# Patient Record
Sex: FEMALE | Race: WHITE | NOT HISPANIC OR LATINO | URBAN - METROPOLITAN AREA
[De-identification: names, ages, dates, MRNs, and addresses within clinical notes are randomized per-mention and may not be internally consistent; named-entity substitution may affect disease eponyms.]

---

## 2017-04-29 ENCOUNTER — EMERGENCY (EMERGENCY)
Facility: HOSPITAL | Age: 72
LOS: 1 days | Discharge: HOME | End: 2017-04-29
Attending: EMERGENCY MEDICINE | Admitting: EMERGENCY MEDICINE
Payer: MEDICARE

## 2017-04-29 DIAGNOSIS — K94.23 GASTROSTOMY MALFUNCTION: ICD-10-CM

## 2017-04-29 DIAGNOSIS — Z88.8 ALLERGY STATUS TO OTHER DRUGS, MEDICAMENTS AND BIOLOGICAL SUBSTANCES STATUS: ICD-10-CM

## 2017-04-29 DIAGNOSIS — Z91.018 ALLERGY TO OTHER FOODS: ICD-10-CM

## 2017-04-29 DIAGNOSIS — I10 ESSENTIAL (PRIMARY) HYPERTENSION: ICD-10-CM

## 2017-04-29 DIAGNOSIS — K94.23 GASTROSTOMY MALFUNCTION: Chronic | ICD-10-CM

## 2017-04-29 PROCEDURE — 74000: CPT | Mod: 26

## 2017-04-29 PROCEDURE — 99283 EMERGENCY DEPT VISIT LOW MDM: CPT | Mod: 25

## 2017-04-29 PROCEDURE — 43760 CHANGE GASTROSTOMY TUBE PERCUTANEOUS W/O GUIDE: CPT

## 2017-04-29 RX ORDER — IOHEXOL 300 MG/ML
50 INJECTION, SOLUTION INTRAVENOUS ONCE
Qty: 0 | Refills: 0 | Status: COMPLETED | OUTPATIENT
Start: 2017-04-29 | End: 2017-04-29

## 2017-04-29 RX ORDER — DIATRIZOATE MEGLUMINE 180 MG/ML
30 INJECTION, SOLUTION INTRAVESICAL ONCE
Qty: 0 | Refills: 0 | Status: DISCONTINUED | OUTPATIENT
Start: 2017-04-29 | End: 2017-04-29

## 2017-04-29 RX ADMIN — IOHEXOL 50 MILLILITER(S): 300 INJECTION, SOLUTION INTRAVENOUS at 23:00

## 2017-04-29 NOTE — ED PROVIDER NOTE - GASTROINTESTINAL, MLM
NABS, soft, ND, +PEG tube, unable to flush at bedside, no surrounding erythema, ecchymosis, warmth, induration or d/c noted

## 2017-04-29 NOTE — ED PROCEDURE NOTE - CPROC ED INFORMED CONSENT1
Benefits, risks, and possible complications of procedure explained to patient/caregiver who verbalized understanding and gave verbal consent. Benefits, risks, and possible complications of procedure explained to patient/caregiver who verbalized understanding and gave verbal consent./x

## 2017-04-29 NOTE — ED PROVIDER NOTE - MEDICAL DECISION MAKING DETAILS
pt with clogged PEG tube from group home, replaced at bedside with 16fr PEG tube, confirmed on AXR with no extravasation of contrast noted, medically stable for d/c

## 2017-04-29 NOTE — ED PROVIDER NOTE - OBJECTIVE STATEMENT
46 yo F with PMHx of MRCP, GERD, HTN, dementia, atypical psychosis, spastic dysplegia and dysphagia with PEG placement, last changed few months ago, diverticulosis, asp PNA, PE and DVT, not on any AC, sent from group home for clogged PEG tube since this afternoon.  Per aid, pt was noted to have clogged PEG tube and unable to flush the tube by RN staff and sent in for further evaluation.  No fever, chills, bleeding, d/c, abdominal pain, AMS, cough, and SOB noted

## 2017-04-29 NOTE — ED PROVIDER NOTE - PMH
CP (cerebral palsy)    DVT (deep venous thrombosis)    Dysphagia    GERD (gastroesophageal reflux disease)    HTN (hypertension)    Mental retardation    PE (pulmonary thromboembolism)    Spastic quadriplegia

## 2017-04-29 NOTE — ED PROCEDURE NOTE - CPROC ED POST RADIOGRAPHY1
post-procedure radiography performed/no extravasation of contrast/feeding tube in correct gastric position

## 2017-04-29 NOTE — ED PROCEDURE NOTE - CPROC ED FEED TUBE REPLAC DET1
Location was identified, and feeding tube inserted into the existing tract of the abdominal wall with easy passage. Tube was secured in place, and balloon inflated.

## 2017-05-07 ENCOUNTER — EMERGENCY (EMERGENCY)
Facility: HOSPITAL | Age: 72
LOS: 1 days | Discharge: HOME | End: 2017-05-07
Attending: EMERGENCY MEDICINE | Admitting: EMERGENCY MEDICINE
Payer: MEDICARE

## 2017-05-07 VITALS
DIASTOLIC BLOOD PRESSURE: 70 MMHG | TEMPERATURE: 98 F | OXYGEN SATURATION: 94 % | RESPIRATION RATE: 18 BRPM | SYSTOLIC BLOOD PRESSURE: 127 MMHG | HEART RATE: 75 BPM

## 2017-05-07 VITALS
OXYGEN SATURATION: 95 % | HEART RATE: 84 BPM | TEMPERATURE: 98 F | DIASTOLIC BLOOD PRESSURE: 76 MMHG | SYSTOLIC BLOOD PRESSURE: 125 MMHG | RESPIRATION RATE: 17 BRPM

## 2017-05-07 DIAGNOSIS — K94.23 GASTROSTOMY MALFUNCTION: Chronic | ICD-10-CM

## 2017-05-07 DIAGNOSIS — K94.23 GASTROSTOMY MALFUNCTION: ICD-10-CM

## 2017-05-07 DIAGNOSIS — Z88.8 ALLERGY STATUS TO OTHER DRUGS, MEDICAMENTS AND BIOLOGICAL SUBSTANCES STATUS: ICD-10-CM

## 2017-05-07 DIAGNOSIS — I10 ESSENTIAL (PRIMARY) HYPERTENSION: ICD-10-CM

## 2017-05-07 DIAGNOSIS — Z91.018 ALLERGY TO OTHER FOODS: ICD-10-CM

## 2017-05-07 PROCEDURE — 74010: CPT | Mod: 26

## 2017-05-07 PROCEDURE — 74020: CPT | Mod: 26

## 2017-05-07 PROCEDURE — 99283 EMERGENCY DEPT VISIT LOW MDM: CPT | Mod: 25

## 2017-05-07 NOTE — ED PROVIDER NOTE - OBJECTIVE STATEMENT
70 yo F with Hx of CP, DVT, GERD, HTN, Mental retardation, PE and spastic quadriplegia presents c/o g-tube dislodged 30 minutes ago. Pt's aid denies any other complaints at this time. Denies fever or chills.

## 2017-05-07 NOTE — ED PROVIDER NOTE - CONSTITUTIONAL, MLM
normal... Well appearing, well nourished, awake, alert and in no apparent distress. Pt is minimally verbal.

## 2017-05-07 NOTE — ED PROVIDER NOTE - NS ED MD SCRIBE ATTENDING SCRIBE SECTIONS
VITAL SIGNS( Pullset)/PHYSICAL EXAM/HISTORY OF PRESENT ILLNESS/INTAKE ASSESSMENT/SCREENINGS/PAST MEDICAL/SURGICAL/SOCIAL HISTORY/CONSULTATIONS/SHIFT CHANGE/DISPOSITION/PROGRESS NOTE/RESULTS/REVIEW OF SYSTEMS/HIV

## 2017-05-07 NOTE — ED PROVIDER NOTE - MEDICAL DECISION MAKING DETAILS
70 yo F with Hx of mental retardation and thus minimally very upon exam presents c/o G-tube dislodgement and abdominal stroma. 70 yo F with PMH of cerebral palsy, chronic PEG and thus minimally very upon exam presents c/o G-tube dislodgement and abdominal stroma. 72 yo F with PMH of cerebral palsy, chronic PEG and thus minimally very upon exam presents c/o G-tube dislodgement and abdominal stoma.  PEG tube replaced and confirmed on xray with no contrast extravagation.

## 2017-05-07 NOTE — ED PROVIDER NOTE - DIAGNOSTIC INTERPRETATION
Interpreted by ED physician  abdomen x-ray, 1 views  tube in place, no Foreign Body noted, soft tissue normal

## 2017-05-07 NOTE — ED ADULT NURSE NOTE - OBJECTIVE STATEMENT
pt brought it by aid because her gtube dislogded. Pt not in any pain/distress. pt nonverbal at baseline. pt w/ hx of CP, reflux, dementia, psychosis, spastic dysplegia brought it by aid because her gtube dislogded. Pt not in any pain/distress. pt nonverbal at baseline.

## 2019-10-10 ENCOUNTER — EMERGENCY (EMERGENCY)
Facility: HOSPITAL | Age: 74
LOS: 1 days | Discharge: HOME | End: 2019-10-10
Attending: EMERGENCY MEDICINE | Admitting: EMERGENCY MEDICINE
Payer: MEDICARE

## 2019-10-10 VITALS
OXYGEN SATURATION: 98 % | HEART RATE: 98 BPM | TEMPERATURE: 98 F | RESPIRATION RATE: 18 BRPM | SYSTOLIC BLOOD PRESSURE: 102 MMHG | DIASTOLIC BLOOD PRESSURE: 62 MMHG

## 2019-10-10 VITALS
DIASTOLIC BLOOD PRESSURE: 87 MMHG | OXYGEN SATURATION: 94 % | SYSTOLIC BLOOD PRESSURE: 127 MMHG | TEMPERATURE: 98 F | HEART RATE: 85 BPM | RESPIRATION RATE: 18 BRPM

## 2019-10-10 DIAGNOSIS — K94.23 GASTROSTOMY MALFUNCTION: Chronic | ICD-10-CM

## 2019-10-10 PROCEDURE — 99282 EMERGENCY DEPT VISIT SF MDM: CPT

## 2019-10-10 PROCEDURE — 99283 EMERGENCY DEPT VISIT LOW MDM: CPT

## 2019-10-10 PROCEDURE — 99285 EMERGENCY DEPT VISIT HI MDM: CPT

## 2019-10-10 NOTE — ED ADULT NURSE NOTE - CHIEF COMPLAINT QUOTE
Patient from [facility], comes in for clogged Gastrostomy Tube, which health aid noted 12pm. Patient states possible obstructed tube causing pain. no bleeding. No fever

## 2019-10-10 NOTE — ED PROVIDER NOTE - CLINICAL SUMMARY MEDICAL DECISION MAKING FREE TEXT BOX
74f w feeding tube malfunction. Unable to flush or replace tube. Care d/w GI and patient to be transferred to [facility] for ED GI eval.

## 2019-10-10 NOTE — ED PROVIDER NOTE - PHYSICAL EXAMINATION
Physical Exam  General: Awake, alert, NAD, elderly/frail, non-toxic appearing, NCAT  Eyes: PERRL, EOMI, no icterus, lids and conjunctivae are normal  ENT: External inspection normal, pink/moist membranes, pharynx normal  CV: S1S2, regular rate and rhythm, no murmur/gallops/rubs, no JVD, 2+ pulses b/l, no edema/cords/homans, warm/well-perfused  Respiratory: Normal respiratory rate/effort, no respiratory distress, normal voice, speaking full sentences, lungs clear to auscultation b/l, no wheezing/rales/rhonchi, no retractions, no stridor  Abdomen: Soft abdomen, Feeding tube in LUQ, no tender/distended/guarding/rebound, no CVA tender  Musculoskeletal: Contracted extremities at baseline, N/V intact  Neck: FROM neck, supple, no meningismus, trachea midline, no JVD  Integumentary: Color normal for race, warm and dry, no rash  Neuro: Alert/interactive, CN 2-12 grossly intact, baseline motor/sensory

## 2019-10-10 NOTE — CHART NOTE - NSCHARTNOTEFT_GEN_A_CORE
Called by ED physician at [facility] Dr. Fontaine for PEG dysfunction. Patient was noted to have dysfunctioning PEG x 2 days for which she initially presented to the ED but was discharged after return of function. She represented with her aide today after her PEG was unable to be flushed. Per Dr. Fontaine, he attempted flushing with multiple modalities including flushing of ginger ale and replacement, however, unsuccessful. The patient was transferred to [facility] ED for evaluation. Pt was seen at bedside and noted to have a dysfunctioning PEG tube. The PEG tube was then cleaned with a cleaning brush and subsequently flushed with 60 cc of sterile water without difficulty. Pt and aid were instructed to follow-up with primary GI for further PEG management and possible replacement.

## 2019-10-10 NOTE — ED PROVIDER NOTE - PROGRESS NOTE DETAILS
Attempted to flush feeding tube and to flush tube w ginger ale but unable to flush tube. attempted to deflate balloon to replace and unable to deflate balloon or remove dysfunctional feeding tube. will d/w GI. Care d/w Dr Karolina ESCALANTE and recommending transfer to [facility] ED for ED eval by GI. Care d/w Dr Solomon accepting transfer

## 2019-10-10 NOTE — ED ADULT TRIAGE NOTE - CHIEF COMPLAINT QUOTE
Patient from [facility], comes in for clogged Gastrostomy Tube, which health aid noted 12pm. Patient states possible obstructed tube causing pain. no bleeding. No fever

## 2019-10-10 NOTE — ED PROVIDER NOTE - GASTROINTESTINAL, MLM
PEG in place; surrounding area appears clean and dry.  No grimacing on palpation throughout entire abdomen.

## 2019-10-10 NOTE — ED PROVIDER NOTE - NSFOLLOWUPINSTRUCTIONS_ED_ALL_ED_FT
Please follow up with your primary physician in 1-2 days for re evaluation.  Please return to ER immediately should your symptoms worsen or if you have any concern prior to this recommended follow up.    How to Use and Care for Your PEG Tube    WHAT YOU NEED TO KNOW:    Healthcare providers will teach you how to put liquid food and certain medicines through the tube. You will also be taught how to care for the PEG tube and the skin where the tube enters your body.    DISCHARGE INSTRUCTIONS:    Return to the emergency department if:     You start coughing or vomiting during or after a feeding.       You have severe abdominal pain.       Blood or tube feeding fluid leaks from the PEG tube site.       Your PEG tube is shorter than it was when it was put in.      Your PEG tube comes out.       Your mouth feels dry, your heart feels like it is beating too fast, or you feel weak.    Call your doctor or gastroenterologist if:     You have nausea, diarrhea, or abdominal bloating or discomfort.      You have stomach pain after each feeding or when you move around.      You have discomfort or pain around your PEG tube site.      The skin around your PEG tube is red, swollen, or draining pus.      You weigh less than your healthcare provider says you should.      Your PEG tube is longer than it was when it was put in.      You have questions or concerns about your condition or care.    How to use a PEG tube for feedings: Your healthcare provider will tell you when and how often to use your PEG tube for feedings. A bolus feeding means nutrition is given over a short period of time. An intermittent feeding is scheduled for certain times throughout the day. Continuous feedings run all the time. The following are types of PEG tube systems:     A feeding syringe helps liquid food to flow steadily into the PEG tube. The syringe is connected to the end of the PEG tube. You will pour the liquid into the syringe and hold it up high. The syringe plunger may be used to gently push the last of the liquid through the PEG tube.      A gravity drip bag allows liquid food to drip more slowly into the PEG tube. The tubing from the gravity drip bag is connected to the end of the PEG tube. You will pour the liquid into the bag. The bag hangs on a medical pole or similar device. You can adjust the flow rate on the tubing according to your healthcare provider's instructions.      An electric feeding pump controls the flow of the liquid food into your PEG tube. Your healthcare provider will teach you how to set up and use the pump.    How to care for your PEG tube:     Always wash your hands before and after each use. This helps prevent infections. Use soap and water to wash your hands.Handwashing           Always flush your PEG tube before and after each use. This helps prevent blockage from formula or medicine. Use at least 30 milliliters (mL) of water to flush the tube. Follow directions for flushing your PEG tube.      If your PEG tube becomes clogged, try to unclog it as soon as you can. Flush your PEG tube with a 60 mL syringe filled with warm water. Never use a wire to unclog the tube. A wire can poke a hole in the tube. Your healthcare provider may have you use a medicine or a plastic brush to help unclog your tube.      Check the PEG tube daily:   Check the length of the tube from the end to where it goes into your body. If it gets longer, it may be at risk for coming out. If it gets shorter, let your healthcare provider know right away.      Check the bumper. The bumper is a piece that goes around the tube, next to your skin. It should be snug against your skin. Tell your healthcare provider if the bumper seems too tight or too loose.      Use an alcohol pad to clean the end of your PEG tube. Clean before you connect tubing or a syringe to your PEG tube and after you remove it. Do not let the end of the PEG tube touch anything.    How do I care for the skin around my PEG tube?     Do not remove the stitches or medical tape. Stitches or medical tape hold your PEG tube in place when you first get it. Your healthcare provider will take them off once the skin around your tube heals. Leave clean bandages over the tube area for the first 24 hours after the tube is put in. You may not need to use bandages after 24 hours if the skin around the tube looks dry. Ask when you can shower or bathe.      Routine skin care:   Clean the skin around your tube 1 to 2 times each day. Ask your healthcare provider what you should use to clean your skin. Check for redness, swelling, or pus in the area where the tube goes into your body. Check for fluid draining from your stoma (the hole where the tube was put in).      Gently turn your tube daily after your stitches come out. This may decrease pressure on your skin under the bumper. It may also help prevent an infection.      Keep the skin around your PEG tube dry. This will help prevent skin irritation and infection.      Use topical medicines as directed. You may need to put antibiotic cream on the skin around your tube after you are done cleaning it.    Other tips to know about a PEG tube:     Keep a record of liquids you have each day. You may also need to keep a record of how much you urinate and how many times you have a bowel movement each day. Bring this record to your follow-up visits.      Check your weight directed. Keep a record of your weights and bring it to your follow-up visits. Your healthcare provider may need to change your feedings if your weight changes too quickly.Weight Checks SHANTEL           Take your medicines as directed. Learn which of your medicines can be crushed, mixed with water, and given through the PEG tube. Certain medicines should not be crushed or may clog the PEG tube.      Go to all follow-up appointments. You may need to have blood tests and other tests when you see your healthcare provider.    Follow up with your doctor or gastroenterologist as directed: Write down your questions so you remember to ask them during your visits.       © Copyright [employer] 2019 All illustrations and images included in CareNotes are the copyrighted property of [employer].A.M., Inc[employer] or Assignment Editor.      back to top                      © Copyright [employer] 2019

## 2019-10-10 NOTE — ED PROVIDER NOTE - NS ED ROS FT
Review of Systems  Constitutional:  No fever or chills.   Eyes:  Negative.   ENMT:  No nasal congestion, discharge, or throat pain.   Cardiac:  No chest pain, syncope, or edema.  Respiratory:  No dyspnea, wheezing, or cough. No hemoptysis.  GI:  See HPI  :  No dysuria or hematuria.   Musculoskeletal:  Chronic spastic paraplegia unchanged  Skin:  No skin rash, jaundice, or lesions.  Neuro:  No change in mental status.

## 2019-10-10 NOTE — ED PROVIDER NOTE - PATIENT PORTAL LINK FT
You can access the FollowMyHealth Patient Portal offered by [facility] by registering at the following website: [email or web address]. By joining [facility]’s FollowMyHealth portal, you will also be able to view your health information using other applications (apps) compatible with our system.

## 2019-10-10 NOTE — ED ADULT NURSE NOTE - TEMPLATE
ED Nursing criteria listed below was addressed during verbal handoff:     Abnormal vitals: No  Abnormal results: Yes  Med Reconciliation completed: Yes  Meds given in ED: Yes  Any Overdue Meds: Yes  Core Measures: N/A  Isolation: No  Special needs: No  Skin assessment: Yes, right hand is swollen.    Observation Patient  Education provided: Yes  
Abdominal Pain, N/V/D

## 2019-10-10 NOTE — ED PROVIDER NOTE - NSBENEFITOFTRANSFER_ED_A_ED
Continuity of Care at Other Facility/Worsening of Condition, Death, or Disability if Patient Does Not Transfer/Obtain Level of Care/Service Not Available at this Facility

## 2019-10-10 NOTE — ED PROVIDER NOTE - CLINICAL SUMMARY MEDICAL DECISION MAKING FREE TEXT BOX
Patient in ED w concern for PEG tube malfunction, sent to [facility] ED from [facility] ED for GI eval of PEG tube.  GI in ED to eval on arrival and PEG tube is cleaned and flushing appropriately.  No additional acute complaints or concerns per care taker at bedside.  Will plan for discharge home with instruction for prompt GI follow up in 1-2 days (patient has GI physician out of another facility).  Caretaker is instructed to return patient to ED immediately should symptoms worsen or if there is any concern prior to this recommended follow up.  Caretaker is aware of plan and verbalizes her understanding.  Will discharge home at this time.

## 2019-10-10 NOTE — ED ADULT NURSE NOTE - OBJECTIVE STATEMENT
per  Liborio ([facility]), while at day program feeding tube became clogged today while attempting to pass meds, pt c/o luq pain, states lbm was today, receives tube feeds from [id number] daily, no s/s of distress, awaiting provider eval (speech garbled-baseline per staff) hha at bedside

## 2019-10-10 NOTE — ED PROVIDER NOTE - OBJECTIVE STATEMENT
74f w a hx of HTN, HLD, MR/Psych, spastic quadriplegia, and dysphagia dependent on PEG tube. Pt presents w aide for PEG tube malfunction since 12p today. Aide reports unable to pass feed through tube. Pt reports no symptoms at this time. No abdominal pain. No vomit/diarrhea, no black/bloody stools. Patient in usual state of health prior. No other injury, no other complaints. History assisted by aide at bedside.

## 2019-10-10 NOTE — ED PROVIDER NOTE - OBJECTIVE STATEMENT
74 year old female with history of CP, DVT, Dysphagia, GERD, chronic PEG, HTN, mental retardation, PE, spastic quadreplgia presents to ED from [facility] after being evaluated for PEG tube malfunction.  As per report, PEG tube was clogged and patient was sent to [facility] for GI eval of PEG tube.  GI team in ED to evaluate on patient's arrival.  Patient is accompanied by health aid and no additional acute complaints or concerns are noted.  Additional history is limited as patient is non verbal.

## 2019-10-10 NOTE — ED PROVIDER NOTE - CARE PLAN
Principal Discharge DX:	PEG tube malfunction Principal Discharge DX:	PEG tube malfunction  Assessment and plan of treatment:	74f w feeding tube malfunction. --Will attempt to flush or replace tube, observe/re-assess.

## 2019-10-10 NOTE — ED ADULT NURSE NOTE - OBJECTIVE STATEMENT
pt hx of dysphagia, spastic quad and CP with g tube for feedings and meds presents with clogged tube all day per HHA.

## 2019-10-11 PROBLEM — I82.409 ACUTE EMBOLISM AND THROMBOSIS OF UNSPECIFIED DEEP VEINS OF UNSPECIFIED LOWER EXTREMITY: Chronic | Status: ACTIVE | Noted: 2017-04-29

## 2019-10-11 PROBLEM — G82.50 QUADRIPLEGIA, UNSPECIFIED: Chronic | Status: ACTIVE | Noted: 2017-04-29

## 2019-10-11 PROBLEM — I26.99 OTHER PULMONARY EMBOLISM WITHOUT ACUTE COR PULMONALE: Chronic | Status: ACTIVE | Noted: 2017-04-29

## 2019-10-11 PROBLEM — F79 UNSPECIFIED INTELLECTUAL DISABILITIES: Chronic | Status: ACTIVE | Noted: 2017-04-29

## 2019-10-11 PROBLEM — R13.10 DYSPHAGIA, UNSPECIFIED: Chronic | Status: ACTIVE | Noted: 2017-04-29

## 2019-10-11 PROBLEM — I10 ESSENTIAL (PRIMARY) HYPERTENSION: Chronic | Status: ACTIVE | Noted: 2017-04-29

## 2019-10-11 PROBLEM — G80.9 CEREBRAL PALSY, UNSPECIFIED: Chronic | Status: ACTIVE | Noted: 2017-04-29

## 2019-10-11 PROBLEM — K21.9 GASTRO-ESOPHAGEAL REFLUX DISEASE WITHOUT ESOPHAGITIS: Chronic | Status: ACTIVE | Noted: 2017-04-29

## 2019-10-14 DIAGNOSIS — K94.23 GASTROSTOMY MALFUNCTION: ICD-10-CM

## 2019-10-17 DIAGNOSIS — K94.23 GASTROSTOMY MALFUNCTION: ICD-10-CM

## 2020-04-03 ENCOUNTER — INPATIENT (INPATIENT)
Facility: HOSPITAL | Age: 75
LOS: 34 days | Discharge: HOME HEALTH | DRG: 207 | End: 2020-05-08
Attending: INTERNAL MEDICINE | Admitting: INTERNAL MEDICINE
Payer: MEDICARE

## 2020-04-03 VITALS
HEART RATE: 110 BPM | RESPIRATION RATE: 18 BRPM | OXYGEN SATURATION: 90 % | SYSTOLIC BLOOD PRESSURE: 159 MMHG | DIASTOLIC BLOOD PRESSURE: 95 MMHG

## 2020-04-03 DIAGNOSIS — K94.23 GASTROSTOMY MALFUNCTION: Chronic | ICD-10-CM

## 2020-04-03 LAB
ALBUMIN SERPL ELPH-MCNC: 3.1 G/DL — LOW (ref 3.4–5)
ALP SERPL-CCNC: 84 U/L — SIGNIFICANT CHANGE UP (ref 40–120)
ALT FLD-CCNC: 30 U/L — SIGNIFICANT CHANGE UP (ref 12–42)
ANION GAP SERPL CALC-SCNC: 7 MMOL/L — LOW (ref 9–16)
APTT BLD: 27.7 SEC — SIGNIFICANT CHANGE UP (ref 27.5–36.3)
APTT BLD: 30.4 SEC — SIGNIFICANT CHANGE UP (ref 27.5–36.3)
AST SERPL-CCNC: 55 U/L — HIGH (ref 15–37)
BASE EXCESS BLDA CALC-SCNC: -1.3 MMOL/L — SIGNIFICANT CHANGE UP (ref -2–3)
BASOPHILS # BLD AUTO: 0.03 K/UL — SIGNIFICANT CHANGE UP (ref 0–0.2)
BASOPHILS NFR BLD AUTO: 0.5 % — SIGNIFICANT CHANGE UP (ref 0–2)
BILIRUB SERPL-MCNC: 0.5 MG/DL — SIGNIFICANT CHANGE UP (ref 0.2–1.2)
BUN SERPL-MCNC: 18 MG/DL — SIGNIFICANT CHANGE UP (ref 7–23)
CALCIUM SERPL-MCNC: 8.8 MG/DL — SIGNIFICANT CHANGE UP (ref 8.5–10.5)
CHLORIDE SERPL-SCNC: 95 MMOL/L — LOW (ref 96–108)
CO2 SERPL-SCNC: 33 MMOL/L — HIGH (ref 22–31)
CREAT SERPL-MCNC: 0.76 MG/DL — SIGNIFICANT CHANGE UP (ref 0.5–1.3)
CRP SERPL-MCNC: 1.28 MG/DL — HIGH (ref 0–0.4)
D DIMER BLD IA.RAPID-MCNC: 478 NG/ML DDU — HIGH
EOSINOPHIL # BLD AUTO: 0.05 K/UL — SIGNIFICANT CHANGE UP (ref 0–0.5)
EOSINOPHIL NFR BLD AUTO: 0.8 % — SIGNIFICANT CHANGE UP (ref 0–6)
FERRITIN SERPL-MCNC: 162 NG/ML — HIGH (ref 15–150)
GLUCOSE SERPL-MCNC: 122 MG/DL — HIGH (ref 70–99)
HCO3 BLDA-SCNC: 24 MMOL/L — SIGNIFICANT CHANGE UP (ref 21–28)
HCT VFR BLD CALC: 44.8 % — SIGNIFICANT CHANGE UP (ref 34.5–45)
HGB BLD-MCNC: 13.8 G/DL — SIGNIFICANT CHANGE UP (ref 11.5–15.5)
IMM GRANULOCYTES NFR BLD AUTO: 0.2 % — SIGNIFICANT CHANGE UP (ref 0–1.5)
INR BLD: 0.95 — SIGNIFICANT CHANGE UP (ref 0.88–1.16)
INR BLD: 0.97 — SIGNIFICANT CHANGE UP (ref 0.88–1.16)
LACTATE SERPL-SCNC: 2 MMOL/L — SIGNIFICANT CHANGE UP (ref 0.5–2)
LACTATE SERPL-SCNC: 4.2 MMOL/L — CRITICAL HIGH (ref 0.4–2)
LYMPHOCYTES # BLD AUTO: 3 K/UL — SIGNIFICANT CHANGE UP (ref 1–3.3)
LYMPHOCYTES # BLD AUTO: 48 % — HIGH (ref 13–44)
MCHC RBC-ENTMCNC: 30.8 GM/DL — LOW (ref 32–36)
MCHC RBC-ENTMCNC: 31.1 PG — SIGNIFICANT CHANGE UP (ref 27–34)
MCV RBC AUTO: 100.9 FL — HIGH (ref 80–100)
MONOCYTES # BLD AUTO: 0.83 K/UL — SIGNIFICANT CHANGE UP (ref 0–0.9)
MONOCYTES NFR BLD AUTO: 13.3 % — SIGNIFICANT CHANGE UP (ref 2–14)
MRSA PCR RESULT.: NEGATIVE — SIGNIFICANT CHANGE UP
NEUTROPHILS # BLD AUTO: 2.33 K/UL — SIGNIFICANT CHANGE UP (ref 1.8–7.4)
NEUTROPHILS NFR BLD AUTO: 37.2 % — LOW (ref 43–77)
NRBC # BLD: 0 /100 WBCS — SIGNIFICANT CHANGE UP (ref 0–0)
PCO2 BLDA: 41 MMHG — SIGNIFICANT CHANGE UP (ref 32–45)
PCO2 BLDV: 73 MMHG — HIGH (ref 41–51)
PCO2 BLDV: 97 MMHG — HIGH (ref 41–51)
PH BLDA: 7.38 — SIGNIFICANT CHANGE UP (ref 7.35–7.45)
PH BLDV: 7.08 — CRITICAL LOW (ref 7.32–7.43)
PH BLDV: 7.3 — LOW (ref 7.32–7.43)
PLATELET # BLD AUTO: 157 K/UL — SIGNIFICANT CHANGE UP (ref 150–400)
PO2 BLDA: 101 MMHG — SIGNIFICANT CHANGE UP (ref 83–108)
PO2 BLDV: 23 MMHG — LOW (ref 35–40)
PO2 BLDV: 84 MMHG — HIGH (ref 35–40)
POTASSIUM SERPL-MCNC: 3.6 MMOL/L — SIGNIFICANT CHANGE UP (ref 3.5–5.3)
POTASSIUM SERPL-SCNC: 3.6 MMOL/L — SIGNIFICANT CHANGE UP (ref 3.5–5.3)
PROCALCITONIN SERPL-MCNC: 0.46 NG/ML — HIGH (ref 0.02–0.1)
PROT SERPL-MCNC: 7.4 G/DL — SIGNIFICANT CHANGE UP (ref 6.4–8.2)
PROTHROM AB SERPL-ACNC: 10.4 SEC — SIGNIFICANT CHANGE UP (ref 10–12.9)
PROTHROM AB SERPL-ACNC: 11.1 SEC — SIGNIFICANT CHANGE UP (ref 10–12.9)
RBC # BLD: 4.44 M/UL — SIGNIFICANT CHANGE UP (ref 3.8–5.2)
RBC # FLD: 13 % — SIGNIFICANT CHANGE UP (ref 10.3–14.5)
S AUREUS DNA NOSE QL NAA+PROBE: POSITIVE
SAO2 % BLDA: 98 % — SIGNIFICANT CHANGE UP (ref 95–100)
SAO2 % BLDV: 34 % — SIGNIFICANT CHANGE UP
SAO2 % BLDV: 91 % — SIGNIFICANT CHANGE UP
SARS-COV-2 RNA SPEC QL NAA+PROBE: DETECTED
SODIUM SERPL-SCNC: 135 MMOL/L — SIGNIFICANT CHANGE UP (ref 132–145)
WBC # BLD: 6.25 K/UL — SIGNIFICANT CHANGE UP (ref 3.8–10.5)
WBC # FLD AUTO: 6.25 K/UL — SIGNIFICANT CHANGE UP (ref 3.8–10.5)

## 2020-04-03 PROCEDURE — 71045 X-RAY EXAM CHEST 1 VIEW: CPT | Mod: 26,77

## 2020-04-03 PROCEDURE — 93010 ELECTROCARDIOGRAM REPORT: CPT | Mod: 59

## 2020-04-03 PROCEDURE — 71045 X-RAY EXAM CHEST 1 VIEW: CPT | Mod: 26

## 2020-04-03 PROCEDURE — 99291 CRITICAL CARE FIRST HOUR: CPT | Mod: 25

## 2020-04-03 PROCEDURE — 31500 INSERT EMERGENCY AIRWAY: CPT

## 2020-04-03 PROCEDURE — 99291 CRITICAL CARE FIRST HOUR: CPT

## 2020-04-03 RX ORDER — CHLORHEXIDINE GLUCONATE 213 G/1000ML
15 SOLUTION TOPICAL EVERY 12 HOURS
Refills: 0 | Status: DISCONTINUED | OUTPATIENT
Start: 2020-04-03 | End: 2020-04-03

## 2020-04-03 RX ORDER — CHLORHEXIDINE GLUCONATE 213 G/1000ML
1 SOLUTION TOPICAL
Refills: 0 | Status: DISCONTINUED | OUTPATIENT
Start: 2020-04-03 | End: 2020-05-08

## 2020-04-03 RX ORDER — PROPOFOL 10 MG/ML
40 INJECTION, EMULSION INTRAVENOUS ONCE
Refills: 0 | Status: COMPLETED | OUTPATIENT
Start: 2020-04-03 | End: 2020-04-03

## 2020-04-03 RX ORDER — HYDROXYCHLOROQUINE SULFATE 200 MG
400 TABLET ORAL EVERY 12 HOURS
Refills: 0 | Status: COMPLETED | OUTPATIENT
Start: 2020-04-03 | End: 2020-04-04

## 2020-04-03 RX ORDER — ACETAMINOPHEN 500 MG
650 TABLET ORAL EVERY 6 HOURS
Refills: 0 | Status: DISCONTINUED | OUTPATIENT
Start: 2020-04-03 | End: 2020-05-08

## 2020-04-03 RX ORDER — SENNA PLUS 8.6 MG/1
2 TABLET ORAL AT BEDTIME
Refills: 0 | Status: DISCONTINUED | OUTPATIENT
Start: 2020-04-03 | End: 2020-04-14

## 2020-04-03 RX ORDER — PROPOFOL 10 MG/ML
10 INJECTION, EMULSION INTRAVENOUS
Qty: 1000 | Refills: 0 | Status: DISCONTINUED | OUTPATIENT
Start: 2020-04-03 | End: 2020-04-07

## 2020-04-03 RX ORDER — SODIUM CHLORIDE 9 MG/ML
10 INJECTION INTRAMUSCULAR; INTRAVENOUS; SUBCUTANEOUS
Refills: 0 | Status: DISCONTINUED | OUTPATIENT
Start: 2020-04-03 | End: 2020-05-08

## 2020-04-03 RX ORDER — HYDROXYCHLOROQUINE SULFATE 200 MG
TABLET ORAL
Refills: 0 | Status: COMPLETED | OUTPATIENT
Start: 2020-04-03 | End: 2020-04-08

## 2020-04-03 RX ORDER — PANTOPRAZOLE SODIUM 20 MG/1
40 TABLET, DELAYED RELEASE ORAL DAILY
Refills: 0 | Status: DISCONTINUED | OUTPATIENT
Start: 2020-04-03 | End: 2020-05-08

## 2020-04-03 RX ORDER — PROPOFOL 10 MG/ML
10 INJECTION, EMULSION INTRAVENOUS
Qty: 500 | Refills: 0 | Status: DISCONTINUED | OUTPATIENT
Start: 2020-04-03 | End: 2020-04-03

## 2020-04-03 RX ORDER — KETAMINE HYDROCHLORIDE 100 MG/ML
50 INJECTION INTRAMUSCULAR; INTRAVENOUS ONCE
Refills: 0 | Status: DISCONTINUED | OUTPATIENT
Start: 2020-04-03 | End: 2020-04-03

## 2020-04-03 RX ORDER — ROCURONIUM BROMIDE 10 MG/ML
100 VIAL (ML) INTRAVENOUS ONCE
Refills: 0 | Status: COMPLETED | OUTPATIENT
Start: 2020-04-03 | End: 2020-04-03

## 2020-04-03 RX ORDER — HYDROXYCHLOROQUINE SULFATE 200 MG
200 TABLET ORAL EVERY 12 HOURS
Refills: 0 | Status: COMPLETED | OUTPATIENT
Start: 2020-04-04 | End: 2020-04-08

## 2020-04-03 RX ORDER — CHLORHEXIDINE GLUCONATE 213 G/1000ML
15 SOLUTION TOPICAL EVERY 12 HOURS
Refills: 0 | Status: DISCONTINUED | OUTPATIENT
Start: 2020-04-03 | End: 2020-04-07

## 2020-04-03 RX ORDER — VANCOMYCIN HCL 1 G
800 VIAL (EA) INTRAVENOUS EVERY 12 HOURS
Refills: 0 | Status: DISCONTINUED | OUTPATIENT
Start: 2020-04-03 | End: 2020-04-04

## 2020-04-03 RX ORDER — SODIUM CHLORIDE 9 MG/ML
500 INJECTION INTRAMUSCULAR; INTRAVENOUS; SUBCUTANEOUS ONCE
Refills: 0 | Status: COMPLETED | OUTPATIENT
Start: 2020-04-03 | End: 2020-04-03

## 2020-04-03 RX ORDER — AZITHROMYCIN 500 MG/1
250 TABLET, FILM COATED ORAL EVERY 24 HOURS
Refills: 0 | Status: DISCONTINUED | OUTPATIENT
Start: 2020-04-03 | End: 2020-04-09

## 2020-04-03 RX ORDER — ENOXAPARIN SODIUM 100 MG/ML
40 INJECTION SUBCUTANEOUS EVERY 24 HOURS
Refills: 0 | Status: DISCONTINUED | OUTPATIENT
Start: 2020-04-03 | End: 2020-04-06

## 2020-04-03 RX ORDER — VANCOMYCIN HCL 1 G
1000 VIAL (EA) INTRAVENOUS ONCE
Refills: 0 | Status: COMPLETED | OUTPATIENT
Start: 2020-04-03 | End: 2020-04-03

## 2020-04-03 RX ORDER — INSULIN LISPRO 100/ML
VIAL (ML) SUBCUTANEOUS EVERY 6 HOURS
Refills: 0 | Status: DISCONTINUED | OUTPATIENT
Start: 2020-04-03 | End: 2020-05-08

## 2020-04-03 RX ORDER — PROPOFOL 10 MG/ML
50 INJECTION, EMULSION INTRAVENOUS ONCE
Refills: 0 | Status: COMPLETED | OUTPATIENT
Start: 2020-04-03 | End: 2020-04-03

## 2020-04-03 RX ORDER — PROPOFOL 10 MG/ML
20 INJECTION, EMULSION INTRAVENOUS
Qty: 1000 | Refills: 0 | Status: DISCONTINUED | OUTPATIENT
Start: 2020-04-03 | End: 2020-04-03

## 2020-04-03 RX ORDER — CHLORHEXIDINE GLUCONATE 213 G/1000ML
1 SOLUTION TOPICAL
Refills: 0 | Status: DISCONTINUED | OUTPATIENT
Start: 2020-04-03 | End: 2020-04-03

## 2020-04-03 RX ORDER — ETOMIDATE 2 MG/ML
20 INJECTION INTRAVENOUS ONCE
Refills: 0 | Status: COMPLETED | OUTPATIENT
Start: 2020-04-03 | End: 2020-04-03

## 2020-04-03 RX ORDER — PIPERACILLIN AND TAZOBACTAM 4; .5 G/20ML; G/20ML
3.38 INJECTION, POWDER, LYOPHILIZED, FOR SOLUTION INTRAVENOUS EVERY 6 HOURS
Refills: 0 | Status: DISCONTINUED | OUTPATIENT
Start: 2020-04-03 | End: 2020-04-03

## 2020-04-03 RX ORDER — NOREPINEPHRINE BITARTRATE/D5W 8 MG/250ML
0.05 PLASTIC BAG, INJECTION (ML) INTRAVENOUS
Qty: 8 | Refills: 0 | Status: DISCONTINUED | OUTPATIENT
Start: 2020-04-03 | End: 2020-04-07

## 2020-04-03 RX ORDER — FENTANYL CITRATE 50 UG/ML
50 INJECTION INTRAVENOUS ONCE
Refills: 0 | Status: DISCONTINUED | OUTPATIENT
Start: 2020-04-03 | End: 2020-04-03

## 2020-04-03 RX ORDER — ACETAMINOPHEN 500 MG
650 TABLET ORAL ONCE
Refills: 0 | Status: COMPLETED | OUTPATIENT
Start: 2020-04-03 | End: 2020-04-03

## 2020-04-03 RX ORDER — PIPERACILLIN AND TAZOBACTAM 4; .5 G/20ML; G/20ML
3.38 INJECTION, POWDER, LYOPHILIZED, FOR SOLUTION INTRAVENOUS ONCE
Refills: 0 | Status: COMPLETED | OUTPATIENT
Start: 2020-04-03 | End: 2020-04-03

## 2020-04-03 RX ADMIN — PIPERACILLIN AND TAZOBACTAM 100 GRAM(S): 4; .5 INJECTION, POWDER, LYOPHILIZED, FOR SOLUTION INTRAVENOUS at 14:54

## 2020-04-03 RX ADMIN — Medication 650 MILLIGRAM(S): at 19:36

## 2020-04-03 RX ADMIN — ETOMIDATE 20 MILLIGRAM(S): 2 INJECTION INTRAVENOUS at 04:58

## 2020-04-03 RX ADMIN — SODIUM CHLORIDE 500 MILLILITER(S): 9 INJECTION INTRAMUSCULAR; INTRAVENOUS; SUBCUTANEOUS at 07:00

## 2020-04-03 RX ADMIN — Medication 1000 MILLIGRAM(S): at 06:45

## 2020-04-03 RX ADMIN — PROPOFOL 50 MILLIGRAM(S): 10 INJECTION, EMULSION INTRAVENOUS at 06:20

## 2020-04-03 RX ADMIN — Medication 250 MILLIGRAM(S): at 05:45

## 2020-04-03 RX ADMIN — Medication 400 MILLIGRAM(S): at 19:41

## 2020-04-03 RX ADMIN — ENOXAPARIN SODIUM 40 MILLIGRAM(S): 100 INJECTION SUBCUTANEOUS at 12:20

## 2020-04-03 RX ADMIN — Medication 650 MILLIGRAM(S): at 12:20

## 2020-04-03 RX ADMIN — SODIUM CHLORIDE 500 MILLILITER(S): 9 INJECTION INTRAMUSCULAR; INTRAVENOUS; SUBCUTANEOUS at 05:35

## 2020-04-03 RX ADMIN — AZITHROMYCIN 250 MILLIGRAM(S): 500 TABLET, FILM COATED ORAL at 19:41

## 2020-04-03 RX ADMIN — Medication 100 MILLIGRAM(S): at 04:59

## 2020-04-03 RX ADMIN — KETAMINE HYDROCHLORIDE 50 MILLIGRAM(S): 100 INJECTION INTRAMUSCULAR; INTRAVENOUS at 08:00

## 2020-04-03 RX ADMIN — Medication 40 MILLIGRAM(S): at 19:30

## 2020-04-03 RX ADMIN — Medication 5.22 MICROGRAM(S)/KG/MIN: at 12:56

## 2020-04-03 RX ADMIN — PANTOPRAZOLE SODIUM 40 MILLIGRAM(S): 20 TABLET, DELAYED RELEASE ORAL at 12:20

## 2020-04-03 RX ADMIN — PROPOFOL 40 MILLIGRAM(S): 10 INJECTION, EMULSION INTRAVENOUS at 07:05

## 2020-04-03 RX ADMIN — PIPERACILLIN AND TAZOBACTAM 200 GRAM(S): 4; .5 INJECTION, POWDER, LYOPHILIZED, FOR SOLUTION INTRAVENOUS at 05:35

## 2020-04-03 RX ADMIN — SODIUM CHLORIDE 500 MILLILITER(S): 9 INJECTION INTRAMUSCULAR; INTRAVENOUS; SUBCUTANEOUS at 06:35

## 2020-04-03 RX ADMIN — SODIUM CHLORIDE 500 MILLILITER(S): 9 INJECTION INTRAMUSCULAR; INTRAVENOUS; SUBCUTANEOUS at 08:00

## 2020-04-03 RX ADMIN — PROPOFOL 6.6 MICROGRAM(S)/KG/MIN: 10 INJECTION, EMULSION INTRAVENOUS at 06:44

## 2020-04-03 RX ADMIN — PROPOFOL 3.3 MICROGRAM(S)/KG/MIN: 10 INJECTION, EMULSION INTRAVENOUS at 16:54

## 2020-04-03 RX ADMIN — PIPERACILLIN AND TAZOBACTAM 3.38 GRAM(S): 4; .5 INJECTION, POWDER, LYOPHILIZED, FOR SOLUTION INTRAVENOUS at 05:50

## 2020-04-03 RX ADMIN — CHLORHEXIDINE GLUCONATE 15 MILLILITER(S): 213 SOLUTION TOPICAL at 15:25

## 2020-04-03 RX ADMIN — FENTANYL CITRATE 50 MICROGRAM(S): 50 INJECTION INTRAVENOUS at 07:10

## 2020-04-03 NOTE — ED PROVIDER NOTE - CLINICAL SUMMARY MEDICAL DECISION MAKING FREE TEXT BOX
Elderly F w/multiple comorbidities in respiratory failure, intubated and sedated with propofol, ETT in place as visualized on CXR. + L infiltrate suggesting PNA, likely aspiration, covered with abx, COVID testing sent, stable on Vent maintaining pressure, saturating high 90s on 100% FiO2. Anticipate admission to MICU.

## 2020-04-03 NOTE — ED ADULT NURSE NOTE - OBJECTIVE STATEMENT
73 y/o F BIBA from nursing home. Pt found on floor, unresponsive. EMS BVMing pt. Pt has known PMH of Cerebral Palsy and currently has PEG tube in place. Pt currently unresponsive. SpO2 up to 96% with BVM. Pt tachycardic to low 100s.

## 2020-04-03 NOTE — ED PROVIDER NOTE - PHYSICAL EXAMINATION
VITAL SIGNS: I have reviewed nursing notes and confirm.  CONSTITUTIONAL: Well-developed F w/MR in extremis  SKIN: Skin exam is warm and dry, no acute rash.  HEAD: Normocephalic; atraumatic.  EYES: PERRL, conjunctiva and sclera clear.  ENT: No nasal discharge; airway clear.  NECK: Supple; non tender.  CARD: S1, S2 normal; + tachycardic regular rhythm  RESP: agonal breathing, bagging via BVM in progress  ABD: soft; non-distended, + PEG tube well maintained  EXT: peripherally cyanotic, distal pulses intact  NEURO: obtunded

## 2020-04-03 NOTE — ED ADULT NURSE REASSESSMENT NOTE - NS ED NURSE REASSESS COMMENT FT1
Called [facility] at 0650 to try and give report, was asked to call back. Called again at 0735 and was told RN was dealing with another patient and to hold. At 0748, JERI Villarreal told me they were unsure if they had the staffing to take care of an ICU pt. Assistant Director of Nursing, Nathan, called at 0750 and said [facility] is ready to take patient and to call them back for report. Spoke to JERI Clay at 0755 and gave report. Pt leaving unit now, 0805.
Pt ventilated and on propofol drip. RR 18, SpO2 100% with EtCO2 30. HR 79, NSR. /56. Pt has bed assigned at [facility], awaiting transfer. Will continue to monitor. Side rails up, safety is maintained.

## 2020-04-03 NOTE — ED ADULT NURSE NOTE - NSFALLRSKOUTCOME_ED_ALL_ED
Reviewed and accepted note   Alert and cooperative  -burning, -itching, -tearing, -flashes/floater, +headache, -diplopia      No results found for: HGBA1C     Dilated fundus exam performed. 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 Explained risk for glaucoma, cataract, retinopathy and benefits of diet, exercise and goal of A1C <7.0.  Annual observation advised. Negative macular edema.     Compound hyperopic astigmat, ou cc presbyopia  Explained accommodation and bifocal     Nuclear Cataract.  Discussed natural course and future benefits of extraction.    2018 mild frontal headache that comes on in the afternoon.        Fall with Harm Risk

## 2020-04-03 NOTE — ED ADULT TRIAGE NOTE - CHIEF COMPLAINT QUOTE
BIBA from SNF with respiratory failure, hx of cerebral palsy. Pt arrives being oxygenated via BVM by EMS for a SPO2 of 60%

## 2020-04-03 NOTE — ED PROVIDER NOTE - OBJECTIVE STATEMENT
73 y/o F w/hx CP, DVT, Dysphagia, GERD, chronic PEG, HTN, mental retardation, PE, spastic quadreplgia BIBEMS in acute respiratory failure, found shortly before arrival to ED cyanotic w/agonal breathing, initial O2 saturation in 50s, maintaining stable BP, bagged up to 90s w/vomitus in the oropharynx. No report of recent illness per NH or documented fever.

## 2020-04-03 NOTE — PROGRESS NOTE ADULT - SUBJECTIVE AND OBJECTIVE BOX
MICU Transfer Accept Note    Transfer from: [facility]   Transfer to:  MICU  Accepting physican: Dr. Topete       [facility] COURSE: 73 y/o F w/hx CP, DVT, Dysphagia, GERD, chronic PEG, HTN, mental retardation, PE, spastic quadreplgia BIBEMS in acute respiratory failure, found shortly before arrival to ED cyanotic w/agonal breathing, initial O2 saturation in 50s, maintaining stable BP, bagged up to 90s w/vomitus in the oropharynx. No report of recent illness per NH or documented fever.    4/3: transferred to [facility] from [facility], intubated on propofol         OBJECTIVE:    Vital Signs Last 24 Hrs  T(C): 37.9 (03 Apr 2020 12:00), Max: 37.9 (03 Apr 2020 12:00)  T(F): 100.3 (03 Apr 2020 12:00), Max: 100.3 (03 Apr 2020 12:00)  HR: 95 (03 Apr 2020 14:00) (77 - 110)  BP: 97/53 (03 Apr 2020 14:00) (78/49 - 159/95)  BP(mean): 72 (03 Apr 2020 14:00) (59 - 108)  RR: 18 (03 Apr 2020 14:00) (18 - 29)  SpO2: 92% (03 Apr 2020 14:00) (89% - 99%)  I&O's Summary    03 Apr 2020 07:01  -  03 Apr 2020 15:09  --------------------------------------------------------  IN: 54 mL / OUT: 375 mL / NET: -321 mL        PHYSICAL EXAM:  CONSTITUTIONAL: Intubated, sedated on propofol   In no acute respiratory distress  	SKIN: Skin exam is warm and dry, no acute rash.  	HEAD: Normocephalic; atraumatic.  	EYES: PERRL, conjunctiva and sclera clear.  	ENT: No nasal discharge; airway clear.  	NECK: Supple; non tender.  	CARD: S1, S2 normal; + tachycardic regular rhythm  	RESP: agonal breathing, bagging via BVM in progress  	ABD: soft; non-distended, + PEG tube well maintained  	EXT: peripherally cyanotic, distal pulses intact  NEURO: obtunded    MEDICATIONS  (STANDING):  chlorhexidine 0.12% Liquid 15 milliLiter(s) Oral Mucosa every 12 hours  chlorhexidine 2% Cloths 1 Application(s) Topical <User Schedule>  chlorhexidine 4% Liquid 1 Application(s) Topical <User Schedule>  enoxaparin Injectable 40 milliGRAM(s) SubCutaneous every 24 hours  norepinephrine Infusion 0.05 MICROgram(s)/kG/Min (5.22 mL/Hr) IV Continuous <Continuous>  pantoprazole   Suspension 40 milliGRAM(s) Oral daily  piperacillin/tazobactam IVPB.. 3.375 Gram(s) IV Intermittent every 6 hours  propofol Infusion 10 MICROgram(s)/kG/Min (3.3 mL/Hr) IV Continuous <Continuous>  senna 2 Tablet(s) Oral at bedtime    MEDICATIONS  (PRN):  acetaminophen    Suspension .. 650 milliGRAM(s) Oral every 6 hours PRN Temp greater or equal to 38C (100.4F), Mild Pain (1 - 3)  bisacodyl 5 milliGRAM(s) Oral every 12 hours PRN Constipation  sodium chloride 0.9% lock flush 10 milliLiter(s) IV Push every 1 hour PRN Pre/post blood products, medications, blood draw, and to maintain line patency        LABS                                            13.8                  Neurophils% (auto):   37.2   (04-03 @ 04:27):    6.25 )-----------(157          Lymphocytes% (auto):  48.0                                          44.8                   Eosinphils% (auto):   0.8      Manual%: Neutrophils x    ; Lymphocytes x    ; Eosinophils x    ; Bands%: x    ; Blasts x                                    135    |  95     |  18                  Calcium: 8.8   / iCa: x      (04-03 @ 05:37)    ----------------------------<  122       Magnesium: x                                3.6     |  33     |  0.76             Phosphorous: x        TPro  7.4    /  Alb  3.1    /  TBili  0.5    /  DBili  x      /  AST  55     /  ALT  30     /  AlkPhos  84     03 Apr 2020 05:37    ( 04-03 @ 11:46 )   PT: 11.1 sec;   INR: 0.97   aPTT: 30.4 sec

## 2020-04-03 NOTE — PROGRESS NOTE ADULT - ASSESSMENT
ASSESSMENT & PLAN: 74 F with pmhx of CP, MR, hx of DVT/PE, GERD, chronic PEG, HTN\, 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, presented to [facility] for AHRF, intubated, sedated on propofol tx to [facility] for ICU care. COVID results pending , now saturating well.     Plan:     acute hypoxic respiratory distress.  Imaging c/w ARDS       #COVID19+  All the following measure, pending covid results and Goal of care planning upon family discussion     - hydroxychloroquine 400mg q12h x2 doses then 200mg q12h  - lopinavir/ritonavir 80mg-20mg 5mL sol'n q12h  - tociluzumab 560mg IV q8h x4 doses  - daily CBC/CMP/Mg/Phos/CRP  - q3d labs: ESR, Tcell subset, d-dimer, LDH, ferritin, CK, trop, coags  - COVID isolation protocol  - obtain consent for HIV testing when able    #Hypoxic respiratory failure 2/2 ARDS 2/2 COVID+  - CXR on arrival with BL multilobar interstitial opacities c/w ARDS 2/2 COVID+  - O2sat 90 improving on full vent support     Gastrointestinal:  #Prophylaxis  - protonix 40mg IV daily         F: Fluids  E: Replete K<4, Mg<2  N: NPO   G: protonix 40mg IV daily    Code: FULL CODE ASSESSMENT & PLAN: 74 F with pmhx of CP, MR, hx of DVT/PE, GERD, chronic PEG, HTN\, 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, presented to [facility] for AHRF, intubated, sedated on propofol tx to [facility] for ICU care. COVID results pending but likely coronavirus pna with acute hypoxemic respiratory failure , now saturating well.     Plan:     acute hypoxic respiratory distress.  Imaging c/w ARDS       #COVID19+  All the following measure, pending covid results and Goal of care planning upon family discussion     - hydroxychloroquine 400mg q12h x2 doses then 200mg q12h  - lopinavir/ritonavir 80mg-20mg 5mL sol'n q12h  - tociluzumab 560mg IV q8h x4 doses  - daily CBC/CMP/Mg/Phos/CRP  - q3d labs: ESR, Tcell subset, d-dimer, LDH, ferritin, CK, trop, coags  - COVID isolation protocol  - obtain consent for HIV testing when able    #Hypoxic respiratory failure 2/2 ARDS 2/2 COVID+  - CXR on arrival with BL multilobar interstitial opacities c/w ARDS 2/2 COVID+  - O2sat 90 improving on full vent support     Gastrointestinal:  #Prophylaxis  - protonix 40mg IV daily         F: Fluids  E: Replete K<4, Mg<2  N: NPO   G: protonix 40mg IV daily    Code: FULL CODE

## 2020-04-03 NOTE — ED ADULT NURSE NOTE - BREATH SOUNDS, LEFT
Procedure(s): SIGMOIDOSCOPY FLEXIBLE with Botox injection BOTOX INJECTION 
COLON BIOPSY. <BSHSIANPOST> Visit Vitals BP 94/52 Pulse 70 Temp 36.6 °C (97.9 °F) Resp 0 Ht (!) 142.2 cm Wt 34.7 kg SpO2 100% BMI 17.15 kg/m² clear

## 2020-04-04 LAB
ANION GAP SERPL CALC-SCNC: 12 MMOL/L — SIGNIFICANT CHANGE UP (ref 5–17)
BUN SERPL-MCNC: 12 MG/DL — SIGNIFICANT CHANGE UP (ref 7–23)
C DIFF GDH STL QL: NEGATIVE — SIGNIFICANT CHANGE UP
C DIFF GDH STL QL: SIGNIFICANT CHANGE UP
CALCIUM SERPL-MCNC: 8.1 MG/DL — LOW (ref 8.4–10.5)
CHLORIDE SERPL-SCNC: 100 MMOL/L — SIGNIFICANT CHANGE UP (ref 96–108)
CO2 SERPL-SCNC: 25 MMOL/L — SIGNIFICANT CHANGE UP (ref 22–31)
CREAT SERPL-MCNC: 0.58 MG/DL — SIGNIFICANT CHANGE UP (ref 0.5–1.3)
CRP SERPL-MCNC: 17.31 MG/DL — HIGH (ref 0–0.4)
D DIMER BLD IA.RAPID-MCNC: 358 NG/ML DDU — HIGH
FERRITIN SERPL-MCNC: 249 NG/ML — HIGH (ref 15–150)
GLUCOSE SERPL-MCNC: 141 MG/DL — HIGH (ref 70–99)
HCT VFR BLD CALC: 38.3 % — SIGNIFICANT CHANGE UP (ref 34.5–45)
HGB BLD-MCNC: 12.2 G/DL — SIGNIFICANT CHANGE UP (ref 11.5–15.5)
MAGNESIUM SERPL-MCNC: 1.5 MG/DL — LOW (ref 1.6–2.6)
MCHC RBC-ENTMCNC: 30.5 PG — SIGNIFICANT CHANGE UP (ref 27–34)
MCHC RBC-ENTMCNC: 31.9 GM/DL — LOW (ref 32–36)
MCV RBC AUTO: 95.8 FL — SIGNIFICANT CHANGE UP (ref 80–100)
NRBC # BLD: 0 /100 WBCS — SIGNIFICANT CHANGE UP (ref 0–0)
PHOSPHATE SERPL-MCNC: 2.8 MG/DL — SIGNIFICANT CHANGE UP (ref 2.5–4.5)
PLATELET # BLD AUTO: 156 K/UL — SIGNIFICANT CHANGE UP (ref 150–400)
POTASSIUM SERPL-MCNC: 3 MMOL/L — LOW (ref 3.5–5.3)
POTASSIUM SERPL-SCNC: 3 MMOL/L — LOW (ref 3.5–5.3)
PROCALCITONIN SERPL-MCNC: 7.77 NG/ML — HIGH (ref 0.02–0.1)
RBC # BLD: 4 M/UL — SIGNIFICANT CHANGE UP (ref 3.8–5.2)
RBC # FLD: 13.1 % — SIGNIFICANT CHANGE UP (ref 10.3–14.5)
SODIUM SERPL-SCNC: 137 MMOL/L — SIGNIFICANT CHANGE UP (ref 135–145)
WBC # BLD: 22.05 K/UL — HIGH (ref 3.8–10.5)
WBC # FLD AUTO: 22.05 K/UL — HIGH (ref 3.8–10.5)

## 2020-04-04 PROCEDURE — 71045 X-RAY EXAM CHEST 1 VIEW: CPT | Mod: 26

## 2020-04-04 PROCEDURE — 99291 CRITICAL CARE FIRST HOUR: CPT

## 2020-04-04 RX ORDER — CEFTRIAXONE 500 MG/1
2000 INJECTION, POWDER, FOR SOLUTION INTRAMUSCULAR; INTRAVENOUS EVERY 24 HOURS
Refills: 0 | Status: DISCONTINUED | OUTPATIENT
Start: 2020-04-04 | End: 2020-04-05

## 2020-04-04 RX ORDER — MAGNESIUM SULFATE 500 MG/ML
2 VIAL (ML) INJECTION ONCE
Refills: 0 | Status: COMPLETED | OUTPATIENT
Start: 2020-04-04 | End: 2020-04-04

## 2020-04-04 RX ORDER — POTASSIUM CHLORIDE 20 MEQ
40 PACKET (EA) ORAL ONCE
Refills: 0 | Status: COMPLETED | OUTPATIENT
Start: 2020-04-04 | End: 2020-04-04

## 2020-04-04 RX ORDER — FUROSEMIDE 40 MG
20 TABLET ORAL ONCE
Refills: 0 | Status: COMPLETED | OUTPATIENT
Start: 2020-04-04 | End: 2020-04-04

## 2020-04-04 RX ORDER — VANCOMYCIN HCL 1 G
750 VIAL (EA) INTRAVENOUS EVERY 12 HOURS
Refills: 0 | Status: DISCONTINUED | OUTPATIENT
Start: 2020-04-04 | End: 2020-04-04

## 2020-04-04 RX ORDER — POTASSIUM CHLORIDE 20 MEQ
20 PACKET (EA) ORAL
Refills: 0 | Status: COMPLETED | OUTPATIENT
Start: 2020-04-04 | End: 2020-04-04

## 2020-04-04 RX ADMIN — ENOXAPARIN SODIUM 40 MILLIGRAM(S): 100 INJECTION SUBCUTANEOUS at 12:17

## 2020-04-04 RX ADMIN — PANTOPRAZOLE SODIUM 40 MILLIGRAM(S): 20 TABLET, DELAYED RELEASE ORAL at 11:45

## 2020-04-04 RX ADMIN — CHLORHEXIDINE GLUCONATE 15 MILLILITER(S): 213 SOLUTION TOPICAL at 17:22

## 2020-04-04 RX ADMIN — Medication 5.22 MICROGRAM(S)/KG/MIN: at 09:00

## 2020-04-04 RX ADMIN — Medication 50 MILLIEQUIVALENT(S): at 06:31

## 2020-04-04 RX ADMIN — Medication 50 GRAM(S): at 12:31

## 2020-04-04 RX ADMIN — CHLORHEXIDINE GLUCONATE 1 APPLICATION(S): 213 SOLUTION TOPICAL at 06:32

## 2020-04-04 RX ADMIN — SENNA PLUS 2 TABLET(S): 8.6 TABLET ORAL at 22:58

## 2020-04-04 RX ADMIN — CEFTRIAXONE 100 MILLIGRAM(S): 500 INJECTION, POWDER, FOR SOLUTION INTRAMUSCULAR; INTRAVENOUS at 11:44

## 2020-04-04 RX ADMIN — CHLORHEXIDINE GLUCONATE 15 MILLILITER(S): 213 SOLUTION TOPICAL at 06:31

## 2020-04-04 RX ADMIN — Medication 20 MILLIGRAM(S): at 11:45

## 2020-04-04 RX ADMIN — Medication 50 MILLIEQUIVALENT(S): at 09:01

## 2020-04-04 RX ADMIN — PROPOFOL 3.3 MICROGRAM(S)/KG/MIN: 10 INJECTION, EMULSION INTRAVENOUS at 17:08

## 2020-04-04 RX ADMIN — Medication 400 MILLIGRAM(S): at 06:31

## 2020-04-04 RX ADMIN — Medication 200 MILLIGRAM(S): at 17:22

## 2020-04-04 RX ADMIN — AZITHROMYCIN 250 MILLIGRAM(S): 500 TABLET, FILM COATED ORAL at 18:52

## 2020-04-04 RX ADMIN — Medication 40 MILLIEQUIVALENT(S): at 11:45

## 2020-04-04 RX ADMIN — Medication 40 MILLIEQUIVALENT(S): at 06:31

## 2020-04-04 RX ADMIN — Medication 40 MILLIGRAM(S): at 17:22

## 2020-04-04 RX ADMIN — Medication 50 MILLIEQUIVALENT(S): at 07:44

## 2020-04-04 RX ADMIN — PROPOFOL 3.3 MICROGRAM(S)/KG/MIN: 10 INJECTION, EMULSION INTRAVENOUS at 09:00

## 2020-04-04 RX ADMIN — Medication 40 MILLIGRAM(S): at 06:31

## 2020-04-04 NOTE — PROGRESS NOTE ADULT - ASSESSMENT
ASSESSMENT & PLAN: 74 F with pmhx of CP, MR, hx of DVT/PE, GERD, chronic PEG, HTN 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, presented to [facility] for AHRF, intubated, sedated on propofol tx to [facility] for ICU care. COVID results pending but likely coronavirus pna with acute hypoxemic respiratory failure , now saturating well.     acute hypoxic respiratory distress.  Imaging c/w ARDS       #COVID19+  All the following measure, pending covid results and Goal of care planning upon family discussion     - hydroxychloroquine 400mg q12h x2 doses then 200mg q12h  - lopinavir/ritonavir 80mg-20mg 5mL sol'n q12h  - tociluzumab 560mg IV q8h x4 doses  - daily CBC/CMP/Mg/Phos/CRP  - q3d labs: ESR, Tcell subset, d-dimer, LDH, ferritin, CK, trop, coags  - COVID isolation protocol    #Hypoxic respiratory failure 2/2 ARDS 2/2 COVID+  - CXR on arrival with BL multilobar interstitial opacities c/w ARDS 2/2 COVID+  - intubated on vent:     Gastrointestinal:  - protonix 40mg IV daily  -        F: Fluids  E: Replete K<4, Mg<2  N: NPO   G: protonix 40mg IV daily    Code: FULL CODE ASSESSMENT & PLAN: 74 F with pmhx of CP, MR, hx of DVT/PE, GERD, chronic PEG, HTN 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, presented to [facility] for AHRF, intubated, sedated on propofol tx to [facility] for ICU care. COVID results pending but likely coronavirus pna with acute hypoxemic respiratory failure/ARDS    NEURO: sedation holiday  CV: hypotensive on low dose levophed  PULM: intubated on AC, CPAP trial today. lasix 20 today.   GI/FEN: NPO, dobhoff for meds. protonix ppx, hypoK - Kcl 40 q8h today.   : tabitha  ENDO: ISS  ID: plaquenil (), zithromax (), empiric PNA coverage with ceftriaxone.   PPX: SCD, SQL,   LINES: PIV, Laure (), TLC ()  WOUNDS/DRAINS: none  PT: not appropriate  DISPO: MICU        , now saturating well.           #COVID19+  All the following measure, pending covid results and Goal of care planning upon family discussion     - hydroxychloroquine 400mg q12h x2 doses then 200mg q12h  - lopinavir/ritonavir 80mg-20mg 5mL sol'n q12h  - tociluzumab 560mg IV q8h x4 doses  - daily CBC/CMP/Mg/Phos/CRP  - q3d labs: ESR, Tcell subset, d-dimer, LDH, ferritin, CK, trop, coags  - COVID isolation protocol    #Hypoxic respiratory failure 2/2 ARDS 2/2 COVID+  - CXR on arrival with BL multilobar interstitial opacities c/w ARDS 2/2 COVID+  - intubated on vent: CPAP trial today.   - give lasix 20 x 1.     Gastrointestinal:  - protonix 40mg IV daily  - NGT for meds.     Code: FULL CODE ASSESSMENT & PLAN: 74 F with pmhx of CP, MR, hx of DVT/PE, GERD, chronic PEG, HTN 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, presented to [facility] for AHRF, intubated, sedated on propofol tx to [facility] for ICU care. COVID results pending but likely coronavirus pna with acute hypoxemic respiratory failure/ARDS    NEURO: sedation holiday  CV: hypotensive on low dose levophed  PULM: intubated on AC, CPAP trial today. lasix 20 today.   GI/FEN: NPO, dobhoff for meds. protonix ppx, hypoK - Kcl 40 q8h today.   : tabitha  ENDO: ISS  ID: plaquenil (), zithromax (), solumedrol. empiric PNA coverage with ceftriaxone.   PPX: SCD, SQL,   LINES: PIV, [place] (), TLC ()  WOUNDS/DRAINS: none  PT: not appropriate  DISPO: MICU        , now saturating well.           #COVID19+  All the following measure, pending covid results and Goal of care planning upon family discussion     - hydroxychloroquine 400mg q12h x2 doses then 200mg q12h  - lopinavir/ritonavir 80mg-20mg 5mL sol'n q12h  - tociluzumab 560mg IV q8h x4 doses  - daily CBC/CMP/Mg/Phos/CRP  - q3d labs: ESR, Tcell subset, d-dimer, LDH, ferritin, CK, trop, coags  - COVID isolation protocol    #Hypoxic respiratory failure 2/2 ARDS 2/2 COVID+  - CXR on arrival with BL multilobar interstitial opacities c/w ARDS 2/2 COVID+  - intubated on vent: CPAP trial today.   - give lasix 20 x 1.     Gastrointestinal:  - protonix 40mg IV daily  - NGT for meds.     Code: FULL CODE ASSESSMENT & PLAN: 74 F with pmhx of CP, MR, hx of DVT/PE, GERD, chronic PEG, HTN 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, presented to [facility] for AHRF, intubated, sedated on propofol tx to [facility] for ICU care. COVID positive, coronavirus pna with acute hypoxemic respiratory failure/ARDS    NEURO: sedation holiday  CV: hypotensive on low dose levophed  PULM: intubated on AC, CPAP trial today. lasix 20 today.   GI/FEN: NPO, dobhoff for meds. protonix ppx, hypoK - Kcl 40 q8h today.   : tabitha  ENDO: ISS  ID: plaquenil (), zithromax (), solumedrol. empiric PNA coverage with ceftriaxone.   PPX: SCD, SQL,   LINES: PIV, Laure (), TLC ()  WOUNDS/DRAINS: none  PT: not appropriate  DISPO: MICU

## 2020-04-04 NOTE — PROGRESS NOTE ADULT - SUBJECTIVE AND OBJECTIVE BOX
S: No new issues/events overnight, no new med c/o    O: ICU Vital Signs Last 24 Hrs  T(F): 100.1 (04-04-20 @ 10:00), Max: 102 (04-03-20 @ 19:00)  HR: 78 (04-04-20 @ 14:00) (72 - 98)  BP: 94/54 (04-03-20 @ 20:00) (94/54 - 112/57)  BP(mean): 71 (04-03-20 @ 20:00) (71 - 80)  ABP: 139/69 (04-04-20 @ 14:00)  RR: 32 (04-04-20 @ 14:00) (0 - 35)  SpO2: 95% (04-04-20 @ 14:00) (87% - 98%)      PHYSICAL EXAM:   CONSTITUTIONAL: Intubated, sedated on propofol   In no acute respiratory distress  	SKIN: Skin exam is warm and dry, no acute rash.  	HEAD: Normocephalic; atraumatic.  	EYES: PERRL, conjunctiva and sclera clear.  	ENT: No nasal discharge; airway clear.  	NECK: Supple; non tender.  	CARD: S1, S2 normal; + tachycardic regular rhythm  	RESP: agonal breathing, bagging via BVM in progress  	ABD: soft; non-distended, + PEG tube well maintained  	EXT: peripherally cyanotic, distal pulses intact  NEURO: obtunded    LABS:    04-04    137  |  100  |  12  ----------------------------<  141<H>  3.0<L>   |  25  |  0.58    Ca    8.1<L>      04 Apr 2020 04:06  Phos  2.8     04-04  Mg     1.5     04-04    TPro  7.4  /  Alb  3.1<L>  /  TBili  0.5  /  DBili  x   /  AST  55<H>  /  ALT  30  /  AlkPhos  84  04-03  LIVER FUNCTIONS - ( 03 Apr 2020 05:37 )  Alb: 3.1 g/dL / Pro: 7.4 g/dL / ALK PHOS: 84 U/L / ALT: 30 U/L / AST: 55 U/L / GGT: x                               12.2   22.05 )-----------( 156      ( 04 Apr 2020 04:06 )             38.3   PT/INR - ( 03 Apr 2020 11:46 )   PT: 11.1 sec;   INR: 0.97          PTT - ( 03 Apr 2020 11:46 )  PTT:30.4 sec  ABG - ( 03 Apr 2020 17:52 )  pH, Arterial: 7.38  pH, Blood: x     /  pCO2: 41    /  pO2: 101   / HCO3: 24    / Base Excess: -1.3  /  SaO2: 98              CAPILLARY BLOOD GLUCOSE      POCT Blood Glucose.: 118 mg/dL (04 Apr 2020 12:08)  POCT Blood Glucose.: 117 mg/dL (04 Apr 2020 06:04)  POCT Blood Glucose.: 116 mg/dL (03 Apr 2020 23:51)  POCT Blood Glucose.: 88 mg/dL (03 Apr 2020 19:07)      MEDICATIONS  (STANDING):  azithromycin   Tablet 250 milliGRAM(s) Oral every 24 hours  cefTRIAXone   IVPB 2000 milliGRAM(s) IV Intermittent every 24 hours  chlorhexidine 0.12% Liquid 15 milliLiter(s) Oral Mucosa every 12 hours  chlorhexidine 2% Cloths 1 Application(s) Topical <User Schedule>  enoxaparin Injectable 40 milliGRAM(s) SubCutaneous every 24 hours  hydroxychloroquine   Oral   hydroxychloroquine 200 milliGRAM(s) Oral every 12 hours  insulin lispro (HumaLOG) corrective regimen sliding scale   SubCutaneous every 6 hours  methylPREDNISolone sodium succinate Injectable 40 milliGRAM(s) IV Push every 12 hours  norepinephrine Infusion 0.05 MICROgram(s)/kG/Min (5.22 mL/Hr) IV Continuous <Continuous>  pantoprazole   Suspension 40 milliGRAM(s) Oral daily  propofol Infusion 10 MICROgram(s)/kG/Min (3.3 mL/Hr) IV Continuous <Continuous>  senna 2 Tablet(s) Oral at bedtime    MEDICATIONS  (PRN):  acetaminophen    Suspension .. 650 milliGRAM(s) Oral every 6 hours PRN Temp greater or equal to 38C (100.4F), Mild Pain (1 - 3)  bisacodyl 5 milliGRAM(s) Oral every 12 hours PRN Constipation  sodium chloride 0.9% lock flush 10 milliLiter(s) IV Push every 1 hour PRN Pre/post blood products, medications, blood draw, and to maintain line patency      Quintero:	  [ ] None	[x ] Daily Quintero Order Placed	   Indication:	  [x ] Strict I and O's    [ ] Obstruction     [ ] Incontinence + Stage 3 or 4 Decubitus  Central Line:  [ ] None	   [ ]  Medication / TPN Administration     [ ] No Peripheral IV S: No new issues/events overnight, no new med c/o, ROS not obtainable    O: ICU Vital Signs Last 24 Hrs  T(F): 100.1 (04-04-20 @ 10:00), Max: 102 (04-03-20 @ 19:00)  HR: 78 (04-04-20 @ 14:00) (72 - 98)  BP: 94/54 (04-03-20 @ 20:00) (94/54 - 112/57)  BP(mean): 71 (04-03-20 @ 20:00) (71 - 80)  ABP: 139/69 (04-04-20 @ 14:00)  RR: 32 (04-04-20 @ 14:00) (0 - 35)  SpO2: 95% (04-04-20 @ 14:00) (87% - 98%)      PHYSICAL EXAM:   CONSTITUTIONAL: Intubated, sedated on propofol   In no acute respiratory distress  	SKIN: Skin exam is warm and dry, no acute rash.  	HEAD: Normocephalic; atraumatic.  	EYES: PERRL, conjunctiva and sclera clear.  	ENT: No nasal discharge; airway clear.  	NECK: Supple; non tender.  	CARD: S1, S2 normal; + tachycardic regular rhythm  	RESP: agonal breathing, bagging via BVM in progress  	ABD: soft; non-distended, + PEG tube well maintained  	EXT: peripherally cyanotic, distal pulses intact  NEURO: sedated    LABS:    04-04    137  |  100  |  12  ----------------------------<  141<H>  3.0<L>   |  25  |  0.58    Ca    8.1<L>      04 Apr 2020 04:06  Phos  2.8     04-04  Mg     1.5     04-04    TPro  7.4  /  Alb  3.1<L>  /  TBili  0.5  /  DBili  x   /  AST  55<H>  /  ALT  30  /  AlkPhos  84  04-03  LIVER FUNCTIONS - ( 03 Apr 2020 05:37 )  Alb: 3.1 g/dL / Pro: 7.4 g/dL / ALK PHOS: 84 U/L / ALT: 30 U/L / AST: 55 U/L / GGT: x                               12.2   22.05 )-----------( 156      ( 04 Apr 2020 04:06 )             38.3   PT/INR - ( 03 Apr 2020 11:46 )   PT: 11.1 sec;   INR: 0.97          PTT - ( 03 Apr 2020 11:46 )  PTT:30.4 sec  ABG - ( 03 Apr 2020 17:52 )  pH, Arterial: 7.38  pH, Blood: x     /  pCO2: 41    /  pO2: 101   / HCO3: 24    / Base Excess: -1.3  /  SaO2: 98              CAPILLARY BLOOD GLUCOSE      POCT Blood Glucose.: 118 mg/dL (04 Apr 2020 12:08)  POCT Blood Glucose.: 117 mg/dL (04 Apr 2020 06:04)  POCT Blood Glucose.: 116 mg/dL (03 Apr 2020 23:51)  POCT Blood Glucose.: 88 mg/dL (03 Apr 2020 19:07)      MEDICATIONS  (STANDING):  azithromycin   Tablet 250 milliGRAM(s) Oral every 24 hours  cefTRIAXone   IVPB 2000 milliGRAM(s) IV Intermittent every 24 hours  chlorhexidine 0.12% Liquid 15 milliLiter(s) Oral Mucosa every 12 hours  chlorhexidine 2% Cloths 1 Application(s) Topical <User Schedule>  enoxaparin Injectable 40 milliGRAM(s) SubCutaneous every 24 hours  hydroxychloroquine   Oral   hydroxychloroquine 200 milliGRAM(s) Oral every 12 hours  insulin lispro (HumaLOG) corrective regimen sliding scale   SubCutaneous every 6 hours  methylPREDNISolone sodium succinate Injectable 40 milliGRAM(s) IV Push every 12 hours  norepinephrine Infusion 0.05 MICROgram(s)/kG/Min (5.22 mL/Hr) IV Continuous <Continuous>  pantoprazole   Suspension 40 milliGRAM(s) Oral daily  propofol Infusion 10 MICROgram(s)/kG/Min (3.3 mL/Hr) IV Continuous <Continuous>  senna 2 Tablet(s) Oral at bedtime    MEDICATIONS  (PRN):  acetaminophen    Suspension .. 650 milliGRAM(s) Oral every 6 hours PRN Temp greater or equal to 38C (100.4F), Mild Pain (1 - 3)  bisacodyl 5 milliGRAM(s) Oral every 12 hours PRN Constipation  sodium chloride 0.9% lock flush 10 milliLiter(s) IV Push every 1 hour PRN Pre/post blood products, medications, blood draw, and to maintain line patency      Quintero:	  [ ] None	[x ] Daily Quintero Order Placed	   Indication:	  [x ] Strict I and O's    [ ] Obstruction     [ ] Incontinence + Stage 3 or 4 Decubitus  Central Line:  [ ] None	   [ ]  Medication / TPN Administration     [ ] No Peripheral IV

## 2020-04-05 LAB
ALBUMIN SERPL ELPH-MCNC: 2.8 G/DL — LOW (ref 3.3–5)
ALP SERPL-CCNC: 62 U/L — SIGNIFICANT CHANGE UP (ref 40–120)
ALT FLD-CCNC: 23 U/L — SIGNIFICANT CHANGE UP (ref 10–45)
ANION GAP SERPL CALC-SCNC: 10 MMOL/L — SIGNIFICANT CHANGE UP (ref 5–17)
ANION GAP SERPL CALC-SCNC: 13 MMOL/L — SIGNIFICANT CHANGE UP (ref 5–17)
AST SERPL-CCNC: 56 U/L — HIGH (ref 10–40)
BASOPHILS # BLD AUTO: 0.02 K/UL — SIGNIFICANT CHANGE UP (ref 0–0.2)
BASOPHILS NFR BLD AUTO: 0.1 % — SIGNIFICANT CHANGE UP (ref 0–2)
BILIRUB SERPL-MCNC: 0.3 MG/DL — SIGNIFICANT CHANGE UP (ref 0.2–1.2)
BUN SERPL-MCNC: 20 MG/DL — SIGNIFICANT CHANGE UP (ref 7–23)
BUN SERPL-MCNC: 21 MG/DL — SIGNIFICANT CHANGE UP (ref 7–23)
CALCIUM SERPL-MCNC: 7.6 MG/DL — LOW (ref 8.4–10.5)
CALCIUM SERPL-MCNC: 8.1 MG/DL — LOW (ref 8.4–10.5)
CHLORIDE SERPL-SCNC: 104 MMOL/L — SIGNIFICANT CHANGE UP (ref 96–108)
CHLORIDE SERPL-SCNC: 105 MMOL/L — SIGNIFICANT CHANGE UP (ref 96–108)
CO2 SERPL-SCNC: 23 MMOL/L — SIGNIFICANT CHANGE UP (ref 22–31)
CO2 SERPL-SCNC: 24 MMOL/L — SIGNIFICANT CHANGE UP (ref 22–31)
CREAT SERPL-MCNC: 0.52 MG/DL — SIGNIFICANT CHANGE UP (ref 0.5–1.3)
CREAT SERPL-MCNC: 0.68 MG/DL — SIGNIFICANT CHANGE UP (ref 0.5–1.3)
CRP SERPL-MCNC: 24.41 MG/DL — HIGH (ref 0–0.4)
D DIMER BLD IA.RAPID-MCNC: 326 NG/ML DDU — HIGH
EOSINOPHIL # BLD AUTO: 0 K/UL — SIGNIFICANT CHANGE UP (ref 0–0.5)
EOSINOPHIL NFR BLD AUTO: 0 % — SIGNIFICANT CHANGE UP (ref 0–6)
FERRITIN SERPL-MCNC: 289 NG/ML — HIGH (ref 15–150)
GAS PNL BLDA: SIGNIFICANT CHANGE UP
GLUCOSE BLDC GLUCOMTR-MCNC: 188 MG/DL — HIGH (ref 70–99)
GLUCOSE BLDC GLUCOMTR-MCNC: 206 MG/DL — HIGH (ref 70–99)
GLUCOSE SERPL-MCNC: 124 MG/DL — HIGH (ref 70–99)
GLUCOSE SERPL-MCNC: 207 MG/DL — HIGH (ref 70–99)
GRAM STN FLD: SIGNIFICANT CHANGE UP
HCT VFR BLD CALC: 35 % — SIGNIFICANT CHANGE UP (ref 34.5–45)
HCT VFR BLD CALC: 37.1 % — SIGNIFICANT CHANGE UP (ref 34.5–45)
HGB BLD-MCNC: 10.9 G/DL — LOW (ref 11.5–15.5)
HGB BLD-MCNC: 11.7 G/DL — SIGNIFICANT CHANGE UP (ref 11.5–15.5)
IMM GRANULOCYTES NFR BLD AUTO: 3.7 % — HIGH (ref 0–1.5)
LYMPHOCYTES # BLD AUTO: 0.46 K/UL — LOW (ref 1–3.3)
LYMPHOCYTES # BLD AUTO: 2 % — LOW (ref 13–44)
MAGNESIUM SERPL-MCNC: 2 MG/DL — SIGNIFICANT CHANGE UP (ref 1.6–2.6)
MAGNESIUM SERPL-MCNC: 2.4 MG/DL — SIGNIFICANT CHANGE UP (ref 1.6–2.6)
MCHC RBC-ENTMCNC: 30.3 PG — SIGNIFICANT CHANGE UP (ref 27–34)
MCHC RBC-ENTMCNC: 30.7 PG — SIGNIFICANT CHANGE UP (ref 27–34)
MCHC RBC-ENTMCNC: 31.1 GM/DL — LOW (ref 32–36)
MCHC RBC-ENTMCNC: 31.5 GM/DL — LOW (ref 32–36)
MCV RBC AUTO: 97.2 FL — SIGNIFICANT CHANGE UP (ref 80–100)
MCV RBC AUTO: 97.4 FL — SIGNIFICANT CHANGE UP (ref 80–100)
MONOCYTES # BLD AUTO: 0.97 K/UL — HIGH (ref 0–0.9)
MONOCYTES NFR BLD AUTO: 4.1 % — SIGNIFICANT CHANGE UP (ref 2–14)
NEUTROPHILS # BLD AUTO: 21.15 K/UL — HIGH (ref 1.8–7.4)
NEUTROPHILS NFR BLD AUTO: 90.1 % — HIGH (ref 43–77)
NRBC # BLD: 0 /100 WBCS — SIGNIFICANT CHANGE UP (ref 0–0)
NRBC # BLD: 0 /100 WBCS — SIGNIFICANT CHANGE UP (ref 0–0)
PHOSPHATE SERPL-MCNC: 1.7 MG/DL — LOW (ref 2.5–4.5)
PHOSPHATE SERPL-MCNC: 3.7 MG/DL — SIGNIFICANT CHANGE UP (ref 2.5–4.5)
PLATELET # BLD AUTO: 177 K/UL — SIGNIFICANT CHANGE UP (ref 150–400)
PLATELET # BLD AUTO: 185 K/UL — SIGNIFICANT CHANGE UP (ref 150–400)
POTASSIUM SERPL-MCNC: 3.4 MMOL/L — LOW (ref 3.5–5.3)
POTASSIUM SERPL-MCNC: 4.6 MMOL/L — SIGNIFICANT CHANGE UP (ref 3.5–5.3)
POTASSIUM SERPL-SCNC: 3.4 MMOL/L — LOW (ref 3.5–5.3)
POTASSIUM SERPL-SCNC: 4.6 MMOL/L — SIGNIFICANT CHANGE UP (ref 3.5–5.3)
PROT SERPL-MCNC: 6.5 G/DL — SIGNIFICANT CHANGE UP (ref 6–8.3)
RBC # BLD: 3.6 M/UL — LOW (ref 3.8–5.2)
RBC # BLD: 3.81 M/UL — SIGNIFICANT CHANGE UP (ref 3.8–5.2)
RBC # FLD: 13.3 % — SIGNIFICANT CHANGE UP (ref 10.3–14.5)
RBC # FLD: 13.6 % — SIGNIFICANT CHANGE UP (ref 10.3–14.5)
SODIUM SERPL-SCNC: 138 MMOL/L — SIGNIFICANT CHANGE UP (ref 135–145)
SODIUM SERPL-SCNC: 141 MMOL/L — SIGNIFICANT CHANGE UP (ref 135–145)
SPECIMEN SOURCE: SIGNIFICANT CHANGE UP
TRIGL SERPL-MCNC: 88 MG/DL — SIGNIFICANT CHANGE UP (ref 10–149)
WBC # BLD: 18.67 K/UL — HIGH (ref 3.8–10.5)
WBC # BLD: 23.46 K/UL — HIGH (ref 3.8–10.5)
WBC # FLD AUTO: 18.67 K/UL — HIGH (ref 3.8–10.5)
WBC # FLD AUTO: 23.46 K/UL — HIGH (ref 3.8–10.5)

## 2020-04-05 PROCEDURE — 99291 CRITICAL CARE FIRST HOUR: CPT

## 2020-04-05 RX ORDER — POTASSIUM CHLORIDE 20 MEQ
40 PACKET (EA) ORAL ONCE
Refills: 0 | Status: COMPLETED | OUTPATIENT
Start: 2020-04-05 | End: 2020-04-05

## 2020-04-05 RX ORDER — PIPERACILLIN AND TAZOBACTAM 4; .5 G/20ML; G/20ML
4.5 INJECTION, POWDER, LYOPHILIZED, FOR SOLUTION INTRAVENOUS EVERY 6 HOURS
Refills: 0 | Status: COMPLETED | OUTPATIENT
Start: 2020-04-05 | End: 2020-04-12

## 2020-04-05 RX ORDER — POTASSIUM CHLORIDE 20 MEQ
20 PACKET (EA) ORAL
Refills: 0 | Status: COMPLETED | OUTPATIENT
Start: 2020-04-05 | End: 2020-04-06

## 2020-04-05 RX ORDER — ROBINUL 0.2 MG/ML
0.2 INJECTION INTRAMUSCULAR; INTRAVENOUS EVERY 6 HOURS
Refills: 0 | Status: DISCONTINUED | OUTPATIENT
Start: 2020-04-06 | End: 2020-05-05

## 2020-04-05 RX ORDER — SODIUM CHLORIDE 9 MG/ML
500 INJECTION INTRAMUSCULAR; INTRAVENOUS; SUBCUTANEOUS ONCE
Refills: 0 | Status: COMPLETED | OUTPATIENT
Start: 2020-04-05 | End: 2020-04-05

## 2020-04-05 RX ORDER — POTASSIUM CHLORIDE 20 MEQ
80 PACKET (EA) ORAL ONCE
Refills: 0 | Status: DISCONTINUED | OUTPATIENT
Start: 2020-04-05 | End: 2020-04-05

## 2020-04-05 RX ADMIN — Medication 40 MILLIGRAM(S): at 18:12

## 2020-04-05 RX ADMIN — PANTOPRAZOLE SODIUM 40 MILLIGRAM(S): 20 TABLET, DELAYED RELEASE ORAL at 11:57

## 2020-04-05 RX ADMIN — Medication 5.22 MICROGRAM(S)/KG/MIN: at 14:15

## 2020-04-05 RX ADMIN — SODIUM CHLORIDE 500 MILLILITER(S): 9 INJECTION INTRAMUSCULAR; INTRAVENOUS; SUBCUTANEOUS at 14:42

## 2020-04-05 RX ADMIN — PIPERACILLIN AND TAZOBACTAM 25 GRAM(S): 4; .5 INJECTION, POWDER, LYOPHILIZED, FOR SOLUTION INTRAVENOUS at 23:17

## 2020-04-05 RX ADMIN — AZITHROMYCIN 250 MILLIGRAM(S): 500 TABLET, FILM COATED ORAL at 18:43

## 2020-04-05 RX ADMIN — SENNA PLUS 2 TABLET(S): 8.6 TABLET ORAL at 23:17

## 2020-04-05 RX ADMIN — Medication 200 MILLIGRAM(S): at 18:11

## 2020-04-05 RX ADMIN — PROPOFOL 3.3 MICROGRAM(S)/KG/MIN: 10 INJECTION, EMULSION INTRAVENOUS at 14:15

## 2020-04-05 RX ADMIN — Medication 2: at 18:42

## 2020-04-05 RX ADMIN — Medication 4: at 23:40

## 2020-04-05 RX ADMIN — ENOXAPARIN SODIUM 40 MILLIGRAM(S): 100 INJECTION SUBCUTANEOUS at 11:57

## 2020-04-05 RX ADMIN — Medication 85 MILLIMOLE(S): at 14:57

## 2020-04-05 RX ADMIN — Medication 40 MILLIEQUIVALENT(S): at 23:17

## 2020-04-05 RX ADMIN — CHLORHEXIDINE GLUCONATE 1 APPLICATION(S): 213 SOLUTION TOPICAL at 06:04

## 2020-04-05 RX ADMIN — PIPERACILLIN AND TAZOBACTAM 25 GRAM(S): 4; .5 INJECTION, POWDER, LYOPHILIZED, FOR SOLUTION INTRAVENOUS at 18:10

## 2020-04-05 RX ADMIN — Medication 40 MILLIGRAM(S): at 06:03

## 2020-04-05 RX ADMIN — CHLORHEXIDINE GLUCONATE 15 MILLILITER(S): 213 SOLUTION TOPICAL at 18:11

## 2020-04-05 RX ADMIN — Medication 50 MILLIEQUIVALENT(S): at 23:17

## 2020-04-05 RX ADMIN — CHLORHEXIDINE GLUCONATE 15 MILLILITER(S): 213 SOLUTION TOPICAL at 06:03

## 2020-04-05 RX ADMIN — PIPERACILLIN AND TAZOBACTAM 25 GRAM(S): 4; .5 INJECTION, POWDER, LYOPHILIZED, FOR SOLUTION INTRAVENOUS at 11:57

## 2020-04-05 RX ADMIN — Medication 200 MILLIGRAM(S): at 06:03

## 2020-04-05 NOTE — PROGRESS NOTE ADULT - SUBJECTIVE AND OBJECTIVE BOX
S: No new issues/events overnight, no new med c/o, ROS not obtainable        Vital Signs Last 24 Hrs  T(C): 37.1 (05 Apr 2020 09:00), Max: 37.4 (04 Apr 2020 20:00)  T(F): 98.7 (05 Apr 2020 09:00), Max: 99.4 (04 Apr 2020 20:00)  HR: 81 (05 Apr 2020 10:00) (74 - 106)  BP: 147/65 (05 Apr 2020 10:00) (90/54 - 147/65)  BP(mean): 94 (05 Apr 2020 10:00) (67 - 94)  RR: 29 (05 Apr 2020 10:00) (18 - 44)  SpO2: 92% (05 Apr 2020 10:00) (91% - 98%)    04-04 @ 07:01  -  04-05 @ 07:00  --------------------------------------------------------  IN: 1111.9 mL / OUT: 1371 mL / NET: -259.1 mL    04-05 @ 07:01  -  04-05 @ 12:47  --------------------------------------------------------  IN: 33.9 mL / OUT: 75 mL / NET: -41.1 mL      04-04 @ 07:01  -  04-05 @ 07:00  --------------------------------------------------------  IN: 1111.9 mL / OUT: 1371 mL / NET: -259.1 mL    04-05 @ 07:01  -  04-05 @ 12:47  --------------------------------------------------------  IN: 33.9 mL / OUT: 75 mL / NET: -41.1 mL      PHYSICAL EXAM:   CONSTITUTIONAL: Intubated, sedated on propofol   In no acute respiratory distress  SKIN: Skin exam is warm and dry, no acute rash.  HEAD: Normocephalic; atraumatic.  EYES: PERRL, conjunctiva and sclera clear.  ENT: No nasal discharge; airway clear.  NECK: Supple; non tender.  CARD: S1, S2 normal; + tachycardic regular rhythm  RESP: agonal breathing, bagging via BVM in progress  ABD: soft; non-distended, + PEG tube well maintained  EXT: peripherally cyanotic, distal pulses intact  NEURO: sedated          LABS:  ABG - ( 03 Apr 2020 17:52 )  pH, Arterial: 7.38  pH, Blood: x     /  pCO2: 41    /  pO2: 101   / HCO3: 24    / Base Excess: -1.3  /  SaO2: 98                  CBC Full  -  ( 05 Apr 2020 05:02 )  WBC Count : 23.46 K/uL  RBC Count : 3.81 M/uL  Hemoglobin : 11.7 g/dL  Hematocrit : 37.1 %  Platelet Count - Automated : 177 K/uL  Mean Cell Volume : 97.4 fl  Mean Cell Hemoglobin : 30.7 pg  Mean Cell Hemoglobin Concentration : 31.5 gm/dL  Auto Neutrophil # : 21.15 K/uL  Auto Lymphocyte # : 0.46 K/uL  Auto Monocyte # : 0.97 K/uL  Auto Eosinophil # : 0.00 K/uL  Auto Basophil # : 0.02 K/uL  Auto Neutrophil % : 90.1 %  Auto Lymphocyte % : 2.0 %  Auto Monocyte % : 4.1 %  Auto Eosinophil % : 0.0 %  Auto Basophil % : 0.1 %    04-05    138  |  105  |  21  ----------------------------<  124<H>  4.6   |  23  |  0.68    Ca    8.1<L>      05 Apr 2020 05:02  Phos  1.7     04-05  Mg     2.4     04-05    TPro  6.5  /  Alb  2.8<L>  /  TBili  0.3  /  DBili  x   /  AST  56<H>  /  ALT  23  /  AlkPhos  62  04-05                    RADIOLOGY & ADDITIONAL STUDIES:        ASSESSMENT & PLAN: 74 F with pmhx of CP, MR, hx of DVT/PE, GERD, chronic PEG, HTN 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 COVID positive, coronavirus pna with acute hypoxemic respiratory failure/ARDS    NEURO: sedation holiday  CV: hypotensive on low dose levophed  PULM: intubated on AC- monitor secretions  GI/FEN: Jevity 1.5 started, dobhoff for meds. protonix ppx  ENDO: ISS  ID: Azithromycin, Plaquenil and Solumedrol. Ceftriaxone switched to zosyn. F/u sputum Cx and UA  PPX: SCD, SQL,   LINES: PIV, [place], TLC   WOUNDS/DRAINS: none  PT: not appropriate    FULL CODE  DISPO: MICU  d/w Dr. Lino S: No new issues/events overnight, no new med c/o, ROS not obtainable        Vital Signs Last 24 Hrs  T(C): 37.1 (05 Apr 2020 09:00), Max: 37.4 (04 Apr 2020 20:00)  T(F): 98.7 (05 Apr 2020 09:00), Max: 99.4 (04 Apr 2020 20:00)  HR: 81 (05 Apr 2020 10:00) (74 - 106)  BP: 147/65 (05 Apr 2020 10:00) (90/54 - 147/65)  BP(mean): 94 (05 Apr 2020 10:00) (67 - 94)  RR: 29 (05 Apr 2020 10:00) (18 - 44)  SpO2: 92% (05 Apr 2020 10:00) (91% - 98%)    04-04 @ 07:01  -  04-05 @ 07:00  --------------------------------------------------------  IN: 1111.9 mL / OUT: 1371 mL / NET: -259.1 mL    04-05 @ 07:01  -  04-05 @ 12:47  --------------------------------------------------------  IN: 33.9 mL / OUT: 75 mL / NET: -41.1 mL      04-04 @ 07:01  -  04-05 @ 07:00  --------------------------------------------------------  IN: 1111.9 mL / OUT: 1371 mL / NET: -259.1 mL    04-05 @ 07:01  -  04-05 @ 12:47  --------------------------------------------------------  IN: 33.9 mL / OUT: 75 mL / NET: -41.1 mL      PHYSICAL EXAM:   CONSTITUTIONAL: Intubated, sedated on propofol   In no acute respiratory distress  SKIN: Skin exam is warm and dry, no acute rash.  HEAD: Normocephalic; atraumatic.  EYES: PERRL, conjunctiva and sclera clear.  ENT: No nasal discharge; airway clear.  NECK: Supple; non tender.  CARD: S1, S2 normal; + tachycardic regular rhythm  RESP: agonal breathing, bagging via BVM in progress  ABD: soft; non-distended, + PEG tube well maintained  EXT: peripherally cyanotic, distal pulses intact  NEURO: sedated          LABS:  ABG - ( 03 Apr 2020 17:52 )  pH, Arterial: 7.38  pH, Blood: x     /  pCO2: 41    /  pO2: 101   / HCO3: 24    / Base Excess: -1.3  /  SaO2: 98                  CBC Full  -  ( 05 Apr 2020 05:02 )  WBC Count : 23.46 K/uL  RBC Count : 3.81 M/uL  Hemoglobin : 11.7 g/dL  Hematocrit : 37.1 %  Platelet Count - Automated : 177 K/uL  Mean Cell Volume : 97.4 fl  Mean Cell Hemoglobin : 30.7 pg  Mean Cell Hemoglobin Concentration : 31.5 gm/dL  Auto Neutrophil # : 21.15 K/uL  Auto Lymphocyte # : 0.46 K/uL  Auto Monocyte # : 0.97 K/uL  Auto Eosinophil # : 0.00 K/uL  Auto Basophil # : 0.02 K/uL  Auto Neutrophil % : 90.1 %  Auto Lymphocyte % : 2.0 %  Auto Monocyte % : 4.1 %  Auto Eosinophil % : 0.0 %  Auto Basophil % : 0.1 %    04-05    138  |  105  |  21  ----------------------------<  124<H>  4.6   |  23  |  0.68    Ca    8.1<L>      05 Apr 2020 05:02  Phos  1.7     04-05  Mg     2.4     04-05    TPro  6.5  /  Alb  2.8<L>  /  TBili  0.3  /  DBili  x   /  AST  56<H>  /  ALT  23  /  AlkPhos  62  04-05                    RADIOLOGY & ADDITIONAL STUDIES:        ASSESSMENT & PLAN: 74 F with pmhx of CP, MR, hx of DVT/PE, GERD, chronic PEG, HTN 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, presented to [facility] for AHRF, intubated, sedated on propofol tx to [facility] for ICU care. COVID positive, coronavirus pna with acute hypoxemic respiratory failure/ARDS    NEURO: sedation holiday  CV: hypotensive on low dose levophed  PULM: intubated on AC- monitor secretions  GI/FEN: Jevity 1.5 started, dobhoff for meds. protonix ppx  ENDO: ISS  ID: Azithromycin, Plaquenil and Solumedrol. Ceftriaxone switched to zosyn as WBC still high and was in group home. F/u sputum Cx and UA  PPX: SCD, SQL,   LINES: PIV, [place], TLC   WOUNDS/DRAINS: none  PT: not appropriate    FULL CODE  DISPO: MICU  d/w Dr. Lino

## 2020-04-05 NOTE — DIETITIAN INITIAL EVALUATION ADULT. - ENERGY NEEDS
Height: 55" Weight: 122.7lbs (4/3) IBW 90lbs+/-10%, %%, BMI 28.5 kg/m2  IBW used for calculations as pt >120% of IBW. Needs adjusted for spastic quadriplegia, vent, acute infection/ catabolic state 2/2 COVID-19 and advanced age. Fluids per team.

## 2020-04-05 NOTE — DIETITIAN INITIAL EVALUATION ADULT. - PERTINENT MEDS FT
Lovenox  Rocephin  Zithromax  Peridex  Plaquenil   Insulin  Bowel regime  Solumedrol  Levo  Protonix  Propofol

## 2020-04-05 NOTE — DIETITIAN INITIAL EVALUATION ADULT. - OTHER INFO
74F with hx of CP, MR, hx of DVT/PE, GERD, chronic PEG, HTN 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, presented to [facility] for AHRF, intubated, sedated on propofol (5 ml/hr providing 132kcal) tx to [facility] for ICU care. COVID positive, coronavirus pna with acute hypoxemic respiratory failure/ARDS. Vent on AC mode. MAP 88 this am- stable on levo. Questionable plan for CPAP trials today. Unable to complete assessment 2/2 AMS. NPO this am. Pt with PEG with plan to restart EN feeding per EMR/ team- please see recommendations below, disc with team. GI: WDL, last BM 4/4. Skin: WDL, jeovanny score 12, trace to +1 generalized pitting edema. Pain: denies at this time. Allergic to caffeine, high acid, and chocolate- noted in EMR. Education deferred. Please see nutrition recommendations below. RD to follow up per protocol. 74F with hx of CP, MR, hx of DVT/PE, GERD, chronic PEG, HTN 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, presented to [facility] for AHRF, intubated, sedated on propofol (5 ml/hr providing 132kcal) tx to [facility] for ICU care. COVID positive, coronavirus pna with acute hypoxemic respiratory failure/ARDS. Vent on AC mode. MAP 88 this am- stable on levo. Questionable plan for CPAP trials today. Unable to complete assessment 2/2 AMS. NPO this am. Pt with PEG with plan to restart EN feeding per EMR/ team- please see recommendations below, disc with team. GI: WDL, last BM 4/4. Skin: WDL, jeovanny score 12, trace to +1 generalized pitting edema. Pain: denies at this time. Allergic to caffeine, high acid, and chocolate, tomatoes- noted in EMR. Education deferred. Please see nutrition recommendations below. RD to follow up per protocol.

## 2020-04-06 LAB
ALBUMIN SERPL ELPH-MCNC: 2.6 G/DL — LOW (ref 3.3–5)
ALP SERPL-CCNC: 55 U/L — SIGNIFICANT CHANGE UP (ref 40–120)
ALT FLD-CCNC: 21 U/L — SIGNIFICANT CHANGE UP (ref 10–45)
ANION GAP SERPL CALC-SCNC: 11 MMOL/L — SIGNIFICANT CHANGE UP (ref 5–17)
ANION GAP SERPL CALC-SCNC: 8 MMOL/L — SIGNIFICANT CHANGE UP (ref 5–17)
AST SERPL-CCNC: 40 U/L — SIGNIFICANT CHANGE UP (ref 10–40)
BASOPHILS # BLD AUTO: 0.02 K/UL — SIGNIFICANT CHANGE UP (ref 0–0.2)
BASOPHILS NFR BLD AUTO: 0.1 % — SIGNIFICANT CHANGE UP (ref 0–2)
BILIRUB SERPL-MCNC: 0.2 MG/DL — SIGNIFICANT CHANGE UP (ref 0.2–1.2)
BUN SERPL-MCNC: 14 MG/DL — SIGNIFICANT CHANGE UP (ref 7–23)
BUN SERPL-MCNC: 19 MG/DL — SIGNIFICANT CHANGE UP (ref 7–23)
CALCIUM SERPL-MCNC: 7.8 MG/DL — LOW (ref 8.4–10.5)
CALCIUM SERPL-MCNC: 7.8 MG/DL — LOW (ref 8.4–10.5)
CHLORIDE SERPL-SCNC: 101 MMOL/L — SIGNIFICANT CHANGE UP (ref 96–108)
CHLORIDE SERPL-SCNC: 106 MMOL/L — SIGNIFICANT CHANGE UP (ref 96–108)
CO2 SERPL-SCNC: 22 MMOL/L — SIGNIFICANT CHANGE UP (ref 22–31)
CO2 SERPL-SCNC: 24 MMOL/L — SIGNIFICANT CHANGE UP (ref 22–31)
CREAT SERPL-MCNC: 0.51 MG/DL — SIGNIFICANT CHANGE UP (ref 0.5–1.3)
CREAT SERPL-MCNC: 0.53 MG/DL — SIGNIFICANT CHANGE UP (ref 0.5–1.3)
CRP SERPL-MCNC: 10.18 MG/DL — HIGH (ref 0–0.4)
D DIMER BLD IA.RAPID-MCNC: 235 NG/ML DDU — HIGH
EOSINOPHIL # BLD AUTO: 0 K/UL — SIGNIFICANT CHANGE UP (ref 0–0.5)
EOSINOPHIL NFR BLD AUTO: 0 % — SIGNIFICANT CHANGE UP (ref 0–6)
FERRITIN SERPL-MCNC: 257 NG/ML — HIGH (ref 15–150)
GLUCOSE BLDC GLUCOMTR-MCNC: 117 MG/DL — HIGH (ref 70–99)
GLUCOSE BLDC GLUCOMTR-MCNC: 118 MG/DL — HIGH (ref 70–99)
GLUCOSE BLDC GLUCOMTR-MCNC: 157 MG/DL — HIGH (ref 70–99)
GLUCOSE BLDC GLUCOMTR-MCNC: 95 MG/DL — SIGNIFICANT CHANGE UP (ref 70–99)
GLUCOSE SERPL-MCNC: 143 MG/DL — HIGH (ref 70–99)
GLUCOSE SERPL-MCNC: 194 MG/DL — HIGH (ref 70–99)
HCT VFR BLD CALC: 36.3 % — SIGNIFICANT CHANGE UP (ref 34.5–45)
HGB BLD-MCNC: 11.1 G/DL — LOW (ref 11.5–15.5)
IMM GRANULOCYTES NFR BLD AUTO: 0.9 % — SIGNIFICANT CHANGE UP (ref 0–1.5)
LYMPHOCYTES # BLD AUTO: 0.25 K/UL — LOW (ref 1–3.3)
LYMPHOCYTES # BLD AUTO: 1.5 % — LOW (ref 13–44)
MAGNESIUM SERPL-MCNC: 1.8 MG/DL — SIGNIFICANT CHANGE UP (ref 1.6–2.6)
MAGNESIUM SERPL-MCNC: 2.1 MG/DL — SIGNIFICANT CHANGE UP (ref 1.6–2.6)
MCHC RBC-ENTMCNC: 29.8 PG — SIGNIFICANT CHANGE UP (ref 27–34)
MCHC RBC-ENTMCNC: 30.6 GM/DL — LOW (ref 32–36)
MCV RBC AUTO: 97.3 FL — SIGNIFICANT CHANGE UP (ref 80–100)
MONOCYTES # BLD AUTO: 0.84 K/UL — SIGNIFICANT CHANGE UP (ref 0–0.9)
MONOCYTES NFR BLD AUTO: 5 % — SIGNIFICANT CHANGE UP (ref 2–14)
NEUTROPHILS # BLD AUTO: 15.6 K/UL — HIGH (ref 1.8–7.4)
NEUTROPHILS NFR BLD AUTO: 92.5 % — HIGH (ref 43–77)
NRBC # BLD: 0 /100 WBCS — SIGNIFICANT CHANGE UP (ref 0–0)
PHOSPHATE SERPL-MCNC: 1.4 MG/DL — LOW (ref 2.5–4.5)
PHOSPHATE SERPL-MCNC: 3 MG/DL — SIGNIFICANT CHANGE UP (ref 2.5–4.5)
PLATELET # BLD AUTO: 203 K/UL — SIGNIFICANT CHANGE UP (ref 150–400)
POTASSIUM SERPL-MCNC: 4.1 MMOL/L — SIGNIFICANT CHANGE UP (ref 3.5–5.3)
POTASSIUM SERPL-MCNC: 6 MMOL/L — HIGH (ref 3.5–5.3)
POTASSIUM SERPL-SCNC: 4.1 MMOL/L — SIGNIFICANT CHANGE UP (ref 3.5–5.3)
POTASSIUM SERPL-SCNC: 6 MMOL/L — HIGH (ref 3.5–5.3)
PROCALCITONIN SERPL-MCNC: 3.13 NG/ML — HIGH (ref 0.02–0.1)
PROT SERPL-MCNC: 5.9 G/DL — LOW (ref 6–8.3)
RBC # BLD: 3.73 M/UL — LOW (ref 3.8–5.2)
RBC # FLD: 13.4 % — SIGNIFICANT CHANGE UP (ref 10.3–14.5)
SODIUM SERPL-SCNC: 136 MMOL/L — SIGNIFICANT CHANGE UP (ref 135–145)
SODIUM SERPL-SCNC: 136 MMOL/L — SIGNIFICANT CHANGE UP (ref 135–145)
TRIGL SERPL-MCNC: 96 MG/DL — SIGNIFICANT CHANGE UP (ref 10–149)
WBC # BLD: 16.86 K/UL — HIGH (ref 3.8–10.5)
WBC # FLD AUTO: 16.86 K/UL — HIGH (ref 3.8–10.5)

## 2020-04-06 PROCEDURE — 99291 CRITICAL CARE FIRST HOUR: CPT

## 2020-04-06 RX ORDER — APIXABAN 2.5 MG/1
5 TABLET, FILM COATED ORAL EVERY 12 HOURS
Refills: 0 | Status: DISCONTINUED | OUTPATIENT
Start: 2020-04-06 | End: 2020-05-08

## 2020-04-06 RX ORDER — ROBINUL 0.2 MG/ML
0.2 INJECTION INTRAMUSCULAR; INTRAVENOUS ONCE
Refills: 0 | Status: COMPLETED | OUTPATIENT
Start: 2020-04-06 | End: 2020-04-06

## 2020-04-06 RX ORDER — INSULIN HUMAN 100 [IU]/ML
10 INJECTION, SOLUTION SUBCUTANEOUS ONCE
Refills: 0 | Status: COMPLETED | OUTPATIENT
Start: 2020-04-06 | End: 2020-04-06

## 2020-04-06 RX ORDER — DEXTROSE 50 % IN WATER 50 %
50 SYRINGE (ML) INTRAVENOUS ONCE
Refills: 0 | Status: COMPLETED | OUTPATIENT
Start: 2020-04-06 | End: 2020-04-06

## 2020-04-06 RX ADMIN — PIPERACILLIN AND TAZOBACTAM 25 GRAM(S): 4; .5 INJECTION, POWDER, LYOPHILIZED, FOR SOLUTION INTRAVENOUS at 22:32

## 2020-04-06 RX ADMIN — INSULIN HUMAN 10 UNIT(S): 100 INJECTION, SOLUTION SUBCUTANEOUS at 06:46

## 2020-04-06 RX ADMIN — Medication 50 MILLIEQUIVALENT(S): at 02:28

## 2020-04-06 RX ADMIN — PIPERACILLIN AND TAZOBACTAM 25 GRAM(S): 4; .5 INJECTION, POWDER, LYOPHILIZED, FOR SOLUTION INTRAVENOUS at 11:30

## 2020-04-06 RX ADMIN — ROBINUL 0.2 MILLIGRAM(S): 0.2 INJECTION INTRAMUSCULAR; INTRAVENOUS at 17:30

## 2020-04-06 RX ADMIN — ROBINUL 0.2 MILLIGRAM(S): 0.2 INJECTION INTRAMUSCULAR; INTRAVENOUS at 22:32

## 2020-04-06 RX ADMIN — ROBINUL 0.2 MILLIGRAM(S): 0.2 INJECTION INTRAMUSCULAR; INTRAVENOUS at 05:34

## 2020-04-06 RX ADMIN — AZITHROMYCIN 250 MILLIGRAM(S): 500 TABLET, FILM COATED ORAL at 18:39

## 2020-04-06 RX ADMIN — Medication 50 MILLIEQUIVALENT(S): at 01:12

## 2020-04-06 RX ADMIN — CHLORHEXIDINE GLUCONATE 15 MILLILITER(S): 213 SOLUTION TOPICAL at 17:28

## 2020-04-06 RX ADMIN — Medication 40 MILLIGRAM(S): at 17:30

## 2020-04-06 RX ADMIN — Medication 200 MILLIGRAM(S): at 05:34

## 2020-04-06 RX ADMIN — Medication 85 MILLIMOLE(S): at 08:00

## 2020-04-06 RX ADMIN — APIXABAN 5 MILLIGRAM(S): 2.5 TABLET, FILM COATED ORAL at 22:32

## 2020-04-06 RX ADMIN — PIPERACILLIN AND TAZOBACTAM 25 GRAM(S): 4; .5 INJECTION, POWDER, LYOPHILIZED, FOR SOLUTION INTRAVENOUS at 05:34

## 2020-04-06 RX ADMIN — ROBINUL 0.2 MILLIGRAM(S): 0.2 INJECTION INTRAMUSCULAR; INTRAVENOUS at 11:30

## 2020-04-06 RX ADMIN — PANTOPRAZOLE SODIUM 40 MILLIGRAM(S): 20 TABLET, DELAYED RELEASE ORAL at 11:30

## 2020-04-06 RX ADMIN — ROBINUL 0.2 MILLIGRAM(S): 0.2 INJECTION INTRAMUSCULAR; INTRAVENOUS at 09:15

## 2020-04-06 RX ADMIN — Medication 2: at 06:38

## 2020-04-06 RX ADMIN — APIXABAN 5 MILLIGRAM(S): 2.5 TABLET, FILM COATED ORAL at 11:32

## 2020-04-06 RX ADMIN — Medication 40 MILLIGRAM(S): at 05:34

## 2020-04-06 RX ADMIN — Medication 200 MILLIGRAM(S): at 17:28

## 2020-04-06 RX ADMIN — Medication 50 MILLILITER(S): at 06:46

## 2020-04-06 RX ADMIN — CHLORHEXIDINE GLUCONATE 1 APPLICATION(S): 213 SOLUTION TOPICAL at 05:34

## 2020-04-06 RX ADMIN — CHLORHEXIDINE GLUCONATE 15 MILLILITER(S): 213 SOLUTION TOPICAL at 05:34

## 2020-04-06 RX ADMIN — PIPERACILLIN AND TAZOBACTAM 25 GRAM(S): 4; .5 INJECTION, POWDER, LYOPHILIZED, FOR SOLUTION INTRAVENOUS at 17:30

## 2020-04-06 NOTE — PROGRESS NOTE ADULT - ASSESSMENT
ASSESSMENT & PLAN: 74 F with pmhx of CP, MR, hx of DVT/PE, GERD, chronic PEG, HTN 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, presented to [facility] for AHRF, intubated, sedated on propofol tx to [facility] for ICU care. COVID positive, coronavirus pna with acute hypoxemic respiratory failure/ARDS    NEURO: stopped sedation   CV: on/off levo  PULM: extubated   GI/FEN: Jevity 1.5 started. G-tube. protonix ppx  ENDO: ISS  ID: Azithromycin, Plaquenil and Solumedrol. Ceftriaxone switched to zosyn as WBC still high and was in group home.   PPX: SCD, SQL,   LINES: PIV, Laure, TLC   WOUNDS/DRAINS: none    FULL CODE  DISPO: MICU  d/w Dr. Lino

## 2020-04-06 NOTE — PROGRESS NOTE ADULT - SUBJECTIVE AND OBJECTIVE BOX
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    OVERNIGHT EVENTS: doing well on the vent. weaning sedation for possible extubation    SUBJECTIVE HPI: Patient seen and examined at bedside.    VITAL SIGNS:  ICU Vital Signs Last 24 Hrs  T(C): 37.3 (06 Apr 2020 15:00), Max: 37.7 (06 Apr 2020 12:00)  T(F): 99.1 (06 Apr 2020 15:00), Max: 99.8 (06 Apr 2020 12:00)  HR: 84 (06 Apr 2020 15:00) (58 - 85)  BP: 133/72 (06 Apr 2020 15:00) (90/50 - 137/100)  BP(mean): 96 (06 Apr 2020 15:00) (64 - 112)  ABP: 129/116 (06 Apr 2020 15:00) (96/47 - 146/77)  ABP(mean): 122 (06 Apr 2020 15:00) (66 - 140)  RR: 27 (06 Apr 2020 15:00) (10 - 37)  SpO2: 94% (06 Apr 2020 15:00) (92% - 100%)    I&O's Summary    05 Apr 2020 07:01  -  06 Apr 2020 07:00  --------------------------------------------------------  IN: 2442.2 mL / OUT: 925 mL / NET: 1517.2 mL    06 Apr 2020 07:01  -  06 Apr 2020 15:20  --------------------------------------------------------  IN: 480.1 mL / OUT: 366 mL / NET: 114.1 mL    PHYSICAL EXAM:  General: Comfortable  Neurological: awake and alert.  HEENT: NC/AT; EOMI, PERRL, clear conjunctiva, no nasal or oropharyngeal discharge or exudates, MMM  Neck: supple, no cervical or post-auricular lymphadenopathy  Cardiovascular: +S1/S2, no murmurs/rubs/gallops, RRR  Respiratory: CTA B/L, no diminished breath sounds, no wheezes/rales/rhonchi, no increased work of breathing or accessory muscle use  Gastrointestinal: soft, NT/ND; active BSx4 quadrants  Genitourinary: no suprapubic tenderness, no CVA tenderness  Extremities: WWP; no edema, clubbing or cyanosis  Vascular: 2+ radial, DP/PT pulses B/L  Skin: no rashes    MEDICATIONS:  MEDICATIONS  (STANDING):  apixaban 5 milliGRAM(s) Oral every 12 hours  azithromycin   Tablet 250 milliGRAM(s) Oral every 24 hours  chlorhexidine 0.12% Liquid 15 milliLiter(s) Oral Mucosa every 12 hours  chlorhexidine 2% Cloths 1 Application(s) Topical <User Schedule>  glycopyrrolate Injectable 0.2 milliGRAM(s) IV Push every 6 hours  hydroxychloroquine   Oral   hydroxychloroquine 200 milliGRAM(s) Oral every 12 hours  insulin lispro (HumaLOG) corrective regimen sliding scale   SubCutaneous every 6 hours  methylPREDNISolone sodium succinate Injectable 40 milliGRAM(s) IV Push every 12 hours  norepinephrine Infusion 0.05 MICROgram(s)/kG/Min (5.22 mL/Hr) IV Continuous <Continuous>  pantoprazole   Suspension 40 milliGRAM(s) Oral daily  piperacillin/tazobactam IVPB.. 4.5 Gram(s) IV Intermittent every 6 hours  propofol Infusion 10 MICROgram(s)/kG/Min (3.3 mL/Hr) IV Continuous <Continuous>  senna 2 Tablet(s) Oral at bedtime    MEDICATIONS  (PRN):  acetaminophen    Suspension .. 650 milliGRAM(s) Oral every 6 hours PRN Temp greater or equal to 38C (100.4F), Mild Pain (1 - 3)  bisacodyl 5 milliGRAM(s) Oral every 12 hours PRN Constipation  sodium chloride 0.9% lock flush 10 milliLiter(s) IV Push every 1 hour PRN Pre/post blood products, medications, blood draw, and to maintain line patency    ALLERGIES/INTOLERANCES:  Allergies    ace inhibitors (Anaphylaxis)  caffeine (Rash)  chocolate (Rash)  high acid (Rash)  Tomatoes (Hives)    Intolerances    LABS:                        11.1   16.86 )-----------( 203      ( 06 Apr 2020 05:21 )             36.3     Auto Neutrophil %: 92.5 % (04-06-20 @ 05:21)  Auto Lymphocyte #: 0.25 K/uL (04-06-20 @ 05:21)    04-06    136  |  106  |  19  ----------------------------<  194<H>  6.0<H>   |  22  |  0.53    Ca    7.8<L>      06 Apr 2020 05:21  Phos  1.4     04-06  Mg     2.1     04-06    TPro  5.9<L>  /  Alb  2.6<L>  /  TBili  0.2  /  DBili  x   /  AST  40  /  ALT  21  /  AlkPhos  55  04-06    Urinalysis Basic - ( 05 Apr 2020 12:44 )    Color: Yellow / Appearance: Clear / SG: >=1.030 / pH: x  Gluc: x / Ketone: >=80 mg/dL  / Bili: Negative / Urobili: 0.2 E.U./dL   Blood: x / Protein: 100 mg/dL / Nitrite: NEGATIVE   Leuk Esterase: NEGATIVE / RBC: 5-10 /HPF / WBC 5-10 /HPF   Sq Epi: x / Non Sq Epi: 0-5 /HPF / Bacteria: Present /HPF    ABG - ( 05 Apr 2020 21:57 )  pH, Arterial: 7.37  pH, Blood: x     /  pCO2: 46    /  pO2: 89    / HCO3: 26    / Base Excess: 0.7   /  SaO2: 97        CoVID-specific labs:  D-Dimer Assay, Quantitative: 235 ng/mL DDU <H> (04-06-20 @ 05:21)  Procalcitonin, Serum: 3.13 ng/mL <H> (04-06-20 @ 05:21)  C-Reactive Protein, Serum: 10.18 mg/dL <H> (04-06-20 @ 05:21)    Microbiology:   Culture - Sputum (collected 05 Apr 2020 17:36)  Source: .Sputum Sputum  Gram Stain (05 Apr 2020 21:01):    No epithelial cells seen    Rare WBC's    Few-moderate Gram Negative Rods  Preliminary Report (06 Apr 2020 10:22):    Numerous Pseudomonas aeruginosa    Susceptibility to follow.    RADIOLOGY, EKG AND ADDITIONAL TESTS: Reviewed.

## 2020-04-07 LAB
-  AMIKACIN: SIGNIFICANT CHANGE UP
-  AZTREONAM: SIGNIFICANT CHANGE UP
-  CEFEPIME: SIGNIFICANT CHANGE UP
-  CEFTAZIDIME: SIGNIFICANT CHANGE UP
-  CIPROFLOXACIN: SIGNIFICANT CHANGE UP
-  GENTAMICIN: SIGNIFICANT CHANGE UP
-  LEVOFLOXACIN: SIGNIFICANT CHANGE UP
-  MEROPENEM: SIGNIFICANT CHANGE UP
-  PIPERACILLIN/TAZOBACTAM: SIGNIFICANT CHANGE UP
-  TOBRAMYCIN: SIGNIFICANT CHANGE UP
ALBUMIN SERPL ELPH-MCNC: 2.6 G/DL — LOW (ref 3.3–5)
ALP SERPL-CCNC: 42 U/L — SIGNIFICANT CHANGE UP (ref 40–120)
ALT FLD-CCNC: 22 U/L — SIGNIFICANT CHANGE UP (ref 10–45)
ANION GAP SERPL CALC-SCNC: 9 MMOL/L — SIGNIFICANT CHANGE UP (ref 5–17)
AST SERPL-CCNC: 35 U/L — SIGNIFICANT CHANGE UP (ref 10–40)
BASOPHILS # BLD AUTO: 0.01 K/UL — SIGNIFICANT CHANGE UP (ref 0–0.2)
BASOPHILS NFR BLD AUTO: 0.1 % — SIGNIFICANT CHANGE UP (ref 0–2)
BILIRUB SERPL-MCNC: 0.3 MG/DL — SIGNIFICANT CHANGE UP (ref 0.2–1.2)
BUN SERPL-MCNC: 13 MG/DL — SIGNIFICANT CHANGE UP (ref 7–23)
CALCIUM SERPL-MCNC: 8 MG/DL — LOW (ref 8.4–10.5)
CHLORIDE SERPL-SCNC: 104 MMOL/L — SIGNIFICANT CHANGE UP (ref 96–108)
CO2 SERPL-SCNC: 25 MMOL/L — SIGNIFICANT CHANGE UP (ref 22–31)
CREAT SERPL-MCNC: 0.51 MG/DL — SIGNIFICANT CHANGE UP (ref 0.5–1.3)
CRP SERPL-MCNC: 4.35 MG/DL — HIGH (ref 0–0.4)
CULTURE RESULTS: SIGNIFICANT CHANGE UP
D DIMER BLD IA.RAPID-MCNC: 296 NG/ML DDU — HIGH
EOSINOPHIL # BLD AUTO: 0 K/UL — SIGNIFICANT CHANGE UP (ref 0–0.5)
EOSINOPHIL NFR BLD AUTO: 0 % — SIGNIFICANT CHANGE UP (ref 0–6)
FERRITIN SERPL-MCNC: 216 NG/ML — HIGH (ref 15–150)
GLUCOSE BLDC GLUCOMTR-MCNC: 125 MG/DL — HIGH (ref 70–99)
GLUCOSE BLDC GLUCOMTR-MCNC: 130 MG/DL — HIGH (ref 70–99)
GLUCOSE BLDC GLUCOMTR-MCNC: 166 MG/DL — HIGH (ref 70–99)
GLUCOSE BLDC GLUCOMTR-MCNC: 77 MG/DL — SIGNIFICANT CHANGE UP (ref 70–99)
GLUCOSE SERPL-MCNC: 126 MG/DL — HIGH (ref 70–99)
HCT VFR BLD CALC: 34.9 % — SIGNIFICANT CHANGE UP (ref 34.5–45)
HGB BLD-MCNC: 11 G/DL — LOW (ref 11.5–15.5)
IMM GRANULOCYTES NFR BLD AUTO: 0.4 % — SIGNIFICANT CHANGE UP (ref 0–1.5)
LYMPHOCYTES # BLD AUTO: 0.31 K/UL — LOW (ref 1–3.3)
LYMPHOCYTES # BLD AUTO: 2.3 % — LOW (ref 13–44)
MAGNESIUM SERPL-MCNC: 1.9 MG/DL — SIGNIFICANT CHANGE UP (ref 1.6–2.6)
MCHC RBC-ENTMCNC: 30.3 PG — SIGNIFICANT CHANGE UP (ref 27–34)
MCHC RBC-ENTMCNC: 31.5 GM/DL — LOW (ref 32–36)
MCV RBC AUTO: 96.1 FL — SIGNIFICANT CHANGE UP (ref 80–100)
METHOD TYPE: SIGNIFICANT CHANGE UP
MONOCYTES # BLD AUTO: 0.71 K/UL — SIGNIFICANT CHANGE UP (ref 0–0.9)
MONOCYTES NFR BLD AUTO: 5.3 % — SIGNIFICANT CHANGE UP (ref 2–14)
NEUTROPHILS # BLD AUTO: 12.26 K/UL — HIGH (ref 1.8–7.4)
NEUTROPHILS NFR BLD AUTO: 91.9 % — HIGH (ref 43–77)
NRBC # BLD: 0 /100 WBCS — SIGNIFICANT CHANGE UP (ref 0–0)
ORGANISM # SPEC MICROSCOPIC CNT: SIGNIFICANT CHANGE UP
ORGANISM # SPEC MICROSCOPIC CNT: SIGNIFICANT CHANGE UP
PHOSPHATE SERPL-MCNC: 2.9 MG/DL — SIGNIFICANT CHANGE UP (ref 2.5–4.5)
PLATELET # BLD AUTO: 200 K/UL — SIGNIFICANT CHANGE UP (ref 150–400)
POTASSIUM SERPL-MCNC: 4.2 MMOL/L — SIGNIFICANT CHANGE UP (ref 3.5–5.3)
POTASSIUM SERPL-SCNC: 4.2 MMOL/L — SIGNIFICANT CHANGE UP (ref 3.5–5.3)
PROCALCITONIN SERPL-MCNC: 1.44 NG/ML — HIGH (ref 0.02–0.1)
PROT SERPL-MCNC: 6 G/DL — SIGNIFICANT CHANGE UP (ref 6–8.3)
RBC # BLD: 3.63 M/UL — LOW (ref 3.8–5.2)
RBC # FLD: 13.3 % — SIGNIFICANT CHANGE UP (ref 10.3–14.5)
SODIUM SERPL-SCNC: 138 MMOL/L — SIGNIFICANT CHANGE UP (ref 135–145)
SPECIMEN SOURCE: SIGNIFICANT CHANGE UP
WBC # BLD: 13.35 K/UL — HIGH (ref 3.8–10.5)
WBC # FLD AUTO: 13.35 K/UL — HIGH (ref 3.8–10.5)

## 2020-04-07 PROCEDURE — 99291 CRITICAL CARE FIRST HOUR: CPT

## 2020-04-07 RX ORDER — FUROSEMIDE 40 MG
20 TABLET ORAL DAILY
Refills: 0 | Status: DISCONTINUED | OUTPATIENT
Start: 2020-04-07 | End: 2020-04-08

## 2020-04-07 RX ADMIN — PIPERACILLIN AND TAZOBACTAM 25 GRAM(S): 4; .5 INJECTION, POWDER, LYOPHILIZED, FOR SOLUTION INTRAVENOUS at 05:36

## 2020-04-07 RX ADMIN — PIPERACILLIN AND TAZOBACTAM 25 GRAM(S): 4; .5 INJECTION, POWDER, LYOPHILIZED, FOR SOLUTION INTRAVENOUS at 12:15

## 2020-04-07 RX ADMIN — Medication 20 MILLIGRAM(S): at 11:15

## 2020-04-07 RX ADMIN — Medication 200 MILLIGRAM(S): at 05:36

## 2020-04-07 RX ADMIN — Medication 200 MILLIGRAM(S): at 16:44

## 2020-04-07 RX ADMIN — ROBINUL 0.2 MILLIGRAM(S): 0.2 INJECTION INTRAMUSCULAR; INTRAVENOUS at 12:15

## 2020-04-07 RX ADMIN — CHLORHEXIDINE GLUCONATE 1 APPLICATION(S): 213 SOLUTION TOPICAL at 05:21

## 2020-04-07 RX ADMIN — ROBINUL 0.2 MILLIGRAM(S): 0.2 INJECTION INTRAMUSCULAR; INTRAVENOUS at 23:50

## 2020-04-07 RX ADMIN — PIPERACILLIN AND TAZOBACTAM 25 GRAM(S): 4; .5 INJECTION, POWDER, LYOPHILIZED, FOR SOLUTION INTRAVENOUS at 18:00

## 2020-04-07 RX ADMIN — ROBINUL 0.2 MILLIGRAM(S): 0.2 INJECTION INTRAMUSCULAR; INTRAVENOUS at 05:36

## 2020-04-07 RX ADMIN — AZITHROMYCIN 250 MILLIGRAM(S): 500 TABLET, FILM COATED ORAL at 21:22

## 2020-04-07 RX ADMIN — Medication 40 MILLIGRAM(S): at 18:00

## 2020-04-07 RX ADMIN — PIPERACILLIN AND TAZOBACTAM 25 GRAM(S): 4; .5 INJECTION, POWDER, LYOPHILIZED, FOR SOLUTION INTRAVENOUS at 23:49

## 2020-04-07 RX ADMIN — PANTOPRAZOLE SODIUM 40 MILLIGRAM(S): 20 TABLET, DELAYED RELEASE ORAL at 12:11

## 2020-04-07 RX ADMIN — APIXABAN 5 MILLIGRAM(S): 2.5 TABLET, FILM COATED ORAL at 12:12

## 2020-04-07 RX ADMIN — Medication 40 MILLIGRAM(S): at 05:36

## 2020-04-07 RX ADMIN — APIXABAN 5 MILLIGRAM(S): 2.5 TABLET, FILM COATED ORAL at 23:49

## 2020-04-07 RX ADMIN — ROBINUL 0.2 MILLIGRAM(S): 0.2 INJECTION INTRAMUSCULAR; INTRAVENOUS at 17:59

## 2020-04-07 NOTE — PROGRESS NOTE ADULT - ASSESSMENT
ASSESSMENT & PLAN: 74 F with pmhx of CP, MR, hx of DVT/PE, GERD, chronic PEG, HTN 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, presented to [facility] for AHRF, intubated, sedated on propofol tx to [facility] for ICU care. COVID positive, coronavirus pna with acute hypoxemic respiratory failure/ARDS    NEURO: stopped sedation   CV: on/off levo  PULM: extubated   GI/FEN: Jevity 1.5 started. G-tube. protonix ppx  ENDO: ISS  ID: Azithromycin, Plaquenil and Solumedrol. Ceftriaxone switched to zosyn as WBC still high and was in group home.   PPX: SCD, SQL,   LINES: PIV, [place]ure, TLC   WOUNDS/DRAINS: none    FULL CODE  DISPO: MICU  d/w Dr. Lino ASSESSMENT & PLAN: 74 F with pmhx of CP, MR, hx of DVT/PE, GERD, chronic PEG, HTN 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, presented to [facility] for AHRF, intubated, sedated on propofol tx to [facility] for ICU care. COVID positive, coronavirus pna with acute hypoxemic respiratory failure/ARDS    NEURO: awake off all sedation  CV: off levo  PULM: extubated doing well on nasal cannula   GI/FEN: Jevity 1.5 started. G-tube. protonix ppx  ENDO: ISS  ID: Azithromycin, Plaquenil and Solumedrol. Ceftriaxone switched to zosyn as WBC still high and was in group home.   PPX: SCD, SQL,   LINES: PIV, Laure, TLC   WOUNDS/DRAINS: none    FULL CODE  DISPO: stepdown

## 2020-04-07 NOTE — PROGRESS NOTE ADULT - SUBJECTIVE AND OBJECTIVE BOX
HOSPITAL COURSE: 74 F with pmhx of CP, MR, hx of DVT/PE, GERD, chronic PEG, HTN\, 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, presented to [facility] for AHRF, intubated, sedated on propofol tx to [facility] for ICU care.     OVERNIGHT EVENTS: no acute events overnight    SUBJECTIVE HPI: Patient seen and examined at bedside.    VITAL SIGNS:  ICU Vital Signs Last 24 Hrs  T(C): 37.2 (07 Apr 2020 09:00), Max: 37.4 (06 Apr 2020 18:00)  T(F): 99 (07 Apr 2020 09:00), Max: 99.4 (06 Apr 2020 18:00)  HR: 61 (07 Apr 2020 12:00) (61 - 117)  BP: 110/55 (07 Apr 2020 12:00) (103/59 - 140/75)  BP(mean): 79 (07 Apr 2020 12:00) (76 - 100)  ABP: 117/92 (07 Apr 2020 10:00) (110/60 - 153/72)  ABP(mean): 105 (07 Apr 2020 10:00) (83 - 140)  RR: 25 (07 Apr 2020 12:00) (10 - 43)  SpO2: 88% (07 Apr 2020 12:00) (86% - 97%)    I&O's Summary    06 Apr 2020 07:01  -  07 Apr 2020 07:00  --------------------------------------------------------  IN: 580.1 mL / OUT: 1008 mL / NET: -427.9 mL    07 Apr 2020 07:01  -  07 Apr 2020 12:42  --------------------------------------------------------  IN: 100 mL / OUT: 931 mL / NET: -831 mL    PHYSICAL EXAM:  General: Comfortable in bed on nasal cannula  Neurological: awake and alert  HEENT: NC/AT; EOMI, PERRL, clear conjunctiva, no nasal or oropharyngeal discharge or exudates, MMM  Neck: supple, no cervical or post-auricular lymphadenopathy  Cardiovascular: +S1/S2, no murmurs/rubs/gallops, RRR  Respiratory: CTA B/L, no diminished breath sounds, no wheezes/rales/rhonchi, no increased work of breathing or accessory muscle use  Gastrointestinal: soft, NT/ND; active BSx4 quadrants  Genitourinary: no suprapubic tenderness, no CVA tenderness  Extremities: WWP; no edema, clubbing or cyanosis  Vascular: 2+ radial, DP/PT pulses B/L  Skin: no rashes    MEDICATIONS:  MEDICATIONS  (STANDING):  apixaban 5 milliGRAM(s) Oral every 12 hours  azithromycin   Tablet 250 milliGRAM(s) Oral every 24 hours  chlorhexidine 2% Cloths 1 Application(s) Topical <User Schedule>  furosemide   Injectable 20 milliGRAM(s) IV Push daily  glycopyrrolate Injectable 0.2 milliGRAM(s) IV Push every 6 hours  hydroxychloroquine   Oral   hydroxychloroquine 200 milliGRAM(s) Oral every 12 hours  insulin lispro (HumaLOG) corrective regimen sliding scale   SubCutaneous every 6 hours  methylPREDNISolone sodium succinate Injectable 40 milliGRAM(s) IV Push every 12 hours  pantoprazole   Suspension 40 milliGRAM(s) Oral daily  piperacillin/tazobactam IVPB.. 4.5 Gram(s) IV Intermittent every 6 hours  senna 2 Tablet(s) Oral at bedtime    MEDICATIONS  (PRN):  acetaminophen    Suspension .. 650 milliGRAM(s) Oral every 6 hours PRN Temp greater or equal to 38C (100.4F), Mild Pain (1 - 3)  bisacodyl 5 milliGRAM(s) Oral every 12 hours PRN Constipation  sodium chloride 0.9% lock flush 10 milliLiter(s) IV Push every 1 hour PRN Pre/post blood products, medications, blood draw, and to maintain line patency    ALLERGIES/INTOLERANCES:  Allergies    ace inhibitors (Anaphylaxis)  caffeine (Rash)  chocolate (Rash)  high acid (Rash)  Tomatoes (Hives)    Intolerances    LABS:                        11.0   13.35 )-----------( 200      ( 07 Apr 2020 04:04 )             34.9     Auto Neutrophil %: 91.9 % (04-07-20 @ 04:04)  Auto Lymphocyte #: 0.31 K/uL (04-07-20 @ 04:04)    04-07    138  |  104  |  13  ----------------------------<  126<H>  4.2   |  25  |  0.51    Ca    8.0<L>      07 Apr 2020 04:04  Phos  2.9     04-07  Mg     1.9     04-07    TPro  6.0  /  Alb  2.6<L>  /  TBili  0.3  /  DBili  x   /  AST  35  /  ALT  22  /  AlkPhos  42  04-07    Urinalysis Basic - ( 05 Apr 2020 12:44 )    Color: Yellow / Appearance: Clear / SG: >=1.030 / pH: x  Gluc: x / Ketone: >=80 mg/dL  / Bili: Negative / Urobili: 0.2 E.U./dL   Blood: x / Protein: 100 mg/dL / Nitrite: NEGATIVE   Leuk Esterase: NEGATIVE / RBC: 5-10 /HPF / WBC 5-10 /HPF   Sq Epi: x / Non Sq Epi: 0-5 /HPF / Bacteria: Present /HPF    ABG - ( 05 Apr 2020 21:57 )  pH, Arterial: 7.37  pH, Blood: x     /  pCO2: 46    /  pO2: 89    / HCO3: 26    / Base Excess: 0.7   /  SaO2: 97        CoVID-specific labs:  Procalcitonin, Serum: 1.44 ng/mL <H> (04-07-20 @ 04:04)  Ferritin, Serum: 216 ng/mL <H> (04-07-20 @ 04:04)  C-Reactive Protein, Serum: 4.35 mg/dL <H> (04-07-20 @ 04:04)      Microbiology:   Culture - Sputum (collected 05 Apr 2020 17:36)  Source: .Sputum Sputum  Gram Stain (05 Apr 2020 21:01):    No epithelial cells seen    Rare WBC's    Few-moderate Gram Negative Rods  Final Report (07 Apr 2020 08:28):    Numerous Pseudomonas aeruginosa    Normal Respiratory Ifeoma absent  Organism: Pseudomonas aeruginosa (07 Apr 2020 08:28)  Organism: Pseudomonas aeruginosa (07 Apr 2020 08:28)        RADIOLOGY, EKG AND ADDITIONAL TESTS: Reviewed.  CoVID Basline labs:  T Cell Subsets:

## 2020-04-07 NOTE — H&P ADULT - HISTORY OF PRESENT ILLNESS
74 F with pmhx of CP, MR, hx of DVT/PE, GERD, chronic PEG, HTN\, 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, presented to [facility] for AHRF, intubated, sedated on propofol tx to [facility] for ICU care on 4/3.

## 2020-04-07 NOTE — H&P ADULT - ASSESSMENT
ASSESSMENT & PLAN: 74 F with pmhx of CP, MR, hx of DVT/PE, GERD, chronic PEG, HTN 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, presented to [facility] for AHRF, intubated, sedated on propofol tx to [facility] for ICU care. COVID positive, coronavirus pna with acute hypoxemic respiratory failure/ARDS    NEURO: sedated   CV: on/off levo  PULM: intubated in ED for respiratory distress  GI/FEN: Jevity 1.5 started. G-tube. protonix ppx  ENDO: ISS  ID: Azithromycin, Plaquenil and Solumedrol. Ceftriaxone switched to zosyn as WBC still high and was in group home.   PPX: SCD, SQL,   LINES: PIV, [place], TLC   WOUNDS/DRAINS: none    FULL CODE  DISPO: MICU

## 2020-04-08 LAB
ALBUMIN SERPL ELPH-MCNC: 2.8 G/DL — LOW (ref 3.3–5)
ALP SERPL-CCNC: 56 U/L — SIGNIFICANT CHANGE UP (ref 40–120)
ALT FLD-CCNC: 24 U/L — SIGNIFICANT CHANGE UP (ref 10–45)
ANION GAP SERPL CALC-SCNC: 10 MMOL/L — SIGNIFICANT CHANGE UP (ref 5–17)
APTT BLD: 31.9 SEC — SIGNIFICANT CHANGE UP (ref 27.5–36.3)
AST SERPL-CCNC: 32 U/L — SIGNIFICANT CHANGE UP (ref 10–40)
BASOPHILS # BLD AUTO: 0.01 K/UL — SIGNIFICANT CHANGE UP (ref 0–0.2)
BASOPHILS NFR BLD AUTO: 0.1 % — SIGNIFICANT CHANGE UP (ref 0–2)
BILIRUB SERPL-MCNC: 0.3 MG/DL — SIGNIFICANT CHANGE UP (ref 0.2–1.2)
BUN SERPL-MCNC: 23 MG/DL — SIGNIFICANT CHANGE UP (ref 7–23)
CALCIUM SERPL-MCNC: 8.4 MG/DL — SIGNIFICANT CHANGE UP (ref 8.4–10.5)
CHLORIDE SERPL-SCNC: 101 MMOL/L — SIGNIFICANT CHANGE UP (ref 96–108)
CO2 SERPL-SCNC: 30 MMOL/L — SIGNIFICANT CHANGE UP (ref 22–31)
CREAT SERPL-MCNC: 0.67 MG/DL — SIGNIFICANT CHANGE UP (ref 0.5–1.3)
CRP SERPL-MCNC: 2.51 MG/DL — HIGH (ref 0–0.4)
D DIMER BLD IA.RAPID-MCNC: 221 NG/ML DDU — SIGNIFICANT CHANGE UP
EOSINOPHIL # BLD AUTO: 0 K/UL — SIGNIFICANT CHANGE UP (ref 0–0.5)
EOSINOPHIL NFR BLD AUTO: 0 % — SIGNIFICANT CHANGE UP (ref 0–6)
FERRITIN SERPL-MCNC: 192 NG/ML — HIGH (ref 15–150)
GLUCOSE BLDC GLUCOMTR-MCNC: 109 MG/DL — HIGH (ref 70–99)
GLUCOSE BLDC GLUCOMTR-MCNC: 120 MG/DL — HIGH (ref 70–99)
GLUCOSE BLDC GLUCOMTR-MCNC: 183 MG/DL — HIGH (ref 70–99)
GLUCOSE SERPL-MCNC: 154 MG/DL — HIGH (ref 70–99)
HCT VFR BLD CALC: 37 % — SIGNIFICANT CHANGE UP (ref 34.5–45)
HCV AB S/CO SERPL IA: 0.09 S/CO — SIGNIFICANT CHANGE UP
HCV AB SERPL-IMP: SIGNIFICANT CHANGE UP
HGB BLD-MCNC: 11.4 G/DL — LOW (ref 11.5–15.5)
IMM GRANULOCYTES NFR BLD AUTO: 0.8 % — SIGNIFICANT CHANGE UP (ref 0–1.5)
LYMPHOCYTES # BLD AUTO: 0.39 K/UL — LOW (ref 1–3.3)
LYMPHOCYTES # BLD AUTO: 3.8 % — LOW (ref 13–44)
MAGNESIUM SERPL-MCNC: 1.9 MG/DL — SIGNIFICANT CHANGE UP (ref 1.6–2.6)
MCHC RBC-ENTMCNC: 30.1 PG — SIGNIFICANT CHANGE UP (ref 27–34)
MCHC RBC-ENTMCNC: 30.8 GM/DL — LOW (ref 32–36)
MCV RBC AUTO: 97.6 FL — SIGNIFICANT CHANGE UP (ref 80–100)
MONOCYTES # BLD AUTO: 0.85 K/UL — SIGNIFICANT CHANGE UP (ref 0–0.9)
MONOCYTES NFR BLD AUTO: 8.3 % — SIGNIFICANT CHANGE UP (ref 2–14)
NEUTROPHILS # BLD AUTO: 8.85 K/UL — HIGH (ref 1.8–7.4)
NEUTROPHILS NFR BLD AUTO: 87 % — HIGH (ref 43–77)
NRBC # BLD: 0 /100 WBCS — SIGNIFICANT CHANGE UP (ref 0–0)
PHOSPHATE SERPL-MCNC: 3.2 MG/DL — SIGNIFICANT CHANGE UP (ref 2.5–4.5)
PLATELET # BLD AUTO: 236 K/UL — SIGNIFICANT CHANGE UP (ref 150–400)
POTASSIUM SERPL-MCNC: 3.8 MMOL/L — SIGNIFICANT CHANGE UP (ref 3.5–5.3)
POTASSIUM SERPL-SCNC: 3.8 MMOL/L — SIGNIFICANT CHANGE UP (ref 3.5–5.3)
PROCALCITONIN SERPL-MCNC: 0.76 NG/ML — HIGH (ref 0.02–0.1)
PROT SERPL-MCNC: 6.1 G/DL — SIGNIFICANT CHANGE UP (ref 6–8.3)
RBC # BLD: 3.79 M/UL — LOW (ref 3.8–5.2)
RBC # FLD: 13.2 % — SIGNIFICANT CHANGE UP (ref 10.3–14.5)
SODIUM SERPL-SCNC: 141 MMOL/L — SIGNIFICANT CHANGE UP (ref 135–145)
WBC # BLD: 10.18 K/UL — SIGNIFICANT CHANGE UP (ref 3.8–10.5)
WBC # FLD AUTO: 10.18 K/UL — SIGNIFICANT CHANGE UP (ref 3.8–10.5)

## 2020-04-08 PROCEDURE — 99291 CRITICAL CARE FIRST HOUR: CPT

## 2020-04-08 PROCEDURE — 71045 X-RAY EXAM CHEST 1 VIEW: CPT | Mod: 26

## 2020-04-08 RX ORDER — EPINEPHRINE 11.25MG/ML
5 SOLUTION, NON-ORAL INHALATION ONCE
Refills: 0 | Status: COMPLETED | OUTPATIENT
Start: 2020-04-08 | End: 2020-04-08

## 2020-04-08 RX ORDER — MORPHINE SULFATE 50 MG/1
2 CAPSULE, EXTENDED RELEASE ORAL ONCE
Refills: 0 | Status: DISCONTINUED | OUTPATIENT
Start: 2020-04-08 | End: 2020-04-08

## 2020-04-08 RX ORDER — SODIUM CHLORIDE 9 MG/ML
4 INJECTION INTRAMUSCULAR; INTRAVENOUS; SUBCUTANEOUS ONCE
Refills: 0 | Status: DISCONTINUED | OUTPATIENT
Start: 2020-04-08 | End: 2020-04-21

## 2020-04-08 RX ORDER — CHLORHEXIDINE GLUCONATE 213 G/1000ML
15 SOLUTION TOPICAL EVERY 12 HOURS
Refills: 0 | Status: DISCONTINUED | OUTPATIENT
Start: 2020-04-08 | End: 2020-04-13

## 2020-04-08 RX ORDER — NOREPINEPHRINE BITARTRATE/D5W 8 MG/250ML
0.05 PLASTIC BAG, INJECTION (ML) INTRAVENOUS
Qty: 8 | Refills: 0 | Status: DISCONTINUED | OUTPATIENT
Start: 2020-04-08 | End: 2020-04-11

## 2020-04-08 RX ORDER — PROPOFOL 10 MG/ML
5 INJECTION, EMULSION INTRAVENOUS
Qty: 1000 | Refills: 0 | Status: DISCONTINUED | OUTPATIENT
Start: 2020-04-08 | End: 2020-04-12

## 2020-04-08 RX ADMIN — ROBINUL 0.2 MILLIGRAM(S): 0.2 INJECTION INTRAMUSCULAR; INTRAVENOUS at 06:20

## 2020-04-08 RX ADMIN — Medication 5 MILLILITER(S): at 16:00

## 2020-04-08 RX ADMIN — PIPERACILLIN AND TAZOBACTAM 25 GRAM(S): 4; .5 INJECTION, POWDER, LYOPHILIZED, FOR SOLUTION INTRAVENOUS at 17:11

## 2020-04-08 RX ADMIN — Medication 5.22 MICROGRAM(S)/KG/MIN: at 20:00

## 2020-04-08 RX ADMIN — Medication 2: at 17:31

## 2020-04-08 RX ADMIN — Medication 200 MILLIGRAM(S): at 06:18

## 2020-04-08 RX ADMIN — ROBINUL 0.2 MILLIGRAM(S): 0.2 INJECTION INTRAMUSCULAR; INTRAVENOUS at 12:24

## 2020-04-08 RX ADMIN — PIPERACILLIN AND TAZOBACTAM 25 GRAM(S): 4; .5 INJECTION, POWDER, LYOPHILIZED, FOR SOLUTION INTRAVENOUS at 12:25

## 2020-04-08 RX ADMIN — APIXABAN 5 MILLIGRAM(S): 2.5 TABLET, FILM COATED ORAL at 12:24

## 2020-04-08 RX ADMIN — Medication 40 MILLIGRAM(S): at 08:57

## 2020-04-08 RX ADMIN — Medication 20 MILLIGRAM(S): at 06:18

## 2020-04-08 RX ADMIN — MORPHINE SULFATE 2 MILLIGRAM(S): 50 CAPSULE, EXTENDED RELEASE ORAL at 16:00

## 2020-04-08 RX ADMIN — MORPHINE SULFATE 2 MILLIGRAM(S): 50 CAPSULE, EXTENDED RELEASE ORAL at 15:30

## 2020-04-08 RX ADMIN — PIPERACILLIN AND TAZOBACTAM 25 GRAM(S): 4; .5 INJECTION, POWDER, LYOPHILIZED, FOR SOLUTION INTRAVENOUS at 06:18

## 2020-04-08 RX ADMIN — PANTOPRAZOLE SODIUM 40 MILLIGRAM(S): 20 TABLET, DELAYED RELEASE ORAL at 12:25

## 2020-04-08 RX ADMIN — ROBINUL 0.2 MILLIGRAM(S): 0.2 INJECTION INTRAMUSCULAR; INTRAVENOUS at 18:34

## 2020-04-08 RX ADMIN — CHLORHEXIDINE GLUCONATE 1 APPLICATION(S): 213 SOLUTION TOPICAL at 06:20

## 2020-04-08 RX ADMIN — Medication 5 MILLILITER(S): at 15:00

## 2020-04-08 RX ADMIN — Medication 2: at 00:08

## 2020-04-08 RX ADMIN — Medication 120 MILLIGRAM(S): at 16:00

## 2020-04-08 NOTE — AIRWAY PLACEMENT NOTE ADULT - COMPLICATIONS:
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 Inline suction attached./vomiting/ aspiration
none
none

## 2020-04-08 NOTE — PHYSICAL THERAPY INITIAL EVALUATION ADULT - ADDITIONAL COMMENTS
pt lives in a rehab facility. States that she was able to complete bed<>wheelchair transfers w/ assist prior to this admission. States that she gets PT at rehab.

## 2020-04-08 NOTE — PHYSICAL THERAPY INITIAL EVALUATION ADULT - PLANNED THERAPY INTERVENTIONS, PT EVAL
balance training/neuromuscular re-education/postural re-education/bed mobility training/transfer training/gait training/strengthening

## 2020-04-08 NOTE — AIRWAY PLACEMENT NOTE ADULT - MEDICATIONS GIVEN TO FACILATATE INTUBATION
20 Etomidate, 100 Vecuronium
50mg Propofol, 50mg Rocuronium, 110mg Succinylcholine
Propofol 50mg, Succinylcholine 100mg, Rocuronium 50mg

## 2020-04-08 NOTE — PROGRESS NOTE ADULT - SUBJECTIVE AND OBJECTIVE BOX
HOSPITAL COURSE:    OVERNIGHT EVENTS:     SUBJECTIVE HPI: Patient seen and examined at bedside.    VITAL SIGNS:  ICU Vital Signs Last 24 Hrs  T(C): 36.6 (08 Apr 2020 10:00), Max: 37.4 (07 Apr 2020 13:17)  T(F): 97.8 (08 Apr 2020 10:00), Max: 99.3 (07 Apr 2020 13:17)  HR: 82 (08 Apr 2020 10:00) (58 - 93)  BP: 126/71 (08 Apr 2020 10:00) (85/48 - 126/71)  BP(mean): 94 (08 Apr 2020 10:00) (62 - 94)  ABP: --  ABP(mean): --  RR: 27 (08 Apr 2020 10:00) (21 - 32)  SpO2: 89% (08 Apr 2020 10:00) (82% - 93%)    I&O's Summary    07 Apr 2020 07:01  -  08 Apr 2020 07:00  --------------------------------------------------------  IN: 590 mL / OUT: 1467 mL / NET: -877 mL    08 Apr 2020 07:01  -  08 Apr 2020 12:00  --------------------------------------------------------  IN: 60 mL / OUT: 0 mL / NET: 60 mL    PHYSICAL EXAM:  General: Comfortable, pleasant in bed  Neurological: AAOx3  HEENT: NC/AT; EOMI, PERRL, clear conjunctiva, no nasal or oropharyngeal discharge or exudates, MMM  Neck: supple, no cervical or post-auricular lymphadenopathy  Cardiovascular: +S1/S2, no murmurs/rubs/gallops, RRR  Respiratory: CTA B/L, no diminished breath sounds, no wheezes/rales/rhonchi, no increased work of breathing or accessory muscle use  Gastrointestinal: soft, NT/ND; active BSx4 quadrants  Genitourinary: no suprapubic tenderness, no CVA tenderness  Extremities: WWP; no edema, clubbing or cyanosis  Vascular: 2+ radial, DP/PT pulses B/L  Skin: no rashes    MEDICATIONS:  MEDICATIONS  (STANDING):  apixaban 5 milliGRAM(s) Oral every 12 hours  azithromycin   Tablet 250 milliGRAM(s) Oral every 24 hours  chlorhexidine 2% Cloths 1 Application(s) Topical <User Schedule>  glycopyrrolate Injectable 0.2 milliGRAM(s) IV Push every 6 hours  insulin lispro (HumaLOG) corrective regimen sliding scale   SubCutaneous every 6 hours  methylPREDNISolone sodium succinate Injectable 40 milliGRAM(s) IV Push every 12 hours  pantoprazole   Suspension 40 milliGRAM(s) Oral daily  piperacillin/tazobactam IVPB.. 4.5 Gram(s) IV Intermittent every 6 hours  senna 2 Tablet(s) Oral at bedtime    MEDICATIONS  (PRN):  acetaminophen    Suspension .. 650 milliGRAM(s) Oral every 6 hours PRN Temp greater or equal to 38C (100.4F), Mild Pain (1 - 3)  bisacodyl 5 milliGRAM(s) Oral every 12 hours PRN Constipation  sodium chloride 0.9% lock flush 10 milliLiter(s) IV Push every 1 hour PRN Pre/post blood products, medications, blood draw, and to maintain line patency    ALLERGIES/INTOLERANCES:  Allergies    ace inhibitors (Anaphylaxis)  caffeine (Rash)  chocolate (Rash)  high acid (Rash)  Tomatoes (Hives)    Intolerances    LABS:                        11.4   10.18 )-----------( 236      ( 08 Apr 2020 05:27 )             37.0     Auto Neutrophil %: 87.0 % (04-08-20 @ 05:27)  Auto Lymphocyte #: 0.39 K/uL (04-08-20 @ 05:27)    04-08    141  |  101  |  23  ----------------------------<  154<H>  3.8   |  30  |  0.67    Ca    8.4      08 Apr 2020 05:27  Phos  3.2     04-08  Mg     1.9     04-08    TPro  6.1  /  Alb  2.8<L>  /  TBili  0.3  /  DBili  x   /  AST  32  /  ALT  24  /  AlkPhos  56  04-08    PTT - ( 08 Apr 2020 05:27 )  PTT:31.9 sec    CoVID-specific labs:  D-Dimer Assay, Quantitative: 221 ng/mL DDU (04-08-20 @ 05:27)  Procalcitonin, Serum: 0.76 ng/mL <H> (04-08-20 @ 05:27)  C-Reactive Protein, Serum: 2.51 mg/dL <H> (04-08-20 @ 05:27)    Microbiology:   Culture - Sputum (collected 05 Apr 2020 17:36)  Source: .Sputum Sputum  Gram Stain (05 Apr 2020 21:01):    No epithelial cells seen    Rare WBC's    Few-moderate Gram Negative Rods  Final Report (07 Apr 2020 08:28):    Numerous Pseudomonas aeruginosa    Normal Respiratory Ifeoma absent  Organism: Pseudomonas aeruginosa (07 Apr 2020 08:28)  Organism: Pseudomonas aeruginosa (07 Apr 2020 08:28)    RADIOLOGY, EKG AND ADDITIONAL TESTS: Reviewed.

## 2020-04-08 NOTE — PHYSICAL THERAPY INITIAL EVALUATION ADULT - ORIENTATION, REHAB EVAL
unable to assess 2/2 pt not verbalizing, seems to be mentating well and responds appropriately to all questions/commands

## 2020-04-08 NOTE — PROGRESS NOTE ADULT - ASSESSMENT
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 COVID positive, coronavirus pna with acute hypoxemic respiratory failure/ARDS    NEURO: awake off all sedation  CV: off levo  PULM: extubated doing well on nasal cannula   GI/FEN: Jevity 1.5 started. G-tube. protonix ppx  ENDO: ISS  ID: Azithromycin, Plaquenil and Solumedrol. Ceftriaxone switched to zosyn as WBC still high and was in group home.   PPX: SCD, SQL,   LINES: PIV, Laure, TLC   WOUNDS/DRAINS: none    FULL CODE  DISPO: stepdown

## 2020-04-08 NOTE — AIRWAY PLACEMENT NOTE ADULT - POST AIRWAY PLACEMENT ASSESSMENT:
breath sounds bilateral/positive end tidal CO2 noted/CXR completed with verified placement
CXR pending/chest excursion noted/breath sounds bilateral/breath sounds equal/skin color improved/positive end tidal CO2 noted
positive end tidal CO2 noted/skin color improved/breath sounds equal/chest excursion noted/breath sounds bilateral/CXR pending

## 2020-04-08 NOTE — PHYSICAL THERAPY INITIAL EVALUATION ADULT - IMPAIRMENTS CONTRIBUTING TO GAIT DEVIATIONS, PT EVAL
decreased strength/impaired balance/impaired motor control/abnormal muscle tone/impaired postural control

## 2020-04-08 NOTE — PHYSICAL THERAPY INITIAL EVALUATION ADULT - GAIT DEVIATIONS NOTED, PT EVAL
increased time in double stance/decreased step length/decreased lory/decreased weight-shifting ability

## 2020-04-08 NOTE — AIRWAY PLACEMENT NOTE ADULT - AIRWAY COMMENTS:
Pre-intubation pt oxygenated up to SPO2 98% using BVM, post intubation Spo2 92-95% on mechanical ventilation
Procedure was performed as planned without complication. Pt in stable condition, left in care of ICU team.

## 2020-04-08 NOTE — PHYSICAL THERAPY INITIAL EVALUATION ADULT - CRITERIA FOR SKILLED THERAPEUTIC INTERVENTIONS
therapy frequency/anticipated discharge recommendation/functional limitations in following categories/impairments found/risk reduction/prevention/rehab potential

## 2020-04-08 NOTE — PHYSICAL THERAPY INITIAL EVALUATION ADULT - PERTINENT HX OF CURRENT PROBLEM, REHAB EVAL
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 COVID positive, coronavirus pna with acute hypoxemic respiratory failure/ARDS Hydroxychloroquine Counseling:  I discussed with the patient that a baseline ophthalmologic exam is needed at the start of therapy and every year thereafter while on therapy. A CBC may also be warranted for monitoring.  The side effects of this medication were discussed with the patient, including but not limited to agranulocytosis, aplastic anemia, seizures, rashes, retinopathy, and liver toxicity. Patient instructed to call the office should any adverse effect occur.  The patient verbalized understanding of the proper use and possible adverse effects of Plaquenil.  All the patient's questions and concerns were addressed.

## 2020-04-08 NOTE — PHYSICAL THERAPY INITIAL EVALUATION ADULT - IMPAIRMENTS FOUND, PT EVAL
aerobic capacity/endurance/fine motor/gait, locomotion, and balance/integumentary integrity/muscle strength/posture/joint integrity and mobility/neuromotor development and sensory integration/gross motor

## 2020-04-08 NOTE — AIRWAY PLACEMENT NOTE ADULT - DISPOSITION AFTER INTUBATION:
100% Fio2/patient placed on mechanical ventilation
patient placed on mechanical ventilation
patient placed on mechanical ventilation

## 2020-04-09 LAB
ALBUMIN SERPL ELPH-MCNC: 3.1 G/DL — LOW (ref 3.3–5)
ALP SERPL-CCNC: 46 U/L — SIGNIFICANT CHANGE UP (ref 40–120)
ALT FLD-CCNC: 29 U/L — SIGNIFICANT CHANGE UP (ref 10–45)
ANION GAP SERPL CALC-SCNC: 11 MMOL/L — SIGNIFICANT CHANGE UP (ref 5–17)
APTT BLD: 30.4 SEC — SIGNIFICANT CHANGE UP (ref 27.5–36.3)
AST SERPL-CCNC: 39 U/L — SIGNIFICANT CHANGE UP (ref 10–40)
BASE EXCESS BLDA CALC-SCNC: 8.3 MMOL/L — HIGH (ref -2–3)
BASOPHILS # BLD AUTO: 0.03 K/UL — SIGNIFICANT CHANGE UP (ref 0–0.2)
BASOPHILS NFR BLD AUTO: 0.2 % — SIGNIFICANT CHANGE UP (ref 0–2)
BILIRUB SERPL-MCNC: 0.4 MG/DL — SIGNIFICANT CHANGE UP (ref 0.2–1.2)
BUN SERPL-MCNC: 25 MG/DL — HIGH (ref 7–23)
C3 SERPL-MCNC: 115 MG/DL — SIGNIFICANT CHANGE UP (ref 81–157)
C4 SERPL-MCNC: 33 MG/DL — SIGNIFICANT CHANGE UP (ref 13–39)
CALCIUM SERPL-MCNC: 8.7 MG/DL — SIGNIFICANT CHANGE UP (ref 8.4–10.5)
CHLORIDE SERPL-SCNC: 98 MMOL/L — SIGNIFICANT CHANGE UP (ref 96–108)
CO2 SERPL-SCNC: 35 MMOL/L — HIGH (ref 22–31)
CREAT SERPL-MCNC: 0.72 MG/DL — SIGNIFICANT CHANGE UP (ref 0.5–1.3)
CRP SERPL-MCNC: 3.21 MG/DL — HIGH (ref 0–0.4)
D DIMER BLD IA.RAPID-MCNC: 332 NG/ML DDU — HIGH
EOSINOPHIL # BLD AUTO: 0 K/UL — SIGNIFICANT CHANGE UP (ref 0–0.5)
EOSINOPHIL NFR BLD AUTO: 0 % — SIGNIFICANT CHANGE UP (ref 0–6)
FERRITIN SERPL-MCNC: 233 NG/ML — HIGH (ref 15–150)
FIBRINOGEN PPP-MCNC: 419 MG/DL — SIGNIFICANT CHANGE UP (ref 258–438)
GLUCOSE BLDC GLUCOMTR-MCNC: 125 MG/DL — HIGH (ref 70–99)
GLUCOSE BLDC GLUCOMTR-MCNC: 137 MG/DL — HIGH (ref 70–99)
GLUCOSE BLDC GLUCOMTR-MCNC: 223 MG/DL — HIGH (ref 70–99)
GLUCOSE BLDC GLUCOMTR-MCNC: 72 MG/DL — SIGNIFICANT CHANGE UP (ref 70–99)
GLUCOSE SERPL-MCNC: 216 MG/DL — HIGH (ref 70–99)
HCO3 BLDA-SCNC: 35 MMOL/L — HIGH (ref 21–28)
HCT VFR BLD CALC: 37.2 % — SIGNIFICANT CHANGE UP (ref 34.5–45)
HGB BLD-MCNC: 11.6 G/DL — SIGNIFICANT CHANGE UP (ref 11.5–15.5)
IMM GRANULOCYTES NFR BLD AUTO: 1.5 % — SIGNIFICANT CHANGE UP (ref 0–1.5)
LYMPHOCYTES # BLD AUTO: 0.28 K/UL — LOW (ref 1–3.3)
LYMPHOCYTES # BLD AUTO: 1.4 % — LOW (ref 13–44)
MAGNESIUM SERPL-MCNC: 1.8 MG/DL — SIGNIFICANT CHANGE UP (ref 1.6–2.6)
MCHC RBC-ENTMCNC: 30.4 PG — SIGNIFICANT CHANGE UP (ref 27–34)
MCHC RBC-ENTMCNC: 31.2 GM/DL — LOW (ref 32–36)
MCV RBC AUTO: 97.6 FL — SIGNIFICANT CHANGE UP (ref 80–100)
MONOCYTES # BLD AUTO: 1.12 K/UL — HIGH (ref 0–0.9)
MONOCYTES NFR BLD AUTO: 5.8 % — SIGNIFICANT CHANGE UP (ref 2–14)
NEUTROPHILS # BLD AUTO: 17.73 K/UL — HIGH (ref 1.8–7.4)
NEUTROPHILS NFR BLD AUTO: 91.1 % — HIGH (ref 43–77)
NRBC # BLD: 0 /100 WBCS — SIGNIFICANT CHANGE UP (ref 0–0)
PCO2 BLDA: 54 MMHG — HIGH (ref 32–45)
PH BLDA: 7.42 — SIGNIFICANT CHANGE UP (ref 7.35–7.45)
PHOSPHATE SERPL-MCNC: 3.7 MG/DL — SIGNIFICANT CHANGE UP (ref 2.5–4.5)
PLATELET # BLD AUTO: 274 K/UL — SIGNIFICANT CHANGE UP (ref 150–400)
PO2 BLDA: 58 MMHG — CRITICAL LOW (ref 83–108)
POTASSIUM SERPL-MCNC: 3.2 MMOL/L — LOW (ref 3.5–5.3)
POTASSIUM SERPL-SCNC: 3.2 MMOL/L — LOW (ref 3.5–5.3)
PROCALCITONIN SERPL-MCNC: 0.51 NG/ML — HIGH (ref 0.02–0.1)
PROT SERPL-MCNC: 6.4 G/DL — SIGNIFICANT CHANGE UP (ref 6–8.3)
RBC # BLD: 3.81 M/UL — SIGNIFICANT CHANGE UP (ref 3.8–5.2)
RBC # FLD: 13 % — SIGNIFICANT CHANGE UP (ref 10.3–14.5)
SAO2 % BLDA: 90 % — LOW (ref 95–100)
SODIUM SERPL-SCNC: 144 MMOL/L — SIGNIFICANT CHANGE UP (ref 135–145)
WBC # BLD: 19.45 K/UL — HIGH (ref 3.8–10.5)
WBC # FLD AUTO: 19.45 K/UL — HIGH (ref 3.8–10.5)

## 2020-04-09 PROCEDURE — 70490 CT SOFT TISSUE NECK W/O DYE: CPT | Mod: 26

## 2020-04-09 PROCEDURE — 99291 CRITICAL CARE FIRST HOUR: CPT

## 2020-04-09 PROCEDURE — 71045 X-RAY EXAM CHEST 1 VIEW: CPT | Mod: 26

## 2020-04-09 RX ORDER — DEXTROSE 50 % IN WATER 50 %
12.5 SYRINGE (ML) INTRAVENOUS ONCE
Refills: 0 | Status: DISCONTINUED | OUTPATIENT
Start: 2020-04-09 | End: 2020-05-08

## 2020-04-09 RX ORDER — GLUCAGON INJECTION, SOLUTION 0.5 MG/.1ML
1 INJECTION, SOLUTION SUBCUTANEOUS ONCE
Refills: 0 | Status: DISCONTINUED | OUTPATIENT
Start: 2020-04-09 | End: 2020-05-08

## 2020-04-09 RX ORDER — HUMAN INSULIN 100 [IU]/ML
10 INJECTION, SUSPENSION SUBCUTANEOUS ONCE
Refills: 0 | Status: COMPLETED | OUTPATIENT
Start: 2020-04-09 | End: 2020-04-09

## 2020-04-09 RX ORDER — SODIUM CHLORIDE 9 MG/ML
1000 INJECTION, SOLUTION INTRAVENOUS
Refills: 0 | Status: DISCONTINUED | OUTPATIENT
Start: 2020-04-09 | End: 2020-05-08

## 2020-04-09 RX ORDER — DEXTROSE 50 % IN WATER 50 %
25 SYRINGE (ML) INTRAVENOUS ONCE
Refills: 0 | Status: DISCONTINUED | OUTPATIENT
Start: 2020-04-09 | End: 2020-05-08

## 2020-04-09 RX ORDER — DEXTROSE 50 % IN WATER 50 %
15 SYRINGE (ML) INTRAVENOUS ONCE
Refills: 0 | Status: DISCONTINUED | OUTPATIENT
Start: 2020-04-09 | End: 2020-05-08

## 2020-04-09 RX ORDER — POTASSIUM CHLORIDE 20 MEQ
40 PACKET (EA) ORAL ONCE
Refills: 0 | Status: COMPLETED | OUTPATIENT
Start: 2020-04-09 | End: 2020-04-09

## 2020-04-09 RX ORDER — VANCOMYCIN HCL 1 G
1000 VIAL (EA) INTRAVENOUS EVERY 12 HOURS
Refills: 0 | Status: DISCONTINUED | OUTPATIENT
Start: 2020-04-09 | End: 2020-04-11

## 2020-04-09 RX ORDER — POTASSIUM PHOSPHATE, MONOBASIC POTASSIUM PHOSPHATE, DIBASIC 236; 224 MG/ML; MG/ML
15 INJECTION, SOLUTION INTRAVENOUS ONCE
Refills: 0 | Status: COMPLETED | OUTPATIENT
Start: 2020-04-09 | End: 2020-04-09

## 2020-04-09 RX ORDER — MAGNESIUM SULFATE 500 MG/ML
1 VIAL (ML) INJECTION ONCE
Refills: 0 | Status: COMPLETED | OUTPATIENT
Start: 2020-04-09 | End: 2020-04-09

## 2020-04-09 RX ADMIN — HUMAN INSULIN 10 UNIT(S): 100 INJECTION, SUSPENSION SUBCUTANEOUS at 06:19

## 2020-04-09 RX ADMIN — Medication 40 MILLIGRAM(S): at 00:26

## 2020-04-09 RX ADMIN — ROBINUL 0.2 MILLIGRAM(S): 0.2 INJECTION INTRAMUSCULAR; INTRAVENOUS at 00:27

## 2020-04-09 RX ADMIN — ROBINUL 0.2 MILLIGRAM(S): 0.2 INJECTION INTRAMUSCULAR; INTRAVENOUS at 11:07

## 2020-04-09 RX ADMIN — AZITHROMYCIN 250 MILLIGRAM(S): 500 TABLET, FILM COATED ORAL at 00:00

## 2020-04-09 RX ADMIN — CHLORHEXIDINE GLUCONATE 15 MILLILITER(S): 213 SOLUTION TOPICAL at 17:14

## 2020-04-09 RX ADMIN — PIPERACILLIN AND TAZOBACTAM 25 GRAM(S): 4; .5 INJECTION, POWDER, LYOPHILIZED, FOR SOLUTION INTRAVENOUS at 11:08

## 2020-04-09 RX ADMIN — SENNA PLUS 2 TABLET(S): 8.6 TABLET ORAL at 21:47

## 2020-04-09 RX ADMIN — PIPERACILLIN AND TAZOBACTAM 25 GRAM(S): 4; .5 INJECTION, POWDER, LYOPHILIZED, FOR SOLUTION INTRAVENOUS at 17:14

## 2020-04-09 RX ADMIN — APIXABAN 5 MILLIGRAM(S): 2.5 TABLET, FILM COATED ORAL at 11:08

## 2020-04-09 RX ADMIN — PIPERACILLIN AND TAZOBACTAM 25 GRAM(S): 4; .5 INJECTION, POWDER, LYOPHILIZED, FOR SOLUTION INTRAVENOUS at 06:02

## 2020-04-09 RX ADMIN — Medication 40 MILLIGRAM(S): at 09:13

## 2020-04-09 RX ADMIN — PROPOFOL 1.67 MICROGRAM(S)/KG/MIN: 10 INJECTION, EMULSION INTRAVENOUS at 16:25

## 2020-04-09 RX ADMIN — PIPERACILLIN AND TAZOBACTAM 25 GRAM(S): 4; .5 INJECTION, POWDER, LYOPHILIZED, FOR SOLUTION INTRAVENOUS at 00:26

## 2020-04-09 RX ADMIN — PROPOFOL 1.67 MICROGRAM(S)/KG/MIN: 10 INJECTION, EMULSION INTRAVENOUS at 19:35

## 2020-04-09 RX ADMIN — PANTOPRAZOLE SODIUM 40 MILLIGRAM(S): 20 TABLET, DELAYED RELEASE ORAL at 11:08

## 2020-04-09 RX ADMIN — Medication 4: at 06:18

## 2020-04-09 RX ADMIN — Medication 40 MILLIGRAM(S): at 21:47

## 2020-04-09 RX ADMIN — CHLORHEXIDINE GLUCONATE 15 MILLILITER(S): 213 SOLUTION TOPICAL at 06:02

## 2020-04-09 RX ADMIN — SENNA PLUS 2 TABLET(S): 8.6 TABLET ORAL at 00:27

## 2020-04-09 RX ADMIN — Medication 100 GRAM(S): at 11:08

## 2020-04-09 RX ADMIN — POTASSIUM PHOSPHATE, MONOBASIC POTASSIUM PHOSPHATE, DIBASIC 63.75 MILLIMOLE(S): 236; 224 INJECTION, SOLUTION INTRAVENOUS at 11:08

## 2020-04-09 RX ADMIN — APIXABAN 5 MILLIGRAM(S): 2.5 TABLET, FILM COATED ORAL at 00:27

## 2020-04-09 RX ADMIN — ROBINUL 0.2 MILLIGRAM(S): 0.2 INJECTION INTRAMUSCULAR; INTRAVENOUS at 06:02

## 2020-04-09 RX ADMIN — Medication 40 MILLIEQUIVALENT(S): at 11:09

## 2020-04-09 RX ADMIN — Medication 250 MILLIGRAM(S): at 11:09

## 2020-04-09 RX ADMIN — Medication 250 MILLIGRAM(S): at 23:41

## 2020-04-09 RX ADMIN — ROBINUL 0.2 MILLIGRAM(S): 0.2 INJECTION INTRAMUSCULAR; INTRAVENOUS at 17:14

## 2020-04-09 RX ADMIN — CHLORHEXIDINE GLUCONATE 1 APPLICATION(S): 213 SOLUTION TOPICAL at 06:03

## 2020-04-09 NOTE — PROGRESS NOTE ADULT - SUBJECTIVE AND OBJECTIVE BOX
Patient Discussed on Morning Rounds with Dr. Castro.    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     OVERNIGHT EVENTS: emergent reintubation at 7pm    SUBJECTIVE HPI: Patient seen and examined at bedside.    OBJECTIVE:  Vitals: ICU Vital Signs Last 24 Hrs  T(C): 36.5 (09 Apr 2020 12:00), Max: 37.2 (09 Apr 2020 05:00)  T(F): 97.7 (09 Apr 2020 12:00), Max: 98.9 (09 Apr 2020 05:00)  HR: 68 (09 Apr 2020 16:00) (53 - 137)  BP: 109/65 (09 Apr 2020 14:00) (77/49 - 154/83)  BP(mean): 81 (09 Apr 2020 14:00) (57 - 109)  ABP: 104/46 (09 Apr 2020 16:00) (104/46 - 144/65)  ABP(mean): 64 (09 Apr 2020 16:00) (64 - 93)  RR: 25 (09 Apr 2020 16:00) (18 - 33)  SpO2: 91% (09 Apr 2020 16:00) (68% - 100%)    I&O:  I&O's Detail    08 Apr 2020 07:01  -  09 Apr 2020 07:00  --------------------------------------------------------  IN:    Enteral Tube Flush: 60 mL    IV PiggyBack: 300 mL    norepinephrine Infusion: 54 mL    ns in tub fed  [id number]: 420 mL    propofol Infusion: 50 mL  Total IN: 884 mL    OUT:    Voided: 1200 mL  Total OUT: 1200 mL    Total NET: -316 mL      09 Apr 2020 07:01  -  09 Apr 2020 17:31  --------------------------------------------------------  IN:    Enteral Tube Flush: 100 mL    IV PiggyBack: 350 mL    norepinephrine Infusion: 14 mL    ns in tub fed  [id number]: 270 mL    propofol Infusion: 90 mL    Solution: 100 mL    Solution: 187.5 mL  Total IN: 1111.5 mL    OUT:    Indwelling Catheter - Urethral: 525 mL    Voided: 250 mL  Total OUT: 775 mL    Total NET: 336.5 mL        VENTILATOR SETTINGS:  Mode: AC/ CMV (Assist Control/ Continuous Mandatory Ventilation)  RR (machine): 16  TV (machine): 250  FiO2: 45  PEEP: 5  ITime: 1  MAP: 8  PIP: 19    ABG - ( 09 Apr 2020 12:44 )  pH, Arterial: 7.42  pH, Blood: x     /  pCO2: 54    /  pO2: 58    / HCO3: 35    / Base Excess: 8.3   /  SaO2: 90                  PHYSICAL EXAM: Physical Exam performed by Intensivist to minimize risk of exposure.  Please refer to Attending Attestation for comprehensive exam.   General: No acute distress  Neuro: [Sedated]  CV: RRR  Pulm: [INTUBATED]  : [Quintero]  Psych: affect appropriate    LABS:                         11.6   19.45 )-----------( 274      ( 09 Apr 2020 05:32 )             37.2   04-09    144  |  98  |  25<H>  ----------------------------<  216<H>  3.2<L>   |  35<H>  |  0.72    Ca    8.7      09 Apr 2020 05:32  Phos  3.7     04-09  Mg     1.8     04-09    TPro  6.4  /  Alb  3.1<L>  /  TBili  0.4  /  DBili  x   /  AST  39  /  ALT  29  /  AlkPhos  46  04-09  PTT - ( 09 Apr 2020 05:32 )  PTT:30.4 sec  D-Dimer Assay, Quantitative: 332 ng/mL DDU (04-09-20 @ 05:32)  Ferritin, Serum: 233 ng/mL (04-09-20 @ 05:32)  ABG - ( 09 Apr 2020 12:44 )  pH, Arterial: 7.42  pH, Blood: x     /  pCO2: 54    /  pO2: 58    / HCO3: 35    / Base Excess: 8.3   /  SaO2: 90                CAPILLARY BLOOD GLUCOSE      POCT Blood Glucose.: 72 mg/dL (09 Apr 2020 11:25)  POCT Blood Glucose.: 223 mg/dL (09 Apr 2020 04:34)  POCT Blood Glucose.: 137 mg/dL (09 Apr 2020 00:38)     MEDICATIONS  (STANDING):  apixaban 5 milliGRAM(s) Oral every 12 hours  chlorhexidine 0.12% Liquid 15 milliLiter(s) Oral Mucosa every 12 hours  chlorhexidine 2% Cloths 1 Application(s) Topical <User Schedule>  dextrose 5%. 1000 milliLiter(s) (50 mL/Hr) IV Continuous <Continuous>  dextrose 50% Injectable 12.5 Gram(s) IV Push once  dextrose 50% Injectable 25 Gram(s) IV Push once  dextrose 50% Injectable 25 Gram(s) IV Push once  glycopyrrolate Injectable 0.2 milliGRAM(s) IV Push every 6 hours  insulin lispro (HumaLOG) corrective regimen sliding scale   SubCutaneous every 6 hours  methylPREDNISolone sodium succinate Injectable 40 milliGRAM(s) IV Push every 12 hours  norepinephrine Infusion 0.05 MICROgram(s)/kG/Min (5.22 mL/Hr) IV Continuous <Continuous>  pantoprazole   Suspension 40 milliGRAM(s) Oral daily  piperacillin/tazobactam IVPB.. 4.5 Gram(s) IV Intermittent every 6 hours  propofol Infusion 5 MICROgram(s)/kG/Min (1.67 mL/Hr) IV Continuous <Continuous>  senna 2 Tablet(s) Oral at bedtime  sodium chloride 3%  Inhalation 4 milliLiter(s) Inhalation once  vancomycin  IVPB 1000 milliGRAM(s) IV Intermittent every 12 hours    MEDICATIONS  (PRN):  acetaminophen    Suspension .. 650 milliGRAM(s) Oral every 6 hours PRN Temp greater or equal to 38C (100.4F), Mild Pain (1 - 3)  bisacodyl 5 milliGRAM(s) Oral every 12 hours PRN Constipation  dextrose 40% Gel 15 Gram(s) Oral once PRN Blood Glucose LESS THAN 70 milliGRAM(s)/deciliter  glucagon  Injectable 1 milliGRAM(s) IntraMuscular once PRN Glucose LESS THAN 70 milligrams/deciliter  sodium chloride 0.9% lock flush 10 milliLiter(s) IV Push every 1 hour PRN Pre/post blood products, medications, blood draw, and to maintain line patency      RADIOLOGY/OTHER STUDIES: CXR 4/9 IMPRESSION:  1.  Interval repositioning of the ET tube which now projects with its tip 3.3 cm above the paula.  2.  Moderate left pleural effusion. Small right pleural effusion. Retrocardiac opacity.

## 2020-04-09 NOTE — CHART NOTE - NSCHARTNOTEFT_GEN_A_CORE
Admitting Diagnosis:   Patient is a 74y old  Female who presents with a chief complaint of covid (08 Apr 2020 12:00)      PAST MEDICAL & SURGICAL HISTORY:  HTN (hypertension)  Spastic quadriplegia  DVT (deep venous thrombosis)  PE (pulmonary thromboembolism)  GERD (gastroesophageal reflux disease)  Dysphagia  Mental retardation  CP (cerebral palsy)  PEG tube malfunction      Current Nutrition Order: NPO TF, Please see Below   PO Intake: Good (%) [   ]  Fair (50-75%) [   ] Poor (<25%) [   ]- NPO TF   GI Issues: CDIFF negative, +BM x2 4/7 & x3 4/6 (meds noted, PRN/bedtime Bowel Regime)   Pain: none per flow sheets   Skin Integrity: 1+ gen edema, no pressure ulcers     Labs:   04-09    144  |  98  |  25<H>  ----------------------------<  216<H>  3.2<L>   |  35<H>  |  0.72    Ca    8.7      09 Apr 2020 05:32  Phos  3.7     04-09  Mg     1.8     04-09    TPro  6.4  /  Alb  3.1<L>  /  TBili  0.4  /  DBili  x   /  AST  39  /  ALT  29  /  AlkPhos  46  04-09    CAPILLARY BLOOD GLUCOSE      POCT Blood Glucose.: 223 mg/dL (09 Apr 2020 04:34)  POCT Blood Glucose.: 137 mg/dL (09 Apr 2020 00:38)  POCT Blood Glucose.: 183 mg/dL (08 Apr 2020 17:14)  POCT Blood Glucose.: 109 mg/dL (08 Apr 2020 11:59)      Medications:  MEDICATIONS  (STANDING):  apixaban 5 milliGRAM(s) Oral every 12 hours  azithromycin   Tablet 250 milliGRAM(s) Oral every 24 hours  chlorhexidine 0.12% Liquid 15 milliLiter(s) Oral Mucosa every 12 hours  chlorhexidine 2% Cloths 1 Application(s) Topical <User Schedule>  dextrose 5%. 1000 milliLiter(s) (50 mL/Hr) IV Continuous <Continuous>  dextrose 50% Injectable 12.5 Gram(s) IV Push once  dextrose 50% Injectable 25 Gram(s) IV Push once  dextrose 50% Injectable 25 Gram(s) IV Push once  glycopyrrolate Injectable 0.2 milliGRAM(s) IV Push every 6 hours  insulin lispro (HumaLOG) corrective regimen sliding scale   SubCutaneous every 6 hours  methylPREDNISolone sodium succinate Injectable 40 milliGRAM(s) IV Push every 12 hours  norepinephrine Infusion 0.05 MICROgram(s)/kG/Min (5.22 mL/Hr) IV Continuous <Continuous>  pantoprazole   Suspension 40 milliGRAM(s) Oral daily  piperacillin/tazobactam IVPB.. 4.5 Gram(s) IV Intermittent every 6 hours  propofol Infusion 5 MICROgram(s)/kG/Min (1.67 mL/Hr) IV Continuous <Continuous>  senna 2 Tablet(s) Oral at bedtime  sodium chloride 3%  Inhalation 4 milliLiter(s) Inhalation once    MEDICATIONS  (PRN):  acetaminophen    Suspension .. 650 milliGRAM(s) Oral every 6 hours PRN Temp greater or equal to 38C (100.4F), Mild Pain (1 - 3)  bisacodyl 5 milliGRAM(s) Oral every 12 hours PRN Constipation  dextrose 40% Gel 15 Gram(s) Oral once PRN Blood Glucose LESS THAN 70 milliGRAM(s)/deciliter  glucagon  Injectable 1 milliGRAM(s) IntraMuscular once PRN Glucose LESS THAN 70 milligrams/deciliter  sodium chloride 0.9% lock flush 10 milliLiter(s) IV Push every 1 hour PRN Pre/post blood products, medications, blood draw, and to maintain line patency      4'7''  pounds +-10%  (4/3) 122 pounds   (4/6) 117 pounds  (4/8) 116.6 pounds   %ROP=396% BMI=27.19 -- Based on most recent EMR wt    Nutrition Focused Physical Exam: Completed [   ]  Not Pertinent [   ]  Muscle Wasting- Temporal [   ]  Clavicle/Pectoral [   ]  Shoulder/Deltoid [   ]  Scapula [   ]  Interosseous [   ]  Quadriceps [   ]  Gastrocnemius [   ]  Fat Wasting- Orbital [   ]  Buccal [   ]  Triceps [   ]  Rib [   ]  Suspect [PCM] 2/2 to physical assessment, [poor intake], and [wt loss]; please see malnutrition chart note.    Estimated energy needs:   IBW used to calculate energy needs due to pt's current body weight exceeding 120% of IBW   Needs adjusted for age based on hypermetabolic state 2/2 viral infection  kcal/day (25-30kcal/kg)  g protein/day (1-1.2g pro/kg)  Fluids per team     Subjective:   74F with hx of CP, MR, hx of DVT/PE, GERD, chronic PEG, HTN 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, presented to [facility] for AHRF, intubated, sedated on propofol (5 ml/hr providing 132kcal) tx to [facility] for ICU care 4/3. Pt COVID positive with coronavirus pna, acute hypoxemic respiratory failure/ARDS. Pt extubated to NRB 4/4, however later re-intubated. Pt extuabted 4/6 to NRB. Pt reintuabted 4/8; AC CMV, norepinephrine Infusion (trending down), Propofol rate of 5 ([id number]), MAP *&.   Current TF order for Jevity 1.5 goal rate 30ml/hr x24hrs +1PS/day, provides 720ml, 1180kcal, 61gm prot, 547ml water (+100ml free water daily order).  Allergic to caffeine, high acid, and chocolate, tomatoes- noted in EMR.     Previous Nutrition Diagnosis: Inadequate Energy Intake Etiology RT inability to meet est needs 2/2 acute critical status Signs/Symptoms AEB NPO diet meeting 0% EER.   RESOLVED - order for EN 4/5   Goal/Expected Outcome 1. Consistently meet >75% through EN.    Recommendations:    Education: NA   Risk Level: High [ X  ] Moderate [   ] Low [   ] Admitting Diagnosis:   Patient is a 74y old  Female who presents with a chief complaint of covid (08 Apr 2020 12:00)      PAST MEDICAL & SURGICAL HISTORY:  HTN (hypertension)  Spastic quadriplegia  DVT (deep venous thrombosis)  PE (pulmonary thromboembolism)  GERD (gastroesophageal reflux disease)  Dysphagia  Mental retardation  CP (cerebral palsy)  PEG tube malfunction      Current Nutrition Order: NPO TF, Please see Below   PO Intake: Good (%) [   ]  Fair (50-75%) [   ] Poor (<25%) [   ]- NPO TF   GI Issues: CDIFF negative, +BM x2 4/7 & x3 4/6 (meds noted, PRN/bedtime Bowel Regime)   Pain: none per flow sheets   Skin Integrity: 1+ gen edema, no pressure ulcers     Labs:   04-09    144  |  98  |  25<H>  ----------------------------<  216<H>  3.2<L>   |  35<H>  |  0.72    Ca    8.7      09 Apr 2020 05:32  Phos  3.7     04-09  Mg     1.8     04-09    TPro  6.4  /  Alb  3.1<L>  /  TBili  0.4  /  DBili  x   /  AST  39  /  ALT  29  /  AlkPhos  46  04-09    CAPILLARY BLOOD GLUCOSE      POCT Blood Glucose.: 223 mg/dL (09 Apr 2020 04:34)  POCT Blood Glucose.: 137 mg/dL (09 Apr 2020 00:38)  POCT Blood Glucose.: 183 mg/dL (08 Apr 2020 17:14)  POCT Blood Glucose.: 109 mg/dL (08 Apr 2020 11:59)      Medications:  MEDICATIONS  (STANDING):  apixaban 5 milliGRAM(s) Oral every 12 hours  azithromycin   Tablet 250 milliGRAM(s) Oral every 24 hours  chlorhexidine 0.12% Liquid 15 milliLiter(s) Oral Mucosa every 12 hours  chlorhexidine 2% Cloths 1 Application(s) Topical <User Schedule>  dextrose 5%. 1000 milliLiter(s) (50 mL/Hr) IV Continuous <Continuous>  dextrose 50% Injectable 12.5 Gram(s) IV Push once  dextrose 50% Injectable 25 Gram(s) IV Push once  dextrose 50% Injectable 25 Gram(s) IV Push once  glycopyrrolate Injectable 0.2 milliGRAM(s) IV Push every 6 hours  insulin lispro (HumaLOG) corrective regimen sliding scale   SubCutaneous every 6 hours  methylPREDNISolone sodium succinate Injectable 40 milliGRAM(s) IV Push every 12 hours  norepinephrine Infusion 0.05 MICROgram(s)/kG/Min (5.22 mL/Hr) IV Continuous <Continuous>  pantoprazole   Suspension 40 milliGRAM(s) Oral daily  piperacillin/tazobactam IVPB.. 4.5 Gram(s) IV Intermittent every 6 hours  propofol Infusion 5 MICROgram(s)/kG/Min (1.67 mL/Hr) IV Continuous <Continuous>  senna 2 Tablet(s) Oral at bedtime  sodium chloride 3%  Inhalation 4 milliLiter(s) Inhalation once    MEDICATIONS  (PRN):  acetaminophen    Suspension .. 650 milliGRAM(s) Oral every 6 hours PRN Temp greater or equal to 38C (100.4F), Mild Pain (1 - 3)  bisacodyl 5 milliGRAM(s) Oral every 12 hours PRN Constipation  dextrose 40% Gel 15 Gram(s) Oral once PRN Blood Glucose LESS THAN 70 milliGRAM(s)/deciliter  glucagon  Injectable 1 milliGRAM(s) IntraMuscular once PRN Glucose LESS THAN 70 milligrams/deciliter  sodium chloride 0.9% lock flush 10 milliLiter(s) IV Push every 1 hour PRN Pre/post blood products, medications, blood draw, and to maintain line patency      4'7'' IBW 90 pounds +-10%  (4/3) 122 pounds   (4/6) 117 pounds  (4/8) 116.6 pounds   %WAS=980% BMI=27.19 -- Based on most recent EMR wt    Nutrition Focused Physical Exam: Completed [   ]  Not Pertinent [   ]- NA d/t COVID     Estimated energy needs:   IBW used to calculate energy needs due to pt's current body weight exceeding 120% of IBW   Needs adjusted for age based on hypermetabolic state 2/2 viral infection  1025-1230kcal/day (25-30kcal/kg)  57-66g protein/day (1.4-1.6g pro/kg)  Fluids per team     Subjective:   74F with hx of CP, MR, hx of DVT/PE, GERD, chronic PEG, HTN 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, presented to [facility] for AHRF, intubated, sedated on propofol (5 ml/hr providing 132kcal) tx to [facility] for ICU care 4/3. Pt COVID positive with coronavirus pna, acute hypoxemic respiratory failure/ARDS. Pt extubated to NRB 4/4, however later re-intubated. Pt extuabted 4/6 to NRB. Pt reintuabted 4/8; AC CMV, norepinephrine Infusion (trending down), Propofol rate of 5 ([id number]), MAP 84.   Current TF order for Jevity 1.5 goal rate 30ml/hr x24hrs +1PS/day, provides 720ml, 1180kcal, 61gm prot, 547ml water (+100ml free water daily order). Spoke with RN, reports pt tolerating feeds thus far, running at 30ml, denies BM thus far today.   Allergic to caffeine, high acid, and chocolate, tomatoes- noted in EMR.      Previous Nutrition Diagnosis: Inadequate Energy Intake Etiology RT inability to meet est needs 2/2 acute critical status Signs/Symptoms AEB NPO diet meeting 0% EER.   RESOLVED - order for EN 4/5   New PES: Increased nutrient needs RT increased demand for energy and protein AEB hypermetabolic state 2/2 viral infection (COVID19+)   Goal/Expected Outcome: Pt will meet at least 75% of nutrient needs     Recommendations:  1. Pt with order for Steroids, Last 3 POCT 223 137 183, last Glucose 216.  >> Should CHO reduced formula be indicated, consider use of Glucerna 1.2 x24hrs goal rate 30ml/hr +1PS/day, will provide ~720ml, 964kcal/1096kcal with propofol at current rate, 58gm prot, 580ml water.  >> However should Reduced CHO formula not be needed, Pending BG/POCT with steroid use, consider use of Jevity 1.2 x24hrs goal rate 25ml/hr +2PS/day, will provide ~600ml, 920kcal/1052kcal with propofol at current rate, 63gm prot, 484ml water.   2. Start feeds at low rate, increase slowly as tolerated. Monitor tolerance, GI distress - if diarrhea, fiber agent vs ? need for bowel regime adjustment.  3. Monitor Skin, Labs, Wts, GOC.  4. Micro-nutrients/Vitamins/Minerals per team.  5. RD to remain available for additional nutrition interventions as needed.     Education: NA   Risk Level: High [ X  ] Moderate [   ] Low [   ] Admitting Diagnosis:   Patient is a 74y old  Female who presents with a chief complaint of covid (08 Apr 2020 12:00)      PAST MEDICAL & SURGICAL HISTORY:  HTN (hypertension)  Spastic quadriplegia  DVT (deep venous thrombosis)  PE (pulmonary thromboembolism)  GERD (gastroesophageal reflux disease)  Dysphagia  Mental retardation  CP (cerebral palsy)  PEG tube malfunction      Current Nutrition Order: NPO TF, Please see Below   PO Intake: Good (%) [   ]  Fair (50-75%) [   ] Poor (<25%) [   ]- NPO TF   GI Issues: CDIFF negative, +BM x2 4/7 & x3 4/6 (meds noted, PRN/bedtime Bowel Regime)   Pain: none per flow sheets   Skin Integrity: 1+ gen edema, no pressure ulcers     Labs:   04-09    144  |  98  |  25<H>  ----------------------------<  216<H>  3.2<L>   |  35<H>  |  0.72    Ca    8.7      09 Apr 2020 05:32  Phos  3.7     04-09  Mg     1.8     04-09    TPro  6.4  /  Alb  3.1<L>  /  TBili  0.4  /  DBili  x   /  AST  39  /  ALT  29  /  AlkPhos  46  04-09    CAPILLARY BLOOD GLUCOSE      POCT Blood Glucose.: 223 mg/dL (09 Apr 2020 04:34)  POCT Blood Glucose.: 137 mg/dL (09 Apr 2020 00:38)  POCT Blood Glucose.: 183 mg/dL (08 Apr 2020 17:14)  POCT Blood Glucose.: 109 mg/dL (08 Apr 2020 11:59)      Medications:  MEDICATIONS  (STANDING):  apixaban 5 milliGRAM(s) Oral every 12 hours  azithromycin   Tablet 250 milliGRAM(s) Oral every 24 hours  chlorhexidine 0.12% Liquid 15 milliLiter(s) Oral Mucosa every 12 hours  chlorhexidine 2% Cloths 1 Application(s) Topical <User Schedule>  dextrose 5%. 1000 milliLiter(s) (50 mL/Hr) IV Continuous <Continuous>  dextrose 50% Injectable 12.5 Gram(s) IV Push once  dextrose 50% Injectable 25 Gram(s) IV Push once  dextrose 50% Injectable 25 Gram(s) IV Push once  glycopyrrolate Injectable 0.2 milliGRAM(s) IV Push every 6 hours  insulin lispro (HumaLOG) corrective regimen sliding scale   SubCutaneous every 6 hours  methylPREDNISolone sodium succinate Injectable 40 milliGRAM(s) IV Push every 12 hours  norepinephrine Infusion 0.05 MICROgram(s)/kG/Min (5.22 mL/Hr) IV Continuous <Continuous>  pantoprazole   Suspension 40 milliGRAM(s) Oral daily  piperacillin/tazobactam IVPB.. 4.5 Gram(s) IV Intermittent every 6 hours  propofol Infusion 5 MICROgram(s)/kG/Min (1.67 mL/Hr) IV Continuous <Continuous>  senna 2 Tablet(s) Oral at bedtime  sodium chloride 3%  Inhalation 4 milliLiter(s) Inhalation once    MEDICATIONS  (PRN):  acetaminophen    Suspension .. 650 milliGRAM(s) Oral every 6 hours PRN Temp greater or equal to 38C (100.4F), Mild Pain (1 - 3)  bisacodyl 5 milliGRAM(s) Oral every 12 hours PRN Constipation  dextrose 40% Gel 15 Gram(s) Oral once PRN Blood Glucose LESS THAN 70 milliGRAM(s)/deciliter  glucagon  Injectable 1 milliGRAM(s) IntraMuscular once PRN Glucose LESS THAN 70 milligrams/deciliter  sodium chloride 0.9% lock flush 10 milliLiter(s) IV Push every 1 hour PRN Pre/post blood products, medications, blood draw, and to maintain line patency      4'7'' IBW 90 pounds +-10%  (4/3) 122 pounds   (4/6) 117 pounds  (4/8) 116.6 pounds   %SKU=355% BMI=27.19 -- Based on most recent EMR wt    Nutrition Focused Physical Exam: Completed [   ]  Not Pertinent [   ]- NA d/t COVID     Estimated energy needs:   IBW used to calculate energy needs due to pt's current body weight exceeding 120% of IBW   Needs adjusted for age based on hypermetabolic state 2/2 viral infection  1025-1230kcal/day (25-30kcal/kg)  57-66g protein/day (1.4-1.6g pro/kg)  Fluids per team     Subjective:   74F with hx of CP, MR, hx of DVT/PE, GERD, chronic PEG, HTN 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, presented to [facility] for AHRF, intubated, sedated on propofol (5 ml/hr providing 132kcal) tx to [facility] for ICU care 4/3. Pt COVID positive with coronavirus pna, acute hypoxemic respiratory failure/ARDS. Pt extubated to NRB 4/4, however later re-intubated. Pt extuabted 4/6 to NRB. Pt reintuabted 4/8; AC CMV, norepinephrine Infusion (trending down), Propofol rate of 5 ([id number]), MAP 84.   Current TF order for Jevity 1.5 goal rate 30ml/hr x24hrs +1PS/day, provides 720ml, 1180kcal, 61gm prot, 547ml water (+100ml free water daily order). Spoke with RN, reports pt tolerating feeds thus far, running at 30ml, denies BM thus far today.   Allergic to caffeine, high acid, and chocolate, tomatoes- noted in EMR.  Please see below for nutritions recommendations. Recs made with team.     Previous Nutrition Diagnosis: Inadequate Energy Intake Etiology RT inability to meet est needs 2/2 acute critical status Signs/Symptoms AEB NPO diet meeting 0% EER.   RESOLVED - order for EN 4/5   New PES: Increased nutrient needs RT increased demand for energy and protein AEB hypermetabolic state 2/2 viral infection (COVID19+)   Goal/Expected Outcome: Pt will meet at least 75% of nutrient needs     Recommendations:  1. Pt with order for Steroids, Last 3 POCT 223 137 183, last Glucose 216.  >> Should CHO reduced formula be indicated, consider use of Glucerna 1.2 x24hrs goal rate 30ml/hr +1PS/day, will provide ~720ml, 964kcal/1096kcal with propofol at current rate, 58gm prot, 580ml water.  >> However should Reduced CHO formula not be needed, Pending BG/POCT with steroid use, consider use of Jevity 1.2 x24hrs goal rate 25ml/hr +2PS/day, will provide ~600ml, 920kcal/1052kcal with propofol at current rate, 63gm prot, 484ml water.   2. Start feeds at low rate, increase slowly as tolerated. Monitor tolerance, GI distress - if diarrhea, fiber agent vs ? need for bowel regime adjustment.  3. Monitor Skin, Labs, Wts, GOC.  4. Micro-nutrients/Vitamins/Minerals per team.  5. RD to remain available for additional nutrition interventions as needed.     Education: NA   Risk Level: High [ X  ] Moderate [   ] Low [   ]

## 2020-04-09 NOTE — PROGRESS NOTE ADULT - ASSESSMENT
Assessment:   74 F with pmhx of CP, MR, hx of DVT/PE, GERD, chronic PEG, HTN 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, presented to [facility] for AHRF, intubated, sedated on propofol tx to [facility] for ICU care. COVID positive, coronavirus pna with acute hypoxemic respiratory failure/ARDS    Plan:    1. Neurovascular:   -Intubation requiring sedation  -Drips: levophed, propofol  -Monitor neuro status    2. Respiratory: Acute hypoxemic respiratory failure 2/2 COVID.    -Date Intubated: reintubated 4/8  -solumedrol (4/3- )  -Continue vent weaning as tolerated    3. Cardiovascular: Pressor requirement 2/2 sedation/pain regimen.   -Daily EKG's to assess for QT prolongation    -QTc: 400  -Drips:  -Monitor HR/BP/Tele    4. GI: NPO  -Nutrition: Jevity 1.5. G-tube. protonix ppx  -Prophylaxis: Protonix 40mg  -C/w bowel regimen: senna, bisacodyl      5. /Renal: +Quintero  -BUN/Cr: 25/0.72  -Trend Cr on AM labs  -Monitor UOP  -Replete electrolytes as needed for goal K+ 4.0, Phos 3.0, Mg 2.0    6. ID: Sepsis 2/2 COVID   -vanco (4/9- ), zosyn (4/5- ) for pseudomonas,   -Plaquenil and Azithromycin completed  -WBC: 19.45  -Continue with regular surveillance labs including CBC with diff, CMP, Mg, Phos, CRP, ABG, Ferritin, CK,  D-Dimer, EKG  -COVID isolation protocol     7. Endo:  -ISS    8. Heme:   -H/H: 11.6/37.2  -Continue to monitor on daily and q3d labs as above.  -DVT ppx: apixaban 5mg q12 and SCDs  -4/9 C4 complement: 33, C5 complement: 115  -f/u C1Q binding results  -abg daily or every 2 days    Disposition: Full Code. Remain in ICU    Critical Care Time Spent:

## 2020-04-10 LAB
ALBUMIN SERPL ELPH-MCNC: 2.5 G/DL — LOW (ref 3.3–5)
ALP SERPL-CCNC: 44 U/L — SIGNIFICANT CHANGE UP (ref 40–120)
ALT FLD-CCNC: 21 U/L — SIGNIFICANT CHANGE UP (ref 10–45)
ANION GAP SERPL CALC-SCNC: 10 MMOL/L — SIGNIFICANT CHANGE UP (ref 5–17)
ANION GAP SERPL CALC-SCNC: 8 MMOL/L — SIGNIFICANT CHANGE UP (ref 5–17)
APTT BLD: 30.2 SEC — SIGNIFICANT CHANGE UP (ref 27.5–36.3)
AST SERPL-CCNC: 24 U/L — SIGNIFICANT CHANGE UP (ref 10–40)
BASE EXCESS BLDA CALC-SCNC: 11.5 MMOL/L — HIGH (ref -2–3)
BASE EXCESS BLDA CALC-SCNC: 12.2 MMOL/L — HIGH (ref -2–3)
BASOPHILS # BLD AUTO: 0 K/UL — SIGNIFICANT CHANGE UP (ref 0–0.2)
BASOPHILS NFR BLD AUTO: 0 % — SIGNIFICANT CHANGE UP (ref 0–2)
BILIRUB SERPL-MCNC: 0.2 MG/DL — SIGNIFICANT CHANGE UP (ref 0.2–1.2)
BUN SERPL-MCNC: 18 MG/DL — SIGNIFICANT CHANGE UP (ref 7–23)
BUN SERPL-MCNC: 18 MG/DL — SIGNIFICANT CHANGE UP (ref 7–23)
CALCIUM SERPL-MCNC: 8.2 MG/DL — LOW (ref 8.4–10.5)
CALCIUM SERPL-MCNC: 8.6 MG/DL — SIGNIFICANT CHANGE UP (ref 8.4–10.5)
CHLORIDE SERPL-SCNC: 95 MMOL/L — LOW (ref 96–108)
CHLORIDE SERPL-SCNC: 99 MMOL/L — SIGNIFICANT CHANGE UP (ref 96–108)
CO2 SERPL-SCNC: 34 MMOL/L — HIGH (ref 22–31)
CO2 SERPL-SCNC: 38 MMOL/L — HIGH (ref 22–31)
CREAT SERPL-MCNC: 0.54 MG/DL — SIGNIFICANT CHANGE UP (ref 0.5–1.3)
CREAT SERPL-MCNC: 0.58 MG/DL — SIGNIFICANT CHANGE UP (ref 0.5–1.3)
CRP SERPL-MCNC: 4.26 MG/DL — HIGH (ref 0–0.4)
D DIMER BLD IA.RAPID-MCNC: 172 NG/ML DDU — SIGNIFICANT CHANGE UP
EOSINOPHIL # BLD AUTO: 0 K/UL — SIGNIFICANT CHANGE UP (ref 0–0.5)
EOSINOPHIL NFR BLD AUTO: 0 % — SIGNIFICANT CHANGE UP (ref 0–6)
FERRITIN SERPL-MCNC: 177 NG/ML — HIGH (ref 15–150)
FIBRINOGEN PPP-MCNC: 412 MG/DL — SIGNIFICANT CHANGE UP (ref 258–438)
GAS PNL BLDA: SIGNIFICANT CHANGE UP
GLUCOSE BLDC GLUCOMTR-MCNC: 115 MG/DL — HIGH (ref 70–99)
GLUCOSE BLDC GLUCOMTR-MCNC: 136 MG/DL — HIGH (ref 70–99)
GLUCOSE BLDC GLUCOMTR-MCNC: 159 MG/DL — HIGH (ref 70–99)
GLUCOSE BLDC GLUCOMTR-MCNC: 168 MG/DL — HIGH (ref 70–99)
GLUCOSE BLDC GLUCOMTR-MCNC: 175 MG/DL — HIGH (ref 70–99)
GLUCOSE SERPL-MCNC: 164 MG/DL — HIGH (ref 70–99)
GLUCOSE SERPL-MCNC: 181 MG/DL — HIGH (ref 70–99)
HBA1C BLD-MCNC: 5.7 % — HIGH (ref 4–5.6)
HCO3 BLDA-SCNC: 36 MMOL/L — HIGH (ref 21–28)
HCO3 BLDA-SCNC: 37 MMOL/L — HIGH (ref 21–28)
HCT VFR BLD CALC: 33.2 % — LOW (ref 34.5–45)
HGB BLD-MCNC: 10.2 G/DL — LOW (ref 11.5–15.5)
IMM GRANULOCYTES NFR BLD AUTO: 1.3 % — SIGNIFICANT CHANGE UP (ref 0–1.5)
INR BLD: 1.3 — HIGH (ref 0.88–1.16)
LDH SERPL L TO P-CCNC: 343 U/L — HIGH (ref 50–242)
LYMPHOCYTES # BLD AUTO: 0.28 K/UL — LOW (ref 1–3.3)
LYMPHOCYTES # BLD AUTO: 3.3 % — LOW (ref 13–44)
MAGNESIUM SERPL-MCNC: 1.9 MG/DL — SIGNIFICANT CHANGE UP (ref 1.6–2.6)
MCHC RBC-ENTMCNC: 29.9 PG — SIGNIFICANT CHANGE UP (ref 27–34)
MCHC RBC-ENTMCNC: 30.7 GM/DL — LOW (ref 32–36)
MCV RBC AUTO: 97.4 FL — SIGNIFICANT CHANGE UP (ref 80–100)
MONOCYTES # BLD AUTO: 0.41 K/UL — SIGNIFICANT CHANGE UP (ref 0–0.9)
MONOCYTES NFR BLD AUTO: 4.8 % — SIGNIFICANT CHANGE UP (ref 2–14)
NEUTROPHILS # BLD AUTO: 7.76 K/UL — HIGH (ref 1.8–7.4)
NEUTROPHILS NFR BLD AUTO: 90.6 % — HIGH (ref 43–77)
NRBC # BLD: 0 /100 WBCS — SIGNIFICANT CHANGE UP (ref 0–0)
PCO2 BLDA: 44 MMHG — SIGNIFICANT CHANGE UP (ref 32–45)
PCO2 BLDA: 47 MMHG — HIGH (ref 32–45)
PH BLDA: 7.51 — HIGH (ref 7.35–7.45)
PH BLDA: 7.52 — HIGH (ref 7.35–7.45)
PHOSPHATE SERPL-MCNC: 2.6 MG/DL — SIGNIFICANT CHANGE UP (ref 2.5–4.5)
PLATELET # BLD AUTO: 256 K/UL — SIGNIFICANT CHANGE UP (ref 150–400)
PO2 BLDA: 57 MMHG — CRITICAL LOW (ref 83–108)
PO2 BLDA: 57 MMHG — CRITICAL LOW (ref 83–108)
POTASSIUM SERPL-MCNC: 3.4 MMOL/L — LOW (ref 3.5–5.3)
POTASSIUM SERPL-MCNC: 3.4 MMOL/L — LOW (ref 3.5–5.3)
POTASSIUM SERPL-SCNC: 3.4 MMOL/L — LOW (ref 3.5–5.3)
POTASSIUM SERPL-SCNC: 3.4 MMOL/L — LOW (ref 3.5–5.3)
PROCALCITONIN SERPL-MCNC: 0.74 NG/ML — HIGH (ref 0.02–0.1)
PROT SERPL-MCNC: 5.4 G/DL — LOW (ref 6–8.3)
PROTHROM AB SERPL-ACNC: 14.9 SEC — HIGH (ref 10–12.9)
RBC # BLD: 3.41 M/UL — LOW (ref 3.8–5.2)
RBC # FLD: 12.9 % — SIGNIFICANT CHANGE UP (ref 10.3–14.5)
SAO2 % BLDA: 90 % — LOW (ref 95–100)
SAO2 % BLDA: 91 % — LOW (ref 95–100)
SODIUM SERPL-SCNC: 141 MMOL/L — SIGNIFICANT CHANGE UP (ref 135–145)
SODIUM SERPL-SCNC: 143 MMOL/L — SIGNIFICANT CHANGE UP (ref 135–145)
WBC # BLD: 8.56 K/UL — SIGNIFICANT CHANGE UP (ref 3.8–10.5)
WBC # FLD AUTO: 8.56 K/UL — SIGNIFICANT CHANGE UP (ref 3.8–10.5)

## 2020-04-10 PROCEDURE — 99291 CRITICAL CARE FIRST HOUR: CPT

## 2020-04-10 RX ORDER — POTASSIUM CHLORIDE 20 MEQ
40 PACKET (EA) ORAL ONCE
Refills: 0 | Status: COMPLETED | OUTPATIENT
Start: 2020-04-10 | End: 2020-04-10

## 2020-04-10 RX ORDER — FUROSEMIDE 40 MG
40 TABLET ORAL ONCE
Refills: 0 | Status: COMPLETED | OUTPATIENT
Start: 2020-04-10 | End: 2020-04-10

## 2020-04-10 RX ORDER — POTASSIUM CHLORIDE 20 MEQ
60 PACKET (EA) ORAL ONCE
Refills: 0 | Status: COMPLETED | OUTPATIENT
Start: 2020-04-10 | End: 2020-04-10

## 2020-04-10 RX ADMIN — Medication 40 MILLIGRAM(S): at 09:18

## 2020-04-10 RX ADMIN — ROBINUL 0.2 MILLIGRAM(S): 0.2 INJECTION INTRAMUSCULAR; INTRAVENOUS at 06:06

## 2020-04-10 RX ADMIN — APIXABAN 5 MILLIGRAM(S): 2.5 TABLET, FILM COATED ORAL at 11:00

## 2020-04-10 RX ADMIN — PIPERACILLIN AND TAZOBACTAM 25 GRAM(S): 4; .5 INJECTION, POWDER, LYOPHILIZED, FOR SOLUTION INTRAVENOUS at 23:01

## 2020-04-10 RX ADMIN — ROBINUL 0.2 MILLIGRAM(S): 0.2 INJECTION INTRAMUSCULAR; INTRAVENOUS at 17:07

## 2020-04-10 RX ADMIN — Medication 250 MILLIGRAM(S): at 11:01

## 2020-04-10 RX ADMIN — PIPERACILLIN AND TAZOBACTAM 25 GRAM(S): 4; .5 INJECTION, POWDER, LYOPHILIZED, FOR SOLUTION INTRAVENOUS at 11:01

## 2020-04-10 RX ADMIN — ROBINUL 0.2 MILLIGRAM(S): 0.2 INJECTION INTRAMUSCULAR; INTRAVENOUS at 11:00

## 2020-04-10 RX ADMIN — PROPOFOL 1.67 MICROGRAM(S)/KG/MIN: 10 INJECTION, EMULSION INTRAVENOUS at 11:01

## 2020-04-10 RX ADMIN — PIPERACILLIN AND TAZOBACTAM 25 GRAM(S): 4; .5 INJECTION, POWDER, LYOPHILIZED, FOR SOLUTION INTRAVENOUS at 06:06

## 2020-04-10 RX ADMIN — Medication 250 MILLIGRAM(S): at 22:10

## 2020-04-10 RX ADMIN — Medication 40 MILLIEQUIVALENT(S): at 11:00

## 2020-04-10 RX ADMIN — PIPERACILLIN AND TAZOBACTAM 25 GRAM(S): 4; .5 INJECTION, POWDER, LYOPHILIZED, FOR SOLUTION INTRAVENOUS at 00:18

## 2020-04-10 RX ADMIN — ROBINUL 0.2 MILLIGRAM(S): 0.2 INJECTION INTRAMUSCULAR; INTRAVENOUS at 00:18

## 2020-04-10 RX ADMIN — PROPOFOL 1.67 MICROGRAM(S)/KG/MIN: 10 INJECTION, EMULSION INTRAVENOUS at 06:41

## 2020-04-10 RX ADMIN — Medication 60 MILLIEQUIVALENT(S): at 17:39

## 2020-04-10 RX ADMIN — CHLORHEXIDINE GLUCONATE 15 MILLILITER(S): 213 SOLUTION TOPICAL at 17:07

## 2020-04-10 RX ADMIN — Medication 2: at 06:35

## 2020-04-10 RX ADMIN — CHLORHEXIDINE GLUCONATE 15 MILLILITER(S): 213 SOLUTION TOPICAL at 06:06

## 2020-04-10 RX ADMIN — CHLORHEXIDINE GLUCONATE 1 APPLICATION(S): 213 SOLUTION TOPICAL at 06:06

## 2020-04-10 RX ADMIN — Medication 2: at 17:39

## 2020-04-10 RX ADMIN — PIPERACILLIN AND TAZOBACTAM 25 GRAM(S): 4; .5 INJECTION, POWDER, LYOPHILIZED, FOR SOLUTION INTRAVENOUS at 17:07

## 2020-04-10 RX ADMIN — APIXABAN 5 MILLIGRAM(S): 2.5 TABLET, FILM COATED ORAL at 23:01

## 2020-04-10 RX ADMIN — PROPOFOL 1.67 MICROGRAM(S)/KG/MIN: 10 INJECTION, EMULSION INTRAVENOUS at 17:41

## 2020-04-10 RX ADMIN — APIXABAN 5 MILLIGRAM(S): 2.5 TABLET, FILM COATED ORAL at 00:18

## 2020-04-10 RX ADMIN — PANTOPRAZOLE SODIUM 40 MILLIGRAM(S): 20 TABLET, DELAYED RELEASE ORAL at 11:00

## 2020-04-10 RX ADMIN — Medication 40 MILLIGRAM(S): at 21:48

## 2020-04-10 RX ADMIN — SENNA PLUS 2 TABLET(S): 8.6 TABLET ORAL at 21:48

## 2020-04-10 RX ADMIN — ROBINUL 0.2 MILLIGRAM(S): 0.2 INJECTION INTRAMUSCULAR; INTRAVENOUS at 23:40

## 2020-04-10 NOTE — PROGRESS NOTE ADULT - SUBJECTIVE AND OBJECTIVE BOX
Patient Discussed on Morning Rounds with Dr. Castro.    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     OVERNIGHT EVENTS: No overnight event.    SUBJECTIVE HPI: Patient seen and examined at bedside.    ICU Vital Signs Last 24 Hrs  T(C): 37.5 (10 Apr 2020 09:00), Max: 37.5 (10 Apr 2020 09:00)  T(F): 99.5 (10 Apr 2020 09:00), Max: 99.5 (10 Apr 2020 09:00)  HR: 69 (10 Apr 2020 11:00) (56 - 78)  BP: 120/59 (10 Apr 2020 09:00) (109/62 - 154/83)  BP(mean): 84 (10 Apr 2020 09:00) (80 - 109)  ABP: 129/54 (10 Apr 2020 11:00) (81/38 - 152/61)  ABP(mean): 77 (10 Apr 2020 11:00) (54 - 98)  RR: 25 (10 Apr 2020 11:00) (0 - 27)  SpO2: 90% (10 Apr 2020 11:00) (89% - 97%)    I&O's Detail    09 Apr 2020 07:01  -  10 Apr 2020 07:00  --------------------------------------------------------  IN:    Enteral Tube Flush: 210 mL    IV PiggyBack: 750 mL    norepinephrine Infusion: 24 mL    ns in tub fed  [id number]: 720 mL    propofol Infusion: 254 mL    Solution: 100 mL    Solution: 250 mL  Total IN: 2308 mL    OUT:    Indwelling Catheter - Urethral: 1255 mL    Voided: 250 mL  Total OUT: 1505 mL    Total NET: 803 mL      10 Apr 2020 07:01  -  10 Apr 2020 11:58  --------------------------------------------------------  IN:    Enteral Tube Flush: 200 mL    IV PiggyBack: 100 mL    ns in tub fed  [id number]: 150 mL    propofol Infusion: 56 mL    Solution: 250 mL  Total IN: 756 mL    OUT:    Indwelling Catheter - Urethral: 475 mL  Total OUT: 475 mL    Total NET: 281 mL    Mode: AC/ CMV (Assist Control/ Continuous Mandatory Ventilation)  RR (machine): 16  TV (machine): 250  FiO2: 45  PEEP: 5  ITime: 1  MAP: 17  PIP: 22    PHYSICAL EXAM: Physical Exam performed by Intensivist to minimize risk of exposure.  Please refer to Attending Attestation for comprehensive exam.   General: No acute distress  Neuro: [Sedated]  CV: RRR  Pulm: [INTUBATED]  : [Quintero]  Psych: affect appropriate    LABS:                       10.2   8.56  )-----------( 256      ( 10 Apr 2020 05:44 )             33.2   D-Dimer 172 today    04-10    141  |  99  |  18  ----------------------------<  164<H>  3.4<L>   |  34<H>  |  0.54    Ca    8.2<L>      10 Apr 2020 05:44  Phos  2.6     04-10  Mg     1.9     04-10    TPro  5.4<L>  /  Alb  2.5<L>  /  TBili  0.2  /  DBili  x   /  AST  24  /  ALT  21  /  AlkPhos  44  04-10      PT/INR - ( 10 Apr 2020 05:44 )   PT: 14.9 sec;   INR: 1.30          PTT - ( 10 Apr 2020 05:44 )  PTT:30.2 sec             CAPILLARY BLOOD GLUCOSE  POCT Blood Glucose.: 115 mg/dL (10 Apr 2020 11:04)  POCT Blood Glucose.: 168 mg/dL (10 Apr 2020 06:31)  POCT Blood Glucose.: 136 mg/dL (10 Apr 2020 00:29)  POCT Blood Glucose.: 125 mg/dL (09 Apr 2020 17:44)    MEDICATIONS  (STANDING):  apixaban 5 milliGRAM(s) Oral every 12 hours  chlorhexidine 0.12% Liquid 15 milliLiter(s) Oral Mucosa every 12 hours  chlorhexidine 2% Cloths 1 Application(s) Topical <User Schedule>  dextrose 5%. 1000 milliLiter(s) (50 mL/Hr) IV Continuous <Continuous>  dextrose 50% Injectable 12.5 Gram(s) IV Push once  dextrose 50% Injectable 25 Gram(s) IV Push once  dextrose 50% Injectable 25 Gram(s) IV Push once  glycopyrrolate Injectable 0.2 milliGRAM(s) IV Push every 6 hours  insulin lispro (HumaLOG) corrective regimen sliding scale   SubCutaneous every 6 hours  methylPREDNISolone sodium succinate Injectable 40 milliGRAM(s) IV Push every 12 hours  norepinephrine Infusion 0.05 MICROgram(s)/kG/Min (5.22 mL/Hr) IV Continuous <Continuous>  pantoprazole   Suspension 40 milliGRAM(s) Oral daily  piperacillin/tazobactam IVPB.. 4.5 Gram(s) IV Intermittent every 6 hours  propofol Infusion 5 MICROgram(s)/kG/Min (1.67 mL/Hr) IV Continuous <Continuous>  senna 2 Tablet(s) Oral at bedtime  sodium chloride 3%  Inhalation 4 milliLiter(s) Inhalation once  vancomycin  IVPB 1000 milliGRAM(s) IV Intermittent every 12 hours    MEDICATIONS  (PRN):  acetaminophen    Suspension .. 650 milliGRAM(s) Oral every 6 hours PRN Temp greater or equal to 38C (100.4F), Mild Pain (1 - 3)  bisacodyl 5 milliGRAM(s) Oral every 12 hours PRN Constipation  dextrose 40% Gel 15 Gram(s) Oral once PRN Blood Glucose LESS THAN 70 milliGRAM(s)/deciliter  glucagon  Injectable 1 milliGRAM(s) IntraMuscular once PRN Glucose LESS THAN 70 milligrams/deciliter  sodium chloride 0.9% lock flush 10 milliLiter(s) IV Push every 1 hour PRN Pre/post blood products, medications, blood draw, and to maintain line patency    RADIOLOGY/OTHER STUDIES: CXR 4/9 IMPRESSION:  1.  Interval repositioning of the ET tube which now projects with its tip 3.3 cm above the paula.  2.  Moderate left pleural effusion. Small right pleural effusion. Retrocardiac opacity.     < from: CT Neck Soft Tissue No Cont (04.09.20 @ 23:29) >  The CT examination is limited secondary to patient's body habitus and positioning as well as lack of intravenous contrast.  There is fluid surrounding the endotracheal tracheal tube up to the oropharynx.    There is marked levoscoliosis with superimposed kyphotic deformity of the cervicothoracic spine. There are marked degenerative changes involving the left lateral mass of C1 and C2 with with large left anterolateral osteophyte indenting on the on the left side of the oropharynx.    < end of copied text >    Physical Exam:   Reference Recent Physical Exam:  · In accordance with current standards limiting patient contact please refer to the recent:	Inpatient Physical Exam	    Assessment and Plan:   · Assessment		  Assessment:     Plan:    1. Neurovascular:   -Intubation requiring sedation  -Drips: levophed, propofol  -Monitor neuro status    2. Respiratory: Acute hypoxemic respiratory failure 2/2 COVID.    -Date Intubated: reintubated 4/8  -solumedrol (4/3- )  -Continue vent weaning as tolerated  - robinul 0.2 mg iv q6h    3. Cardiovascular: Pressor requirement 2/2 sedation/pain regimen.   -Daily EKG's to assess for QT prolongation  -Monitor HR/BP/Tele    4. GI: NPO  -Nutrition: Jevity 1.5. G-tube. protonix ppx  -Prophylaxis: Protonix 40mg  -C/w bowel regimen: senna, bisacodyl      5. /Renal: +Quintero  -BUN/Cr: 18/0.54  -Trend Cr on AM labs  -Monitor UOP  -Replete electrolytes as needed for goal K+ 4.0, Phos 3.0, Mg 2.0  - Lasix 40 x 1 dose today    6. ID: Sepsis 2/2 COVID   -vanco (4/9- ), zosyn (4/5-  ) for pseudomonas,   -Plaquenil and Azithromycin completed  -WBC: 8.56  -Continue with regular surveillance labs including CBC with diff, CMP, Mg, Phos, CRP, ABG, Ferritin, CK,  D-Dimer, EKG  -COVID isolation protocol     7. Endo:  -ISS    8. Heme:   -H/H: 10.2/32  -Continue to monitor on daily and q3d labs as above.  -DVT ppx: apixaban 5mg q12 and SCDs  -4/9 C4 complement: 33, C5 complement: 115  -f/u C1Q binding results  -abg daily or every 2 days    Disposition: Full Code. Remain in ICU    All above d/w Dr. Castro.

## 2020-04-11 LAB
ALBUMIN SERPL ELPH-MCNC: 2.7 G/DL — LOW (ref 3.3–5)
ALP SERPL-CCNC: 38 U/L — LOW (ref 40–120)
ALT FLD-CCNC: 20 U/L — SIGNIFICANT CHANGE UP (ref 10–45)
ANION GAP SERPL CALC-SCNC: 9 MMOL/L — SIGNIFICANT CHANGE UP (ref 5–17)
APTT BLD: 28.5 SEC — SIGNIFICANT CHANGE UP (ref 27.5–36.3)
AST SERPL-CCNC: 19 U/L — SIGNIFICANT CHANGE UP (ref 10–40)
BASE EXCESS BLDA CALC-SCNC: 9.8 MMOL/L — HIGH (ref -2–3)
BASOPHILS # BLD AUTO: 0 K/UL — SIGNIFICANT CHANGE UP (ref 0–0.2)
BASOPHILS NFR BLD AUTO: 0 % — SIGNIFICANT CHANGE UP (ref 0–2)
BILIRUB SERPL-MCNC: 0.3 MG/DL — SIGNIFICANT CHANGE UP (ref 0.2–1.2)
BUN SERPL-MCNC: 19 MG/DL — SIGNIFICANT CHANGE UP (ref 7–23)
CALCIUM SERPL-MCNC: 8.4 MG/DL — SIGNIFICANT CHANGE UP (ref 8.4–10.5)
CHLORIDE SERPL-SCNC: 97 MMOL/L — SIGNIFICANT CHANGE UP (ref 96–108)
CO2 SERPL-SCNC: 34 MMOL/L — HIGH (ref 22–31)
CREAT SERPL-MCNC: 0.61 MG/DL — SIGNIFICANT CHANGE UP (ref 0.5–1.3)
CRP SERPL-MCNC: 2.41 MG/DL — HIGH (ref 0–0.4)
D DIMER BLD IA.RAPID-MCNC: 220 NG/ML DDU — SIGNIFICANT CHANGE UP
EOSINOPHIL # BLD AUTO: 0 K/UL — SIGNIFICANT CHANGE UP (ref 0–0.5)
EOSINOPHIL NFR BLD AUTO: 0 % — SIGNIFICANT CHANGE UP (ref 0–6)
GAS PNL BLDA: SIGNIFICANT CHANGE UP
GLUCOSE BLDC GLUCOMTR-MCNC: 100 MG/DL — HIGH (ref 70–99)
GLUCOSE BLDC GLUCOMTR-MCNC: 136 MG/DL — HIGH (ref 70–99)
GLUCOSE BLDC GLUCOMTR-MCNC: 142 MG/DL — HIGH (ref 70–99)
GLUCOSE BLDC GLUCOMTR-MCNC: 147 MG/DL — HIGH (ref 70–99)
GLUCOSE SERPL-MCNC: 158 MG/DL — HIGH (ref 70–99)
HCO3 BLDA-SCNC: 34 MMOL/L — HIGH (ref 21–28)
HCT VFR BLD CALC: 33.9 % — LOW (ref 34.5–45)
HGB BLD-MCNC: 10.7 G/DL — LOW (ref 11.5–15.5)
INR BLD: 1.22 — HIGH (ref 0.88–1.16)
LACTATE SERPL-SCNC: 2 MMOL/L — SIGNIFICANT CHANGE UP (ref 0.5–2)
LYMPHOCYTES # BLD AUTO: 0.22 K/UL — LOW (ref 1–3.3)
LYMPHOCYTES # BLD AUTO: 2.8 % — LOW (ref 13–44)
MAGNESIUM SERPL-MCNC: 1.6 MG/DL — SIGNIFICANT CHANGE UP (ref 1.6–2.6)
MCHC RBC-ENTMCNC: 30.1 PG — SIGNIFICANT CHANGE UP (ref 27–34)
MCHC RBC-ENTMCNC: 31.6 GM/DL — LOW (ref 32–36)
MCV RBC AUTO: 95.2 FL — SIGNIFICANT CHANGE UP (ref 80–100)
MONOCYTES # BLD AUTO: 0.22 K/UL — SIGNIFICANT CHANGE UP (ref 0–0.9)
MONOCYTES NFR BLD AUTO: 2.8 % — SIGNIFICANT CHANGE UP (ref 2–14)
NEUTROPHILS # BLD AUTO: 7.31 K/UL — SIGNIFICANT CHANGE UP (ref 1.8–7.4)
NEUTROPHILS NFR BLD AUTO: 91.6 % — HIGH (ref 43–77)
PCO2 BLDA: 46 MMHG — HIGH (ref 32–45)
PH BLDA: 7.49 — HIGH (ref 7.35–7.45)
PHOSPHATE SERPL-MCNC: 2.6 MG/DL — SIGNIFICANT CHANGE UP (ref 2.5–4.5)
PLATELET # BLD AUTO: 306 K/UL — SIGNIFICANT CHANGE UP (ref 150–400)
PO2 BLDA: 55 MMHG — CRITICAL LOW (ref 83–108)
POTASSIUM SERPL-MCNC: 3.9 MMOL/L — SIGNIFICANT CHANGE UP (ref 3.5–5.3)
POTASSIUM SERPL-SCNC: 3.9 MMOL/L — SIGNIFICANT CHANGE UP (ref 3.5–5.3)
PROT SERPL-MCNC: 5.6 G/DL — LOW (ref 6–8.3)
PROTHROM AB SERPL-ACNC: 14 SEC — HIGH (ref 10–12.9)
RBC # BLD: 3.56 M/UL — LOW (ref 3.8–5.2)
RBC # FLD: 12.6 % — SIGNIFICANT CHANGE UP (ref 10.3–14.5)
SAO2 % BLDA: 87 % — LOW (ref 95–100)
SODIUM SERPL-SCNC: 140 MMOL/L — SIGNIFICANT CHANGE UP (ref 135–145)
TRIGL SERPL-MCNC: 159 MG/DL — HIGH (ref 10–149)
VANCOMYCIN FLD-MCNC: 21.2 UG/ML — SIGNIFICANT CHANGE UP
WBC # BLD: 7.98 K/UL — SIGNIFICANT CHANGE UP (ref 3.8–10.5)
WBC # FLD AUTO: 7.98 K/UL — SIGNIFICANT CHANGE UP (ref 3.8–10.5)

## 2020-04-11 PROCEDURE — 99291 CRITICAL CARE FIRST HOUR: CPT

## 2020-04-11 RX ORDER — NOREPINEPHRINE BITARTRATE/D5W 8 MG/250ML
0.05 PLASTIC BAG, INJECTION (ML) INTRAVENOUS
Qty: 8 | Refills: 0 | Status: DISCONTINUED | OUTPATIENT
Start: 2020-04-11 | End: 2020-04-13

## 2020-04-11 RX ORDER — ACETAZOLAMIDE 250 MG/1
500 TABLET ORAL ONCE
Refills: 0 | Status: COMPLETED | OUTPATIENT
Start: 2020-04-11 | End: 2020-04-11

## 2020-04-11 RX ORDER — LACTULOSE 10 G/15ML
10 SOLUTION ORAL DAILY
Refills: 0 | Status: DISCONTINUED | OUTPATIENT
Start: 2020-04-11 | End: 2020-04-11

## 2020-04-11 RX ORDER — SCOPALAMINE 1 MG/3D
1 PATCH, EXTENDED RELEASE TRANSDERMAL
Refills: 0 | Status: DISCONTINUED | OUTPATIENT
Start: 2020-04-11 | End: 2020-05-08

## 2020-04-11 RX ORDER — LACTULOSE 10 G/15ML
20 SOLUTION ORAL
Refills: 0 | Status: DISCONTINUED | OUTPATIENT
Start: 2020-04-11 | End: 2020-04-14

## 2020-04-11 RX ADMIN — ROBINUL 0.2 MILLIGRAM(S): 0.2 INJECTION INTRAMUSCULAR; INTRAVENOUS at 12:18

## 2020-04-11 RX ADMIN — PROPOFOL 1.67 MICROGRAM(S)/KG/MIN: 10 INJECTION, EMULSION INTRAVENOUS at 12:51

## 2020-04-11 RX ADMIN — PANTOPRAZOLE SODIUM 40 MILLIGRAM(S): 20 TABLET, DELAYED RELEASE ORAL at 12:19

## 2020-04-11 RX ADMIN — ACETAZOLAMIDE 110 MILLIGRAM(S): 250 TABLET ORAL at 12:19

## 2020-04-11 RX ADMIN — CHLORHEXIDINE GLUCONATE 15 MILLILITER(S): 213 SOLUTION TOPICAL at 05:48

## 2020-04-11 RX ADMIN — PIPERACILLIN AND TAZOBACTAM 25 GRAM(S): 4; .5 INJECTION, POWDER, LYOPHILIZED, FOR SOLUTION INTRAVENOUS at 12:18

## 2020-04-11 RX ADMIN — Medication 40 MILLIGRAM(S): at 23:08

## 2020-04-11 RX ADMIN — PROPOFOL 1.67 MICROGRAM(S)/KG/MIN: 10 INJECTION, EMULSION INTRAVENOUS at 08:31

## 2020-04-11 RX ADMIN — PIPERACILLIN AND TAZOBACTAM 25 GRAM(S): 4; .5 INJECTION, POWDER, LYOPHILIZED, FOR SOLUTION INTRAVENOUS at 05:00

## 2020-04-11 RX ADMIN — Medication 2: at 00:18

## 2020-04-11 RX ADMIN — PIPERACILLIN AND TAZOBACTAM 25 GRAM(S): 4; .5 INJECTION, POWDER, LYOPHILIZED, FOR SOLUTION INTRAVENOUS at 23:09

## 2020-04-11 RX ADMIN — ROBINUL 0.2 MILLIGRAM(S): 0.2 INJECTION INTRAMUSCULAR; INTRAVENOUS at 23:08

## 2020-04-11 RX ADMIN — PIPERACILLIN AND TAZOBACTAM 25 GRAM(S): 4; .5 INJECTION, POWDER, LYOPHILIZED, FOR SOLUTION INTRAVENOUS at 18:51

## 2020-04-11 RX ADMIN — CHLORHEXIDINE GLUCONATE 1 APPLICATION(S): 213 SOLUTION TOPICAL at 05:48

## 2020-04-11 RX ADMIN — PROPOFOL 1.67 MICROGRAM(S)/KG/MIN: 10 INJECTION, EMULSION INTRAVENOUS at 14:48

## 2020-04-11 RX ADMIN — APIXABAN 5 MILLIGRAM(S): 2.5 TABLET, FILM COATED ORAL at 12:19

## 2020-04-11 RX ADMIN — PROPOFOL 1.67 MICROGRAM(S)/KG/MIN: 10 INJECTION, EMULSION INTRAVENOUS at 02:23

## 2020-04-11 RX ADMIN — ROBINUL 0.2 MILLIGRAM(S): 0.2 INJECTION INTRAMUSCULAR; INTRAVENOUS at 05:01

## 2020-04-11 RX ADMIN — APIXABAN 5 MILLIGRAM(S): 2.5 TABLET, FILM COATED ORAL at 23:09

## 2020-04-11 RX ADMIN — Medication 40 MILLIGRAM(S): at 10:07

## 2020-04-11 RX ADMIN — CHLORHEXIDINE GLUCONATE 15 MILLILITER(S): 213 SOLUTION TOPICAL at 18:52

## 2020-04-11 RX ADMIN — ROBINUL 0.2 MILLIGRAM(S): 0.2 INJECTION INTRAMUSCULAR; INTRAVENOUS at 18:52

## 2020-04-11 RX ADMIN — SCOPALAMINE 1 PATCH: 1 PATCH, EXTENDED RELEASE TRANSDERMAL at 19:03

## 2020-04-11 RX ADMIN — SCOPALAMINE 1 PATCH: 1 PATCH, EXTENDED RELEASE TRANSDERMAL at 12:50

## 2020-04-11 NOTE — PROGRESS NOTE ADULT - SUBJECTIVE AND OBJECTIVE BOX
Medicine Progress Note    Patient is a 74y old  Female who presents with a chief complaint of covid (10 Apr 2020 11:55)  Patient intubated and sedated.     No acute events overnight.       MEDICATIONS  (STANDING):  acetaZOLAMIDE  IVPB 500 milliGRAM(s) IV Intermittent once  apixaban 5 milliGRAM(s) Oral every 12 hours  chlorhexidine 0.12% Liquid 15 milliLiter(s) Oral Mucosa every 12 hours  chlorhexidine 2% Cloths 1 Application(s) Topical <User Schedule>  dextrose 5%. 1000 milliLiter(s) (50 mL/Hr) IV Continuous <Continuous>  dextrose 50% Injectable 12.5 Gram(s) IV Push once  dextrose 50% Injectable 25 Gram(s) IV Push once  dextrose 50% Injectable 25 Gram(s) IV Push once  glycopyrrolate Injectable 0.2 milliGRAM(s) IV Push every 6 hours  insulin lispro (HumaLOG) corrective regimen sliding scale   SubCutaneous every 6 hours  methylPREDNISolone sodium succinate Injectable 40 milliGRAM(s) IV Push every 12 hours  norepinephrine Infusion 0.05 MICROgram(s)/kG/Min (5.22 mL/Hr) IV Continuous <Continuous>  pantoprazole   Suspension 40 milliGRAM(s) Oral daily  piperacillin/tazobactam IVPB.. 4.5 Gram(s) IV Intermittent every 6 hours  propofol Infusion 5 MICROgram(s)/kG/Min (1.67 mL/Hr) IV Continuous <Continuous>  senna 2 Tablet(s) Oral at bedtime  sodium chloride 3%  Inhalation 4 milliLiter(s) Inhalation once    MEDICATIONS  (PRN):  acetaminophen    Suspension .. 650 milliGRAM(s) Oral every 6 hours PRN Temp greater or equal to 38C (100.4F), Mild Pain (1 - 3)  bisacodyl 5 milliGRAM(s) Oral every 12 hours PRN Constipation  dextrose 40% Gel 15 Gram(s) Oral once PRN Blood Glucose LESS THAN 70 milliGRAM(s)/deciliter  glucagon  Injectable 1 milliGRAM(s) IntraMuscular once PRN Glucose LESS THAN 70 milligrams/deciliter  sodium chloride 0.9% lock flush 10 milliLiter(s) IV Push every 1 hour PRN Pre/post blood products, medications, blood draw, and to maintain line patency    CAPILLARY BLOOD GLUCOSE      POCT Blood Glucose.: 136 mg/dL (11 Apr 2020 05:51)  POCT Blood Glucose.: 175 mg/dL (10 Apr 2020 23:25)  POCT Blood Glucose.: 159 mg/dL (10 Apr 2020 17:13)  POCT Blood Glucose.: 115 mg/dL (10 Apr 2020 11:04)    I&O's Summary    10 Apr 2020 07:01  -  11 Apr 2020 07:00  --------------------------------------------------------  IN: 2426 mL / OUT: 2110 mL / NET: 316 mL    11 Apr 2020 07:01  -  11 Apr 2020 10:50  --------------------------------------------------------  IN: 140 mL / OUT: 125 mL / NET: 15 mL        PHYSICAL EXAM:  Vital Signs Last 24 Hrs  T(C): 36 (11 Apr 2020 08:45), Max: 37.1 (10 Apr 2020 16:46)  T(F): 96.8 (11 Apr 2020 08:45), Max: 98.8 (10 Apr 2020 18:00)  HR: 74 (11 Apr 2020 09:00) (59 - 86)  BP: 139/73 (11 Apr 2020 09:00) (107/60 - 168/85)  BP(mean): 100 (11 Apr 2020 09:00) (78 - 119)  RR: 26 (11 Apr 2020 09:00) (16 - 28)  SpO2: 94% (11 Apr 2020 09:00) (89% - 96%)  PE deferred - patient examined by attending to minimize COVID exposure of the team.   LABS:                        10.7   7.98  )-----------( 306      ( 11 Apr 2020 05:13 )             33.9     04-11    140  |  97  |  19  ----------------------------<  158<H>  3.9   |  34<H>  |  0.61    Ca    8.4      11 Apr 2020 05:13  Phos  2.6     04-11  Mg     1.6     04-11    TPro  5.6<L>  /  Alb  2.7<L>  /  TBili  0.3  /  DBili  x   /  AST  19  /  ALT  20  /  AlkPhos  38<L>  04-11    PT/INR - ( 11 Apr 2020 05:13 )   PT: 14.0 sec;   INR: 1.22          PTT - ( 11 Apr 2020 05:13 )  PTT:28.5 sec    Assessment:   74y female with hx of CP admitted for acute respiratory failure secondary to COVID infection.   Patient continued to be intubated and sedated, currently stable condition.   Patient continues to be on Zosyn with normalized WBC. Coags wnl, patient currently being AC with eliquis. FSBG controlled with ISS and patient is currently receiving Jevity 1.5 at goal via PEG tube (in place upon admission). Quintero in place to monitor fluid status - will remove once patients condition is more stable and she is no longer needing intubation. Maintaining ~neg 1L fluid balance giving Lasix prn, Cr remains normal. Inflammatory markers continue to downtrend, patient is recovering appropriately with no escalation of care needed, continue steroids. We will maintain supportive care. No plans for extubation today.   Plan discussed with attending.

## 2020-04-12 LAB
ALBUMIN SERPL ELPH-MCNC: 2.7 G/DL — LOW (ref 3.3–5)
ALP SERPL-CCNC: 45 U/L — SIGNIFICANT CHANGE UP (ref 40–120)
ALT FLD-CCNC: 18 U/L — SIGNIFICANT CHANGE UP (ref 10–45)
ANION GAP SERPL CALC-SCNC: 11 MMOL/L — SIGNIFICANT CHANGE UP (ref 5–17)
APTT BLD: 28.8 SEC — SIGNIFICANT CHANGE UP (ref 27.5–36.3)
AST SERPL-CCNC: 17 U/L — SIGNIFICANT CHANGE UP (ref 10–40)
BASE EXCESS BLDA CALC-SCNC: 2.9 MMOL/L — SIGNIFICANT CHANGE UP (ref -2–3)
BASE EXCESS BLDA CALC-SCNC: 3.1 MMOL/L — HIGH (ref -2–3)
BASOPHILS # BLD AUTO: 0.03 K/UL — SIGNIFICANT CHANGE UP (ref 0–0.2)
BASOPHILS NFR BLD AUTO: 0.2 % — SIGNIFICANT CHANGE UP (ref 0–2)
BILIRUB SERPL-MCNC: 0.2 MG/DL — SIGNIFICANT CHANGE UP (ref 0.2–1.2)
BUN SERPL-MCNC: 18 MG/DL — SIGNIFICANT CHANGE UP (ref 7–23)
CALCIUM SERPL-MCNC: 9.1 MG/DL — SIGNIFICANT CHANGE UP (ref 8.4–10.5)
CHLORIDE SERPL-SCNC: 101 MMOL/L — SIGNIFICANT CHANGE UP (ref 96–108)
CO2 SERPL-SCNC: 29 MMOL/L — SIGNIFICANT CHANGE UP (ref 22–31)
CREAT SERPL-MCNC: 0.61 MG/DL — SIGNIFICANT CHANGE UP (ref 0.5–1.3)
CRP SERPL-MCNC: 1.55 MG/DL — HIGH (ref 0–0.4)
D DIMER BLD IA.RAPID-MCNC: 183 NG/ML DDU — SIGNIFICANT CHANGE UP
D DIMER BLD IA.RAPID-MCNC: 223 NG/ML DDU — SIGNIFICANT CHANGE UP
EOSINOPHIL # BLD AUTO: 0.01 K/UL — SIGNIFICANT CHANGE UP (ref 0–0.5)
EOSINOPHIL NFR BLD AUTO: 0.1 % — SIGNIFICANT CHANGE UP (ref 0–6)
FERRITIN SERPL-MCNC: 164 NG/ML — HIGH (ref 15–150)
GAS PNL BLDA: SIGNIFICANT CHANGE UP
GLUCOSE BLDC GLUCOMTR-MCNC: 130 MG/DL — HIGH (ref 70–99)
GLUCOSE BLDC GLUCOMTR-MCNC: 171 MG/DL — HIGH (ref 70–99)
GLUCOSE BLDC GLUCOMTR-MCNC: 206 MG/DL — HIGH (ref 70–99)
GLUCOSE SERPL-MCNC: 196 MG/DL — HIGH (ref 70–99)
HCO3 BLDA-SCNC: 29 MMOL/L — HIGH (ref 21–28)
HCO3 BLDA-SCNC: 29 MMOL/L — HIGH (ref 21–28)
HCT VFR BLD CALC: 38.3 % — SIGNIFICANT CHANGE UP (ref 34.5–45)
HGB BLD-MCNC: 11.5 G/DL — SIGNIFICANT CHANGE UP (ref 11.5–15.5)
IMM GRANULOCYTES NFR BLD AUTO: 2 % — HIGH (ref 0–1.5)
INR BLD: 1.17 — HIGH (ref 0.88–1.16)
LYMPHOCYTES # BLD AUTO: 0.5 K/UL — LOW (ref 1–3.3)
LYMPHOCYTES # BLD AUTO: 3.5 % — LOW (ref 13–44)
MAGNESIUM SERPL-MCNC: 1.6 MG/DL — SIGNIFICANT CHANGE UP (ref 1.6–2.6)
MCHC RBC-ENTMCNC: 29.5 PG — SIGNIFICANT CHANGE UP (ref 27–34)
MCHC RBC-ENTMCNC: 30 GM/DL — LOW (ref 32–36)
MCV RBC AUTO: 98.2 FL — SIGNIFICANT CHANGE UP (ref 80–100)
MONOCYTES # BLD AUTO: 0.31 K/UL — SIGNIFICANT CHANGE UP (ref 0–0.9)
MONOCYTES NFR BLD AUTO: 2.2 % — SIGNIFICANT CHANGE UP (ref 2–14)
NEUTROPHILS # BLD AUTO: 13.01 K/UL — HIGH (ref 1.8–7.4)
NEUTROPHILS NFR BLD AUTO: 92 % — HIGH (ref 43–77)
NRBC # BLD: 0 /100 WBCS — SIGNIFICANT CHANGE UP (ref 0–0)
PCO2 BLDA: 48 MMHG — HIGH (ref 32–45)
PCO2 BLDA: 52 MMHG — HIGH (ref 32–45)
PH BLDA: 7.37 — SIGNIFICANT CHANGE UP (ref 7.35–7.45)
PH BLDA: 7.39 — SIGNIFICANT CHANGE UP (ref 7.35–7.45)
PHOSPHATE SERPL-MCNC: 4.5 MG/DL — SIGNIFICANT CHANGE UP (ref 2.5–4.5)
PLATELET # BLD AUTO: 375 K/UL — SIGNIFICANT CHANGE UP (ref 150–400)
PO2 BLDA: 108 MMHG — SIGNIFICANT CHANGE UP (ref 83–108)
PO2 BLDA: 72 MMHG — LOW (ref 83–108)
POTASSIUM SERPL-MCNC: 3.2 MMOL/L — LOW (ref 3.5–5.3)
POTASSIUM SERPL-SCNC: 3.2 MMOL/L — LOW (ref 3.5–5.3)
PROT SERPL-MCNC: 6.1 G/DL — SIGNIFICANT CHANGE UP (ref 6–8.3)
PROTHROM AB SERPL-ACNC: 13.4 SEC — HIGH (ref 10–12.9)
RBC # BLD: 3.9 M/UL — SIGNIFICANT CHANGE UP (ref 3.8–5.2)
RBC # FLD: 13 % — SIGNIFICANT CHANGE UP (ref 10.3–14.5)
SAO2 % BLDA: 94 % — LOW (ref 95–100)
SAO2 % BLDA: 98 % — SIGNIFICANT CHANGE UP (ref 95–100)
SARS-COV-2 RNA SPEC QL NAA+PROBE: DETECTED
SODIUM SERPL-SCNC: 141 MMOL/L — SIGNIFICANT CHANGE UP (ref 135–145)
WBC # BLD: 14.14 K/UL — HIGH (ref 3.8–10.5)
WBC # FLD AUTO: 14.14 K/UL — HIGH (ref 3.8–10.5)

## 2020-04-12 PROCEDURE — 99291 CRITICAL CARE FIRST HOUR: CPT | Mod: CS

## 2020-04-12 RX ORDER — PIPERACILLIN AND TAZOBACTAM 4; .5 G/20ML; G/20ML
3.38 INJECTION, POWDER, LYOPHILIZED, FOR SOLUTION INTRAVENOUS EVERY 6 HOURS
Refills: 0 | Status: DISCONTINUED | OUTPATIENT
Start: 2020-04-12 | End: 2020-04-14

## 2020-04-12 RX ORDER — VANCOMYCIN HCL 1 G
750 VIAL (EA) INTRAVENOUS ONCE
Refills: 0 | Status: COMPLETED | OUTPATIENT
Start: 2020-04-12 | End: 2020-04-12

## 2020-04-12 RX ORDER — PIPERACILLIN AND TAZOBACTAM 4; .5 G/20ML; G/20ML
3.38 INJECTION, POWDER, LYOPHILIZED, FOR SOLUTION INTRAVENOUS EVERY 8 HOURS
Refills: 0 | Status: DISCONTINUED | OUTPATIENT
Start: 2020-04-12 | End: 2020-04-12

## 2020-04-12 RX ORDER — PIPERACILLIN AND TAZOBACTAM 4; .5 G/20ML; G/20ML
3.38 INJECTION, POWDER, LYOPHILIZED, FOR SOLUTION INTRAVENOUS ONCE
Refills: 0 | Status: DISCONTINUED | OUTPATIENT
Start: 2020-04-12 | End: 2020-04-12

## 2020-04-12 RX ORDER — VANCOMYCIN HCL 1 G
VIAL (EA) INTRAVENOUS
Refills: 0 | Status: DISCONTINUED | OUTPATIENT
Start: 2020-04-12 | End: 2020-04-14

## 2020-04-12 RX ORDER — DEXMEDETOMIDINE HYDROCHLORIDE IN 0.9% SODIUM CHLORIDE 4 UG/ML
0.2 INJECTION INTRAVENOUS
Qty: 200 | Refills: 0 | Status: DISCONTINUED | OUTPATIENT
Start: 2020-04-12 | End: 2020-04-13

## 2020-04-12 RX ORDER — POTASSIUM CHLORIDE 20 MEQ
40 PACKET (EA) ORAL ONCE
Refills: 0 | Status: COMPLETED | OUTPATIENT
Start: 2020-04-12 | End: 2020-04-12

## 2020-04-12 RX ORDER — VANCOMYCIN HCL 1 G
750 VIAL (EA) INTRAVENOUS EVERY 12 HOURS
Refills: 0 | Status: DISCONTINUED | OUTPATIENT
Start: 2020-04-12 | End: 2020-04-14

## 2020-04-12 RX ADMIN — ROBINUL 0.2 MILLIGRAM(S): 0.2 INJECTION INTRAMUSCULAR; INTRAVENOUS at 11:23

## 2020-04-12 RX ADMIN — Medication 2: at 06:18

## 2020-04-12 RX ADMIN — PROPOFOL 1.67 MICROGRAM(S)/KG/MIN: 10 INJECTION, EMULSION INTRAVENOUS at 02:16

## 2020-04-12 RX ADMIN — PANTOPRAZOLE SODIUM 40 MILLIGRAM(S): 20 TABLET, DELAYED RELEASE ORAL at 11:23

## 2020-04-12 RX ADMIN — Medication 4: at 17:00

## 2020-04-12 RX ADMIN — DEXMEDETOMIDINE HYDROCHLORIDE IN 0.9% SODIUM CHLORIDE 2.79 MICROGRAM(S)/KG/HR: 4 INJECTION INTRAVENOUS at 10:08

## 2020-04-12 RX ADMIN — SENNA PLUS 2 TABLET(S): 8.6 TABLET ORAL at 22:25

## 2020-04-12 RX ADMIN — PIPERACILLIN AND TAZOBACTAM 25 GRAM(S): 4; .5 INJECTION, POWDER, LYOPHILIZED, FOR SOLUTION INTRAVENOUS at 05:50

## 2020-04-12 RX ADMIN — Medication 40 MILLIGRAM(S): at 09:38

## 2020-04-12 RX ADMIN — Medication 250 MILLIGRAM(S): at 09:37

## 2020-04-12 RX ADMIN — PIPERACILLIN AND TAZOBACTAM 200 GRAM(S): 4; .5 INJECTION, POWDER, LYOPHILIZED, FOR SOLUTION INTRAVENOUS at 22:24

## 2020-04-12 RX ADMIN — PIPERACILLIN AND TAZOBACTAM 200 GRAM(S): 4; .5 INJECTION, POWDER, LYOPHILIZED, FOR SOLUTION INTRAVENOUS at 16:42

## 2020-04-12 RX ADMIN — SCOPALAMINE 1 PATCH: 1 PATCH, EXTENDED RELEASE TRANSDERMAL at 05:50

## 2020-04-12 RX ADMIN — CHLORHEXIDINE GLUCONATE 15 MILLILITER(S): 213 SOLUTION TOPICAL at 05:48

## 2020-04-12 RX ADMIN — Medication 40 MILLIGRAM(S): at 22:24

## 2020-04-12 RX ADMIN — Medication 40 MILLIEQUIVALENT(S): at 07:22

## 2020-04-12 RX ADMIN — Medication 250 MILLIGRAM(S): at 22:24

## 2020-04-12 RX ADMIN — SCOPALAMINE 1 PATCH: 1 PATCH, EXTENDED RELEASE TRANSDERMAL at 17:56

## 2020-04-12 RX ADMIN — APIXABAN 5 MILLIGRAM(S): 2.5 TABLET, FILM COATED ORAL at 11:23

## 2020-04-12 RX ADMIN — ROBINUL 0.2 MILLIGRAM(S): 0.2 INJECTION INTRAMUSCULAR; INTRAVENOUS at 05:48

## 2020-04-12 RX ADMIN — ROBINUL 0.2 MILLIGRAM(S): 0.2 INJECTION INTRAMUSCULAR; INTRAVENOUS at 16:43

## 2020-04-12 RX ADMIN — CHLORHEXIDINE GLUCONATE 1 APPLICATION(S): 213 SOLUTION TOPICAL at 05:48

## 2020-04-12 RX ADMIN — CHLORHEXIDINE GLUCONATE 15 MILLILITER(S): 213 SOLUTION TOPICAL at 16:42

## 2020-04-12 NOTE — PROGRESS NOTE ADULT - SUBJECTIVE AND OBJECTIVE BOX
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 Patient was extubated and then reintubated 4/8 for respiratory distress. Remains intubated now.    OVERNIGHT EVENTS: no acute events    SUBJECTIVE HPI: Patient seen and examined at bedside.      VITAL SIGNS:  ICU Vital Signs Last 24 Hrs  T(C): 36.9 (12 Apr 2020 10:00), Max: 37.3 (11 Apr 2020 17:00)  T(F): 98.4 (12 Apr 2020 10:00), Max: 99.2 (11 Apr 2020 17:00)  HR: 78 (12 Apr 2020 11:00) (56 - 90)  BP: 139/74 (12 Apr 2020 09:00) (94/53 - 149/74)  BP(mean): 102 (12 Apr 2020 09:00) (67 - 104)  ABP: 118/63 (12 Apr 2020 11:00) (91/52 - 163/74)  ABP(mean): 83 (12 Apr 2020 11:00) (66 - 109)  RR: 28 (12 Apr 2020 11:00) (0 - 40)  SpO2: 92% (12 Apr 2020 11:00) (89% - 98%)      I&O's Summary    11 Apr 2020 07:01  -  12 Apr 2020 07:00  --------------------------------------------------------  IN: 1707 mL / OUT: 2635 mL / NET: -928 mL    12 Apr 2020 07:01  -  12 Apr 2020 11:50  --------------------------------------------------------  IN: 405 mL / OUT: 760 mL / NET: -355 mL        Ventilator settings:      PHYSICAL EXAM:  General: Comfortable   Neurological: sedated  HEENT: NC/AT; EOMI, PERRL, clear conjunctiva, no nasal or oropharyngeal discharge or exudates, MMM  Neck: supple, no cervical or post-auricular lymphadenopathy  Cardiovascular: +S1/S2, no murmurs/rubs/gallops, RRR  Respiratory: CTA B/L, no diminished breath sounds, no wheezes/rales/rhonchi, no increased work of breathing or accessory muscle use  Gastrointestinal: soft, NT/ND; active BSx4 quadrants  Genitourinary: no suprapubic tenderness, no CVA tenderness  Extremities: WWP; no edema, clubbing or cyanosis  Vascular: 2+ radial, DP/PT pulses B/L  Skin: no rashes    MEDICATIONS:  MEDICATIONS  (STANDING):  apixaban 5 milliGRAM(s) Oral every 12 hours  chlorhexidine 0.12% Liquid 15 milliLiter(s) Oral Mucosa every 12 hours  chlorhexidine 2% Cloths 1 Application(s) Topical <User Schedule>  dexMEDEtomidine Infusion 0.2 MICROgram(s)/kG/Hr (2.79 mL/Hr) IV Continuous <Continuous>  dextrose 5%. 1000 milliLiter(s) (50 mL/Hr) IV Continuous <Continuous>  dextrose 50% Injectable 12.5 Gram(s) IV Push once  dextrose 50% Injectable 25 Gram(s) IV Push once  dextrose 50% Injectable 25 Gram(s) IV Push once  glycopyrrolate Injectable 0.2 milliGRAM(s) IV Push every 6 hours  insulin lispro (HumaLOG) corrective regimen sliding scale   SubCutaneous every 6 hours  methylPREDNISolone sodium succinate Injectable 40 milliGRAM(s) IV Push every 12 hours  norepinephrine Infusion 0.05 MICROgram(s)/kG/Min (5.22 mL/Hr) IV Continuous <Continuous>  pantoprazole   Suspension 40 milliGRAM(s) Oral daily  piperacillin/tazobactam IVPB.. 3.375 Gram(s) IV Intermittent every 12 hours  scopolamine   Patch 1 Patch Transdermal every 72 hours  senna 2 Tablet(s) Oral at bedtime  sodium chloride 3%  Inhalation 4 milliLiter(s) Inhalation once  vancomycin  IVPB 750 milliGRAM(s) IV Intermittent every 12 hours  vancomycin  IVPB        MEDICATIONS  (PRN):  acetaminophen    Suspension .. 650 milliGRAM(s) Oral every 6 hours PRN Temp greater or equal to 38C (100.4F), Mild Pain (1 - 3)  bisacodyl 5 milliGRAM(s) Oral every 12 hours PRN Constipation  dextrose 40% Gel 15 Gram(s) Oral once PRN Blood Glucose LESS THAN 70 milliGRAM(s)/deciliter  glucagon  Injectable 1 milliGRAM(s) IntraMuscular once PRN Glucose LESS THAN 70 milligrams/deciliter  lactulose Syrup 20 Gram(s) Oral four times a day PRN lack of bowel movement  sodium chloride 0.9% lock flush 10 milliLiter(s) IV Push every 1 hour PRN Pre/post blood products, medications, blood draw, and to maintain line patency    ALLERGIES/INTOLERANCES:  Allergies    ace inhibitors (Anaphylaxis)  caffeine (Rash)  chocolate (Rash)  high acid (Rash)  Tomatoes (Hives)    Intolerances    LABS:                        11.5   14.14 )-----------( 375      ( 12 Apr 2020 05:50 )             38.3     Auto Neutrophil %: 92.0 % (04-12-20 @ 05:50)  Auto Lymphocyte #: 0.50 K/uL (04-12-20 @ 05:50)    04-12    141  |  101  |  18  ----------------------------<  196<H>  3.2<L>   |  29  |  0.61    Ca    9.1      12 Apr 2020 05:50  Phos  4.5     04-12  Mg     1.6     04-12    TPro  6.1  /  Alb  2.7<L>  /  TBili  0.2  /  DBili  x   /  AST  17  /  ALT  18  /  AlkPhos  45  04-12    PT/INR - ( 12 Apr 2020 05:50 )   PT: 13.4 sec;   INR: 1.17        PTT - ( 12 Apr 2020 05:50 )  PTT:28.8 sec    ABG - ( 12 Apr 2020 05:38 )  pH, Arterial: 7.37  pH, Blood: x     /  pCO2: 52    /  pO2: 72    / HCO3: 29    / Base Excess: 3.1   /  SaO2: 94                  CoVID-specific labs:  Ferritin, Serum: 164 ng/mL <H> (04-12-20 @ 05:50)  D-Dimer Assay, Quantitative: 183 ng/mL DDU (04-12-20 @ 05:50)  C-Reactive Protein, Serum: 1.55 mg/dL <H> (04-12-20 @ 05:50)      Microbiology:     RADIOLOGY, EKG AND ADDITIONAL TESTS: Reviewed.  CoVID Basline labs:  T Cell Subsets:

## 2020-04-12 NOTE — PROGRESS NOTE ADULT - ASSESSMENT
62 y/o M DM 2 acute hypoxemic respiratory failure 2/2+ COVID; Sepsis, respiratory acidosis, coronavirus pna/ARDS with septic shock.     Neuro:  - awake once sedation was shut off  - started precedex     CV:   - off levo    Pulm:  - intubated on arrival to [facility]  - extubated and re-intubated  - remain on vent now but possible extubation today     ID:  - finished plaquenil & azithromycin  - continue with solumedrol   - repeat COVID swab & sputum sent today  - increased WBC to 14 : continued zosyn and started vanco      Nephro:  - f/u U/O    GI:  - G-tube  - Jevity 30ml/hr    Endo:   - follow-up FS    Prophy:  - eliquis    FULL CODE  Dispo: MICU 74 F with pmhx of CP, MR, hx of DVT/PE, GERD, chronic PEG, HTN\, 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 for respiratory failure secondary to COVID     Neuro:  - awake once sedation was shut off  - started precedex     CV:   - off levo    Pulm:  - intubated on arrival to [facility]  - extubated and re-intubated  - remain on vent now but possible extubation today     ID:  - finished plaquenil & azithromycin  - continue with solumedrol   - repeat COVID swab & sputum sent today  - increased WBC to 14 : continued zosyn and started vanco      Nephro:  - f/u U/O    GI:  - G-tube  - Jevity 30ml/hr    Endo:   - follow-up FS    Prophy:  - eliquis    FULL CODE  Dispo: MICU

## 2020-04-13 LAB
ALBUMIN SERPL ELPH-MCNC: 2.8 G/DL — LOW (ref 3.3–5)
ALP SERPL-CCNC: 43 U/L — SIGNIFICANT CHANGE UP (ref 40–120)
ALT FLD-CCNC: 17 U/L — SIGNIFICANT CHANGE UP (ref 10–45)
ANION GAP SERPL CALC-SCNC: 9 MMOL/L — SIGNIFICANT CHANGE UP (ref 5–17)
APPEARANCE UR: CLEAR — SIGNIFICANT CHANGE UP
APTT BLD: 28.3 SEC — SIGNIFICANT CHANGE UP (ref 27.5–36.3)
AST SERPL-CCNC: 18 U/L — SIGNIFICANT CHANGE UP (ref 10–40)
BACTERIA # UR AUTO: PRESENT /HPF
BASOPHILS # BLD AUTO: 0.02 K/UL — SIGNIFICANT CHANGE UP (ref 0–0.2)
BASOPHILS NFR BLD AUTO: 0.2 % — SIGNIFICANT CHANGE UP (ref 0–2)
BILIRUB SERPL-MCNC: 0.3 MG/DL — SIGNIFICANT CHANGE UP (ref 0.2–1.2)
BILIRUB UR-MCNC: NEGATIVE — SIGNIFICANT CHANGE UP
BUN SERPL-MCNC: 18 MG/DL — SIGNIFICANT CHANGE UP (ref 7–23)
CALCIUM SERPL-MCNC: 9 MG/DL — SIGNIFICANT CHANGE UP (ref 8.4–10.5)
CHLORIDE SERPL-SCNC: 103 MMOL/L — SIGNIFICANT CHANGE UP (ref 96–108)
CO2 SERPL-SCNC: 29 MMOL/L — SIGNIFICANT CHANGE UP (ref 22–31)
COLOR SPEC: YELLOW — SIGNIFICANT CHANGE UP
CREAT SERPL-MCNC: 0.57 MG/DL — SIGNIFICANT CHANGE UP (ref 0.5–1.3)
CRP SERPL-MCNC: 1.07 MG/DL — HIGH (ref 0–0.4)
D DIMER BLD IA.RAPID-MCNC: 261 NG/ML DDU — HIGH
DIFF PNL FLD: ABNORMAL
EOSINOPHIL # BLD AUTO: 0 K/UL — SIGNIFICANT CHANGE UP (ref 0–0.5)
EOSINOPHIL NFR BLD AUTO: 0 % — SIGNIFICANT CHANGE UP (ref 0–6)
EPI CELLS # UR: SIGNIFICANT CHANGE UP /HPF (ref 0–5)
GLUCOSE BLDC GLUCOMTR-MCNC: 127 MG/DL — HIGH (ref 70–99)
GLUCOSE BLDC GLUCOMTR-MCNC: 133 MG/DL — HIGH (ref 70–99)
GLUCOSE BLDC GLUCOMTR-MCNC: 142 MG/DL — HIGH (ref 70–99)
GLUCOSE BLDC GLUCOMTR-MCNC: 145 MG/DL — HIGH (ref 70–99)
GLUCOSE BLDC GLUCOMTR-MCNC: 181 MG/DL — HIGH (ref 70–99)
GLUCOSE SERPL-MCNC: 160 MG/DL — HIGH (ref 70–99)
GLUCOSE UR QL: NEGATIVE — SIGNIFICANT CHANGE UP
HCT VFR BLD CALC: 36.2 % — SIGNIFICANT CHANGE UP (ref 34.5–45)
HGB BLD-MCNC: 11.3 G/DL — LOW (ref 11.5–15.5)
IMM GRANULOCYTES NFR BLD AUTO: 2.2 % — HIGH (ref 0–1.5)
INR BLD: 1.16 — SIGNIFICANT CHANGE UP (ref 0.88–1.16)
KETONES UR-MCNC: NEGATIVE — SIGNIFICANT CHANGE UP
LEUKOCYTE ESTERASE UR-ACNC: NEGATIVE — SIGNIFICANT CHANGE UP
LYMPHOCYTES # BLD AUTO: 0.65 K/UL — LOW (ref 1–3.3)
LYMPHOCYTES # BLD AUTO: 5.2 % — LOW (ref 13–44)
MAGNESIUM SERPL-MCNC: 1.6 MG/DL — SIGNIFICANT CHANGE UP (ref 1.6–2.6)
MCHC RBC-ENTMCNC: 30.5 PG — SIGNIFICANT CHANGE UP (ref 27–34)
MCHC RBC-ENTMCNC: 31.2 GM/DL — LOW (ref 32–36)
MCV RBC AUTO: 97.8 FL — SIGNIFICANT CHANGE UP (ref 80–100)
MONOCYTES # BLD AUTO: 0.36 K/UL — SIGNIFICANT CHANGE UP (ref 0–0.9)
MONOCYTES NFR BLD AUTO: 2.9 % — SIGNIFICANT CHANGE UP (ref 2–14)
NEUTROPHILS # BLD AUTO: 11.2 K/UL — HIGH (ref 1.8–7.4)
NEUTROPHILS NFR BLD AUTO: 89.5 % — HIGH (ref 43–77)
NITRITE UR-MCNC: NEGATIVE — SIGNIFICANT CHANGE UP
NRBC # BLD: 0 /100 WBCS — SIGNIFICANT CHANGE UP (ref 0–0)
PH UR: 7 — SIGNIFICANT CHANGE UP (ref 5–8)
PHOSPHATE SERPL-MCNC: 2.8 MG/DL — SIGNIFICANT CHANGE UP (ref 2.5–4.5)
PLATELET # BLD AUTO: 355 K/UL — SIGNIFICANT CHANGE UP (ref 150–400)
POTASSIUM SERPL-MCNC: 3.6 MMOL/L — SIGNIFICANT CHANGE UP (ref 3.5–5.3)
POTASSIUM SERPL-SCNC: 3.6 MMOL/L — SIGNIFICANT CHANGE UP (ref 3.5–5.3)
PROT SERPL-MCNC: 5.9 G/DL — LOW (ref 6–8.3)
PROT UR-MCNC: 30 MG/DL
PROTHROM AB SERPL-ACNC: 13.3 SEC — HIGH (ref 10–12.9)
RBC # BLD: 3.7 M/UL — LOW (ref 3.8–5.2)
RBC # FLD: 13 % — SIGNIFICANT CHANGE UP (ref 10.3–14.5)
RBC CASTS # UR COMP ASSIST: ABNORMAL /HPF
SARS-COV-2 RNA SPEC QL NAA+PROBE: DETECTED
SODIUM SERPL-SCNC: 141 MMOL/L — SIGNIFICANT CHANGE UP (ref 135–145)
SP GR SPEC: 1.01 — SIGNIFICANT CHANGE UP (ref 1–1.03)
UROBILINOGEN FLD QL: 0.2 E.U./DL — SIGNIFICANT CHANGE UP
WBC # BLD: 12.51 K/UL — HIGH (ref 3.8–10.5)
WBC # FLD AUTO: 12.51 K/UL — HIGH (ref 3.8–10.5)
WBC UR QL: < 5 /HPF — SIGNIFICANT CHANGE UP

## 2020-04-13 PROCEDURE — 99291 CRITICAL CARE FIRST HOUR: CPT | Mod: CS

## 2020-04-13 RX ORDER — SODIUM CHLORIDE 9 MG/ML
1000 INJECTION, SOLUTION INTRAVENOUS
Refills: 0 | Status: DISCONTINUED | OUTPATIENT
Start: 2020-04-13 | End: 2020-04-15

## 2020-04-13 RX ORDER — SODIUM CHLORIDE 9 MG/ML
500 INJECTION INTRAMUSCULAR; INTRAVENOUS; SUBCUTANEOUS ONCE
Refills: 0 | Status: DISCONTINUED | OUTPATIENT
Start: 2020-04-13 | End: 2020-04-13

## 2020-04-13 RX ADMIN — PANTOPRAZOLE SODIUM 40 MILLIGRAM(S): 20 TABLET, DELAYED RELEASE ORAL at 14:05

## 2020-04-13 RX ADMIN — CHLORHEXIDINE GLUCONATE 15 MILLILITER(S): 213 SOLUTION TOPICAL at 19:08

## 2020-04-13 RX ADMIN — APIXABAN 5 MILLIGRAM(S): 2.5 TABLET, FILM COATED ORAL at 14:04

## 2020-04-13 RX ADMIN — SODIUM CHLORIDE 50 MILLILITER(S): 9 INJECTION, SOLUTION INTRAVENOUS at 10:09

## 2020-04-13 RX ADMIN — ROBINUL 0.2 MILLIGRAM(S): 0.2 INJECTION INTRAMUSCULAR; INTRAVENOUS at 05:36

## 2020-04-13 RX ADMIN — PIPERACILLIN AND TAZOBACTAM 200 GRAM(S): 4; .5 INJECTION, POWDER, LYOPHILIZED, FOR SOLUTION INTRAVENOUS at 04:01

## 2020-04-13 RX ADMIN — ROBINUL 0.2 MILLIGRAM(S): 0.2 INJECTION INTRAMUSCULAR; INTRAVENOUS at 14:05

## 2020-04-13 RX ADMIN — Medication 40 MILLIGRAM(S): at 22:27

## 2020-04-13 RX ADMIN — PIPERACILLIN AND TAZOBACTAM 200 GRAM(S): 4; .5 INJECTION, POWDER, LYOPHILIZED, FOR SOLUTION INTRAVENOUS at 10:09

## 2020-04-13 RX ADMIN — Medication 40 MILLIGRAM(S): at 10:08

## 2020-04-13 RX ADMIN — APIXABAN 5 MILLIGRAM(S): 2.5 TABLET, FILM COATED ORAL at 00:01

## 2020-04-13 RX ADMIN — Medication 250 MILLIGRAM(S): at 22:27

## 2020-04-13 RX ADMIN — PIPERACILLIN AND TAZOBACTAM 200 GRAM(S): 4; .5 INJECTION, POWDER, LYOPHILIZED, FOR SOLUTION INTRAVENOUS at 19:28

## 2020-04-13 RX ADMIN — Medication 2: at 05:36

## 2020-04-13 RX ADMIN — CHLORHEXIDINE GLUCONATE 15 MILLILITER(S): 213 SOLUTION TOPICAL at 05:36

## 2020-04-13 RX ADMIN — ROBINUL 0.2 MILLIGRAM(S): 0.2 INJECTION INTRAMUSCULAR; INTRAVENOUS at 19:05

## 2020-04-13 RX ADMIN — Medication 250 MILLIGRAM(S): at 14:05

## 2020-04-13 RX ADMIN — SCOPALAMINE 1 PATCH: 1 PATCH, EXTENDED RELEASE TRANSDERMAL at 05:36

## 2020-04-13 RX ADMIN — ROBINUL 0.2 MILLIGRAM(S): 0.2 INJECTION INTRAMUSCULAR; INTRAVENOUS at 00:01

## 2020-04-13 RX ADMIN — CHLORHEXIDINE GLUCONATE 1 APPLICATION(S): 213 SOLUTION TOPICAL at 04:59

## 2020-04-13 RX ADMIN — SCOPALAMINE 1 PATCH: 1 PATCH, EXTENDED RELEASE TRANSDERMAL at 19:29

## 2020-04-13 NOTE — PROGRESS NOTE ADULT - ASSESSMENT
64 y/o M DM 2 acute hypoxemic respiratory failure 2/2+ COVID; Sepsis, respiratory acidosis, coronavirus pna/ARDS with septic shock.     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, presenting to [facility] for respiratory failure secondary to COVID     Neuro:  - awake off sedation     CV:   - off levo    Pulm:  - intubated on arrival to [facility]  - extubated and re-intubated  - extubated 4/12  - non-breather with downgrade to nasal cannula     ID:  - finished plaquenil & azithromycin  - continue with solumedrol   - repeat COVID swab & sputum sent today  - increased WBC to 14 : continued zosyn and started vanco      Nephro:  - f/u U/O    GI:  - G-tube  - Jevity 30ml/hr  - LR@50    Endo:   - follow-up FS    Prophy:  - eliquis    FULL CODE  Dispo: [facility]

## 2020-04-13 NOTE — PROGRESS NOTE ADULT - SUBJECTIVE AND OBJECTIVE BOX
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 for ICU care. Extubated x2 and doing well on non-rebreather.     OVERNIGHT EVENTS: did well on non-rebreather all night    SUBJECTIVE HPI: Patient seen and examined at bedside.    VITAL SIGNS:  ICU Vital Signs Last 24 Hrs  T(C): 37.4 (2020 09:00), Max: 37.4 (2020 09:00)  T(F): 99.3 (2020 09:00), Max: 99.3 (2020 09:00)  HR: 92 (:00) (53 - 103)  BP: 125/62 (2020 09:00) (111/60 - 152/74)  BP(mean): 86 (2020 09:00) (78 - 107)  ABP: 143/58 (2020 09:00) (113/51 - 159/72)  ABP(mean): 87 (2020 09:00) (70 - 106)  RR: 27 (2020 09:00) (12 - 40)  SpO2: 95% (2020 09:00) (90% - 99%)    I&O's Summary    2020 07:01  -  2020 07:00  --------------------------------------------------------  IN: 1165 mL / OUT: 1545 mL / NET: -380 mL    2020 07:01  -  2020 10:03  --------------------------------------------------------  IN: 0 mL / OUT: 1 mL / NET: -1 mL    PHYSICAL EXAM:  General: Comfortable, pleasant laying in bed  Neurological: Awake and alert, responsive to conversation, no focal deficits  HEENT: NC/AT; EOMI, PERRL, clear conjunctiva, no nasal or oropharyngeal discharge or exudates, MMM  Neck: supple, no cervical or post-auricular lymphadenopathy  Cardiovascular: +S1/S2, no murmurs/rubs/gallops, RRR  Respiratory: CTA B/L, no diminished breath sounds, no wheezes/rales/rhonchi, no increased work of breathing or accessory muscle use  Gastrointestinal: soft, NT/ND; active BSx4 quadrants  Genitourinary: no suprapubic tenderness, no CVA tenderness  Extremities: WWP; no edema, clubbing or cyanosis  Vascular: 2+ radial, DP/PT pulses B/L  Skin: no rashes    MEDICATIONS:  MEDICATIONS  (STANDING):  apixaban 5 milliGRAM(s) Oral every 12 hours  chlorhexidine 0.12% Liquid 15 milliLiter(s) Oral Mucosa every 12 hours  chlorhexidine 2% Cloths 1 Application(s) Topical <User Schedule>  dextrose 5%. 1000 milliLiter(s) (50 mL/Hr) IV Continuous <Continuous>  dextrose 50% Injectable 12.5 Gram(s) IV Push once  dextrose 50% Injectable 25 Gram(s) IV Push once  dextrose 50% Injectable 25 Gram(s) IV Push once  glycopyrrolate Injectable 0.2 milliGRAM(s) IV Push every 6 hours  insulin lispro (HumaLOG) corrective regimen sliding scale   SubCutaneous every 6 hours  lactated ringers. 1000 milliLiter(s) (50 mL/Hr) IV Continuous <Continuous>  methylPREDNISolone sodium succinate Injectable 40 milliGRAM(s) IV Push every 12 hours  pantoprazole   Suspension 40 milliGRAM(s) Oral daily  piperacillin/tazobactam IVPB.. 3.375 Gram(s) IV Intermittent every 6 hours  scopolamine   Patch 1 Patch Transdermal every 72 hours  senna 2 Tablet(s) Oral at bedtime  sodium chloride 3%  Inhalation 4 milliLiter(s) Inhalation once  vancomycin  IVPB 750 milliGRAM(s) IV Intermittent every 12 hours  vancomycin  IVPB        MEDICATIONS  (PRN):  acetaminophen    Suspension .. 650 milliGRAM(s) Oral every 6 hours PRN Temp greater or equal to 38C (100.4F), Mild Pain (1 - 3)  bisacodyl 5 milliGRAM(s) Oral every 12 hours PRN Constipation  dextrose 40% Gel 15 Gram(s) Oral once PRN Blood Glucose LESS THAN 70 milliGRAM(s)/deciliter  glucagon  Injectable 1 milliGRAM(s) IntraMuscular once PRN Glucose LESS THAN 70 milligrams/deciliter  lactulose Syrup 20 Gram(s) Oral four times a day PRN lack of bowel movement  sodium chloride 0.9% lock flush 10 milliLiter(s) IV Push every 1 hour PRN Pre/post blood products, medications, blood draw, and to maintain line patency    ALLERGIES/INTOLERANCES:  Allergies    ace inhibitors (Anaphylaxis)  caffeine (Rash)  chocolate (Rash)  high acid (Rash)  Tomatoes (Hives)    Intolerances    LABS:                        11.3   12.51 )-----------( 355      ( 2020 05:21 )             36.2     Auto Neutrophil %: 89.5 % (20 @ 05:21)  Auto Lymphocyte #: 0.65 K/uL (20 @ 05:21)    04-    141  |  103  |  18  ----------------------------<  160<H>  3.6   |  29  |  0.57    Ca    9.0      2020 05:21  Phos  2.8     04-13  Mg     1.6     04-13    TPro  5.9<L>  /  Alb  2.8<L>  /  TBili  0.3  /  DBili  x   /  AST  18  /  ALT  17  /  AlkPhos  43  04-13    PT/INR - ( 2020 05:21 )   PT: 13.3 sec;   INR: 1.16     PTT - ( 2020 05:21 )  PTT:28.3 sec  Urinalysis Basic - ( 2020 00:53 )    Color: Yellow / Appearance: Clear / S.010 / pH: x  Gluc: x / Ketone: NEGATIVE  / Bili: Negative / Urobili: 0.2 E.U./dL   Blood: x / Protein: 30 mg/dL / Nitrite: NEGATIVE   Leuk Esterase: NEGATIVE / RBC: Many /HPF / WBC < 5 /HPF   Sq Epi: x / Non Sq Epi: 0-5 /HPF / Bacteria: Present /HPF        ABG - ( 2020 17:11 )  pH, Arterial: 7.39  pH, Blood: x     /  pCO2: 48    /  pO2: 108   / HCO3: 29    / Base Excess: 2.9   /  SaO2: 98                  CoVID-specific labs:  C-Reactive Protein, Serum: 1.07 mg/dL <H> (20 @ 05:21)  D-Dimer Assay, Quantitative: 261 ng/mL DDU <H> (20 @ 05:21)  Ferritin, Serum: 164 ng/mL <H> (20 @ 05:50)      Microbiology:     RADIOLOGY, EKG AND ADDITIONAL TESTS: Reviewed.  CoVID Basline labs:  T Cell Subsets:

## 2020-04-14 LAB
ALBUMIN SERPL ELPH-MCNC: 2.6 G/DL — LOW (ref 3.3–5)
ALP SERPL-CCNC: 40 U/L — SIGNIFICANT CHANGE UP (ref 40–120)
ALT FLD-CCNC: 19 U/L — SIGNIFICANT CHANGE UP (ref 10–45)
ANION GAP SERPL CALC-SCNC: 9 MMOL/L — SIGNIFICANT CHANGE UP (ref 5–17)
AST SERPL-CCNC: 23 U/L — SIGNIFICANT CHANGE UP (ref 10–40)
BASOPHILS # BLD AUTO: 0.01 K/UL — SIGNIFICANT CHANGE UP (ref 0–0.2)
BASOPHILS NFR BLD AUTO: 0.1 % — SIGNIFICANT CHANGE UP (ref 0–2)
BILIRUB SERPL-MCNC: 0.2 MG/DL — SIGNIFICANT CHANGE UP (ref 0.2–1.2)
BUN SERPL-MCNC: 20 MG/DL — SIGNIFICANT CHANGE UP (ref 7–23)
C1Q FLD-MCNC: 1.3 MCG/DL — SIGNIFICANT CHANGE UP
CALCIUM SERPL-MCNC: 8.6 MG/DL — SIGNIFICANT CHANGE UP (ref 8.4–10.5)
CHLORIDE SERPL-SCNC: 102 MMOL/L — SIGNIFICANT CHANGE UP (ref 96–108)
CO2 SERPL-SCNC: 30 MMOL/L — SIGNIFICANT CHANGE UP (ref 22–31)
CREAT SERPL-MCNC: 0.48 MG/DL — LOW (ref 0.5–1.3)
CRP SERPL-MCNC: 0.69 MG/DL — HIGH (ref 0–0.4)
D DIMER BLD IA.RAPID-MCNC: 195 NG/ML DDU — SIGNIFICANT CHANGE UP
EOSINOPHIL # BLD AUTO: 0.01 K/UL — SIGNIFICANT CHANGE UP (ref 0–0.5)
EOSINOPHIL NFR BLD AUTO: 0.1 % — SIGNIFICANT CHANGE UP (ref 0–6)
FERRITIN SERPL-MCNC: 214 NG/ML — HIGH (ref 15–150)
GLUCOSE BLDC GLUCOMTR-MCNC: 111 MG/DL — HIGH (ref 70–99)
GLUCOSE BLDC GLUCOMTR-MCNC: 115 MG/DL — HIGH (ref 70–99)
GLUCOSE BLDC GLUCOMTR-MCNC: 153 MG/DL — HIGH (ref 70–99)
GLUCOSE BLDC GLUCOMTR-MCNC: 84 MG/DL — SIGNIFICANT CHANGE UP (ref 70–99)
GLUCOSE SERPL-MCNC: 166 MG/DL — HIGH (ref 70–99)
HCT VFR BLD CALC: 33.6 % — LOW (ref 34.5–45)
HGB BLD-MCNC: 10.3 G/DL — LOW (ref 11.5–15.5)
IMM GRANULOCYTES NFR BLD AUTO: 1.6 % — HIGH (ref 0–1.5)
LYMPHOCYTES # BLD AUTO: 0.69 K/UL — LOW (ref 1–3.3)
LYMPHOCYTES # BLD AUTO: 7 % — LOW (ref 13–44)
MAGNESIUM SERPL-MCNC: 1.5 MG/DL — LOW (ref 1.6–2.6)
MCHC RBC-ENTMCNC: 30 PG — SIGNIFICANT CHANGE UP (ref 27–34)
MCHC RBC-ENTMCNC: 30.7 GM/DL — LOW (ref 32–36)
MCV RBC AUTO: 98 FL — SIGNIFICANT CHANGE UP (ref 80–100)
MONOCYTES # BLD AUTO: 0.27 K/UL — SIGNIFICANT CHANGE UP (ref 0–0.9)
MONOCYTES NFR BLD AUTO: 2.7 % — SIGNIFICANT CHANGE UP (ref 2–14)
NEUTROPHILS # BLD AUTO: 8.75 K/UL — HIGH (ref 1.8–7.4)
NEUTROPHILS NFR BLD AUTO: 88.5 % — HIGH (ref 43–77)
NRBC # BLD: 0 /100 WBCS — SIGNIFICANT CHANGE UP (ref 0–0)
PHOSPHATE SERPL-MCNC: 2.6 MG/DL — SIGNIFICANT CHANGE UP (ref 2.5–4.5)
PLATELET # BLD AUTO: 337 K/UL — SIGNIFICANT CHANGE UP (ref 150–400)
POTASSIUM SERPL-MCNC: 3.8 MMOL/L — SIGNIFICANT CHANGE UP (ref 3.5–5.3)
POTASSIUM SERPL-SCNC: 3.8 MMOL/L — SIGNIFICANT CHANGE UP (ref 3.5–5.3)
PROCALCITONIN SERPL-MCNC: 0.13 NG/ML — HIGH (ref 0.02–0.1)
PROT SERPL-MCNC: 5.4 G/DL — LOW (ref 6–8.3)
RBC # BLD: 3.43 M/UL — LOW (ref 3.8–5.2)
RBC # FLD: 12.8 % — SIGNIFICANT CHANGE UP (ref 10.3–14.5)
SODIUM SERPL-SCNC: 141 MMOL/L — SIGNIFICANT CHANGE UP (ref 135–145)
VANCOMYCIN TROUGH SERPL-MCNC: 16.2 UG/ML — SIGNIFICANT CHANGE UP (ref 10–20)
WBC # BLD: 9.89 K/UL — SIGNIFICANT CHANGE UP (ref 3.8–10.5)
WBC # FLD AUTO: 9.89 K/UL — SIGNIFICANT CHANGE UP (ref 3.8–10.5)

## 2020-04-14 PROCEDURE — 99291 CRITICAL CARE FIRST HOUR: CPT | Mod: CS

## 2020-04-14 RX ORDER — VANCOMYCIN HCL 1 G
750 VIAL (EA) INTRAVENOUS EVERY 12 HOURS
Refills: 0 | Status: DISCONTINUED | OUTPATIENT
Start: 2020-04-14 | End: 2020-04-15

## 2020-04-14 RX ADMIN — PIPERACILLIN AND TAZOBACTAM 200 GRAM(S): 4; .5 INJECTION, POWDER, LYOPHILIZED, FOR SOLUTION INTRAVENOUS at 06:10

## 2020-04-14 RX ADMIN — Medication 250 MILLIGRAM(S): at 14:54

## 2020-04-14 RX ADMIN — PANTOPRAZOLE SODIUM 40 MILLIGRAM(S): 20 TABLET, DELAYED RELEASE ORAL at 11:41

## 2020-04-14 RX ADMIN — PIPERACILLIN AND TAZOBACTAM 200 GRAM(S): 4; .5 INJECTION, POWDER, LYOPHILIZED, FOR SOLUTION INTRAVENOUS at 11:41

## 2020-04-14 RX ADMIN — SCOPALAMINE 1 PATCH: 1 PATCH, EXTENDED RELEASE TRANSDERMAL at 12:08

## 2020-04-14 RX ADMIN — Medication 2: at 05:30

## 2020-04-14 RX ADMIN — CHLORHEXIDINE GLUCONATE 1 APPLICATION(S): 213 SOLUTION TOPICAL at 05:30

## 2020-04-14 RX ADMIN — PIPERACILLIN AND TAZOBACTAM 200 GRAM(S): 4; .5 INJECTION, POWDER, LYOPHILIZED, FOR SOLUTION INTRAVENOUS at 00:01

## 2020-04-14 RX ADMIN — Medication 40 MILLIGRAM(S): at 10:30

## 2020-04-14 RX ADMIN — ROBINUL 0.2 MILLIGRAM(S): 0.2 INJECTION INTRAMUSCULAR; INTRAVENOUS at 23:10

## 2020-04-14 RX ADMIN — Medication 40 MILLIGRAM(S): at 22:03

## 2020-04-14 RX ADMIN — SCOPALAMINE 1 PATCH: 1 PATCH, EXTENDED RELEASE TRANSDERMAL at 12:07

## 2020-04-14 RX ADMIN — APIXABAN 5 MILLIGRAM(S): 2.5 TABLET, FILM COATED ORAL at 00:01

## 2020-04-14 RX ADMIN — ROBINUL 0.2 MILLIGRAM(S): 0.2 INJECTION INTRAMUSCULAR; INTRAVENOUS at 11:40

## 2020-04-14 RX ADMIN — APIXABAN 5 MILLIGRAM(S): 2.5 TABLET, FILM COATED ORAL at 23:10

## 2020-04-14 RX ADMIN — SCOPALAMINE 1 PATCH: 1 PATCH, EXTENDED RELEASE TRANSDERMAL at 06:14

## 2020-04-14 RX ADMIN — ROBINUL 0.2 MILLIGRAM(S): 0.2 INJECTION INTRAMUSCULAR; INTRAVENOUS at 00:01

## 2020-04-14 RX ADMIN — SCOPALAMINE 1 PATCH: 1 PATCH, EXTENDED RELEASE TRANSDERMAL at 19:11

## 2020-04-14 RX ADMIN — ROBINUL 0.2 MILLIGRAM(S): 0.2 INJECTION INTRAMUSCULAR; INTRAVENOUS at 18:00

## 2020-04-14 RX ADMIN — ROBINUL 0.2 MILLIGRAM(S): 0.2 INJECTION INTRAMUSCULAR; INTRAVENOUS at 06:10

## 2020-04-14 RX ADMIN — APIXABAN 5 MILLIGRAM(S): 2.5 TABLET, FILM COATED ORAL at 11:40

## 2020-04-14 NOTE — PROGRESS NOTE ADULT - SUBJECTIVE AND OBJECTIVE BOX
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 for ICU care. Extubated x2 and doing well on non-rebreather. Switched to nasal cannula , tolerating.     OVERNIGHT EVENTS: tolerated nasal cannula all night    SUBJECTIVE HPI: Patient seen and examined at bedside.    VITAL SIGNS:  ICU Vital Signs Last 24 Hrs  T(C): 37.3 (2020 09:00), Max: 37.5 (2020 02:00)  T(F): 99.2 (2020 09:00), Max: 99.5 (2020 02:00)  HR: 92 (2020 09:00) (60 - 95)  BP: 170/87 (:00) (120/71 - 170/87)  BP(mean): 121 (:00) (91 - 121)  ABP: 158/66 (2020 09:00) (129/49 - 165/70)  ABP(mean): 100 (2020 09:00) (72 - 105)  RR: 41 (:00) (15 - 41)  SpO2: 93% (2020 09:00) (92% - 100%)    I&O's Summary    2020 07:  -  2020 07:00  --------------------------------------------------------  IN: 1920 mL / OUT: 1151 mL / NET: 769 mL    2020 07:01  -  2020 11:19  --------------------------------------------------------  IN: 80 mL / OUT: 250 mL / NET: -170 mL    PHYSICAL EXAM:  General: Comfortable, pleasant  Neurological: AAOx3, no focal deficits  HEENT: NC/AT; EOMI, PERRL, clear conjunctiva, no nasal or oropharyngeal discharge or exudates, MMM  Neck: supple, no cervical or post-auricular lymphadenopathy  Cardiovascular: +S1/S2, no murmurs/rubs/gallops, RRR  Respiratory: CTA B/L, no diminished breath sounds, no wheezes/rales/rhonchi, no increased work of breathing or accessory muscle use  Gastrointestinal: soft, NT/ND; active BSx4 quadrants  Genitourinary: no suprapubic tenderness, no CVA tenderness  Extremities: WWP; no edema, clubbing or cyanosis  Vascular: 2+ radial, DP/PT pulses B/L  Skin: no rashes     MEDICATIONS:  MEDICATIONS  (STANDING):  apixaban 5 milliGRAM(s) Oral every 12 hours  chlorhexidine 2% Cloths 1 Application(s) Topical <User Schedule>  dextrose 5%. 1000 milliLiter(s) (50 mL/Hr) IV Continuous <Continuous>  dextrose 50% Injectable 12.5 Gram(s) IV Push once  dextrose 50% Injectable 25 Gram(s) IV Push once  dextrose 50% Injectable 25 Gram(s) IV Push once  glycopyrrolate Injectable 0.2 milliGRAM(s) IV Push every 6 hours  insulin lispro (HumaLOG) corrective regimen sliding scale   SubCutaneous every 6 hours  lactated ringers. 1000 milliLiter(s) (50 mL/Hr) IV Continuous <Continuous>  methylPREDNISolone sodium succinate Injectable 40 milliGRAM(s) IV Push every 12 hours  pantoprazole   Suspension 40 milliGRAM(s) Oral daily  piperacillin/tazobactam IVPB.. 3.375 Gram(s) IV Intermittent every 6 hours  scopolamine   Patch 1 Patch Transdermal every 72 hours  senna 2 Tablet(s) Oral at bedtime  sodium chloride 3%  Inhalation 4 milliLiter(s) Inhalation once    MEDICATIONS  (PRN):  acetaminophen    Suspension .. 650 milliGRAM(s) Oral every 6 hours PRN Temp greater or equal to 38C (100.4F), Mild Pain (1 - 3)  bisacodyl 5 milliGRAM(s) Oral every 12 hours PRN Constipation  dextrose 40% Gel 15 Gram(s) Oral once PRN Blood Glucose LESS THAN 70 milliGRAM(s)/deciliter  glucagon  Injectable 1 milliGRAM(s) IntraMuscular once PRN Glucose LESS THAN 70 milligrams/deciliter  lactulose Syrup 20 Gram(s) Oral four times a day PRN lack of bowel movement  sodium chloride 0.9% lock flush 10 milliLiter(s) IV Push every 1 hour PRN Pre/post blood products, medications, blood draw, and to maintain line patency    ALLERGIES/INTOLERANCES:  Allergies    ace inhibitors (Anaphylaxis)  caffeine (Rash)  chocolate (Rash)  high acid (Rash)  Tomatoes (Hives)    Intolerances    LABS:                        10.3   9.89  )-----------( 337      ( 2020 05:28 )             33.6     Auto Neutrophil %: 88.5 % (20 @ 05:28)  Auto Lymphocyte #: 0.69 K/uL (20 @ 05:28)        141  |  102  |  20  ----------------------------<  166<H>  3.8   |  30  |  0.48<L>    Ca    8.6      2020 05:28  Phos  2.6     -  Mg     1.5         TPro  5.4<L>  /  Alb  2.6<L>  /  TBili  0.2  /  DBili  x   /  AST  23  /  ALT  19  /  AlkPhos  40  04-14    PT/INR - ( 2020 05:21 )   PT: 13.3 sec;   INR: 1.16       PTT - ( 2020 05:21 )  PTT:28.3 sec    Urinalysis Basic - ( 2020 00:53 )    Color: Yellow / Appearance: Clear / S.010 / pH: x  Gluc: x / Ketone: NEGATIVE  / Bili: Negative / Urobili: 0.2 E.U./dL   Blood: x / Protein: 30 mg/dL / Nitrite: NEGATIVE   Leuk Esterase: NEGATIVE / RBC: Many /HPF / WBC < 5 /HPF   Sq Epi: x / Non Sq Epi: 0-5 /HPF / Bacteria: Present /HPF    ABG - ( 2020 17:11 )  pH, Arterial: 7.39  pH, Blood: x     /  pCO2: 48    /  pO2: 108   / HCO3: 29    / Base Excess: 2.9   /  SaO2: 98      CoVID-specific labs:  D-Dimer Assay, Quantitative: 195 ng/mL DDU (20 @ 05:28)  Procalcitonin, Serum: 0.13 ng/mL <H> (20 @ 05:28)  C-Reactive Protein, Serum: 0.69 mg/dL <H> (20 @ 05:28)    RADIOLOGY, EKG AND ADDITIONAL TESTS: Reviewed.

## 2020-04-14 NOTE — CHART NOTE - NSCHARTNOTEFT_GEN_A_CORE
Admitting Diagnosis:   Patient is a 74y old  Female who presents with a chief complaint of covid (14 Apr 2020 11:19)      PAST MEDICAL & SURGICAL HISTORY:  HTN (hypertension)  Spastic quadriplegia  DVT (deep venous thrombosis)  PE (pulmonary thromboembolism)  GERD (gastroesophageal reflux disease)  Dysphagia  Mental retardation  CP (cerebral palsy)  PEG tube malfunction      Current Nutrition Order: Jevity 1.5 goal rate 30ml/hr 1PS/day x24hrs via PEG   PO Intake: Good (%) [   ]  Fair (50-75%) [   ] Poor (<25%) [   ]- Please See Below   GI Issues:  +BM 4/13 & 4/14 x2, 500ml 4/12; Noted CDIFF negative results; Antibiotic use noted + Bowel regieme noted as well   Pain: none per flow sheets   Skin: 1+ edema gen / no pressure ulcer       Labs:   04-14    141  |  102  |  20  ----------------------------<  166<H>  3.8   |  30  |  0.48<L>    Ca    8.6      14 Apr 2020 05:28  Phos  2.6     04-14  Mg     1.5     04-14    TPro  5.4<L>  /  Alb  2.6<L>  /  TBili  0.2  /  DBili  x   /  AST  23  /  ALT  19  /  AlkPhos  40  04-14    CAPILLARY BLOOD GLUCOSE      POCT Blood Glucose.: 84 mg/dL (14 Apr 2020 10:30)  POCT Blood Glucose.: 153 mg/dL (14 Apr 2020 04:59)  POCT Blood Glucose.: 145 mg/dL (13 Apr 2020 23:15)  POCT Blood Glucose.: 133 mg/dL (13 Apr 2020 19:11)  POCT Blood Glucose.: 127 mg/dL (13 Apr 2020 14:29)      Medications:  MEDICATIONS  (STANDING):  apixaban 5 milliGRAM(s) Oral every 12 hours  chlorhexidine 2% Cloths 1 Application(s) Topical <User Schedule>  dextrose 5%. 1000 milliLiter(s) (50 mL/Hr) IV Continuous <Continuous>  dextrose 50% Injectable 12.5 Gram(s) IV Push once  dextrose 50% Injectable 25 Gram(s) IV Push once  dextrose 50% Injectable 25 Gram(s) IV Push once  glycopyrrolate Injectable 0.2 milliGRAM(s) IV Push every 6 hours  insulin lispro (HumaLOG) corrective regimen sliding scale   SubCutaneous every 6 hours  lactated ringers. 1000 milliLiter(s) (50 mL/Hr) IV Continuous <Continuous>  methylPREDNISolone sodium succinate Injectable 40 milliGRAM(s) IV Push every 12 hours  pantoprazole   Suspension 40 milliGRAM(s) Oral daily  piperacillin/tazobactam IVPB.. 3.375 Gram(s) IV Intermittent every 6 hours  scopolamine   Patch 1 Patch Transdermal every 72 hours  senna 2 Tablet(s) Oral at bedtime  sodium chloride 3%  Inhalation 4 milliLiter(s) Inhalation once    MEDICATIONS  (PRN):  acetaminophen    Suspension .. 650 milliGRAM(s) Oral every 6 hours PRN Temp greater or equal to 38C (100.4F), Mild Pain (1 - 3)  bisacodyl 5 milliGRAM(s) Oral every 12 hours PRN Constipation  dextrose 40% Gel 15 Gram(s) Oral once PRN Blood Glucose LESS THAN 70 milliGRAM(s)/deciliter  glucagon  Injectable 1 milliGRAM(s) IntraMuscular once PRN Glucose LESS THAN 70 milligrams/deciliter  lactulose Syrup 20 Gram(s) Oral four times a day PRN lack of bowel movement  sodium chloride 0.9% lock flush 10 milliLiter(s) IV Push every 1 hour PRN Pre/post blood products, medications, blood draw, and to maintain line patency      4'7'' IBW 90 pounds +-10%  (4/3) 122 pounds   (4/6) 117 pounds  (4/8) 116.6 pounds   (4/12) 116.6 pounds   %MDV=116% BMI=27.19 -- Based on most recent EMR wt    Nutrition Focused Physical Exam: Completed [   ]  Not Pertinent [   ]- NA d/t COVID     Estimated energy needs:   IBW used to calculate energy needs due to pt's current body weight exceeding 120% of IBW   Needs adjusted for age based on hypermetabolic state 2/2 viral infection  1025-1230kcal/day (25-30kcal/kg)  49-57g protein/day (1.2-1.4g pro/kg)  Fluids per team     Subjective:   74F with hx of CP, MR, hx of DVT/PE, GERD, chronic PEG, HTN 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 for ICU care 4/3. Pt COVID positive with coronavirus pna, acute hypoxemic respiratory failure/ARDS. Pt extubated to NRB 4/4, however later re-intubated. Pt extuabted 4/6 to NRB. Pt reintuabted 4/8. Extubated for third time 4/12, doing well on non-rebreather and Switched to nasal cannula 4/13, noted to be tolerating.   Current TF order for Jevity 1.5 goal rate 30ml/hr x24hrs +1PS/day, provides 720ml, 1180kcal, 61gm prot, 547ml water; Running at 30ml/hr per EMR.   Allergic to caffeine, high acid, and chocolate, tomatoes- noted in EMR.  Please see below for nutritions recommendations. Recs made with team.     Previous Nutrition Diagnosis: Inadequate Energy Intake Etiology RT inability to meet est needs 2/2 acute critical status Signs/Symptoms AEB NPO diet meeting 0% EER.   RESOLVED - order for EN 4/5   Previous Nutrition Diagnosis: Increased nutrient needs RT increased demand for energy and protein AEB hypermetabolic state 2/2 viral infection (COVID19+)   ON GOING AT THIS TIME   Goal/Expected Outcome: Pt will meet at least 75% of nutrient needs     Recommendations:  1. Pt with order for Steroids, Last 3 POCT 84 153 145, last Glucose 166 - less comapred to time of prior RD visit; A1c 5.7% noted.  >> Should CHO reduced formula be indicated, consider use of Glucerna 1.2 x24hrs goal rate 38ml/hr to provide ~912ml, 1094kcal, 55gm prot, 735ml water.   >> However should Reduced CHO formula not be needed, Pending BG/POCT with steroid use, current TF order meeting EER - Jevity 1.5 goal rate 30ml/hr x24hrs +1PS/day, provides 720ml, 1180kcal, 61gm prot, 547ml water  2. Monitor tolerance, GI distress - if diarrhea, fiber agent vs ? need for bowel regime adjustment.  3. Monitor Skin, Labs, Wts, GOC.  4. Micro-nutrients/Vitamins/Minerals per team.  5. RD to remain available for additional nutrition interventions as needed.     Education: NA   Risk Level: High [ X  ] Moderate [   ] Low [   ].

## 2020-04-14 NOTE — PROGRESS NOTE ADULT - ASSESSMENT
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

## 2020-04-15 LAB
ALBUMIN SERPL ELPH-MCNC: 2.5 G/DL — LOW (ref 3.3–5)
ALP SERPL-CCNC: 40 U/L — SIGNIFICANT CHANGE UP (ref 40–120)
ALT FLD-CCNC: 19 U/L — SIGNIFICANT CHANGE UP (ref 10–45)
ANION GAP SERPL CALC-SCNC: 8 MMOL/L — SIGNIFICANT CHANGE UP (ref 5–17)
AST SERPL-CCNC: 25 U/L — SIGNIFICANT CHANGE UP (ref 10–40)
BASOPHILS # BLD AUTO: 0.01 K/UL — SIGNIFICANT CHANGE UP (ref 0–0.2)
BASOPHILS NFR BLD AUTO: 0.1 % — SIGNIFICANT CHANGE UP (ref 0–2)
BILIRUB SERPL-MCNC: 0.3 MG/DL — SIGNIFICANT CHANGE UP (ref 0.2–1.2)
BUN SERPL-MCNC: 20 MG/DL — SIGNIFICANT CHANGE UP (ref 7–23)
CALCIUM SERPL-MCNC: 8.5 MG/DL — SIGNIFICANT CHANGE UP (ref 8.4–10.5)
CHLORIDE SERPL-SCNC: 102 MMOL/L — SIGNIFICANT CHANGE UP (ref 96–108)
CO2 SERPL-SCNC: 29 MMOL/L — SIGNIFICANT CHANGE UP (ref 22–31)
CREAT SERPL-MCNC: 0.39 MG/DL — LOW (ref 0.5–1.3)
CRP SERPL-MCNC: 0.52 MG/DL — HIGH (ref 0–0.4)
D DIMER BLD IA.RAPID-MCNC: 199 NG/ML DDU — SIGNIFICANT CHANGE UP
EOSINOPHIL # BLD AUTO: 0 K/UL — SIGNIFICANT CHANGE UP (ref 0–0.5)
EOSINOPHIL NFR BLD AUTO: 0 % — SIGNIFICANT CHANGE UP (ref 0–6)
FERRITIN SERPL-MCNC: 208 NG/ML — HIGH (ref 15–150)
GLUCOSE BLDC GLUCOMTR-MCNC: 133 MG/DL — HIGH (ref 70–99)
GLUCOSE BLDC GLUCOMTR-MCNC: 165 MG/DL — HIGH (ref 70–99)
GLUCOSE BLDC GLUCOMTR-MCNC: 99 MG/DL — SIGNIFICANT CHANGE UP (ref 70–99)
GLUCOSE SERPL-MCNC: 195 MG/DL — HIGH (ref 70–99)
HCT VFR BLD CALC: 32.8 % — LOW (ref 34.5–45)
HGB BLD-MCNC: 10.2 G/DL — LOW (ref 11.5–15.5)
IMM GRANULOCYTES NFR BLD AUTO: 1 % — SIGNIFICANT CHANGE UP (ref 0–1.5)
LYMPHOCYTES # BLD AUTO: 0.75 K/UL — LOW (ref 1–3.3)
LYMPHOCYTES # BLD AUTO: 7 % — LOW (ref 13–44)
MAGNESIUM SERPL-MCNC: 1.4 MG/DL — LOW (ref 1.6–2.6)
MCHC RBC-ENTMCNC: 30.2 PG — SIGNIFICANT CHANGE UP (ref 27–34)
MCHC RBC-ENTMCNC: 31.1 GM/DL — LOW (ref 32–36)
MCV RBC AUTO: 97 FL — SIGNIFICANT CHANGE UP (ref 80–100)
MONOCYTES # BLD AUTO: 0.33 K/UL — SIGNIFICANT CHANGE UP (ref 0–0.9)
MONOCYTES NFR BLD AUTO: 3.1 % — SIGNIFICANT CHANGE UP (ref 2–14)
NEUTROPHILS # BLD AUTO: 9.52 K/UL — HIGH (ref 1.8–7.4)
NEUTROPHILS NFR BLD AUTO: 88.8 % — HIGH (ref 43–77)
NRBC # BLD: 0 /100 WBCS — SIGNIFICANT CHANGE UP (ref 0–0)
PHOSPHATE SERPL-MCNC: 2.3 MG/DL — LOW (ref 2.5–4.5)
PLATELET # BLD AUTO: 327 K/UL — SIGNIFICANT CHANGE UP (ref 150–400)
POTASSIUM SERPL-MCNC: 3.9 MMOL/L — SIGNIFICANT CHANGE UP (ref 3.5–5.3)
POTASSIUM SERPL-SCNC: 3.9 MMOL/L — SIGNIFICANT CHANGE UP (ref 3.5–5.3)
PROT SERPL-MCNC: 5.6 G/DL — LOW (ref 6–8.3)
RBC # BLD: 3.38 M/UL — LOW (ref 3.8–5.2)
RBC # FLD: 12.7 % — SIGNIFICANT CHANGE UP (ref 10.3–14.5)
SARS-COV-2 RNA SPEC QL NAA+PROBE: DETECTED
SODIUM SERPL-SCNC: 139 MMOL/L — SIGNIFICANT CHANGE UP (ref 135–145)
WBC # BLD: 10.72 K/UL — HIGH (ref 3.8–10.5)
WBC # FLD AUTO: 10.72 K/UL — HIGH (ref 3.8–10.5)

## 2020-04-15 PROCEDURE — 99291 CRITICAL CARE FIRST HOUR: CPT | Mod: CS

## 2020-04-15 RX ADMIN — Medication 2: at 05:38

## 2020-04-15 RX ADMIN — SCOPALAMINE 1 PATCH: 1 PATCH, EXTENDED RELEASE TRANSDERMAL at 05:37

## 2020-04-15 RX ADMIN — SCOPALAMINE 1 PATCH: 1 PATCH, EXTENDED RELEASE TRANSDERMAL at 17:31

## 2020-04-15 RX ADMIN — CHLORHEXIDINE GLUCONATE 1 APPLICATION(S): 213 SOLUTION TOPICAL at 04:43

## 2020-04-15 RX ADMIN — ROBINUL 0.2 MILLIGRAM(S): 0.2 INJECTION INTRAMUSCULAR; INTRAVENOUS at 04:43

## 2020-04-15 RX ADMIN — ROBINUL 0.2 MILLIGRAM(S): 0.2 INJECTION INTRAMUSCULAR; INTRAVENOUS at 10:10

## 2020-04-15 RX ADMIN — Medication 250 MILLIGRAM(S): at 02:30

## 2020-04-15 RX ADMIN — APIXABAN 5 MILLIGRAM(S): 2.5 TABLET, FILM COATED ORAL at 10:10

## 2020-04-15 RX ADMIN — ROBINUL 0.2 MILLIGRAM(S): 0.2 INJECTION INTRAMUSCULAR; INTRAVENOUS at 18:13

## 2020-04-15 RX ADMIN — Medication 40 MILLIGRAM(S): at 10:10

## 2020-04-15 RX ADMIN — PANTOPRAZOLE SODIUM 40 MILLIGRAM(S): 20 TABLET, DELAYED RELEASE ORAL at 10:10

## 2020-04-15 NOTE — PROGRESS NOTE ADULT - ASSESSMENT
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

## 2020-04-15 NOTE — PROGRESS NOTE ADULT - SUBJECTIVE AND OBJECTIVE BOX
*** INCOMPLETE ***  HOSPITAL COURSE:    OVERNIGHT EVENTS:     INTERVAL EVENTS:    SUBJECTIVE HPI: Patient seen and examined at bedside.      VITAL SIGNS:  ICU Vital Signs Last 24 Hrs  T(C): 36.8 (15 Apr 2020 10:00), Max: 37.2 (14 Apr 2020 17:25)  T(F): 98.3 (15 Apr 2020 10:00), Max: 98.9 (14 Apr 2020 17:25)  HR: 96 (15 Apr 2020 11:00) (56 - 97)  BP: 124/64 (14 Apr 2020 19:00) (124/64 - 153/86)  BP(mean): 100 (14 Apr 2020 16:00) (98 - 100)  ABP: 157/67 (15 Apr 2020 11:00) (114/50 - 176/84)  ABP(mean): 97 (15 Apr 2020 11:00) (71 - 121)  RR: 24 (15 Apr 2020 11:00) (18 - 33)  SpO2: 97% (15 Apr 2020 11:00) (91% - 98%)      I&O's Summary    14 Apr 2020 07:01  -  15 Apr 2020 07:00  --------------------------------------------------------  IN: 2010 mL / OUT: 1455 mL / NET: 555 mL    15 Apr 2020 07:01  -  15 Apr 2020 11:52  --------------------------------------------------------  IN: 80 mL / OUT: 60 mL / NET: 20 mL        Ventilator settings:      PHYSICAL EXAM:  General: Comfortable, pleasant/anxious/agitated, Ill-appearing, well-nourished/frail/cachectic, comfortable / in distress  Neurological: AAOx3, no focal deficits  HEENT: NC/AT; EOMI, PERRL, clear conjunctiva, no nasal or oropharyngeal discharge or exudates, MMM  Neck: supple, no cervical or post-auricular lymphadenopathy  Cardiovascular: +S1/S2, no murmurs/rubs/gallops, RRR  Respiratory: CTA B/L, no diminished breath sounds, no wheezes/rales/rhonchi, no increased work of breathing or accessory muscle use  Gastrointestinal: soft, NT/ND; active BSx4 quadrants  Genitourinary: no suprapubic tenderness, no CVA tenderness  Extremities: WWP; no edema, clubbing or cyanosis  Vascular: 2+ radial, DP/PT pulses B/L  Skin: no rashes  Lines/Drains:       MEDICATIONS:  MEDICATIONS  (STANDING):  apixaban 5 milliGRAM(s) Oral every 12 hours  chlorhexidine 2% Cloths 1 Application(s) Topical <User Schedule>  dextrose 5%. 1000 milliLiter(s) (50 mL/Hr) IV Continuous <Continuous>  dextrose 50% Injectable 12.5 Gram(s) IV Push once  dextrose 50% Injectable 25 Gram(s) IV Push once  dextrose 50% Injectable 25 Gram(s) IV Push once  glycopyrrolate Injectable 0.2 milliGRAM(s) IV Push every 6 hours  insulin lispro (HumaLOG) corrective regimen sliding scale   SubCutaneous every 6 hours  methylPREDNISolone sodium succinate Injectable 40 milliGRAM(s) IV Push every 12 hours  pantoprazole   Suspension 40 milliGRAM(s) Oral daily  scopolamine   Patch 1 Patch Transdermal every 72 hours  sodium chloride 3%  Inhalation 4 milliLiter(s) Inhalation once    MEDICATIONS  (PRN):  acetaminophen    Suspension .. 650 milliGRAM(s) Oral every 6 hours PRN Temp greater or equal to 38C (100.4F), Mild Pain (1 - 3)  dextrose 40% Gel 15 Gram(s) Oral once PRN Blood Glucose LESS THAN 70 milliGRAM(s)/deciliter  glucagon  Injectable 1 milliGRAM(s) IntraMuscular once PRN Glucose LESS THAN 70 milligrams/deciliter  sodium chloride 0.9% lock flush 10 milliLiter(s) IV Push every 1 hour PRN Pre/post blood products, medications, blood draw, and to maintain line patency      ALLERGIES/INTOLERANCES:  Allergies    ace inhibitors (Anaphylaxis)  caffeine (Rash)  chocolate (Rash)  high acid (Rash)  Tomatoes (Hives)    Intolerances          LABS:                        10.2   10.72 )-----------( 327      ( 15 Apr 2020 05:38 )             32.8     Auto Neutrophil %: 88.8 % (04-15-20 @ 05:38)  Auto Lymphocyte #: 0.75 K/uL (04-15-20 @ 05:38)      04-15    139  |  102  |  20  ----------------------------<  195<H>  3.9   |  29  |  0.39<L>    Ca    8.5      15 Apr 2020 05:38  Phos  2.3     04-15  Mg     1.4     04-15    TPro  5.6<L>  /  Alb  2.5<L>  /  TBili  0.3  /  DBili  x   /  AST  25  /  ALT  19  /  AlkPhos  40  04-15                            CoVID-specific labs:  D-Dimer Assay, Quantitative: 199 ng/mL DDU (04-15-20 @ 05:38)  C-Reactive Protein, Serum: 0.52 mg/dL <H> (04-15-20 @ 05:38)  Ferritin, Serum: 208 ng/mL <H> (04-15-20 @ 05:38)      Microbiology:     RADIOLOGY, EKG AND ADDITIONAL TESTS: Reviewed.  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 Extubated x2 and doing well on non-rebreather. Switched to nasal cannula 4/13, tolerating.     OVERNIGHT EVENTS: no acute events      SUBJECTIVE HPI: Patient seen and examined at bedside.    VITAL SIGNS:  ICU Vital Signs Last 24 Hrs  T(C): 36.8 (15 Apr 2020 10:00), Max: 37.2 (14 Apr 2020 17:25)  T(F): 98.3 (15 Apr 2020 10:00), Max: 98.9 (14 Apr 2020 17:25)  HR: 96 (15 Apr 2020 11:00) (56 - 97)  BP: 124/64 (14 Apr 2020 19:00) (124/64 - 153/86)  BP(mean): 100 (14 Apr 2020 16:00) (98 - 100)  ABP: 157/67 (15 Apr 2020 11:00) (114/50 - 176/84)  ABP(mean): 97 (15 Apr 2020 11:00) (71 - 121)  RR: 24 (15 Apr 2020 11:00) (18 - 33)  SpO2: 97% (15 Apr 2020 11:00) (91% - 98%)  I&O's Summary    14 Apr 2020 07:01  -  15 Apr 2020 07:00  --------------------------------------------------------  IN: 2010 mL / OUT: 1455 mL / NET: 555 mL    15 Apr 2020 07:01  -  15 Apr 2020 11:52  --------------------------------------------------------  IN: 80 mL / OUT: 60 mL / NET: 20 mL    PHYSICAL EXAM:  General: Comfortable, pleasant  Neurological: AAOx3, no focal deficits  HEENT: NC/AT; EOMI, PERRL, clear conjunctiva, no nasal or oropharyngeal discharge or exudates, MMM  Neck: supple, no cervical or post-auricular lymphadenopathy  Cardiovascular: +S1/S2, no murmurs/rubs/gallops, RRR  Respiratory: CTA B/L, no diminished breath sounds, no wheezes/rales/rhonchi, no increased work of breathing or accessory muscle use  Gastrointestinal: soft, NT/ND; active BSx4 quadrants  Genitourinary: no suprapubic tenderness, no CVA tenderness  Extremities: WWP; no edema, clubbing or cyanosis  Vascular: 2+ radial, DP/PT pulses B/L  Skin: no rashes    MEDICATIONS:  MEDICATIONS  (STANDING):  apixaban 5 milliGRAM(s) Oral every 12 hours  chlorhexidine 2% Cloths 1 Application(s) Topical <User Schedule>  dextrose 5%. 1000 milliLiter(s) (50 mL/Hr) IV Continuous <Continuous>  dextrose 50% Injectable 12.5 Gram(s) IV Push once  dextrose 50% Injectable 25 Gram(s) IV Push once  dextrose 50% Injectable 25 Gram(s) IV Push once  glycopyrrolate Injectable 0.2 milliGRAM(s) IV Push every 6 hours  insulin lispro (HumaLOG) corrective regimen sliding scale   SubCutaneous every 6 hours  methylPREDNISolone sodium succinate Injectable 40 milliGRAM(s) IV Push every 12 hours  pantoprazole   Suspension 40 milliGRAM(s) Oral daily  scopolamine   Patch 1 Patch Transdermal every 72 hours  sodium chloride 3%  Inhalation 4 milliLiter(s) Inhalation once    MEDICATIONS  (PRN):  acetaminophen    Suspension .. 650 milliGRAM(s) Oral every 6 hours PRN Temp greater or equal to 38C (100.4F), Mild Pain (1 - 3)  dextrose 40% Gel 15 Gram(s) Oral once PRN Blood Glucose LESS THAN 70 milliGRAM(s)/deciliter  glucagon  Injectable 1 milliGRAM(s) IntraMuscular once PRN Glucose LESS THAN 70 milligrams/deciliter  sodium chloride 0.9% lock flush 10 milliLiter(s) IV Push every 1 hour PRN Pre/post blood products, medications, blood draw, and to maintain line patency    ALLERGIES/INTOLERANCES:  Allergies    ace inhibitors (Anaphylaxis)  caffeine (Rash)  chocolate (Rash)  high acid (Rash)  Tomatoes (Hives)    Intolerances    LABS:                        10.2   10.72 )-----------( 327      ( 15 Apr 2020 05:38 )             32.8     Auto Neutrophil %: 88.8 % (04-15-20 @ 05:38)  Auto Lymphocyte #: 0.75 K/uL (04-15-20 @ 05:38)    04-15    139  |  102  |  20  ----------------------------<  195<H>  3.9   |  29  |  0.39<L>    Ca    8.5      15 Apr 2020 05:38  Phos  2.3     04-15  Mg     1.4     04-15    TPro  5.6<L>  /  Alb  2.5<L>  /  TBili  0.3  /  DBili  x   /  AST  25  /  ALT  19  /  AlkPhos  40  04-15  CoVID-specific labs:  D-Dimer Assay, Quantitative: 199 ng/mL DDU (04-15-20 @ 05:38)  C-Reactive Protein, Serum: 0.52 mg/dL <H> (04-15-20 @ 05:38)  Ferritin, Serum: 208 ng/mL <H> (04-15-20 @ 05:38)    RADIOLOGY, EKG AND ADDITIONAL TESTS: Reviewed.

## 2020-04-16 LAB
ALBUMIN SERPL ELPH-MCNC: 2.8 G/DL — LOW (ref 3.3–5)
ALP SERPL-CCNC: 44 U/L — SIGNIFICANT CHANGE UP (ref 40–120)
ALT FLD-CCNC: 17 U/L — SIGNIFICANT CHANGE UP (ref 10–45)
ANION GAP SERPL CALC-SCNC: 6 MMOL/L — SIGNIFICANT CHANGE UP (ref 5–17)
APTT BLD: 28.7 SEC — SIGNIFICANT CHANGE UP (ref 27.5–36.3)
AST SERPL-CCNC: 20 U/L — SIGNIFICANT CHANGE UP (ref 10–40)
BASOPHILS # BLD AUTO: 0.03 K/UL — SIGNIFICANT CHANGE UP (ref 0–0.2)
BASOPHILS NFR BLD AUTO: 0.2 % — SIGNIFICANT CHANGE UP (ref 0–2)
BILIRUB SERPL-MCNC: 0.3 MG/DL — SIGNIFICANT CHANGE UP (ref 0.2–1.2)
BUN SERPL-MCNC: 20 MG/DL — SIGNIFICANT CHANGE UP (ref 7–23)
CALCIUM SERPL-MCNC: 8.8 MG/DL — SIGNIFICANT CHANGE UP (ref 8.4–10.5)
CHLORIDE SERPL-SCNC: 104 MMOL/L — SIGNIFICANT CHANGE UP (ref 96–108)
CO2 SERPL-SCNC: 32 MMOL/L — HIGH (ref 22–31)
CREAT SERPL-MCNC: 0.4 MG/DL — LOW (ref 0.5–1.3)
CRP SERPL-MCNC: 0.56 MG/DL — HIGH (ref 0–0.4)
D DIMER BLD IA.RAPID-MCNC: 231 NG/ML DDU — HIGH
EOSINOPHIL # BLD AUTO: 0.15 K/UL — SIGNIFICANT CHANGE UP (ref 0–0.5)
EOSINOPHIL NFR BLD AUTO: 0.8 % — SIGNIFICANT CHANGE UP (ref 0–6)
FERRITIN SERPL-MCNC: 214 NG/ML — HIGH (ref 15–150)
FIBRINOGEN PPP-MCNC: 321 MG/DL — SIGNIFICANT CHANGE UP (ref 258–438)
GLUCOSE BLDC GLUCOMTR-MCNC: 101 MG/DL — HIGH (ref 70–99)
GLUCOSE BLDC GLUCOMTR-MCNC: 109 MG/DL — HIGH (ref 70–99)
GLUCOSE BLDC GLUCOMTR-MCNC: 89 MG/DL — SIGNIFICANT CHANGE UP (ref 70–99)
GLUCOSE BLDC GLUCOMTR-MCNC: 93 MG/DL — SIGNIFICANT CHANGE UP (ref 70–99)
GLUCOSE BLDC GLUCOMTR-MCNC: 93 MG/DL — SIGNIFICANT CHANGE UP (ref 70–99)
GLUCOSE SERPL-MCNC: 112 MG/DL — HIGH (ref 70–99)
HCT VFR BLD CALC: 33.4 % — LOW (ref 34.5–45)
HGB BLD-MCNC: 10.5 G/DL — LOW (ref 11.5–15.5)
IMM GRANULOCYTES NFR BLD AUTO: 0.7 % — SIGNIFICANT CHANGE UP (ref 0–1.5)
LYMPHOCYTES # BLD AUTO: 1.61 K/UL — SIGNIFICANT CHANGE UP (ref 1–3.3)
LYMPHOCYTES # BLD AUTO: 9.1 % — LOW (ref 13–44)
MAGNESIUM SERPL-MCNC: 1.5 MG/DL — LOW (ref 1.6–2.6)
MCHC RBC-ENTMCNC: 30.4 PG — SIGNIFICANT CHANGE UP (ref 27–34)
MCHC RBC-ENTMCNC: 31.4 GM/DL — LOW (ref 32–36)
MCV RBC AUTO: 96.8 FL — SIGNIFICANT CHANGE UP (ref 80–100)
MONOCYTES # BLD AUTO: 1.52 K/UL — HIGH (ref 0–0.9)
MONOCYTES NFR BLD AUTO: 8.6 % — SIGNIFICANT CHANGE UP (ref 2–14)
NEUTROPHILS # BLD AUTO: 14.34 K/UL — HIGH (ref 1.8–7.4)
NEUTROPHILS NFR BLD AUTO: 80.6 % — HIGH (ref 43–77)
NRBC # BLD: 0 /100 WBCS — SIGNIFICANT CHANGE UP (ref 0–0)
PHOSPHATE SERPL-MCNC: 2 MG/DL — LOW (ref 2.5–4.5)
PLATELET # BLD AUTO: 332 K/UL — SIGNIFICANT CHANGE UP (ref 150–400)
POTASSIUM SERPL-MCNC: 3.3 MMOL/L — LOW (ref 3.5–5.3)
POTASSIUM SERPL-SCNC: 3.3 MMOL/L — LOW (ref 3.5–5.3)
PROCALCITONIN SERPL-MCNC: 0.08 NG/ML — SIGNIFICANT CHANGE UP (ref 0.02–0.1)
PROT SERPL-MCNC: 5.6 G/DL — LOW (ref 6–8.3)
RBC # BLD: 3.45 M/UL — LOW (ref 3.8–5.2)
RBC # FLD: 12.9 % — SIGNIFICANT CHANGE UP (ref 10.3–14.5)
SODIUM SERPL-SCNC: 142 MMOL/L — SIGNIFICANT CHANGE UP (ref 135–145)
WBC # BLD: 17.77 K/UL — HIGH (ref 3.8–10.5)
WBC # FLD AUTO: 17.77 K/UL — HIGH (ref 3.8–10.5)

## 2020-04-16 PROCEDURE — 99291 CRITICAL CARE FIRST HOUR: CPT | Mod: CS

## 2020-04-16 RX ORDER — MAGNESIUM SULFATE 500 MG/ML
2 VIAL (ML) INJECTION ONCE
Refills: 0 | Status: COMPLETED | OUTPATIENT
Start: 2020-04-16 | End: 2020-04-16

## 2020-04-16 RX ORDER — VANCOMYCIN HCL 1 G
750 VIAL (EA) INTRAVENOUS ONCE
Refills: 0 | Status: COMPLETED | OUTPATIENT
Start: 2020-04-16 | End: 2020-04-16

## 2020-04-16 RX ORDER — PIPERACILLIN AND TAZOBACTAM 4; .5 G/20ML; G/20ML
3.38 INJECTION, POWDER, LYOPHILIZED, FOR SOLUTION INTRAVENOUS EVERY 6 HOURS
Refills: 0 | Status: DISCONTINUED | OUTPATIENT
Start: 2020-04-16 | End: 2020-04-20

## 2020-04-16 RX ORDER — POTASSIUM PHOSPHATE, MONOBASIC POTASSIUM PHOSPHATE, DIBASIC 236; 224 MG/ML; MG/ML
15 INJECTION, SOLUTION INTRAVENOUS ONCE
Refills: 0 | Status: COMPLETED | OUTPATIENT
Start: 2020-04-16 | End: 2020-04-16

## 2020-04-16 RX ORDER — PIPERACILLIN AND TAZOBACTAM 4; .5 G/20ML; G/20ML
3.38 INJECTION, POWDER, LYOPHILIZED, FOR SOLUTION INTRAVENOUS ONCE
Refills: 0 | Status: COMPLETED | OUTPATIENT
Start: 2020-04-16 | End: 2020-04-16

## 2020-04-16 RX ORDER — POTASSIUM CHLORIDE 20 MEQ
40 PACKET (EA) ORAL ONCE
Refills: 0 | Status: COMPLETED | OUTPATIENT
Start: 2020-04-16 | End: 2020-04-16

## 2020-04-16 RX ORDER — VANCOMYCIN HCL 1 G
750 VIAL (EA) INTRAVENOUS EVERY 12 HOURS
Refills: 0 | Status: DISCONTINUED | OUTPATIENT
Start: 2020-04-16 | End: 2020-04-18

## 2020-04-16 RX ORDER — VANCOMYCIN HCL 1 G
VIAL (EA) INTRAVENOUS
Refills: 0 | Status: DISCONTINUED | OUTPATIENT
Start: 2020-04-16 | End: 2020-04-18

## 2020-04-16 RX ADMIN — ROBINUL 0.2 MILLIGRAM(S): 0.2 INJECTION INTRAMUSCULAR; INTRAVENOUS at 01:35

## 2020-04-16 RX ADMIN — Medication 250 MILLIGRAM(S): at 23:09

## 2020-04-16 RX ADMIN — ROBINUL 0.2 MILLIGRAM(S): 0.2 INJECTION INTRAMUSCULAR; INTRAVENOUS at 23:09

## 2020-04-16 RX ADMIN — Medication 250 MILLIGRAM(S): at 18:25

## 2020-04-16 RX ADMIN — APIXABAN 5 MILLIGRAM(S): 2.5 TABLET, FILM COATED ORAL at 01:35

## 2020-04-16 RX ADMIN — ROBINUL 0.2 MILLIGRAM(S): 0.2 INJECTION INTRAMUSCULAR; INTRAVENOUS at 06:43

## 2020-04-16 RX ADMIN — PIPERACILLIN AND TAZOBACTAM 200 GRAM(S): 4; .5 INJECTION, POWDER, LYOPHILIZED, FOR SOLUTION INTRAVENOUS at 23:05

## 2020-04-16 RX ADMIN — Medication 50 GRAM(S): at 10:42

## 2020-04-16 RX ADMIN — CHLORHEXIDINE GLUCONATE 1 APPLICATION(S): 213 SOLUTION TOPICAL at 06:43

## 2020-04-16 RX ADMIN — Medication 40 MILLIEQUIVALENT(S): at 10:43

## 2020-04-16 RX ADMIN — PANTOPRAZOLE SODIUM 40 MILLIGRAM(S): 20 TABLET, DELAYED RELEASE ORAL at 10:43

## 2020-04-16 RX ADMIN — POTASSIUM PHOSPHATE, MONOBASIC POTASSIUM PHOSPHATE, DIBASIC 63.75 MILLIMOLE(S): 236; 224 INJECTION, SOLUTION INTRAVENOUS at 10:43

## 2020-04-16 RX ADMIN — SCOPALAMINE 1 PATCH: 1 PATCH, EXTENDED RELEASE TRANSDERMAL at 19:04

## 2020-04-16 RX ADMIN — ROBINUL 0.2 MILLIGRAM(S): 0.2 INJECTION INTRAMUSCULAR; INTRAVENOUS at 10:43

## 2020-04-16 RX ADMIN — APIXABAN 5 MILLIGRAM(S): 2.5 TABLET, FILM COATED ORAL at 10:43

## 2020-04-16 RX ADMIN — SCOPALAMINE 1 PATCH: 1 PATCH, EXTENDED RELEASE TRANSDERMAL at 06:43

## 2020-04-16 RX ADMIN — ROBINUL 0.2 MILLIGRAM(S): 0.2 INJECTION INTRAMUSCULAR; INTRAVENOUS at 18:25

## 2020-04-16 RX ADMIN — APIXABAN 5 MILLIGRAM(S): 2.5 TABLET, FILM COATED ORAL at 23:09

## 2020-04-16 RX ADMIN — PIPERACILLIN AND TAZOBACTAM 200 GRAM(S): 4; .5 INJECTION, POWDER, LYOPHILIZED, FOR SOLUTION INTRAVENOUS at 18:25

## 2020-04-16 NOTE — PROGRESS NOTE ADULT - SUBJECTIVE AND OBJECTIVE BOX
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 Extubated x2 and doing well on non-rebreather. Switched to nasal cannula 4/13, tolerating.     INTERVAL HPI/OVERNIGHT EVENTS:  no acute events overnight  rpt covid test positive    MEDICATIONS  (STANDING):  apixaban 5 milliGRAM(s) Oral every 12 hours  chlorhexidine 2% Cloths 1 Application(s) Topical <User Schedule>  dextrose 5%. 1000 milliLiter(s) (50 mL/Hr) IV Continuous <Continuous>  dextrose 50% Injectable 12.5 Gram(s) IV Push once  dextrose 50% Injectable 25 Gram(s) IV Push once  dextrose 50% Injectable 25 Gram(s) IV Push once  glycopyrrolate Injectable 0.2 milliGRAM(s) IV Push every 6 hours  insulin lispro (HumaLOG) corrective regimen sliding scale   SubCutaneous every 6 hours  pantoprazole   Suspension 40 milliGRAM(s) Oral daily  scopolamine   Patch 1 Patch Transdermal every 72 hours  sodium chloride 3%  Inhalation 4 milliLiter(s) Inhalation once    MEDICATIONS  (PRN):  acetaminophen    Suspension .. 650 milliGRAM(s) Oral every 6 hours PRN Temp greater or equal to 38C (100.4F), Mild Pain (1 - 3)  dextrose 40% Gel 15 Gram(s) Oral once PRN Blood Glucose LESS THAN 70 milliGRAM(s)/deciliter  glucagon  Injectable 1 milliGRAM(s) IntraMuscular once PRN Glucose LESS THAN 70 milligrams/deciliter  sodium chloride 0.9% lock flush 10 milliLiter(s) IV Push every 1 hour PRN Pre/post blood products, medications, blood draw, and to maintain line patency      Vitals:  Vital Signs Last 24 Hrs  T(C): 36.9 (16 Apr 2020 05:01), Max: 37.7 (15 Apr 2020 15:53)  T(F): 98.4 (16 Apr 2020 05:01), Max: 99.9 (15 Apr 2020 15:53)  HR: 81 (16 Apr 2020 06:00) (63 - 103)  BP: --  BP(mean): --  RR: 25 (16 Apr 2020 06:00) (14 - 33)  SpO2: 100% (16 Apr 2020 06:00) (91% - 100%)    Vitals (Invasive):  ABP: 156/60 (04-16-20 @ 06:00) (125/47 - 169/75)  CVP(mm Hg): 5 (04-16-20 @ 06:00) (1 - 8)  CVP(cm H2O): --  CO: --  CI: --  PA: --  PA(mean): --  PCWP: --  LA: --  RA: --  SVR: --  SVRI: --  PVR: --  PVRI: --    I&O's Summary    15 Apr 2020 07:01  -  16 Apr 2020 07:00  --------------------------------------------------------  IN: 80 mL / OUT: 210 mL / NET: -130 mL        Physical Exam:  HEENT: NC/AT; EOMI, PERRL, clear conjunctiva, no nasal or oropharyngeal discharge or exudates, MMM  Neck: supple, no cervical or post-auricular lymphadenopathy  Cardiovascular: +S1/S2, no murmurs/rubs/gallops, RRR  Respiratory: CTA B/L, no diminished breath sounds, no wheezes/rales/rhonchi, no increased work of breathing or accessory muscle use  Gastrointestinal: soft, NT/ND; active BSx4 quadrants  Genitourinary: no suprapubic tenderness, no CVA tenderness  Extremities: WWP; no edema, clubbing or cyanosis  Vascular: 2+ radial, DP/PT pulses B/L  Skin: no rashes     Labs:  COVID:  COVID-19 PCR: Detected (04-15-20 @ 11:23)                          10.2   10.72 )-----------( 327      ( 15 Apr 2020 05:38 )             32.8     04-16    142  |  104  |  20  ----------------------------<  112<H>  3.3<L>   |  32<H>  |  0.40<L>    Ca    8.8      16 Apr 2020 07:06  Phos  2.0     04-16  Mg     1.5     04-16    TPro  5.6<L>  /  Alb  2.8<L>  /  TBili  0.3  /  DBili  x   /  AST  20  /  ALT  17  /  AlkPhos  44  04-16    PTT - ( 16 Apr 2020 07:06 )  PTT:28.7 sec      COVID related labs:  16 Apr 2020 07:06  D-dimer:  231<H>  Calcitonin:  x  CRP:  x  LDH:  x  Lactate,Blood:  x  CK:  x  Troponin I:  x  Troponin T:  x  Troponin HS:  x  Ferritin, Serum: x  BNP:  x      Blood Gases:  (ARTERIAL):  04-12-20 @ 17:11  pH 7.39 / pCO2 48 / pO2 108 / HCO3 29  Total CO2 --  FiO2 --  Oxygen Saturation 98  Total Hemoglobin, Calculated --  Hematocrit, Calculated --  Oxygen Content --  Blood Gas Arterial - Calcium, Ionized --  Blood Gas Arterial - Chloride --  Blood Gas Arterial - Glucose --  Blood Gas Arterial - Potassium --  Blood Gas Arterial - Sodium --  Blood Gas Arterial - Creatinine --  Base Excess, Arterial 2.9    (VENOUS):  04-03-20 @ 05:37  pH 7.30 / pCO2 73 / pO2 23 / HCO3 --  Total CO2, Venous --  FiO2, Venous --  Oxygen Saturation 34  Blood Gas Venous - Creatinine --  Blood Gas Venous - Glucose --  Blood Gas Venous - Hematocrit --  Blood Gas Venous Hemoglobin --  Blood Gas Venous - Lactate --  Blood Gas Venous - Potassium --  Blood Gas Venous - Sodium --  Base Excess, Venous --      Radiology:  CT chest results consistent with multifocal bilateral ground glass opacities  ID consulted, follow up recommendations    Plan:  74 F with pmhx of CP, MR, hx of DVT/PE, GERD, chronic PEG, HTN\, 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, presenting to [facility] for respiratory failure secondary to COVID     Neuro:  - awake   - comfortable     CV:   - off levo    Pulm:  - intubated on arrival to [facility]  - extubated and re-intubated  - extubated 4/12  - nasal cannula     ID:  - finished plaquenil & azithromycin  - continue with solumedrol   - repeat COVID swab & sputum: positive  -continue zosyn    Nephro:  - f/u U/O    GI:  - G-tube  - Jevity 30ml/hr  - LR@50    Endo:   - follow-up FS    Prophy:  - eliquis    PT: out of bed to chair today     FULL CODE  Dispo: MICU    Code: FULL CODE    Disposition: Patient requires continued monitoring in MICU Admit Date: 04-03-20  Length of Stay: 13d    74yFemale  74 F with pmhx of CP, MR, hx of DVT/PE, GERD, chronic PEG, HTN\, 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 Extubated x2 and doing well on non-rebreather. Switched to nasal cannula 4/13, tolerating.     INTERVAL HPI/OVERNIGHT EVENTS:  no acute events overnight  rpt covid test positive    MEDICATIONS  (STANDING):  apixaban 5 milliGRAM(s) Oral every 12 hours  chlorhexidine 2% Cloths 1 Application(s) Topical <User Schedule>  dextrose 5%. 1000 milliLiter(s) (50 mL/Hr) IV Continuous <Continuous>  dextrose 50% Injectable 12.5 Gram(s) IV Push once  dextrose 50% Injectable 25 Gram(s) IV Push once  dextrose 50% Injectable 25 Gram(s) IV Push once  glycopyrrolate Injectable 0.2 milliGRAM(s) IV Push every 6 hours  insulin lispro (HumaLOG) corrective regimen sliding scale   SubCutaneous every 6 hours  pantoprazole   Suspension 40 milliGRAM(s) Oral daily  scopolamine   Patch 1 Patch Transdermal every 72 hours  sodium chloride 3%  Inhalation 4 milliLiter(s) Inhalation once    MEDICATIONS  (PRN):  acetaminophen    Suspension .. 650 milliGRAM(s) Oral every 6 hours PRN Temp greater or equal to 38C (100.4F), Mild Pain (1 - 3)  dextrose 40% Gel 15 Gram(s) Oral once PRN Blood Glucose LESS THAN 70 milliGRAM(s)/deciliter  glucagon  Injectable 1 milliGRAM(s) IntraMuscular once PRN Glucose LESS THAN 70 milligrams/deciliter  sodium chloride 0.9% lock flush 10 milliLiter(s) IV Push every 1 hour PRN Pre/post blood products, medications, blood draw, and to maintain line patency      Vitals:  Vital Signs Last 24 Hrs  T(C): 36.9 (16 Apr 2020 05:01), Max: 37.7 (15 Apr 2020 15:53)  T(F): 98.4 (16 Apr 2020 05:01), Max: 99.9 (15 Apr 2020 15:53)  HR: 81 (16 Apr 2020 06:00) (63 - 103)  BP: --  BP(mean): --  RR: 25 (16 Apr 2020 06:00) (14 - 33)  SpO2: 100% (16 Apr 2020 06:00) (91% - 100%)    Vitals (Invasive):  ABP: 156/60 (04-16-20 @ 06:00) (125/47 - 169/75)  CVP(mm Hg): 5 (04-16-20 @ 06:00) (1 - 8)  CVP(cm H2O): --  CO: --  CI: --  PA: --  PA(mean): --  PCWP: --  LA: --  RA: --  SVR: --  SVRI: --  PVR: --  PVRI: --    I&O's Summary    15 Apr 2020 07:01  -  16 Apr 2020 07:00  --------------------------------------------------------  IN: 80 mL / OUT: 210 mL / NET: -130 mL        Physical Exam:  HEENT: NC/AT; EOMI, PERRL, clear conjunctiva, no nasal or oropharyngeal discharge or exudates, MMM  Neck: supple, no cervical or post-auricular lymphadenopathy  Cardiovascular: +S1/S2, no murmurs/rubs/gallops, RRR  Respiratory: CTA B/L, no diminished breath sounds, no wheezes/rales/rhonchi, no increased work of breathing or accessory muscle use  Gastrointestinal: soft, NT/ND; active BSx4 quadrants  Genitourinary: no suprapubic tenderness, no CVA tenderness  Extremities: WWP; no edema, clubbing or cyanosis  Vascular: 2+ radial, DP/PT pulses B/L  Skin: no rashes     Labs:  COVID:  COVID-19 PCR: Detected (04-15-20 @ 11:23)                          10.2   10.72 )-----------( 327      ( 15 Apr 2020 05:38 )             32.8     04-16    142  |  104  |  20  ----------------------------<  112<H>  3.3<L>   |  32<H>  |  0.40<L>    Ca    8.8      16 Apr 2020 07:06  Phos  2.0     04-16  Mg     1.5     04-16    TPro  5.6<L>  /  Alb  2.8<L>  /  TBili  0.3  /  DBili  x   /  AST  20  /  ALT  17  /  AlkPhos  44  04-16    PTT - ( 16 Apr 2020 07:06 )  PTT:28.7 sec      COVID related labs:  16 Apr 2020 07:06  D-dimer:  231<H>  Calcitonin:  x  CRP:  x  LDH:  x  Lactate,Blood:  x  CK:  x  Troponin I:  x  Troponin T:  x  Troponin HS:  x  Ferritin, Serum: x  BNP:  x      Blood Gases:  (ARTERIAL):  04-12-20 @ 17:11  pH 7.39 / pCO2 48 / pO2 108 / HCO3 29  Total CO2 --  FiO2 --  Oxygen Saturation 98  Total Hemoglobin, Calculated --  Hematocrit, Calculated --  Oxygen Content --  Blood Gas Arterial - Calcium, Ionized --  Blood Gas Arterial - Chloride --  Blood Gas Arterial - Glucose --  Blood Gas Arterial - Potassium --  Blood Gas Arterial - Sodium --  Blood Gas Arterial - Creatinine --  Base Excess, Arterial 2.9    (VENOUS):  04-03-20 @ 05:37  pH 7.30 / pCO2 73 / pO2 23 / HCO3 --  Total CO2, Venous --  FiO2, Venous --  Oxygen Saturation 34  Blood Gas Venous - Creatinine --  Blood Gas Venous - Glucose --  Blood Gas Venous - Hematocrit --  Blood Gas Venous Hemoglobin --  Blood Gas Venous - Lactate --  Blood Gas Venous - Potassium --  Blood Gas Venous - Sodium --  Base Excess, Venous --      Radiology:  CT chest results consistent with multifocal bilateral ground glass opacities  ID consulted, follow up recommendations    Plan:  74 F with pmhx of CP, MR, hx of DVT/PE, GERD, chronic PEG, HTN\, 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, presenting to [facility] for respiratory failure secondary to COVID     Neuro:  - awake   - comfortable     CV:   - off levo    Pulm:  - intubated on arrival to [facility]  - extubated and re-intubated  - extubated 4/12  - nasal cannula     ID:  - finished plaquenil & azithromycin  - continue with solumedrol   - repeat COVID swab & sputum: positive  - continue zosyn    Nephro:  - f/u U/O    GI:  - G-tube  - Jevity 30ml/hr  - LR@50    Endo:   - follow-up FS    Prophy:  - eliquis    PT: out of bed to chair today     FULL CODE  Dispo: MICU    Code: FULL CODE    Disposition: Patient requires continued monitoring in MICU Admit Date: 04-03-20  Length of Stay: 13d    74yFemale  74 F with pmhx of CP, MR, hx of DVT/PE, GERD, chronic PEG, HTN\, 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 Extubated x2 and doing well on non-rebreather. Switched to nasal cannula 4/13, tolerating.     INTERVAL HPI/OVERNIGHT EVENTS:  no acute events overnight  rpt covid test positive    MEDICATIONS  (STANDING):  apixaban 5 milliGRAM(s) Oral every 12 hours  chlorhexidine 2% Cloths 1 Application(s) Topical <User Schedule>  dextrose 5%. 1000 milliLiter(s) (50 mL/Hr) IV Continuous <Continuous>  dextrose 50% Injectable 12.5 Gram(s) IV Push once  dextrose 50% Injectable 25 Gram(s) IV Push once  dextrose 50% Injectable 25 Gram(s) IV Push once  glycopyrrolate Injectable 0.2 milliGRAM(s) IV Push every 6 hours  insulin lispro (HumaLOG) corrective regimen sliding scale   SubCutaneous every 6 hours  pantoprazole   Suspension 40 milliGRAM(s) Oral daily  scopolamine   Patch 1 Patch Transdermal every 72 hours  sodium chloride 3%  Inhalation 4 milliLiter(s) Inhalation once    MEDICATIONS  (PRN):  acetaminophen    Suspension .. 650 milliGRAM(s) Oral every 6 hours PRN Temp greater or equal to 38C (100.4F), Mild Pain (1 - 3)  dextrose 40% Gel 15 Gram(s) Oral once PRN Blood Glucose LESS THAN 70 milliGRAM(s)/deciliter  glucagon  Injectable 1 milliGRAM(s) IntraMuscular once PRN Glucose LESS THAN 70 milligrams/deciliter  sodium chloride 0.9% lock flush 10 milliLiter(s) IV Push every 1 hour PRN Pre/post blood products, medications, blood draw, and to maintain line patency      Vitals:  Vital Signs Last 24 Hrs  T(C): 36.9 (16 Apr 2020 05:01), Max: 37.7 (15 Apr 2020 15:53)  T(F): 98.4 (16 Apr 2020 05:01), Max: 99.9 (15 Apr 2020 15:53)  HR: 81 (16 Apr 2020 06:00) (63 - 103)  BP: --  BP(mean): --  RR: 25 (16 Apr 2020 06:00) (14 - 33)  SpO2: 100% (16 Apr 2020 06:00) (91% - 100%)    Vitals (Invasive):  ABP: 156/60 (04-16-20 @ 06:00) (125/47 - 169/75)  CVP(mm Hg): 5 (04-16-20 @ 06:00) (1 - 8)  CVP(cm H2O): --  CO: --  CI: --  PA: --  PA(mean): --  PCWP: --  LA: --  RA: --  SVR: --  SVRI: --  PVR: --  PVRI: --    I&O's Summary    15 Apr 2020 07:01  -  16 Apr 2020 07:00  --------------------------------------------------------  IN: 80 mL / OUT: 210 mL / NET: -130 mL        Physical Exam:  HEENT: NC/AT; EOMI, PERRL, clear conjunctiva, no nasal or oropharyngeal discharge or exudates, MMM  Neck: supple, no cervical or post-auricular lymphadenopathy  Cardiovascular: +S1/S2, no murmurs/rubs/gallops, RRR  Respiratory: CTA B/L, no diminished breath sounds, no wheezes/rales/rhonchi, no increased work of breathing or accessory muscle use  Gastrointestinal: soft, NT/ND; active BSx4 quadrants  Genitourinary: no suprapubic tenderness, no CVA tenderness  Extremities: WWP; no edema, clubbing or cyanosis  Vascular: 2+ radial, DP/PT pulses B/L  Skin: no rashes     Labs:  COVID:  COVID-19 PCR: Detected (04-15-20 @ 11:23)                          10.2   10.72 )-----------( 327      ( 15 Apr 2020 05:38 )             32.8     04-16    142  |  104  |  20  ----------------------------<  112<H>  3.3<L>   |  32<H>  |  0.40<L>    Ca    8.8      16 Apr 2020 07:06  Phos  2.0     04-16  Mg     1.5     04-16    TPro  5.6<L>  /  Alb  2.8<L>  /  TBili  0.3  /  DBili  x   /  AST  20  /  ALT  17  /  AlkPhos  44  04-16    PTT - ( 16 Apr 2020 07:06 )  PTT:28.7 sec      COVID related labs:  16 Apr 2020 07:06  D-dimer:  231<H>  Calcitonin:  x  CRP:  x  LDH:  x  Lactate,Blood:  x  CK:  x  Troponin I:  x  Troponin T:  x  Troponin HS:  x  Ferritin, Serum: x  BNP:  x      Blood Gases:  (ARTERIAL):  04-12-20 @ 17:11  pH 7.39 / pCO2 48 / pO2 108 / HCO3 29  Total CO2 --  FiO2 --  Oxygen Saturation 98  Total Hemoglobin, Calculated --  Hematocrit, Calculated --  Oxygen Content --  Blood Gas Arterial - Calcium, Ionized --  Blood Gas Arterial - Chloride --  Blood Gas Arterial - Glucose --  Blood Gas Arterial - Potassium --  Blood Gas Arterial - Sodium --  Blood Gas Arterial - Creatinine --  Base Excess, Arterial 2.9    (VENOUS):  04-03-20 @ 05:37  pH 7.30 / pCO2 73 / pO2 23 / HCO3 --  Total CO2, Venous --  FiO2, Venous --  Oxygen Saturation 34  Blood Gas Venous - Creatinine --  Blood Gas Venous - Glucose --  Blood Gas Venous - Hematocrit --  Blood Gas Venous Hemoglobin --  Blood Gas Venous - Lactate --  Blood Gas Venous - Potassium --  Blood Gas Venous - Sodium --  Base Excess, Venous --      Radiology:  CT chest results consistent with multifocal bilateral ground glass opacities  ID consulted, follow up recommendations    Plan:  74 F with pmhx of CP, MR, hx of DVT/PE, GERD, chronic PEG, HTN\, 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, presenting to [facility] for respiratory failure secondary to COVID     Neuro:  - awake   - comfortable     CV:   - off levo    Pulm:  - intubated on arrival to [facility]  - extubated and re-intubated  - extubated 4/12  - nasal cannula   - cont gylcopyrolate    ID:  - finished plaquenil & azithromycin  - finished with solumedrol   - repeat COVID swab & sputum: positive  - resume vanc/zosyn empiric for inc WBC w/ bands  - f/u vanc trough before tomorrow nights dose     Nephro:  - HLIV  - f/u U/O  - lu to gravity     GI:  - G-tube  - Jevity 30ml/hr  - PPI daily    Endo:   - follow-up FS    Prophy:  - eliquis    PT: out of bed to chair today     FULL CODE  Dispo: MICU    Code: FULL CODE  Disposition: Patient requires continued monitoring in MICU  as above d/w Dr. Castro

## 2020-04-17 LAB
ALBUMIN SERPL ELPH-MCNC: 2.8 G/DL — LOW (ref 3.3–5)
ALP SERPL-CCNC: 43 U/L — SIGNIFICANT CHANGE UP (ref 40–120)
ALT FLD-CCNC: 16 U/L — SIGNIFICANT CHANGE UP (ref 10–45)
ANION GAP SERPL CALC-SCNC: 6 MMOL/L — SIGNIFICANT CHANGE UP (ref 5–17)
AST SERPL-CCNC: 19 U/L — SIGNIFICANT CHANGE UP (ref 10–40)
BASOPHILS # BLD AUTO: 0.01 K/UL — SIGNIFICANT CHANGE UP (ref 0–0.2)
BASOPHILS NFR BLD AUTO: 0.1 % — SIGNIFICANT CHANGE UP (ref 0–2)
BILIRUB SERPL-MCNC: 0.4 MG/DL — SIGNIFICANT CHANGE UP (ref 0.2–1.2)
BUN SERPL-MCNC: 14 MG/DL — SIGNIFICANT CHANGE UP (ref 7–23)
CALCIUM SERPL-MCNC: 8.3 MG/DL — LOW (ref 8.4–10.5)
CHLORIDE SERPL-SCNC: 104 MMOL/L — SIGNIFICANT CHANGE UP (ref 96–108)
CO2 SERPL-SCNC: 33 MMOL/L — HIGH (ref 22–31)
CREAT SERPL-MCNC: 0.44 MG/DL — LOW (ref 0.5–1.3)
CRP SERPL-MCNC: 2.58 MG/DL — HIGH (ref 0–0.4)
CULTURE RESULTS: SIGNIFICANT CHANGE UP
D DIMER BLD IA.RAPID-MCNC: 228 NG/ML DDU — SIGNIFICANT CHANGE UP
EOSINOPHIL # BLD AUTO: 0.3 K/UL — SIGNIFICANT CHANGE UP (ref 0–0.5)
EOSINOPHIL NFR BLD AUTO: 2.7 % — SIGNIFICANT CHANGE UP (ref 0–6)
FERRITIN SERPL-MCNC: 213 NG/ML — HIGH (ref 15–150)
GLUCOSE BLDC GLUCOMTR-MCNC: 112 MG/DL — HIGH (ref 70–99)
GLUCOSE BLDC GLUCOMTR-MCNC: 122 MG/DL — HIGH (ref 70–99)
GLUCOSE BLDC GLUCOMTR-MCNC: 130 MG/DL — HIGH (ref 70–99)
GLUCOSE BLDC GLUCOMTR-MCNC: 97 MG/DL — SIGNIFICANT CHANGE UP (ref 70–99)
GLUCOSE SERPL-MCNC: 111 MG/DL — HIGH (ref 70–99)
GRAM STN FLD: SIGNIFICANT CHANGE UP
HCT VFR BLD CALC: 31.4 % — LOW (ref 34.5–45)
HGB BLD-MCNC: 9.7 G/DL — LOW (ref 11.5–15.5)
IMM GRANULOCYTES NFR BLD AUTO: 0.4 % — SIGNIFICANT CHANGE UP (ref 0–1.5)
LYMPHOCYTES # BLD AUTO: 1.3 K/UL — SIGNIFICANT CHANGE UP (ref 1–3.3)
LYMPHOCYTES # BLD AUTO: 11.6 % — LOW (ref 13–44)
MAGNESIUM SERPL-MCNC: 1.9 MG/DL — SIGNIFICANT CHANGE UP (ref 1.6–2.6)
MCHC RBC-ENTMCNC: 30.2 PG — SIGNIFICANT CHANGE UP (ref 27–34)
MCHC RBC-ENTMCNC: 30.9 GM/DL — LOW (ref 32–36)
MCV RBC AUTO: 97.8 FL — SIGNIFICANT CHANGE UP (ref 80–100)
MONOCYTES # BLD AUTO: 1.05 K/UL — HIGH (ref 0–0.9)
MONOCYTES NFR BLD AUTO: 9.4 % — SIGNIFICANT CHANGE UP (ref 2–14)
NEUTROPHILS # BLD AUTO: 8.5 K/UL — HIGH (ref 1.8–7.4)
NEUTROPHILS NFR BLD AUTO: 75.8 % — SIGNIFICANT CHANGE UP (ref 43–77)
NRBC # BLD: 0 /100 WBCS — SIGNIFICANT CHANGE UP (ref 0–0)
PHOSPHATE SERPL-MCNC: 2.8 MG/DL — SIGNIFICANT CHANGE UP (ref 2.5–4.5)
PLATELET # BLD AUTO: 299 K/UL — SIGNIFICANT CHANGE UP (ref 150–400)
POTASSIUM SERPL-MCNC: 4.1 MMOL/L — SIGNIFICANT CHANGE UP (ref 3.5–5.3)
POTASSIUM SERPL-SCNC: 4.1 MMOL/L — SIGNIFICANT CHANGE UP (ref 3.5–5.3)
PROCALCITONIN SERPL-MCNC: 0.08 NG/ML — SIGNIFICANT CHANGE UP (ref 0.02–0.1)
PROT SERPL-MCNC: 5.3 G/DL — LOW (ref 6–8.3)
RBC # BLD: 3.21 M/UL — LOW (ref 3.8–5.2)
RBC # FLD: 12.9 % — SIGNIFICANT CHANGE UP (ref 10.3–14.5)
SARS-COV-2 RNA SPEC QL NAA+PROBE: DETECTED
SODIUM SERPL-SCNC: 143 MMOL/L — SIGNIFICANT CHANGE UP (ref 135–145)
SPECIMEN SOURCE: SIGNIFICANT CHANGE UP
VANCOMYCIN TROUGH SERPL-MCNC: 12 UG/ML — SIGNIFICANT CHANGE UP (ref 10–20)
WBC # BLD: 11.21 K/UL — HIGH (ref 3.8–10.5)
WBC # FLD AUTO: 11.21 K/UL — HIGH (ref 3.8–10.5)

## 2020-04-17 PROCEDURE — 99291 CRITICAL CARE FIRST HOUR: CPT | Mod: CS

## 2020-04-17 RX ADMIN — PIPERACILLIN AND TAZOBACTAM 200 GRAM(S): 4; .5 INJECTION, POWDER, LYOPHILIZED, FOR SOLUTION INTRAVENOUS at 12:11

## 2020-04-17 RX ADMIN — PIPERACILLIN AND TAZOBACTAM 200 GRAM(S): 4; .5 INJECTION, POWDER, LYOPHILIZED, FOR SOLUTION INTRAVENOUS at 05:06

## 2020-04-17 RX ADMIN — SCOPALAMINE 1 PATCH: 1 PATCH, EXTENDED RELEASE TRANSDERMAL at 12:17

## 2020-04-17 RX ADMIN — Medication 250 MILLIGRAM(S): at 12:18

## 2020-04-17 RX ADMIN — APIXABAN 5 MILLIGRAM(S): 2.5 TABLET, FILM COATED ORAL at 12:11

## 2020-04-17 RX ADMIN — ROBINUL 0.2 MILLIGRAM(S): 0.2 INJECTION INTRAMUSCULAR; INTRAVENOUS at 18:00

## 2020-04-17 RX ADMIN — APIXABAN 5 MILLIGRAM(S): 2.5 TABLET, FILM COATED ORAL at 23:05

## 2020-04-17 RX ADMIN — SCOPALAMINE 1 PATCH: 1 PATCH, EXTENDED RELEASE TRANSDERMAL at 07:17

## 2020-04-17 RX ADMIN — ROBINUL 0.2 MILLIGRAM(S): 0.2 INJECTION INTRAMUSCULAR; INTRAVENOUS at 05:06

## 2020-04-17 RX ADMIN — CHLORHEXIDINE GLUCONATE 1 APPLICATION(S): 213 SOLUTION TOPICAL at 05:06

## 2020-04-17 RX ADMIN — PIPERACILLIN AND TAZOBACTAM 200 GRAM(S): 4; .5 INJECTION, POWDER, LYOPHILIZED, FOR SOLUTION INTRAVENOUS at 23:05

## 2020-04-17 RX ADMIN — SCOPALAMINE 1 PATCH: 1 PATCH, EXTENDED RELEASE TRANSDERMAL at 19:00

## 2020-04-17 RX ADMIN — SCOPALAMINE 1 PATCH: 1 PATCH, EXTENDED RELEASE TRANSDERMAL at 12:00

## 2020-04-17 RX ADMIN — PANTOPRAZOLE SODIUM 40 MILLIGRAM(S): 20 TABLET, DELAYED RELEASE ORAL at 14:41

## 2020-04-17 RX ADMIN — ROBINUL 0.2 MILLIGRAM(S): 0.2 INJECTION INTRAMUSCULAR; INTRAVENOUS at 23:05

## 2020-04-17 RX ADMIN — ROBINUL 0.2 MILLIGRAM(S): 0.2 INJECTION INTRAMUSCULAR; INTRAVENOUS at 12:12

## 2020-04-17 RX ADMIN — PIPERACILLIN AND TAZOBACTAM 200 GRAM(S): 4; .5 INJECTION, POWDER, LYOPHILIZED, FOR SOLUTION INTRAVENOUS at 18:00

## 2020-04-17 NOTE — PROGRESS NOTE ADULT - SUBJECTIVE AND OBJECTIVE BOX
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 for AHRF, intubated, sedated on propofol tx to [facility] for ICU care. Extubated x2 and doing well on non-rebreather. Switched to nasal cannula 4/13, tolerating.     INTERVAL HPI/OVERNIGHT EVENTS:  no acute events overnight    MEDICATIONS  (STANDING):  apixaban 5 milliGRAM(s) Oral every 12 hours  chlorhexidine 2% Cloths 1 Application(s) Topical <User Schedule>  dextrose 5%. 1000 milliLiter(s) (50 mL/Hr) IV Continuous <Continuous>  dextrose 50% Injectable 12.5 Gram(s) IV Push once  dextrose 50% Injectable 25 Gram(s) IV Push once  dextrose 50% Injectable 25 Gram(s) IV Push once  glycopyrrolate Injectable 0.2 milliGRAM(s) IV Push every 6 hours  insulin lispro (HumaLOG) corrective regimen sliding scale   SubCutaneous every 6 hours  pantoprazole   Suspension 40 milliGRAM(s) Oral daily  piperacillin/tazobactam IVPB.. 3.375 Gram(s) IV Intermittent every 6 hours  scopolamine   Patch 1 Patch Transdermal every 72 hours  sodium chloride 3%  Inhalation 4 milliLiter(s) Inhalation once  vancomycin  IVPB      vancomycin  IVPB 750 milliGRAM(s) IV Intermittent every 12 hours    MEDICATIONS  (PRN):  acetaminophen    Suspension .. 650 milliGRAM(s) Oral every 6 hours PRN Temp greater or equal to 38C (100.4F), Mild Pain (1 - 3)  dextrose 40% Gel 15 Gram(s) Oral once PRN Blood Glucose LESS THAN 70 milliGRAM(s)/deciliter  glucagon  Injectable 1 milliGRAM(s) IntraMuscular once PRN Glucose LESS THAN 70 milligrams/deciliter  sodium chloride 0.9% lock flush 10 milliLiter(s) IV Push every 1 hour PRN Pre/post blood products, medications, blood draw, and to maintain line patency    ICU Vital Signs Last 24 Hrs  T(C): 36.7 (17 Apr 2020 09:00), Max: 37.5 (17 Apr 2020 05:29)  T(F): 98.1 (17 Apr 2020 09:00), Max: 99.5 (17 Apr 2020 05:29)  HR: 84 (17 Apr 2020 12:00) (71 - 101)  BP: --  BP(mean): --  ABP: 142/61 (17 Apr 2020 12:00) (129/55 - 172/72)  ABP(mean): 86 (17 Apr 2020 12:00) (74 - 111)  RR: 22 (17 Apr 2020 12:00) (18 - 42)  SpO2: 98% (17 Apr 2020 12:00) (65% - 100%)    I&O's Summary    16 Apr 2020 07:01  -  17 Apr 2020 07:00  --------------------------------------------------------  IN: 830 mL / OUT: 750 mL / NET: 80 mL    17 Apr 2020 07:01  -  17 Apr 2020 12:17  --------------------------------------------------------  IN: 60 mL / OUT: 250 mL / NET: -190 mL    Physical Exam:  HEENT: NC/AT; EOMI, PERRL, clear conjunctiva, no nasal or oropharyngeal discharge or exudates, MMM  Neck: supple, no cervical or post-auricular lymphadenopathy  Cardiovascular: +S1/S2, no murmurs/rubs/gallops, RRR  Respiratory: CTA B/L, no diminished breath sounds, no wheezes/rales/rhonchi, no increased work of breathing or accessory muscle use  Gastrointestinal: soft, NT/ND; active BSx4 quadrants  Genitourinary: no suprapubic tenderness, no CVA tenderness  Extremities: WWP; no edema, clubbing or cyanosis  Vascular: 2+ radial, DP/PT pulses B/L  Skin: no rashes     Labs:  COVID:  COVID-19 PCR: Detected (04-15-20 @ 11:23)               *** INCOMPLETE ***  HOSPITAL COURSE:    OVERNIGHT EVENTS:     INTERVAL EVENTS:    SUBJECTIVE HPI: Patient seen and examined at bedside.      VITAL SIGNS:  ICU Vital Signs Last 24 Hrs  T(C): 36.7 (17 Apr 2020 09:00), Max: 37.5 (17 Apr 2020 05:29)  T(F): 98.1 (17 Apr 2020 09:00), Max: 99.5 (17 Apr 2020 05:29)  HR: 84 (17 Apr 2020 12:00) (71 - 101)  BP: --  BP(mean): --  ABP: 142/61 (17 Apr 2020 12:00) (129/55 - 172/72)  ABP(mean): 86 (17 Apr 2020 12:00) (74 - 111)  RR: 22 (17 Apr 2020 12:00) (18 - 42)  SpO2: 98% (17 Apr 2020 12:00) (65% - 100%)      I&O's Summary    16 Apr 2020 07:01  -  17 Apr 2020 07:00  --------------------------------------------------------  IN: 830 mL / OUT: 750 mL / NET: 80 mL    17 Apr 2020 07:01  -  17 Apr 2020 12:18  --------------------------------------------------------  IN: 60 mL / OUT: 250 mL / NET: -190 mL        Ventilator settings:      PHYSICAL EXAM:  General: Comfortable, pleasant/anxious/agitated, Ill-appearing, well-nourished/frail/cachectic, comfortable / in distress  Neurological: AAOx3, no focal deficits  HEENT: NC/AT; EOMI, PERRL, clear conjunctiva, no nasal or oropharyngeal discharge or exudates, MMM  Neck: supple, no cervical or post-auricular lymphadenopathy  Cardiovascular: +S1/S2, no murmurs/rubs/gallops, RRR  Respiratory: CTA B/L, no diminished breath sounds, no wheezes/rales/rhonchi, no increased work of breathing or accessory muscle use  Gastrointestinal: soft, NT/ND; active BSx4 quadrants  Genitourinary: no suprapubic tenderness, no CVA tenderness  Extremities: WWP; no edema, clubbing or cyanosis  Vascular: 2+ radial, DP/PT pulses B/L  Skin: no rashes  Lines/Drains:       MEDICATIONS:  MEDICATIONS  (STANDING):  apixaban 5 milliGRAM(s) Oral every 12 hours  chlorhexidine 2% Cloths 1 Application(s) Topical <User Schedule>  dextrose 5%. 1000 milliLiter(s) (50 mL/Hr) IV Continuous <Continuous>  dextrose 50% Injectable 12.5 Gram(s) IV Push once  dextrose 50% Injectable 25 Gram(s) IV Push once  dextrose 50% Injectable 25 Gram(s) IV Push once  glycopyrrolate Injectable 0.2 milliGRAM(s) IV Push every 6 hours  insulin lispro (HumaLOG) corrective regimen sliding scale   SubCutaneous every 6 hours  pantoprazole   Suspension 40 milliGRAM(s) Oral daily  piperacillin/tazobactam IVPB.. 3.375 Gram(s) IV Intermittent every 6 hours  scopolamine   Patch 1 Patch Transdermal every 72 hours  sodium chloride 3%  Inhalation 4 milliLiter(s) Inhalation once  vancomycin  IVPB      vancomycin  IVPB 750 milliGRAM(s) IV Intermittent every 12 hours    MEDICATIONS  (PRN):  acetaminophen    Suspension .. 650 milliGRAM(s) Oral every 6 hours PRN Temp greater or equal to 38C (100.4F), Mild Pain (1 - 3)  dextrose 40% Gel 15 Gram(s) Oral once PRN Blood Glucose LESS THAN 70 milliGRAM(s)/deciliter  glucagon  Injectable 1 milliGRAM(s) IntraMuscular once PRN Glucose LESS THAN 70 milligrams/deciliter  sodium chloride 0.9% lock flush 10 milliLiter(s) IV Push every 1 hour PRN Pre/post blood products, medications, blood draw, and to maintain line patency      ALLERGIES/INTOLERANCES:  Allergies    ace inhibitors (Anaphylaxis)  caffeine (Rash)  chocolate (Rash)  high acid (Rash)  Tomatoes (Hives)    Intolerances    LABS:                      9.7    11.21 )-----------( 299      ( 17 Apr 2020 03:48 )             31.4     Auto Neutrophil %: 75.8 % (04-17-20 @ 03:48)  Auto Lymphocyte #: 1.30 K/uL (04-17-20 @ 03:48)    04-17    143  |  104  |  14  ----------------------------<  111<H>  4.1   |  33<H>  |  0.44<L>    Ca    8.3<L>      17 Apr 2020 03:48  Phos  2.8     04-17  Mg     1.9     04-17    TPro  5.3<L>  /  Alb  2.8<L>  /  TBili  0.4  /  DBili  x   /  AST  19  /  ALT  16  /  AlkPhos  43  04-17      PTT - ( 16 Apr 2020 07:06 )  PTT:28.7 sec    CoVID-specific labs:  D-Dimer Assay, Quantitative: 228 ng/mL DDU (04-17-20 @ 03:48)  Ferritin, Serum: 213 ng/mL <H> (04-17-20 @ 03:48)  Procalcitonin, Serum: 0.08 ng/mL (04-17-20 @ 03:48)    RADIOLOGY, EKG AND ADDITIONAL TESTS: Reviewed.    Plan:  74 F with pmhx of CP, MR, hx of DVT/PE, GERD, chronic PEG, HTN spastic quadroplegia living in rehab, presenting to [facility] for respiratory failure secondary to COVID     Neuro:  - awake   - comfortable     CV:   - off levo    Pulm:  - intubated on arrival to [facility]  - extubated and re-intubated  - extubated 4/12  - nasal cannula   - cont glyccopyrolate    ID:  - finished plaquenil & azithromycin  - finished with solumedrol   - repeat COVID swab & sputum: positive, new sputum and nasopharyngeal COVID swabs ordered 4/17  - resume vanc/zosyn empiric for inc WBC w/ bands  - f/u vanc trough 23:30     Nephro:  - HLIV  - f/u U/O  - lu to gravity     GI:  - G-tube  - Jevity 30ml/hr  - PPI daily    Endo:   - follow-up FS    Prophy:  - eliquis    PT: out of bed to chair today     Code: FULL CODE  Disposition: Patient requires continued monitoring in MICU    All above d/w Dr. Castro

## 2020-04-17 NOTE — CHART NOTE - NSCHARTNOTEFT_GEN_A_CORE
Admitting Diagnosis:   Patient is a 74y old  Female who presents with a chief complaint of covid (16 Apr 2020 08:02)      PAST MEDICAL & SURGICAL HISTORY:  HTN (hypertension)  Spastic quadriplegia  DVT (deep venous thrombosis)  PE (pulmonary thromboembolism)  GERD (gastroesophageal reflux disease)  Dysphagia  Mental retardation  CP (cerebral palsy)  PEG tube malfunction      Current Nutrition Order: Jevity 1.5 goal rate 30ml/hr 1PS/day x24hrs via PEG 4/5   PO Intake: Good (%) [   ]  Fair (50-75%) [   ] Poor (<25%) [   ]- Please See Below   GI Issues:  +BM 4/16 large mucoid x2, 4/14 loose x3; Noted CDIFF negative results; Antibiotic use noted + Bowel regieme noted as well   Pain: none per flow sheets   Skin: 1+ edema BL leg / no pressure ulcer     Labs:   04-17    143  |  104  |  14  ----------------------------<  111<H>  4.1   |  33<H>  |  0.44<L>    Ca    8.3<L>      17 Apr 2020 03:48  Phos  2.8     04-17  Mg     1.9     04-17    TPro  5.3<L>  /  Alb  2.8<L>  /  TBili  0.4  /  DBili  x   /  AST  19  /  ALT  16  /  AlkPhos  43  04-17    CAPILLARY BLOOD GLUCOSE      POCT Blood Glucose.: 97 mg/dL (17 Apr 2020 05:42)  POCT Blood Glucose.: 93 mg/dL (16 Apr 2020 23:26)  POCT Blood Glucose.: 101 mg/dL (16 Apr 2020 18:26)  POCT Blood Glucose.: 93 mg/dL (16 Apr 2020 10:54)      Medications:  MEDICATIONS  (STANDING):  apixaban 5 milliGRAM(s) Oral every 12 hours  chlorhexidine 2% Cloths 1 Application(s) Topical <User Schedule>  dextrose 5%. 1000 milliLiter(s) (50 mL/Hr) IV Continuous <Continuous>  dextrose 50% Injectable 12.5 Gram(s) IV Push once  dextrose 50% Injectable 25 Gram(s) IV Push once  dextrose 50% Injectable 25 Gram(s) IV Push once  glycopyrrolate Injectable 0.2 milliGRAM(s) IV Push every 6 hours  insulin lispro (HumaLOG) corrective regimen sliding scale   SubCutaneous every 6 hours  pantoprazole   Suspension 40 milliGRAM(s) Oral daily  piperacillin/tazobactam IVPB.. 3.375 Gram(s) IV Intermittent every 6 hours  scopolamine   Patch 1 Patch Transdermal every 72 hours  sodium chloride 3%  Inhalation 4 milliLiter(s) Inhalation once  vancomycin  IVPB      vancomycin  IVPB 750 milliGRAM(s) IV Intermittent every 12 hours    MEDICATIONS  (PRN):  acetaminophen    Suspension .. 650 milliGRAM(s) Oral every 6 hours PRN Temp greater or equal to 38C (100.4F), Mild Pain (1 - 3)  dextrose 40% Gel 15 Gram(s) Oral once PRN Blood Glucose LESS THAN 70 milliGRAM(s)/deciliter  glucagon  Injectable 1 milliGRAM(s) IntraMuscular once PRN Glucose LESS THAN 70 milligrams/deciliter  sodium chloride 0.9% lock flush 10 milliLiter(s) IV Push every 1 hour PRN Pre/post blood products, medications, blood draw, and to maintain line patency    4'7'' IBW 90 pounds +-10%  (4/3) 122 pounds   (4/6) 117 pounds  (4/8) 116.6 pounds   (4/12) 116.6 pounds   %WAP=647% BMI=27.19  Based on most recent EMR wt    Nutrition Focused Physical Exam: Completed [   ]  Not Pertinent [   ]- NA d/t COVID     Estimated energy needs:   IBW used to calculate energy needs due to pt's current body weight exceeding 120% of IBW   Needs adjusted for age based on hypermetabolic state 2/2 viral infection  1025-1230kcal/day (25-30kcal/kg)  49-57g protein/day (1.2-1.4g pro/kg)  Fluids per team     Subjective:   74F with hx of CP, MR, hx of DVT/PE, GERD, chronic PEG, HTN 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, presented to [facility] for AHRF, intubated, sedated on propofol (5 ml/hr providing 132kcal) tx to [facility] for ICU care 4/3. Pt COVID positive with coronavirus pna, acute hypoxemic respiratory failure/ARDS. Pt extubated to NRB 4/4, however later re-intubated. Pt extuabted 4/6 to NRB. Pt reintuabted 4/8. Extubated for third time 4/12, doing well on non-rebreather and Switched to nasal cannula 4/13, noted to be tolerating.   Current TF order for Jevity 1.5 goal rate 30ml/hr x24hrs +1PS/day, provides 720ml, 1180kcal, 61gm prot, 547ml water.   Allergic to caffeine, high acid, and chocolate, tomatoes- noted in EMR.  Please see below for nutritions recommendations. Recs made with team.     Previous Nutrition Diagnosis: Inadequate Energy Intake Etiology RT inability to meet est needs 2/2 acute critical status Signs/Symptoms AEB NPO diet meeting 0% EER.   RESOLVED - order for EN 4/5   Previous Nutrition Diagnosis: Increased nutrient needs RT increased demand for energy and protein AEB hypermetabolic state 2/2 viral infection (COVID19+)   ON GOING AT THIS TIME   Goal/Expected Outcome: Pt will meet at least 75% of nutrient needs     Recommendations:  1. Current TF order for Jevity 1.5 goal rate 30ml/hr x24hrs +1PS/day, provides 720ml, 1180kcal, 61gm prot, 547ml water - meeting EER.  >> Given pump shortage d/t COVID 19, if bolus feeds feasible, consider use of 4 cans Jevity 1.2/day, will provide ~948ml, 1140kcal, 53gm prot.   2. Monitor tolerance, GI distress - if diarrhea, fiber agent vs protobitc use.   3. Monitor Skin, Labs, Wts, GOC.  4. Micro-nutrients/Vitamins/Minerals per team.  5. RD to remain available for additional nutrition interventions as needed.     Education: NA   Risk Level: High [ X  ] Moderate [   ] Low [   ]. Admitting Diagnosis:   Patient is a 74y old  Female who presents with a chief complaint of covid (16 Apr 2020 08:02)      PAST MEDICAL & SURGICAL HISTORY:  HTN (hypertension)  Spastic quadriplegia  DVT (deep venous thrombosis)  PE (pulmonary thromboembolism)  GERD (gastroesophageal reflux disease)  Dysphagia  Mental retardation  CP (cerebral palsy)  PEG tube malfunction      Current Nutrition Order: Jevity 1.5 goal rate 30ml/hr 1PS/day x24hrs via PEG 4/5   PO Intake: Good (%) [   ]  Fair (50-75%) [   ] Poor (<25%) [   ]- Please See Below   GI Issues:  +BM 4/16 large mucoid x2, 4/14 loose x3; Noted CDIFF negative results; Antibiotic use noted   Pain: none per flow sheets   Skin: 1+ edema BL leg / no pressure ulcer     Labs:   04-17    143  |  104  |  14  ----------------------------<  111<H>  4.1   |  33<H>  |  0.44<L>    Ca    8.3<L>      17 Apr 2020 03:48  Phos  2.8     04-17  Mg     1.9     04-17    TPro  5.3<L>  /  Alb  2.8<L>  /  TBili  0.4  /  DBili  x   /  AST  19  /  ALT  16  /  AlkPhos  43  04-17    CAPILLARY BLOOD GLUCOSE      POCT Blood Glucose.: 97 mg/dL (17 Apr 2020 05:42)  POCT Blood Glucose.: 93 mg/dL (16 Apr 2020 23:26)  POCT Blood Glucose.: 101 mg/dL (16 Apr 2020 18:26)  POCT Blood Glucose.: 93 mg/dL (16 Apr 2020 10:54)      Medications:  MEDICATIONS  (STANDING):  apixaban 5 milliGRAM(s) Oral every 12 hours  chlorhexidine 2% Cloths 1 Application(s) Topical <User Schedule>  dextrose 5%. 1000 milliLiter(s) (50 mL/Hr) IV Continuous <Continuous>  dextrose 50% Injectable 12.5 Gram(s) IV Push once  dextrose 50% Injectable 25 Gram(s) IV Push once  dextrose 50% Injectable 25 Gram(s) IV Push once  glycopyrrolate Injectable 0.2 milliGRAM(s) IV Push every 6 hours  insulin lispro (HumaLOG) corrective regimen sliding scale   SubCutaneous every 6 hours  pantoprazole   Suspension 40 milliGRAM(s) Oral daily  piperacillin/tazobactam IVPB.. 3.375 Gram(s) IV Intermittent every 6 hours  scopolamine   Patch 1 Patch Transdermal every 72 hours  sodium chloride 3%  Inhalation 4 milliLiter(s) Inhalation once  vancomycin  IVPB      vancomycin  IVPB 750 milliGRAM(s) IV Intermittent every 12 hours    MEDICATIONS  (PRN):  acetaminophen    Suspension .. 650 milliGRAM(s) Oral every 6 hours PRN Temp greater or equal to 38C (100.4F), Mild Pain (1 - 3)  dextrose 40% Gel 15 Gram(s) Oral once PRN Blood Glucose LESS THAN 70 milliGRAM(s)/deciliter  glucagon  Injectable 1 milliGRAM(s) IntraMuscular once PRN Glucose LESS THAN 70 milligrams/deciliter  sodium chloride 0.9% lock flush 10 milliLiter(s) IV Push every 1 hour PRN Pre/post blood products, medications, blood draw, and to maintain line patency    4'7'' IBW 90 pounds +-10%  (4/3) 122 pounds   (4/6) 117 pounds  (4/8) 116.6 pounds   (4/12) 116.6 pounds   %HKJ=480% BMI=27.19  Based on most recent EMR wt    Nutrition Focused Physical Exam: Completed [   ]  Not Pertinent [   ]- NA d/t COVID     Estimated energy needs:   IBW used to calculate energy needs due to pt's current body weight exceeding 120% of IBW   Needs adjusted for age based on hypermetabolic state 2/2 viral infection  1025-1230kcal/day (25-30kcal/kg)  49-57g protein/day (1.2-1.4g pro/kg)  Fluids per team     Subjective:   74F with hx of CP, MR, hx of DVT/PE, GERD, chronic PEG, HTN 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, presented to [facility] for AHRF, intubated, sedated on propofol (5 ml/hr providing 132kcal) tx to [facility] for ICU care 4/3. Pt COVID positive with coronavirus pna, acute hypoxemic respiratory failure/ARDS. Pt extubated to NRB 4/4, however later re-intubated. Pt extuabted 4/6 to NRB. Pt reintuabted 4/8. Extubated for third time 4/12, doing well on non-rebreather and Switched to nasal cannula 4/13, noted to be tolerating.   Current TF order for Jevity 1.5 goal rate 30ml/hr x24hrs +1PS/day, provides 720ml, 1180kcal, 61gm prot, 547ml water. Spoke with RN who reports not yet assessing TF tolerance yet, however reports no issues with TF from night shift RN.   Allergic to caffeine, high acid, and chocolate, tomatoes- noted in EMR.  Please see below for nutritions recommendations. Recs made with team.     Previous Nutrition Diagnosis: Inadequate Energy Intake Etiology RT inability to meet est needs 2/2 acute critical status Signs/Symptoms AEB NPO diet meeting 0% EER.   RESOLVED - order for EN 4/5   Previous Nutrition Diagnosis: Increased nutrient needs RT increased demand for energy and protein AEB hypermetabolic state 2/2 viral infection (COVID19+)   ON GOING AT THIS TIME   Goal/Expected Outcome: Pt will meet at least 75% of nutrient needs     Recommendations:  1. Current TF order for Jevity 1.5 goal rate 30ml/hr x24hrs +1PS/day, provides 720ml, 1180kcal, 61gm prot, 547ml water - meeting EER.  >> Given pump shortage d/t COVID 19, if bolus feeds feasible, consider use of 4 cans Jevity 1.2/day, will provide ~948ml, 1140kcal, 53gm prot.   2. Monitor tolerance, GI distress - if diarrhea, fiber agent vs protobitc use.   3. Monitor Skin, Labs, Wts, GOC.  4. Micro-nutrients/Vitamins/Minerals per team.  5. RD to remain available for additional nutrition interventions as needed.     Education: NA   Risk Level: High [ X  ] Moderate [   ] Low [   ].

## 2020-04-18 LAB
ALBUMIN SERPL ELPH-MCNC: 2.7 G/DL — LOW (ref 3.3–5)
ALP SERPL-CCNC: 50 U/L — SIGNIFICANT CHANGE UP (ref 40–120)
ALT FLD-CCNC: 14 U/L — SIGNIFICANT CHANGE UP (ref 10–45)
ANION GAP SERPL CALC-SCNC: 4 MMOL/L — LOW (ref 5–17)
AST SERPL-CCNC: 18 U/L — SIGNIFICANT CHANGE UP (ref 10–40)
BASOPHILS # BLD AUTO: 0.01 K/UL — SIGNIFICANT CHANGE UP (ref 0–0.2)
BASOPHILS NFR BLD AUTO: 0.1 % — SIGNIFICANT CHANGE UP (ref 0–2)
BILIRUB SERPL-MCNC: 0.4 MG/DL — SIGNIFICANT CHANGE UP (ref 0.2–1.2)
BUN SERPL-MCNC: 12 MG/DL — SIGNIFICANT CHANGE UP (ref 7–23)
CALCIUM SERPL-MCNC: 8.9 MG/DL — SIGNIFICANT CHANGE UP (ref 8.4–10.5)
CHLORIDE SERPL-SCNC: 101 MMOL/L — SIGNIFICANT CHANGE UP (ref 96–108)
CO2 SERPL-SCNC: 36 MMOL/L — HIGH (ref 22–31)
CREAT SERPL-MCNC: 0.55 MG/DL — SIGNIFICANT CHANGE UP (ref 0.5–1.3)
CRP SERPL-MCNC: 3.65 MG/DL — HIGH (ref 0–0.4)
D DIMER BLD IA.RAPID-MCNC: 308 NG/ML DDU — HIGH
EOSINOPHIL # BLD AUTO: 0.25 K/UL — SIGNIFICANT CHANGE UP (ref 0–0.5)
EOSINOPHIL NFR BLD AUTO: 2.2 % — SIGNIFICANT CHANGE UP (ref 0–6)
FERRITIN SERPL-MCNC: 217 NG/ML — HIGH (ref 15–150)
GLUCOSE BLDC GLUCOMTR-MCNC: 105 MG/DL — HIGH (ref 70–99)
GLUCOSE BLDC GLUCOMTR-MCNC: 129 MG/DL — HIGH (ref 70–99)
GLUCOSE BLDC GLUCOMTR-MCNC: 90 MG/DL — SIGNIFICANT CHANGE UP (ref 70–99)
GLUCOSE BLDC GLUCOMTR-MCNC: 99 MG/DL — SIGNIFICANT CHANGE UP (ref 70–99)
GLUCOSE SERPL-MCNC: 145 MG/DL — HIGH (ref 70–99)
HCT VFR BLD CALC: 29.6 % — LOW (ref 34.5–45)
HGB BLD-MCNC: 9.1 G/DL — LOW (ref 11.5–15.5)
IMM GRANULOCYTES NFR BLD AUTO: 0.4 % — SIGNIFICANT CHANGE UP (ref 0–1.5)
LYMPHOCYTES # BLD AUTO: 1.26 K/UL — SIGNIFICANT CHANGE UP (ref 1–3.3)
LYMPHOCYTES # BLD AUTO: 11.2 % — LOW (ref 13–44)
MAGNESIUM SERPL-MCNC: 1.7 MG/DL — SIGNIFICANT CHANGE UP (ref 1.6–2.6)
MCHC RBC-ENTMCNC: 30.6 PG — SIGNIFICANT CHANGE UP (ref 27–34)
MCHC RBC-ENTMCNC: 30.7 GM/DL — LOW (ref 32–36)
MCV RBC AUTO: 99.7 FL — SIGNIFICANT CHANGE UP (ref 80–100)
MONOCYTES # BLD AUTO: 0.91 K/UL — HIGH (ref 0–0.9)
MONOCYTES NFR BLD AUTO: 8.1 % — SIGNIFICANT CHANGE UP (ref 2–14)
NEUTROPHILS # BLD AUTO: 8.8 K/UL — HIGH (ref 1.8–7.4)
NEUTROPHILS NFR BLD AUTO: 78 % — HIGH (ref 43–77)
NRBC # BLD: 0 /100 WBCS — SIGNIFICANT CHANGE UP (ref 0–0)
PHOSPHATE SERPL-MCNC: 2.8 MG/DL — SIGNIFICANT CHANGE UP (ref 2.5–4.5)
PLATELET # BLD AUTO: 295 K/UL — SIGNIFICANT CHANGE UP (ref 150–400)
POTASSIUM SERPL-MCNC: 3.9 MMOL/L — SIGNIFICANT CHANGE UP (ref 3.5–5.3)
POTASSIUM SERPL-SCNC: 3.9 MMOL/L — SIGNIFICANT CHANGE UP (ref 3.5–5.3)
PROCALCITONIN SERPL-MCNC: 0.05 NG/ML — SIGNIFICANT CHANGE UP (ref 0.02–0.1)
PROT SERPL-MCNC: 5.5 G/DL — LOW (ref 6–8.3)
RBC # BLD: 2.97 M/UL — LOW (ref 3.8–5.2)
RBC # FLD: 13.1 % — SIGNIFICANT CHANGE UP (ref 10.3–14.5)
SODIUM SERPL-SCNC: 141 MMOL/L — SIGNIFICANT CHANGE UP (ref 135–145)
WBC # BLD: 11.28 K/UL — HIGH (ref 3.8–10.5)
WBC # FLD AUTO: 11.28 K/UL — HIGH (ref 3.8–10.5)

## 2020-04-18 PROCEDURE — 99291 CRITICAL CARE FIRST HOUR: CPT | Mod: CS

## 2020-04-18 RX ORDER — VANCOMYCIN HCL 1 G
750 VIAL (EA) INTRAVENOUS EVERY 12 HOURS
Refills: 0 | Status: COMPLETED | OUTPATIENT
Start: 2020-04-18 | End: 2020-04-19

## 2020-04-18 RX ORDER — FUROSEMIDE 40 MG
40 TABLET ORAL ONCE
Refills: 0 | Status: DISCONTINUED | OUTPATIENT
Start: 2020-04-18 | End: 2020-04-18

## 2020-04-18 RX ORDER — FUROSEMIDE 40 MG
20 TABLET ORAL ONCE
Refills: 0 | Status: COMPLETED | OUTPATIENT
Start: 2020-04-18 | End: 2020-04-18

## 2020-04-18 RX ORDER — NYSTATIN CREAM 100000 [USP'U]/G
1 CREAM TOPICAL
Refills: 0 | Status: DISCONTINUED | OUTPATIENT
Start: 2020-04-18 | End: 2020-04-21

## 2020-04-18 RX ORDER — MAGNESIUM SULFATE 500 MG/ML
2 VIAL (ML) INJECTION ONCE
Refills: 0 | Status: COMPLETED | OUTPATIENT
Start: 2020-04-18 | End: 2020-04-18

## 2020-04-18 RX ADMIN — PIPERACILLIN AND TAZOBACTAM 200 GRAM(S): 4; .5 INJECTION, POWDER, LYOPHILIZED, FOR SOLUTION INTRAVENOUS at 23:11

## 2020-04-18 RX ADMIN — PIPERACILLIN AND TAZOBACTAM 200 GRAM(S): 4; .5 INJECTION, POWDER, LYOPHILIZED, FOR SOLUTION INTRAVENOUS at 11:02

## 2020-04-18 RX ADMIN — APIXABAN 5 MILLIGRAM(S): 2.5 TABLET, FILM COATED ORAL at 11:03

## 2020-04-18 RX ADMIN — ROBINUL 0.2 MILLIGRAM(S): 0.2 INJECTION INTRAMUSCULAR; INTRAVENOUS at 05:14

## 2020-04-18 RX ADMIN — ROBINUL 0.2 MILLIGRAM(S): 0.2 INJECTION INTRAMUSCULAR; INTRAVENOUS at 23:10

## 2020-04-18 RX ADMIN — SCOPALAMINE 1 PATCH: 1 PATCH, EXTENDED RELEASE TRANSDERMAL at 19:15

## 2020-04-18 RX ADMIN — ROBINUL 0.2 MILLIGRAM(S): 0.2 INJECTION INTRAMUSCULAR; INTRAVENOUS at 11:03

## 2020-04-18 RX ADMIN — APIXABAN 5 MILLIGRAM(S): 2.5 TABLET, FILM COATED ORAL at 23:10

## 2020-04-18 RX ADMIN — Medication 50 GRAM(S): at 06:48

## 2020-04-18 RX ADMIN — Medication 20 MILLIGRAM(S): at 09:10

## 2020-04-18 RX ADMIN — Medication 250 MILLIGRAM(S): at 00:36

## 2020-04-18 RX ADMIN — CHLORHEXIDINE GLUCONATE 1 APPLICATION(S): 213 SOLUTION TOPICAL at 06:31

## 2020-04-18 RX ADMIN — ROBINUL 0.2 MILLIGRAM(S): 0.2 INJECTION INTRAMUSCULAR; INTRAVENOUS at 17:13

## 2020-04-18 RX ADMIN — Medication 650 MILLIGRAM(S): at 06:48

## 2020-04-18 RX ADMIN — PIPERACILLIN AND TAZOBACTAM 200 GRAM(S): 4; .5 INJECTION, POWDER, LYOPHILIZED, FOR SOLUTION INTRAVENOUS at 05:14

## 2020-04-18 RX ADMIN — PIPERACILLIN AND TAZOBACTAM 200 GRAM(S): 4; .5 INJECTION, POWDER, LYOPHILIZED, FOR SOLUTION INTRAVENOUS at 17:14

## 2020-04-18 RX ADMIN — SCOPALAMINE 1 PATCH: 1 PATCH, EXTENDED RELEASE TRANSDERMAL at 06:31

## 2020-04-18 RX ADMIN — NYSTATIN CREAM 1 APPLICATION(S): 100000 CREAM TOPICAL at 19:23

## 2020-04-18 RX ADMIN — Medication 250 MILLIGRAM(S): at 12:04

## 2020-04-18 RX ADMIN — PANTOPRAZOLE SODIUM 40 MILLIGRAM(S): 20 TABLET, DELAYED RELEASE ORAL at 11:03

## 2020-04-18 NOTE — PROGRESS NOTE ADULT - SUBJECTIVE AND OBJECTIVE BOX
Medicine Progress Note    Patient is a 74y old  Female who presents with a chief complaint of covid (17 Apr 2020 12:15)      SUBJECTIVE / OVERNIGHT EVENTS:  Vanco trough 12, redosed vanc. Sputum culture contaminated and rejected, ordered repeat. Mg repleted    MEDICATIONS  (STANDING):  apixaban 5 milliGRAM(s) Oral every 12 hours  chlorhexidine 2% Cloths 1 Application(s) Topical <User Schedule>  dextrose 5%. 1000 milliLiter(s) (50 mL/Hr) IV Continuous <Continuous>  dextrose 50% Injectable 12.5 Gram(s) IV Push once  dextrose 50% Injectable 25 Gram(s) IV Push once  dextrose 50% Injectable 25 Gram(s) IV Push once  glycopyrrolate Injectable 0.2 milliGRAM(s) IV Push every 6 hours  insulin lispro (HumaLOG) corrective regimen sliding scale   SubCutaneous every 6 hours  magnesium sulfate  IVPB 2 Gram(s) IV Intermittent once  pantoprazole   Suspension 40 milliGRAM(s) Oral daily  piperacillin/tazobactam IVPB.. 3.375 Gram(s) IV Intermittent every 6 hours  scopolamine   Patch 1 Patch Transdermal every 72 hours  sodium chloride 3%  Inhalation 4 milliLiter(s) Inhalation once  vancomycin  IVPB 750 milliGRAM(s) IV Intermittent every 12 hours    MEDICATIONS  (PRN):  acetaminophen    Suspension .. 650 milliGRAM(s) Oral every 6 hours PRN Temp greater or equal to 38C (100.4F), Mild Pain (1 - 3)  dextrose 40% Gel 15 Gram(s) Oral once PRN Blood Glucose LESS THAN 70 milliGRAM(s)/deciliter  glucagon  Injectable 1 milliGRAM(s) IntraMuscular once PRN Glucose LESS THAN 70 milligrams/deciliter  sodium chloride 0.9% lock flush 10 milliLiter(s) IV Push every 1 hour PRN Pre/post blood products, medications, blood draw, and to maintain line patency    CAPILLARY BLOOD GLUCOSE      POCT Blood Glucose.: 105 mg/dL (18 Apr 2020 05:30)  POCT Blood Glucose.: 130 mg/dL (17 Apr 2020 23:23)  POCT Blood Glucose.: 112 mg/dL (17 Apr 2020 18:18)  POCT Blood Glucose.: 122 mg/dL (17 Apr 2020 12:51)    I&O's Summary    16 Apr 2020 07:01  -  17 Apr 2020 07:00  --------------------------------------------------------  IN: 830 mL / OUT: 750 mL / NET: 80 mL    17 Apr 2020 07:01  -  18 Apr 2020 06:44  --------------------------------------------------------  IN: 1970 mL / OUT: 950 mL / NET: 1020 mL      Vital Signs Last 24 Hrs  T(C): 37.1 (18 Apr 2020 02:00), Max: 37.1 (18 Apr 2020 02:00)  T(F): 98.8 (18 Apr 2020 02:00), Max: 98.8 (18 Apr 2020 02:00)  HR: 93 (18 Apr 2020 06:00) (72 - 101)  BP: --  BP(mean): --  RR: 25 (18 Apr 2020 06:00) (9 - 41)  SpO2: 94% (18 Apr 2020 06:00) (90% - 100%)    Physical Exam:  HEENT: NC/AT; EOMI, PERRL, clear conjunctiva, no nasal or oropharyngeal discharge or exudates, MMM  Neck: supple, no cervical or post-auricular lymphadenopathy  Cardiovascular: +S1/S2, no murmurs/rubs/gallops, RRR  Respiratory: CTA B/L, no diminished breath sounds, no wheezes/rales/rhonchi, no increased work of breathing or accessory muscle use  Gastrointestinal: soft, NT/ND; active BSx4 quadrants  Genitourinary: no suprapubic tenderness, no CVA tenderness  Extremities: WWP; no edema, clubbing or cyanosis  Vascular: 2+ radial, DP/PT pulses B/L  Skin: no rashes       LABS:                        9.1    11.28 )-----------( 295      ( 18 Apr 2020 03:28 )             29.6     04-18    141  |  101  |  12  ----------------------------<  145<H>  3.9   |  36<H>  |  0.55    Ca    8.9      18 Apr 2020 03:28  Phos  2.8     04-18  Mg     1.7     04-18    TPro  5.5<L>  /  Alb  2.7<L>  /  TBili  0.4  /  DBili  x   /  AST  18  /  ALT  14  /  AlkPhos  50  04-18    PTT - ( 16 Apr 2020 07:06 )  PTT:28.7 sec          Culture - Sputum (collected 17 Apr 2020 14:44)  Source: .Sputum Sputum  Gram Stain (17 Apr 2020 20:49):    Moderate epithelial cells    No WBC's seen.    Moderate Gram Negative Rods  Final Report (17 Apr 2020 20:49):    Sputum specimen rejected.  Microscopic examination indicates    oropharyngeal contamination.  Please repeat.      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, presenting to [facility] for respiratory failure secondary to COVID     PLAN:  Neuro:  - awake   - comfortable     CV:   - off levo    Pulm:  - intubated on arrival to [facility]  - extubated and re-intubated  - extubated 4/12  - nasal cannula   - cont glyccopyrolate    ID:  - finished plaquenil & azithromycin  - finished with solumedrol   - repeat COVID swab & sputum: positive, new sputum and nasopharyngeal COVID swabs ordered 4/17  - resume vanc/zosyn empiric for inc WBC w/ bands  - f/u vanc trough 23:30     Nephro:  - HLIV  - f/u U/O  - lu to gravity     GI:  - G-tube  - Jevity 30ml/hr  - PPI daily    Endo:   - follow-up FS    Prophy:  - eliquis    PT: out of bed to chair today     Code: FULL CODE  Disposition: Patient requires continued monitoring in MICU    All above d/w Dr. Castro Medicine Progress Note    Patient is a 74y old  Female who presents with a chief complaint of covid (17 Apr 2020 12:15)      SUBJECTIVE / OVERNIGHT EVENTS:  Vanco trough 12, redosed vanc. Sputum culture contaminated and rejected, ordered repeat. Mg repleted    MEDICATIONS  (STANDING):  apixaban 5 milliGRAM(s) Oral every 12 hours  chlorhexidine 2% Cloths 1 Application(s) Topical <User Schedule>  dextrose 5%. 1000 milliLiter(s) (50 mL/Hr) IV Continuous <Continuous>  dextrose 50% Injectable 12.5 Gram(s) IV Push once  dextrose 50% Injectable 25 Gram(s) IV Push once  dextrose 50% Injectable 25 Gram(s) IV Push once  glycopyrrolate Injectable 0.2 milliGRAM(s) IV Push every 6 hours  insulin lispro (HumaLOG) corrective regimen sliding scale   SubCutaneous every 6 hours  magnesium sulfate  IVPB 2 Gram(s) IV Intermittent once  pantoprazole   Suspension 40 milliGRAM(s) Oral daily  piperacillin/tazobactam IVPB.. 3.375 Gram(s) IV Intermittent every 6 hours  scopolamine   Patch 1 Patch Transdermal every 72 hours  sodium chloride 3%  Inhalation 4 milliLiter(s) Inhalation once  vancomycin  IVPB 750 milliGRAM(s) IV Intermittent every 12 hours    MEDICATIONS  (PRN):  acetaminophen    Suspension .. 650 milliGRAM(s) Oral every 6 hours PRN Temp greater or equal to 38C (100.4F), Mild Pain (1 - 3)  dextrose 40% Gel 15 Gram(s) Oral once PRN Blood Glucose LESS THAN 70 milliGRAM(s)/deciliter  glucagon  Injectable 1 milliGRAM(s) IntraMuscular once PRN Glucose LESS THAN 70 milligrams/deciliter  sodium chloride 0.9% lock flush 10 milliLiter(s) IV Push every 1 hour PRN Pre/post blood products, medications, blood draw, and to maintain line patency    CAPILLARY BLOOD GLUCOSE      POCT Blood Glucose.: 105 mg/dL (18 Apr 2020 05:30)  POCT Blood Glucose.: 130 mg/dL (17 Apr 2020 23:23)  POCT Blood Glucose.: 112 mg/dL (17 Apr 2020 18:18)  POCT Blood Glucose.: 122 mg/dL (17 Apr 2020 12:51)    I&O's Summary    16 Apr 2020 07:01  -  17 Apr 2020 07:00  --------------------------------------------------------  IN: 830 mL / OUT: 750 mL / NET: 80 mL    17 Apr 2020 07:01  -  18 Apr 2020 06:44  --------------------------------------------------------  IN: 1970 mL / OUT: 950 mL / NET: 1020 mL      Vital Signs Last 24 Hrs  T(C): 37.1 (18 Apr 2020 02:00), Max: 37.1 (18 Apr 2020 02:00)  T(F): 98.8 (18 Apr 2020 02:00), Max: 98.8 (18 Apr 2020 02:00)  HR: 93 (18 Apr 2020 06:00) (72 - 101)  BP: --  BP(mean): --  RR: 25 (18 Apr 2020 06:00) (9 - 41)  SpO2: 94% (18 Apr 2020 06:00) (90% - 100%)    Physical Exam:  HEENT: NC/AT; EOMI, PERRL, clear conjunctiva, no nasal or oropharyngeal discharge or exudates, MMM  Neck: supple, no cervical or post-auricular lymphadenopathy  Cardiovascular: +S1/S2, no murmurs/rubs/gallops, RRR  Respiratory: CTA B/L, no diminished breath sounds, no wheezes/rales/rhonchi, no increased work of breathing or accessory muscle use  Gastrointestinal: soft, NT/ND; active BSx4 quadrants  Genitourinary: no suprapubic tenderness, no CVA tenderness  Extremities: WWP; no edema, clubbing or cyanosis  Vascular: 2+ radial, DP/PT pulses B/L  Skin: no rashes       LABS:                        9.1    11.28 )-----------( 295      ( 18 Apr 2020 03:28 )             29.6     04-18    141  |  101  |  12  ----------------------------<  145<H>  3.9   |  36<H>  |  0.55    Ca    8.9      18 Apr 2020 03:28  Phos  2.8     04-18  Mg     1.7     04-18    TPro  5.5<L>  /  Alb  2.7<L>  /  TBili  0.4  /  DBili  x   /  AST  18  /  ALT  14  /  AlkPhos  50  04-18    PTT - ( 16 Apr 2020 07:06 )  PTT:28.7 sec          Culture - Sputum (collected 17 Apr 2020 14:44)  Source: .Sputum Sputum  Gram Stain (17 Apr 2020 20:49):    Moderate epithelial cells    No WBC's seen.    Moderate Gram Negative Rods  Final Report (17 Apr 2020 20:49):    Sputum specimen rejected.  Microscopic examination indicates    oropharyngeal contamination.  Please repeat.      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 for respiratory failure secondary to COVID     PLAN:  Neuro:  - awake   - comfortable     CV:   - off levo    Pulm:  - intubated on arrival to [facility]  - extubated and re-intubated  - extubated 4/12  - nasal cannula   - cont glyccopyrolate  - given lasix 20mg IV on 4/18    ID:  - finished plaquenil & azithromycin  - finished with solumedrol   - repeat COVID swab & sputum: positive, new sputum and nasopharyngeal COVID swabs ordered 4/17  - resume vanc/zosyn empiric for inc WBC w/ bands  - f/u vanc trough 23:30 12, next vanc trough 4/19 at 11am    Nephro:  - HLIV  - f/u U/O after lasix  - lu to gravity     GI:  - G-tube  - Jevity 30ml/hr  - PPI daily    Endo:   - follow-up FS    Prophy:  - eliquis    PT: out of bed to chair today     Code: FULL CODE  Disposition: Patient requires continued monitoring in MICU    All above d/w Dr. Castro

## 2020-04-19 LAB
ALBUMIN SERPL ELPH-MCNC: 2.7 G/DL — LOW (ref 3.3–5)
ALP SERPL-CCNC: 45 U/L — SIGNIFICANT CHANGE UP (ref 40–120)
ALT FLD-CCNC: 13 U/L — SIGNIFICANT CHANGE UP (ref 10–45)
ANION GAP SERPL CALC-SCNC: 7 MMOL/L — SIGNIFICANT CHANGE UP (ref 5–17)
AST SERPL-CCNC: 17 U/L — SIGNIFICANT CHANGE UP (ref 10–40)
BASE EXCESS BLDA CALC-SCNC: 12.5 MMOL/L — HIGH (ref -2–3)
BASOPHILS # BLD AUTO: 0.02 K/UL — SIGNIFICANT CHANGE UP (ref 0–0.2)
BASOPHILS NFR BLD AUTO: 0.2 % — SIGNIFICANT CHANGE UP (ref 0–2)
BILIRUB SERPL-MCNC: 0.4 MG/DL — SIGNIFICANT CHANGE UP (ref 0.2–1.2)
BUN SERPL-MCNC: 10 MG/DL — SIGNIFICANT CHANGE UP (ref 7–23)
CALCIUM SERPL-MCNC: 8.7 MG/DL — SIGNIFICANT CHANGE UP (ref 8.4–10.5)
CHLORIDE SERPL-SCNC: 99 MMOL/L — SIGNIFICANT CHANGE UP (ref 96–108)
CO2 SERPL-SCNC: 36 MMOL/L — HIGH (ref 22–31)
CREAT SERPL-MCNC: 0.53 MG/DL — SIGNIFICANT CHANGE UP (ref 0.5–1.3)
CRP SERPL-MCNC: 4.19 MG/DL — HIGH (ref 0–0.4)
D DIMER BLD IA.RAPID-MCNC: 198 NG/ML DDU — SIGNIFICANT CHANGE UP
EOSINOPHIL # BLD AUTO: 0.22 K/UL — SIGNIFICANT CHANGE UP (ref 0–0.5)
EOSINOPHIL NFR BLD AUTO: 2.3 % — SIGNIFICANT CHANGE UP (ref 0–6)
FERRITIN SERPL-MCNC: 221 NG/ML — HIGH (ref 15–150)
GLUCOSE BLDC GLUCOMTR-MCNC: 107 MG/DL — HIGH (ref 70–99)
GLUCOSE BLDC GLUCOMTR-MCNC: 122 MG/DL — HIGH (ref 70–99)
GLUCOSE BLDC GLUCOMTR-MCNC: 83 MG/DL — SIGNIFICANT CHANGE UP (ref 70–99)
GLUCOSE BLDC GLUCOMTR-MCNC: 96 MG/DL — SIGNIFICANT CHANGE UP (ref 70–99)
GLUCOSE SERPL-MCNC: 82 MG/DL — SIGNIFICANT CHANGE UP (ref 70–99)
HCO3 BLDA-SCNC: 38 MMOL/L — HIGH (ref 21–28)
HCT VFR BLD CALC: 29.1 % — LOW (ref 34.5–45)
HGB BLD-MCNC: 9.1 G/DL — LOW (ref 11.5–15.5)
IMM GRANULOCYTES NFR BLD AUTO: 0.4 % — SIGNIFICANT CHANGE UP (ref 0–1.5)
LYMPHOCYTES # BLD AUTO: 1.22 K/UL — SIGNIFICANT CHANGE UP (ref 1–3.3)
LYMPHOCYTES # BLD AUTO: 12.9 % — LOW (ref 13–44)
MAGNESIUM SERPL-MCNC: 1.8 MG/DL — SIGNIFICANT CHANGE UP (ref 1.6–2.6)
MCHC RBC-ENTMCNC: 30.7 PG — SIGNIFICANT CHANGE UP (ref 27–34)
MCHC RBC-ENTMCNC: 31.3 GM/DL — LOW (ref 32–36)
MCV RBC AUTO: 98.3 FL — SIGNIFICANT CHANGE UP (ref 80–100)
MONOCYTES # BLD AUTO: 0.67 K/UL — SIGNIFICANT CHANGE UP (ref 0–0.9)
MONOCYTES NFR BLD AUTO: 7.1 % — SIGNIFICANT CHANGE UP (ref 2–14)
NEUTROPHILS # BLD AUTO: 7.27 K/UL — SIGNIFICANT CHANGE UP (ref 1.8–7.4)
NEUTROPHILS NFR BLD AUTO: 77.1 % — HIGH (ref 43–77)
NRBC # BLD: 0 /100 WBCS — SIGNIFICANT CHANGE UP (ref 0–0)
PCO2 BLDA: 58 MMHG — HIGH (ref 32–45)
PH BLDA: 7.44 — SIGNIFICANT CHANGE UP (ref 7.35–7.45)
PHOSPHATE SERPL-MCNC: 2.6 MG/DL — SIGNIFICANT CHANGE UP (ref 2.5–4.5)
PLATELET # BLD AUTO: 267 K/UL — SIGNIFICANT CHANGE UP (ref 150–400)
PO2 BLDA: 75 MMHG — LOW (ref 83–108)
POTASSIUM SERPL-MCNC: 3.7 MMOL/L — SIGNIFICANT CHANGE UP (ref 3.5–5.3)
POTASSIUM SERPL-SCNC: 3.7 MMOL/L — SIGNIFICANT CHANGE UP (ref 3.5–5.3)
PROCALCITONIN SERPL-MCNC: 0.06 NG/ML — SIGNIFICANT CHANGE UP (ref 0.02–0.1)
PROT SERPL-MCNC: 5.7 G/DL — LOW (ref 6–8.3)
RBC # BLD: 2.96 M/UL — LOW (ref 3.8–5.2)
RBC # FLD: 13.3 % — SIGNIFICANT CHANGE UP (ref 10.3–14.5)
SAO2 % BLDA: 95 % — SIGNIFICANT CHANGE UP (ref 95–100)
SARS-COV-2 RNA SPEC QL NAA+PROBE: DETECTED
SODIUM SERPL-SCNC: 142 MMOL/L — SIGNIFICANT CHANGE UP (ref 135–145)
WBC # BLD: 9.44 K/UL — SIGNIFICANT CHANGE UP (ref 3.8–10.5)
WBC # FLD AUTO: 9.44 K/UL — SIGNIFICANT CHANGE UP (ref 3.8–10.5)

## 2020-04-19 PROCEDURE — 99291 CRITICAL CARE FIRST HOUR: CPT | Mod: CS

## 2020-04-19 RX ORDER — ACETAZOLAMIDE 250 MG/1
500 TABLET ORAL ONCE
Refills: 0 | Status: COMPLETED | OUTPATIENT
Start: 2020-04-19 | End: 2020-04-19

## 2020-04-19 RX ORDER — POTASSIUM CHLORIDE 20 MEQ
40 PACKET (EA) ORAL ONCE
Refills: 0 | Status: COMPLETED | OUTPATIENT
Start: 2020-04-19 | End: 2020-04-19

## 2020-04-19 RX ADMIN — APIXABAN 5 MILLIGRAM(S): 2.5 TABLET, FILM COATED ORAL at 12:38

## 2020-04-19 RX ADMIN — CHLORHEXIDINE GLUCONATE 1 APPLICATION(S): 213 SOLUTION TOPICAL at 05:12

## 2020-04-19 RX ADMIN — PIPERACILLIN AND TAZOBACTAM 200 GRAM(S): 4; .5 INJECTION, POWDER, LYOPHILIZED, FOR SOLUTION INTRAVENOUS at 12:38

## 2020-04-19 RX ADMIN — NYSTATIN CREAM 1 APPLICATION(S): 100000 CREAM TOPICAL at 05:51

## 2020-04-19 RX ADMIN — SCOPALAMINE 1 PATCH: 1 PATCH, EXTENDED RELEASE TRANSDERMAL at 10:58

## 2020-04-19 RX ADMIN — SCOPALAMINE 1 PATCH: 1 PATCH, EXTENDED RELEASE TRANSDERMAL at 19:58

## 2020-04-19 RX ADMIN — ROBINUL 0.2 MILLIGRAM(S): 0.2 INJECTION INTRAMUSCULAR; INTRAVENOUS at 12:38

## 2020-04-19 RX ADMIN — APIXABAN 5 MILLIGRAM(S): 2.5 TABLET, FILM COATED ORAL at 23:09

## 2020-04-19 RX ADMIN — PANTOPRAZOLE SODIUM 40 MILLIGRAM(S): 20 TABLET, DELAYED RELEASE ORAL at 12:39

## 2020-04-19 RX ADMIN — Medication 40 MILLIEQUIVALENT(S): at 12:39

## 2020-04-19 RX ADMIN — ROBINUL 0.2 MILLIGRAM(S): 0.2 INJECTION INTRAMUSCULAR; INTRAVENOUS at 05:51

## 2020-04-19 RX ADMIN — PIPERACILLIN AND TAZOBACTAM 200 GRAM(S): 4; .5 INJECTION, POWDER, LYOPHILIZED, FOR SOLUTION INTRAVENOUS at 20:02

## 2020-04-19 RX ADMIN — NYSTATIN CREAM 1 APPLICATION(S): 100000 CREAM TOPICAL at 18:00

## 2020-04-19 RX ADMIN — ACETAZOLAMIDE 110 MILLIGRAM(S): 250 TABLET ORAL at 09:18

## 2020-04-19 RX ADMIN — PIPERACILLIN AND TAZOBACTAM 200 GRAM(S): 4; .5 INJECTION, POWDER, LYOPHILIZED, FOR SOLUTION INTRAVENOUS at 05:51

## 2020-04-19 RX ADMIN — Medication 250 MILLIGRAM(S): at 00:19

## 2020-04-19 RX ADMIN — ROBINUL 0.2 MILLIGRAM(S): 0.2 INJECTION INTRAMUSCULAR; INTRAVENOUS at 20:02

## 2020-04-19 NOTE — PROGRESS NOTE ADULT - ASSESSMENT
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

## 2020-04-19 NOTE — PROGRESS NOTE ADULT - SUBJECTIVE AND OBJECTIVE BOX
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 Extubated x2 and doing well on nasal cannula.    OVERNIGHT EVENTS: no acute events    SUBJECTIVE HPI: Patient seen and examined at bedside.    VITAL SIGNS:  ICU Vital Signs Last 24 Hrs  T(C): 37.4 (19 Apr 2020 10:00), Max: 37.4 (19 Apr 2020 05:16)  T(F): 99.3 (19 Apr 2020 10:00), Max: 99.3 (19 Apr 2020 05:16)  HR: 81 (19 Apr 2020 10:00) (70 - 105)  BP: 135/64 (19 Apr 2020 09:25) (100/52 - 135/64)  BP(mean): 92 (19 Apr 2020 09:25) (73 - 92)  ABP: 138/58 (19 Apr 2020 10:00) (105/45 - 164/64)  ABP(mean): 85 (19 Apr 2020 10:00) (65 - 103)  RR: 28 (19 Apr 2020 10:00) (20 - 36)  SpO2: 98% (19 Apr 2020 10:00) (92% - 100%)    I&O's Summary    18 Apr 2020 07:01  -  19 Apr 2020 07:00  --------------------------------------------------------  IN: 1050 mL / OUT: 1400 mL / NET: -350 mL    PHYSICAL EXAM:  General: Comfortable, pleasant  Neurological: Alert & awake, no focal deficits  HEENT: NC/AT; EOMI, PERRL, clear conjunctiva, no nasal or oropharyngeal discharge or exudates, MMM  Neck: supple, no cervical or post-auricular lymphadenopathy  Cardiovascular: +S1/S2, no murmurs/rubs/gallops, RRR  Respiratory: CTA B/L, no diminished breath sounds, no wheezes/rales/rhonchi, no increased work of breathing or accessory muscle use  Gastrointestinal: soft, NT/ND; active BSx4 quadrants  Genitourinary: no suprapubic tenderness, no CVA tenderness  Extremities: WWP; no edema, clubbing or cyanosis  Vascular: 2+ radial, DP/PT pulses B/L  Skin: no rashes    MEDICATIONS:  MEDICATIONS  (STANDING):  apixaban 5 milliGRAM(s) Oral every 12 hours  chlorhexidine 2% Cloths 1 Application(s) Topical <User Schedule>  dextrose 5%. 1000 milliLiter(s) (50 mL/Hr) IV Continuous <Continuous>  dextrose 50% Injectable 12.5 Gram(s) IV Push once  dextrose 50% Injectable 25 Gram(s) IV Push once  dextrose 50% Injectable 25 Gram(s) IV Push once  glycopyrrolate Injectable 0.2 milliGRAM(s) IV Push every 6 hours  insulin lispro (HumaLOG) corrective regimen sliding scale   SubCutaneous every 6 hours  nystatin Cream 1 Application(s) Topical two times a day  pantoprazole   Suspension 40 milliGRAM(s) Oral daily  piperacillin/tazobactam IVPB.. 3.375 Gram(s) IV Intermittent every 6 hours  scopolamine   Patch 1 Patch Transdermal every 72 hours  sodium chloride 3%  Inhalation 4 milliLiter(s) Inhalation once    MEDICATIONS  (PRN):  acetaminophen    Suspension .. 650 milliGRAM(s) Oral every 6 hours PRN Temp greater or equal to 38C (100.4F), Mild Pain (1 - 3)  dextrose 40% Gel 15 Gram(s) Oral once PRN Blood Glucose LESS THAN 70 milliGRAM(s)/deciliter  glucagon  Injectable 1 milliGRAM(s) IntraMuscular once PRN Glucose LESS THAN 70 milligrams/deciliter  sodium chloride 0.9% lock flush 10 milliLiter(s) IV Push every 1 hour PRN Pre/post blood products, medications, blood draw, and to maintain line patency    ALLERGIES/INTOLERANCES:  Allergies    ace inhibitors (Anaphylaxis)  caffeine (Rash)  chocolate (Rash)  high acid (Rash)  Tomatoes (Hives)    Intolerances    LABS:                        9.1    9.44  )-----------( 267      ( 19 Apr 2020 04:36 )             29.1     Auto Neutrophil %: 77.1 % (04-19-20 @ 04:36)  Auto Lymphocyte #: 1.22 K/uL (04-19-20 @ 04:36)    04-19    142  |  99  |  10  ----------------------------<  82  3.7   |  36<H>  |  0.53    Ca    8.7      19 Apr 2020 04:36  Phos  2.6     04-19  Mg     1.8     04-19    TPro  5.7<L>  /  Alb  2.7<L>  /  TBili  0.4  /  DBili  x   /  AST  17  /  ALT  13  /  AlkPhos  45  04-19    ABG - ( 19 Apr 2020 04:28 )  pH, Arterial: 7.44  pH, Blood: x     /  pCO2: 58    /  pO2: 75    / HCO3: 38    / Base Excess: 12.5  /  SaO2: 95        CoVID-specific labs:  C-Reactive Protein, Serum: 4.19 mg/dL <H> (04-19-20 @ 04:36)  Procalcitonin, Serum: 0.06 ng/mL (04-19-20 @ 04:36)  Ferritin, Serum: 221 ng/mL <H> (04-19-20 @ 04:36)    Microbiology:   Culture - Sputum (collected 17 Apr 2020 14:44)  Source: .Sputum Sputum  Gram Stain (17 Apr 2020 20:49):    Moderate epithelial cells    No WBC's seen.    Moderate Gram Negative Rods  Final Report (17 Apr 2020 20:49):    Sputum specimen rejected.  Microscopic examination indicates    oropharyngeal contamination.  Please repeat.        RADIOLOGY, EKG AND ADDITIONAL TESTS: Reviewed.  CoVID Basline labs:  T Cell Subsets:

## 2020-04-20 LAB
ALBUMIN SERPL ELPH-MCNC: 3.1 G/DL — LOW (ref 3.3–5)
ALP SERPL-CCNC: 51 U/L — SIGNIFICANT CHANGE UP (ref 40–120)
ALT FLD-CCNC: 13 U/L — SIGNIFICANT CHANGE UP (ref 10–45)
ANION GAP SERPL CALC-SCNC: 9 MMOL/L — SIGNIFICANT CHANGE UP (ref 5–17)
AST SERPL-CCNC: 19 U/L — SIGNIFICANT CHANGE UP (ref 10–40)
BASOPHILS # BLD AUTO: 0.03 K/UL — SIGNIFICANT CHANGE UP (ref 0–0.2)
BASOPHILS NFR BLD AUTO: 0.3 % — SIGNIFICANT CHANGE UP (ref 0–2)
BILIRUB SERPL-MCNC: 0.3 MG/DL — SIGNIFICANT CHANGE UP (ref 0.2–1.2)
BUN SERPL-MCNC: 9 MG/DL — SIGNIFICANT CHANGE UP (ref 7–23)
CALCIUM SERPL-MCNC: 9.2 MG/DL — SIGNIFICANT CHANGE UP (ref 8.4–10.5)
CHLORIDE SERPL-SCNC: 106 MMOL/L — SIGNIFICANT CHANGE UP (ref 96–108)
CO2 SERPL-SCNC: 26 MMOL/L — SIGNIFICANT CHANGE UP (ref 22–31)
CREAT SERPL-MCNC: 0.6 MG/DL — SIGNIFICANT CHANGE UP (ref 0.5–1.3)
CRP SERPL-MCNC: 3.62 MG/DL — HIGH (ref 0–0.4)
EOSINOPHIL # BLD AUTO: 0.12 K/UL — SIGNIFICANT CHANGE UP (ref 0–0.5)
EOSINOPHIL NFR BLD AUTO: 1.3 % — SIGNIFICANT CHANGE UP (ref 0–6)
GLUCOSE BLDC GLUCOMTR-MCNC: 112 MG/DL — HIGH (ref 70–99)
GLUCOSE BLDC GLUCOMTR-MCNC: 139 MG/DL — HIGH (ref 70–99)
GLUCOSE BLDC GLUCOMTR-MCNC: 86 MG/DL — SIGNIFICANT CHANGE UP (ref 70–99)
GLUCOSE BLDC GLUCOMTR-MCNC: 97 MG/DL — SIGNIFICANT CHANGE UP (ref 70–99)
GLUCOSE SERPL-MCNC: 131 MG/DL — HIGH (ref 70–99)
HCT VFR BLD CALC: 30.9 % — LOW (ref 34.5–45)
HGB BLD-MCNC: 9.6 G/DL — LOW (ref 11.5–15.5)
IMM GRANULOCYTES NFR BLD AUTO: 0.5 % — SIGNIFICANT CHANGE UP (ref 0–1.5)
LYMPHOCYTES # BLD AUTO: 0.59 K/UL — LOW (ref 1–3.3)
LYMPHOCYTES # BLD AUTO: 6.5 % — LOW (ref 13–44)
MAGNESIUM SERPL-MCNC: 1.6 MG/DL — SIGNIFICANT CHANGE UP (ref 1.6–2.6)
MCHC RBC-ENTMCNC: 30.4 PG — SIGNIFICANT CHANGE UP (ref 27–34)
MCHC RBC-ENTMCNC: 31.1 GM/DL — LOW (ref 32–36)
MCV RBC AUTO: 97.8 FL — SIGNIFICANT CHANGE UP (ref 80–100)
MONOCYTES # BLD AUTO: 0.59 K/UL — SIGNIFICANT CHANGE UP (ref 0–0.9)
MONOCYTES NFR BLD AUTO: 6.5 % — SIGNIFICANT CHANGE UP (ref 2–14)
NEUTROPHILS # BLD AUTO: 7.75 K/UL — HIGH (ref 1.8–7.4)
NEUTROPHILS NFR BLD AUTO: 84.9 % — HIGH (ref 43–77)
NRBC # BLD: 0 /100 WBCS — SIGNIFICANT CHANGE UP (ref 0–0)
PHOSPHATE SERPL-MCNC: 2 MG/DL — LOW (ref 2.5–4.5)
PLATELET # BLD AUTO: 265 K/UL — SIGNIFICANT CHANGE UP (ref 150–400)
POTASSIUM SERPL-MCNC: 3.5 MMOL/L — SIGNIFICANT CHANGE UP (ref 3.5–5.3)
POTASSIUM SERPL-SCNC: 3.5 MMOL/L — SIGNIFICANT CHANGE UP (ref 3.5–5.3)
PROT SERPL-MCNC: 6.1 G/DL — SIGNIFICANT CHANGE UP (ref 6–8.3)
RBC # BLD: 3.16 M/UL — LOW (ref 3.8–5.2)
RBC # FLD: 13.3 % — SIGNIFICANT CHANGE UP (ref 10.3–14.5)
SARS-COV-2 RNA SPEC QL NAA+PROBE: DETECTED
SODIUM SERPL-SCNC: 141 MMOL/L — SIGNIFICANT CHANGE UP (ref 135–145)
WBC # BLD: 9.13 K/UL — SIGNIFICANT CHANGE UP (ref 3.8–10.5)
WBC # FLD AUTO: 9.13 K/UL — SIGNIFICANT CHANGE UP (ref 3.8–10.5)

## 2020-04-20 PROCEDURE — 71045 X-RAY EXAM CHEST 1 VIEW: CPT | Mod: 26

## 2020-04-20 PROCEDURE — 99291 CRITICAL CARE FIRST HOUR: CPT | Mod: CS

## 2020-04-20 RX ORDER — MEROPENEM 1 G/30ML
1000 INJECTION INTRAVENOUS ONCE
Refills: 0 | Status: COMPLETED | OUTPATIENT
Start: 2020-04-20 | End: 2020-04-20

## 2020-04-20 RX ORDER — VANCOMYCIN HCL 1 G
750 VIAL (EA) INTRAVENOUS EVERY 12 HOURS
Refills: 0 | Status: DISCONTINUED | OUTPATIENT
Start: 2020-04-20 | End: 2020-04-23

## 2020-04-20 RX ORDER — VANCOMYCIN HCL 1 G
500 VIAL (EA) INTRAVENOUS EVERY 12 HOURS
Refills: 0 | Status: DISCONTINUED | OUTPATIENT
Start: 2020-04-20 | End: 2020-04-20

## 2020-04-20 RX ORDER — VANCOMYCIN HCL 1 G
VIAL (EA) INTRAVENOUS
Refills: 0 | Status: DISCONTINUED | OUTPATIENT
Start: 2020-04-20 | End: 2020-04-20

## 2020-04-20 RX ORDER — VANCOMYCIN HCL 1 G
500 VIAL (EA) INTRAVENOUS ONCE
Refills: 0 | Status: DISCONTINUED | OUTPATIENT
Start: 2020-04-20 | End: 2020-04-20

## 2020-04-20 RX ORDER — MEROPENEM 1 G/30ML
1000 INJECTION INTRAVENOUS EVERY 8 HOURS
Refills: 0 | Status: COMPLETED | OUTPATIENT
Start: 2020-04-20 | End: 2020-04-23

## 2020-04-20 RX ADMIN — SCOPALAMINE 1 PATCH: 1 PATCH, EXTENDED RELEASE TRANSDERMAL at 12:50

## 2020-04-20 RX ADMIN — CHLORHEXIDINE GLUCONATE 1 APPLICATION(S): 213 SOLUTION TOPICAL at 05:31

## 2020-04-20 RX ADMIN — ROBINUL 0.2 MILLIGRAM(S): 0.2 INJECTION INTRAMUSCULAR; INTRAVENOUS at 05:31

## 2020-04-20 RX ADMIN — MEROPENEM 100 MILLIGRAM(S): 1 INJECTION INTRAVENOUS at 21:25

## 2020-04-20 RX ADMIN — PIPERACILLIN AND TAZOBACTAM 200 GRAM(S): 4; .5 INJECTION, POWDER, LYOPHILIZED, FOR SOLUTION INTRAVENOUS at 00:52

## 2020-04-20 RX ADMIN — NYSTATIN CREAM 1 APPLICATION(S): 100000 CREAM TOPICAL at 05:31

## 2020-04-20 RX ADMIN — ROBINUL 0.2 MILLIGRAM(S): 0.2 INJECTION INTRAMUSCULAR; INTRAVENOUS at 17:26

## 2020-04-20 RX ADMIN — ROBINUL 0.2 MILLIGRAM(S): 0.2 INJECTION INTRAMUSCULAR; INTRAVENOUS at 23:43

## 2020-04-20 RX ADMIN — PIPERACILLIN AND TAZOBACTAM 200 GRAM(S): 4; .5 INJECTION, POWDER, LYOPHILIZED, FOR SOLUTION INTRAVENOUS at 05:31

## 2020-04-20 RX ADMIN — PIPERACILLIN AND TAZOBACTAM 200 GRAM(S): 4; .5 INJECTION, POWDER, LYOPHILIZED, FOR SOLUTION INTRAVENOUS at 11:34

## 2020-04-20 RX ADMIN — APIXABAN 5 MILLIGRAM(S): 2.5 TABLET, FILM COATED ORAL at 23:43

## 2020-04-20 RX ADMIN — NYSTATIN CREAM 1 APPLICATION(S): 100000 CREAM TOPICAL at 17:26

## 2020-04-20 RX ADMIN — ROBINUL 0.2 MILLIGRAM(S): 0.2 INJECTION INTRAMUSCULAR; INTRAVENOUS at 00:51

## 2020-04-20 RX ADMIN — PANTOPRAZOLE SODIUM 40 MILLIGRAM(S): 20 TABLET, DELAYED RELEASE ORAL at 11:36

## 2020-04-20 RX ADMIN — MEROPENEM 100 MILLIGRAM(S): 1 INJECTION INTRAVENOUS at 12:27

## 2020-04-20 RX ADMIN — APIXABAN 5 MILLIGRAM(S): 2.5 TABLET, FILM COATED ORAL at 11:36

## 2020-04-20 RX ADMIN — SCOPALAMINE 1 PATCH: 1 PATCH, EXTENDED RELEASE TRANSDERMAL at 11:36

## 2020-04-20 RX ADMIN — Medication 650 MILLIGRAM(S): at 04:24

## 2020-04-20 RX ADMIN — SCOPALAMINE 1 PATCH: 1 PATCH, EXTENDED RELEASE TRANSDERMAL at 05:08

## 2020-04-20 RX ADMIN — Medication 250 MILLIGRAM(S): at 13:27

## 2020-04-20 RX ADMIN — ROBINUL 0.2 MILLIGRAM(S): 0.2 INJECTION INTRAMUSCULAR; INTRAVENOUS at 11:36

## 2020-04-20 NOTE — PROGRESS NOTE ADULT - ASSESSMENT
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

## 2020-04-20 NOTE — CHART NOTE - NSCHARTNOTEFT_GEN_A_CORE
Infectious Diseases Anti-infective Approval Note    Medication:  Meropenem  Dose:  1 gram  Route:  IV  Frequency:  q8hrs  Duration:   3 days     Dose may be adjusted as needed for alterations in renal function.    *THIS IS NOT AN INFECTIOUS DISEASES CONSULTATION*

## 2020-04-20 NOTE — PROGRESS NOTE ADULT - SUBJECTIVE AND OBJECTIVE BOX
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 Extubated x2 and doing well on nasal cannula.    OVERNIGHT EVENTS: tmax 101. cultures sent & abx started    SUBJECTIVE HPI: Patient seen and examined at bedside.    VITAL SIGNS:  ICU Vital Signs Last 24 Hrs  T(C): 36.9 (20 Apr 2020 08:47), Max: 38.3 (20 Apr 2020 05:13)  T(F): 98.5 (20 Apr 2020 08:47), Max: 101 (20 Apr 2020 05:13)  HR: 92 (20 Apr 2020 09:46) (70 - 111)  BP: --  BP(mean): --  ABP: 150/65 (20 Apr 2020 09:46) (96/71 - 228/228)  ABP(mean): 96 (20 Apr 2020 09:46) (72 - 228)  RR: 38 (20 Apr 2020 09:46) (18 - 38)  SpO2: 91% (20 Apr 2020 09:46) (87% - 100%)    I&O's Summary    19 Apr 2020 07:01  -  20 Apr 2020 07:00  --------------------------------------------------------  IN: 1410 mL / OUT: 700 mL / NET: 710 mL    20 Apr 2020 07:01  -  20 Apr 2020 10:30  --------------------------------------------------------  IN: 30 mL / OUT: 0 mL / NET: 30 mL    PHYSICAL EXAM:  General: Comfortable, pleasant, responding to conversation  Neurological: Alert and awake  HEENT: NC/AT; EOMI, PERRL, clear conjunctiva, no nasal or oropharyngeal discharge or exudates, MMM  Neck: supple, no cervical or post-auricular lymphadenopathy  Cardiovascular: +S1/S2, no murmurs/rubs/gallops, RRR  Respiratory: CTA B/L, no diminished breath sounds, no wheezes/rales/rhonchi, no increased work of breathing or accessory muscle use  Gastrointestinal: soft, NT/ND; active BSx4 quadrants  Genitourinary: no suprapubic tenderness, no CVA tenderness  Extremities: WWP; no edema, clubbing or cyanosis  Vascular: 2+ radial, DP/PT pulses B/L  Skin: no rashes    MEDICATIONS:  MEDICATIONS  (STANDING):  apixaban 5 milliGRAM(s) Oral every 12 hours  chlorhexidine 2% Cloths 1 Application(s) Topical <User Schedule>  dextrose 5%. 1000 milliLiter(s) (50 mL/Hr) IV Continuous <Continuous>  dextrose 50% Injectable 12.5 Gram(s) IV Push once  dextrose 50% Injectable 25 Gram(s) IV Push once  dextrose 50% Injectable 25 Gram(s) IV Push once  glycopyrrolate Injectable 0.2 milliGRAM(s) IV Push every 6 hours  insulin lispro (HumaLOG) corrective regimen sliding scale   SubCutaneous every 6 hours  nystatin Cream 1 Application(s) Topical two times a day  pantoprazole   Suspension 40 milliGRAM(s) Oral daily  piperacillin/tazobactam IVPB.. 3.375 Gram(s) IV Intermittent every 6 hours  scopolamine   Patch 1 Patch Transdermal every 72 hours  sodium chloride 3%  Inhalation 4 milliLiter(s) Inhalation once    MEDICATIONS  (PRN):  acetaminophen    Suspension .. 650 milliGRAM(s) Oral every 6 hours PRN Temp greater or equal to 38C (100.4F), Mild Pain (1 - 3)  dextrose 40% Gel 15 Gram(s) Oral once PRN Blood Glucose LESS THAN 70 milliGRAM(s)/deciliter  glucagon  Injectable 1 milliGRAM(s) IntraMuscular once PRN Glucose LESS THAN 70 milligrams/deciliter  sodium chloride 0.9% lock flush 10 milliLiter(s) IV Push every 1 hour PRN Pre/post blood products, medications, blood draw, and to maintain line patency    ALLERGIES/INTOLERANCES:  Allergies    ace inhibitors (Anaphylaxis)  caffeine (Rash)  chocolate (Rash)  high acid (Rash)  Tomatoes (Hives)    Intolerances    LABS:                        9.6    9.13  )-----------( 265      ( 20 Apr 2020 03:45 )             30.9     Auto Neutrophil %: 84.9 % (04-20-20 @ 03:45)  Auto Lymphocyte #: 0.59 K/uL (04-20-20 @ 03:45)      04-20    141  |  106  |  9   ----------------------------<  131<H>  3.5   |  26  |  0.60    Ca    9.2      20 Apr 2020 03:45  Phos  2.0     04-20  Mg     1.6     04-20    TPro  6.1  /  Alb  3.1<L>  /  TBili  0.3  /  DBili  x   /  AST  19  /  ALT  13  /  AlkPhos  51  04-20    ABG - ( 19 Apr 2020 04:28 )  pH, Arterial: 7.44  pH, Blood: x     /  pCO2: 58    /  pO2: 75    / HCO3: 38    / Base Excess: 12.5  /  SaO2: 95        CoVID-specific labs:  C-Reactive Protein, Serum: 3.62 mg/dL <H> (04-20-20 @ 03:45)  C-Reactive Protein, Serum: 4.19 mg/dL <H> (04-19-20 @ 04:36)  Procalcitonin, Serum: 0.06 ng/mL (04-19-20 @ 04:36)      Microbiology:   Culture - Sputum (collected 17 Apr 2020 14:44)  Source: .Sputum Sputum  Gram Stain (17 Apr 2020 20:49):    Moderate epithelial cells    No WBC's seen.    Moderate Gram Negative Rods  Final Report (17 Apr 2020 20:49):    Sputum specimen rejected.  Microscopic examination indicates    oropharyngeal contamination.  Please repeat.        RADIOLOGY, EKG AND ADDITIONAL TESTS: Reviewed.  CoVID Basline labs:  T Cell Subsets:

## 2020-04-21 LAB
ALBUMIN SERPL ELPH-MCNC: 2.9 G/DL — LOW (ref 3.3–5)
ALP SERPL-CCNC: 49 U/L — SIGNIFICANT CHANGE UP (ref 40–120)
ALT FLD-CCNC: 14 U/L — SIGNIFICANT CHANGE UP (ref 10–45)
ANION GAP SERPL CALC-SCNC: 11 MMOL/L — SIGNIFICANT CHANGE UP (ref 5–17)
AST SERPL-CCNC: 22 U/L — SIGNIFICANT CHANGE UP (ref 10–40)
BASOPHILS # BLD AUTO: 0.03 K/UL — SIGNIFICANT CHANGE UP (ref 0–0.2)
BASOPHILS NFR BLD AUTO: 0.3 % — SIGNIFICANT CHANGE UP (ref 0–2)
BILIRUB SERPL-MCNC: 0.2 MG/DL — SIGNIFICANT CHANGE UP (ref 0.2–1.2)
BUN SERPL-MCNC: 10 MG/DL — SIGNIFICANT CHANGE UP (ref 7–23)
CALCIUM SERPL-MCNC: 8.5 MG/DL — SIGNIFICANT CHANGE UP (ref 8.4–10.5)
CHLORIDE SERPL-SCNC: 105 MMOL/L — SIGNIFICANT CHANGE UP (ref 96–108)
CO2 SERPL-SCNC: 24 MMOL/L — SIGNIFICANT CHANGE UP (ref 22–31)
CREAT SERPL-MCNC: 0.56 MG/DL — SIGNIFICANT CHANGE UP (ref 0.5–1.3)
CRP SERPL-MCNC: 3.86 MG/DL — HIGH (ref 0–0.4)
D DIMER BLD IA.RAPID-MCNC: 270 NG/ML DDU — HIGH
EOSINOPHIL # BLD AUTO: 0.09 K/UL — SIGNIFICANT CHANGE UP (ref 0–0.5)
EOSINOPHIL NFR BLD AUTO: 1 % — SIGNIFICANT CHANGE UP (ref 0–6)
FERRITIN SERPL-MCNC: 250 NG/ML — HIGH (ref 15–150)
GLUCOSE BLDC GLUCOMTR-MCNC: 116 MG/DL — HIGH (ref 70–99)
GLUCOSE BLDC GLUCOMTR-MCNC: 119 MG/DL — HIGH (ref 70–99)
GLUCOSE BLDC GLUCOMTR-MCNC: 124 MG/DL — HIGH (ref 70–99)
GLUCOSE BLDC GLUCOMTR-MCNC: 125 MG/DL — HIGH (ref 70–99)
GLUCOSE SERPL-MCNC: 131 MG/DL — HIGH (ref 70–99)
GRAM STN FLD: SIGNIFICANT CHANGE UP
HCT VFR BLD CALC: 29.9 % — LOW (ref 34.5–45)
HGB BLD-MCNC: 9.5 G/DL — LOW (ref 11.5–15.5)
IMM GRANULOCYTES NFR BLD AUTO: 0.3 % — SIGNIFICANT CHANGE UP (ref 0–1.5)
LYMPHOCYTES # BLD AUTO: 0.79 K/UL — LOW (ref 1–3.3)
LYMPHOCYTES # BLD AUTO: 9.2 % — LOW (ref 13–44)
MAGNESIUM SERPL-MCNC: 1.4 MG/DL — LOW (ref 1.6–2.6)
MCHC RBC-ENTMCNC: 30.4 PG — SIGNIFICANT CHANGE UP (ref 27–34)
MCHC RBC-ENTMCNC: 31.8 GM/DL — LOW (ref 32–36)
MCV RBC AUTO: 95.8 FL — SIGNIFICANT CHANGE UP (ref 80–100)
MONOCYTES # BLD AUTO: 0.61 K/UL — SIGNIFICANT CHANGE UP (ref 0–0.9)
MONOCYTES NFR BLD AUTO: 7.1 % — SIGNIFICANT CHANGE UP (ref 2–14)
NEUTROPHILS # BLD AUTO: 7.04 K/UL — SIGNIFICANT CHANGE UP (ref 1.8–7.4)
NEUTROPHILS NFR BLD AUTO: 82.1 % — HIGH (ref 43–77)
NRBC # BLD: 0 /100 WBCS — SIGNIFICANT CHANGE UP (ref 0–0)
PHOSPHATE SERPL-MCNC: 1.5 MG/DL — LOW (ref 2.5–4.5)
PLATELET # BLD AUTO: 225 K/UL — SIGNIFICANT CHANGE UP (ref 150–400)
POTASSIUM SERPL-MCNC: 3 MMOL/L — LOW (ref 3.5–5.3)
POTASSIUM SERPL-SCNC: 3 MMOL/L — LOW (ref 3.5–5.3)
PROCALCITONIN SERPL-MCNC: 0.21 NG/ML — HIGH (ref 0.02–0.1)
PROT SERPL-MCNC: 6.1 G/DL — SIGNIFICANT CHANGE UP (ref 6–8.3)
RBC # BLD: 3.12 M/UL — LOW (ref 3.8–5.2)
RBC # FLD: 13.7 % — SIGNIFICANT CHANGE UP (ref 10.3–14.5)
SODIUM SERPL-SCNC: 140 MMOL/L — SIGNIFICANT CHANGE UP (ref 135–145)
WBC # BLD: 8.59 K/UL — SIGNIFICANT CHANGE UP (ref 3.8–10.5)
WBC # FLD AUTO: 8.59 K/UL — SIGNIFICANT CHANGE UP (ref 3.8–10.5)

## 2020-04-21 PROCEDURE — 99291 CRITICAL CARE FIRST HOUR: CPT | Mod: CS

## 2020-04-21 RX ORDER — MAGNESIUM SULFATE 500 MG/ML
2 VIAL (ML) INJECTION ONCE
Refills: 0 | Status: COMPLETED | OUTPATIENT
Start: 2020-04-21 | End: 2020-04-21

## 2020-04-21 RX ORDER — POTASSIUM CHLORIDE 20 MEQ
40 PACKET (EA) ORAL EVERY 4 HOURS
Refills: 0 | Status: COMPLETED | OUTPATIENT
Start: 2020-04-21 | End: 2020-04-21

## 2020-04-21 RX ORDER — POTASSIUM CHLORIDE 20 MEQ
40 PACKET (EA) ORAL EVERY 4 HOURS
Refills: 0 | Status: DISCONTINUED | OUTPATIENT
Start: 2020-04-21 | End: 2020-04-21

## 2020-04-21 RX ADMIN — Medication 85 MILLIMOLE(S): at 14:48

## 2020-04-21 RX ADMIN — SCOPALAMINE 1 PATCH: 1 PATCH, EXTENDED RELEASE TRANSDERMAL at 18:04

## 2020-04-21 RX ADMIN — NYSTATIN CREAM 1 APPLICATION(S): 100000 CREAM TOPICAL at 06:20

## 2020-04-21 RX ADMIN — CHLORHEXIDINE GLUCONATE 1 APPLICATION(S): 213 SOLUTION TOPICAL at 06:22

## 2020-04-21 RX ADMIN — Medication 650 MILLIGRAM(S): at 18:10

## 2020-04-21 RX ADMIN — Medication 250 MILLIGRAM(S): at 01:01

## 2020-04-21 RX ADMIN — MEROPENEM 100 MILLIGRAM(S): 1 INJECTION INTRAVENOUS at 22:45

## 2020-04-21 RX ADMIN — ROBINUL 0.2 MILLIGRAM(S): 0.2 INJECTION INTRAMUSCULAR; INTRAVENOUS at 22:43

## 2020-04-21 RX ADMIN — Medication 250 MILLIGRAM(S): at 22:44

## 2020-04-21 RX ADMIN — Medication 50 GRAM(S): at 07:01

## 2020-04-21 RX ADMIN — APIXABAN 5 MILLIGRAM(S): 2.5 TABLET, FILM COATED ORAL at 11:51

## 2020-04-21 RX ADMIN — ROBINUL 0.2 MILLIGRAM(S): 0.2 INJECTION INTRAMUSCULAR; INTRAVENOUS at 06:22

## 2020-04-21 RX ADMIN — APIXABAN 5 MILLIGRAM(S): 2.5 TABLET, FILM COATED ORAL at 22:44

## 2020-04-21 RX ADMIN — PANTOPRAZOLE SODIUM 40 MILLIGRAM(S): 20 TABLET, DELAYED RELEASE ORAL at 11:51

## 2020-04-21 RX ADMIN — Medication 40 MILLIEQUIVALENT(S): at 11:51

## 2020-04-21 RX ADMIN — Medication 40 MILLIEQUIVALENT(S): at 08:31

## 2020-04-21 RX ADMIN — ROBINUL 0.2 MILLIGRAM(S): 0.2 INJECTION INTRAMUSCULAR; INTRAVENOUS at 16:50

## 2020-04-21 RX ADMIN — MEROPENEM 100 MILLIGRAM(S): 1 INJECTION INTRAVENOUS at 06:23

## 2020-04-21 RX ADMIN — Medication 250 MILLIGRAM(S): at 11:51

## 2020-04-21 RX ADMIN — Medication 650 MILLIGRAM(S): at 03:20

## 2020-04-21 RX ADMIN — SCOPALAMINE 1 PATCH: 1 PATCH, EXTENDED RELEASE TRANSDERMAL at 06:23

## 2020-04-21 RX ADMIN — MEROPENEM 100 MILLIGRAM(S): 1 INJECTION INTRAVENOUS at 14:49

## 2020-04-21 RX ADMIN — Medication 40 MILLIEQUIVALENT(S): at 16:50

## 2020-04-21 RX ADMIN — ROBINUL 0.2 MILLIGRAM(S): 0.2 INJECTION INTRAMUSCULAR; INTRAVENOUS at 11:51

## 2020-04-21 NOTE — PROGRESS NOTE ADULT - SUBJECTIVE AND OBJECTIVE BOX
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 Extubated x2 and doing well on nasal cannula.    INTERVAL HPI/OVERNIGHT EVENTS:  febrile overnight  o/w LIZABETH    MEDICATIONS  (STANDING):  apixaban 5 milliGRAM(s) Oral every 12 hours  chlorhexidine 2% Cloths 1 Application(s) Topical <User Schedule>  dextrose 5%. 1000 milliLiter(s) (50 mL/Hr) IV Continuous <Continuous>  dextrose 50% Injectable 12.5 Gram(s) IV Push once  dextrose 50% Injectable 25 Gram(s) IV Push once  dextrose 50% Injectable 25 Gram(s) IV Push once  glycopyrrolate Injectable 0.2 milliGRAM(s) IV Push every 6 hours  insulin lispro (HumaLOG) corrective regimen sliding scale   SubCutaneous every 6 hours  meropenem  IVPB 1000 milliGRAM(s) IV Intermittent every 8 hours  nystatin Cream 1 Application(s) Topical two times a day  pantoprazole   Suspension 40 milliGRAM(s) Oral daily  potassium chloride   Powder 40 milliEquivalent(s) Oral every 4 hours  scopolamine   Patch 1 Patch Transdermal every 72 hours  sodium chloride 3%  Inhalation 4 milliLiter(s) Inhalation once  sodium phosphate IVPB 30 milliMole(s) IV Intermittent once  vancomycin  IVPB 750 milliGRAM(s) IV Intermittent every 12 hours    MEDICATIONS  (PRN):  acetaminophen    Suspension .. 650 milliGRAM(s) Oral every 6 hours PRN Temp greater or equal to 38C (100.4F), Mild Pain (1 - 3)  dextrose 40% Gel 15 Gram(s) Oral once PRN Blood Glucose LESS THAN 70 milliGRAM(s)/deciliter  glucagon  Injectable 1 milliGRAM(s) IntraMuscular once PRN Glucose LESS THAN 70 milligrams/deciliter  sodium chloride 0.9% lock flush 10 milliLiter(s) IV Push every 1 hour PRN Pre/post blood products, medications, blood draw, and to maintain line patency      Vitals:  Vital Signs Last 24 Hrs  T(C): 37.2 (21 Apr 2020 06:37), Max: 38.4 (21 Apr 2020 02:02)  T(F): 99 (21 Apr 2020 06:37), Max: 101.1 (21 Apr 2020 02:02)  HR: 88 (21 Apr 2020 06:00) (83 - 128)  BP: 101/54 (21 Apr 2020 05:00) (101/54 - 138/65)  BP(mean): 74 (21 Apr 2020 05:00) (74 - 93)  RR: 33 (21 Apr 2020 06:00) (14 - 38)  SpO2: 92% (21 Apr 2020 06:00) (87% - 93%)    Vitals (Invasive):  ABP: 106/45 (04-21-20 @ 06:00) (95/46 - 183/71)  CVP(mm Hg): 42 (04-21-20 @ 00:00) (6 - 48)  CVP(cm H2O): --  CO: --  CI: --  PA: --  PA(mean): --  PCWP: --  LA: --  RA: --  SVR: --  SVRI: --  PVR: --  PVRI: --    I&O's Summary    20 Apr 2020 07:01  -  21 Apr 2020 07:00  --------------------------------------------------------  IN: 1850 mL / OUT: 1349 mL / NET: 501 mL        Physical Exam:  General: Comfortable, pleasant, responding to conversation  Neurological: Alert and awake  HEENT: NC/AT; EOMI, PERRL, clear conjunctiva, no nasal or oropharyngeal discharge or exudates, MMM  Neck: supple, no cervical or post-auricular lymphadenopathy  Cardiovascular: +S1/S2, no murmurs/rubs/gallops, RRR  Respiratory: CTA B/L, no diminished breath sounds, no wheezes/rales/rhonchi, no increased work of breathing or accessory muscle use  Gastrointestinal: soft, NT/ND; active BSx4 quadrants  Genitourinary: no suprapubic tenderness, no CVA tenderness  Extremities: WWP; no edema, clubbing or cyanosis  Vascular: 2+ radial, DP/PT pulses B/L  Skin: no rashes    Labs:  COVID:  COVID-19 PCR: Detected (04-20-20 @ 10:07)                          9.5    8.59  )-----------( 225      ( 21 Apr 2020 05:23 )             29.9     04-21    140  |  105  |  10  ----------------------------<  131<H>  3.0<L>   |  24  |  0.56    Ca    8.5      21 Apr 2020 05:23  Phos  1.5     04-21  Mg     1.4     04-21    TPro  6.1  /  Alb  2.9<L>  /  TBili  0.2  /  DBili  x   /  AST  22  /  ALT  14  /  AlkPhos  49  04-21          Culture - Blood (collected 04-20-20 @ 10:25)  Source: .Blood Blood  Preliminary Report (04-20-20 @ 23:00):    No growth at 12 hours      COVID related labs:  21 Apr 2020 05:23  D-dimer:  270<H>  Calcitonin:  x  CRP:  x  LDH:  x  Lactate,Blood:  x  CK:  x  Troponin I:  x  Troponin T:  x  Troponin HS:  x  Ferritin, Serum: 250<H>  BNP:  x      Blood Gases:  (ARTERIAL):  04-19-20 @ 04:28  pH 7.44 / pCO2 58 / pO2 75 / HCO3 38  Total CO2 --  FiO2 --  Oxygen Saturation 95  Total Hemoglobin, Calculated --  Hematocrit, Calculated --  Oxygen Content --  Blood Gas Arterial - Calcium, Ionized --  Blood Gas Arterial - Chloride --  Blood Gas Arterial - Glucose --  Blood Gas Arterial - Potassium --  Blood Gas Arterial - Sodium --  Blood Gas Arterial - Creatinine --  Base Excess, Arterial 12.5    (VENOUS):      Radiology:  CT chest results consistent with multifocal bilateral ground glass opacities  ID consulted, follow up recommendations    Plan:  74 F with pmhx of CP, MR, hx of DVT/PE, GERD, chronic PEG, HTN\, 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, presenting to [facility] for respiratory failure secondary to COVID     Neuro:  - awake off sedation   - daily sedation holidays    CV:   - stable    Pulm:  - intubated on arrival to [facility]  - extubated and re-intubated  - extubated 4/12  - nasal cannula     ID:  - finished plaquenil & azithromycin  - repeat COVID swab sent yest postivite 4/20  - tmax 101  - sent blood cultures & sputum  - started vanc/hermes    Nephro:  - lu is removed     GI:  - G-tube  - Jevity 30ml/hr    Endo:   - follow-up FS    Prophy:  - eliquis    FULL CODE  Dispo: MICU    Code: FULL CODE    Disposition: Patient requires continued monitoring in MICU Admit Date: 04-03-20  Length of Stay: 18d    74 F with pmhx of CP, MR, hx of DVT/PE, GERD, chronic PEG, HTN\, 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 for ICU care. Extubated x2 and doing well on nasal cannula.    INTERVAL HPI/OVERNIGHT EVENTS:  febrile overnight  o/w LIZABETH    MEDICATIONS  (STANDING):  apixaban 5 milliGRAM(s) Oral every 12 hours  chlorhexidine 2% Cloths 1 Application(s) Topical <User Schedule>  dextrose 5%. 1000 milliLiter(s) (50 mL/Hr) IV Continuous <Continuous>  dextrose 50% Injectable 12.5 Gram(s) IV Push once  dextrose 50% Injectable 25 Gram(s) IV Push once  dextrose 50% Injectable 25 Gram(s) IV Push once  glycopyrrolate Injectable 0.2 milliGRAM(s) IV Push every 6 hours  insulin lispro (HumaLOG) corrective regimen sliding scale   SubCutaneous every 6 hours  meropenem  IVPB 1000 milliGRAM(s) IV Intermittent every 8 hours  nystatin Cream 1 Application(s) Topical two times a day  pantoprazole   Suspension 40 milliGRAM(s) Oral daily  potassium chloride   Powder 40 milliEquivalent(s) Oral every 4 hours  scopolamine   Patch 1 Patch Transdermal every 72 hours  sodium chloride 3%  Inhalation 4 milliLiter(s) Inhalation once  sodium phosphate IVPB 30 milliMole(s) IV Intermittent once  vancomycin  IVPB 750 milliGRAM(s) IV Intermittent every 12 hours    MEDICATIONS  (PRN):  acetaminophen    Suspension .. 650 milliGRAM(s) Oral every 6 hours PRN Temp greater or equal to 38C (100.4F), Mild Pain (1 - 3)  dextrose 40% Gel 15 Gram(s) Oral once PRN Blood Glucose LESS THAN 70 milliGRAM(s)/deciliter  glucagon  Injectable 1 milliGRAM(s) IntraMuscular once PRN Glucose LESS THAN 70 milligrams/deciliter  sodium chloride 0.9% lock flush 10 milliLiter(s) IV Push every 1 hour PRN Pre/post blood products, medications, blood draw, and to maintain line patency      Vitals:  Vital Signs Last 24 Hrs  T(C): 37.2 (21 Apr 2020 06:37), Max: 38.4 (21 Apr 2020 02:02)  T(F): 99 (21 Apr 2020 06:37), Max: 101.1 (21 Apr 2020 02:02)  HR: 88 (21 Apr 2020 06:00) (83 - 128)  BP: 101/54 (21 Apr 2020 05:00) (101/54 - 138/65)  BP(mean): 74 (21 Apr 2020 05:00) (74 - 93)  RR: 33 (21 Apr 2020 06:00) (14 - 38)  SpO2: 92% (21 Apr 2020 06:00) (87% - 93%)    Vitals (Invasive):  ABP: 106/45 (04-21-20 @ 06:00) (95/46 - 183/71)  CVP(mm Hg): 42 (04-21-20 @ 00:00) (6 - 48)  CVP(cm H2O): --  CO: --  CI: --  PA: --  PA(mean): --  PCWP: --  LA: --  RA: --  SVR: --  SVRI: --  PVR: --  PVRI: --    I&O's Summary    20 Apr 2020 07:01  -  21 Apr 2020 07:00  --------------------------------------------------------  IN: 1850 mL / OUT: 1349 mL / NET: 501 mL        Physical Exam:  General: Comfortable, pleasant, responding to conversation  Neurological: Alert and awake  HEENT: NC/AT; EOMI, PERRL, clear conjunctiva, no nasal or oropharyngeal discharge or exudates, MMM  Neck: supple, no cervical or post-auricular lymphadenopathy  Cardiovascular: +S1/S2, no murmurs/rubs/gallops, RRR  Respiratory: CTA B/L, no diminished breath sounds, no wheezes/rales/rhonchi, no increased work of breathing or accessory muscle use  Gastrointestinal: soft, NT/ND; active BSx4 quadrants  Genitourinary: no suprapubic tenderness, no CVA tenderness  Extremities: WWP; no edema, clubbing or cyanosis  Vascular: 2+ radial, DP/PT pulses B/L  Skin: no rashes    Labs:  COVID:  COVID-19 PCR: Detected (04-20-20 @ 10:07)                          9.5    8.59  )-----------( 225      ( 21 Apr 2020 05:23 )             29.9     04-21    140  |  105  |  10  ----------------------------<  131<H>  3.0<L>   |  24  |  0.56    Ca    8.5      21 Apr 2020 05:23  Phos  1.5     04-21  Mg     1.4     04-21    TPro  6.1  /  Alb  2.9<L>  /  TBili  0.2  /  DBili  x   /  AST  22  /  ALT  14  /  AlkPhos  49  04-21          Culture - Blood (collected 04-20-20 @ 10:25)  Source: .Blood Blood  Preliminary Report (04-20-20 @ 23:00):    No growth at 12 hours      COVID related labs:  21 Apr 2020 05:23  D-dimer:  270<H>  Calcitonin:  x  CRP:  x  LDH:  x  Lactate,Blood:  x  CK:  x  Troponin I:  x  Troponin T:  x  Troponin HS:  x  Ferritin, Serum: 250<H>  BNP:  x      Blood Gases:  (ARTERIAL):  04-19-20 @ 04:28  pH 7.44 / pCO2 58 / pO2 75 / HCO3 38  Total CO2 --  FiO2 --  Oxygen Saturation 95  Total Hemoglobin, Calculated --  Hematocrit, Calculated --  Oxygen Content --  Blood Gas Arterial - Calcium, Ionized --  Blood Gas Arterial - Chloride --  Blood Gas Arterial - Glucose --  Blood Gas Arterial - Potassium --  Blood Gas Arterial - Sodium --  Blood Gas Arterial - Creatinine --  Base Excess, Arterial 12.5    (VENOUS):      Radiology:  CT chest results consistent with multifocal bilateral ground glass opacities  ID consulted, follow up recommendations    Plan:  74 F with pmhx of CP, MR, hx of DVT/PE, GERD, chronic PEG, HTN\, 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 for respiratory failure secondary to COVID     Neuro:  - awake off sedation   - daily sedation holidays    CV:   - stable    Pulm:  - intubated on arrival to [facility]  - extubated and re-intubated  - extubated 4/12  - nasal cannula   - encourage chest PT daily when awake    ID:  - finished plaquenil & azithromycin  - repeat COVID swab sent yest postivite 4/20  - tmax 101  - sent blood cultures & sputum  - started vanc/hermes, f/u vanc trough before the PM dose  - rpt covid test q3d    Nephro:  - lu is removed     GI:  - G-tube  - Jevity 30ml/hr    Endo:   - follow-up FS    Prophy:  - eliquis    FULL CODE  Dispo: MICU    Code: FULL CODE  above d/w Dr. Fritz  Disposition: Patient requires continued monitoring in MICU

## 2020-04-21 NOTE — ADVANCED PRACTICE NURSE CONSULT - RECOMMEDATIONS
Erythematous skin - apply zinc based moisture barrier cream twice daily.   WOCN discussed assessment and recommendation with JERI Lopez and ZAID Bush.

## 2020-04-21 NOTE — ADVANCED PRACTICE NURSE CONSULT - REASON FOR CONSULT
C nurse consult to assess incontinence associated dermatitis. The patient is a 74 F with pmhx of CP, MR, hx of DVT/PE, GERD, chronic PEG, HTN\, spastic quadriplegia living in rehab, presented to [facility] for AHRF, intubated, sedated on propofol tx to [facility] for ICU care on 4/3.

## 2020-04-21 NOTE — ADVANCED PRACTICE NURSE CONSULT - ASSESSMENT
Patient presented with bilateral buttocks and thigh generalized erythema secondary to bowel incontinence. Indwelling bowel management system in place. External urinary management system also in place. WOCN applied zinc based moisture barrier cream to erythematous skin.

## 2020-04-22 LAB
ALBUMIN SERPL ELPH-MCNC: 2.8 G/DL — LOW (ref 3.3–5)
ALP SERPL-CCNC: 51 U/L — SIGNIFICANT CHANGE UP (ref 40–120)
ALT FLD-CCNC: 17 U/L — SIGNIFICANT CHANGE UP (ref 10–45)
ANION GAP SERPL CALC-SCNC: 9 MMOL/L — SIGNIFICANT CHANGE UP (ref 5–17)
AST SERPL-CCNC: 25 U/L — SIGNIFICANT CHANGE UP (ref 10–40)
BASOPHILS # BLD AUTO: 0.08 K/UL — SIGNIFICANT CHANGE UP (ref 0–0.2)
BASOPHILS NFR BLD AUTO: 1.8 % — SIGNIFICANT CHANGE UP (ref 0–2)
BILIRUB SERPL-MCNC: 0.2 MG/DL — SIGNIFICANT CHANGE UP (ref 0.2–1.2)
BUN SERPL-MCNC: 6 MG/DL — LOW (ref 7–23)
CALCIUM SERPL-MCNC: 8.8 MG/DL — SIGNIFICANT CHANGE UP (ref 8.4–10.5)
CHLORIDE SERPL-SCNC: 109 MMOL/L — HIGH (ref 96–108)
CO2 SERPL-SCNC: 21 MMOL/L — LOW (ref 22–31)
CREAT SERPL-MCNC: 0.44 MG/DL — LOW (ref 0.5–1.3)
CRP SERPL-MCNC: 4.27 MG/DL — HIGH (ref 0–0.4)
D DIMER BLD IA.RAPID-MCNC: 310 NG/ML DDU — HIGH
EOSINOPHIL # BLD AUTO: 0.16 K/UL — SIGNIFICANT CHANGE UP (ref 0–0.5)
EOSINOPHIL NFR BLD AUTO: 3.5 % — SIGNIFICANT CHANGE UP (ref 0–6)
FERRITIN SERPL-MCNC: 247 NG/ML — HIGH (ref 15–150)
GLUCOSE BLDC GLUCOMTR-MCNC: 100 MG/DL — HIGH (ref 70–99)
GLUCOSE BLDC GLUCOMTR-MCNC: 118 MG/DL — HIGH (ref 70–99)
GLUCOSE BLDC GLUCOMTR-MCNC: 52 MG/DL — LOW (ref 70–99)
GLUCOSE BLDC GLUCOMTR-MCNC: 92 MG/DL — SIGNIFICANT CHANGE UP (ref 70–99)
GLUCOSE BLDC GLUCOMTR-MCNC: 99 MG/DL — SIGNIFICANT CHANGE UP (ref 70–99)
GLUCOSE SERPL-MCNC: 98 MG/DL — SIGNIFICANT CHANGE UP (ref 70–99)
HCT VFR BLD CALC: 32.2 % — LOW (ref 34.5–45)
HGB BLD-MCNC: 9.9 G/DL — LOW (ref 11.5–15.5)
LYMPHOCYTES # BLD AUTO: 1.29 K/UL — SIGNIFICANT CHANGE UP (ref 1–3.3)
LYMPHOCYTES # BLD AUTO: 28.3 % — SIGNIFICANT CHANGE UP (ref 13–44)
MAGNESIUM SERPL-MCNC: 1.8 MG/DL — SIGNIFICANT CHANGE UP (ref 1.6–2.6)
MCHC RBC-ENTMCNC: 29.9 PG — SIGNIFICANT CHANGE UP (ref 27–34)
MCHC RBC-ENTMCNC: 30.7 GM/DL — LOW (ref 32–36)
MCV RBC AUTO: 97.3 FL — SIGNIFICANT CHANGE UP (ref 80–100)
MONOCYTES # BLD AUTO: 0.04 K/UL — SIGNIFICANT CHANGE UP (ref 0–0.9)
MONOCYTES NFR BLD AUTO: 0.9 % — LOW (ref 2–14)
NEUTROPHILS # BLD AUTO: 2.98 K/UL — SIGNIFICANT CHANGE UP (ref 1.8–7.4)
NEUTROPHILS NFR BLD AUTO: 65.5 % — SIGNIFICANT CHANGE UP (ref 43–77)
PHOSPHATE SERPL-MCNC: 2.6 MG/DL — SIGNIFICANT CHANGE UP (ref 2.5–4.5)
PLATELET # BLD AUTO: 215 K/UL — SIGNIFICANT CHANGE UP (ref 150–400)
POTASSIUM SERPL-MCNC: 4.8 MMOL/L — SIGNIFICANT CHANGE UP (ref 3.5–5.3)
POTASSIUM SERPL-SCNC: 4.8 MMOL/L — SIGNIFICANT CHANGE UP (ref 3.5–5.3)
PROCALCITONIN SERPL-MCNC: 0.19 NG/ML — HIGH (ref 0.02–0.1)
PROT SERPL-MCNC: 6.3 G/DL — SIGNIFICANT CHANGE UP (ref 6–8.3)
RBC # BLD: 3.31 M/UL — LOW (ref 3.8–5.2)
RBC # FLD: 13.9 % — SIGNIFICANT CHANGE UP (ref 10.3–14.5)
SARS-COV-2 RNA SPEC QL NAA+PROBE: DETECTED
SODIUM SERPL-SCNC: 139 MMOL/L — SIGNIFICANT CHANGE UP (ref 135–145)
VANCOMYCIN TROUGH SERPL-MCNC: 17.9 UG/ML — SIGNIFICANT CHANGE UP (ref 10–20)
WBC # BLD: 4.55 K/UL — SIGNIFICANT CHANGE UP (ref 3.8–10.5)
WBC # FLD AUTO: 4.55 K/UL — SIGNIFICANT CHANGE UP (ref 3.8–10.5)

## 2020-04-22 PROCEDURE — 99291 CRITICAL CARE FIRST HOUR: CPT | Mod: CS

## 2020-04-22 RX ORDER — MAGNESIUM SULFATE 500 MG/ML
1 VIAL (ML) INJECTION ONCE
Refills: 0 | Status: COMPLETED | OUTPATIENT
Start: 2020-04-22 | End: 2020-04-22

## 2020-04-22 RX ADMIN — Medication 62.5 MILLIMOLE(S): at 11:59

## 2020-04-22 RX ADMIN — PANTOPRAZOLE SODIUM 40 MILLIGRAM(S): 20 TABLET, DELAYED RELEASE ORAL at 13:21

## 2020-04-22 RX ADMIN — MEROPENEM 100 MILLIGRAM(S): 1 INJECTION INTRAVENOUS at 13:24

## 2020-04-22 RX ADMIN — SCOPALAMINE 1 PATCH: 1 PATCH, EXTENDED RELEASE TRANSDERMAL at 05:15

## 2020-04-22 RX ADMIN — MEROPENEM 100 MILLIGRAM(S): 1 INJECTION INTRAVENOUS at 23:10

## 2020-04-22 RX ADMIN — APIXABAN 5 MILLIGRAM(S): 2.5 TABLET, FILM COATED ORAL at 11:59

## 2020-04-22 RX ADMIN — APIXABAN 5 MILLIGRAM(S): 2.5 TABLET, FILM COATED ORAL at 23:10

## 2020-04-22 RX ADMIN — MEROPENEM 100 MILLIGRAM(S): 1 INJECTION INTRAVENOUS at 05:14

## 2020-04-22 RX ADMIN — ROBINUL 0.2 MILLIGRAM(S): 0.2 INJECTION INTRAMUSCULAR; INTRAVENOUS at 05:15

## 2020-04-22 RX ADMIN — ROBINUL 0.2 MILLIGRAM(S): 0.2 INJECTION INTRAMUSCULAR; INTRAVENOUS at 23:10

## 2020-04-22 RX ADMIN — Medication 100 GRAM(S): at 11:58

## 2020-04-22 RX ADMIN — SCOPALAMINE 1 PATCH: 1 PATCH, EXTENDED RELEASE TRANSDERMAL at 19:11

## 2020-04-22 RX ADMIN — ROBINUL 0.2 MILLIGRAM(S): 0.2 INJECTION INTRAMUSCULAR; INTRAVENOUS at 11:59

## 2020-04-22 RX ADMIN — Medication 250 MILLIGRAM(S): at 23:44

## 2020-04-22 RX ADMIN — Medication 250 MILLIGRAM(S): at 13:21

## 2020-04-22 RX ADMIN — ROBINUL 0.2 MILLIGRAM(S): 0.2 INJECTION INTRAMUSCULAR; INTRAVENOUS at 17:41

## 2020-04-22 RX ADMIN — CHLORHEXIDINE GLUCONATE 1 APPLICATION(S): 213 SOLUTION TOPICAL at 04:52

## 2020-04-22 NOTE — PROGRESS NOTE ADULT - SUBJECTIVE AND OBJECTIVE BOX
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 for ICU care. Extubated x2 and doing well on nasal cannula.        Vital Signs Last 24 Hrs  T(C): 37.3 (22 Apr 2020 09:16), Max: 38.5 (21 Apr 2020 17:50)  T(F): 99.2 (22 Apr 2020 09:16), Max: 101.3 (21 Apr 2020 17:50)  HR: 108 (22 Apr 2020 09:50) (76 - 111)  BP: 155/74 (22 Apr 2020 07:00) (105/51 - 155/74)  BP(mean): 105 (22 Apr 2020 07:00) (74 - 105)  RR: 49 (22 Apr 2020 09:50) (21 - 49)  SpO2: 90% (22 Apr 2020 09:50) (89% - 96%)    04-21 @ 07:01  -  04-22 @ 07:00  --------------------------------------------------------  IN: 1969.8 mL / OUT: 1750 mL / NET: 219.8 mL    04-22 @ 07:01 - 04-22 @ 11:42  --------------------------------------------------------  IN: 30 mL / OUT: 300 mL / NET: -270 mL      04-21 @ 07:01  -  04-22 @ 07:00  --------------------------------------------------------  IN: 1969.8 mL / OUT: 1750 mL / NET: 219.8 mL    04-22 @ 07:01  -  04-22 @ 11:42  --------------------------------------------------------  IN: 30 mL / OUT: 300 mL / NET: -270 mL        Physical Exam:  General: Comfortable, pleasant, responding to conversation  Neurological: Alert and awake  HEENT: NC/AT; EOMI, PERRL, clear conjunctiva, no nasal or oropharyngeal discharge or exudates, MMM  Neck: supple, no cervical or post-auricular lymphadenopathy  Cardiovascular: +S1/S2, no murmurs/rubs/gallops, RRR  Respiratory: CTA B/L, no diminished breath sounds, no wheezes/rales/rhonchi, no increased work of breathing or accessory muscle use  Gastrointestinal: soft, NT/ND; active BSx4 quadrants  Genitourinary: no suprapubic tenderness, no CVA tenderness  Extremities: WWP; no edema, clubbing or cyanosis  Vascular: 2+ radial, DP/PT pulses B/L  Skin: no rashes          LABS:      CBC Full  -  ( 22 Apr 2020 04:07 )  WBC Count : 4.55 K/uL  RBC Count : 3.31 M/uL  Hemoglobin : 9.9 g/dL  Hematocrit : 32.2 %  Platelet Count - Automated : 215 K/uL  Mean Cell Volume : 97.3 fl  Mean Cell Hemoglobin : 29.9 pg  Mean Cell Hemoglobin Concentration : 30.7 gm/dL  Auto Neutrophil # : 2.98 K/uL  Auto Lymphocyte # : 1.29 K/uL  Auto Monocyte # : 0.04 K/uL  Auto Eosinophil # : 0.16 K/uL  Auto Basophil # : 0.08 K/uL  Auto Neutrophil % : 65.5 %  Auto Lymphocyte % : 28.3 %  Auto Monocyte % : 0.9 %  Auto Eosinophil % : 3.5 %  Auto Basophil % : 1.8 %    04-22    139  |  109<H>  |  6<L>  ----------------------------<  98  4.8   |  21<L>  |  0.44<L>    Ca    8.8      22 Apr 2020 04:07  Phos  2.6     04-22  Mg     1.8     04-22    TPro  6.3  /  Alb  2.8<L>  /  TBili  0.2  /  DBili  x   /  AST  25  /  ALT  17  /  AlkPhos  51  04-22                    RADIOLOGY & ADDITIONAL STUDIES (The following images were personally reviewed):          Plan:  74 F with pmhx of CP, MR, hx of DVT/PE, GERD, chronic PEG, HTN 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 for respiratory failure secondary to COVID.    Neuro:  - awake, off sedation     CV:   - SBP normotensive     Pulm:  - extubated 4/12  - nasal cannula (tolerating on 6L)  - encourage chest PT daily  - suctioning prn     ID:  - finished plaquenil & azithromycin  - repeat COVID swab sent (last positive on 4/20)  - rpt covid test q3d (next swab on 4/23)  - intermittently febrile- continued on meropenem and vanco  - F/u vanco trough 4/22 at 11 am    Nephro:  - voiding well     GI:  - G-tube  - Jevity 30ml/hr    Endo:   - ISS    Prophy:  - therapeutic Eliquis 5mg bid      Code: FULL CODE  Disposition: Patient requires continued monitoring in MICU  -d/w Dr. 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 Extubated x2 and doing well on nasal cannula.        Vital Signs Last 24 Hrs  T(C): 37.3 (22 Apr 2020 09:16), Max: 38.5 (21 Apr 2020 17:50)  T(F): 99.2 (22 Apr 2020 09:16), Max: 101.3 (21 Apr 2020 17:50)  HR: 108 (22 Apr 2020 09:50) (76 - 111)  BP: 155/74 (22 Apr 2020 07:00) (105/51 - 155/74)  BP(mean): 105 (22 Apr 2020 07:00) (74 - 105)  RR: 49 (22 Apr 2020 09:50) (21 - 49)  SpO2: 90% (22 Apr 2020 09:50) (89% - 96%)    04-21 @ 07:01  -  04-22 @ 07:00  --------------------------------------------------------  IN: 1969.8 mL / OUT: 1750 mL / NET: 219.8 mL    04-22 @ 07:01  -  04-22 @ 11:42  --------------------------------------------------------  IN: 30 mL / OUT: 300 mL / NET: -270 mL      04-21 @ 07:01  -  04-22 @ 07:00  --------------------------------------------------------  IN: 1969.8 mL / OUT: 1750 mL / NET: 219.8 mL    04-22 @ 07:01  -  04-22 @ 11:42  --------------------------------------------------------  IN: 30 mL / OUT: 300 mL / NET: -270 mL        Physical Exam:  General: Comfortable, pleasant, responding to conversation  Neurological: Alert and awake  HEENT: NC/AT; EOMI, PERRL, clear conjunctiva, no nasal or oropharyngeal discharge or exudates, MMM  Neck: supple, no cervical or post-auricular lymphadenopathy  Cardiovascular: +S1/S2, no murmurs/rubs/gallops, RRR  Respiratory: CTA B/L, no diminished breath sounds, no wheezes/rales/rhonchi, no increased work of breathing or accessory muscle use  Gastrointestinal: soft, NT/ND; active BSx4 quadrants  Genitourinary: no suprapubic tenderness, no CVA tenderness  Extremities: WWP; no edema, clubbing or cyanosis  Vascular: 2+ radial, DP/PT pulses B/L  Skin: no rashes          LABS:      CBC Full  -  ( 22 Apr 2020 04:07 )  WBC Count : 4.55 K/uL  RBC Count : 3.31 M/uL  Hemoglobin : 9.9 g/dL  Hematocrit : 32.2 %  Platelet Count - Automated : 215 K/uL  Mean Cell Volume : 97.3 fl  Mean Cell Hemoglobin : 29.9 pg  Mean Cell Hemoglobin Concentration : 30.7 gm/dL  Auto Neutrophil # : 2.98 K/uL  Auto Lymphocyte # : 1.29 K/uL  Auto Monocyte # : 0.04 K/uL  Auto Eosinophil # : 0.16 K/uL  Auto Basophil # : 0.08 K/uL  Auto Neutrophil % : 65.5 %  Auto Lymphocyte % : 28.3 %  Auto Monocyte % : 0.9 %  Auto Eosinophil % : 3.5 %  Auto Basophil % : 1.8 %    04-22    139  |  109<H>  |  6<L>  ----------------------------<  98  4.8   |  21<L>  |  0.44<L>    Ca    8.8      22 Apr 2020 04:07  Phos  2.6     04-22  Mg     1.8     04-22    TPro  6.3  /  Alb  2.8<L>  /  TBili  0.2  /  DBili  x   /  AST  25  /  ALT  17  /  AlkPhos  51  04-22                    RADIOLOGY & ADDITIONAL STUDIES (The following images were personally reviewed):          Plan:  74 F with pmhx of CP, MR, hx of DVT/PE, GERD, chronic PEG, HTN 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    Neuro:  - awake, off sedation     CV:   - SBP normotensive     Pulm:  - extubated 4/12  - nasal cannula (tolerating on 6L)  - encourage chest PT daily  - suctioning prn     ID:  - finished plaquenil & azithromycin  - repeat COVID swab sent (last positive on 4/20)  - rpt covid test q3d (next swab on 4/23)  - intermittently febrile- continued on meropenem and vanco  - F/u vanco trough 4/22 at 11 am    Nephro:  - voiding well     GI:  - G-tube  - Jevity 30ml/hr    Endo:   - ISS    Prophy:  - therapeutic Eliquis 5mg bid      Code: FULL CODE  Disposition: Patient requires continued monitoring in MICU  -d/w Dr. Fritz

## 2020-04-23 LAB
ALBUMIN SERPL ELPH-MCNC: 3.1 G/DL — LOW (ref 3.3–5)
ALP SERPL-CCNC: 53 U/L — SIGNIFICANT CHANGE UP (ref 40–120)
ALT FLD-CCNC: 18 U/L — SIGNIFICANT CHANGE UP (ref 10–45)
ANION GAP SERPL CALC-SCNC: 9 MMOL/L — SIGNIFICANT CHANGE UP (ref 5–17)
AST SERPL-CCNC: 27 U/L — SIGNIFICANT CHANGE UP (ref 10–40)
BASOPHILS # BLD AUTO: 0.03 K/UL — SIGNIFICANT CHANGE UP (ref 0–0.2)
BASOPHILS NFR BLD AUTO: 0.6 % — SIGNIFICANT CHANGE UP (ref 0–2)
BILIRUB SERPL-MCNC: 0.3 MG/DL — SIGNIFICANT CHANGE UP (ref 0.2–1.2)
BUN SERPL-MCNC: 9 MG/DL — SIGNIFICANT CHANGE UP (ref 7–23)
CALCIUM SERPL-MCNC: 8.6 MG/DL — SIGNIFICANT CHANGE UP (ref 8.4–10.5)
CHLORIDE SERPL-SCNC: 104 MMOL/L — SIGNIFICANT CHANGE UP (ref 96–108)
CO2 SERPL-SCNC: 25 MMOL/L — SIGNIFICANT CHANGE UP (ref 22–31)
CREAT SERPL-MCNC: 0.5 MG/DL — SIGNIFICANT CHANGE UP (ref 0.5–1.3)
CRP SERPL-MCNC: 3.29 MG/DL — HIGH (ref 0–0.4)
D DIMER BLD IA.RAPID-MCNC: 325 NG/ML DDU — HIGH
EOSINOPHIL # BLD AUTO: 0.14 K/UL — SIGNIFICANT CHANGE UP (ref 0–0.5)
EOSINOPHIL NFR BLD AUTO: 2.7 % — SIGNIFICANT CHANGE UP (ref 0–6)
FERRITIN SERPL-MCNC: 217 NG/ML — HIGH (ref 15–150)
GLUCOSE BLDC GLUCOMTR-MCNC: 126 MG/DL — HIGH (ref 70–99)
GLUCOSE BLDC GLUCOMTR-MCNC: 138 MG/DL — HIGH (ref 70–99)
GLUCOSE BLDC GLUCOMTR-MCNC: 159 MG/DL — HIGH (ref 70–99)
GLUCOSE BLDC GLUCOMTR-MCNC: 91 MG/DL — SIGNIFICANT CHANGE UP (ref 70–99)
GLUCOSE BLDC GLUCOMTR-MCNC: 91 MG/DL — SIGNIFICANT CHANGE UP (ref 70–99)
GLUCOSE SERPL-MCNC: 93 MG/DL — SIGNIFICANT CHANGE UP (ref 70–99)
HCT VFR BLD CALC: 31 % — LOW (ref 34.5–45)
HGB BLD-MCNC: 9.8 G/DL — LOW (ref 11.5–15.5)
IMM GRANULOCYTES NFR BLD AUTO: 0.2 % — SIGNIFICANT CHANGE UP (ref 0–1.5)
LYMPHOCYTES # BLD AUTO: 1.13 K/UL — SIGNIFICANT CHANGE UP (ref 1–3.3)
LYMPHOCYTES # BLD AUTO: 22.1 % — SIGNIFICANT CHANGE UP (ref 13–44)
MAGNESIUM SERPL-MCNC: 1.9 MG/DL — SIGNIFICANT CHANGE UP (ref 1.6–2.6)
MCHC RBC-ENTMCNC: 30.2 PG — SIGNIFICANT CHANGE UP (ref 27–34)
MCHC RBC-ENTMCNC: 31.6 GM/DL — LOW (ref 32–36)
MCV RBC AUTO: 95.4 FL — SIGNIFICANT CHANGE UP (ref 80–100)
MONOCYTES # BLD AUTO: 0.41 K/UL — SIGNIFICANT CHANGE UP (ref 0–0.9)
MONOCYTES NFR BLD AUTO: 8 % — SIGNIFICANT CHANGE UP (ref 2–14)
NEUTROPHILS # BLD AUTO: 3.4 K/UL — SIGNIFICANT CHANGE UP (ref 1.8–7.4)
NEUTROPHILS NFR BLD AUTO: 66.4 % — SIGNIFICANT CHANGE UP (ref 43–77)
NRBC # BLD: 0 /100 WBCS — SIGNIFICANT CHANGE UP (ref 0–0)
PHOSPHATE SERPL-MCNC: 2 MG/DL — LOW (ref 2.5–4.5)
PLATELET # BLD AUTO: 182 K/UL — SIGNIFICANT CHANGE UP (ref 150–400)
POTASSIUM SERPL-MCNC: 4.1 MMOL/L — SIGNIFICANT CHANGE UP (ref 3.5–5.3)
POTASSIUM SERPL-SCNC: 4.1 MMOL/L — SIGNIFICANT CHANGE UP (ref 3.5–5.3)
PROCALCITONIN SERPL-MCNC: 0.22 NG/ML — HIGH (ref 0.02–0.1)
PROT SERPL-MCNC: 6.3 G/DL — SIGNIFICANT CHANGE UP (ref 6–8.3)
RBC # BLD: 3.25 M/UL — LOW (ref 3.8–5.2)
RBC # FLD: 14.3 % — SIGNIFICANT CHANGE UP (ref 10.3–14.5)
SARS-COV-2 RNA SPEC QL NAA+PROBE: SIGNIFICANT CHANGE UP
SODIUM SERPL-SCNC: 138 MMOL/L — SIGNIFICANT CHANGE UP (ref 135–145)
WBC # BLD: 5.12 K/UL — SIGNIFICANT CHANGE UP (ref 3.8–10.5)
WBC # FLD AUTO: 5.12 K/UL — SIGNIFICANT CHANGE UP (ref 3.8–10.5)

## 2020-04-23 PROCEDURE — 99291 CRITICAL CARE FIRST HOUR: CPT | Mod: CS

## 2020-04-23 RX ORDER — LIDOCAINE HCL 20 MG/ML
50 VIAL (ML) INJECTION ONCE
Refills: 0 | Status: COMPLETED | OUTPATIENT
Start: 2020-04-23 | End: 2020-04-23

## 2020-04-23 RX ORDER — LACTOBACILLUS ACIDOPHILUS 100MM CELL
1 CAPSULE ORAL DAILY
Refills: 0 | Status: DISCONTINUED | OUTPATIENT
Start: 2020-04-23 | End: 2020-05-08

## 2020-04-23 RX ORDER — MAGNESIUM SULFATE 500 MG/ML
2 VIAL (ML) INJECTION ONCE
Refills: 0 | Status: COMPLETED | OUTPATIENT
Start: 2020-04-23 | End: 2020-04-23

## 2020-04-23 RX ORDER — LIDOCAINE HCL 20 MG/ML
5 VIAL (ML) INJECTION ONCE
Refills: 0 | Status: DISCONTINUED | OUTPATIENT
Start: 2020-04-23 | End: 2020-04-23

## 2020-04-23 RX ORDER — IOHEXOL 300 MG/ML
30 INJECTION, SOLUTION INTRAVENOUS ONCE
Refills: 0 | Status: COMPLETED | OUTPATIENT
Start: 2020-04-23 | End: 2020-04-23

## 2020-04-23 RX ORDER — VANCOMYCIN HCL 1 G
750 VIAL (EA) INTRAVENOUS EVERY 12 HOURS
Refills: 0 | Status: COMPLETED | OUTPATIENT
Start: 2020-04-23 | End: 2020-04-23

## 2020-04-23 RX ORDER — IOHEXOL 300 MG/ML
30 INJECTION, SOLUTION INTRAVENOUS ONCE
Refills: 0 | Status: DISCONTINUED | OUTPATIENT
Start: 2020-04-23 | End: 2020-04-23

## 2020-04-23 RX ADMIN — Medication 250 MILLIGRAM(S): at 17:44

## 2020-04-23 RX ADMIN — Medication 250 MILLIGRAM(S): at 10:01

## 2020-04-23 RX ADMIN — Medication 50 GRAM(S): at 06:34

## 2020-04-23 RX ADMIN — ROBINUL 0.2 MILLIGRAM(S): 0.2 INJECTION INTRAMUSCULAR; INTRAVENOUS at 10:03

## 2020-04-23 RX ADMIN — MEROPENEM 100 MILLIGRAM(S): 1 INJECTION INTRAVENOUS at 12:27

## 2020-04-23 RX ADMIN — PANTOPRAZOLE SODIUM 40 MILLIGRAM(S): 20 TABLET, DELAYED RELEASE ORAL at 10:05

## 2020-04-23 RX ADMIN — ROBINUL 0.2 MILLIGRAM(S): 0.2 INJECTION INTRAMUSCULAR; INTRAVENOUS at 05:29

## 2020-04-23 RX ADMIN — Medication 2: at 11:13

## 2020-04-23 RX ADMIN — ROBINUL 0.2 MILLIGRAM(S): 0.2 INJECTION INTRAMUSCULAR; INTRAVENOUS at 22:45

## 2020-04-23 RX ADMIN — CHLORHEXIDINE GLUCONATE 1 APPLICATION(S): 213 SOLUTION TOPICAL at 05:29

## 2020-04-23 RX ADMIN — Medication 650 MILLIGRAM(S): at 22:01

## 2020-04-23 RX ADMIN — SCOPALAMINE 1 PATCH: 1 PATCH, EXTENDED RELEASE TRANSDERMAL at 07:31

## 2020-04-23 RX ADMIN — SCOPALAMINE 1 PATCH: 1 PATCH, EXTENDED RELEASE TRANSDERMAL at 11:13

## 2020-04-23 RX ADMIN — APIXABAN 5 MILLIGRAM(S): 2.5 TABLET, FILM COATED ORAL at 10:03

## 2020-04-23 RX ADMIN — MEROPENEM 100 MILLIGRAM(S): 1 INJECTION INTRAVENOUS at 05:29

## 2020-04-23 RX ADMIN — SCOPALAMINE 1 PATCH: 1 PATCH, EXTENDED RELEASE TRANSDERMAL at 10:16

## 2020-04-23 RX ADMIN — ROBINUL 0.2 MILLIGRAM(S): 0.2 INJECTION INTRAMUSCULAR; INTRAVENOUS at 17:49

## 2020-04-23 RX ADMIN — Medication 85 MILLIMOLE(S): at 06:55

## 2020-04-23 RX ADMIN — SCOPALAMINE 1 PATCH: 1 PATCH, EXTENDED RELEASE TRANSDERMAL at 17:49

## 2020-04-23 NOTE — PROGRESS NOTE ADULT - SUBJECTIVE AND OBJECTIVE BOX
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 Extubated x2 and doing well on nasal cannula.    OVERNIGHT EVENTS: No acute events overnight.    SUBJECTIVE HPI: Patient seen and examined at bedside.    VITAL SIGNS:  ICU Vital Signs Last 24 Hrs  T(C): 37.1 (23 Apr 2020 09:28), Max: 37.8 (22 Apr 2020 16:54)  T(F): 98.7 (23 Apr 2020 09:28), Max: 100 (22 Apr 2020 16:54)  HR: 109 (23 Apr 2020 11:00) (78 - 119)  BP: 126/79 (23 Apr 2020 11:00) (112/59 - 154/75)  BP(mean): 96 (23 Apr 2020 11:00) (80 - 104)  ABP: 150/66 (23 Apr 2020 11:00) (118/51 - 161/73)  ABP(mean): 97 (23 Apr 2020 11:00) (76 - 105)  RR: 41 (23 Apr 2020 11:00) (19 - 47)  SpO2: 93% (23 Apr 2020 11:00) (91% - 100%)      I&O's Summary    22 Apr 2020 07:01  -  23 Apr 2020 07:00  --------------------------------------------------------  IN: 1940 mL / OUT: 2400 mL / NET: -460 mL    23 Apr 2020 07:01  -  23 Apr 2020 11:45  --------------------------------------------------------  IN: 30 mL / OUT: 0 mL / NET: 30 mL    PHYSICAL EXAM:  General: Comfortable, pleasant/anxious/agitated, Ill-appearing, well-nourished/frail/cachectic, comfortable / in distress  Neurological: AAOx3, no focal deficits  HEENT: NC/AT; EOMI, PERRL, clear conjunctiva, no nasal or oropharyngeal discharge or exudates, MMM  Neck: supple, no cervical or post-auricular lymphadenopathy  Cardiovascular: +S1/S2, no murmurs/rubs/gallops, RRR  Respiratory: CTA B/L, no diminished breath sounds, no wheezes/rales/rhonchi, no increased work of breathing or accessory muscle use  Gastrointestinal: soft, NT/ND; active BSx4 quadrants  Genitourinary: no suprapubic tenderness, no CVA tenderness  Extremities: WWP; no edema, clubbing or cyanosis  Vascular: 2+ radial, DP/PT pulses B/L  Skin: no rashes    MEDICATIONS:  MEDICATIONS  (STANDING):  apixaban 5 milliGRAM(s) Oral every 12 hours  chlorhexidine 2% Cloths 1 Application(s) Topical <User Schedule>  dextrose 5%. 1000 milliLiter(s) (50 mL/Hr) IV Continuous <Continuous>  dextrose 50% Injectable 12.5 Gram(s) IV Push once  dextrose 50% Injectable 25 Gram(s) IV Push once  dextrose 50% Injectable 25 Gram(s) IV Push once  glycopyrrolate Injectable 0.2 milliGRAM(s) IV Push every 6 hours  insulin lispro (HumaLOG) corrective regimen sliding scale   SubCutaneous every 6 hours  meropenem  IVPB 1000 milliGRAM(s) IV Intermittent every 8 hours  pantoprazole   Suspension 40 milliGRAM(s) Oral daily  scopolamine   Patch 1 Patch Transdermal every 72 hours  vancomycin  IVPB 750 milliGRAM(s) IV Intermittent every 12 hours    MEDICATIONS  (PRN):  acetaminophen    Suspension .. 650 milliGRAM(s) Oral every 6 hours PRN Temp greater or equal to 38C (100.4F), Mild Pain (1 - 3)  dextrose 40% Gel 15 Gram(s) Oral once PRN Blood Glucose LESS THAN 70 milliGRAM(s)/deciliter  glucagon  Injectable 1 milliGRAM(s) IntraMuscular once PRN Glucose LESS THAN 70 milligrams/deciliter  sodium chloride 0.9% lock flush 10 milliLiter(s) IV Push every 1 hour PRN Pre/post blood products, medications, blood draw, and to maintain line patency    ALLERGIES/INTOLERANCES:  Allergies    ace inhibitors (Anaphylaxis)  caffeine (Rash)  chocolate (Rash)  high acid (Rash)  Tomatoes (Hives)    Intolerances    LABS:              9.8    5.12  )-----------( 182      ( 23 Apr 2020 04:48 )             31.0     Auto Neutrophil %: 66.4 % (04-23-20 @ 04:48)  Auto Lymphocyte #: 1.13 K/uL (04-23-20 @ 04:48)      04-23    138  |  104  |  9   ----------------------------<  93  4.1   |  25  |  0.50    Ca    8.6      23 Apr 2020 04:48  Phos  2.0     04-23  Mg     1.9     04-23    TPro  6.3  /  Alb  3.1<L>  /  TBili  0.3  /  DBili  x   /  AST  27  /  ALT  18  /  AlkPhos  53  04-23    CoVID-specific labs:  C-Reactive Protein, Serum: 3.29 mg/dL <H> (04-23-20 @ 04:48)  Procalcitonin, Serum: 0.22 ng/mL <H> (04-23-20 @ 04:48)  Ferritin, Serum: 217 ng/mL <H> (04-23-20 @ 04:48)    Microbiology:   Culture - Sputum (collected 20 Apr 2020 22:34)  Source: .Sputum Sputum  Gram Stain (21 Apr 2020 12:28):    Moderate WBC's    Few squamous epithelial cells    Few Gram Negative Rods    Rare Gram Positive Rods  Preliminary Report (22 Apr 2020 09:30):    Moderate Gram Negative Rods    Identification and susceptibility to follow.    Accompanied by normal respiratory teto    RADIOLOGY, EKG AND ADDITIONAL TESTS: Reviewed.    Assessment & Plan:  74 F with pmhx of CP, MR, hx of DVT/PE, GERD, chronic PEG, HTN 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, presenting to [facility] for respiratory failure secondary to COVID.    Neuro:  - awake, off sedation     CV:   - SBP normotensive     Pulm:  - extubated 4/12  - nasal cannula (tolerating on 6L)  - encourage chest PT daily  - suctioning prn     ID:  - finished plaquenil & azithromycin  - repeat COVID swab sent today (last positive on 4/20)  - rpt covid test q3d (next swab on 4/26)  - intermittently febrile- completed meropenem and vanco today    Nephro:  - voiding well     GI:  - G-tube  - Jevity 30ml/hr    Endo:   - ISS    Prophy:  - therapeutic Eliquis 5mg bid    Code: FULL CODE  Disposition: Patient requires continued monitoring in MICU  -d/w Dr. Fritz

## 2020-04-23 NOTE — CHART NOTE - NSCHARTNOTEFT_GEN_A_CORE
Admitting Diagnosis:   Patient is a 74y old  Female who presents with a chief complaint of covid (23 Apr 2020 11:45)      PAST MEDICAL & SURGICAL HISTORY:  HTN (hypertension)  Spastic quadriplegia  DVT (deep venous thrombosis)  PE (pulmonary thromboembolism)  GERD (gastroesophageal reflux disease)  Dysphagia  Mental retardation  CP (cerebral palsy)  PEG tube malfunction      Current Nutrition Order:  Jevity 1.5 goal rate 30ml/hr 1PS/day x24hrs via PEG 4/5     PO Intake: Good (%) [   ]  Fair (50-75%) [   ] Poor (<25%) [   ]  NA, Please See Below     GI Issues:  Noted CDIFF negative results  Antibiotic use noted   BM+ x2 4/19, x3 4/18  Rectal Tube (250ml 4/20, 700ml 4/22, 100ml 4/23)     Pain:  none per flow sheets     Skin:  1+ edema BL foot edema  No pressure ulcers  +bilateral buttocks and thigh generalized erythema secondary to bowel incontinence       Labs:   04-23    138  |  104  |  9   ----------------------------<  93  4.1   |  25  |  0.50    Ca    8.6      23 Apr 2020 04:48  Phos  2.0     04-23  Mg     1.9     04-23    TPro  6.3  /  Alb  3.1<L>  /  TBili  0.3  /  DBili  x   /  AST  27  /  ALT  18  /  AlkPhos  53  04-23    CAPILLARY BLOOD GLUCOSE      POCT Blood Glucose.: 159 mg/dL (23 Apr 2020 11:01)  POCT Blood Glucose.: 138 mg/dL (23 Apr 2020 10:25)  POCT Blood Glucose.: 126 mg/dL (23 Apr 2020 05:34)  POCT Blood Glucose.: 99 mg/dL (22 Apr 2020 22:11)  POCT Blood Glucose.: 92 mg/dL (22 Apr 2020 17:44)      Medications:  MEDICATIONS  (STANDING):  apixaban 5 milliGRAM(s) Oral every 12 hours  chlorhexidine 2% Cloths 1 Application(s) Topical <User Schedule>  dextrose 5%. 1000 milliLiter(s) (50 mL/Hr) IV Continuous <Continuous>  dextrose 50% Injectable 12.5 Gram(s) IV Push once  dextrose 50% Injectable 25 Gram(s) IV Push once  dextrose 50% Injectable 25 Gram(s) IV Push once  glycopyrrolate Injectable 0.2 milliGRAM(s) IV Push every 6 hours  insulin lispro (HumaLOG) corrective regimen sliding scale   SubCutaneous every 6 hours  meropenem  IVPB 1000 milliGRAM(s) IV Intermittent every 8 hours  pantoprazole   Suspension 40 milliGRAM(s) Oral daily  scopolamine   Patch 1 Patch Transdermal every 72 hours  vancomycin  IVPB 750 milliGRAM(s) IV Intermittent every 12 hours    MEDICATIONS  (PRN):  acetaminophen    Suspension .. 650 milliGRAM(s) Oral every 6 hours PRN Temp greater or equal to 38C (100.4F), Mild Pain (1 - 3)  dextrose 40% Gel 15 Gram(s) Oral once PRN Blood Glucose LESS THAN 70 milliGRAM(s)/deciliter  glucagon  Injectable 1 milliGRAM(s) IntraMuscular once PRN Glucose LESS THAN 70 milligrams/deciliter  sodium chloride 0.9% lock flush 10 milliLiter(s) IV Push every 1 hour PRN Pre/post blood products, medications, blood draw, and to maintain line patency    4'7'' IBW 90 pounds +-10%  (4/3) 122 pounds   (4/6) 117 pounds  (4/8) 116.6 pounds   (4/12) 116.6 pounds   %OAY=743% BMI=27.19  Based on most recent EMR wt    Nutrition Focused Physical Exam: Completed [   ]  Not Pertinent [   ]- NA d/t COVID     Estimated energy needs:   IBW used to calculate energy needs due to pt's current body weight exceeding 120% of IBW   Needs adjusted for age based on hypermetabolic state 2/2 viral infection  1025-1230kcal/day (25-30kcal/kg)  49-57g protein/day (1.2-1.4g pro/kg)  Fluids per team     Subjective:   74F with hx of CP, MR, hx of DVT/PE, GERD, chronic PEG, HTN 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, presented to [facility] for AHRF, intubated, sedated on propofol (5 ml/hr providing 132kcal) tx to [facility] for ICU care 4/3. Pt COVID positive with coronavirus pna, acute hypoxemic respiratory failure/ARDS. Pt extubated to NRB 4/4, however later re-intubated. Pt extuabted 4/6 to NRB. Pt reintuabted 4/8. Extubated for third time 4/12, doing well on non-rebreather and Switched to nasal cannula 4/13, noted to be tolerating + RN confirms. Repeat COVID swab sent, last positive 4/20, next swab 4/23 planned. Noted need for negative test prior to returning to group home. Planned to remain in MICU for continued monitoring.  Current TF order for Jevity 1.5 goal rate 30ml/hr x24hrs +1PS/day, provides 720ml, 1180kcal, 61gm prot, 547ml water. Spoke with RN who reports pt tolerating feeds.  Allergic to caffeine, high acid, and chocolate, tomatoes- noted in EMR.  Please see below for nutritions recommendations. Recs made with team.     Previous Nutrition Diagnosis: Inadequate Energy Intake Etiology RT inability to meet est needs 2/2 acute critical status Signs/Symptoms AEB NPO diet meeting 0% EER.   RESOLVED - order for EN 4/5   Previous Nutrition Diagnosis: Increased nutrient needs RT increased demand for energy and protein AEB hypermetabolic state 2/2 viral infection (COVID19+)   ON GOING AT THIS TIME   Goal/Expected Outcome: Pt will meet at least 75% of nutrient needs     Recommendations:  1. Current TF order for Jevity 1.5 goal rate 30ml/hr x24hrs +1PS/day, provides 720ml, 1180kcal, 61gm prot, 547ml water - meeting EER.  >> Given pump shortage d/t COVID 19, if bolus feeds feasible, consider use of 4 cans Jevity 1.2/day, will provide ~948ml, 1140kcal, 53gm prot, 765ml water.   2. Monitor tolerance, GI distress - Consider use of fiber agent vs protobitc use.   3. Monitor Skin, Wts, GOC.  4. Micro-nutrients/Vitamins/Minerals per team.  5. Monitor Labs; monitor BMP, CBC, glucose, lytes, trend renal indices, LFTs.   6. RD to remain available for additional nutrition interventions as needed.     Education: NA   Risk Level: High [ X  ] Moderate [   ] Low [   ].

## 2020-04-23 NOTE — PROVIDER CONTACT NOTE (OTHER) - SITUATION
a lot of resistance met when attempted to administer medications, PEG site examined and tube found to have fallen out and dislodged

## 2020-04-23 NOTE — PROVIDER CONTACT NOTE (OTHER) - ACTION/TREATMENT ORDERED:
MD notified, surgery consulted and evaled
Dr. Bruner made aware, MD will order pain management, will monitor
Dr. Ornelas made aware, tylenol and ice packs given, will monitor
Dr. Ornelas made aware. tylenol and ice packs given. will monitor

## 2020-04-24 LAB
ALBUMIN SERPL ELPH-MCNC: 3 G/DL — LOW (ref 3.3–5)
ALP SERPL-CCNC: 58 U/L — SIGNIFICANT CHANGE UP (ref 40–120)
ALT FLD-CCNC: 22 U/L — SIGNIFICANT CHANGE UP (ref 10–45)
ANION GAP SERPL CALC-SCNC: 9 MMOL/L — SIGNIFICANT CHANGE UP (ref 5–17)
AST SERPL-CCNC: 26 U/L — SIGNIFICANT CHANGE UP (ref 10–40)
BASOPHILS # BLD AUTO: 0.03 K/UL — SIGNIFICANT CHANGE UP (ref 0–0.2)
BASOPHILS NFR BLD AUTO: 0.7 % — SIGNIFICANT CHANGE UP (ref 0–2)
BILIRUB SERPL-MCNC: 0.3 MG/DL — SIGNIFICANT CHANGE UP (ref 0.2–1.2)
BUN SERPL-MCNC: 8 MG/DL — SIGNIFICANT CHANGE UP (ref 7–23)
CALCIUM SERPL-MCNC: 8.7 MG/DL — SIGNIFICANT CHANGE UP (ref 8.4–10.5)
CHLORIDE SERPL-SCNC: 104 MMOL/L — SIGNIFICANT CHANGE UP (ref 96–108)
CO2 SERPL-SCNC: 27 MMOL/L — SIGNIFICANT CHANGE UP (ref 22–31)
CREAT SERPL-MCNC: 0.48 MG/DL — LOW (ref 0.5–1.3)
CRP SERPL-MCNC: 3.63 MG/DL — HIGH (ref 0–0.4)
CULTURE RESULTS: NO GROWTH — SIGNIFICANT CHANGE UP
CULTURE RESULTS: NO GROWTH — SIGNIFICANT CHANGE UP
EOSINOPHIL # BLD AUTO: 0.18 K/UL — SIGNIFICANT CHANGE UP (ref 0–0.5)
EOSINOPHIL NFR BLD AUTO: 4.4 % — SIGNIFICANT CHANGE UP (ref 0–6)
FERRITIN SERPL-MCNC: 209 NG/ML — HIGH (ref 15–150)
GLUCOSE BLDC GLUCOMTR-MCNC: 106 MG/DL — HIGH (ref 70–99)
GLUCOSE BLDC GLUCOMTR-MCNC: 107 MG/DL — HIGH (ref 70–99)
GLUCOSE BLDC GLUCOMTR-MCNC: 90 MG/DL — SIGNIFICANT CHANGE UP (ref 70–99)
GLUCOSE BLDC GLUCOMTR-MCNC: 98 MG/DL — SIGNIFICANT CHANGE UP (ref 70–99)
GLUCOSE SERPL-MCNC: 116 MG/DL — HIGH (ref 70–99)
HCT VFR BLD CALC: 30.6 % — LOW (ref 34.5–45)
HGB BLD-MCNC: 9.5 G/DL — LOW (ref 11.5–15.5)
IMM GRANULOCYTES NFR BLD AUTO: 0.2 % — SIGNIFICANT CHANGE UP (ref 0–1.5)
LYMPHOCYTES # BLD AUTO: 0.97 K/UL — LOW (ref 1–3.3)
LYMPHOCYTES # BLD AUTO: 23.8 % — SIGNIFICANT CHANGE UP (ref 13–44)
MAGNESIUM SERPL-MCNC: 2 MG/DL — SIGNIFICANT CHANGE UP (ref 1.6–2.6)
MCHC RBC-ENTMCNC: 30.1 PG — SIGNIFICANT CHANGE UP (ref 27–34)
MCHC RBC-ENTMCNC: 31 GM/DL — LOW (ref 32–36)
MCV RBC AUTO: 96.8 FL — SIGNIFICANT CHANGE UP (ref 80–100)
MONOCYTES # BLD AUTO: 0.34 K/UL — SIGNIFICANT CHANGE UP (ref 0–0.9)
MONOCYTES NFR BLD AUTO: 8.4 % — SIGNIFICANT CHANGE UP (ref 2–14)
NEUTROPHILS # BLD AUTO: 2.54 K/UL — SIGNIFICANT CHANGE UP (ref 1.8–7.4)
NEUTROPHILS NFR BLD AUTO: 62.5 % — SIGNIFICANT CHANGE UP (ref 43–77)
NRBC # BLD: 0 /100 WBCS — SIGNIFICANT CHANGE UP (ref 0–0)
PHOSPHATE SERPL-MCNC: 2.4 MG/DL — LOW (ref 2.5–4.5)
PLATELET # BLD AUTO: 157 K/UL — SIGNIFICANT CHANGE UP (ref 150–400)
POTASSIUM SERPL-MCNC: 3.6 MMOL/L — SIGNIFICANT CHANGE UP (ref 3.5–5.3)
POTASSIUM SERPL-SCNC: 3.6 MMOL/L — SIGNIFICANT CHANGE UP (ref 3.5–5.3)
PROT SERPL-MCNC: 6.4 G/DL — SIGNIFICANT CHANGE UP (ref 6–8.3)
RBC # BLD: 3.16 M/UL — LOW (ref 3.8–5.2)
RBC # FLD: 14.1 % — SIGNIFICANT CHANGE UP (ref 10.3–14.5)
SARS-COV-2 RNA SPEC QL NAA+PROBE: DETECTED
SODIUM SERPL-SCNC: 140 MMOL/L — SIGNIFICANT CHANGE UP (ref 135–145)
SPECIMEN SOURCE: SIGNIFICANT CHANGE UP
SPECIMEN SOURCE: SIGNIFICANT CHANGE UP
WBC # BLD: 4.07 K/UL — SIGNIFICANT CHANGE UP (ref 3.8–10.5)
WBC # FLD AUTO: 4.07 K/UL — SIGNIFICANT CHANGE UP (ref 3.8–10.5)

## 2020-04-24 PROCEDURE — 74018 RADEX ABDOMEN 1 VIEW: CPT | Mod: 26

## 2020-04-24 PROCEDURE — 99291 CRITICAL CARE FIRST HOUR: CPT | Mod: CS

## 2020-04-24 RX ORDER — POTASSIUM CHLORIDE 20 MEQ
40 PACKET (EA) ORAL EVERY 6 HOURS
Refills: 0 | Status: COMPLETED | OUTPATIENT
Start: 2020-04-24 | End: 2020-04-24

## 2020-04-24 RX ADMIN — IOHEXOL 30 MILLILITER(S): 300 INJECTION, SOLUTION INTRAVENOUS at 00:05

## 2020-04-24 RX ADMIN — SCOPALAMINE 1 PATCH: 1 PATCH, EXTENDED RELEASE TRANSDERMAL at 04:54

## 2020-04-24 RX ADMIN — ROBINUL 0.2 MILLIGRAM(S): 0.2 INJECTION INTRAMUSCULAR; INTRAVENOUS at 11:31

## 2020-04-24 RX ADMIN — SCOPALAMINE 1 PATCH: 1 PATCH, EXTENDED RELEASE TRANSDERMAL at 18:21

## 2020-04-24 RX ADMIN — Medication 50 MILLILITER(S): at 00:05

## 2020-04-24 RX ADMIN — ROBINUL 0.2 MILLIGRAM(S): 0.2 INJECTION INTRAMUSCULAR; INTRAVENOUS at 18:21

## 2020-04-24 RX ADMIN — ROBINUL 0.2 MILLIGRAM(S): 0.2 INJECTION INTRAMUSCULAR; INTRAVENOUS at 23:15

## 2020-04-24 RX ADMIN — ROBINUL 0.2 MILLIGRAM(S): 0.2 INJECTION INTRAMUSCULAR; INTRAVENOUS at 04:53

## 2020-04-24 RX ADMIN — PANTOPRAZOLE SODIUM 40 MILLIGRAM(S): 20 TABLET, DELAYED RELEASE ORAL at 11:32

## 2020-04-24 RX ADMIN — Medication 1 TABLET(S): at 11:31

## 2020-04-24 RX ADMIN — APIXABAN 5 MILLIGRAM(S): 2.5 TABLET, FILM COATED ORAL at 23:16

## 2020-04-24 RX ADMIN — CHLORHEXIDINE GLUCONATE 1 APPLICATION(S): 213 SOLUTION TOPICAL at 04:54

## 2020-04-24 RX ADMIN — APIXABAN 5 MILLIGRAM(S): 2.5 TABLET, FILM COATED ORAL at 11:31

## 2020-04-24 RX ADMIN — Medication 40 MILLIEQUIVALENT(S): at 11:31

## 2020-04-24 RX ADMIN — APIXABAN 5 MILLIGRAM(S): 2.5 TABLET, FILM COATED ORAL at 02:30

## 2020-04-24 RX ADMIN — Medication 40 MILLIEQUIVALENT(S): at 08:55

## 2020-04-24 NOTE — CONSULT NOTE ADULT - ASSESSMENT
Ms. Soliman is a 74 yo F with displaced gastrostomy tube s/p bedside replacement and subsequent tube study within normal limits.    - Contacted by MICU team immediately after displacement of gastrostomy tube. Patient evaluated at bedside and found to be hemodynamically stable with a benign abdominal exam. Given that tube has been in placed for approximately 2-3 years, decision was made to replace it by bedside.   - Displaced g-tube found to 16Fr in size. 14Fr red rubber catheter was placed into tract easily without any resistance and left in place until replacement g-tube was obtained. 14Fr g-tube was placed and inflated with 3 cc of sterile saline (total of 5 cc capacity) again without resistance and tube study was obtained. G-tube was secured in placed with 2-0 Nylon sutures.  - Tube study obtained by bedside using iohexol oral contrast solution. No evidence of extravasation seen on immediate and delayed images; all contrast found to be intraluminal. Some retrograde movement of contrast into esophagus was noted and therefore balloon was deflated by 1 cc. Total volume in balloon: 2 cc.  - Given clinically benign vitals, abdominal exam, uncomplicated replacement of gastrostomy tube, and normal tube study, may restart tube feeds. Would start at trickle and advance to goal slowly until AM. Surgery will reevaluate with abdominal exam in AM.  - If any further concerns, changes in vitals or abdominal exam, please contact surgery team immediately: [phone] or [phone].  - Please use abdominal binder to further secure g-tube.  - Plan discussed with attending and chief resident. Ms. Soliman is a 74 yo F with displaced gastrostomy tube s/p bedside replacement and subsequent tube study within normal limits.    - Contacted by MICU team immediately after displacement of gastrostomy tube. Patient evaluated at bedside and found to be hemodynamically stable with a benign abdominal exam. Given that tube has been in placed for approximately 2-3 years, decision was made to replace it by bedside.   - Displaced g-tube found to 16Fr in size. 14Fr red rubber catheter was placed into tract easily without any resistance and left in place until replacement g-tube was obtained. 14Fr g-tube was placed and inflated with 3 cc of sterile saline (total of 5 cc capacity) without resistance and tube study was obtained. G-tube was secured in placed with 2-0 Nylon sutures.  - Tube study obtained by bedside using iohexol oral contrast solution. No evidence of extravasation seen on immediate and delayed images; all contrast found to be intraluminal. Some retrograde movement of contrast into esophagus was noted and therefore balloon was deflated by 1 cc. Total volume in balloon: 2 cc.  - Given clinically benign vitals, abdominal exam, uncomplicated replacement of gastrostomy tube, and normal tube study, may restart tube feeds. Would start at trickle and advance to goal slowly until AM. Surgery will reevaluate with abdominal exam in AM.  - If any further concerns, changes in vitals or abdominal exam, please contact surgery team immediately: [phone] or [phone].  - Please use abdominal binder to further secure g-tube.  - Plan discussed with attending and chief resident. Ms. Soliman is a 74 yo F with displaced gastrostomy tube s/p bedside replacement and subsequent tube study within normal limits.    - Contacted by MICU team immediately after displacement of gastrostomy tube. Patient evaluated at bedside and found to be hemodynamically stable with a benign abdominal exam. Given that tube has been in placed for approximately 2-3 years, decision was made to replace it by bedside.   - Displaced g-tube found to 16Fr in size. 14Fr red rubber catheter was placed into tract easily without any resistance and left in place until replacement g-tube was obtained. 14Fr g-tube was placed and inflated with 3 cc of sterile saline (total of 5 cc capacity) without resistance and bedside tube study was obtained.   - Tube study performed by bedside using iohexol oral contrast solution. No evidence of extravasation seen on immediate and delayed images; all contrast found to be intraluminal. Some retrograde movement of contrast into esophagus was noted and therefore balloon was deflated by 1 cc. Total volume in balloon: 2 cc.  - G-tube was secured in placed with 2-0 Nylon sutures.  - Given clinically benign vitals, abdominal exam, uncomplicated replacement of gastrostomy tube, and normal tube study, may restart tube feeds. Would start at trickle and advance to goal slowly until AM. Surgery will reevaluate with abdominal exam in AM.  - If any further concerns, changes in vitals or abdominal exam, please contact surgery team immediately: [phone] or [phone].  - Please use abdominal binder to further secure g-tube.  - Plan discussed with attending and chief resident.

## 2020-04-24 NOTE — CONSULT NOTE ADULT - SUBJECTIVE AND OBJECTIVE BOX
GENERAL SURGERY CONSULT NOTE    HPI:    Ms. Soliman is a 75 yo F with history of spastic quadriplegia dependent tube feeds through PEG tube, currently admitted to MICU for hypoxemic respiratory failure secondary to COVID. Surgery was consulted for displaced PEG tube. MICU team called immediately. Per history from chart review and MICU team, patient has had her gastrostomy tube in place since 2017.     On evaluation by bedside, patient found to have stable vitals and a benign abdominal exam.     PAST MEDICAL & SURGICAL HISTORY:  HTN (hypertension)  Spastic quadriplegia  DVT (deep venous thrombosis)  PE (pulmonary thromboembolism)  GERD (gastroesophageal reflux disease)  Dysphagia  Mental retardation  CP (cerebral palsy)  PEG tube malfunction    PAST SURGICAL HISTORY:     REVIEW OF SYSTEMS:   Pertinent positives/negatives noted in HPI.     HOME MEDICATIONS:  See med rec    ALLERGIES:  ACE inhibitors (Anaphylaxis)  caffeine (Rash)  chocolate (Rash)  high acid (Rash)  Tomatoes (Hives)    SOCIAL HISTORY:  Deferred    FAMILY HISTORY:  Deferred    Vital Signs Last 24 Hrs  T(C): 38 (23 Apr 2020 21:50), Max: 38 (23 Apr 2020 21:50)  T(F): 100.4 (23 Apr 2020 21:50), Max: 100.4 (23 Apr 2020 21:50)  HR: 94 (24 Apr 2020 00:00) (78 - 112)  BP: 120/57 (23 Apr 2020 23:00) (112/59 - 154/75)  BP(mean): 82 (23 Apr 2020 23:00) (80 - 104)  RR: 25 (23 Apr 2020 21:00) (19 - 49)  SpO2: 94% (24 Apr 2020 00:00) (91% - 100%)    PHYSICAL EXAM:  General: no acute distress  Cardiovascular: NSR  Pulmonary: nonlabored breathing, no respiratory distress  Abdomen: soft, nondistended, nontender, no guarding or rebound. Gastrostomy tube tract without drainage, surrounding erythema, induration or fluctuance.  Extremities: nonedematous    LABS:                        9.8    5.12  )-----------( 182      ( 23 Apr 2020 04:48 )             31.0     04-23    138  |  104  |  9   ----------------------------<  93  4.1   |  25  |  0.50    Ca    8.6      23 Apr 2020 04:48  Phos  2.0     04-23  Mg     1.9     04-23    TPro  6.3  /  Alb  3.1<L>  /  TBili  0.3  /  DBili  x   /  AST  27  /  ALT  18  /  AlkPhos  53  04-23

## 2020-04-24 NOTE — PROGRESS NOTE ADULT - SUBJECTIVE AND OBJECTIVE BOX
INTERVAL HPI/OVERNIGHT EVENTS: Overnight pt's G-tube became dislodged. Our service was consulted for replacement, which was performed. Tube study shows no leak. No other complications.     MEDICATIONS  (STANDING):  apixaban 5 milliGRAM(s) Oral every 12 hours  chlorhexidine 2% Cloths 1 Application(s) Topical <User Schedule>  dextrose 5%. 1000 milliLiter(s) (50 mL/Hr) IV Continuous <Continuous>  dextrose 50% Injectable 12.5 Gram(s) IV Push once  dextrose 50% Injectable 25 Gram(s) IV Push once  dextrose 50% Injectable 25 Gram(s) IV Push once  glycopyrrolate Injectable 0.2 milliGRAM(s) IV Push every 6 hours  insulin lispro (HumaLOG) corrective regimen sliding scale   SubCutaneous every 6 hours  lactobacillus acidophilus 1 Tablet(s) Oral daily  pantoprazole   Suspension 40 milliGRAM(s) Oral daily  potassium chloride   Solution 40 milliEquivalent(s) Oral every 6 hours  scopolamine   Patch 1 Patch Transdermal every 72 hours    MEDICATIONS  (PRN):  acetaminophen    Suspension .. 650 milliGRAM(s) Oral every 6 hours PRN Temp greater or equal to 38C (100.4F), Mild Pain (1 - 3)  dextrose 40% Gel 15 Gram(s) Oral once PRN Blood Glucose LESS THAN 70 milliGRAM(s)/deciliter  glucagon  Injectable 1 milliGRAM(s) IntraMuscular once PRN Glucose LESS THAN 70 milligrams/deciliter  sodium chloride 0.9% lock flush 10 milliLiter(s) IV Push every 1 hour PRN Pre/post blood products, medications, blood draw, and to maintain line patency      Vital Signs Last 24 Hrs  T(C): 37.1 (24 Apr 2020 07:02), Max: 38 (23 Apr 2020 21:50)  T(F): 98.7 (24 Apr 2020 07:02), Max: 100.4 (23 Apr 2020 21:50)  HR: 106 (24 Apr 2020 09:00) (76 - 109)  BP: 154/78 (24 Apr 2020 09:00) (90/48 - 154/78)  BP(mean): 104 (24 Apr 2020 09:00) (65 - 104)  RR: 20 (24 Apr 2020 07:00) (19 - 49)  SpO2: 91% (24 Apr 2020 09:00) (90% - 95%)    PHYSICAL EXAM:      General: no acute distress  Cardiovascular: NSR  Pulmonary: nonlabored breathing, no respiratory distress  Abdomen: soft, nondistended, nontender, no guarding or rebound. Gastrostomy tube in place  Extremities: nonedematous    I&O's Detail    23 Apr 2020 07:01  -  24 Apr 2020 07:00  --------------------------------------------------------  IN:    Enteral Tube Flush: 100 mL    ns in tub fed  [id number]: 210 mL  Total IN: 310 mL    OUT:  Total OUT: 0 mL    Total NET: 310 mL          LABS:                        9.5    4.07  )-----------( 157      ( 24 Apr 2020 03:51 )             30.6     04-24    140  |  104  |  8   ----------------------------<  116<H>  3.6   |  27  |  0.48<L>    Ca    8.7      24 Apr 2020 03:51  Phos  2.4     04-24  Mg     2.0     04-24    TPro  6.4  /  Alb  3.0<L>  /  TBili  0.3  /  DBili  x   /  AST  26  /  ALT  22  /  AlkPhos  58  04-24          RADIOLOGY & ADDITIONAL STUDIES:

## 2020-04-24 NOTE — PROGRESS NOTE ADULT - ASSESSMENT
Ms. Soliman is a 76 yo F with displaced gastrostomy tube s/p bedside replacement and subsequent tube study within normal limits.    - Displaced g-tube replaced last PM and secured. Total volume in balloon: 2 cc.  - Given clinically benign vitals, abdominal exam, uncomplicated replacement of gastrostomy tube, and normal tube study, may restart tube feeds. Would start at trickle and advance to goal slowly until AM.   - Surgery Team 4C will sign off at this time. If any further concerns, changes in vitals or abdominal exam, please contact surgery team immediately: [phone] or [phone].  - Please use abdominal binder to further secure g-tube.  - Plan discussed with attending and chief resident.

## 2020-04-24 NOTE — PROGRESS NOTE ADULT - SUBJECTIVE AND OBJECTIVE BOX
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 Extubated x2 and doing well on nasal cannula.    OVERNIGHT EVENTS: PEG tube dislodged overnight.  PEG replaced by general surgery and patient tolerated continuous tube feeds overnight.    SUBJECTIVE HPI: Patient seen and examined at bedside.    VITAL SIGNS:  ICU Vital Signs Last 24 Hrs  T(C): 37.1 (24 Apr 2020 07:02), Max: 38 (23 Apr 2020 21:50)  T(F): 98.7 (24 Apr 2020 07:02), Max: 100.4 (23 Apr 2020 21:50)  HR: 96 (24 Apr 2020 11:00) (76 - 108)  BP: 137/68 (24 Apr 2020 10:00) (90/48 - 154/78)  BP(mean): 89 (24 Apr 2020 10:00) (65 - 104)  ABP: 135/83 (24 Apr 2020 11:00) (64/56 - 168/72)  ABP(mean): 106 (24 Apr 2020 11:00) (61 - 126)  RR: 20 (24 Apr 2020 07:00) (19 - 49)  SpO2: 92% (24 Apr 2020 11:00) (90% - 95%)    I&O's Summary    23 Apr 2020 07:01  -  24 Apr 2020 07:00  --------------------------------------------------------  IN: 310 mL / OUT: 0 mL / NET: 310 mL    PHYSICAL EXAM:  General: Comfortable, pleasant/anxious/agitated, Ill-appearing, well-nourished/frail/cachectic, comfortable / in distress  Neurological: AAOx3, no focal deficits  HEENT: NC/AT; EOMI, PERRL, clear conjunctiva, no nasal or oropharyngeal discharge or exudates, MMM  Neck: supple, no cervical or post-auricular lymphadenopathy  Cardiovascular: +S1/S2, no murmurs/rubs/gallops, RRR  Respiratory: CTA B/L, no diminished breath sounds, no wheezes/rales/rhonchi, no increased work of breathing or accessory muscle use  Gastrointestinal: soft, NT/ND; active BSx4 quadrants. + PEG.  Genitourinary: no suprapubic tenderness, no CVA tenderness  Extremities: WWP; no edema, clubbing or cyanosis  Vascular: 2+ radial, DP/PT pulses B/L  Skin: no rashes    MEDICATIONS:  MEDICATIONS  (STANDING):  apixaban 5 milliGRAM(s) Oral every 12 hours  chlorhexidine 2% Cloths 1 Application(s) Topical <User Schedule>  dextrose 5%. 1000 milliLiter(s) (50 mL/Hr) IV Continuous <Continuous>  dextrose 50% Injectable 12.5 Gram(s) IV Push once  dextrose 50% Injectable 25 Gram(s) IV Push once  dextrose 50% Injectable 25 Gram(s) IV Push once  glycopyrrolate Injectable 0.2 milliGRAM(s) IV Push every 6 hours  insulin lispro (HumaLOG) corrective regimen sliding scale   SubCutaneous every 6 hours  lactobacillus acidophilus 1 Tablet(s) Oral daily  pantoprazole   Suspension 40 milliGRAM(s) Oral daily  potassium chloride   Solution 40 milliEquivalent(s) Oral every 6 hours  scopolamine   Patch 1 Patch Transdermal every 72 hours    MEDICATIONS  (PRN):  acetaminophen    Suspension .. 650 milliGRAM(s) Oral every 6 hours PRN Temp greater or equal to 38C (100.4F), Mild Pain (1 - 3)  dextrose 40% Gel 15 Gram(s) Oral once PRN Blood Glucose LESS THAN 70 milliGRAM(s)/deciliter  glucagon  Injectable 1 milliGRAM(s) IntraMuscular once PRN Glucose LESS THAN 70 milligrams/deciliter  sodium chloride 0.9% lock flush 10 milliLiter(s) IV Push every 1 hour PRN Pre/post blood products, medications, blood draw, and to maintain line patency    ALLERGIES/INTOLERANCES:  Allergies    ace inhibitors (Anaphylaxis)  caffeine (Rash)  chocolate (Rash)  high acid (Rash)  Tomatoes (Hives)    Intolerances    LABS:                        9.5    4.07  )-----------( 157      ( 24 Apr 2020 03:51 )             30.6   Auto Neutrophil %: 62.5 % (04-24-20 @ 03:51)  Auto Lymphocyte #: 0.97 K/uL (04-24-20 @ 03:51)    04-24    140  |  104  |  8   ----------------------------<  116<H>  3.6   |  27  |  0.48<L>    Ca    8.7      24 Apr 2020 03:51  Phos  2.4     04-24  Mg     2.0     04-24    TPro  6.4  /  Alb  3.0<L>  /  TBili  0.3  /  DBili  x   /  AST  26  /  ALT  22  /  AlkPhos  58  04-24    CoVID-specific labs:  Ferritin, Serum: 209 ng/mL <H> (04-24-20 @ 03:51)  C-Reactive Protein, Serum: 3.63 mg/dL <H> (04-24-20 @ 03:51)  C-Reactive Protein, Serum: 3.29 mg/dL <H> (04-23-20 @ 04:48)    RADIOLOGY, EKG AND ADDITIONAL TESTS: Reviewed.    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    Neuro:  - awake, off sedation     CV:   - SBP normotensive     Pulm:  - extubated 4/12  - nasal cannula (tolerating on 6L)  - encourage chest PT daily  - suctioning prn     ID:  - finished plaquenil & azithromycin  - repeat COVID swab sent 4/23 is negative (last positive on 4/20)  - repeat covid test was sent again today 4/24  - rpt covid test q3d (next swab on 4/26)  - intermittently febrile- completed meropenem and vanco 4/23, off    Nephro:  - voiding well     GI:  - G-tube replaced by gen surg 4/23, ok to use per gen surg  - tube feeds    Endo:   - ISS    Prophy:  - therapeutic Eliquis 5mg bid    Code: FULL CODE  Disposition: Patient requires frequent suctioning and continued monitoring in MICU, dispo pending  -d/w Dr. Fritz

## 2020-04-25 LAB
-  AZTREONAM: SIGNIFICANT CHANGE UP
-  AZTREONAM: SIGNIFICANT CHANGE UP
-  CEFEPIME: SIGNIFICANT CHANGE UP
-  CEFEPIME: SIGNIFICANT CHANGE UP
-  CIPROFLOXACIN: SIGNIFICANT CHANGE UP
-  CIPROFLOXACIN: SIGNIFICANT CHANGE UP
-  GENTAMICIN: SIGNIFICANT CHANGE UP
-  GENTAMICIN: SIGNIFICANT CHANGE UP
-  MEROPENEM: SIGNIFICANT CHANGE UP
-  MEROPENEM: SIGNIFICANT CHANGE UP
-  PIPERACILLIN/TAZOBACTAM: SIGNIFICANT CHANGE UP
-  PIPERACILLIN/TAZOBACTAM: SIGNIFICANT CHANGE UP
-  TOBRAMYCIN: SIGNIFICANT CHANGE UP
-  TOBRAMYCIN: SIGNIFICANT CHANGE UP
ANION GAP SERPL CALC-SCNC: 10 MMOL/L — SIGNIFICANT CHANGE UP (ref 5–17)
APPEARANCE UR: CLEAR — SIGNIFICANT CHANGE UP
BILIRUB UR-MCNC: NEGATIVE — SIGNIFICANT CHANGE UP
BUN SERPL-MCNC: 15 MG/DL — SIGNIFICANT CHANGE UP (ref 7–23)
CALCIUM SERPL-MCNC: 8.9 MG/DL — SIGNIFICANT CHANGE UP (ref 8.4–10.5)
CHLORIDE SERPL-SCNC: 108 MMOL/L — SIGNIFICANT CHANGE UP (ref 96–108)
CO2 SERPL-SCNC: 24 MMOL/L — SIGNIFICANT CHANGE UP (ref 22–31)
COLOR SPEC: YELLOW — SIGNIFICANT CHANGE UP
CREAT SERPL-MCNC: 0.53 MG/DL — SIGNIFICANT CHANGE UP (ref 0.5–1.3)
CRP SERPL-MCNC: 3.83 MG/DL — HIGH (ref 0–0.4)
CULTURE RESULTS: SIGNIFICANT CHANGE UP
D DIMER BLD IA.RAPID-MCNC: 323 NG/ML DDU — HIGH
DIFF PNL FLD: ABNORMAL
FERRITIN SERPL-MCNC: 199 NG/ML — HIGH (ref 15–150)
GLUCOSE BLDC GLUCOMTR-MCNC: 108 MG/DL — HIGH (ref 70–99)
GLUCOSE BLDC GLUCOMTR-MCNC: 182 MG/DL — HIGH (ref 70–99)
GLUCOSE BLDC GLUCOMTR-MCNC: 86 MG/DL — SIGNIFICANT CHANGE UP (ref 70–99)
GLUCOSE BLDC GLUCOMTR-MCNC: 95 MG/DL — SIGNIFICANT CHANGE UP (ref 70–99)
GLUCOSE SERPL-MCNC: 123 MG/DL — HIGH (ref 70–99)
GLUCOSE UR QL: NEGATIVE — SIGNIFICANT CHANGE UP
HCT VFR BLD CALC: 31.4 % — LOW (ref 34.5–45)
HGB BLD-MCNC: 9.9 G/DL — LOW (ref 11.5–15.5)
KETONES UR-MCNC: NEGATIVE — SIGNIFICANT CHANGE UP
LEUKOCYTE ESTERASE UR-ACNC: NEGATIVE — SIGNIFICANT CHANGE UP
MAGNESIUM SERPL-MCNC: 1.8 MG/DL — SIGNIFICANT CHANGE UP (ref 1.6–2.6)
MCHC RBC-ENTMCNC: 30.5 PG — SIGNIFICANT CHANGE UP (ref 27–34)
MCHC RBC-ENTMCNC: 31.5 GM/DL — LOW (ref 32–36)
MCV RBC AUTO: 96.6 FL — SIGNIFICANT CHANGE UP (ref 80–100)
METHOD TYPE: SIGNIFICANT CHANGE UP
NITRITE UR-MCNC: POSITIVE
NRBC # BLD: 0 /100 WBCS — SIGNIFICANT CHANGE UP (ref 0–0)
ORGANISM # SPEC MICROSCOPIC CNT: SIGNIFICANT CHANGE UP
PH UR: 6.5 — SIGNIFICANT CHANGE UP (ref 5–8)
PHOSPHATE SERPL-MCNC: 1.5 MG/DL — LOW (ref 2.5–4.5)
PLATELET # BLD AUTO: 153 K/UL — SIGNIFICANT CHANGE UP (ref 150–400)
POTASSIUM SERPL-MCNC: 4.3 MMOL/L — SIGNIFICANT CHANGE UP (ref 3.5–5.3)
POTASSIUM SERPL-SCNC: 4.3 MMOL/L — SIGNIFICANT CHANGE UP (ref 3.5–5.3)
PROCALCITONIN SERPL-MCNC: 0.33 NG/ML — HIGH (ref 0.02–0.1)
PROT UR-MCNC: 100 MG/DL
RBC # BLD: 3.25 M/UL — LOW (ref 3.8–5.2)
RBC # FLD: 14.4 % — SIGNIFICANT CHANGE UP (ref 10.3–14.5)
SARS-COV-2 RNA SPEC QL NAA+PROBE: DETECTED
SODIUM SERPL-SCNC: 142 MMOL/L — SIGNIFICANT CHANGE UP (ref 135–145)
SP GR SPEC: >=1.03 — SIGNIFICANT CHANGE UP (ref 1–1.03)
SPECIMEN SOURCE: SIGNIFICANT CHANGE UP
UROBILINOGEN FLD QL: 0.2 E.U./DL — SIGNIFICANT CHANGE UP
WBC # BLD: 6.41 K/UL — SIGNIFICANT CHANGE UP (ref 3.8–10.5)
WBC # FLD AUTO: 6.41 K/UL — SIGNIFICANT CHANGE UP (ref 3.8–10.5)

## 2020-04-25 PROCEDURE — 71045 X-RAY EXAM CHEST 1 VIEW: CPT | Mod: 26

## 2020-04-25 PROCEDURE — 99222 1ST HOSP IP/OBS MODERATE 55: CPT | Mod: CS

## 2020-04-25 PROCEDURE — 99291 CRITICAL CARE FIRST HOUR: CPT | Mod: CS

## 2020-04-25 PROCEDURE — 36600 WITHDRAWAL OF ARTERIAL BLOOD: CPT | Mod: CS

## 2020-04-25 RX ADMIN — Medication 85 MILLIMOLE(S): at 15:25

## 2020-04-25 RX ADMIN — Medication 2: at 06:23

## 2020-04-25 RX ADMIN — CHLORHEXIDINE GLUCONATE 1 APPLICATION(S): 213 SOLUTION TOPICAL at 06:00

## 2020-04-25 RX ADMIN — ROBINUL 0.2 MILLIGRAM(S): 0.2 INJECTION INTRAMUSCULAR; INTRAVENOUS at 23:08

## 2020-04-25 RX ADMIN — ROBINUL 0.2 MILLIGRAM(S): 0.2 INJECTION INTRAMUSCULAR; INTRAVENOUS at 17:00

## 2020-04-25 RX ADMIN — ROBINUL 0.2 MILLIGRAM(S): 0.2 INJECTION INTRAMUSCULAR; INTRAVENOUS at 06:00

## 2020-04-25 RX ADMIN — Medication 650 MILLIGRAM(S): at 04:30

## 2020-04-25 RX ADMIN — APIXABAN 5 MILLIGRAM(S): 2.5 TABLET, FILM COATED ORAL at 23:07

## 2020-04-25 RX ADMIN — SCOPALAMINE 1 PATCH: 1 PATCH, EXTENDED RELEASE TRANSDERMAL at 17:04

## 2020-04-25 RX ADMIN — SCOPALAMINE 1 PATCH: 1 PATCH, EXTENDED RELEASE TRANSDERMAL at 06:23

## 2020-04-25 RX ADMIN — Medication 650 MILLIGRAM(S): at 11:40

## 2020-04-25 RX ADMIN — PANTOPRAZOLE SODIUM 40 MILLIGRAM(S): 20 TABLET, DELAYED RELEASE ORAL at 11:00

## 2020-04-25 RX ADMIN — APIXABAN 5 MILLIGRAM(S): 2.5 TABLET, FILM COATED ORAL at 11:00

## 2020-04-25 RX ADMIN — Medication 1 TABLET(S): at 11:00

## 2020-04-25 RX ADMIN — SODIUM CHLORIDE 10 MILLILITER(S): 9 INJECTION INTRAMUSCULAR; INTRAVENOUS; SUBCUTANEOUS at 09:00

## 2020-04-25 RX ADMIN — ROBINUL 0.2 MILLIGRAM(S): 0.2 INJECTION INTRAMUSCULAR; INTRAVENOUS at 11:00

## 2020-04-25 NOTE — PROGRESS NOTE ADULT - SUBJECTIVE AND OBJECTIVE BOX
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 for ICU care. Extubated x2 and doing well on nasal cannula.    OVERNIGHT EVENTS: LIZABETH, pt febrile to 101.5F rectally, pt pan cultured. Pt tolerating NC 4L O2 sat 96%.       SUBJECTIVE HPI: Patient seen and examined at bedside.    VITAL SIGNS:  ICU Vital Signs Last 24 Hrs  T(C): 38.5 (25 Apr 2020 11:00), Max: 38.6 (25 Apr 2020 04:25)  T(F): 101.3 (25 Apr 2020 11:00), Max: 101.5 (25 Apr 2020 04:25)  HR: 106 (25 Apr 2020 11:00) (93 - 122)  BP: 129/68 (25 Apr 2020 11:00) (104/56 - 137/95)  BP(mean): 88 (25 Apr 2020 11:00) (72 - 112)  ABP: 154/59 (25 Apr 2020 11:00) (101/96 - 160/67)  ABP(mean): 92 (25 Apr 2020 11:00) (82 - 120)  RR: 29 (25 Apr 2020 11:00) (28 - 43)  SpO2: 100% (25 Apr 2020 11:00) (91% - 100%)      I&O's Summary    24 Apr 2020 07:01  -  25 Apr 2020 07:00  --------------------------------------------------------  IN: 790 mL / OUT: 1100 mL / NET: -310 mL    25 Apr 2020 07:01  -  25 Apr 2020 11:49  --------------------------------------------------------  IN: 240 mL / OUT: 50 mL / NET: 190 mL        Ventilator settings: N/A       PHYSICAL EXAM:  General: Comfortable, NAD, well-nourished   Neurological: A&O to person (baseline), no focal neuro deficits   CN II-XII grossly intact: PERRL b/l, EOMI b/l, no facial asymmetry   CAMPA x4 spont   HEENT: NC/AT; EOMI, PERRL, clear conjunctiva, no nasal or oropharyngeal discharge or exudates, MMM  Neck: supple, no cervical or post-auricular lymphadenopathy  Cardiovascular: +S1/S2, no murmurs/rubs/gallops, RRR  Respiratory: CTA B/L, no diminished breath sounds, no wheezes/rales/rhonchi, no increased work of breathing or accessory muscle use  Gastrointestinal: soft, NT/ND; active BSx4 quadrants. + PEG.  Genitourinary: no suprapubic tenderness, no CVA tenderness  Extremities: WWP; no edema, clubbing or cyanosis  Vascular: 2+ radial, DP/PT pulses B/L  Skin: no rashes      MEDICATIONS:  MEDICATIONS  (STANDING):  apixaban 5 milliGRAM(s) Oral every 12 hours  chlorhexidine 2% Cloths 1 Application(s) Topical <User Schedule>  dextrose 5%. 1000 milliLiter(s) (50 mL/Hr) IV Continuous <Continuous>  dextrose 50% Injectable 12.5 Gram(s) IV Push once  dextrose 50% Injectable 25 Gram(s) IV Push once  dextrose 50% Injectable 25 Gram(s) IV Push once  glycopyrrolate Injectable 0.2 milliGRAM(s) IV Push every 6 hours  insulin lispro (HumaLOG) corrective regimen sliding scale   SubCutaneous every 6 hours  lactobacillus acidophilus 1 Tablet(s) Oral daily  pantoprazole   Suspension 40 milliGRAM(s) Oral daily  scopolamine   Patch 1 Patch Transdermal every 72 hours    MEDICATIONS  (PRN):  acetaminophen    Suspension .. 650 milliGRAM(s) Oral every 6 hours PRN Temp greater or equal to 38C (100.4F), Mild Pain (1 - 3)  dextrose 40% Gel 15 Gram(s) Oral once PRN Blood Glucose LESS THAN 70 milliGRAM(s)/deciliter  glucagon  Injectable 1 milliGRAM(s) IntraMuscular once PRN Glucose LESS THAN 70 milligrams/deciliter  sodium chloride 0.9% lock flush 10 milliLiter(s) IV Push every 1 hour PRN Pre/post blood products, medications, blood draw, and to maintain line patency      ALLERGIES/INTOLERANCES:  Allergies    ace inhibitors (Anaphylaxis)  caffeine (Rash)  chocolate (Rash)  high acid (Rash)  Tomatoes (Hives)    Intolerances          LABS:                        9.9    6.41  )-----------( 153      ( 25 Apr 2020 04:58 )             31.4     Auto Neutrophil %: 62.5 % (04-24-20 @ 03:51)  Auto Lymphocyte #: 0.97 K/uL (04-24-20 @ 03:51)      04-25    142  |  108  |  15  ----------------------------<  123<H>  4.3   |  24  |  0.53    Ca    8.9      25 Apr 2020 04:58  Phos  1.5     04-25  Mg     1.8     04-25    TPro  6.4  /  Alb  3.0<L>  /  TBili  0.3  /  DBili  x   /  AST  26  /  ALT  22  /  AlkPhos  58  04-24                            CoVID-specific labs:  Ferritin, Serum: 199 ng/mL <H> (04-25-20 @ 04:58)  C-Reactive Protein, Serum: 3.83 mg/dL <H> (04-25-20 @ 04:58)  D-Dimer Assay, Quantitative: 323 ng/mL DDU <H> (04-25-20 @ 04:58)      Microbiology:     RADIOLOGY, EKG AND ADDITIONAL TESTS: Reviewed.  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 for ICU care. Extubated x2 and doing well on nasal cannula.    OVERNIGHT EVENTS: LIZABETH, pt febrile to 101.5F rectally, pt pan cultured. Pt tolerating NC 4L, O2 sat 96%.       SUBJECTIVE HPI: Patient seen and examined at bedside.    VITAL SIGNS:  ICU Vital Signs Last 24 Hrs  T(C): 38.5 (25 Apr 2020 11:00), Max: 38.6 (25 Apr 2020 04:25)  T(F): 101.3 (25 Apr 2020 11:00), Max: 101.5 (25 Apr 2020 04:25)  HR: 106 (25 Apr 2020 11:00) (93 - 122)  BP: 129/68 (25 Apr 2020 11:00) (104/56 - 137/95)  BP(mean): 88 (25 Apr 2020 11:00) (72 - 112)  ABP: 154/59 (25 Apr 2020 11:00) (101/96 - 160/67)  ABP(mean): 92 (25 Apr 2020 11:00) (82 - 120)  RR: 29 (25 Apr 2020 11:00) (28 - 43)  SpO2: 100% (25 Apr 2020 11:00) (91% - 100%)      I&O's Summary    24 Apr 2020 07:01  -  25 Apr 2020 07:00  --------------------------------------------------------  IN: 790 mL / OUT: 1100 mL / NET: -310 mL    25 Apr 2020 07:01  -  25 Apr 2020 11:49  --------------------------------------------------------  IN: 240 mL / OUT: 50 mL / NET: 190 mL        Ventilator settings: N/A       PHYSICAL EXAM:  General: Comfortable, NAD, well-nourished   Neurological: A&O to person (baseline), no focal neuro deficits   CN II-XII grossly intact: PERRL b/l, EOMI b/l, no facial asymmetry   CAMPA x4 spont   HEENT: NC/AT; EOMI, PERRL, clear conjunctiva, no nasal or oropharyngeal discharge or exudates, MMM  Neck: supple, no cervical or post-auricular lymphadenopathy  Cardiovascular: +S1/S2, no murmurs/rubs/gallops, RRR  Respiratory: CTA B/L, no diminished breath sounds, no wheezes/rales/rhonchi, no increased work of breathing or accessory muscle use  Gastrointestinal: soft, NT/ND; active BSx4 quadrants. + PEG.  Genitourinary: no suprapubic tenderness, no CVA tenderness  Extremities: WWP; no edema, clubbing or cyanosis  Vascular: 2+ radial, DP/PT pulses B/L  Skin: no rashes    LINES/TUBES/CATHETER:   PEG tube   L radial arterial line   R ectal tube   Primafit       MEDICATIONS:  MEDICATIONS  (STANDING):  apixaban 5 milliGRAM(s) Oral every 12 hours  chlorhexidine 2% Cloths 1 Application(s) Topical <User Schedule>  dextrose 5%. 1000 milliLiter(s) (50 mL/Hr) IV Continuous <Continuous>  dextrose 50% Injectable 12.5 Gram(s) IV Push once  dextrose 50% Injectable 25 Gram(s) IV Push once  dextrose 50% Injectable 25 Gram(s) IV Push once  glycopyrrolate Injectable 0.2 milliGRAM(s) IV Push every 6 hours  insulin lispro (HumaLOG) corrective regimen sliding scale   SubCutaneous every 6 hours  lactobacillus acidophilus 1 Tablet(s) Oral daily  pantoprazole   Suspension 40 milliGRAM(s) Oral daily  scopolamine   Patch 1 Patch Transdermal every 72 hours    MEDICATIONS  (PRN):  acetaminophen    Suspension .. 650 milliGRAM(s) Oral every 6 hours PRN Temp greater or equal to 38C (100.4F), Mild Pain (1 - 3)  dextrose 40% Gel 15 Gram(s) Oral once PRN Blood Glucose LESS THAN 70 milliGRAM(s)/deciliter  glucagon  Injectable 1 milliGRAM(s) IntraMuscular once PRN Glucose LESS THAN 70 milligrams/deciliter  sodium chloride 0.9% lock flush 10 milliLiter(s) IV Push every 1 hour PRN Pre/post blood products, medications, blood draw, and to maintain line patency      ALLERGIES/INTOLERANCES:  Allergies    ace inhibitors (Anaphylaxis)  caffeine (Rash)  chocolate (Rash)  high acid (Rash)  Tomatoes (Hives)    Intolerances          LABS:                        9.9    6.41  )-----------( 153      ( 25 Apr 2020 04:58 )             31.4     Auto Neutrophil %: 62.5 % (04-24-20 @ 03:51)  Auto Lymphocyte #: 0.97 K/uL (04-24-20 @ 03:51)      04-25    142  |  108  |  15  ----------------------------<  123<H>  4.3   |  24  |  0.53    Ca    8.9      25 Apr 2020 04:58  Phos  1.5     04-25  Mg     1.8     04-25    TPro  6.4  /  Alb  3.0<L>  /  TBili  0.3  /  DBili  x   /  AST  26  /  ALT  22  /  AlkPhos  58  04-24                            CoVID-specific labs:  Ferritin, Serum: 199 ng/mL <H> (04-25-20 @ 04:58)  C-Reactive Protein, Serum: 3.83 mg/dL <H> (04-25-20 @ 04:58)  D-Dimer Assay, Quantitative: 323 ng/mL DDU <H> (04-25-20 @ 04:58)      Microbiology:   Blood culture from 4/25 pend growth     Sputum culture from 4/25 pend collection     COVID-19 PCR . (04.25.20 @ 10:24)    COVID-19 PCR: Detected: This test has been validated by [facility] to be accurate;  though it has not been FDA cleared/approved by the usual pathway  As with all laboratory test, results should be correlated with clinical  findings.  Performed at  [facility]  https://www.fda.gov/media/107950/download  https://www.fda.gov/media/021730/download    COVID-19 PCR . (04.24.20 @ 12:00)    COVID-19 PCR: Detected: This test has been validated by [facility] to be accurate;  though it has not been FDA cleared/approved by the usual pathway  As with all laboratory test, results should be correlated with clinical  findings.  Performed at  [facility]  https://www.fda.gov/media/787310/download  https://www.fda.gov/media/159816/download    Culture - Blood (04.20.20 @ 10:25)    Specimen Source: .Blood Blood    Culture Results:   No growth    Culture - Sputum . (04.20.20 @ 22:34)    Gram Stain:   Moderate WBC's  Few squamous epithelial cells  Few Gram Negative Rods  Rare Gram Positive Rods    Specimen Source: .Sputum Sputum    Culture Results:   Moderate Pseudomonas aeruginosa  Susceptibility to follow.  Accompanied by normal respiratory teto          RADIOLOGY, EKG AND ADDITIONAL TESTS: Reviewed.  < from: Xray Chest 1 View- PORTABLE-Urgent (04.25.20 @ 07:29) >  Impression: Discoid change and/or patchy infiltrates bilaterally.      CoVID Basline labs: reviewed   T Cell Subsets: N/A

## 2020-04-25 NOTE — PROGRESS NOTE ADULT - ASSESSMENT
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  Now tolerating NC 4L, pend negative COVID-19 test for dispo back to long-term rehab facility.     PLAN:   Neuro:  - awake, off sedation   - baseline mental status     CV:   - SBP normotensive   - d/c a-line today     Pulm:  - extubated 4/12  - tolerating NC 4L, monitor O2 sat   - encourage chest PT daily   - cont suctioning prn    - CXR shows slight improvement of b/l infiltrates     ID:  - finished plaquenil & azithromycin   - repeat COVID swab sent 4/23 is negative  - repeat covid test was sent again today 4/25 --> COVID-19 PCR + for 4/24 and 4/25, will repeat in a couple of days   - intermittently febrile- completed meropenem and vanco 4/23   - f/u blood and sputum cx from today (4/25)   - no abx at this time     Nephro:  - voiding well, cont to monitor Is and Os   - daily BMP   - electrolyte repletions PRN  - Phos down-trending, f/u w/ nutrition regarding tube feed/supplement recs     GI:  - PEG-tube replaced by gen surg 4/23, ok to use per gen surg  - tube feeds via PEG tube     Endo:   - ISS    Prophy:  - cont therapeutic Eliquis 5mg bid    Code: FULL CODE  Disposition: Patient requires frequent suctioning and continued monitoring in MICU, dispo pending COVID-19 negative test for return to long-term rehab facility   - d/w Dr. 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 for respiratory failure secondary to COVID.  Now tolerating NC 4L, pend negative COVID-19 test for dispo back to long-term rehab facility.     PLAN:   Neuro:  - awake, off sedation   - baseline mental status      CV:   - SBP normotensive   - d/c a-line today     Pulm:  - extubated 4/12  - tolerating NC 4L, monitor O2 sat   - encourage chest PT daily   - cont suctioning prn    - CXR shows slight improvement of b/l infiltrates     ID:  - finished plaquenil & azithromycin   - repeat COVID swab sent 4/23 is negative  - repeat covid test was sent again today 4/25 --> COVID-19 PCR + for 4/24 and 4/25, will repeat in a couple of days   - intermittently febrile- completed meropenem and vanco 4/23   - f/u blood and sputum cx from today (4/25)   - no abx at this time     Nephro:  - voiding well, cont to monitor Is and Os   - daily BMP   - electrolyte repletions PRN  - Phos down-trending, f/u w/ nutrition regarding tube feed/supplement recs     GI:  - PEG-tube replaced by gen surg 4/23, ok to use per gen surg  - tube feeds via PEG tube     Endo:   - ISS    Prophy:  - cont therapeutic Eliquis 5mg bid    Code: FULL CODE  Disposition: Patient requires frequent suctioning and continued monitoring in MICU, dispo pending COVID-19 negative test for return to long-term rehab facility   - d/w Dr. Fritz

## 2020-04-26 LAB
-  KPC RESISTANCE GENE: SIGNIFICANT CHANGE UP
ALBUMIN SERPL ELPH-MCNC: 2.7 G/DL — LOW (ref 3.3–5)
ALP SERPL-CCNC: SIGNIFICANT CHANGE UP U/L (ref 40–120)
ALT FLD-CCNC: SIGNIFICANT CHANGE UP U/L (ref 10–45)
ANION GAP SERPL CALC-SCNC: 10 MMOL/L — SIGNIFICANT CHANGE UP (ref 5–17)
AST SERPL-CCNC: SIGNIFICANT CHANGE UP U/L (ref 10–40)
BASOPHILS # BLD AUTO: 0.02 K/UL — SIGNIFICANT CHANGE UP (ref 0–0.2)
BASOPHILS NFR BLD AUTO: 0.4 % — SIGNIFICANT CHANGE UP (ref 0–2)
BILIRUB SERPL-MCNC: 0.2 MG/DL — SIGNIFICANT CHANGE UP (ref 0.2–1.2)
BUN SERPL-MCNC: 15 MG/DL — SIGNIFICANT CHANGE UP (ref 7–23)
CALCIUM SERPL-MCNC: 8.5 MG/DL — SIGNIFICANT CHANGE UP (ref 8.4–10.5)
CHLORIDE SERPL-SCNC: 106 MMOL/L — SIGNIFICANT CHANGE UP (ref 96–108)
CO2 SERPL-SCNC: 25 MMOL/L — SIGNIFICANT CHANGE UP (ref 22–31)
CREAT SERPL-MCNC: 0.38 MG/DL — LOW (ref 0.5–1.3)
CRP SERPL-MCNC: 7.15 MG/DL — HIGH (ref 0–0.4)
D DIMER BLD IA.RAPID-MCNC: 248 NG/ML DDU — HIGH
EOSINOPHIL # BLD AUTO: 0.2 K/UL — SIGNIFICANT CHANGE UP (ref 0–0.5)
EOSINOPHIL NFR BLD AUTO: 4.2 % — SIGNIFICANT CHANGE UP (ref 0–6)
FIBRINOGEN PPP-MCNC: 424 MG/DL — SIGNIFICANT CHANGE UP (ref 258–438)
GLUCOSE BLDC GLUCOMTR-MCNC: 112 MG/DL — HIGH (ref 70–99)
GLUCOSE BLDC GLUCOMTR-MCNC: 124 MG/DL — HIGH (ref 70–99)
GLUCOSE BLDC GLUCOMTR-MCNC: 134 MG/DL — HIGH (ref 70–99)
GLUCOSE SERPL-MCNC: 127 MG/DL — HIGH (ref 70–99)
GRAM STN FLD: SIGNIFICANT CHANGE UP
GRAM STN FLD: SIGNIFICANT CHANGE UP
HCT VFR BLD CALC: 29.9 % — LOW (ref 34.5–45)
HGB BLD-MCNC: 9.2 G/DL — LOW (ref 11.5–15.5)
IMM GRANULOCYTES NFR BLD AUTO: 0.4 % — SIGNIFICANT CHANGE UP (ref 0–1.5)
LYMPHOCYTES # BLD AUTO: 1.32 K/UL — SIGNIFICANT CHANGE UP (ref 1–3.3)
LYMPHOCYTES # BLD AUTO: 27.7 % — SIGNIFICANT CHANGE UP (ref 13–44)
MAGNESIUM SERPL-MCNC: 1.7 MG/DL — SIGNIFICANT CHANGE UP (ref 1.6–2.6)
MCHC RBC-ENTMCNC: 30.1 PG — SIGNIFICANT CHANGE UP (ref 27–34)
MCHC RBC-ENTMCNC: 30.8 GM/DL — LOW (ref 32–36)
MCV RBC AUTO: 97.7 FL — SIGNIFICANT CHANGE UP (ref 80–100)
METHOD TYPE: SIGNIFICANT CHANGE UP
MONOCYTES # BLD AUTO: 0.36 K/UL — SIGNIFICANT CHANGE UP (ref 0–0.9)
MONOCYTES NFR BLD AUTO: 7.5 % — SIGNIFICANT CHANGE UP (ref 2–14)
NEUTROPHILS # BLD AUTO: 2.85 K/UL — SIGNIFICANT CHANGE UP (ref 1.8–7.4)
NEUTROPHILS NFR BLD AUTO: 59.8 % — SIGNIFICANT CHANGE UP (ref 43–77)
NRBC # BLD: 0 /100 WBCS — SIGNIFICANT CHANGE UP (ref 0–0)
P AERUGINOSA DNA BLD POS NAA+NON-PROBE: SIGNIFICANT CHANGE UP
PHOSPHATE SERPL-MCNC: 3.1 MG/DL — SIGNIFICANT CHANGE UP (ref 2.5–4.5)
PLATELET # BLD AUTO: 142 K/UL — LOW (ref 150–400)
POTASSIUM SERPL-MCNC: SIGNIFICANT CHANGE UP MMOL/L (ref 3.5–5.3)
POTASSIUM SERPL-SCNC: SIGNIFICANT CHANGE UP MMOL/L (ref 3.5–5.3)
PROT SERPL-MCNC: 6.6 G/DL — SIGNIFICANT CHANGE UP (ref 6–8.3)
RBC # BLD: 3.06 M/UL — LOW (ref 3.8–5.2)
RBC # FLD: 14.5 % — SIGNIFICANT CHANGE UP (ref 10.3–14.5)
SODIUM SERPL-SCNC: 141 MMOL/L — SIGNIFICANT CHANGE UP (ref 135–145)
WBC # BLD: 4.77 K/UL — SIGNIFICANT CHANGE UP (ref 3.8–10.5)
WBC # FLD AUTO: 4.77 K/UL — SIGNIFICANT CHANGE UP (ref 3.8–10.5)

## 2020-04-26 PROCEDURE — 99291 CRITICAL CARE FIRST HOUR: CPT | Mod: CS

## 2020-04-26 RX ORDER — MAGNESIUM SULFATE 500 MG/ML
1 VIAL (ML) INJECTION ONCE
Refills: 0 | Status: COMPLETED | OUTPATIENT
Start: 2020-04-26 | End: 2020-04-26

## 2020-04-26 RX ORDER — PIPERACILLIN AND TAZOBACTAM 4; .5 G/20ML; G/20ML
3.38 INJECTION, POWDER, LYOPHILIZED, FOR SOLUTION INTRAVENOUS EVERY 6 HOURS
Refills: 0 | Status: DISCONTINUED | OUTPATIENT
Start: 2020-04-26 | End: 2020-04-27

## 2020-04-26 RX ORDER — CEFTRIAXONE 500 MG/1
1000 INJECTION, POWDER, FOR SOLUTION INTRAMUSCULAR; INTRAVENOUS EVERY 24 HOURS
Refills: 0 | Status: DISCONTINUED | OUTPATIENT
Start: 2020-04-26 | End: 2020-04-26

## 2020-04-26 RX ORDER — PIPERACILLIN AND TAZOBACTAM 4; .5 G/20ML; G/20ML
3.38 INJECTION, POWDER, LYOPHILIZED, FOR SOLUTION INTRAVENOUS ONCE
Refills: 0 | Status: COMPLETED | OUTPATIENT
Start: 2020-04-26 | End: 2020-04-26

## 2020-04-26 RX ORDER — CEFTRIAXONE 500 MG/1
2 INJECTION, POWDER, FOR SOLUTION INTRAMUSCULAR; INTRAVENOUS EVERY 12 HOURS
Refills: 0 | Status: DISCONTINUED | OUTPATIENT
Start: 2020-04-26 | End: 2020-04-26

## 2020-04-26 RX ADMIN — ROBINUL 0.2 MILLIGRAM(S): 0.2 INJECTION INTRAMUSCULAR; INTRAVENOUS at 23:02

## 2020-04-26 RX ADMIN — CHLORHEXIDINE GLUCONATE 1 APPLICATION(S): 213 SOLUTION TOPICAL at 05:01

## 2020-04-26 RX ADMIN — SCOPALAMINE 1 PATCH: 1 PATCH, EXTENDED RELEASE TRANSDERMAL at 11:34

## 2020-04-26 RX ADMIN — SCOPALAMINE 1 PATCH: 1 PATCH, EXTENDED RELEASE TRANSDERMAL at 09:38

## 2020-04-26 RX ADMIN — Medication 1 TABLET(S): at 11:34

## 2020-04-26 RX ADMIN — PIPERACILLIN AND TAZOBACTAM 200 GRAM(S): 4; .5 INJECTION, POWDER, LYOPHILIZED, FOR SOLUTION INTRAVENOUS at 11:34

## 2020-04-26 RX ADMIN — SCOPALAMINE 1 PATCH: 1 PATCH, EXTENDED RELEASE TRANSDERMAL at 06:08

## 2020-04-26 RX ADMIN — SCOPALAMINE 1 PATCH: 1 PATCH, EXTENDED RELEASE TRANSDERMAL at 17:44

## 2020-04-26 RX ADMIN — PANTOPRAZOLE SODIUM 40 MILLIGRAM(S): 20 TABLET, DELAYED RELEASE ORAL at 11:34

## 2020-04-26 RX ADMIN — ROBINUL 0.2 MILLIGRAM(S): 0.2 INJECTION INTRAMUSCULAR; INTRAVENOUS at 11:34

## 2020-04-26 RX ADMIN — ROBINUL 0.2 MILLIGRAM(S): 0.2 INJECTION INTRAMUSCULAR; INTRAVENOUS at 17:00

## 2020-04-26 RX ADMIN — PIPERACILLIN AND TAZOBACTAM 200 GRAM(S): 4; .5 INJECTION, POWDER, LYOPHILIZED, FOR SOLUTION INTRAVENOUS at 17:00

## 2020-04-26 RX ADMIN — PIPERACILLIN AND TAZOBACTAM 200 GRAM(S): 4; .5 INJECTION, POWDER, LYOPHILIZED, FOR SOLUTION INTRAVENOUS at 23:02

## 2020-04-26 RX ADMIN — Medication 100 GRAM(S): at 05:58

## 2020-04-26 RX ADMIN — APIXABAN 5 MILLIGRAM(S): 2.5 TABLET, FILM COATED ORAL at 23:02

## 2020-04-26 RX ADMIN — ROBINUL 0.2 MILLIGRAM(S): 0.2 INJECTION INTRAMUSCULAR; INTRAVENOUS at 05:01

## 2020-04-26 RX ADMIN — APIXABAN 5 MILLIGRAM(S): 2.5 TABLET, FILM COATED ORAL at 11:34

## 2020-04-26 NOTE — PROGRESS NOTE ADULT - SUBJECTIVE AND OBJECTIVE BOX
Patient Discussed on Morning Rounds with      Primary Diagnosis: COVID Pneumonia    SUBJECTIVE:  74yFemale    Interval Events:    OBJECTIVE:  Vitals: ICU Vital Signs Last 24 Hrs  T(C): 36.4 (26 Apr 2020 06:24), Max: 38.5 (25 Apr 2020 11:00)  T(F): 97.6 (26 Apr 2020 06:24), Max: 101.3 (25 Apr 2020 11:00)  HR: 94 (26 Apr 2020 07:00) (73 - 120)  BP: 114/57 (26 Apr 2020 07:00) (99/56 - 140/67)  BP(mean): 80 (26 Apr 2020 07:00) (74 - 112)  ABP: 120/52 (25 Apr 2020 13:00) (120/52 - 154/59)  ABP(mean): 75 (25 Apr 2020 13:00) (75 - 94)  RR: 31 (26 Apr 2020 07:00) (19 - 43)  SpO2: 94% (26 Apr 2020 07:00) (94% - 100%)    I&O:  I&O's Detail    25 Apr 2020 07:01  -  26 Apr 2020 07:00  --------------------------------------------------------  IN:    IV PiggyBack: 610 mL    ns in tub fed  [id number]: 570 mL    Oral Fluid: 60 mL  Total IN: 1240 mL    OUT:    Intermittent Catheterization - Urethral: 500 mL    Voided: 150 mL  Total OUT: 650 mL    Total NET: 590 mL      26 Apr 2020 07:01  -  26 Apr 2020 08:00  --------------------------------------------------------  IN:    ns in tub fed  [id number]: 30 mL  Total IN: 30 mL    OUT:  Total OUT: 0 mL    Total NET: 30 mL        VENTILATOR SETTINGS:        PHYSICAL EXAM: Physical Exam performed by Intensivist to minimize risk of exposure.  Please refer to Attending Attestation for comprehensive exam.   General: No acute distress  Neuro: [Sedated]  CV: RRR  Pulm: [INTUBATED]  : [Quintero]  Psych: affect appropriate    LABS:                         9.2    4.77  )-----------( 142      ( 26 Apr 2020 04:30 )             29.9   04-26    141  |  106  |  15  ----------------------------<  127<H>  SEE NOTE   |  25  |  0.38<L>    Ca    8.5      26 Apr 2020 04:30  Phos  3.1     04-26  Mg     1.7     04-26    TPro  6.6  /  Alb  2.7<L>  /  TBili  0.2  /  DBili  x   /  AST  SEE NOTE  /  ALT  SEE NOTE  /  AlkPhos  SEE NOTE  04-26  Urinalysis Basic - ( 25 Apr 2020 11:44 )    Color: Yellow / Appearance: Clear / SG: >=1.030 / pH: x  Gluc: x / Ketone: NEGATIVE  / Bili: Negative / Urobili: 0.2 E.U./dL   Blood: x / Protein: 100 mg/dL / Nitrite: POSITIVE   Leuk Esterase: NEGATIVE / RBC: < 5 /HPF / WBC < 5 /HPF   Sq Epi: x / Non Sq Epi: 0-5 /HPF / Bacteria: Present /HPF      D-Dimer Assay, Quantitative: 248 ng/mL DDU (04-26-20 @ 04:30)    CAPILLARY BLOOD GLUCOSE      POCT Blood Glucose.: 108 mg/dL (25 Apr 2020 21:54)  POCT Blood Glucose.: 95 mg/dL (25 Apr 2020 16:54)  POCT Blood Glucose.: 86 mg/dL (25 Apr 2020 11:08)     MEDICATIONS  (STANDING):  apixaban 5 milliGRAM(s) Oral every 12 hours  chlorhexidine 2% Cloths 1 Application(s) Topical <User Schedule>  dextrose 5%. 1000 milliLiter(s) (50 mL/Hr) IV Continuous <Continuous>  dextrose 50% Injectable 12.5 Gram(s) IV Push once  dextrose 50% Injectable 25 Gram(s) IV Push once  dextrose 50% Injectable 25 Gram(s) IV Push once  glycopyrrolate Injectable 0.2 milliGRAM(s) IV Push every 6 hours  insulin lispro (HumaLOG) corrective regimen sliding scale   SubCutaneous every 6 hours  lactobacillus acidophilus 1 Tablet(s) Oral daily  pantoprazole   Suspension 40 milliGRAM(s) Oral daily  scopolamine   Patch 1 Patch Transdermal every 72 hours    MEDICATIONS  (PRN):  acetaminophen    Suspension .. 650 milliGRAM(s) Oral every 6 hours PRN Temp greater or equal to 38C (100.4F), Mild Pain (1 - 3)  dextrose 40% Gel 15 Gram(s) Oral once PRN Blood Glucose LESS THAN 70 milliGRAM(s)/deciliter  glucagon  Injectable 1 milliGRAM(s) IntraMuscular once PRN Glucose LESS THAN 70 milligrams/deciliter  sodium chloride 0.9% lock flush 10 milliLiter(s) IV Push every 1 hour PRN Pre/post blood products, medications, blood draw, and to maintain line patency      RADIOLOGY/OTHER STUDIES: Patient Discussed on Morning Rounds with Dr. Fritz.     Primary Diagnosis: COVID Pneumonia    SUBJECTIVE HPI: Patient seen and examined at bedside.    OVERNIGHT EVENTS: Urinary retention. Straight cath 500cc at midnight. Pt tolerating NC 4L, O2 sat 94%.       OBJECTIVE:  Vitals: ICU Vital Signs Last 24 Hrs  T(C): 36.4 (26 Apr 2020 06:24), Max: 38.5 (25 Apr 2020 11:00)  T(F): 97.6 (26 Apr 2020 06:24), Max: 101.3 (25 Apr 2020 11:00)  HR: 94 (26 Apr 2020 07:00) (73 - 120)  BP: 114/57 (26 Apr 2020 07:00) (99/56 - 140/67)  BP(mean): 80 (26 Apr 2020 07:00) (74 - 112)  ABP: 120/52 (25 Apr 2020 13:00) (120/52 - 154/59)  ABP(mean): 75 (25 Apr 2020 13:00) (75 - 94)  RR: 31 (26 Apr 2020 07:00) (19 - 43)  SpO2: 94% (26 Apr 2020 07:00) (94% - 100%)    I&O:  I&O's Detail    25 Apr 2020 07:01  -  26 Apr 2020 07:00  --------------------------------------------------------  IN:    IV PiggyBack: 610 mL    ns in tub fed  [id number]: 570 mL    Oral Fluid: 60 mL  Total IN: 1240 mL    OUT:    Intermittent Catheterization - Urethral: 500 mL    Voided: 150 mL  Total OUT: 650 mL    Total NET: 590 mL    26 Apr 2020 07:01  -  26 Apr 2020 08:00  --------------------------------------------------------  IN:    ns in tub fed  [id number]: 30 mL  Total IN: 30 mL    OUT:  Total OUT: 0 mL    Total NET: 30 mL      PHYSICAL EXAM:   General: No acute distress  Neuro: A&O to person (baseline), no focal neuro deficits   CV: RRR  Pulm: CTA B/L, no diminished breath sounds, no wheezes/rales/rhonchi, no increased work of breathing or accessory muscle use  Gastrointestinal: soft, NT/ND; active BSx4 quadrants. + PEG.  : no suprapubic tenderness, no CVA tenderness  Extremities: WWP; no edema, clubbing or cyanosis  Vascular: 2+ radial, DP/PT pulses B/L  Skin: no rashes    LINES/TUBES/CATHETER:   PIV x 2  PEG tube   Rectal tube   Primafit     LABS:                         9.2    4.77  )-----------( 142      ( 26 Apr 2020 04:30 )             29.9   04-26    141  |  106  |  15  ----------------------------<  127<H>  SEE NOTE   |  25  |  0.38<L>    Ca    8.5      26 Apr 2020 04:30  Phos  3.1     04-26  Mg     1.7     04-26    TPro  6.6  /  Alb  2.7<L>  /  TBili  0.2  /  DBili  x   /  AST  SEE NOTE  /  ALT  SEE NOTE  /  AlkPhos  SEE NOTE  04-26  Urinalysis Basic - ( 25 Apr 2020 11:44 )    Color: Yellow / Appearance: Clear / SG: >=1.030 / pH: x  Gluc: x / Ketone: NEGATIVE  / Bili: Negative / Urobili: 0.2 E.U./dL   Blood: x / Protein: 100 mg/dL / Nitrite: POSITIVE   Leuk Esterase: NEGATIVE / RBC: < 5 /HPF / WBC < 5 /HPF   Sq Epi: x / Non Sq Epi: 0-5 /HPF / Bacteria: Present /HPF    D-Dimer Assay, Quantitative: 248 ng/mL DDU (04-26-20 @ 04:30)    CAPILLARY BLOOD GLUCOSE    POCT Blood Glucose.: 108 mg/dL (25 Apr 2020 21:54)  POCT Blood Glucose.: 95 mg/dL (25 Apr 2020 16:54)  POCT Blood Glucose.: 86 mg/dL (25 Apr 2020 11:08)     MEDICATIONS  (STANDING):  apixaban 5 milliGRAM(s) Oral every 12 hours  chlorhexidine 2% Cloths 1 Application(s) Topical <User Schedule>  dextrose 5%. 1000 milliLiter(s) (50 mL/Hr) IV Continuous <Continuous>  dextrose 50% Injectable 12.5 Gram(s) IV Push once  dextrose 50% Injectable 25 Gram(s) IV Push once  dextrose 50% Injectable 25 Gram(s) IV Push once  glycopyrrolate Injectable 0.2 milliGRAM(s) IV Push every 6 hours  insulin lispro (HumaLOG) corrective regimen sliding scale   SubCutaneous every 6 hours  lactobacillus acidophilus 1 Tablet(s) Oral daily  pantoprazole   Suspension 40 milliGRAM(s) Oral daily  scopolamine   Patch 1 Patch Transdermal every 72 hours    MEDICATIONS  (PRN):  acetaminophen    Suspension .. 650 milliGRAM(s) Oral every 6 hours PRN Temp greater or equal to 38C (100.4F), Mild Pain (1 - 3)  dextrose 40% Gel 15 Gram(s) Oral once PRN Blood Glucose LESS THAN 70 milliGRAM(s)/deciliter  glucagon  Injectable 1 milliGRAM(s) IntraMuscular once PRN Glucose LESS THAN 70 milligrams/deciliter  sodium chloride 0.9% lock flush 10 milliLiter(s) IV Push every 1 hour PRN Pre/post blood products, medications, blood draw, and to maintain line patency      RADIOLOGY/OTHER STUDIES:

## 2020-04-26 NOTE — PROGRESS NOTE ADULT - ASSESSMENT
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  Now tolerating NC 4L, pend negative COVID-19 test for dispo back to long-term rehab facility.     Neuro:  - awake, off sedation   - baseline mental status      CV:   - SBP normotensive     Pulm:  - extubated 4/12  - tolerating NC 4L, monitor O2 sat   - encourage chest PT daily   - cont suctioning prn      ID:  - finished plaquenil & azithromycin   - repeat COVID swab sent 4/23 is negative  - repeat covid test was sent again today 4/25 --> COVID-19 PCR + for 4/24 and 4/25, will repeat 4/27  - completed meropenem and vanco 4/23   - 4/25 blood cx: No growth at 12 hrs  - f/u sputum cx   - no abx at this time     Nephro:  - s/p straight cath o/n.   - cont to monitor for urinary retention.   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 for respiratory failure secondary to COVID.  Now tolerating NC 4L, pend negative COVID-19 test for dispo back to long-term rehab facility.     Neuro:  - awake, off sedation   - baseline mental status      CV:   - SBP normotensive     Pulm:  - extubated 4/12  - tolerating NC 4L, monitor O2 sat   - encourage chest PT daily   - cont suctioning prn      ID:  - finished plaquenil & azithromycin   - repeat COVID swab sent 4/23 is negative  - repeat covid test was sent again today 4/25 --> COVID-19 PCR + for 4/24 and 4/25, will repeat today  - completed meropenem and vanco 4/23   - 4/25 blood cx: GNR  -Start ceftriaxone   - f/u sputum cx     Nephro:  - s/p straight cath o/n.   - cont to monitor for urinary retention.   - cont to monitor Is and Os   - daily BMP   - electrolyte repletions PRN    GI:  - PEG-tube replaced by gen surg 4/23, ok to use per gen surg  - tube feeds via PEG tube     Endo:   - ISS    Prophy:  - cont therapeutic Eliquis 5mg bid    Code: FULL CODE  Disposition: Patient requires frequent suctioning and continued monitoring in MICU, dispo pending COVID-19 negative test for return to long-term rehab facility

## 2020-04-27 LAB
ALBUMIN SERPL ELPH-MCNC: 2.8 G/DL — LOW (ref 3.3–5)
ALP SERPL-CCNC: 51 U/L — SIGNIFICANT CHANGE UP (ref 40–120)
ALT FLD-CCNC: 16 U/L — SIGNIFICANT CHANGE UP (ref 10–45)
ANION GAP SERPL CALC-SCNC: 8 MMOL/L — SIGNIFICANT CHANGE UP (ref 5–17)
AST SERPL-CCNC: 22 U/L — SIGNIFICANT CHANGE UP (ref 10–40)
BASOPHILS # BLD AUTO: 0.03 K/UL — SIGNIFICANT CHANGE UP (ref 0–0.2)
BASOPHILS NFR BLD AUTO: 0.6 % — SIGNIFICANT CHANGE UP (ref 0–2)
BILIRUB SERPL-MCNC: 0.2 MG/DL — SIGNIFICANT CHANGE UP (ref 0.2–1.2)
BUN SERPL-MCNC: 22 MG/DL — SIGNIFICANT CHANGE UP (ref 7–23)
CALCIUM SERPL-MCNC: 8.7 MG/DL — SIGNIFICANT CHANGE UP (ref 8.4–10.5)
CHLORIDE SERPL-SCNC: 108 MMOL/L — SIGNIFICANT CHANGE UP (ref 96–108)
CO2 SERPL-SCNC: 30 MMOL/L — SIGNIFICANT CHANGE UP (ref 22–31)
CREAT SERPL-MCNC: 0.52 MG/DL — SIGNIFICANT CHANGE UP (ref 0.5–1.3)
CRP SERPL-MCNC: 3.66 MG/DL — HIGH (ref 0–0.4)
D DIMER BLD IA.RAPID-MCNC: 202 NG/ML DDU — SIGNIFICANT CHANGE UP
EOSINOPHIL # BLD AUTO: 0.32 K/UL — SIGNIFICANT CHANGE UP (ref 0–0.5)
EOSINOPHIL NFR BLD AUTO: 6.6 % — HIGH (ref 0–6)
FERRITIN SERPL-MCNC: 139 NG/ML — SIGNIFICANT CHANGE UP (ref 15–150)
GLUCOSE BLDC GLUCOMTR-MCNC: 104 MG/DL — HIGH (ref 70–99)
GLUCOSE BLDC GLUCOMTR-MCNC: 118 MG/DL — HIGH (ref 70–99)
GLUCOSE BLDC GLUCOMTR-MCNC: 127 MG/DL — HIGH (ref 70–99)
GLUCOSE SERPL-MCNC: 116 MG/DL — HIGH (ref 70–99)
HCT VFR BLD CALC: 26.9 % — LOW (ref 34.5–45)
HGB BLD-MCNC: 8.2 G/DL — LOW (ref 11.5–15.5)
IMM GRANULOCYTES NFR BLD AUTO: 0.4 % — SIGNIFICANT CHANGE UP (ref 0–1.5)
LYMPHOCYTES # BLD AUTO: 1.26 K/UL — SIGNIFICANT CHANGE UP (ref 1–3.3)
LYMPHOCYTES # BLD AUTO: 26.1 % — SIGNIFICANT CHANGE UP (ref 13–44)
MAGNESIUM SERPL-MCNC: 1.9 MG/DL — SIGNIFICANT CHANGE UP (ref 1.6–2.6)
MCHC RBC-ENTMCNC: 29.9 PG — SIGNIFICANT CHANGE UP (ref 27–34)
MCHC RBC-ENTMCNC: 30.5 GM/DL — LOW (ref 32–36)
MCV RBC AUTO: 98.2 FL — SIGNIFICANT CHANGE UP (ref 80–100)
MONOCYTES # BLD AUTO: 0.46 K/UL — SIGNIFICANT CHANGE UP (ref 0–0.9)
MONOCYTES NFR BLD AUTO: 9.5 % — SIGNIFICANT CHANGE UP (ref 2–14)
NEUTROPHILS # BLD AUTO: 2.74 K/UL — SIGNIFICANT CHANGE UP (ref 1.8–7.4)
NEUTROPHILS NFR BLD AUTO: 56.8 % — SIGNIFICANT CHANGE UP (ref 43–77)
NRBC # BLD: 0 /100 WBCS — SIGNIFICANT CHANGE UP (ref 0–0)
PHOSPHATE SERPL-MCNC: 2.1 MG/DL — LOW (ref 2.5–4.5)
PLATELET # BLD AUTO: 165 K/UL — SIGNIFICANT CHANGE UP (ref 150–400)
POTASSIUM SERPL-MCNC: 3.8 MMOL/L — SIGNIFICANT CHANGE UP (ref 3.5–5.3)
POTASSIUM SERPL-SCNC: 3.8 MMOL/L — SIGNIFICANT CHANGE UP (ref 3.5–5.3)
PROT SERPL-MCNC: 5.9 G/DL — LOW (ref 6–8.3)
RBC # BLD: 2.74 M/UL — LOW (ref 3.8–5.2)
RBC # FLD: 14.3 % — SIGNIFICANT CHANGE UP (ref 10.3–14.5)
SARS-COV-2 RNA SPEC QL NAA+PROBE: DETECTED
SODIUM SERPL-SCNC: 146 MMOL/L — HIGH (ref 135–145)
WBC # BLD: 4.83 K/UL — SIGNIFICANT CHANGE UP (ref 3.8–10.5)
WBC # FLD AUTO: 4.83 K/UL — SIGNIFICANT CHANGE UP (ref 3.8–10.5)

## 2020-04-27 PROCEDURE — 99291 CRITICAL CARE FIRST HOUR: CPT | Mod: CS

## 2020-04-27 PROCEDURE — 99223 1ST HOSP IP/OBS HIGH 75: CPT

## 2020-04-27 RX ORDER — CIPROFLOXACIN LACTATE 400MG/40ML
750 VIAL (ML) INTRAVENOUS EVERY 12 HOURS
Refills: 0 | Status: DISCONTINUED | OUTPATIENT
Start: 2020-04-27 | End: 2020-05-08

## 2020-04-27 RX ORDER — MEROPENEM 1 G/30ML
1000 INJECTION INTRAVENOUS EVERY 8 HOURS
Refills: 0 | Status: DISCONTINUED | OUTPATIENT
Start: 2020-04-27 | End: 2020-04-28

## 2020-04-27 RX ORDER — SODIUM,POTASSIUM PHOSPHATES 278-250MG
1 POWDER IN PACKET (EA) ORAL ONCE
Refills: 0 | Status: COMPLETED | OUTPATIENT
Start: 2020-04-27 | End: 2020-04-27

## 2020-04-27 RX ADMIN — MEROPENEM 100 MILLIGRAM(S): 1 INJECTION INTRAVENOUS at 22:31

## 2020-04-27 RX ADMIN — ROBINUL 0.2 MILLIGRAM(S): 0.2 INJECTION INTRAMUSCULAR; INTRAVENOUS at 22:32

## 2020-04-27 RX ADMIN — ROBINUL 0.2 MILLIGRAM(S): 0.2 INJECTION INTRAMUSCULAR; INTRAVENOUS at 17:28

## 2020-04-27 RX ADMIN — SCOPALAMINE 1 PATCH: 1 PATCH, EXTENDED RELEASE TRANSDERMAL at 06:19

## 2020-04-27 RX ADMIN — Medication 1 TABLET(S): at 11:09

## 2020-04-27 RX ADMIN — Medication 750 MILLIGRAM(S): at 17:28

## 2020-04-27 RX ADMIN — ROBINUL 0.2 MILLIGRAM(S): 0.2 INJECTION INTRAMUSCULAR; INTRAVENOUS at 11:09

## 2020-04-27 RX ADMIN — SCOPALAMINE 1 PATCH: 1 PATCH, EXTENDED RELEASE TRANSDERMAL at 18:42

## 2020-04-27 RX ADMIN — MEROPENEM 100 MILLIGRAM(S): 1 INJECTION INTRAVENOUS at 15:15

## 2020-04-27 RX ADMIN — ROBINUL 0.2 MILLIGRAM(S): 0.2 INJECTION INTRAMUSCULAR; INTRAVENOUS at 05:12

## 2020-04-27 RX ADMIN — Medication 1 PACKET(S): at 11:10

## 2020-04-27 RX ADMIN — PANTOPRAZOLE SODIUM 40 MILLIGRAM(S): 20 TABLET, DELAYED RELEASE ORAL at 11:11

## 2020-04-27 RX ADMIN — PIPERACILLIN AND TAZOBACTAM 200 GRAM(S): 4; .5 INJECTION, POWDER, LYOPHILIZED, FOR SOLUTION INTRAVENOUS at 05:12

## 2020-04-27 RX ADMIN — APIXABAN 5 MILLIGRAM(S): 2.5 TABLET, FILM COATED ORAL at 22:32

## 2020-04-27 RX ADMIN — APIXABAN 5 MILLIGRAM(S): 2.5 TABLET, FILM COATED ORAL at 11:09

## 2020-04-27 RX ADMIN — PIPERACILLIN AND TAZOBACTAM 200 GRAM(S): 4; .5 INJECTION, POWDER, LYOPHILIZED, FOR SOLUTION INTRAVENOUS at 11:09

## 2020-04-27 RX ADMIN — CHLORHEXIDINE GLUCONATE 1 APPLICATION(S): 213 SOLUTION TOPICAL at 05:12

## 2020-04-27 NOTE — PROGRESS NOTE ADULT - SUBJECTIVE AND OBJECTIVE BOX
Patient Discussed on Morning Rounds with      Primary Diagnosis: COVID Pneumonia    SUBJECTIVE:  74yFemale    Interval Events:    OBJECTIVE:  Vitals: ICU Vital Signs Last 24 Hrs  T(C): 36.8 (27 Apr 2020 09:07), Max: 37.6 (26 Apr 2020 14:00)  T(F): 98.2 (27 Apr 2020 09:07), Max: 99.7 (26 Apr 2020 14:00)  HR: 97 (27 Apr 2020 09:00) (66 - 133)  BP: 130/63 (27 Apr 2020 09:00) (82/46 - 152/78)  BP(mean): 90 (27 Apr 2020 09:00) (59 - 112)  ABP: --  ABP(mean): --  RR: 33 (27 Apr 2020 09:00) (22 - 51)  SpO2: 96% (27 Apr 2020 09:00) (89% - 99%)    I&O:  I&O's Detail    26 Apr 2020 07:01  -  27 Apr 2020 07:00  --------------------------------------------------------  IN:    Enteral Tube Flush: 180 mL    IV PiggyBack: 200 mL    ns in tub fed  [id number]: 720 mL    Oral Fluid: 100 mL  Total IN: 1200 mL    OUT:    Stool: 400 mL    Voided: 350 mL  Total OUT: 750 mL    Total NET: 450 mL      27 Apr 2020 07:01  -  27 Apr 2020 09:49  --------------------------------------------------------  IN:    ns in tub fed  [id number]: 30 mL  Total IN: 30 mL    OUT:  Total OUT: 0 mL    Total NET: 30 mL        VENTILATOR SETTINGS:        PHYSICAL EXAM: Physical Exam performed by Intensivist to minimize risk of exposure.  Please refer to Attending Attestation for comprehensive exam.   General: No acute distress  Neuro: [Sedated]  CV: RRR  Pulm: [INTUBATED]  : [Quintero]  Psych: affect appropriate    LABS:                         8.2    4.83  )-----------( 165      ( 27 Apr 2020 05:12 )             26.9   04-27    146<H>  |  108  |  22  ----------------------------<  116<H>  3.8   |  30  |  0.52    Ca    8.7      27 Apr 2020 05:12  Phos  2.1     04-27  Mg     1.9     04-27    TPro  5.9<L>  /  Alb  2.8<L>  /  TBili  0.2  /  DBili  x   /  AST  22  /  ALT  16  /  AlkPhos  51  04-27  Urinalysis Basic - ( 25 Apr 2020 11:44 )    Color: Yellow / Appearance: Clear / SG: >=1.030 / pH: x  Gluc: x / Ketone: NEGATIVE  / Bili: Negative / Urobili: 0.2 E.U./dL   Blood: x / Protein: 100 mg/dL / Nitrite: POSITIVE   Leuk Esterase: NEGATIVE / RBC: < 5 /HPF / WBC < 5 /HPF   Sq Epi: x / Non Sq Epi: 0-5 /HPF / Bacteria: Present /HPF      Ferritin, Serum: 139 ng/mL (04-27-20 @ 05:12)  D-Dimer Assay, Quantitative: 202 ng/mL DDU (04-27-20 @ 05:12)    CAPILLARY BLOOD GLUCOSE      POCT Blood Glucose.: 112 mg/dL (26 Apr 2020 22:48)  POCT Blood Glucose.: 124 mg/dL (26 Apr 2020 16:18)  POCT Blood Glucose.: 134 mg/dL (26 Apr 2020 11:46)     MEDICATIONS  (STANDING):  apixaban 5 milliGRAM(s) Oral every 12 hours  chlorhexidine 2% Cloths 1 Application(s) Topical <User Schedule>  dextrose 5%. 1000 milliLiter(s) (50 mL/Hr) IV Continuous <Continuous>  dextrose 50% Injectable 12.5 Gram(s) IV Push once  dextrose 50% Injectable 25 Gram(s) IV Push once  dextrose 50% Injectable 25 Gram(s) IV Push once  glycopyrrolate Injectable 0.2 milliGRAM(s) IV Push every 6 hours  insulin lispro (HumaLOG) corrective regimen sliding scale   SubCutaneous every 6 hours  lactobacillus acidophilus 1 Tablet(s) Oral daily  pantoprazole   Suspension 40 milliGRAM(s) Oral daily  piperacillin/tazobactam IVPB.. 3.375 Gram(s) IV Intermittent every 6 hours  potassium phosphate / sodium phosphate powder 1 Packet(s) Oral once  scopolamine   Patch 1 Patch Transdermal every 72 hours    MEDICATIONS  (PRN):  acetaminophen    Suspension .. 650 milliGRAM(s) Oral every 6 hours PRN Temp greater or equal to 38C (100.4F), Mild Pain (1 - 3)  dextrose 40% Gel 15 Gram(s) Oral once PRN Blood Glucose LESS THAN 70 milliGRAM(s)/deciliter  glucagon  Injectable 1 milliGRAM(s) IntraMuscular once PRN Glucose LESS THAN 70 milligrams/deciliter  sodium chloride 0.9% lock flush 10 milliLiter(s) IV Push every 1 hour PRN Pre/post blood products, medications, blood draw, and to maintain line patency      RADIOLOGY/OTHER STUDIES: Patient Discussed on Morning Rounds with Dr. Fritz    Primary Diagnosis: COVID Pneumonia    SUBJECTIVE: Patient seen and examined at bedside.    Interval Events: Blood cx +pseudomonas. Started zosyn. Tolerating 2LNC.     OBJECTIVE:  Vitals: ICU Vital Signs Last 24 Hrs  T(C): 36.8 (27 Apr 2020 09:07), Max: 37.6 (26 Apr 2020 14:00)  T(F): 98.2 (27 Apr 2020 09:07), Max: 99.7 (26 Apr 2020 14:00)  HR: 97 (27 Apr 2020 09:00) (66 - 133)  BP: 130/63 (27 Apr 2020 09:00) (82/46 - 152/78)  BP(mean): 90 (27 Apr 2020 09:00) (59 - 112)  ABP: --  ABP(mean): --  RR: 33 (27 Apr 2020 09:00) (22 - 51)  SpO2: 96% (27 Apr 2020 09:00) (89% - 99%)    I&O:  I&O's Detail    26 Apr 2020 07:01  -  27 Apr 2020 07:00  --------------------------------------------------------  IN:    Enteral Tube Flush: 180 mL    IV PiggyBack: 200 mL    ns in tub fed  [id number]: 720 mL    Oral Fluid: 100 mL  Total IN: 1200 mL    OUT:    Stool: 400 mL    Voided: 350 mL  Total OUT: 750 mL    Total NET: 450 mL      27 Apr 2020 07:01  -  27 Apr 2020 09:49  --------------------------------------------------------  IN:    ns in tub fed  [id number]: 30 mL  Total IN: 30 mL    OUT:  Total OUT: 0 mL    Total NET: 30 mL      VENTILATOR SETTINGS:      PHYSICAL EXAM:   General: No acute distress  Neuro: A&O to person (baseline), no focal neuro deficits   CV: RRR  Pulm: CTA B/L, no diminished breath sounds, no wheezes/rales/rhonchi, no increased work of breathing or accessory muscle use  Gastrointestinal: soft, NT/ND; active BSx4 quadrants. + PEG.  : no suprapubic tenderness, no CVA tenderness  Extremities: WWP; no edema, clubbing or cyanosis  Vascular: 2+ radial, DP/PT pulses B/L  Skin: no rashes    LABS:                         8.2    4.83  )-----------( 165      ( 27 Apr 2020 05:12 )             26.9   04-27    146<H>  |  108  |  22  ----------------------------<  116<H>  3.8   |  30  |  0.52    Ca    8.7      27 Apr 2020 05:12  Phos  2.1     04-27  Mg     1.9     04-27    TPro  5.9<L>  /  Alb  2.8<L>  /  TBili  0.2  /  DBili  x   /  AST  22  /  ALT  16  /  AlkPhos  51  04-27  Urinalysis Basic - ( 25 Apr 2020 11:44 )    Color: Yellow / Appearance: Clear / SG: >=1.030 / pH: x  Gluc: x / Ketone: NEGATIVE  / Bili: Negative / Urobili: 0.2 E.U./dL   Blood: x / Protein: 100 mg/dL / Nitrite: POSITIVE   Leuk Esterase: NEGATIVE / RBC: < 5 /HPF / WBC < 5 /HPF   Sq Epi: x / Non Sq Epi: 0-5 /HPF / Bacteria: Present /HPF    Ferritin, Serum: 139 ng/mL (04-27-20 @ 05:12)  D-Dimer Assay, Quantitative: 202 ng/mL DDU (04-27-20 @ 05:12)    CAPILLARY BLOOD GLUCOSE    POCT Blood Glucose.: 112 mg/dL (26 Apr 2020 22:48)  POCT Blood Glucose.: 124 mg/dL (26 Apr 2020 16:18)  POCT Blood Glucose.: 134 mg/dL (26 Apr 2020 11:46)     MEDICATIONS  (STANDING):  apixaban 5 milliGRAM(s) Oral every 12 hours  chlorhexidine 2% Cloths 1 Application(s) Topical <User Schedule>  dextrose 5%. 1000 milliLiter(s) (50 mL/Hr) IV Continuous <Continuous>  dextrose 50% Injectable 12.5 Gram(s) IV Push once  dextrose 50% Injectable 25 Gram(s) IV Push once  dextrose 50% Injectable 25 Gram(s) IV Push once  glycopyrrolate Injectable 0.2 milliGRAM(s) IV Push every 6 hours  insulin lispro (HumaLOG) corrective regimen sliding scale   SubCutaneous every 6 hours  lactobacillus acidophilus 1 Tablet(s) Oral daily  pantoprazole   Suspension 40 milliGRAM(s) Oral daily  piperacillin/tazobactam IVPB.. 3.375 Gram(s) IV Intermittent every 6 hours  potassium phosphate / sodium phosphate powder 1 Packet(s) Oral once  scopolamine   Patch 1 Patch Transdermal every 72 hours    MEDICATIONS  (PRN):  acetaminophen    Suspension .. 650 milliGRAM(s) Oral every 6 hours PRN Temp greater or equal to 38C (100.4F), Mild Pain (1 - 3)  dextrose 40% Gel 15 Gram(s) Oral once PRN Blood Glucose LESS THAN 70 milliGRAM(s)/deciliter  glucagon  Injectable 1 milliGRAM(s) IntraMuscular once PRN Glucose LESS THAN 70 milligrams/deciliter  sodium chloride 0.9% lock flush 10 milliLiter(s) IV Push every 1 hour PRN Pre/post blood products, medications, blood draw, and to maintain line patency    RADIOLOGY/OTHER STUDIES:

## 2020-04-27 NOTE — CONSULT NOTE ADULT - ASSESSMENT
75 yo female with CP/MR, T spine fusion, presented from nursing facility, COVID-19+ on 4/3, course c/b Pseudomonas VAP (initial isolate 4/3 was wild-type (pansusceptible)) for which the patient has been on a prolonged course of Zosyn since 4/3 now with Pseudomonas BSI 4/25 and pneumonia--sputum cx 4/20 showing isolate has evolved resistance to Zosyn.  - surveillance bcx  - f/u 4/25 bcx susceptibility  - d/c Zosyn  - based on sputum cx susceptibility, recommend commence meropenem 1g IV q8h + Cipro 750mg via PEG q12h; if blood isolate found to be susceptible to Cipro, plan to continue this agent and d/c meropenem     ID Team 1

## 2020-04-27 NOTE — PROGRESS NOTE ADULT - ASSESSMENT
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 for respiratory failure secondary to COVID, now bacteremic.    Neuro:  - awake, off sedation   - baseline mental status      CV:   - SBP normotensive     Pulm:  - extubated 4/12  - tolerating NC 2L, monitor O2 sat   - encourage chest PT daily   - cont suctioning prn      ID:  - finished plaquenil & azithromycin   - repeat COVID swab sent 4/23 is negative  - repeat covid test was sent again today 4/25 --> COVID-19 PCR + for 4/24 and 4/25, will repeat today  - completed meropenem and vanco 4/23   - 4/25 blood cx: pseudomonas aeruginosa. f/u sensitivites  - Zosyn started 4/26  - ID consult for antimicrobial guidance, f/u recs  - Obtain urine cx    Nephro:  - cont to monitor for urinary retention.   - cont to monitor Is and Os   - daily BMP   - electrolyte repletions PRN    GI:  - PEG-tube replaced by gen surg 4/23, ok to use per gen surg  - tube feeds via PEG tube     Endo:   - ISS    Prophy:  - cont therapeutic Eliquis 5mg bid    Code: FULL CODE  Disposition: Patient requires chest PT and intermit suctioning. Continue monitoring in MICU, Dispo pending COVID-19 negative test for return to long-term rehab facility

## 2020-04-27 NOTE — CHART NOTE - NSCHARTNOTEFT_GEN_A_CORE
Admitting Diagnosis:   Patient is a 74y old  Female who presents with a chief complaint of covid (27 Apr 2020 14:06)      PAST MEDICAL & SURGICAL HISTORY:  HTN (hypertension)  Spastic quadriplegia  DVT (deep venous thrombosis)  PE (pulmonary thromboembolism)  GERD (gastroesophageal reflux disease)  Dysphagia  Mental retardation  CP (cerebral palsy)  PEG tube malfunction      Current Nutrition Order:  Jevity 1.5 30ml/hr x24hrs +1PS/day   +BM 4/26  +Order for lactobacillus 4/23     PO Intake: Good (%) [   ]  Fair (50-75%) [   ] Poor (<25%) [   ]  NA, TF    GI Issues:   Rectal Tube OP 50ml 4/27, 350ml 4/26, 400ml 4/25     Pain:  none per flow sheets     Skin Integrity:  1+   generalized edema  No pressure ulcers     Labs:   04-27    146<H>  |  108  |  22  ----------------------------<  116<H>  3.8   |  30  |  0.52    Ca    8.7      27 Apr 2020 05:12  Phos  2.1     04-27  Mg     1.9     04-27    TPro  5.9<L>  /  Alb  2.8<L>  /  TBili  0.2  /  DBili  x   /  AST  22  /  ALT  16  /  AlkPhos  51  04-27    CAPILLARY BLOOD GLUCOSE      POCT Blood Glucose.: 127 mg/dL (27 Apr 2020 10:48)  POCT Blood Glucose.: 112 mg/dL (26 Apr 2020 22:48)  POCT Blood Glucose.: 124 mg/dL (26 Apr 2020 16:18)      Medications:  MEDICATIONS  (STANDING):  apixaban 5 milliGRAM(s) Oral every 12 hours  chlorhexidine 2% Cloths 1 Application(s) Topical <User Schedule>  ciprofloxacin     Tablet 750 milliGRAM(s) Oral every 12 hours  dextrose 5%. 1000 milliLiter(s) (50 mL/Hr) IV Continuous <Continuous>  dextrose 50% Injectable 12.5 Gram(s) IV Push once  dextrose 50% Injectable 25 Gram(s) IV Push once  dextrose 50% Injectable 25 Gram(s) IV Push once  glycopyrrolate Injectable 0.2 milliGRAM(s) IV Push every 6 hours  insulin lispro (HumaLOG) corrective regimen sliding scale   SubCutaneous every 6 hours  lactobacillus acidophilus 1 Tablet(s) Oral daily  meropenem  IVPB 1000 milliGRAM(s) IV Intermittent every 8 hours  pantoprazole   Suspension 40 milliGRAM(s) Oral daily  scopolamine   Patch 1 Patch Transdermal every 72 hours    MEDICATIONS  (PRN):  acetaminophen    Suspension .. 650 milliGRAM(s) Oral every 6 hours PRN Temp greater or equal to 38C (100.4F), Mild Pain (1 - 3)  dextrose 40% Gel 15 Gram(s) Oral once PRN Blood Glucose LESS THAN 70 milliGRAM(s)/deciliter  glucagon  Injectable 1 milliGRAM(s) IntraMuscular once PRN Glucose LESS THAN 70 milligrams/deciliter  sodium chloride 0.9% lock flush 10 milliLiter(s) IV Push every 1 hour PRN Pre/post blood products, medications, blood draw, and to maintain line patency        4'7'' IBW 90 pounds +-10%  (4/3) 122 pounds   (4/6) 117 pounds  (4/8) 116.6 pounds   (4/12) 116.6 pounds   %QHW=914% BMI=27.19  Based on most recent EMR wt    Nutrition Focused Physical Exam: Completed [   ]  Not Pertinent [   ]- NA d/t COVID     Estimated energy needs:   IBW used to calculate energy needs due to pt's current body weight exceeding 120% of IBW   Needs adjusted for age based on hypermetabolic state 2/2 viral infection  1025-1230kcal/day (25-30kcal/kg)  49-57g protein/day (1.2-1.4g pro/kg)  Fluids per team     Subjective:   74F with hx of CP, MR, hx of DVT/PE, GERD, chronic PEG, HTN 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, presented to [facility] for AHRF, intubated, sedated on propofol (5 ml/hr providing 132kcal) tx to [facility] for ICU care 4/3. Pt COVID positive with coronavirus pna, acute hypoxemic respiratory failure/ARDS. Pt extubated to NRB 4/4, however later re-intubated. Pt extuabted 4/6 to NRB. Pt reintuabted 4/8. Extubated for third time 4/12, doing well on non-rebreather and Switched to nasal cannula 4/13. Repeat COVID swab sent: positive 4/20, tests from 4/25 and 4/26 COVID-19 PCR+, plan to retest again today (of note results from today also positive). Noted need for negative test prior to returning to group home / group home cannot accommodate any IVABx (Pt with Blood cx + pseudomonas, getting AB treatment). Planned to remain in MICU for continued monitoring.  Noted PEG dislodged on 4/23, Replaced by Sx - Tube study shows no leak, SX signed off. Current TF order for Jevity 1.5 goal rate 30ml/hr x24hrs +1PS/day, provides 720ml, 1180kcal, 61gm prot, 547ml water. Spoke with RN who reports pt tolerating feeds at goal rate.  Allergic to caffeine, high acid, and chocolate, tomatoes- noted in EMR.  Please see below for nutritions recommendations. Recs made with team.     Previous Nutrition Diagnosis: Inadequate Energy Intake Etiology RT inability to meet est needs 2/2 acute critical status Signs/Symptoms AEB NPO diet meeting 0% EER.   RESOLVED - ordered for EN   Previous Nutrition Diagnosis: Increased nutrient needs RT increased demand for energy and protein AEB hypermetabolic state 2/2 viral infection (COVID19+)   ON GOING AT THIS TIME   Goal/Expected Outcome: Pt will meet at least 75% of nutrient needs     Recommendations:  1. Current TF order for Jevity 1.5 goal rate 30ml/hr x24hrs +1PS/day, provides 720ml, 1180kcal, 61gm prot, 547ml water - meeting EER.  >> Given pump shortage d/t COVID 19, if bolus feeds feasible, consider use of 4 cans Jevity 1.2/day, will provide ~948ml, 1140kcal, 53gm prot, 765ml water.   2. Monitor tolerance, GI distress- continue with probiotic use PRN, should stooling remain at increased rate consider fiber agent.   3. Maintain ASP Precautions at all times.   4. Monitor Skin, Wts, GOC.  5. Monitor Labs; monitor BMP, CBC, glucose, lytes, trend renal indices, LFTs.   6. RD to remain available for additional nutrition interventions as needed.     Education: NA   Risk Level: High [ X  ] Moderate [   ] Low [   ].

## 2020-04-27 NOTE — CONSULT NOTE ADULT - SUBJECTIVE AND OBJECTIVE BOX
HPI:  74 F with pmhx of CP, MR, hx of DVT/PE, GERD, chronic PEG, HTN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, presented to [facility] for AHRF, intubated, sedated on propofol tx to [facility] for ICU care on 4/3. (07 Apr 2020 12:50)      PAST MEDICAL & SURGICAL HISTORY:  HTN (hypertension)  Spastic quadriplegia  DVT (deep venous thrombosis)  PE (pulmonary thromboembolism)  GERD (gastroesophageal reflux disease)  Dysphagia  Mental retardation  CP (cerebral palsy)  PEG tube malfunction        REVIEW OF SYSTEMS:    UTO      ANTIBIOTICS:  MEDICATIONS  (STANDING):  apixaban 5 milliGRAM(s) Oral every 12 hours  chlorhexidine 2% Cloths 1 Application(s) Topical <User Schedule>  ciprofloxacin     Tablet 750 milliGRAM(s) Oral every 12 hours  dextrose 5%. 1000 milliLiter(s) (50 mL/Hr) IV Continuous <Continuous>  dextrose 50% Injectable 12.5 Gram(s) IV Push once  dextrose 50% Injectable 25 Gram(s) IV Push once  dextrose 50% Injectable 25 Gram(s) IV Push once  glycopyrrolate Injectable 0.2 milliGRAM(s) IV Push every 6 hours  insulin lispro (HumaLOG) corrective regimen sliding scale   SubCutaneous every 6 hours  lactobacillus acidophilus 1 Tablet(s) Oral daily  meropenem  IVPB 1000 milliGRAM(s) IV Intermittent every 8 hours  pantoprazole   Suspension 40 milliGRAM(s) Oral daily  scopolamine   Patch 1 Patch Transdermal every 72 hours    MEDICATIONS  (PRN):  acetaminophen    Suspension .. 650 milliGRAM(s) Oral every 6 hours PRN Temp greater or equal to 38C (100.4F), Mild Pain (1 - 3)  dextrose 40% Gel 15 Gram(s) Oral once PRN Blood Glucose LESS THAN 70 milliGRAM(s)/deciliter  glucagon  Injectable 1 milliGRAM(s) IntraMuscular once PRN Glucose LESS THAN 70 milligrams/deciliter  sodium chloride 0.9% lock flush 10 milliLiter(s) IV Push every 1 hour PRN Pre/post blood products, medications, blood draw, and to maintain line patency      Allergies    ace inhibitors (Anaphylaxis)  caffeine (Rash)  chocolate (Rash)  high acid (Rash)  Tomatoes (Hives)    Intolerances        SOCIAL HISTORY:    FAMILY HISTORY:      Vital Signs Last 24 Hrs  T(C): 36.8 (27 Apr 2020 09:07), Max: 37 (26 Apr 2020 19:32)  T(F): 98.2 (27 Apr 2020 09:07), Max: 98.6 (26 Apr 2020 19:32)  HR: 94 (27 Apr 2020 13:00) (66 - 108)  BP: 107/55 (27 Apr 2020 13:00) (82/46 - 152/78)  BP(mean): 75 (27 Apr 2020 13:00) (59 - 101)  RR: 28 (27 Apr 2020 13:00) (22 - 51)  SpO2: 96% (27 Apr 2020 13:00) (94% - 99%)    PHYSICAL EXAM:  Constitutional:Well-developed, well nourished  Eyes:RACHEL, EOMI  Ear/Nose/Throat: no oral lesion, no sinus tenderness on percussion	  Neck:no JVD, no lymphadenopathy, supple  Respiratory: CTA dayanna  Cardiovascular: S1S2 RRR, no murmurs  Gastrointestinal:soft, (+) BS, no HSM  Extremities:no e/e/c  Vascular: DP Pulse:	right normal; left normal            LABS:                        8.2    4.83  )-----------( 165      ( 27 Apr 2020 05:12 )             26.9     04-27    146<H>  |  108  |  22  ----------------------------<  116<H>  3.8   |  30  |  0.52    Ca    8.7      27 Apr 2020 05:12  Phos  2.1     04-27  Mg     1.9     04-27    TPro  5.9<L>  /  Alb  2.8<L>  /  TBili  0.2  /  DBili  x   /  AST  22  /  ALT  16  /  AlkPhos  51  04-27          MICROBIOLOGY: Culture - Blood (04.25.20 @ 08:22)    Gram Stain:   Aerobic Bottle: Gram Negative Rods  Floor previously notified.    Specimen Source: .Blood Blood    Culture Results:   Culture in progress    Culture - Blood (04.25.20 @ 08:22)    -  Multidrug (KPC pos) resistant organism: Nondet    -  Pseudomonas aeruginosa: Detec    Gram Stain:   Aerobic Bottle: Gram Negative Rods  Result called to and read back byArnulfo Keith RN  04/26/2020 08:33:09  ***Blood Panel PCR results on this specimen are available  approximately 3 hours after the Gram stain result.***  Gram stain, PCR, and/or culture results may not always  correspond due to difference in methodologies.  ************************************************************  This PCR assay was performed using [email or web address].  The following targets are tested for: Enterococcus,  vancomycin resistant enterococci, Listeria monocytogenes,  coagulase negative staphylococci, S. aureus,  methicillin resistant S. aureus, Streptococcus agalactiae  (Group B), S. pneumoniae, S. pyogenes (Group A),  Acinetobacter baumannii, Enterobacter cloacae, E. coli,  Klebsiella oxytoca, K. pneumoniae, Proteus sp.,  Serratia marcescens, Haemophilus influenzae,  Neisseria meningitidis, Pseudomonas aeruginosa, Candida  albicans, C. glabrata, C krusei, C parapsilosis,  C. tropicalis and the KPC resistance gene.    Specimen Source: .Blood Blood    Organism: Blood Culture PCR    Culture Results:   Growth in aerobic bottle: Pseudomonas aeruginosa  Susceptibility to follow.    Organism Identification: Blood Culture PCR    Method Type: PCR      Culture - Sputum . (04.20.20 @ 22:34)    -  Meropenem: S <=1    -  Meropenem: S <=1    -  Gentamicin: S 2    -  Gentamicin: S 2    -  Cefepime: S    -  Cefepime: I 16    Gram Stain:   Moderate WBC's  Few squamous epithelial cells  Few Gram Negative Rods  Rare Gram Positive Rods    -  Ciprofloxacin: S <=0.5    -  Ciprofloxacin: S <=0.5    -  Aztreonam: I 16    -  Aztreonam: I 16    -  Tobramycin: S <=2    -  Tobramycin: S <=2    -  Piperacillin/Tazobactam: I 64    -  Piperacillin/Tazobactam: R >64    Specimen Source: .Sputum Sputum    Culture Results:   Moderate Pseudomonas aeruginosa  Few Pseudomonas aeruginosa #2  Normal Respiratory Ifeoma absent    Organism Identification: Pseudomonas aeruginosa  Pseudomonas aeruginosa  Pseudomonas aeruginosa  Pseudomonas aeruginosa    Organism: Pseudomonas aeruginosa    Organism: Pseudomonas aeruginosa    Organism: Pseudomonas aeruginosa    Organism: Pseudomonas aeruginosa    Method Type: INGRID    Method Type: KB    Method Type: KB    Method Type: INGRID    RADIOLOGY & ADDITIONAL STUDIES: reviewed

## 2020-04-28 LAB
-  AZTREONAM: SIGNIFICANT CHANGE UP
-  AZTREONAM: SIGNIFICANT CHANGE UP
-  CEFEPIME: SIGNIFICANT CHANGE UP
-  CEFEPIME: SIGNIFICANT CHANGE UP
-  CIPROFLOXACIN: SIGNIFICANT CHANGE UP
-  CIPROFLOXACIN: SIGNIFICANT CHANGE UP
-  GENTAMICIN: SIGNIFICANT CHANGE UP
-  GENTAMICIN: SIGNIFICANT CHANGE UP
-  PIPERACILLIN/TAZOBACTAM: SIGNIFICANT CHANGE UP
-  PIPERACILLIN/TAZOBACTAM: SIGNIFICANT CHANGE UP
-  TOBRAMYCIN: SIGNIFICANT CHANGE UP
-  TOBRAMYCIN: SIGNIFICANT CHANGE UP
ALBUMIN SERPL ELPH-MCNC: 2.9 G/DL — LOW (ref 3.3–5)
ALP SERPL-CCNC: 52 U/L — SIGNIFICANT CHANGE UP (ref 40–120)
ALT FLD-CCNC: 15 U/L — SIGNIFICANT CHANGE UP (ref 10–45)
ANION GAP SERPL CALC-SCNC: 8 MMOL/L — SIGNIFICANT CHANGE UP (ref 5–17)
AST SERPL-CCNC: 22 U/L — SIGNIFICANT CHANGE UP (ref 10–40)
BASOPHILS # BLD AUTO: 0.04 K/UL — SIGNIFICANT CHANGE UP (ref 0–0.2)
BASOPHILS NFR BLD AUTO: 0.8 % — SIGNIFICANT CHANGE UP (ref 0–2)
BILIRUB SERPL-MCNC: 0.2 MG/DL — SIGNIFICANT CHANGE UP (ref 0.2–1.2)
BUN SERPL-MCNC: 19 MG/DL — SIGNIFICANT CHANGE UP (ref 7–23)
CALCIUM SERPL-MCNC: 8.7 MG/DL — SIGNIFICANT CHANGE UP (ref 8.4–10.5)
CHLORIDE SERPL-SCNC: 109 MMOL/L — HIGH (ref 96–108)
CO2 SERPL-SCNC: 30 MMOL/L — SIGNIFICANT CHANGE UP (ref 22–31)
CREAT SERPL-MCNC: 0.47 MG/DL — LOW (ref 0.5–1.3)
CRP SERPL-MCNC: 2.14 MG/DL — HIGH (ref 0–0.4)
D DIMER BLD IA.RAPID-MCNC: 269 NG/ML DDU — HIGH
EOSINOPHIL # BLD AUTO: 0.41 K/UL — SIGNIFICANT CHANGE UP (ref 0–0.5)
EOSINOPHIL NFR BLD AUTO: 7.9 % — HIGH (ref 0–6)
FERRITIN SERPL-MCNC: 143 NG/ML — SIGNIFICANT CHANGE UP (ref 15–150)
GLUCOSE BLDC GLUCOMTR-MCNC: 117 MG/DL — HIGH (ref 70–99)
GLUCOSE SERPL-MCNC: 118 MG/DL — HIGH (ref 70–99)
HCT VFR BLD CALC: 26.9 % — LOW (ref 34.5–45)
HGB BLD-MCNC: 8.2 G/DL — LOW (ref 11.5–15.5)
IMM GRANULOCYTES NFR BLD AUTO: 0.6 % — SIGNIFICANT CHANGE UP (ref 0–1.5)
LYMPHOCYTES # BLD AUTO: 1.26 K/UL — SIGNIFICANT CHANGE UP (ref 1–3.3)
LYMPHOCYTES # BLD AUTO: 24.2 % — SIGNIFICANT CHANGE UP (ref 13–44)
MAGNESIUM SERPL-MCNC: 1.7 MG/DL — SIGNIFICANT CHANGE UP (ref 1.6–2.6)
MCHC RBC-ENTMCNC: 29.7 PG — SIGNIFICANT CHANGE UP (ref 27–34)
MCHC RBC-ENTMCNC: 30.5 GM/DL — LOW (ref 32–36)
MCV RBC AUTO: 97.5 FL — SIGNIFICANT CHANGE UP (ref 80–100)
METHOD TYPE: SIGNIFICANT CHANGE UP
MONOCYTES # BLD AUTO: 0.49 K/UL — SIGNIFICANT CHANGE UP (ref 0–0.9)
MONOCYTES NFR BLD AUTO: 9.4 % — SIGNIFICANT CHANGE UP (ref 2–14)
NEUTROPHILS # BLD AUTO: 2.97 K/UL — SIGNIFICANT CHANGE UP (ref 1.8–7.4)
NEUTROPHILS NFR BLD AUTO: 57.1 % — SIGNIFICANT CHANGE UP (ref 43–77)
NRBC # BLD: 0 /100 WBCS — SIGNIFICANT CHANGE UP (ref 0–0)
PHOSPHATE SERPL-MCNC: 2.1 MG/DL — LOW (ref 2.5–4.5)
PLATELET # BLD AUTO: 206 K/UL — SIGNIFICANT CHANGE UP (ref 150–400)
POTASSIUM SERPL-MCNC: 4 MMOL/L — SIGNIFICANT CHANGE UP (ref 3.5–5.3)
POTASSIUM SERPL-SCNC: 4 MMOL/L — SIGNIFICANT CHANGE UP (ref 3.5–5.3)
PROT SERPL-MCNC: 5.9 G/DL — LOW (ref 6–8.3)
RBC # BLD: 2.76 M/UL — LOW (ref 3.8–5.2)
RBC # FLD: 14.3 % — SIGNIFICANT CHANGE UP (ref 10.3–14.5)
SODIUM SERPL-SCNC: 147 MMOL/L — HIGH (ref 135–145)
WBC # BLD: 5.2 K/UL — SIGNIFICANT CHANGE UP (ref 3.8–10.5)
WBC # FLD AUTO: 5.2 K/UL — SIGNIFICANT CHANGE UP (ref 3.8–10.5)

## 2020-04-28 PROCEDURE — 99291 CRITICAL CARE FIRST HOUR: CPT | Mod: CS

## 2020-04-28 PROCEDURE — 99232 SBSQ HOSP IP/OBS MODERATE 35: CPT

## 2020-04-28 RX ADMIN — APIXABAN 5 MILLIGRAM(S): 2.5 TABLET, FILM COATED ORAL at 11:10

## 2020-04-28 RX ADMIN — CHLORHEXIDINE GLUCONATE 1 APPLICATION(S): 213 SOLUTION TOPICAL at 05:09

## 2020-04-28 RX ADMIN — ROBINUL 0.2 MILLIGRAM(S): 0.2 INJECTION INTRAMUSCULAR; INTRAVENOUS at 11:10

## 2020-04-28 RX ADMIN — APIXABAN 5 MILLIGRAM(S): 2.5 TABLET, FILM COATED ORAL at 23:01

## 2020-04-28 RX ADMIN — ROBINUL 0.2 MILLIGRAM(S): 0.2 INJECTION INTRAMUSCULAR; INTRAVENOUS at 05:12

## 2020-04-28 RX ADMIN — Medication 1 TABLET(S): at 11:10

## 2020-04-28 RX ADMIN — Medication 750 MILLIGRAM(S): at 05:12

## 2020-04-28 RX ADMIN — MEROPENEM 100 MILLIGRAM(S): 1 INJECTION INTRAVENOUS at 05:09

## 2020-04-28 RX ADMIN — SCOPALAMINE 1 PATCH: 1 PATCH, EXTENDED RELEASE TRANSDERMAL at 18:06

## 2020-04-28 RX ADMIN — ROBINUL 0.2 MILLIGRAM(S): 0.2 INJECTION INTRAMUSCULAR; INTRAVENOUS at 23:01

## 2020-04-28 RX ADMIN — PANTOPRAZOLE SODIUM 40 MILLIGRAM(S): 20 TABLET, DELAYED RELEASE ORAL at 11:10

## 2020-04-28 RX ADMIN — SCOPALAMINE 1 PATCH: 1 PATCH, EXTENDED RELEASE TRANSDERMAL at 05:13

## 2020-04-28 RX ADMIN — Medication 750 MILLIGRAM(S): at 17:34

## 2020-04-28 RX ADMIN — ROBINUL 0.2 MILLIGRAM(S): 0.2 INJECTION INTRAMUSCULAR; INTRAVENOUS at 17:34

## 2020-04-28 NOTE — PROGRESS NOTE ADULT - SUBJECTIVE AND OBJECTIVE BOX
INTERVAL HPI/OVERNIGHT EVENTS: No fevers.     ROS: UTO      ANTIBIOTICS/RELEVANT:    MEDICATIONS  (STANDING):  apixaban 5 milliGRAM(s) Oral every 12 hours  chlorhexidine 2% Cloths 1 Application(s) Topical <User Schedule>  ciprofloxacin     Tablet 750 milliGRAM(s) Oral every 12 hours  dextrose 5%. 1000 milliLiter(s) (50 mL/Hr) IV Continuous <Continuous>  dextrose 50% Injectable 12.5 Gram(s) IV Push once  dextrose 50% Injectable 25 Gram(s) IV Push once  dextrose 50% Injectable 25 Gram(s) IV Push once  glycopyrrolate Injectable 0.2 milliGRAM(s) IV Push every 6 hours  insulin lispro (HumaLOG) corrective regimen sliding scale   SubCutaneous every 6 hours  lactobacillus acidophilus 1 Tablet(s) Oral daily  pantoprazole   Suspension 40 milliGRAM(s) Oral daily  scopolamine   Patch 1 Patch Transdermal every 72 hours    MEDICATIONS  (PRN):  acetaminophen    Suspension .. 650 milliGRAM(s) Oral every 6 hours PRN Temp greater or equal to 38C (100.4F), Mild Pain (1 - 3)  dextrose 40% Gel 15 Gram(s) Oral once PRN Blood Glucose LESS THAN 70 milliGRAM(s)/deciliter  glucagon  Injectable 1 milliGRAM(s) IntraMuscular once PRN Glucose LESS THAN 70 milligrams/deciliter  sodium chloride 0.9% lock flush 10 milliLiter(s) IV Push every 1 hour PRN Pre/post blood products, medications, blood draw, and to maintain line patency        Vital Signs Last 24 Hrs  T(C): 37.3 (28 Apr 2020 08:36), Max: 37.3 (28 Apr 2020 08:36)  T(F): 99.2 (28 Apr 2020 08:36), Max: 99.2 (28 Apr 2020 08:36)  HR: 105 (28 Apr 2020 13:00) (74 - 108)  BP: 134/71 (28 Apr 2020 13:00) (87/50 - 136/64)  BP(mean): 88 (28 Apr 2020 13:00) (64 - 99)  RR: 35 (28 Apr 2020 13:00) (17 - 43)  SpO2: 94% (28 Apr 2020 13:00) (91% - 99%)    PHYSICAL EXAM:  deferred       LABS:                        8.2    5.20  )-----------( 206      ( 28 Apr 2020 04:22 )             26.9     04-28    147<H>  |  109<H>  |  19  ----------------------------<  118<H>  4.0   |  30  |  0.47<L>    Ca    8.7      28 Apr 2020 04:22  Phos  2.1     04-28  Mg     1.7     04-28    TPro  5.9<L>  /  Alb  2.9<L>  /  TBili  0.2  /  DBili  x   /  AST  22  /  ALT  15  /  AlkPhos  52  04-28          MICROBIOLOGY: Culture - Blood (04.27.20 @ 11:59)    Specimen Source: .Blood Blood    Culture Results:   No growth at 1 day.    Culture - Blood (04.25.20 @ 08:22)    -  Tobramycin: S <=2    -  Piperacillin/Tazobactam: I 32    -  Gentamicin: S 2    Gram Stain:   Aerobic Bottle: Gram Negative Rods  Floor previously notified.    -  Aztreonam: S 8    -  Ciprofloxacin: S <=0.5    -  Cefepime: S    Specimen Source: .Blood Blood    Organism: Pseudomonas aeruginosa    Organism: Pseudomonas aeruginosa    Culture Results:   Growth in aerobic bottle: Pseudomonas aeruginosa    Organism Identification: Pseudomonas aeruginosa  Pseudomonas aeruginosa    Method Type: INGRID    Method Type: AARON        RADIOLOGY & ADDITIONAL STUDIES: reviewed

## 2020-04-28 NOTE — OCCUPATIONAL THERAPY INITIAL EVALUATION ADULT - PLANNED THERAPY INTERVENTIONS, OT EVAL
strengthening/cognitive, visual perceptual/ROM/transfer training/ADL retraining/balance training/bed mobility training/fine motor coordination training/neuromuscular re-education

## 2020-04-28 NOTE — PROGRESS NOTE ADULT - ASSESSMENT
73 yo female with CP/MR, T spine fusion, presented from nursing facility, COVID-19+ on 4/3, course c/b Pseudomonas VAP (initial isolate 4/3 was wild-type (pansusceptible)) for which the patient has been on a prolonged course of Zosyn since 4/3 now with Pseudomonas BSI 4/25 and pneumonia--sputum cx 4/20 showing isolate has evolved resistance to Zosyn.  - f/u surveillance bcx 4/27--ngtd  - continue Cipro 750mg via PEG [id number] 2 week course through 5/10  - d/c meropenem    ID Team 1

## 2020-04-28 NOTE — OCCUPATIONAL THERAPY INITIAL EVALUATION ADULT - DIAGNOSIS, OT EVAL
Pt presents with deficits in cognition, strength and activity tolerance, resulting in decreased ADLs/functional mobility.

## 2020-04-28 NOTE — PROGRESS NOTE ADULT - ASSESSMENT
74 F with PMH of CP, MR, DVT/PE, GERD, chronic PEG, HTN 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, presenting to [facility] for acute hypoxic respiratory failure requiring intubation 2/2 COVID+.    Neuro:  - awake, off sedation   - baseline mental status      CV:   - SBP normotensive     Pulm:  - extubated 4/12  - tolerating NC 2L, monitor O2 sat   - encourage chest PT daily   - cont suctioning prn      ID:  - finished plaquenil & azithromycin   - repeat COVID swab sent 4/23 is negative  - repeat COVID PCR + for 4/24, 4/25,  and 4/27. Will repeat swab 4/29.  - completed meropenem and vanco 4/23   - 4/25 blood cx: pseudomonas aeruginosa, sensitive to cipro.  - d/c hermes. cont Cipro 750mg Q12 (4/27-5/11)  - ID consult recs appreciated.   - Surveillance cx 4/27: no growth at 1 day.    Nephro:  - cont to monitor for urinary retention.   - cont to monitor Is and Os   - daily BMP   - electrolyte repletions PRN    GI:  - PEG-tube replaced by gen surg 4/23, ok to use per gen surg  - tube feeds via PEG tube     Endo:   - ISS    Prophy:  - cont therapeutic Eliquis 5mg bid    Code: FULL CODE  Disposition: Patient requires chest PT and intermit suctioning. Continue monitoring in MICU, Dispo pending COVID-19 negative test for return to long-term rehab facility

## 2020-04-28 NOTE — OCCUPATIONAL THERAPY INITIAL EVALUATION ADULT - PERTINENT HX OF CURRENT PROBLEM, REHAB EVAL
Pt is a 74 F with pmhx of CP, MR, hx of DVT/PE, GERD, chronic PEG, HTN spastic quadreplgia, presenting to [facility] for respiratory failure secondary to COVID.

## 2020-04-28 NOTE — OCCUPATIONAL THERAPY INITIAL EVALUATION ADULT - GENERAL OBSERVATIONS, REHAB EVAL
Pt received seated in cardiac chair, +2L O2 via NC with humidification, PEG tube, IV, telemetry, rectal tube.

## 2020-04-28 NOTE — PROGRESS NOTE ADULT - SUBJECTIVE AND OBJECTIVE BOX
Patient Discussed on Morning Rounds with      Primary Diagnosis: COVID Pneumonia    SUBJECTIVE:  74yFemale    Interval Events:    OBJECTIVE:  Vitals: ICU Vital Signs Last 24 Hrs  T(C): 37.3 (28 Apr 2020 08:36), Max: 37.3 (28 Apr 2020 08:36)  T(F): 99.2 (28 Apr 2020 08:36), Max: 99.2 (28 Apr 2020 08:36)  HR: 87 (28 Apr 2020 10:00) (74 - 104)  BP: 115/55 (28 Apr 2020 10:00) (87/50 - 136/64)  BP(mean): 78 (28 Apr 2020 10:00) (64 - 99)  ABP: --  ABP(mean): --  RR: 30 (28 Apr 2020 10:00) (17 - 43)  SpO2: 95% (28 Apr 2020 10:00) (91% - 99%)    I&O:  I&O's Detail    27 Apr 2020 07:01  -  28 Apr 2020 07:00  --------------------------------------------------------  IN:    Enteral Tube Flush: 190 mL    IV PiggyBack: 250 mL    ns in tub fed  [id number]: 570 mL  Total IN: 1010 mL    OUT:    Stool: 200 mL    Voided: 475 mL  Total OUT: 675 mL    Total NET: 335 mL      28 Apr 2020 07:01  -  28 Apr 2020 10:38  --------------------------------------------------------  IN:    ns in tub fed  [id number]: 90 mL  Total IN: 90 mL    OUT:    Voided: 200 mL  Total OUT: 200 mL    Total NET: -110 mL        VENTILATOR SETTINGS:        PHYSICAL EXAM: Physical Exam performed by Intensivist to minimize risk of exposure.  Please refer to Attending Attestation for comprehensive exam.   General: No acute distress  Neuro: [Sedated]  CV: RRR  Pulm: [INTUBATED]  : [Quintero]  Psych: affect appropriate    LABS:                         8.2    5.20  )-----------( 206      ( 28 Apr 2020 04:22 )             26.9   04-28    147<H>  |  109<H>  |  19  ----------------------------<  118<H>  4.0   |  30  |  0.47<L>    Ca    8.7      28 Apr 2020 04:22  Phos  2.1     04-28  Mg     1.7     04-28    TPro  5.9<L>  /  Alb  2.9<L>  /  TBili  0.2  /  DBili  x   /  AST  22  /  ALT  15  /  AlkPhos  52  04-28    Ferritin, Serum: 143 ng/mL (04-28-20 @ 04:22)  D-Dimer Assay, Quantitative: 269 ng/mL DDU (04-28-20 @ 04:22)    CAPILLARY BLOOD GLUCOSE      POCT Blood Glucose.: 117 mg/dL (28 Apr 2020 05:26)  POCT Blood Glucose.: 104 mg/dL (27 Apr 2020 22:18)  POCT Blood Glucose.: 118 mg/dL (27 Apr 2020 16:27)  POCT Blood Glucose.: 127 mg/dL (27 Apr 2020 10:48)     MEDICATIONS  (STANDING):  apixaban 5 milliGRAM(s) Oral every 12 hours  chlorhexidine 2% Cloths 1 Application(s) Topical <User Schedule>  ciprofloxacin     Tablet 750 milliGRAM(s) Oral every 12 hours  dextrose 5%. 1000 milliLiter(s) (50 mL/Hr) IV Continuous <Continuous>  dextrose 50% Injectable 12.5 Gram(s) IV Push once  dextrose 50% Injectable 25 Gram(s) IV Push once  dextrose 50% Injectable 25 Gram(s) IV Push once  glycopyrrolate Injectable 0.2 milliGRAM(s) IV Push every 6 hours  insulin lispro (HumaLOG) corrective regimen sliding scale   SubCutaneous every 6 hours  lactobacillus acidophilus 1 Tablet(s) Oral daily  meropenem  IVPB 1000 milliGRAM(s) IV Intermittent every 8 hours  pantoprazole   Suspension 40 milliGRAM(s) Oral daily  scopolamine   Patch 1 Patch Transdermal every 72 hours    MEDICATIONS  (PRN):  acetaminophen    Suspension .. 650 milliGRAM(s) Oral every 6 hours PRN Temp greater or equal to 38C (100.4F), Mild Pain (1 - 3)  dextrose 40% Gel 15 Gram(s) Oral once PRN Blood Glucose LESS THAN 70 milliGRAM(s)/deciliter  glucagon  Injectable 1 milliGRAM(s) IntraMuscular once PRN Glucose LESS THAN 70 milligrams/deciliter  sodium chloride 0.9% lock flush 10 milliLiter(s) IV Push every 1 hour PRN Pre/post blood products, medications, blood draw, and to maintain line patency      RADIOLOGY/OTHER STUDIES: Patient Discussed on Morning Rounds with Dr. Castro.    Primary Diagnosis: COVID Pneumonia    SUBJECTIVE: Patient seen and examined at bedside.    Interval Events: LIZABETH    OBJECTIVE:  Vitals: ICU Vital Signs Last 24 Hrs  T(C): 37.3 (28 Apr 2020 08:36), Max: 37.3 (28 Apr 2020 08:36)  T(F): 99.2 (28 Apr 2020 08:36), Max: 99.2 (28 Apr 2020 08:36)  HR: 87 (28 Apr 2020 10:00) (74 - 104)  BP: 115/55 (28 Apr 2020 10:00) (87/50 - 136/64)  BP(mean): 78 (28 Apr 2020 10:00) (64 - 99)  ABP: --  ABP(mean): --  RR: 30 (28 Apr 2020 10:00) (17 - 43)  SpO2: 95% (28 Apr 2020 10:00) (91% - 99%)    I&O:  I&O's Detail    27 Apr 2020 07:01  -  28 Apr 2020 07:00  --------------------------------------------------------  IN:    Enteral Tube Flush: 190 mL    IV PiggyBack: 250 mL    ns in tub fed  [id number]: 570 mL  Total IN: 1010 mL    OUT:    Stool: 200 mL    Voided: 475 mL  Total OUT: 675 mL    Total NET: 335 mL    28 Apr 2020 07:01  -  28 Apr 2020 10:38  --------------------------------------------------------  IN:    ns in tub fed  [id number]: 90 mL  Total IN: 90 mL    OUT:    Voided: 200 mL  Total OUT: 200 mL    Total NET: -110 mL    VENTILATOR SETTINGS:    PHYSICAL EXAM:  General: No acute distress  Neuro: A&O to person (baseline), no focal neuro deficits   CV: RRR  Pulm: CTA B/L, no diminished breath sounds, no wheezes/rales/rhonchi, no increased work of breathing or accessory muscle use  Gastrointestinal: soft, NT/ND; active BSx4 quadrants. + PEG.  : no suprapubic tenderness, no CVA tenderness  Extremities: WWP; no edema, clubbing or cyanosis  Vascular: 2+ radial, DP/PT pulses B/L  Skin: no rashes  LABS:                         8.2    5.20  )-----------( 206      ( 28 Apr 2020 04:22 )             26.9   04-28    147<H>  |  109<H>  |  19  ----------------------------<  118<H>  4.0   |  30  |  0.47<L>    Ca    8.7      28 Apr 2020 04:22  Phos  2.1     04-28  Mg     1.7     04-28    TPro  5.9<L>  /  Alb  2.9<L>  /  TBili  0.2  /  DBili  x   /  AST  22  /  ALT  15  /  AlkPhos  52  04-28    Ferritin, Serum: 143 ng/mL (04-28-20 @ 04:22)  D-Dimer Assay, Quantitative: 269 ng/mL DDU (04-28-20 @ 04:22)    CAPILLARY BLOOD GLUCOSE    POCT Blood Glucose.: 117 mg/dL (28 Apr 2020 05:26)  POCT Blood Glucose.: 104 mg/dL (27 Apr 2020 22:18)  POCT Blood Glucose.: 118 mg/dL (27 Apr 2020 16:27)  POCT Blood Glucose.: 127 mg/dL (27 Apr 2020 10:48)     MEDICATIONS  (STANDING):  apixaban 5 milliGRAM(s) Oral every 12 hours  chlorhexidine 2% Cloths 1 Application(s) Topical <User Schedule>  ciprofloxacin     Tablet 750 milliGRAM(s) Oral every 12 hours  dextrose 5%. 1000 milliLiter(s) (50 mL/Hr) IV Continuous <Continuous>  dextrose 50% Injectable 12.5 Gram(s) IV Push once  dextrose 50% Injectable 25 Gram(s) IV Push once  dextrose 50% Injectable 25 Gram(s) IV Push once  glycopyrrolate Injectable 0.2 milliGRAM(s) IV Push every 6 hours  insulin lispro (HumaLOG) corrective regimen sliding scale   SubCutaneous every 6 hours  lactobacillus acidophilus 1 Tablet(s) Oral daily  meropenem  IVPB 1000 milliGRAM(s) IV Intermittent every 8 hours  pantoprazole   Suspension 40 milliGRAM(s) Oral daily  scopolamine   Patch 1 Patch Transdermal every 72 hours    MEDICATIONS  (PRN):  acetaminophen    Suspension .. 650 milliGRAM(s) Oral every 6 hours PRN Temp greater or equal to 38C (100.4F), Mild Pain (1 - 3)  dextrose 40% Gel 15 Gram(s) Oral once PRN Blood Glucose LESS THAN 70 milliGRAM(s)/deciliter  glucagon  Injectable 1 milliGRAM(s) IntraMuscular once PRN Glucose LESS THAN 70 milligrams/deciliter  sodium chloride 0.9% lock flush 10 milliLiter(s) IV Push every 1 hour PRN Pre/post blood products, medications, blood draw, and to maintain line patency      RADIOLOGY/OTHER STUDIES:

## 2020-04-28 NOTE — OCCUPATIONAL THERAPY INITIAL EVALUATION ADULT - LEVEL OF INDEPENDENCE, REHAB EVAL
JOLANTA- pt received OOB in cardiac chair TBD- pt received OOB in cardiac chair; As per PT - patient requires assist x 2

## 2020-04-28 NOTE — OCCUPATIONAL THERAPY INITIAL EVALUATION ADULT - ADDITIONAL COMMENTS
Pt lives in a group home but more recently was at rehab facility. Pt lives in a group home but more recently was at rehab facility. Pt states she was able to perform bed-to-wheelchair transfer with assist, prior and required assistance for all BADLs

## 2020-04-29 LAB
ALBUMIN SERPL ELPH-MCNC: 2.8 G/DL — LOW (ref 3.3–5)
ALP SERPL-CCNC: 46 U/L — SIGNIFICANT CHANGE UP (ref 40–120)
ALT FLD-CCNC: 13 U/L — SIGNIFICANT CHANGE UP (ref 10–45)
ANION GAP SERPL CALC-SCNC: 6 MMOL/L — SIGNIFICANT CHANGE UP (ref 5–17)
AST SERPL-CCNC: 20 U/L — SIGNIFICANT CHANGE UP (ref 10–40)
BASOPHILS # BLD AUTO: 0.03 K/UL — SIGNIFICANT CHANGE UP (ref 0–0.2)
BASOPHILS NFR BLD AUTO: 0.7 % — SIGNIFICANT CHANGE UP (ref 0–2)
BILIRUB SERPL-MCNC: 0.2 MG/DL — SIGNIFICANT CHANGE UP (ref 0.2–1.2)
BUN SERPL-MCNC: 19 MG/DL — SIGNIFICANT CHANGE UP (ref 7–23)
CALCIUM SERPL-MCNC: 8.9 MG/DL — SIGNIFICANT CHANGE UP (ref 8.4–10.5)
CHLORIDE SERPL-SCNC: 110 MMOL/L — HIGH (ref 96–108)
CO2 SERPL-SCNC: 32 MMOL/L — HIGH (ref 22–31)
CREAT SERPL-MCNC: 0.43 MG/DL — LOW (ref 0.5–1.3)
CRP SERPL-MCNC: 1.08 MG/DL — HIGH (ref 0–0.4)
D DIMER BLD IA.RAPID-MCNC: 216 NG/ML DDU — SIGNIFICANT CHANGE UP
EOSINOPHIL # BLD AUTO: 0.31 K/UL — SIGNIFICANT CHANGE UP (ref 0–0.5)
EOSINOPHIL NFR BLD AUTO: 7.3 % — HIGH (ref 0–6)
FERRITIN SERPL-MCNC: 111 NG/ML — SIGNIFICANT CHANGE UP (ref 15–150)
GLUCOSE SERPL-MCNC: 131 MG/DL — HIGH (ref 70–99)
HCT VFR BLD CALC: 26.1 % — LOW (ref 34.5–45)
HGB BLD-MCNC: 7.9 G/DL — LOW (ref 11.5–15.5)
IMM GRANULOCYTES NFR BLD AUTO: 1.2 % — SIGNIFICANT CHANGE UP (ref 0–1.5)
LYMPHOCYTES # BLD AUTO: 1.33 K/UL — SIGNIFICANT CHANGE UP (ref 1–3.3)
LYMPHOCYTES # BLD AUTO: 31.3 % — SIGNIFICANT CHANGE UP (ref 13–44)
MAGNESIUM SERPL-MCNC: 1.6 MG/DL — SIGNIFICANT CHANGE UP (ref 1.6–2.6)
MCHC RBC-ENTMCNC: 30 PG — SIGNIFICANT CHANGE UP (ref 27–34)
MCHC RBC-ENTMCNC: 30.3 GM/DL — LOW (ref 32–36)
MCV RBC AUTO: 99.2 FL — SIGNIFICANT CHANGE UP (ref 80–100)
MONOCYTES # BLD AUTO: 0.47 K/UL — SIGNIFICANT CHANGE UP (ref 0–0.9)
MONOCYTES NFR BLD AUTO: 11.1 % — SIGNIFICANT CHANGE UP (ref 2–14)
NEUTROPHILS # BLD AUTO: 2.06 K/UL — SIGNIFICANT CHANGE UP (ref 1.8–7.4)
NEUTROPHILS NFR BLD AUTO: 48.4 % — SIGNIFICANT CHANGE UP (ref 43–77)
NRBC # BLD: 0 /100 WBCS — SIGNIFICANT CHANGE UP (ref 0–0)
PHOSPHATE SERPL-MCNC: 1.7 MG/DL — LOW (ref 2.5–4.5)
PLATELET # BLD AUTO: 220 K/UL — SIGNIFICANT CHANGE UP (ref 150–400)
POTASSIUM SERPL-MCNC: 4 MMOL/L — SIGNIFICANT CHANGE UP (ref 3.5–5.3)
POTASSIUM SERPL-SCNC: 4 MMOL/L — SIGNIFICANT CHANGE UP (ref 3.5–5.3)
PROT SERPL-MCNC: 5.7 G/DL — LOW (ref 6–8.3)
RBC # BLD: 2.63 M/UL — LOW (ref 3.8–5.2)
RBC # FLD: 14.2 % — SIGNIFICANT CHANGE UP (ref 10.3–14.5)
SARS-COV-2 RNA SPEC QL NAA+PROBE: SIGNIFICANT CHANGE UP
SODIUM SERPL-SCNC: 148 MMOL/L — HIGH (ref 135–145)
WBC # BLD: 4.25 K/UL — SIGNIFICANT CHANGE UP (ref 3.8–10.5)
WBC # FLD AUTO: 4.25 K/UL — SIGNIFICANT CHANGE UP (ref 3.8–10.5)

## 2020-04-29 PROCEDURE — 99232 SBSQ HOSP IP/OBS MODERATE 35: CPT

## 2020-04-29 PROCEDURE — 99291 CRITICAL CARE FIRST HOUR: CPT | Mod: CS

## 2020-04-29 RX ORDER — SODIUM,POTASSIUM PHOSPHATES 278-250MG
1 POWDER IN PACKET (EA) ORAL ONCE
Refills: 0 | Status: COMPLETED | OUTPATIENT
Start: 2020-04-29 | End: 2020-04-29

## 2020-04-29 RX ORDER — MAGNESIUM SULFATE 500 MG/ML
4 VIAL (ML) INJECTION ONCE
Refills: 0 | Status: COMPLETED | OUTPATIENT
Start: 2020-04-29 | End: 2020-04-29

## 2020-04-29 RX ADMIN — Medication 1 TABLET(S): at 12:11

## 2020-04-29 RX ADMIN — Medication 750 MILLIGRAM(S): at 05:54

## 2020-04-29 RX ADMIN — PANTOPRAZOLE SODIUM 40 MILLIGRAM(S): 20 TABLET, DELAYED RELEASE ORAL at 12:11

## 2020-04-29 RX ADMIN — APIXABAN 5 MILLIGRAM(S): 2.5 TABLET, FILM COATED ORAL at 22:28

## 2020-04-29 RX ADMIN — Medication 100 GRAM(S): at 13:49

## 2020-04-29 RX ADMIN — SCOPALAMINE 1 PATCH: 1 PATCH, EXTENDED RELEASE TRANSDERMAL at 12:12

## 2020-04-29 RX ADMIN — SCOPALAMINE 1 PATCH: 1 PATCH, EXTENDED RELEASE TRANSDERMAL at 19:00

## 2020-04-29 RX ADMIN — Medication 750 MILLIGRAM(S): at 17:52

## 2020-04-29 RX ADMIN — ROBINUL 0.2 MILLIGRAM(S): 0.2 INJECTION INTRAMUSCULAR; INTRAVENOUS at 17:52

## 2020-04-29 RX ADMIN — Medication 1 PACKET(S): at 12:11

## 2020-04-29 RX ADMIN — APIXABAN 5 MILLIGRAM(S): 2.5 TABLET, FILM COATED ORAL at 12:10

## 2020-04-29 RX ADMIN — CHLORHEXIDINE GLUCONATE 1 APPLICATION(S): 213 SOLUTION TOPICAL at 05:54

## 2020-04-29 RX ADMIN — ROBINUL 0.2 MILLIGRAM(S): 0.2 INJECTION INTRAMUSCULAR; INTRAVENOUS at 22:28

## 2020-04-29 RX ADMIN — ROBINUL 0.2 MILLIGRAM(S): 0.2 INJECTION INTRAMUSCULAR; INTRAVENOUS at 12:11

## 2020-04-29 RX ADMIN — SCOPALAMINE 1 PATCH: 1 PATCH, EXTENDED RELEASE TRANSDERMAL at 05:54

## 2020-04-29 RX ADMIN — ROBINUL 0.2 MILLIGRAM(S): 0.2 INJECTION INTRAMUSCULAR; INTRAVENOUS at 05:54

## 2020-04-29 NOTE — PROGRESS NOTE ADULT - SUBJECTIVE AND OBJECTIVE BOX
INTERVAL HPI/OVERNIGHT EVENTS: No fevers.    ROS: UTO      ANTIBIOTICS/RELEVANT:    MEDICATIONS  (STANDING):  apixaban 5 milliGRAM(s) Oral every 12 hours  chlorhexidine 2% Cloths 1 Application(s) Topical <User Schedule>  ciprofloxacin     Tablet 750 milliGRAM(s) Oral every 12 hours  dextrose 5%. 1000 milliLiter(s) (50 mL/Hr) IV Continuous <Continuous>  dextrose 50% Injectable 12.5 Gram(s) IV Push once  dextrose 50% Injectable 25 Gram(s) IV Push once  dextrose 50% Injectable 25 Gram(s) IV Push once  glycopyrrolate Injectable 0.2 milliGRAM(s) IV Push every 6 hours  insulin lispro (HumaLOG) corrective regimen sliding scale   SubCutaneous every 6 hours  lactobacillus acidophilus 1 Tablet(s) Oral daily  pantoprazole   Suspension 40 milliGRAM(s) Oral daily  scopolamine   Patch 1 Patch Transdermal every 72 hours    MEDICATIONS  (PRN):  acetaminophen    Suspension .. 650 milliGRAM(s) Oral every 6 hours PRN Temp greater or equal to 38C (100.4F), Mild Pain (1 - 3)  dextrose 40% Gel 15 Gram(s) Oral once PRN Blood Glucose LESS THAN 70 milliGRAM(s)/deciliter  glucagon  Injectable 1 milliGRAM(s) IntraMuscular once PRN Glucose LESS THAN 70 milligrams/deciliter  sodium chloride 0.9% lock flush 10 milliLiter(s) IV Push every 1 hour PRN Pre/post blood products, medications, blood draw, and to maintain line patency        Vital Signs Last 24 Hrs  T(C): 36.4 (29 Apr 2020 10:33), Max: 37.6 (29 Apr 2020 01:00)  T(F): 97.5 (29 Apr 2020 10:33), Max: 99.6 (29 Apr 2020 01:00)  HR: 84 (29 Apr 2020 15:00) (62 - 100)  BP: 129/60 (29 Apr 2020 15:00) (97/50 - 157/60)  BP(mean): 87 (29 Apr 2020 15:00) (69 - 100)  RR: 25 (29 Apr 2020 15:00) (14 - 37)  SpO2: 87% (29 Apr 2020 15:00) (87% - 98%)    PHYSICAL EXAM:  deferred       LABS:                        7.9    4.25  )-----------( 220      ( 29 Apr 2020 04:46 )             26.1     04-29    148<H>  |  110<H>  |  19  ----------------------------<  131<H>  4.0   |  32<H>  |  0.43<L>    Ca    8.9      29 Apr 2020 04:46  Phos  1.7     04-29  Mg     1.6     04-29    TPro  5.7<L>  /  Alb  2.8<L>  /  TBili  0.2  /  DBili  x   /  AST  20  /  ALT  13  /  AlkPhos  46  04-29          MICROBIOLOGY: reviewed    RADIOLOGY & ADDITIONAL STUDIES: reviewed

## 2020-04-29 NOTE — PROGRESS NOTE ADULT - SUBJECTIVE AND OBJECTIVE BOX
Admit Date: 04-03-20  Length of Stay: 26d    74yFemale    Reason for admission to ICU:  Patient is in acute hypoxic respiratory distress requiring intubation and sedation. Imaging c/w ARDS. Admitted to MICU for further observation and management.    INTERVAL HPI/OVERNIGHT EVENTS:      MEDICATIONS  (STANDING):  apixaban 5 milliGRAM(s) Oral every 12 hours  chlorhexidine 2% Cloths 1 Application(s) Topical <User Schedule>  ciprofloxacin     Tablet 750 milliGRAM(s) Oral every 12 hours  dextrose 5%. 1000 milliLiter(s) (50 mL/Hr) IV Continuous <Continuous>  dextrose 50% Injectable 12.5 Gram(s) IV Push once  dextrose 50% Injectable 25 Gram(s) IV Push once  dextrose 50% Injectable 25 Gram(s) IV Push once  glycopyrrolate Injectable 0.2 milliGRAM(s) IV Push every 6 hours  insulin lispro (HumaLOG) corrective regimen sliding scale   SubCutaneous every 6 hours  lactobacillus acidophilus 1 Tablet(s) Oral daily  pantoprazole   Suspension 40 milliGRAM(s) Oral daily  potassium phosphate / sodium phosphate powder 1 Packet(s) Oral once  scopolamine   Patch 1 Patch Transdermal every 72 hours    MEDICATIONS  (PRN):  acetaminophen    Suspension .. 650 milliGRAM(s) Oral every 6 hours PRN Temp greater or equal to 38C (100.4F), Mild Pain (1 - 3)  dextrose 40% Gel 15 Gram(s) Oral once PRN Blood Glucose LESS THAN 70 milliGRAM(s)/deciliter  glucagon  Injectable 1 milliGRAM(s) IntraMuscular once PRN Glucose LESS THAN 70 milligrams/deciliter  sodium chloride 0.9% lock flush 10 milliLiter(s) IV Push every 1 hour PRN Pre/post blood products, medications, blood draw, and to maintain line patency      Vitals:  Vital Signs Last 24 Hrs  T(C): 36.4 (29 Apr 2020 10:33), Max: 37.6 (29 Apr 2020 01:00)  T(F): 97.5 (29 Apr 2020 10:33), Max: 99.6 (29 Apr 2020 01:00)  HR: 93 (29 Apr 2020 10:33) (62 - 108)  BP: 129/63 (29 Apr 2020 10:00) (97/50 - 157/60)  BP(mean): 90 (29 Apr 2020 10:00) (69 - 100)  RR: 35 (29 Apr 2020 10:33) (14 - 37)  SpO2: 95% (29 Apr 2020 10:33) (92% - 98%)    Vitals (Invasive):  ABP: --  CVP(mm Hg): --  CVP(cm H2O): --  CO: --  CI: --  PA: --  PA(mean): --  PCWP: --  LA: --  RA: --  SVR: --  SVRI: --  PVR: --  PVRI: --    I&O's Summary    28 Apr 2020 07:01  -  29 Apr 2020 07:00  --------------------------------------------------------  IN: 740 mL / OUT: 650 mL / NET: 90 mL        Physical Exam:  HEENT:     + NCAT  + EOMI  - Conjunctival edema   - Icterus   - Thrush   - ETT  - NGT/OGT  Neck:         + FROM    + JVD     - Nodes     - Masses    + Mid-line trachea   - Tracheostomy  Chest:         - Sternal click  - Sternal drainage  - Chest tubes  - SubQ emphysema  Lungs:          + CTA   - Rhonchi    - Rales    - Wheezing     - Decreased BS   - Dullness R L  Cardiac:       + S1 + S2    + RRR   - Irregular   - S3  - S4    - Murmurs   - Rub   - Hamman’s sign   Abdomen:    + BS     + Soft    + Non-tender     - Distended    - Organomegaly  - PEG  Extremities:   - Cyanosis U/L   - Clubbing  U/L  - LE/UE Edema   + Capillary refill    + Pulses   Neuro:        - Awake   -  Alert   - Confused   - Lethargic   + Sedated   + Paralyzed  - Generalized weakness  Skin:        - Rashes    - Erythema   + Normal incisions   + IV sites intact  - Sacral decubitus    Labs:  COVID:  COVID-19 PCR: NotDetec (04-29-20 @ 05:05)                          7.9    4.25  )-----------( 220      ( 29 Apr 2020 04:46 )             26.1     04-29    148<H>  |  110<H>  |  19  ----------------------------<  131<H>  4.0   |  32<H>  |  0.43<L>    Ca    8.9      29 Apr 2020 04:46  Phos  1.7     04-29  Mg     1.6     04-29    TPro  5.7<L>  /  Alb  2.8<L>  /  TBili  0.2  /  DBili  x   /  AST  20  /  ALT  13  /  AlkPhos  46  04-29          Culture - Blood (collected 04-27-20 @ 11:59)  Source: .Blood Blood  Preliminary Report (04-28-20 @ 12:01):    No growth at 1 day.      COVID related labs:  29 Apr 2020 04:46  D-dimer:  216  Calcitonin:  x  CRP:  x  LDH:  x  Lactate,Blood:  x  CK:  x  Troponin I:  x  Troponin T:  x  Troponin HS:  x  Ferritin, Serum: 111  BNP:  x      Blood Gases:  (ARTERIAL):  04-19-20 @ 04:28  pH 7.44 / pCO2 58 / pO2 75 / HCO3 38  Total CO2 --  FiO2 --  Oxygen Saturation 95  Total Hemoglobin, Calculated --  Hematocrit, Calculated --  Oxygen Content --  Blood Gas Arterial - Calcium, Ionized --  Blood Gas Arterial - Chloride --  Blood Gas Arterial - Glucose --  Blood Gas Arterial - Potassium --  Blood Gas Arterial - Sodium --  Blood Gas Arterial - Creatinine --  Base Excess, Arterial 12.5    (VENOUS):      Radiology:  CT chest results consistent with multifocal bilateral ground glass opacities  ID consulted, follow up recommendations    Plan:  - Hydroxychloroquine 800 mG Q24H x 1 then 400 mG Q24H x 4 doses    Daily Labs:  CBC/CMP/Mg/Phos/CRP/PT, PTT & INR/D-Dimer/LDH/Ferritin/CK/Troponins    Every 3 day labs:   ESR/Tcell subset/D-dimer/LDH/Ferritin/CK/Troponins/Coags  COVID isolation protocol    Pulmonary:  Sepsis 2/2 COVID+  On arrival meeting /4 SIRS criteria ( ) with pulmonary source  WBC with % neutrophils, % lymphocytes, lactate  Status Post:  Vancomycin + Piperacillin / Tazobactam in ED, L NS bolus  Hold further antibiotics at present    Follow Ups:  T-Cell Subset  Urinalysis  Urine Cultures  Blood Cultures  Obtain HIV consent for testing when able    ARDS:  as above  Current vent settings:    Low threshold to prone patient in order to improve oxygenation and secretion drainage  Continue to closely monitor patient    Cardiovascular:  Hypotension  Patient intubated and requiring pressor support in the setting of sedation  Norepinephrine gtt, Fentanyl gtt, Propofol gtt  Goal MAP >65    Neurology:  Sedation  Patient currently on Fentanyl gtt and Propofol gtt  RASS goal -4    Gastrointestinal:  Prophylaxis  - Pantoprazole 40mg IV daily while intubated    Genitourinary:  Decreased urine output  Quintero in place  Continue to monitor strict I&O  Follow Up: Urinalysis, Creatinine    ID:  Sepsis 2/2 COVID+  as above  Follow up: Urinalysis,Blood Culture, Urine Culture  Consulted ID, follow recommandations    Diet: NPO  Electrolytes: Replete K<4, Mg<2    Code: FULL CODE    Disposition: Patient requires continued monitoring in MICU Admit Date: 04-03-20  Length of Stay: 26d    74yFemale    Reason for admission to ICU:  Patient is in acute hypoxic respiratory distress requiring intubation and sedation. Imaging c/w ARDS. Admitted to MICU for further observation and management.    INTERVAL HPI/OVERNIGHT EVENTS:  O2 2L nc : NAD    MEDICATIONS  (STANDING):  apixaban 5 milliGRAM(s) Oral every 12 hours  chlorhexidine 2% Cloths 1 Application(s) Topical <User Schedule>  ciprofloxacin     Tablet 750 milliGRAM(s) Oral every 12 hours  dextrose 5%. 1000 milliLiter(s) (50 mL/Hr) IV Continuous <Continuous>  dextrose 50% Injectable 12.5 Gram(s) IV Push once  dextrose 50% Injectable 25 Gram(s) IV Push once  dextrose 50% Injectable 25 Gram(s) IV Push once  glycopyrrolate Injectable 0.2 milliGRAM(s) IV Push every 6 hours  insulin lispro (HumaLOG) corrective regimen sliding scale   SubCutaneous every 6 hours  lactobacillus acidophilus 1 Tablet(s) Oral daily  pantoprazole   Suspension 40 milliGRAM(s) Oral daily  potassium phosphate / sodium phosphate powder 1 Packet(s) Oral once  scopolamine   Patch 1 Patch Transdermal every 72 hours    MEDICATIONS  (PRN):  acetaminophen    Suspension .. 650 milliGRAM(s) Oral every 6 hours PRN Temp greater or equal to 38C (100.4F), Mild Pain (1 - 3)  dextrose 40% Gel 15 Gram(s) Oral once PRN Blood Glucose LESS THAN 70 milliGRAM(s)/deciliter  glucagon  Injectable 1 milliGRAM(s) IntraMuscular once PRN Glucose LESS THAN 70 milligrams/deciliter  sodium chloride 0.9% lock flush 10 milliLiter(s) IV Push every 1 hour PRN Pre/post blood products, medications, blood draw, and to maintain line patency      Vitals:  Vital Signs Last 24 Hrs  T(C): 36.4 (29 Apr 2020 10:33), Max: 37.6 (29 Apr 2020 01:00)  T(F): 97.5 (29 Apr 2020 10:33), Max: 99.6 (29 Apr 2020 01:00)  HR: 93 (29 Apr 2020 10:33) (62 - 108)  BP: 129/63 (29 Apr 2020 10:00) (97/50 - 157/60)  BP(mean): 90 (29 Apr 2020 10:00) (69 - 100)  RR: 35 (29 Apr 2020 10:33) (14 - 37)  SpO2: 95% (29 Apr 2020 10:33) (92% - 98%)      I&O's Summary    28 Apr 2020 07:01  -  29 Apr 2020 07:00  --------------------------------------------------------  IN: 740 mL / OUT: 650 mL / NET: 90 mL    free H2O 350cc Q6 hrs    Physical exam as per attending note      Labs:  COVID:  COVID-19 PCR: NotDetec (04-29-20 @ 05:05)                          7.9    4.25  )-----------( 220      ( 29 Apr 2020 04:46 )             26.1     04-29    148<H>  |  110<H>  |  19  ----------------------------<  131<H>  4.0   |  32<H>  |  0.43<L>    Ca    8.9      29 Apr 2020 04:46  Phos  1.7     04-29  Mg     1.6     04-29    TPro  5.7<L>  /  Alb  2.8<L>  /  TBili  0.2  /  DBili  x   /  AST  20  /  ALT  13  /  AlkPhos  46  04-29          Culture - Blood (collected 04-27-20 @ 11:59)  Source: .Blood Blood  Preliminary Report (04-28-20 @ 12:01):    No growth at 1 day.      COVID related labs:  29 Apr 2020 04:46  D-dimer:  216  Calcitonin:  x  CRP:  x  LDH:  x  Lactate,Blood:  x  CK:  x  Troponin I:  x  Troponin T:  x  Troponin HS:  x  Ferritin, Serum: 111  BNP:  x      Blood Gases:  (ARTERIAL):  04-19-20 @ 04:28  pH 7.44 / pCO2 58 / pO2 75 / HCO3 38  Total CO2 --  FiO2 --  Oxygen Saturation 95  Total Hemoglobin, Calculated --  Hematocrit, Calculated --  Oxygen Content --  Blood Gas Arterial - Calcium, Ionized --  Blood Gas Arterial - Chloride --  Blood Gas Arterial - Glucose --  Blood Gas Arterial - Potassium --  Blood Gas Arterial - Sodium --  Blood Gas Arterial - Creatinine --  Base Excess, Arterial 12.5    (VENOUS):      Radiology:  CT chest results consistent with multifocal bilateral ground glass opacities  ID consulted, follow up recommendations    Plan:  - Hydroxychloroquine 800 mG Q24H x 1 then 400 mG Q24H x 4 doses    Daily Labs:  CBC/CMP/Mg/Phos/CRP/PT, PTT & INR/D-Dimer/LDH/Ferritin/CK/Troponins    Every 3 day labs:   ESR/Tcell subset/D-dimer/LDH/Ferritin/CK/Troponins/Coags  COVID isolation protocol    Pulmonary:  Sepsis 2/2 COVID+  On arrival meeting /4 SIRS criteria ( ) with pulmonary source  WBC with % neutrophils, % lymphocytes, lactate  Status Post:  Vancomycin + Piperacillin / Tazobactam in ED, L NS bolus  Hold further antibiotics at present    Follow Ups:  T-Cell Subset  Urinalysis  Urine Cultures  Blood Cultures  Obtain HIV consent for testing when able    ARDS:  as above  O2 2L n/c    Low threshold to prone patient in order to improve oxygenation and secretion drainage  Continue to closely monitor patient    Cardiovascular:  Hypotension resolved    Neurology:  alert and awake off of sedation    Gastrointestinal:  Prophylaxis  - Pantoprazole 40mg PO    Genitourinary:  Decreased urine output  Quintero in place  Continue to monitor strict I&O  Follow Up: Urinalysis, Creatinine    ID:  Sepsis 2/2 COVID+  as above  Follow up: Urinalysis,Blood Culture, Urine Culture  Consulted ID, follow recommandations  signing off Cipro PO x 2weeks (4/27-5/10) CBC&CMP weekly @ Group home  Diet: NPO  Electrolytes: Replete K<4, Mg<2  Mg 1.6/ Phos 1.7 repleted    Code: FULL CODE    Disposition: Patient requires continued monitoring in MICU

## 2020-04-29 NOTE — PROGRESS NOTE ADULT - ASSESSMENT
73 yo female with CP/MR, T spine fusion, presented from nursing facility, COVID-19+ on 4/3, course c/b Pseudomonas VAP (initial isolate 4/3 was wild-type (pansusceptible)) for which the patient has been on a prolonged course of Zosyn since 4/3 now with Pseudomonas BSI 4/25 and pneumonia--sputum cx 4/20 showing isolate has evolved resistance to Zosyn.  - f/u surveillance bcx 4/27--ngtd  - continue Cipro 750mg via PEG [id number] 2 week course through 5/10    Please reconsult with ?    ID Team 1

## 2020-04-30 LAB
ALBUMIN SERPL ELPH-MCNC: 3.4 G/DL — SIGNIFICANT CHANGE UP (ref 3.3–5)
ALP SERPL-CCNC: 55 U/L — SIGNIFICANT CHANGE UP (ref 40–120)
ALT FLD-CCNC: 16 U/L — SIGNIFICANT CHANGE UP (ref 10–45)
ANION GAP SERPL CALC-SCNC: 8 MMOL/L — SIGNIFICANT CHANGE UP (ref 5–17)
AST SERPL-CCNC: 25 U/L — SIGNIFICANT CHANGE UP (ref 10–40)
BASOPHILS # BLD AUTO: 0.04 K/UL — SIGNIFICANT CHANGE UP (ref 0–0.2)
BASOPHILS NFR BLD AUTO: 0.8 % — SIGNIFICANT CHANGE UP (ref 0–2)
BILIRUB SERPL-MCNC: 0.2 MG/DL — SIGNIFICANT CHANGE UP (ref 0.2–1.2)
BUN SERPL-MCNC: 17 MG/DL — SIGNIFICANT CHANGE UP (ref 7–23)
CALCIUM SERPL-MCNC: 8.9 MG/DL — SIGNIFICANT CHANGE UP (ref 8.4–10.5)
CHLORIDE SERPL-SCNC: 105 MMOL/L — SIGNIFICANT CHANGE UP (ref 96–108)
CO2 SERPL-SCNC: 30 MMOL/L — SIGNIFICANT CHANGE UP (ref 22–31)
CREAT SERPL-MCNC: 0.48 MG/DL — LOW (ref 0.5–1.3)
CRP SERPL-MCNC: 0.83 MG/DL — HIGH (ref 0–0.4)
CULTURE RESULTS: SIGNIFICANT CHANGE UP
CULTURE RESULTS: SIGNIFICANT CHANGE UP
D DIMER BLD IA.RAPID-MCNC: 210 NG/ML DDU — SIGNIFICANT CHANGE UP
EOSINOPHIL # BLD AUTO: 0.34 K/UL — SIGNIFICANT CHANGE UP (ref 0–0.5)
EOSINOPHIL NFR BLD AUTO: 6.7 % — HIGH (ref 0–6)
FERRITIN SERPL-MCNC: 109 NG/ML — SIGNIFICANT CHANGE UP (ref 15–150)
GLUCOSE SERPL-MCNC: 122 MG/DL — HIGH (ref 70–99)
HCT VFR BLD CALC: 28.1 % — LOW (ref 34.5–45)
HGB BLD-MCNC: 8.6 G/DL — LOW (ref 11.5–15.5)
IMM GRANULOCYTES NFR BLD AUTO: 1.2 % — SIGNIFICANT CHANGE UP (ref 0–1.5)
LYMPHOCYTES # BLD AUTO: 1.56 K/UL — SIGNIFICANT CHANGE UP (ref 1–3.3)
LYMPHOCYTES # BLD AUTO: 30.5 % — SIGNIFICANT CHANGE UP (ref 13–44)
MAGNESIUM SERPL-MCNC: 2.5 MG/DL — SIGNIFICANT CHANGE UP (ref 1.6–2.6)
MCHC RBC-ENTMCNC: 29.8 PG — SIGNIFICANT CHANGE UP (ref 27–34)
MCHC RBC-ENTMCNC: 30.6 GM/DL — LOW (ref 32–36)
MCV RBC AUTO: 97.2 FL — SIGNIFICANT CHANGE UP (ref 80–100)
MONOCYTES # BLD AUTO: 0.47 K/UL — SIGNIFICANT CHANGE UP (ref 0–0.9)
MONOCYTES NFR BLD AUTO: 9.2 % — SIGNIFICANT CHANGE UP (ref 2–14)
NEUTROPHILS # BLD AUTO: 2.64 K/UL — SIGNIFICANT CHANGE UP (ref 1.8–7.4)
NEUTROPHILS NFR BLD AUTO: 51.6 % — SIGNIFICANT CHANGE UP (ref 43–77)
NRBC # BLD: 0 /100 WBCS — SIGNIFICANT CHANGE UP (ref 0–0)
ORGANISM # SPEC MICROSCOPIC CNT: SIGNIFICANT CHANGE UP
PHOSPHATE SERPL-MCNC: 2.4 MG/DL — LOW (ref 2.5–4.5)
PLATELET # BLD AUTO: 285 K/UL — SIGNIFICANT CHANGE UP (ref 150–400)
POTASSIUM SERPL-MCNC: 4.3 MMOL/L — SIGNIFICANT CHANGE UP (ref 3.5–5.3)
POTASSIUM SERPL-SCNC: 4.3 MMOL/L — SIGNIFICANT CHANGE UP (ref 3.5–5.3)
PROT SERPL-MCNC: 6.6 G/DL — SIGNIFICANT CHANGE UP (ref 6–8.3)
RBC # BLD: 2.89 M/UL — LOW (ref 3.8–5.2)
RBC # FLD: 14.5 % — SIGNIFICANT CHANGE UP (ref 10.3–14.5)
SARS-COV-2 RNA SPEC QL NAA+PROBE: DETECTED
SODIUM SERPL-SCNC: 143 MMOL/L — SIGNIFICANT CHANGE UP (ref 135–145)
SPECIMEN SOURCE: SIGNIFICANT CHANGE UP
SPECIMEN SOURCE: SIGNIFICANT CHANGE UP
WBC # BLD: 5.11 K/UL — SIGNIFICANT CHANGE UP (ref 3.8–10.5)
WBC # FLD AUTO: 5.11 K/UL — SIGNIFICANT CHANGE UP (ref 3.8–10.5)

## 2020-04-30 PROCEDURE — 99291 CRITICAL CARE FIRST HOUR: CPT | Mod: CS

## 2020-04-30 RX ADMIN — ROBINUL 0.2 MILLIGRAM(S): 0.2 INJECTION INTRAMUSCULAR; INTRAVENOUS at 05:47

## 2020-04-30 RX ADMIN — ROBINUL 0.2 MILLIGRAM(S): 0.2 INJECTION INTRAMUSCULAR; INTRAVENOUS at 22:39

## 2020-04-30 RX ADMIN — CHLORHEXIDINE GLUCONATE 1 APPLICATION(S): 213 SOLUTION TOPICAL at 05:47

## 2020-04-30 RX ADMIN — SCOPALAMINE 1 PATCH: 1 PATCH, EXTENDED RELEASE TRANSDERMAL at 19:00

## 2020-04-30 RX ADMIN — APIXABAN 5 MILLIGRAM(S): 2.5 TABLET, FILM COATED ORAL at 12:23

## 2020-04-30 RX ADMIN — ROBINUL 0.2 MILLIGRAM(S): 0.2 INJECTION INTRAMUSCULAR; INTRAVENOUS at 17:32

## 2020-04-30 RX ADMIN — SCOPALAMINE 1 PATCH: 1 PATCH, EXTENDED RELEASE TRANSDERMAL at 07:27

## 2020-04-30 RX ADMIN — ROBINUL 0.2 MILLIGRAM(S): 0.2 INJECTION INTRAMUSCULAR; INTRAVENOUS at 12:23

## 2020-04-30 RX ADMIN — PANTOPRAZOLE SODIUM 40 MILLIGRAM(S): 20 TABLET, DELAYED RELEASE ORAL at 12:24

## 2020-04-30 RX ADMIN — Medication 1 TABLET(S): at 12:24

## 2020-04-30 RX ADMIN — Medication 750 MILLIGRAM(S): at 05:47

## 2020-04-30 RX ADMIN — APIXABAN 5 MILLIGRAM(S): 2.5 TABLET, FILM COATED ORAL at 22:39

## 2020-04-30 RX ADMIN — Medication 750 MILLIGRAM(S): at 17:31

## 2020-04-30 NOTE — CHART NOTE - NSCHARTNOTEFT_GEN_A_CORE
Admitting Diagnosis:   Patient is a 74y old  Female who presents with a chief complaint of Covid (30 Apr 2020 10:55)      PAST MEDICAL & SURGICAL HISTORY:  HTN (hypertension)  Spastic quadriplegia  DVT (deep venous thrombosis)  PE (pulmonary thromboembolism)  GERD (gastroesophageal reflux disease)  Dysphagia  Mental retardation  CP (cerebral palsy)  PEG tube malfunction      Current Nutrition Order:  Jevity 1.5 30ml/hr x24hrs +1PS/day + 250ml q6hrs     PO Intake: Good (%) [   ]  Fair (50-75%) [   ] Poor (<25%) [   ]  NA, TF    GI Issues:   Rectal Tube: 150ml 4/20, 450ml 4/29, 100ml 4/28, 250 ml 4/27   +BM moderate loose 4/30   +Order for lactobacillus 4/23  Antibiotic use note      Pain:  none per flow sheets     Skin Integrity:  1+ generalized edema  No pressure ulcers       Labs:   04-30    143  |  105  |  17  ----------------------------<  122<H>  4.3   |  30  |  0.48<L>    Ca    8.9      30 Apr 2020 03:49  Phos  2.4     04-30  Mg     2.5     04-30    TPro  6.6  /  Alb  3.4  /  TBili  0.2  /  DBili  x   /  AST  25  /  ALT  16  /  AlkPhos  55  04-30    CAPILLARY BLOOD GLUCOSE      POCT Blood Glucose.: 121 mg/dL (30 Apr 2020 16:00)  POCT Blood Glucose.: 133 mg/dL (30 Apr 2020 11:55)  POCT Blood Glucose.: 121 mg/dL (30 Apr 2020 05:58)  POCT Blood Glucose.: 116 mg/dL (29 Apr 2020 22:50)  POCT Blood Glucose.: 125 mg/dL (29 Apr 2020 16:45)      Medications:  MEDICATIONS  (STANDING):  apixaban 5 milliGRAM(s) Oral every 12 hours  chlorhexidine 2% Cloths 1 Application(s) Topical <User Schedule>  ciprofloxacin     Tablet 750 milliGRAM(s) Oral every 12 hours  dextrose 5%. 1000 milliLiter(s) (50 mL/Hr) IV Continuous <Continuous>  dextrose 50% Injectable 12.5 Gram(s) IV Push once  dextrose 50% Injectable 25 Gram(s) IV Push once  dextrose 50% Injectable 25 Gram(s) IV Push once  glycopyrrolate Injectable 0.2 milliGRAM(s) IV Push every 6 hours  insulin lispro (HumaLOG) corrective regimen sliding scale   SubCutaneous every 6 hours  lactobacillus acidophilus 1 Tablet(s) Oral daily  pantoprazole   Suspension 40 milliGRAM(s) Oral daily  scopolamine   Patch 1 Patch Transdermal every 72 hours    MEDICATIONS  (PRN):  acetaminophen    Suspension .. 650 milliGRAM(s) Oral every 6 hours PRN Temp greater or equal to 38C (100.4F), Mild Pain (1 - 3)  dextrose 40% Gel 15 Gram(s) Oral once PRN Blood Glucose LESS THAN 70 milliGRAM(s)/deciliter  glucagon  Injectable 1 milliGRAM(s) IntraMuscular once PRN Glucose LESS THAN 70 milligrams/deciliter  sodium chloride 0.9% lock flush 10 milliLiter(s) IV Push every 1 hour PRN Pre/post blood products, medications, blood draw, and to maintain line patency          4'7'' IBW 90 pounds +-10%  (4/3) 122 pounds   (4/6) 117 pounds  (4/8) 116.6 pounds   (4/12) 116.6 pounds   %KKG=845% BMI=27.19  Based on most recent EMR wt    Nutrition Focused Physical Exam: Completed [   ]  Not Pertinent [   ]- NA d/t COVID     Estimated energy needs:   IBW used to calculate energy needs due to pt's current body weight exceeding 120% of IBW   Needs adjusted for age based on hypermetabolic state 2/2 viral infection  1025-1230kcal/day (25-30kcal/kg)  49-57g protein/day (1.2-1.4g pro/kg)  Fluids per team     Subjective:   74F with hx of CP, MR, hx of DVT/PE, GERD, chronic PEG, HTN 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, presented to [facility] for AHRF, intubated, sedated on propofol (5 ml/hr providing 132kcal) tx to [facility] for ICU care 4/3. Pt COVID positive with coronavirus pna, acute hypoxemic respiratory failure/ARDS/VAP. Pt extubated to NRB 4/4, however later re-intubated. Pt extuabted 4/6 to NRB. Pt reintuabted 4/8. Extubated for third time 4/12, doing well on non-rebreather and Switched to nasal cannula 4/13. Noted need for negative test prior to returning to group home, continues to test positive for COVID19 with most recent positive 4/29, currently Awaiting repeat COVID swab sent 4/30.   Noted PEG dislodged on 4/23, Replaced by Sx - Tube study shows no leak, SX signed off. Current TF order for Jevity 1.5 goal rate 30ml/hr x24hrs +1PS/day, provides 720ml, 1180kcal, 61gm prot, 547ml water. Spoke with RN who reports pt tolerating feeds at goal rate without issues .  Allergic to caffeine, high acid, and chocolate, tomatoes- noted in EMR.  Please see below for nutritions recommendations. Recs made with team.     Previous Nutrition Diagnosis: Inadequate Energy Intake Etiology RT inability to meet est needs 2/2 acute critical status Signs/Symptoms AEB NPO diet meeting 0% EER.   RESOLVED - ordered for EN   Previous Nutrition Diagnosis: Increased nutrient needs RT increased demand for energy and protein AEB hypermetabolic state 2/2 viral infection (COVID19+)   ON GOING AT THIS TIME   Goal/Expected Outcome: Pt will meet at least 75% of nutrient needs     Recommendations:  1. Current TF order for Jevity 1.5 goal rate 30ml/hr x24hrs +1PS/day, provides 720ml, 1180kcal, 61gm prot, 547ml water - meeting EER.  >> Given pump shortage d/t COVID 19, if bolus feeds feasible, consider use of 4 cans Jevity 1.2/day, will provide ~948ml, 1140kcal, 53gm prot, 765ml water.   2. Monitor tolerance, GI distress- continue with probiotic use PRN, should stooling remain at increased rate consider fiber agent.   3. Maintain ASP Precautions at all times.   4. Monitor Skin, Wts, GOC.  5. Monitor Labs; monitor BMP, CBC, glucose, lytes - replete PRN, trend renal indices, LFTs.   6. RD to remain available for additional nutrition interventions as needed.     Education: NA   Risk Level: High [ X  ] Moderate [   ] Low [   ].

## 2020-04-30 NOTE — PROGRESS NOTE ADULT - ASSESSMENT
Assessment:      Neurology      Cardiovascular      Pulmonary:      Renal      Endocrine      Fluids/Lytes/Nutrition      Gastroenterology:      Hematology      Infectious Disease:    Lines/Tubes    Musculoskeletal/Myopathy    Pain    Code: FULL CODE Assessment:      Neurology  alert and awake off of sedation    Cardiovascular  -Hypotension resolved    Pulmonary:  - Hydroxychloroquine 800 mG Q24H x 1 then 400 mG Q24H x 4 doses    #Sepsis 2/2 COVID+  -s/p Vanc/Zosyn  -holding Abx    Follow Ups:  T-Cell Subset  Urinalysis  Urine Cultures  Blood Cultures  Obtain HIV consent for testing when able  -c/w HFNC 30/40    Low threshold to prone patient in order to improve oxygenation and secretion drainage  Continue to closely monitor patient    Renal  Decreased urine output  Quintero in place  Continue to monitor strict I&O  Follow Up: Urinalysis, Creatinine    Endocrine      Fluids/Lytes/Nutrition      Gastroenterology:  -Pantoprazole 40mg PO for prophylaxis    Hematology      Infectious Disease:  - Hydroxychloroquine 800 mG Q24H x 1 then 400 mG Q24H x 4 doses  -Sepsis 2/2 COVID+  as above  Follow up: Urinalysis,Blood Culture, Urine Culture  Consulted ID, follow recommandations  signing off Cipro PO x 2weeks (4/27-5/10) CBC&CMP weekly @ Group home  Diet: NPO  Electrolytes: Replete K<4, Mg<2  Mg 1.6/ Phos 1.7 repleted    Lines/Tubes    Musculoskeletal/Myopathy    Pain    Code: FULL CODE    Disposition: Patient requires continued monitoring in MICU    Code: FULL CODE Assessment:  74F w/PMH CP, DVT/PE, GERD, chronic PEG, HTN, T spine fusion admitted to ICU due to acute hypoxic respiratory failure, s/p extubation x3 (last performed 4/13). Patient has improved in oxygenation requirements (94% on 4LNC), yet continues to test positive for COVID 19 (most recent positive 4/29), preventing her from returning to her home long term care facility, which is equipped to handle her numerous comorbidities.     Neurology  Alert and awake off of sedation  Baseline mental status    Cardiovascular  -Hypotension resolved    Pulmonary:  #Sepsis 2/2 COVID+  -s/p Vanc/Zosyn  -s/p hydroxychloroquine  -BCx 4/27 ngtd  Obtain HIV consent for testing when able (?)  -c/w HFNC 30/40    Low threshold to prone patient in order to improve oxygenation and secretion drainage    Renal  Continue to monitor strict I&O    Endocrine  -ISS    Fluids/Lytes/Nutrition  Electrolytes: Replete K<4, Mg<2    Gastroenterology:  -Pantoprazole 40mg PO for prophylaxis    Hematology  Eliquis 5 BID    Infectious Disease:  #Sepsis 2/2 COVID+  -BCx 4/27 ngtd  -c/w Cipro 750mg BID PO via PEG x2 weeks (4/27-5/10) for bacteremia  -s/p Zosyn starting 4/3, but 4/23 revealed resistance to Zosyn and switched to Cipro  -s/p hydroxychloroquine  -ID recommends CBC & CMP weekly @ Group home    Lines/Tubes    Musculoskeletal/Myopathy  -c/w OOB    Pain    Code: FULL CODE    Disposition: Patient requires continued monitoring in MICU    Code: FULL CODE Assessment:  74F w/PMH cerebral palsy, DVT/PE, GERD, chronic PEG, HTN, T spine fusion admitted to ICU due to acute hypoxic respiratory failure, s/p extubation x3 (last performed 4/13). Patient has improved in oxygenation requirements (94% on 4LNC), yet continues to test positive for COVID 19 (most recent positive 4/29), preventing her from returning to her home long term care facility, which is equipped to handle her numerous comorbidities.     Neurology  Alert and awake off of sedation    #Intellectual disability with cerebral palsy and functional quadriplegia  -At baseline mental status    Cardiovascular  -NSR. No active issues    Pulmonary:  #Acute hypoxic respiratory failure 2/2 COVID-19 pneumonia + VAP  -VAP resolved, s/p vanc/zosyn.   -s/p hydroxychloroquine for COVID-19  -c/w 4L NC  -C/w glycopyrrolate and scopolamine for secretions    #History of pulmonary embolism  -Continue with Eliquis    Renal  -No active issues   -BUN/Cr: 17/0.48  -Continue to monitor I/O    Endocrine  -No active issues  -mISS    Gastroenterology:  No active issues.  -Pantoprazole 40mg PO for prophylaxis    Hematology  continue with eliquis 5 BID for DVT/PE    #Normocytic anemia  -H/H stable at 8.6/21.   -Consider iron studies. No signs of symptoms of active bleeding.     Infectious Disease:  #COVID-19 pneumonia  -Resolving. s/p hydroxychloroquine  -4L NC  -Awaiting repeat COVID swab sent 4/30    #Pseudomonas bacteremia  -C/w cipro 750mg q12h  -Blood cx NGTD 4/27    #VAP  -Resolved.     Lines/Tubes:    Musculoskeletal/Myopathy  -No active issue. c/w OOB    Fluids/Lytes/Nutrition  -Fluids: None  -Electrolytes: Replete K<4, Mg<2  -Nutrition: Jevity    Pain  -No active issues.     Code: FULL CODE  Dis[facility]

## 2020-04-30 NOTE — PROGRESS NOTE ADULT - SUBJECTIVE AND OBJECTIVE BOX
INTERVAL HPI/OVERNIGHT EVENTS:    LIZABETH o/n.    SUBJECTIVE: Patient seen and examined at bedside.       VITAL SIGNS:  ICU Vital Signs Last 24 Hrs  T(C): 36.9 (30 Apr 2020 09:32), Max: 36.9 (30 Apr 2020 09:32)  T(F): 98.4 (30 Apr 2020 09:32), Max: 98.4 (30 Apr 2020 09:32)  HR: 89 (30 Apr 2020 10:07) (65 - 104)  BP: 146/78 (30 Apr 2020 10:07) (99/55 - 146/78)  BP(mean): 101 (30 Apr 2020 10:07) (72 - 101)  ABP: --  ABP(mean): --  RR: 28 (30 Apr 2020 10:07) (19 - 35)  SpO2: 100% (30 Apr 2020 10:07) (87% - 100%)      Plateau pressure:   P/F ratio:     04-29 @ 07:01  -  04-30 @ 07:00  --------------------------------------------------------  IN: 1770 mL / OUT: 900 mL / NET: 870 mL    04-30 @ 07:01  -  04-30 @ 10:55  --------------------------------------------------------  IN: 90 mL / OUT: 150 mL / NET: -60 mL      CAPILLARY BLOOD GLUCOSE      POCT Blood Glucose.: 121 mg/dL (30 Apr 2020 05:58)    ECG:    PHYSICAL EXAM:    General:   HEENT:   Neck:   Respiratory:   Cardiovascular:   Abdomen:   Extremities:  Neurological:    MEDICATIONS:  MEDICATIONS  (STANDING):  apixaban 5 milliGRAM(s) Oral every 12 hours  chlorhexidine 2% Cloths 1 Application(s) Topical <User Schedule>  ciprofloxacin     Tablet 750 milliGRAM(s) Oral every 12 hours  dextrose 5%. 1000 milliLiter(s) (50 mL/Hr) IV Continuous <Continuous>  dextrose 50% Injectable 12.5 Gram(s) IV Push once  dextrose 50% Injectable 25 Gram(s) IV Push once  dextrose 50% Injectable 25 Gram(s) IV Push once  glycopyrrolate Injectable 0.2 milliGRAM(s) IV Push every 6 hours  insulin lispro (HumaLOG) corrective regimen sliding scale   SubCutaneous every 6 hours  lactobacillus acidophilus 1 Tablet(s) Oral daily  pantoprazole   Suspension 40 milliGRAM(s) Oral daily  scopolamine   Patch 1 Patch Transdermal every 72 hours    MEDICATIONS  (PRN):  acetaminophen    Suspension .. 650 milliGRAM(s) Oral every 6 hours PRN Temp greater or equal to 38C (100.4F), Mild Pain (1 - 3)  dextrose 40% Gel 15 Gram(s) Oral once PRN Blood Glucose LESS THAN 70 milliGRAM(s)/deciliter  glucagon  Injectable 1 milliGRAM(s) IntraMuscular once PRN Glucose LESS THAN 70 milligrams/deciliter  sodium chloride 0.9% lock flush 10 milliLiter(s) IV Push every 1 hour PRN Pre/post blood products, medications, blood draw, and to maintain line patency      ALLERGIES:  Allergies    ace inhibitors (Anaphylaxis)  caffeine (Rash)  chocolate (Rash)  high acid (Rash)  Tomatoes (Hives)    Intolerances        LABS:                        8.6    5.11  )-----------( 285      ( 30 Apr 2020 03:49 )             28.1     04-30    143  |  105  |  17  ----------------------------<  122<H>  4.3   |  30  |  0.48<L>    Ca    8.9      30 Apr 2020 03:49  Phos  2.4     04-30  Mg     2.5     04-30    TPro  6.6  /  Alb  3.4  /  TBili  0.2  /  DBili  x   /  AST  25  /  ALT  16  /  AlkPhos  55  04-30          RADIOLOGY & ADDITIONAL TESTS: Reviewed. INTERVAL HPI/OVERNIGHT EVENTS: LIZABETH o/n.    SUBJECTIVE: Patient seen and examined at bedside.       VITAL SIGNS:  ICU Vital Signs Last 24 Hrs  T(C): 36.9 (30 Apr 2020 09:32), Max: 36.9 (30 Apr 2020 09:32)  T(F): 98.4 (30 Apr 2020 09:32), Max: 98.4 (30 Apr 2020 09:32)  HR: 89 (30 Apr 2020 10:07) (65 - 104)  BP: 146/78 (30 Apr 2020 10:07) (99/55 - 146/78)  BP(mean): 101 (30 Apr 2020 10:07) (72 - 101)  ABP: --  ABP(mean): --  RR: 28 (30 Apr 2020 10:07) (19 - 35)  SpO2: 100% (30 Apr 2020 10:07) (87% - 100%)      Plateau pressure:   P/F ratio:     04-29 @ 07:01  -  04-30 @ 07:00  --------------------------------------------------------  IN: 1770 mL / OUT: 900 mL / NET: 870 mL    04-30 @ 07:01  -  04-30 @ 10:55  --------------------------------------------------------  IN: 90 mL / OUT: 150 mL / NET: -60 mL      CAPILLARY BLOOD GLUCOSE      POCT Blood Glucose.: 121 mg/dL (30 Apr 2020 05:58)    PHYSICAL EXAM:  Physical exam per attending.     ALLERGIES:  ace inhibitors (Anaphylaxis)  caffeine (Rash)  chocolate (Rash)  high acid (Rash)  Tomatoes (Hives)    MEDICATIONS:  MEDICATIONS  (STANDING):  apixaban 5 milliGRAM(s) Oral every 12 hours  chlorhexidine 2% Cloths 1 Application(s) Topical <User Schedule>  ciprofloxacin     Tablet 750 milliGRAM(s) Oral every 12 hours  dextrose 5%. 1000 milliLiter(s) (50 mL/Hr) IV Continuous <Continuous>  dextrose 50% Injectable 12.5 Gram(s) IV Push once  dextrose 50% Injectable 25 Gram(s) IV Push once  dextrose 50% Injectable 25 Gram(s) IV Push once  glycopyrrolate Injectable 0.2 milliGRAM(s) IV Push every 6 hours  insulin lispro (HumaLOG) corrective regimen sliding scale   SubCutaneous every 6 hours  lactobacillus acidophilus 1 Tablet(s) Oral daily  pantoprazole   Suspension 40 milliGRAM(s) Oral daily  scopolamine   Patch 1 Patch Transdermal every 72 hours    MEDICATIONS  (PRN):  acetaminophen    Suspension .. 650 milliGRAM(s) Oral every 6 hours PRN Temp greater or equal to 38C (100.4F), Mild Pain (1 - 3)  dextrose 40% Gel 15 Gram(s) Oral once PRN Blood Glucose LESS THAN 70 milliGRAM(s)/deciliter  glucagon  Injectable 1 milliGRAM(s) IntraMuscular once PRN Glucose LESS THAN 70 milligrams/deciliter  sodium chloride 0.9% lock flush 10 milliLiter(s) IV Push every 1 hour PRN Pre/post blood products, medications, blood draw, and to maintain line patency    LABS:                        8.6    5.11  )-----------( 285      ( 30 Apr 2020 03:49 )             28.1     04-30    143  |  105  |  17  ----------------------------<  122<H>  4.3   |  30  |  0.48<L>    Ca    8.9      30 Apr 2020 03:49  Phos  2.4     04-30  Mg     2.5     04-30    TPro  6.6  /  Alb  3.4  /  TBili  0.2  /  DBili  x   /  AST  25  /  ALT  16  /  AlkPhos  55  04-30      RADIOLOGY & ADDITIONAL TESTS: Reviewed.

## 2020-05-01 LAB
ALBUMIN SERPL ELPH-MCNC: 3 G/DL — LOW (ref 3.3–5)
ALP SERPL-CCNC: 58 U/L — SIGNIFICANT CHANGE UP (ref 40–120)
ALT FLD-CCNC: 15 U/L — SIGNIFICANT CHANGE UP (ref 10–45)
ANION GAP SERPL CALC-SCNC: 11 MMOL/L — SIGNIFICANT CHANGE UP (ref 5–17)
AST SERPL-CCNC: 28 U/L — SIGNIFICANT CHANGE UP (ref 10–40)
BASOPHILS # BLD AUTO: 0.03 K/UL — SIGNIFICANT CHANGE UP (ref 0–0.2)
BASOPHILS NFR BLD AUTO: 0.5 % — SIGNIFICANT CHANGE UP (ref 0–2)
BILIRUB SERPL-MCNC: 0.3 MG/DL — SIGNIFICANT CHANGE UP (ref 0.2–1.2)
BUN SERPL-MCNC: 17 MG/DL — SIGNIFICANT CHANGE UP (ref 7–23)
CALCIUM SERPL-MCNC: 9.3 MG/DL — SIGNIFICANT CHANGE UP (ref 8.4–10.5)
CHLORIDE SERPL-SCNC: 103 MMOL/L — SIGNIFICANT CHANGE UP (ref 96–108)
CO2 SERPL-SCNC: 27 MMOL/L — SIGNIFICANT CHANGE UP (ref 22–31)
CREAT SERPL-MCNC: 0.5 MG/DL — SIGNIFICANT CHANGE UP (ref 0.5–1.3)
CRP SERPL-MCNC: 0.62 MG/DL — HIGH (ref 0–0.4)
D DIMER BLD IA.RAPID-MCNC: 197 NG/ML DDU — SIGNIFICANT CHANGE UP
EOSINOPHIL # BLD AUTO: 0.23 K/UL — SIGNIFICANT CHANGE UP (ref 0–0.5)
EOSINOPHIL NFR BLD AUTO: 3.7 % — SIGNIFICANT CHANGE UP (ref 0–6)
FERRITIN SERPL-MCNC: 93 NG/ML — SIGNIFICANT CHANGE UP (ref 15–150)
GLUCOSE SERPL-MCNC: 101 MG/DL — HIGH (ref 70–99)
HCT VFR BLD CALC: 30.3 % — LOW (ref 34.5–45)
HGB BLD-MCNC: 9.2 G/DL — LOW (ref 11.5–15.5)
IMM GRANULOCYTES NFR BLD AUTO: 1 % — SIGNIFICANT CHANGE UP (ref 0–1.5)
LYMPHOCYTES # BLD AUTO: 1.78 K/UL — SIGNIFICANT CHANGE UP (ref 1–3.3)
LYMPHOCYTES # BLD AUTO: 28.4 % — SIGNIFICANT CHANGE UP (ref 13–44)
MAGNESIUM SERPL-MCNC: 1.9 MG/DL — SIGNIFICANT CHANGE UP (ref 1.6–2.6)
MCHC RBC-ENTMCNC: 29.9 PG — SIGNIFICANT CHANGE UP (ref 27–34)
MCHC RBC-ENTMCNC: 30.4 GM/DL — LOW (ref 32–36)
MCV RBC AUTO: 98.4 FL — SIGNIFICANT CHANGE UP (ref 80–100)
MONOCYTES # BLD AUTO: 0.56 K/UL — SIGNIFICANT CHANGE UP (ref 0–0.9)
MONOCYTES NFR BLD AUTO: 8.9 % — SIGNIFICANT CHANGE UP (ref 2–14)
NEUTROPHILS # BLD AUTO: 3.61 K/UL — SIGNIFICANT CHANGE UP (ref 1.8–7.4)
NEUTROPHILS NFR BLD AUTO: 57.5 % — SIGNIFICANT CHANGE UP (ref 43–77)
NRBC # BLD: 0 /100 WBCS — SIGNIFICANT CHANGE UP (ref 0–0)
PHOSPHATE SERPL-MCNC: 2.8 MG/DL — SIGNIFICANT CHANGE UP (ref 2.5–4.5)
PLATELET # BLD AUTO: 356 K/UL — SIGNIFICANT CHANGE UP (ref 150–400)
POTASSIUM SERPL-MCNC: 4.6 MMOL/L — SIGNIFICANT CHANGE UP (ref 3.5–5.3)
POTASSIUM SERPL-SCNC: 4.6 MMOL/L — SIGNIFICANT CHANGE UP (ref 3.5–5.3)
PROCALCITONIN SERPL-MCNC: 0.05 NG/ML — SIGNIFICANT CHANGE UP (ref 0.02–0.1)
PROT SERPL-MCNC: 6.3 G/DL — SIGNIFICANT CHANGE UP (ref 6–8.3)
RBC # BLD: 3.08 M/UL — LOW (ref 3.8–5.2)
RBC # FLD: 14.9 % — HIGH (ref 10.3–14.5)
SARS-COV-2 RNA SPEC QL NAA+PROBE: SIGNIFICANT CHANGE UP
SODIUM SERPL-SCNC: 141 MMOL/L — SIGNIFICANT CHANGE UP (ref 135–145)
WBC # BLD: 6.27 K/UL — SIGNIFICANT CHANGE UP (ref 3.8–10.5)
WBC # FLD AUTO: 6.27 K/UL — SIGNIFICANT CHANGE UP (ref 3.8–10.5)

## 2020-05-01 PROCEDURE — 99291 CRITICAL CARE FIRST HOUR: CPT | Mod: CS

## 2020-05-01 RX ORDER — PSYLLIUM SEED (WITH DEXTROSE)
1 POWDER (GRAM) ORAL DAILY
Refills: 0 | Status: DISCONTINUED | OUTPATIENT
Start: 2020-05-01 | End: 2020-05-08

## 2020-05-01 RX ADMIN — APIXABAN 5 MILLIGRAM(S): 2.5 TABLET, FILM COATED ORAL at 23:08

## 2020-05-01 RX ADMIN — Medication 1 TABLET(S): at 11:33

## 2020-05-01 RX ADMIN — ROBINUL 0.2 MILLIGRAM(S): 0.2 INJECTION INTRAMUSCULAR; INTRAVENOUS at 17:33

## 2020-05-01 RX ADMIN — ROBINUL 0.2 MILLIGRAM(S): 0.2 INJECTION INTRAMUSCULAR; INTRAVENOUS at 06:51

## 2020-05-01 RX ADMIN — ROBINUL 0.2 MILLIGRAM(S): 0.2 INJECTION INTRAMUSCULAR; INTRAVENOUS at 11:33

## 2020-05-01 RX ADMIN — SCOPALAMINE 1 PATCH: 1 PATCH, EXTENDED RELEASE TRANSDERMAL at 06:50

## 2020-05-01 RX ADMIN — CHLORHEXIDINE GLUCONATE 1 APPLICATION(S): 213 SOLUTION TOPICAL at 06:50

## 2020-05-01 RX ADMIN — Medication 750 MILLIGRAM(S): at 06:51

## 2020-05-01 RX ADMIN — PANTOPRAZOLE SODIUM 40 MILLIGRAM(S): 20 TABLET, DELAYED RELEASE ORAL at 11:33

## 2020-05-01 RX ADMIN — Medication 750 MILLIGRAM(S): at 17:32

## 2020-05-01 RX ADMIN — APIXABAN 5 MILLIGRAM(S): 2.5 TABLET, FILM COATED ORAL at 11:33

## 2020-05-01 RX ADMIN — SCOPALAMINE 1 PATCH: 1 PATCH, EXTENDED RELEASE TRANSDERMAL at 19:00

## 2020-05-01 RX ADMIN — ROBINUL 0.2 MILLIGRAM(S): 0.2 INJECTION INTRAMUSCULAR; INTRAVENOUS at 23:08

## 2020-05-01 NOTE — PROGRESS NOTE ADULT - ASSESSMENT
Assessment:  74F w/PMH cerebral palsy, DVT/PE, GERD, chronic PEG, HTN, T spine fusion admitted to ICU due to acute hypoxic respiratory failure, s/p extubation x3 (last performed 4/13). Patient has improved in oxygenation requirements (94% on 4LNC), yet continues to test positive for COVID 19 (most recent positive 4/29), preventing her from returning to her home long term care facility, which is equipped to handle her numerous comorbidities.     Neurology  Alert and awake off of sedation    #Intellectual disability with cerebral palsy and functional quadriplegia  -At baseline mental status    Cardiovascular  -NSR. No active issues    Pulmonary:  #Acute hypoxic respiratory failure 2/2 COVID-19 pneumonia + VAP  -VAP resolved, s/p vanc/zosyn.   -s/p hydroxychloroquine for COVID-19  -c/w 4L NC  -C/w glycopyrrolate and scopolamine for secretions    #History of pulmonary embolism  -Continue with Eliquis    Renal  -No active issues   -BUN/Cr: 17/0.48  -Continue to monitor I/O    Endocrine  -No active issues  -mISS    Gastroenterology:  No active issues.  -Pantoprazole 40mg PO for prophylaxis    Hematology  continue with eliquis 5 BID for DVT/PE    #Normocytic anemia  -H/H stable at 8.6/21.   -Consider iron studies. No signs of symptoms of active bleeding.     Infectious Disease:  #COVID-19 pneumonia  -Resolving. s/p hydroxychloroquine  -4L NC  -Awaiting repeat COVID swab sent 4/30    #Pseudomonas bacteremia  -C/w cipro 750mg q12h  -Blood cx NGTD 4/27    #VAP  -Resolved.     Lines/Tubes:    Musculoskeletal/Myopathy  -No active issue. c/w OOB    Fluids/Lytes/Nutrition  -Fluids: None  -Electrolytes: Replete K<4, Mg<2  -Nutrition: Jevity    Pain  -No active issues.     Code: FULL CODE  Disposition: MICU pending discharge to assisted Assessment:  74F w/PMH cerebral palsy, DVT/PE, GERD, chronic PEG, HTN, T spine fusion admitted to ICU due to acute hypoxic respiratory failure, s/p extubation x3 (last performed 4/13). Patient has improved in oxygenation requirements (currently 96% on 2LNC), yet continues to test positive for COVID 19 (most recent positive 4/29), preventing her from returning to her home long term care facility.    Neurology  Alert and awake off of sedation    #Intellectual disability with cerebral palsy and functional quadriplegia  -At baseline mental status    Cardiovascular  -NSR. No active issues    Pulmonary:  #Acute hypoxic respiratory failure 2/2 COVID-19 pneumonia + VAP  -VAP resolved, s/p vanc/zosyn.   -s/p hydroxychloroquine for COVID-19  -c/w 2L NC  -C/w glycopyrrolate and scopolamine for secretions    #History of pulmonary embolism  -Continue with Eliquis    Renal  -No active issues   -BUN/Cr: 17/0.5  -Continue to monitor I/O    Endocrine  -No active issues  -mISS    Gastroenterology:  No active issues.  -Pantoprazole 40mg PO for prophylaxis    Hematology  continue with eliquis 5 BID for DVT/PE    #Normocytic anemia  -H/H stable at 9.2/30.3  -Consider iron studies. No signs of symptoms of active bleeding.     Infectious Disease:  #COVID-19 pneumonia  -Resolving. s/p hydroxychloroquine  -2L NC  -Awaiting repeat COVID swab sent 5/1    #Pseudomonas bacteremia  -C/w cipro 750mg q12h  -Blood cx NGTD 4/27    #VAP  -Resolved.     Lines/Tubes:  Rectal tube    Musculoskeletal/Myopathy  -No active issue. c/w OOB    Fluids/Lytes/Nutrition  -Fluids: None  -Electrolytes: Replete K<4, Mg<2  -Nutrition: Jevity    Pain  -No active issues.     Code: FULL CODE  Disposition: MICU pending discharge to [facility]

## 2020-05-01 NOTE — PROGRESS NOTE ADULT - SUBJECTIVE AND OBJECTIVE BOX
INTERVAL HPI/OVERNIGHT EVENTS:  Patient was seen and examined at bedside.    VITAL SIGNS:  T(F): 98.8 (05-01-20 @ 05:02)  HR: 66 (05-01-20 @ 04:00)  BP: 87/44 (05-01-20 @ 04:00)  RR: 19 (05-01-20 @ 04:00)  SpO2: 96% (05-01-20 @ 04:00)  Wt(kg): --    PHYSICAL EXAM:  Exam per attending note*    MEDICATIONS  (STANDING):  apixaban 5 milliGRAM(s) Oral every 12 hours  chlorhexidine 2% Cloths 1 Application(s) Topical <User Schedule>  ciprofloxacin     Tablet 750 milliGRAM(s) Oral every 12 hours  dextrose 5%. 1000 milliLiter(s) (50 mL/Hr) IV Continuous <Continuous>  dextrose 50% Injectable 12.5 Gram(s) IV Push once  dextrose 50% Injectable 25 Gram(s) IV Push once  dextrose 50% Injectable 25 Gram(s) IV Push once  glycopyrrolate Injectable 0.2 milliGRAM(s) IV Push every 6 hours  insulin lispro (HumaLOG) corrective regimen sliding scale   SubCutaneous every 6 hours  lactobacillus acidophilus 1 Tablet(s) Oral daily  pantoprazole   Suspension 40 milliGRAM(s) Oral daily  scopolamine   Patch 1 Patch Transdermal every 72 hours    MEDICATIONS  (PRN):  acetaminophen    Suspension .. 650 milliGRAM(s) Oral every 6 hours PRN Temp greater or equal to 38C (100.4F), Mild Pain (1 - 3)  dextrose 40% Gel 15 Gram(s) Oral once PRN Blood Glucose LESS THAN 70 milliGRAM(s)/deciliter  glucagon  Injectable 1 milliGRAM(s) IntraMuscular once PRN Glucose LESS THAN 70 milligrams/deciliter  sodium chloride 0.9% lock flush 10 milliLiter(s) IV Push every 1 hour PRN Pre/post blood products, medications, blood draw, and to maintain line patency      Allergies    ace inhibitors (Anaphylaxis)  caffeine (Rash)  chocolate (Rash)  high acid (Rash)  Tomatoes (Hives)    Intolerances        LABS:                        9.2    6.27  )-----------( 356      ( 01 May 2020 06:13 )             30.3     04-30    143  |  105  |  17  ----------------------------<  122<H>  4.3   |  30  |  0.48<L>    Ca    8.9      30 Apr 2020 03:49  Phos  2.4     04-30  Mg     2.5     04-30    TPro  6.6  /  Alb  3.4  /  TBili  0.2  /  DBili  x   /  AST  25  /  ALT  16  /  AlkPhos  55  04-30

## 2020-05-02 LAB
ALBUMIN SERPL ELPH-MCNC: 3.3 G/DL — SIGNIFICANT CHANGE UP (ref 3.3–5)
ALP SERPL-CCNC: 56 U/L — SIGNIFICANT CHANGE UP (ref 40–120)
ALT FLD-CCNC: 15 U/L — SIGNIFICANT CHANGE UP (ref 10–45)
ANION GAP SERPL CALC-SCNC: 10 MMOL/L — SIGNIFICANT CHANGE UP (ref 5–17)
AST SERPL-CCNC: 26 U/L — SIGNIFICANT CHANGE UP (ref 10–40)
BASOPHILS # BLD AUTO: 0.04 K/UL — SIGNIFICANT CHANGE UP (ref 0–0.2)
BASOPHILS NFR BLD AUTO: 0.7 % — SIGNIFICANT CHANGE UP (ref 0–2)
BILIRUB SERPL-MCNC: 0.3 MG/DL — SIGNIFICANT CHANGE UP (ref 0.2–1.2)
BUN SERPL-MCNC: 12 MG/DL — SIGNIFICANT CHANGE UP (ref 7–23)
CALCIUM SERPL-MCNC: 9.2 MG/DL — SIGNIFICANT CHANGE UP (ref 8.4–10.5)
CHLORIDE SERPL-SCNC: 104 MMOL/L — SIGNIFICANT CHANGE UP (ref 96–108)
CO2 SERPL-SCNC: 28 MMOL/L — SIGNIFICANT CHANGE UP (ref 22–31)
CREAT SERPL-MCNC: 0.5 MG/DL — SIGNIFICANT CHANGE UP (ref 0.5–1.3)
CRP SERPL-MCNC: 0.41 MG/DL — HIGH (ref 0–0.4)
CULTURE RESULTS: SIGNIFICANT CHANGE UP
D DIMER BLD IA.RAPID-MCNC: 279 NG/ML DDU — HIGH
EOSINOPHIL # BLD AUTO: 0.22 K/UL — SIGNIFICANT CHANGE UP (ref 0–0.5)
EOSINOPHIL NFR BLD AUTO: 3.9 % — SIGNIFICANT CHANGE UP (ref 0–6)
FERRITIN SERPL-MCNC: 81 NG/ML — SIGNIFICANT CHANGE UP (ref 15–150)
GLUCOSE SERPL-MCNC: 112 MG/DL — HIGH (ref 70–99)
HCT VFR BLD CALC: 29.8 % — LOW (ref 34.5–45)
HGB BLD-MCNC: 9.2 G/DL — LOW (ref 11.5–15.5)
IMM GRANULOCYTES NFR BLD AUTO: 0.9 % — SIGNIFICANT CHANGE UP (ref 0–1.5)
LYMPHOCYTES # BLD AUTO: 1.28 K/UL — SIGNIFICANT CHANGE UP (ref 1–3.3)
LYMPHOCYTES # BLD AUTO: 22.5 % — SIGNIFICANT CHANGE UP (ref 13–44)
MAGNESIUM SERPL-MCNC: 1.7 MG/DL — SIGNIFICANT CHANGE UP (ref 1.6–2.6)
MCHC RBC-ENTMCNC: 30.3 PG — SIGNIFICANT CHANGE UP (ref 27–34)
MCHC RBC-ENTMCNC: 30.9 GM/DL — LOW (ref 32–36)
MCV RBC AUTO: 98 FL — SIGNIFICANT CHANGE UP (ref 80–100)
MONOCYTES # BLD AUTO: 0.52 K/UL — SIGNIFICANT CHANGE UP (ref 0–0.9)
MONOCYTES NFR BLD AUTO: 9.1 % — SIGNIFICANT CHANGE UP (ref 2–14)
NEUTROPHILS # BLD AUTO: 3.58 K/UL — SIGNIFICANT CHANGE UP (ref 1.8–7.4)
NEUTROPHILS NFR BLD AUTO: 62.9 % — SIGNIFICANT CHANGE UP (ref 43–77)
NRBC # BLD: 0 /100 WBCS — SIGNIFICANT CHANGE UP (ref 0–0)
PHOSPHATE SERPL-MCNC: 3.2 MG/DL — SIGNIFICANT CHANGE UP (ref 2.5–4.5)
PLATELET # BLD AUTO: 372 K/UL — SIGNIFICANT CHANGE UP (ref 150–400)
POTASSIUM SERPL-MCNC: 4.2 MMOL/L — SIGNIFICANT CHANGE UP (ref 3.5–5.3)
POTASSIUM SERPL-SCNC: 4.2 MMOL/L — SIGNIFICANT CHANGE UP (ref 3.5–5.3)
PROT SERPL-MCNC: 6.2 G/DL — SIGNIFICANT CHANGE UP (ref 6–8.3)
RBC # BLD: 3.04 M/UL — LOW (ref 3.8–5.2)
RBC # FLD: 15 % — HIGH (ref 10.3–14.5)
SODIUM SERPL-SCNC: 142 MMOL/L — SIGNIFICANT CHANGE UP (ref 135–145)
SPECIMEN SOURCE: SIGNIFICANT CHANGE UP
WBC # BLD: 5.69 K/UL — SIGNIFICANT CHANGE UP (ref 3.8–10.5)
WBC # FLD AUTO: 5.69 K/UL — SIGNIFICANT CHANGE UP (ref 3.8–10.5)

## 2020-05-02 PROCEDURE — 99291 CRITICAL CARE FIRST HOUR: CPT | Mod: CS

## 2020-05-02 RX ADMIN — PANTOPRAZOLE SODIUM 40 MILLIGRAM(S): 20 TABLET, DELAYED RELEASE ORAL at 13:04

## 2020-05-02 RX ADMIN — ROBINUL 0.2 MILLIGRAM(S): 0.2 INJECTION INTRAMUSCULAR; INTRAVENOUS at 23:47

## 2020-05-02 RX ADMIN — Medication 1 PACKET(S): at 13:04

## 2020-05-02 RX ADMIN — Medication 1 TABLET(S): at 13:04

## 2020-05-02 RX ADMIN — APIXABAN 5 MILLIGRAM(S): 2.5 TABLET, FILM COATED ORAL at 13:05

## 2020-05-02 RX ADMIN — SCOPALAMINE 1 PATCH: 1 PATCH, EXTENDED RELEASE TRANSDERMAL at 07:25

## 2020-05-02 RX ADMIN — Medication 750 MILLIGRAM(S): at 18:47

## 2020-05-02 RX ADMIN — ROBINUL 0.2 MILLIGRAM(S): 0.2 INJECTION INTRAMUSCULAR; INTRAVENOUS at 18:48

## 2020-05-02 RX ADMIN — CHLORHEXIDINE GLUCONATE 1 APPLICATION(S): 213 SOLUTION TOPICAL at 05:47

## 2020-05-02 RX ADMIN — ROBINUL 0.2 MILLIGRAM(S): 0.2 INJECTION INTRAMUSCULAR; INTRAVENOUS at 13:05

## 2020-05-02 RX ADMIN — SCOPALAMINE 1 PATCH: 1 PATCH, EXTENDED RELEASE TRANSDERMAL at 13:04

## 2020-05-02 RX ADMIN — APIXABAN 5 MILLIGRAM(S): 2.5 TABLET, FILM COATED ORAL at 23:47

## 2020-05-02 RX ADMIN — SCOPALAMINE 1 PATCH: 1 PATCH, EXTENDED RELEASE TRANSDERMAL at 13:06

## 2020-05-02 RX ADMIN — Medication 750 MILLIGRAM(S): at 05:47

## 2020-05-02 RX ADMIN — SCOPALAMINE 1 PATCH: 1 PATCH, EXTENDED RELEASE TRANSDERMAL at 18:48

## 2020-05-02 RX ADMIN — ROBINUL 0.2 MILLIGRAM(S): 0.2 INJECTION INTRAMUSCULAR; INTRAVENOUS at 05:46

## 2020-05-02 NOTE — PROGRESS NOTE ADULT - SUBJECTIVE AND OBJECTIVE BOX
Patient discussed with Dr. Castro on morning rounds     Primary Diagnosis: COVID-19    SUBJECTIVE: Patient seen and examined at bedside     Interval Events: No events overnight.     OBJECTIVE:  Vitals: ICU Vital Signs Last 24 Hrs  T(C): 37.1 (02 May 2020 06:00), Max: 37.1 (01 May 2020 14:00)  T(F): 98.8 (02 May 2020 06:00), Max: 98.8 (01 May 2020 14:00)  HR: 95 (02 May 2020 08:00) (60 - 112)  BP: 140/72 (02 May 2020 08:00) (98/61 - 160/74)  BP(mean): 93 (02 May 2020 08:00) (73 - 107)  ABP: --  ABP(mean): --  RR: 31 (02 May 2020 08:00) (18 - 35)  SpO2: 93% (02 May 2020 08:00) (89% - 99%)    I&O:  I&O's Detail    01 May 2020 07:01  -  02 May 2020 07:00  --------------------------------------------------------  IN:    Enteral Tube Flush: 100 mL    Free Water: 1000 mL    ns in tub fed  [id number]: 600 mL  Total IN: 1700 mL    OUT:    Stool: 250 mL    Voided: 600 mL  Total OUT: 850 mL    Total NET: 850 mL    PHYSICAL EXAM: Per attending note     LABS:                         9.2    5.69  )-----------( 372      ( 02 May 2020 05:11 )             29.8   05-02    142  |  104  |  12  ----------------------------<  112<H>  4.2   |  28  |  0.50    Ca    9.2      02 May 2020 05:11  Phos  3.2     05-02  Mg     1.7     05-02    TPro  6.2  /  Alb  3.3  /  TBili  0.3  /  DBili  x   /  AST  26  /  ALT  15  /  AlkPhos  56  05-02    Ferritin, Serum: 81 ng/mL (05-02-20 @ 05:11)  D-Dimer Assay, Quantitative: 279 ng/mL DDU (05-02-20 @ 05:11)    CAPILLARY BLOOD GLUCOSE      POCT Blood Glucose.: 100 mg/dL (02 May 2020 05:52)  POCT Blood Glucose.: 122 mg/dL (01 May 2020 23:13)  POCT Blood Glucose.: 115 mg/dL (01 May 2020 17:40)  POCT Blood Glucose.: 97 mg/dL (01 May 2020 11:41)     MEDICATIONS  (STANDING):  apixaban 5 milliGRAM(s) Oral every 12 hours  chlorhexidine 2% Cloths 1 Application(s) Topical <User Schedule>  ciprofloxacin     Tablet 750 milliGRAM(s) Oral every 12 hours  dextrose 5%. 1000 milliLiter(s) (50 mL/Hr) IV Continuous <Continuous>  dextrose 50% Injectable 12.5 Gram(s) IV Push once  dextrose 50% Injectable 25 Gram(s) IV Push once  dextrose 50% Injectable 25 Gram(s) IV Push once  glycopyrrolate Injectable 0.2 milliGRAM(s) IV Push every 6 hours  insulin lispro (HumaLOG) corrective regimen sliding scale   SubCutaneous every 6 hours  lactobacillus acidophilus 1 Tablet(s) Oral daily  pantoprazole   Suspension 40 milliGRAM(s) Oral daily  psyllium Powder 1 Packet(s) Oral daily  scopolamine   Patch 1 Patch Transdermal every 72 hours    MEDICATIONS  (PRN):  acetaminophen    Suspension .. 650 milliGRAM(s) Oral every 6 hours PRN Temp greater or equal to 38C (100.4F), Mild Pain (1 - 3)  dextrose 40% Gel 15 Gram(s) Oral once PRN Blood Glucose LESS THAN 70 milliGRAM(s)/deciliter  glucagon  Injectable 1 milliGRAM(s) IntraMuscular once PRN Glucose LESS THAN 70 milligrams/deciliter  sodium chloride 0.9% lock flush 10 milliLiter(s) IV Push every 1 hour PRN Pre/post blood products, medications, blood draw, and to maintain line patency    RADIOLOGY/OTHER STUDIES: Patient discussed with Dr. Castro on morning rounds     Primary Diagnosis: COVID-19    SUBJECTIVE: Patient seen and examined at bedside     Interval Events: No events overnight.     OBJECTIVE:  Vitals: ICU Vital Signs Last 24 Hrs  T(C): 37.1 (02 May 2020 06:00), Max: 37.1 (01 May 2020 14:00)  T(F): 98.8 (02 May 2020 06:00), Max: 98.8 (01 May 2020 14:00)  HR: 95 (02 May 2020 08:00) (60 - 112)  BP: 140/72 (02 May 2020 08:00) (98/61 - 160/74)  BP(mean): 93 (02 May 2020 08:00) (73 - 107)  ABP: --  ABP(mean): --  RR: 31 (02 May 2020 08:00) (18 - 35)  SpO2: 93% (02 May 2020 08:00) (89% - 99%)    I&O:  I&O's Detail    01 May 2020 07:01  -  02 May 2020 07:00  --------------------------------------------------------  IN:    Enteral Tube Flush: 100 mL    Free Water: 1000 mL    ns in tub fed  [id number]: 600 mL  Total IN: 1700 mL    OUT:    Stool: 250 mL    Voided: 600 mL  Total OUT: 850 mL    Total NET: 850 mL    PHYSICAL EXAM: Physical Exam Per attending note     LABS:                         9.2    5.69  )-----------( 372      ( 02 May 2020 05:11 )             29.8   05-02    142  |  104  |  12  ----------------------------<  112<H>  4.2   |  28  |  0.50    Ca    9.2      02 May 2020 05:11  Phos  3.2     05-02  Mg     1.7     05-02    TPro  6.2  /  Alb  3.3  /  TBili  0.3  /  DBili  x   /  AST  26  /  ALT  15  /  AlkPhos  56  05-02    Ferritin, Serum: 81 ng/mL (05-02-20 @ 05:11)  D-Dimer Assay, Quantitative: 279 ng/mL DDU (05-02-20 @ 05:11)    CAPILLARY BLOOD GLUCOSE      POCT Blood Glucose.: 100 mg/dL (02 May 2020 05:52)  POCT Blood Glucose.: 122 mg/dL (01 May 2020 23:13)  POCT Blood Glucose.: 115 mg/dL (01 May 2020 17:40)  POCT Blood Glucose.: 97 mg/dL (01 May 2020 11:41)     MEDICATIONS  (STANDING):  apixaban 5 milliGRAM(s) Oral every 12 hours  chlorhexidine 2% Cloths 1 Application(s) Topical <User Schedule>  ciprofloxacin     Tablet 750 milliGRAM(s) Oral every 12 hours  dextrose 5%. 1000 milliLiter(s) (50 mL/Hr) IV Continuous <Continuous>  dextrose 50% Injectable 12.5 Gram(s) IV Push once  dextrose 50% Injectable 25 Gram(s) IV Push once  dextrose 50% Injectable 25 Gram(s) IV Push once  glycopyrrolate Injectable 0.2 milliGRAM(s) IV Push every 6 hours  insulin lispro (HumaLOG) corrective regimen sliding scale   SubCutaneous every 6 hours  lactobacillus acidophilus 1 Tablet(s) Oral daily  pantoprazole   Suspension 40 milliGRAM(s) Oral daily  psyllium Powder 1 Packet(s) Oral daily  scopolamine   Patch 1 Patch Transdermal every 72 hours    MEDICATIONS  (PRN):  acetaminophen    Suspension .. 650 milliGRAM(s) Oral every 6 hours PRN Temp greater or equal to 38C (100.4F), Mild Pain (1 - 3)  dextrose 40% Gel 15 Gram(s) Oral once PRN Blood Glucose LESS THAN 70 milliGRAM(s)/deciliter  glucagon  Injectable 1 milliGRAM(s) IntraMuscular once PRN Glucose LESS THAN 70 milligrams/deciliter  sodium chloride 0.9% lock flush 10 milliLiter(s) IV Push every 1 hour PRN Pre/post blood products, medications, blood draw, and to maintain line patency    RADIOLOGY/OTHER STUDIES:

## 2020-05-02 NOTE — PROGRESS NOTE ADULT - ASSESSMENT
74F w/PMH cerebral palsy, DVT/PE, GERD, chronic PEG, HTN, T spine fusion admitted to ICU due to acute hypoxic respiratory failure, s/p extubation x 3 (last performed 4/13). Patient has improved in oxygenation requirements (currently 96% on 2LNC), yet continued to test positive for COVID 19 (most recent positive 4/29), preventing her from returning to her home long term care facility. Most recent COVID PCR 5/1 negative awaiting repeat swab 5/3.     Neurology  Alert and awake off of sedation    #Intellectual disability with cerebral palsy and functional quadriplegia  -At baseline mental status    Cardiovascular  -NSR. No active issues    Pulmonary:  #Acute hypoxic respiratory failure 2/2 COVID-19 pneumonia + VAP  -VAP resolved, s/p vanc/zosyn.   -s/p hydroxychloroquine for COVID-19  -c/w 2L NC-Wean as tolerated  -C/w glycopyrrolate and scopolamine for secretions    #History of pulmonary embolism  -Continue with Eliquis    Renal  -No active issues   -BUN/Cr: 12/0.50  -Continue to monitor I/O    Endocrine  -No active issues  -MISS    Gastroenterology:  No active issues.  -Pantoprazole 40mg PO Daily for prophylaxis    Hematology  continue with Eliquis 5 BID for DVT/PE    #Normocytic anemia  -H/H stable at 9.2/29.8  -Consider iron studies. No signs of symptoms of active bleeding.     Infectious Disease:  #COVID-19 pneumonia  -Resolving. s/p hydroxychloroquine  -2L NC-Wean as tolerated  -COVID SWAB 5/1-negative. Awaiting repeat COVID swab 5/3 for discharge back to [facility]     #Pseudomonas bacteremia  -C/w cipro 750mg q12h  -Blood cx NGTD 4/27    #VAP  -Resolved.     Lines/Tubes:  Rectal tube    Musculoskeletal/Myopathy  -No active issue. c/w OOB    Fluids/Lytes/Nutrition  -Fluids: None  -Electrolytes: Replete K<4, Mg<2  -Nutrition: Jevity    Pain  -No active issues.     Code: FULL CODE  Disposition: MICU pending discharge to [facility] 74F w/PMH cerebral palsy, DVT/PE, GERD, chronic PEG, HTN, T spine fusion admitted to ICU due to acute hypoxic respiratory failure, s/p extubation x 3 (last performed 4/13). Patient has improved in oxygenation requirements (currently 96% on 2LNC), yet continued to test positive for COVID 19 (most recent positive 4/29), preventing her from returning to her home long term care facility. Most recent COVID PCR 5/1 negative awaiting repeat swab 5/3.     Neurology  Alert and awake off of sedation    #Intellectual disability with cerebral palsy and functional quadriplegia  -At baseline mental status    Cardiovascular  -NSR. No active issues    Pulmonary:  #Acute hypoxic respiratory failure 2/2 COVID-19 pneumonia + VAP  -VAP resolved, s/p vanc/zosyn.   -s/p hydroxychloroquine for COVID-19  -c/w 2L NC-Wean as tolerated  -C/w glycopyrrolate and scopolamine for secretions    #History of pulmonary embolism  -Continue with Eliquis    Renal  -No active issues   -BUN/Cr: 12/0.50  -Continue to monitor I/O    Endocrine  -No active issues  -MISS    Gastroenterology:  No active issues.  -Pantoprazole 40mg PO Daily for prophylaxis    Hematology  continue with Eliquis 5 BID for DVT/PE    #Normocytic anemia  -H/H stable at 9.2/29.8  -Consider iron studies. No signs of symptoms of active bleeding.     Infectious Disease:  #COVID-19 pneumonia  -Resolving. s/p hydroxychloroquine  -2L NC-Wean as tolerated  -COVID SWAB 5/1-negative. Awaiting repeat COVID swab 5/3 for discharge back to long term     #Pseudomonas bacteremia  -C/w cipro 750mg q12h x 2 weeks (started 4/27/2020)  -Blood cx NGTD 4/27    #VAP  -Resolved.     Lines/Tubes:  Rectal tube    Musculoskeletal/Myopathy  -No active issue. c/w OOB    Fluids/Lytes/Nutrition  -Fluids: None  -Electrolytes: Replete K<4, Mg<2  -Nutrition: Jevity    Pain  -No active issues.     Code: FULL CODE  Disposition: MICU pending discharge to long term 74F w/PMH cerebral palsy, DVT/PE, GERD, chronic PEG, HTN, T spine fusion admitted to ICU due to acute hypoxic respiratory failure, s/p extubation x 3 (last performed 4/13). Patient has improved in oxygenation requirements (currently 96% on 2LNC), yet continued to test positive for COVID 19 (most recent positive 4/29), preventing her from returning to her home long term care facility. Most recent COVID PCR 5/1 negative awaiting repeat swab 5/3.     Neurology  Alert and awake off of sedation    #Intellectual disability with cerebral palsy and functional quadriplegia  -At baseline mental status    Cardiovascular  -NSR. No active issues    Pulmonary:  #Acute hypoxic respiratory failure 2/2 COVID-19 pneumonia + VAP  -VAP resolved, s/p vanc/zosyn.   -s/p hydroxychloroquine for COVID-19  -c/w 2L NC-Wean as tolerated  -C/w glycopyrrolate and scopolamine for secretions    #History of pulmonary embolism  -Continue with Eliquis    Renal  -No active issues   -BUN/Cr: 12/0.50  -Continue to monitor I/O    Endocrine  -No active issues  -MISS    Gastroenterology:  No active issues.  -Pantoprazole 40mg PO Daily for prophylaxis    Hematology  continue with Eliquis 5 BID for DVT/PE    #Normocytic anemia  -H/H stable at 9.2/29.8  -Consider iron studies. No signs of symptoms of active bleeding.     Infectious Disease:  #COVID-19 pneumonia  -Resolving. s/p hydroxychloroquine  -2L NC-Wean as tolerated  -COVID SWAB 5/1-negative. Awaiting repeat COVID swab 5/3 for discharge back to [facility]     #Pseudomonas bacteremia  -C/w cipro 750mg q12h x 2 weeks (started 4/27/2020)  -Blood cx NGTD 4/27  -Patient afebrile no leukocytosis on CBC     #VAP  -Resolved.     Lines/Tubes:  Rectal tube    Musculoskeletal/Myopathy  -No active issue. c/w OOB    Fluids/Lytes/Nutrition  -Fluids: None  -Electrolytes: Replete K<4, Mg<2  -Nutrition: Jevity    Pain  -No active issues.     Code: FULL CODE  Disposition: MICU pending discharge to halfway

## 2020-05-03 ENCOUNTER — TRANSCRIPTION ENCOUNTER (OUTPATIENT)
Age: 75
End: 2020-05-03

## 2020-05-03 LAB
ALBUMIN SERPL ELPH-MCNC: 3.1 G/DL — LOW (ref 3.3–5)
ALP SERPL-CCNC: 55 U/L — SIGNIFICANT CHANGE UP (ref 40–120)
ALT FLD-CCNC: 13 U/L — SIGNIFICANT CHANGE UP (ref 10–45)
ANION GAP SERPL CALC-SCNC: 8 MMOL/L — SIGNIFICANT CHANGE UP (ref 5–17)
AST SERPL-CCNC: 28 U/L — SIGNIFICANT CHANGE UP (ref 10–40)
BILIRUB SERPL-MCNC: 0.3 MG/DL — SIGNIFICANT CHANGE UP (ref 0.2–1.2)
BUN SERPL-MCNC: 14 MG/DL — SIGNIFICANT CHANGE UP (ref 7–23)
CALCIUM SERPL-MCNC: 9 MG/DL — SIGNIFICANT CHANGE UP (ref 8.4–10.5)
CHLORIDE SERPL-SCNC: 105 MMOL/L — SIGNIFICANT CHANGE UP (ref 96–108)
CO2 SERPL-SCNC: 27 MMOL/L — SIGNIFICANT CHANGE UP (ref 22–31)
CREAT SERPL-MCNC: 0.5 MG/DL — SIGNIFICANT CHANGE UP (ref 0.5–1.3)
CRP SERPL-MCNC: 0.39 MG/DL — SIGNIFICANT CHANGE UP (ref 0–0.4)
D DIMER BLD IA.RAPID-MCNC: 207 NG/ML DDU — SIGNIFICANT CHANGE UP
FERRITIN SERPL-MCNC: 112 NG/ML — SIGNIFICANT CHANGE UP (ref 15–150)
GLUCOSE SERPL-MCNC: 118 MG/DL — HIGH (ref 70–99)
HCT VFR BLD CALC: 32.1 % — LOW (ref 34.5–45)
HGB BLD-MCNC: 9.7 G/DL — LOW (ref 11.5–15.5)
MAGNESIUM SERPL-MCNC: 1.6 MG/DL — SIGNIFICANT CHANGE UP (ref 1.6–2.6)
MCHC RBC-ENTMCNC: 29.8 PG — SIGNIFICANT CHANGE UP (ref 27–34)
MCHC RBC-ENTMCNC: 30.2 GM/DL — LOW (ref 32–36)
MCV RBC AUTO: 98.8 FL — SIGNIFICANT CHANGE UP (ref 80–100)
NRBC # BLD: 0 /100 WBCS — SIGNIFICANT CHANGE UP (ref 0–0)
PHOSPHATE SERPL-MCNC: 3.2 MG/DL — SIGNIFICANT CHANGE UP (ref 2.5–4.5)
PLATELET # BLD AUTO: 445 K/UL — HIGH (ref 150–400)
POTASSIUM SERPL-MCNC: 4.2 MMOL/L — SIGNIFICANT CHANGE UP (ref 3.5–5.3)
POTASSIUM SERPL-SCNC: 4.2 MMOL/L — SIGNIFICANT CHANGE UP (ref 3.5–5.3)
PROT SERPL-MCNC: 6.1 G/DL — SIGNIFICANT CHANGE UP (ref 6–8.3)
RBC # BLD: 3.25 M/UL — LOW (ref 3.8–5.2)
RBC # FLD: 15 % — HIGH (ref 10.3–14.5)
SARS-COV-2 RNA SPEC QL NAA+PROBE: SIGNIFICANT CHANGE UP
SODIUM SERPL-SCNC: 140 MMOL/L — SIGNIFICANT CHANGE UP (ref 135–145)
WBC # BLD: 5.56 K/UL — SIGNIFICANT CHANGE UP (ref 3.8–10.5)
WBC # FLD AUTO: 5.56 K/UL — SIGNIFICANT CHANGE UP (ref 3.8–10.5)

## 2020-05-03 PROCEDURE — 99291 CRITICAL CARE FIRST HOUR: CPT | Mod: CS

## 2020-05-03 RX ORDER — ACETAMINOPHEN 500 MG
15 TABLET ORAL
Qty: 0 | Refills: 0 | DISCHARGE
Start: 2020-05-03

## 2020-05-03 RX ORDER — ACETAMINOPHEN 500 MG
20.31 TABLET ORAL
Qty: 0 | Refills: 0 | DISCHARGE
Start: 2020-05-03

## 2020-05-03 RX ORDER — METOPROLOL TARTRATE 50 MG
5 TABLET ORAL ONCE
Refills: 0 | Status: DISCONTINUED | OUTPATIENT
Start: 2020-05-03 | End: 2020-05-03

## 2020-05-03 RX ORDER — PANTOPRAZOLE SODIUM 20 MG/1
1 TABLET, DELAYED RELEASE ORAL
Qty: 0 | Refills: 0 | DISCHARGE
Start: 2020-05-03

## 2020-05-03 RX ORDER — LACTOBACILLUS ACIDOPHILUS 100MM CELL
1 CAPSULE ORAL
Qty: 0 | Refills: 0 | DISCHARGE
Start: 2020-05-03

## 2020-05-03 RX ORDER — MAGNESIUM SULFATE 500 MG/ML
2 VIAL (ML) INJECTION ONCE
Refills: 0 | Status: COMPLETED | OUTPATIENT
Start: 2020-05-03 | End: 2020-05-03

## 2020-05-03 RX ORDER — CIPROFLOXACIN LACTATE 400MG/40ML
1 VIAL (ML) INTRAVENOUS
Qty: 0 | Refills: 0 | DISCHARGE
Start: 2020-05-03 | End: 2020-05-10

## 2020-05-03 RX ORDER — APIXABAN 2.5 MG/1
1 TABLET, FILM COATED ORAL
Qty: 0 | Refills: 0 | DISCHARGE
Start: 2020-05-03

## 2020-05-03 RX ORDER — PSYLLIUM SEED (WITH DEXTROSE)
1 POWDER (GRAM) ORAL
Qty: 0 | Refills: 0 | DISCHARGE
Start: 2020-05-03

## 2020-05-03 RX ADMIN — Medication 750 MILLIGRAM(S): at 17:24

## 2020-05-03 RX ADMIN — APIXABAN 5 MILLIGRAM(S): 2.5 TABLET, FILM COATED ORAL at 12:44

## 2020-05-03 RX ADMIN — Medication 1 TABLET(S): at 12:43

## 2020-05-03 RX ADMIN — ROBINUL 0.2 MILLIGRAM(S): 0.2 INJECTION INTRAMUSCULAR; INTRAVENOUS at 12:44

## 2020-05-03 RX ADMIN — ROBINUL 0.2 MILLIGRAM(S): 0.2 INJECTION INTRAMUSCULAR; INTRAVENOUS at 17:23

## 2020-05-03 RX ADMIN — ROBINUL 0.2 MILLIGRAM(S): 0.2 INJECTION INTRAMUSCULAR; INTRAVENOUS at 23:04

## 2020-05-03 RX ADMIN — Medication 750 MILLIGRAM(S): at 05:27

## 2020-05-03 RX ADMIN — SCOPALAMINE 1 PATCH: 1 PATCH, EXTENDED RELEASE TRANSDERMAL at 07:43

## 2020-05-03 RX ADMIN — ROBINUL 0.2 MILLIGRAM(S): 0.2 INJECTION INTRAMUSCULAR; INTRAVENOUS at 05:26

## 2020-05-03 RX ADMIN — Medication 1 PACKET(S): at 12:44

## 2020-05-03 RX ADMIN — CHLORHEXIDINE GLUCONATE 1 APPLICATION(S): 213 SOLUTION TOPICAL at 05:27

## 2020-05-03 RX ADMIN — PANTOPRAZOLE SODIUM 40 MILLIGRAM(S): 20 TABLET, DELAYED RELEASE ORAL at 12:47

## 2020-05-03 RX ADMIN — APIXABAN 5 MILLIGRAM(S): 2.5 TABLET, FILM COATED ORAL at 23:04

## 2020-05-03 RX ADMIN — Medication 50 GRAM(S): at 09:19

## 2020-05-03 RX ADMIN — SCOPALAMINE 1 PATCH: 1 PATCH, EXTENDED RELEASE TRANSDERMAL at 17:24

## 2020-05-03 NOTE — DISCHARGE NOTE PROVIDER - NSDCMRMEDTOKEN_GEN_ALL_CORE_FT
acetaminophen 160 mg/5 mL oral suspension: 20.31 milliliter(s) orally every 6 hours, As needed, Temp greater or equal to 38C (100.4F), Mild Pain (1 - 3)  apixaban 5 mg oral tablet: 1 tab(s) orally every 12 hours  pantoprazole 40 mg oral granule, delayed release: 1 tab(s) orally once a day acetaminophen 160 mg/5 mL oral suspension: 20.31 milliliter(s) orally every 6 hours, As needed, Temp greater or equal to 38C (100.4F), Mild Pain (1 - 3)  apixaban 5 mg oral tablet: 1 tab(s) orally every 12 hours  ciprofloxacin 750 mg oral tablet: 1 tab(s) orally every 12 hours, last dose 5/10/2020 evening  lactobacillus acidophilus oral capsule: 1 tab(s) orally once a day  pantoprazole 40 mg oral granule, delayed release: 1 tab(s) orally once a day  psyllium 3.4 g/7 g oral powder for reconstitution: 1 packet(s) orally once a day acetaminophen 160 mg/5 mL oral suspension: 20.31 milliliter(s) orally every 6 hours, As needed, Temp greater or equal to 38C (100.4F), Mild Pain (1 - 3)  apixaban 5 mg oral tablet: 1 tab(s) orally every 12 hours  ciprofloxacin 750 mg oral tablet: 1 tab(s) orally every 12 hours, last dose 5/10/2020 evening  Cuvposa 1 mg/5 mL oral solution: 1 milligram(s) orally 2 times a day   lactobacillus acidophilus oral capsule: 1 tab(s) orally once a day  pantoprazole 40 mg oral granule, delayed release: 1 tab(s) orally once a day  psyllium 3.4 g/7 g oral powder for reconstitution: 1 packet(s) orally once a day  scopolamine 1.5 mg transdermal film, extended release: 1 patch transdermal every 3 days

## 2020-05-03 NOTE — DISCHARGE NOTE PROVIDER - NSDCFUSCHEDAPPT_GEN_ALL_CORE_FT
NAOM GHOSH ; 05/14/2020 ; [facility] [address]  NOAM GHOSH ; 05/15/2020 ; [facility] [address] [place]AM GHOSH ; 05/14/2020 ; [facility] [address]  NOAM GHOSH ; 05/15/2020 ; [facility] [address] [place]AM GHOSH ; 05/14/2020 ; [facility] [address]  NOAM GHOSH ; 05/15/2020 ; [facility] [address] NOAM GHOSH ; 05/14/2020 ; [facility] [address]  NOAM GHOSH ; 05/15/2020 ; [facility] [address] NOAM GHOSH ; 05/14/2020 ; [facility] [address]  NOAM GHOSH ; 05/15/2020 ; [facility] [address]

## 2020-05-03 NOTE — PROGRESS NOTE ADULT - ASSESSMENT
74F w/PMH cerebral palsy, DVT/PE, GERD, chronic PEG, HTN, T spine fusion admitted to ICU due to acute hypoxic respiratory failure, s/p extubation x 3 (last performed 4/13). Patient has improved in oxygenation requirements (currently 96% on 2LNC), yet continued to test positive for COVID 19 (most recent positive 4/29), preventing her from returning to her home long term care facility. Most recent COVID PCR 5/1 negative awaiting repeat swab 5/3.     Neurology  Alert and awake off of sedation    #Intellectual disability with cerebral palsy and functional quadriplegia  -At baseline mental status    Cardiovascular  -NSR. No active issues    Pulmonary:  #Acute hypoxic respiratory failure 2/2 COVID-19 pneumonia + VAP  -VAP resolved, s/p vanc/zosyn.   -s/p hydroxychloroquine for COVID-19  -c/w 2L NC-Wean as tolerated  -C/w glycopyrrolate and scopolamine for secretions    #History of pulmonary embolism  -Continue with Eliquis    Renal  -No active issues   -BUN/Cr: 14/0.50  -Continue to monitor I/O    Endocrine  -No active issues  -MISS    Gastroenterology:  No active issues.  -Pantoprazole 40mg PO Daily for prophylaxis    Hematology  continue with Eliquis 5 BID for DVT/PE    #Normocytic anemia  -H/H stable at 9.7/32.1  -Consider iron studies. No signs of symptoms of active bleeding.     Infectious Disease:  #COVID-19 pneumonia  -Resolving. s/p hydroxychloroquine  -2L NC-Wean as tolerated  -COVID SWAB 5/3 & 5/1-negative. Needs x2 negative COVID tests for dispo     #Pseudomonas bacteremia  -C/w cipro 750mg q12h x 2 weeks (started 4/27/2020)  -Blood cx NGTD 4/27  -Patient afebrile no leukocytosis on CBC     #VAP  -Resolved.     Lines/Tubes:  Rectal tube    Musculoskeletal/Myopathy  -No active issue. c/w OOB    Fluids/Lytes/Nutrition  -Fluids: None  -Electrolytes: Replete K<4, Mg<2  -Nutrition: Jevity    Pain  -No active issues.     Code: FULL CODE  Disposition: MICU pending discharge to long term 74F w/PMH cerebral palsy, DVT/PE, GERD, chronic PEG, HTN, T spine fusion admitted to ICU due to acute hypoxic respiratory failure, s/p extubation x 3 (last performed 4/13). Patient has improved in oxygenation requirements (currently 96% on 2LNC), yet continued to test positive for COVID 19 (most recent positive 4/29), preventing her from returning to her home long term care facility. COVID PCR negative x2 as of 5/3.     Neurology  Alert and awake off of sedation    #Intellectual disability with cerebral palsy and functional quadriplegia  -At baseline mental status    Cardiovascular  -NSR. No active issues    Pulmonary:  #Acute hypoxic respiratory failure 2/2 COVID-19 pneumonia + VAP  -VAP resolved, s/p vanc/zosyn.   -s/p hydroxychloroquine for COVID-19  -c/w 2L NC-Wean as tolerated  -C/w glycopyrrolate and scopolamine for secretions    #History of pulmonary embolism  -Continue with Eliquis    Renal  -No active issues   -BUN/Cr: 14/0.50  -Continue to monitor I/O  -net positive 860 in AM    Endocrine  -No active issues  -MISS    Gastroenterology:  No active issues.  -Pantoprazole 40mg PO Daily for prophylaxis    Hematology  continue with Eliquis 5 BID for DVT/PE    #Normocytic anemia  -H/H stable at 9.7/32.1  -Consider iron studies. No signs of symptoms of active bleeding.     Infectious Disease:  #COVID-19 pneumonia  -Resolving. s/p hydroxychloroquine  -2L NC-Wean as tolerated  -COVID SWAB 5/3 & 5/1-negative. Needs x2 negative COVID tests for dispo     #Pseudomonas bacteremia  -C/w cipro 750mg q12h x 2 weeks (started 4/27, ending 5/10)  -Blood cx NGTD 4/27  -Patient afebrile no leukocytosis on CBC     #VAP  -Resolved.     Lines/Tubes:  Rectal tube    Musculoskeletal/Myopathy  -No active issue. c/w OOB    Fluids/Lytes/Nutrition  -Fluids: None  -Electrolytes: Replete K<4, Mg<2  -Nutrition: Jevity    Pain  -No active issues.     Code: FULL CODE  Disposition: MICU pending discharge to [facility]

## 2020-05-03 NOTE — DISCHARGE NOTE PROVIDER - HOSPITAL COURSE
#Discharge: do not delete        Patient is 75yo F with past medical history of CPR, MR, Tspine fusion    Presented with SOB, found to have COVID-PNA    Problem List/Main Diagnoses (system-based):     Neuro:    #Intellectual disability with cerebral palsy and functional quadriplegia    -At baseline mental status        Pulmonary:    #Acute hypoxic respiratory failure 2/2 COVID-19 pneumonia + VAP    -VAP resolved, s/p vanc/zosyn.     -s/p hydroxychloroquine for COVID-19    -c/w 2L NC-Wean as tolerated    -C/w glycopyrrolate and scopolamine for secretions        #History of pulmonary embolism    -Continue with Eliquis        Renal    -No active issues     -BUN/Cr: 12/0.50    -Continue to monitor I/O        Endocrine    -No active issues    -MISS        Gastroenterology:    No active issues.    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 No signs of symptoms of active bleeding.         Infectious Disease:    #COVID-19 pneumonia    -Resolving. s/p hydroxychloroquine    -2L NC-Wean as tolerated    -COVID SWAB 5/1-negative.    - COVID SWAB 5/3-negative        #Pseudomonas bacteremia    -C/w cipro 750mg q12h x 2 weeks (started 4/27/2020)    -Blood cx NGTD 4/27    -Patient afebrile no leukocytosis on CBC         Inpatient treatment course:     73 yo female with CP/MR, T spine fusion, presented from nursing facility, COVID-19+ on 4/3, course c/b Pseudomonas VAP (initial isolate 4/3 was wild-type (pansusceptible)) for which the patient has been on a prolonged course of Zosyn since 4/3 now with Pseudomonas BSI 4/25 and pneumonia--sputum cx 4/20 showing isolate has evolved resistance to Zosyn, sensitive to meropenem and cipro. Patient transitioned from dual coverage w/ hermes/cipro to cipro only for outpatient course. patient to be discharged back to nursing facility.        New medications: ciprofloxacin #Discharge: do not delete        Patient is 73yo F with past medical history of CPR, MR, Tspine fusion    Presented with SOB, found to have COVID-PNA    Problem List/Main Diagnoses (system-based):     Neuro:    #Intellectual disability with cerebral palsy and functional quadriplegia    -At baseline mental status        Pulmonary:    #Acute hypoxic respiratory failure 2/2 COVID-19 pneumonia + VAP    -VAP resolved, s/p vanc/zosyn.     -s/p hydroxychloroquine for COVID-19    -c/w 2L NC-Wean as tolerated    -C/w glycopyrrolate and scopolamine for secretions        #History of pulmonary embolism    -Continue with Eliquis        Renal    -No active issues     -BUN/Cr: 12/0.50    -Continue to monitor I/O        Endocrine    -No active issues    -MISS        Gastroenterology:    No active issues.    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 No signs of symptoms of active bleeding.         Infectious Disease:    #COVID-19 pneumonia    -Resolving. s/p hydroxychloroquine    -2L NC-Wean as tolerated    -COVID SWAB 5/1-negative.    - COVID SWAB 5/3-negative        #Pseudomonas bacteremia    -C/w cipro 750mg q12h x 2 weeks (started 4/27/2020)    -Blood cx NGTD 4/27    -Patient afebrile no leukocytosis on CBC         Inpatient treatment course:     73 yo female with CP/MR, T spine fusion, presented from nursing facility, COVID-19+ on 4/3, course c/b Pseudomonas VAP (initial isolate 4/3 was wild-type (pansusceptible)) for which the patient has been on a prolonged course of Zosyn since 4/3 now with Pseudomonas BSI 4/25 and pneumonia--sputum cx 4/20 showing isolate has evolved resistance to Zosyn, sensitive to meropenem and cipro. Patient transitioned from dual coverage w/ hermes/cipro to cipro only for outpatient course. patient to be discharged back to nursing facility.        New medications: ciprofloxacin Cipro 750mg via PEG q12h- 2 week course through 5/10 Patient is 73yo F with past medical history of CPR, MR, Tspine fusion    Presented with SOB, found to have COVID-PNA        Inpatient treatment course:     74F w/PMH cerebral palsy, DVT/PE, GERD, chronic PEG, HTN, T spine fusion admitted to ICU due to acute hypoxic respiratory failure, s/p extubation x 3 (last performed 4/13). Patient has improved in oxygenation requirements (currently 96% on 2LNC), yet continued to test positive for COVID 19 (most recent positive 4/29), preventing her from returning to her home long term care facility. COVID PCR negative x2 as of 5/3. Course complicated by pseudomonal bacteremia, s/p hermes/cipro, de-escalated to cipro PO to end 5/10. Pending discharge to group home.        Problem List/Main Diagnoses (system-based):     Neuro:    #Intellectual disability with cerebral palsy and functional quadriplegia    -At baseline mental status        Pulmonary:    #Acute hypoxic respiratory failure 2/2 COVID-19 pneumonia + VAP    -VAP resolved, s/p vanc/zosyn.     -s/p hydroxychloroquine for COVID-19    -was on 3L NC-Weaned down to room air    -discontinued glycopyrrolate    -c/w scopolamine for secretions        #History of pulmonary embolism    -Continue with Eliquis        Renal    -No active issues     -BUN/Cr: 13/0.49    -Continue to monitor I/O        Endocrine    -No active issues    -MISS        Gastroenterology:    No active issues.    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 No signs of symptoms of active bleeding.         Infectious Disease:    #COVID-19 pneumonia    -Resolving. s/p hydroxychloroquine    -2L NC-Wean as tolerated    -COVID SWAB 5/1-negative.    -COVID SWAB 5/3-negative        #Pseudomonas bacteremia    -C/w cipro 750mg q12h x 2 weeks (4/27-5/10)    -Blood cx NGTD 4/27    -Patient afebrile no leukocytosis on CBC         Inpatient treatment course:     73 yo female with CP/MR, T spine fusion, presented from nursing facility, COVID-19+ on 4/3, course c/b Pseudomonas VAP (initial isolate 4/3 was wild-type (pansusceptible)) for which the patient has been on a prolonged course of Zosyn since 4/3 now with Pseudomonas BSI 4/25 and pneumonia--sputum cx 4/20 showing isolate has evolved resistance to Zosyn, sensitive to meropenem and cipro. Patient transitioned from dual coverage w/ hermes/cipro to cipro only for outpatient course. patient to be discharged back to nursing facility.        New medications: ciprofloxacin Cipro 750mg via PEG q12h- 2 week course through 5/10 Patient is 73yo F with past medical history of CPR, MR, Tspine fusion    Presented with SOB, found to have COVID-PNA        Inpatient treatment course:     74F w/PMH cerebral palsy, DVT/PE, GERD, chronic PEG, HTN, T spine fusion admitted to ICU due to acute hypoxic respiratory failure, s/p extubation x 3 (last performed 4/13). Patient has improved in oxygenation requirements (currently 96% on 2LNC), yet continued to test positive for COVID 19 (most recent positive 4/29), preventing her from returning to her home long term care facility. COVID PCR negative x2 as of 5/3. Course complicated by pseudomonal bacteremia, s/p hermes/cipro, de-escalated to cipro PO to end 5/10. Pending discharge to group home.        Problem List/Main Diagnoses (system-based):     Neuro:    #Intellectual disability with cerebral palsy and functional quadriplegia    -At baseline mental status        Pulmonary:    #Acute hypoxic respiratory failure 2/2 COVID-19 pneumonia + VAP    -VAP resolved, s/p vanc/zosyn.     -s/p hydroxychloroquine for COVID-19    -was on 3L NC and will remain on it continuously or as needed    -discontinued glycopyrrolate    -c/w scopolamine for secretions        #History of pulmonary embolism    -Continue with Eliquis        Renal    -No active issues     -BUN/Cr: 13/0.49    -Continue to monitor I/O        Endocrine    -No active issues    -MISS        Gastroenterology:    No active issues.    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 No signs of symptoms of active bleeding.         Infectious Disease:    #COVID-19 pneumonia    -Resolving. s/p hydroxychloroquine    -2L NC-Wean as tolerated    -COVID SWAB 5/1-negative.    -COVID SWAB 5/3-negative        #Pseudomonas bacteremia    -C/w cipro 750mg q12h x 2 weeks (4/27-5/10)    -Blood cx NGTD 4/27    -Patient afebrile no leukocytosis on CBC         Inpatient treatment course:     73 yo female with CP/MR, T spine fusion, presented from nursing facility, COVID-19+ on 4/3, course c/b Pseudomonas VAP (initial isolate 4/3 was wild-type (pansusceptible)) for which the patient has been on a prolonged course of Zosyn since 4/3 now with Pseudomonas BSI 4/25 and pneumonia--sputum cx 4/20 showing isolate has evolved resistance to Zosyn, sensitive to meropenem and cipro. Patient transitioned from dual coverage w/ hermes/cipro to cipro only for outpatient course. patient to be discharged back to nursing facility.        New medications: ciprofloxacin Cipro 750mg via PEG q12h- 2 week course through 5/10 Patient is 73yo F with past medical history of CPR, MR, Tspine fusion    Presented with SOB, found to have COVID-PNA        Inpatient treatment course:     74F w/PMH cerebral palsy, DVT/PE, GERD, chronic PEG, HTN, T spine fusion admitted to ICU due to acute hypoxic respiratory failure, s/p extubation x 3 (last performed 4/13). Patient has improved in oxygenation requirements (currently 96% on 2LNC), yet continued to test positive for COVID 19 (most recent positive 4/29), preventing her from returning to her home long term care facility. COVID PCR negative x2 as of 5/3. Course complicated by pseudomonal bacteremia, s/p hermes/cipro, de-escalated to cipro PO to end 5/10. Pending discharge to group home.        Problem List/Main Diagnoses (system-based):     Neuro:    #Intellectual disability with cerebral palsy and functional quadriplegia    -At baseline mental status        Pulmonary:    #Acute hypoxic respiratory failure 2/2 COVID-19 pneumonia + VAP    -VAP resolved, s/p vanc/zosyn.     -s/p hydroxychloroquine for COVID-19    -was on 3L NC and will remain on it continuously or as needed    -discontinued glycopyrrolate and was monitored for 48 hours off this medication with no secretions.    -c/w scopolamine for secretions        #History of pulmonary embolism    -Continue with Eliquis        Renal    -No active issues     -BUN/Cr: 13/0.49    -Continue to monitor I/O        Endocrine    -No active issues    -MISS        Gastroenterology:    No active issues.    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 No signs of symptoms of active bleeding.         Infectious Disease:    #COVID-19 pneumonia    -Resolving. s/p hydroxychloroquine    -2L NC-Wean as tolerated    -COVID SWAB 5/1-negative.    -COVID SWAB 5/3-negative        #Pseudomonas bacteremia    -C/w cipro 750mg q12h x 2 weeks (4/27-5/10)    -Blood cx NGTD 4/27    -Patient afebrile no leukocytosis on CBC         Inpatient treatment course:     73 yo female with CP/MR, T spine fusion, presented from nursing facility, COVID-19+ on 4/3, course c/b Pseudomonas VAP (initial isolate 4/3 was wild-type (pansusceptible)) for which the patient has been on a prolonged course of Zosyn since 4/3 now with Pseudomonas BSI 4/25 and pneumonia--sputum cx 4/20 showing isolate has evolved resistance to Zosyn, sensitive to meropenem and cipro. Patient transitioned from dual coverage w/ hermes/cipro to cipro only for outpatient course. patient to be discharged back to nursing facility.        New medications: ciprofloxacin Cipro 750mg via PEG q12h- 2 week course through 5/10

## 2020-05-03 NOTE — DISCHARGE NOTE PROVIDER - NSDCFUADDAPPT_GEN_ALL_CORE_FT
Please follow-up with your primary care provider as needed within 1 month after leaving the hospital.

## 2020-05-03 NOTE — DISCHARGE NOTE PROVIDER - NSDCCPCAREPLAN_GEN_ALL_CORE_FT
PRINCIPAL DISCHARGE DIAGNOSIS  Diagnosis: Acute respiratory failure with hypoxia and hypercapnia  Assessment and Plan of Treatment: You were diagnosed with the new COVID-19 coronavirus infection via a nasal swab. The treatment for this infection is supportive care, which includes: rest, maintaining adequate oral intake of food and water, and taking acetaminophen/tylenol for fever. Please maintain a strict home quarantine for 14 days at home, and wear a surgical mask at all times when you need to be in close proximity with another human being. Take tylenol as needed, every 6 hours, with a maximum daily dose of 4,000 mg a day. Please visit your nearest urgent care or emergency department should you start to experience: severe shortness of breath, severe cough/wheezing/difficulty breathing, or fever >103 for 3 days. Please maintain a good healthy diet. If you have any questions, please call your primary care provider.      SECONDARY DISCHARGE DIAGNOSES  Diagnosis: Pseudomonal bacteremia  Assessment and Plan of Treatment: PRINCIPAL DISCHARGE DIAGNOSIS  Diagnosis: Acute respiratory failure with hypoxia and hypercapnia  Assessment and Plan of Treatment: You were diagnosed with the new COVID-19 coronavirus infection via a nasal swab. The treatment for this infection is supportive care, which includes: rest, maintaining adequate oral intake of food and water, and taking acetaminophen/tylenol for fever.  You were re-tested for COVID-19 and tested negative. Please maintain a good healthy diet. If you have any questions, please call your primary care provider.      SECONDARY DISCHARGE DIAGNOSES  Diagnosis: Pseudomonal bacteremia  Assessment and Plan of Treatment: You were diagnosed with bacteremia, which is a bacterial infection in the blood. The bacteria is pseudomonas and ciprofloxacin is the antibiotic that will treat this infection. Please continue to take it as prescribed until 5/10/2020.

## 2020-05-03 NOTE — PROGRESS NOTE ADULT - SUBJECTIVE AND OBJECTIVE BOX
INTERVAL HPI/OVERNIGHT EVENTS:    SUBJECTIVE: Patient seen and examined at bedside.       VITAL SIGNS:  ICU Vital Signs Last 24 Hrs  T(C): 36.9 (03 May 2020 10:00), Max: 37.7 (03 May 2020 05:00)  T(F): 98.4 (03 May 2020 10:00), Max: 99.8 (03 May 2020 05:00)  HR: 104 (03 May 2020 10:00) (59 - 115)  BP: 140/80 (03 May 2020 09:00) (96/51 - 143/69)  BP(mean): 102 (03 May 2020 09:00) (69 - 102)  ABP: --  ABP(mean): --  RR: 37 (03 May 2020 10:00) (15 - 52)  SpO2: 92% (03 May 2020 10:00) (90% - 100%)      Plateau pressure:   P/F ratio:     05-02 @ 07:01  -  05-03 @ 07:00  --------------------------------------------------------  IN: 1720 mL / OUT: 240 mL / NET: 1480 mL    05-03 @ 07:01  -  05-03 @ 11:05  --------------------------------------------------------  IN: 140 mL / OUT: 200 mL / NET: -60 mL      CAPILLARY BLOOD GLUCOSE      POCT Blood Glucose.: 101 mg/dL (02 May 2020 23:51)    ECG:    PHYSICAL EXAM:    General:   HEENT:   Neck:   Respiratory:   Cardiovascular:   Abdomen:   Extremities:  Neurological:    MEDICATIONS:  MEDICATIONS  (STANDING):  apixaban 5 milliGRAM(s) Oral every 12 hours  chlorhexidine 2% Cloths 1 Application(s) Topical <User Schedule>  ciprofloxacin     Tablet 750 milliGRAM(s) Oral every 12 hours  dextrose 5%. 1000 milliLiter(s) (50 mL/Hr) IV Continuous <Continuous>  dextrose 50% Injectable 12.5 Gram(s) IV Push once  dextrose 50% Injectable 25 Gram(s) IV Push once  dextrose 50% Injectable 25 Gram(s) IV Push once  glycopyrrolate Injectable 0.2 milliGRAM(s) IV Push every 6 hours  insulin lispro (HumaLOG) corrective regimen sliding scale   SubCutaneous every 6 hours  lactobacillus acidophilus 1 Tablet(s) Oral daily  pantoprazole   Suspension 40 milliGRAM(s) Oral daily  psyllium Powder 1 Packet(s) Oral daily  scopolamine   Patch 1 Patch Transdermal every 72 hours    MEDICATIONS  (PRN):  acetaminophen    Suspension .. 650 milliGRAM(s) Oral every 6 hours PRN Temp greater or equal to 38C (100.4F), Mild Pain (1 - 3)  dextrose 40% Gel 15 Gram(s) Oral once PRN Blood Glucose LESS THAN 70 milliGRAM(s)/deciliter  glucagon  Injectable 1 milliGRAM(s) IntraMuscular once PRN Glucose LESS THAN 70 milligrams/deciliter  sodium chloride 0.9% lock flush 10 milliLiter(s) IV Push every 1 hour PRN Pre/post blood products, medications, blood draw, and to maintain line patency      ALLERGIES:  Allergies    ace inhibitors (Anaphylaxis)  caffeine (Rash)  chocolate (Rash)  high acid (Rash)  Tomatoes (Hives)    Intolerances        LABS:                        9.7    5.56  )-----------( 445      ( 03 May 2020 06:31 )             32.1     05-03    140  |  105  |  14  ----------------------------<  118<H>  4.2   |  27  |  0.50    Ca    9.0      03 May 2020 05:27  Phos  3.2     05-03  Mg     1.6     05-03    TPro  6.1  /  Alb  3.1<L>  /  TBili  0.3  /  DBili  x   /  AST  28  /  ALT  13  /  AlkPhos  55  05-03          RADIOLOGY & ADDITIONAL TESTS: Reviewed. INTERVAL HPI/OVERNIGHT EVENTS:    LIZABETH o/n    SUBJECTIVE: Patient seen and examined at bedside.       VITAL SIGNS:  ICU Vital Signs Last 24 Hrs  T(C): 36.9 (03 May 2020 10:00), Max: 37.7 (03 May 2020 05:00)  T(F): 98.4 (03 May 2020 10:00), Max: 99.8 (03 May 2020 05:00)  HR: 104 (03 May 2020 10:00) (59 - 115)  BP: 140/80 (03 May 2020 09:00) (96/51 - 143/69)  BP(mean): 102 (03 May 2020 09:00) (69 - 102)  ABP: --  ABP(mean): --  RR: 37 (03 May 2020 10:00) (15 - 52)  SpO2: 92% (03 May 2020 10:00) (90% - 100%)      Plateau pressure:   P/F ratio:     05-02 @ 07:01  -  05-03 @ 07:00  --------------------------------------------------------  IN: 1720 mL / OUT: 240 mL / NET: 1480 mL    05-03 @ 07:01  -  05-03 @ 11:05  --------------------------------------------------------  IN: 140 mL / OUT: 200 mL / NET: -60 mL      CAPILLARY BLOOD GLUCOSE      POCT Blood Glucose.: 101 mg/dL (02 May 2020 23:51)    ECG:    PHYSICAL EXAM:    General:   HEENT:   Neck:   Respiratory:   Cardiovascular:   Abdomen:   Extremities:  Neurological:    MEDICATIONS:  MEDICATIONS  (STANDING):  apixaban 5 milliGRAM(s) Oral every 12 hours  chlorhexidine 2% Cloths 1 Application(s) Topical <User Schedule>  ciprofloxacin     Tablet 750 milliGRAM(s) Oral every 12 hours  dextrose 5%. 1000 milliLiter(s) (50 mL/Hr) IV Continuous <Continuous>  dextrose 50% Injectable 12.5 Gram(s) IV Push once  dextrose 50% Injectable 25 Gram(s) IV Push once  dextrose 50% Injectable 25 Gram(s) IV Push once  glycopyrrolate Injectable 0.2 milliGRAM(s) IV Push every 6 hours  insulin lispro (HumaLOG) corrective regimen sliding scale   SubCutaneous every 6 hours  lactobacillus acidophilus 1 Tablet(s) Oral daily  pantoprazole   Suspension 40 milliGRAM(s) Oral daily  psyllium Powder 1 Packet(s) Oral daily  scopolamine   Patch 1 Patch Transdermal every 72 hours    MEDICATIONS  (PRN):  acetaminophen    Suspension .. 650 milliGRAM(s) Oral every 6 hours PRN Temp greater or equal to 38C (100.4F), Mild Pain (1 - 3)  dextrose 40% Gel 15 Gram(s) Oral once PRN Blood Glucose LESS THAN 70 milliGRAM(s)/deciliter  glucagon  Injectable 1 milliGRAM(s) IntraMuscular once PRN Glucose LESS THAN 70 milligrams/deciliter  sodium chloride 0.9% lock flush 10 milliLiter(s) IV Push every 1 hour PRN Pre/post blood products, medications, blood draw, and to maintain line patency      ALLERGIES:  Allergies    ace inhibitors (Anaphylaxis)  caffeine (Rash)  chocolate (Rash)  high acid (Rash)  Tomatoes (Hives)    Intolerances        LABS:                        9.7    5.56  )-----------( 445      ( 03 May 2020 06:31 )             32.1     05-03    140  |  105  |  14  ----------------------------<  118<H>  4.2   |  27  |  0.50    Ca    9.0      03 May 2020 05:27  Phos  3.2     05-03  Mg     1.6     05-03    TPro  6.1  /  Alb  3.1<L>  /  TBili  0.3  /  DBili  x   /  AST  28  /  ALT  13  /  AlkPhos  55  05-03          RADIOLOGY & ADDITIONAL TESTS: Reviewed.

## 2020-05-04 ENCOUNTER — TRANSCRIPTION ENCOUNTER (OUTPATIENT)
Age: 75
End: 2020-05-04

## 2020-05-04 LAB
ALBUMIN SERPL ELPH-MCNC: 3.5 G/DL — SIGNIFICANT CHANGE UP (ref 3.3–5)
ALP SERPL-CCNC: 57 U/L — SIGNIFICANT CHANGE UP (ref 40–120)
ALT FLD-CCNC: 16 U/L — SIGNIFICANT CHANGE UP (ref 10–45)
ANION GAP SERPL CALC-SCNC: 11 MMOL/L — SIGNIFICANT CHANGE UP (ref 5–17)
AST SERPL-CCNC: 35 U/L — SIGNIFICANT CHANGE UP (ref 10–40)
BASOPHILS # BLD AUTO: 0.03 K/UL — SIGNIFICANT CHANGE UP (ref 0–0.2)
BASOPHILS NFR BLD AUTO: 0.5 % — SIGNIFICANT CHANGE UP (ref 0–2)
BILIRUB SERPL-MCNC: 0.3 MG/DL — SIGNIFICANT CHANGE UP (ref 0.2–1.2)
BUN SERPL-MCNC: 15 MG/DL — SIGNIFICANT CHANGE UP (ref 7–23)
CALCIUM SERPL-MCNC: 9.2 MG/DL — SIGNIFICANT CHANGE UP (ref 8.4–10.5)
CHLORIDE SERPL-SCNC: 107 MMOL/L — SIGNIFICANT CHANGE UP (ref 96–108)
CO2 SERPL-SCNC: 28 MMOL/L — SIGNIFICANT CHANGE UP (ref 22–31)
CREAT SERPL-MCNC: 0.54 MG/DL — SIGNIFICANT CHANGE UP (ref 0.5–1.3)
CRP SERPL-MCNC: 0.44 MG/DL — HIGH (ref 0–0.4)
D DIMER BLD IA.RAPID-MCNC: 254 NG/ML DDU — HIGH
EOSINOPHIL # BLD AUTO: 0.14 K/UL — SIGNIFICANT CHANGE UP (ref 0–0.5)
EOSINOPHIL NFR BLD AUTO: 2.3 % — SIGNIFICANT CHANGE UP (ref 0–6)
FERRITIN SERPL-MCNC: 105 NG/ML — SIGNIFICANT CHANGE UP (ref 15–150)
GLUCOSE SERPL-MCNC: 93 MG/DL — SIGNIFICANT CHANGE UP (ref 70–99)
HCT VFR BLD CALC: 31.7 % — LOW (ref 34.5–45)
HGB BLD-MCNC: 9.5 G/DL — LOW (ref 11.5–15.5)
IMM GRANULOCYTES NFR BLD AUTO: 0.7 % — SIGNIFICANT CHANGE UP (ref 0–1.5)
LYMPHOCYTES # BLD AUTO: 1.59 K/UL — SIGNIFICANT CHANGE UP (ref 1–3.3)
LYMPHOCYTES # BLD AUTO: 26.3 % — SIGNIFICANT CHANGE UP (ref 13–44)
MAGNESIUM SERPL-MCNC: 1.9 MG/DL — SIGNIFICANT CHANGE UP (ref 1.6–2.6)
MCHC RBC-ENTMCNC: 29.8 PG — SIGNIFICANT CHANGE UP (ref 27–34)
MCHC RBC-ENTMCNC: 30 GM/DL — LOW (ref 32–36)
MCV RBC AUTO: 99.4 FL — SIGNIFICANT CHANGE UP (ref 80–100)
MONOCYTES # BLD AUTO: 0.65 K/UL — SIGNIFICANT CHANGE UP (ref 0–0.9)
MONOCYTES NFR BLD AUTO: 10.7 % — SIGNIFICANT CHANGE UP (ref 2–14)
NEUTROPHILS # BLD AUTO: 3.6 K/UL — SIGNIFICANT CHANGE UP (ref 1.8–7.4)
NEUTROPHILS NFR BLD AUTO: 59.5 % — SIGNIFICANT CHANGE UP (ref 43–77)
NRBC # BLD: 0 /100 WBCS — SIGNIFICANT CHANGE UP (ref 0–0)
PHOSPHATE SERPL-MCNC: 3.3 MG/DL — SIGNIFICANT CHANGE UP (ref 2.5–4.5)
PLATELET # BLD AUTO: 392 K/UL — SIGNIFICANT CHANGE UP (ref 150–400)
POTASSIUM SERPL-MCNC: 4.3 MMOL/L — SIGNIFICANT CHANGE UP (ref 3.5–5.3)
POTASSIUM SERPL-SCNC: 4.3 MMOL/L — SIGNIFICANT CHANGE UP (ref 3.5–5.3)
PROT SERPL-MCNC: 6.5 G/DL — SIGNIFICANT CHANGE UP (ref 6–8.3)
RBC # BLD: 3.19 M/UL — LOW (ref 3.8–5.2)
RBC # FLD: 15.2 % — HIGH (ref 10.3–14.5)
SARS-COV-2 RNA SPEC QL NAA+PROBE: SIGNIFICANT CHANGE UP
SODIUM SERPL-SCNC: 146 MMOL/L — HIGH (ref 135–145)
WBC # BLD: 6.05 K/UL — SIGNIFICANT CHANGE UP (ref 3.8–10.5)
WBC # FLD AUTO: 6.05 K/UL — SIGNIFICANT CHANGE UP (ref 3.8–10.5)

## 2020-05-04 PROCEDURE — 99291 CRITICAL CARE FIRST HOUR: CPT | Mod: CS

## 2020-05-04 RX ORDER — ROBINUL 0.2 MG/ML
1 INJECTION INTRAMUSCULAR; INTRAVENOUS
Qty: 60 | Refills: 0
Start: 2020-05-04 | End: 2020-06-02

## 2020-05-04 RX ORDER — SCOPALAMINE 1 MG/3D
1 PATCH, EXTENDED RELEASE TRANSDERMAL
Qty: 0 | Refills: 0 | DISCHARGE
Start: 2020-05-04

## 2020-05-04 RX ADMIN — Medication 1 PACKET(S): at 13:06

## 2020-05-04 RX ADMIN — APIXABAN 5 MILLIGRAM(S): 2.5 TABLET, FILM COATED ORAL at 23:24

## 2020-05-04 RX ADMIN — ROBINUL 0.2 MILLIGRAM(S): 0.2 INJECTION INTRAMUSCULAR; INTRAVENOUS at 12:56

## 2020-05-04 RX ADMIN — Medication 1 TABLET(S): at 12:56

## 2020-05-04 RX ADMIN — SCOPALAMINE 1 PATCH: 1 PATCH, EXTENDED RELEASE TRANSDERMAL at 20:09

## 2020-05-04 RX ADMIN — CHLORHEXIDINE GLUCONATE 1 APPLICATION(S): 213 SOLUTION TOPICAL at 05:56

## 2020-05-04 RX ADMIN — ROBINUL 0.2 MILLIGRAM(S): 0.2 INJECTION INTRAMUSCULAR; INTRAVENOUS at 05:56

## 2020-05-04 RX ADMIN — ROBINUL 0.2 MILLIGRAM(S): 0.2 INJECTION INTRAMUSCULAR; INTRAVENOUS at 23:24

## 2020-05-04 RX ADMIN — APIXABAN 5 MILLIGRAM(S): 2.5 TABLET, FILM COATED ORAL at 12:56

## 2020-05-04 RX ADMIN — Medication 750 MILLIGRAM(S): at 05:56

## 2020-05-04 RX ADMIN — SCOPALAMINE 1 PATCH: 1 PATCH, EXTENDED RELEASE TRANSDERMAL at 05:57

## 2020-05-04 RX ADMIN — PANTOPRAZOLE SODIUM 40 MILLIGRAM(S): 20 TABLET, DELAYED RELEASE ORAL at 13:06

## 2020-05-04 RX ADMIN — ROBINUL 0.2 MILLIGRAM(S): 0.2 INJECTION INTRAMUSCULAR; INTRAVENOUS at 19:09

## 2020-05-04 RX ADMIN — Medication 750 MILLIGRAM(S): at 20:09

## 2020-05-04 NOTE — PROGRESS NOTE ADULT - SUBJECTIVE AND OBJECTIVE BOX
COVID MICU 5 [facility] TRANSFER TO [facility]  HOSPITAL COURSE:  74F w/PMH cerebral palsy, DVT/PE, GERD, chronic PEG, HTN, T spine fusion admitted to ICU due to acute hypoxic respiratory failure, s/p extubation x 3 (last performed 4/13). Patient has improved in oxygenation requirements (currently 96% on 2LNC), yet continued to test positive for COVID 19 (most recent positive 4/29), preventing her from returning to her home long term care facility. COVID PCR negative x2 as of 5/3. Course complicated by pseudomonal bacteremia, s/p hermes/cipro, de-escalated to cipro PO to end 5/10. Pending discharge to group [facility].    INTERVAL HPI/OVERNIGHT EVENTS:  Patient was seen and examined at bedside. LIZABETH o/n. Patient pending discharge to group [facility], discharge documentation complete - being transferred to [facility] pending discharge tmrw    VITAL SIGNS:  T(F): 97.4 (05-04-20 @ 08:00)  HR: 99 (05-04-20 @ 10:55)  BP: 139/56 (05-04-20 @ 10:00)  RR: 21 (05-04-20 @ 10:55)  SpO2: 97% (05-04-20 @ 10:55)  Wt(kg): --    PHYSICAL EXAM:  Refer to attending note    MEDICATIONS  (STANDING):  apixaban 5 milliGRAM(s) Oral every 12 hours  chlorhexidine 2% Cloths 1 Application(s) Topical <User Schedule>  ciprofloxacin     Tablet 750 milliGRAM(s) Oral every 12 hours  dextrose 5%. 1000 milliLiter(s) (50 mL/Hr) IV Continuous <Continuous>  dextrose 50% Injectable 12.5 Gram(s) IV Push once  dextrose 50% Injectable 25 Gram(s) IV Push once  dextrose 50% Injectable 25 Gram(s) IV Push once  glycopyrrolate Injectable 0.2 milliGRAM(s) IV Push every 6 hours  insulin lispro (HumaLOG) corrective regimen sliding scale   SubCutaneous every 6 hours  lactobacillus acidophilus 1 Tablet(s) Oral daily  pantoprazole   Suspension 40 milliGRAM(s) Oral daily  psyllium Powder 1 Packet(s) Oral daily  scopolamine   Patch 1 Patch Transdermal every 72 hours    MEDICATIONS  (PRN):  acetaminophen    Suspension .. 650 milliGRAM(s) Oral every 6 hours PRN Temp greater or equal to 38C (100.4F), Mild Pain (1 - 3)  dextrose 40% Gel 15 Gram(s) Oral once PRN Blood Glucose LESS THAN 70 milliGRAM(s)/deciliter  glucagon  Injectable 1 milliGRAM(s) IntraMuscular once PRN Glucose LESS THAN 70 milligrams/deciliter  sodium chloride 0.9% lock flush 10 milliLiter(s) IV Push every 1 hour PRN Pre/post blood products, medications, blood draw, and to maintain line patency      Allergies    ace inhibitors (Anaphylaxis)  caffeine (Rash)  chocolate (Rash)  high acid (Rash)  Tomatoes (Hives)    Intolerances        LABS:                        9.5    6.05  )-----------( 392      ( 04 May 2020 03:23 )             31.7     05-04    146<H>  |  107  |  15  ----------------------------<  93  4.3   |  28  |  0.54    Ca    9.2      04 May 2020 03:23  Phos  3.3     05-04  Mg     1.9     05-04    TPro  6.5  /  Alb  3.5  /  TBili  0.3  /  DBili  x   /  AST  35  /  ALT  16  /  AlkPhos  57  05-04

## 2020-05-04 NOTE — PROGRESS NOTE ADULT - ATTENDING COMMENTS
Acute hypoxemic respiratory failure 2/2 COVID, failed extubation x 2, last reintubation on 4/8. Intubation preceded by acute hypoxemia with inspiratory wheezing of sudden onset. Initial episode resolved with solumedrol dose and pulmonary toilet; second episode required intubation. Now with leukocytosis most likely secondary to aspiration; c/w antibiotics. CT neck without significant extrinsic airway compression. Max dose of Robinul; add scopolamine patch. Pressure support trials.
Reviewed with ZAID Ryan acting as my scribe
Acute hypoxemic respiratory failure 2/2 COVID, doing well s/p extubation. Continue with steroids. Finish 5 days of abx. PT as tolerated given CP. Full anticoagulation for microthrombi.
Acute hypoxemic respiratory failure 2/2 COVID, failed extubation x 2, last reintubation on 4/8. Intubation preceded by acute hypoxemia with inspiratory wheezing of sudden onset. Initial episode resolved with solumedrol dose and pulmonary toilet; second episode required intubation. CT neck without significant extrinsic airway compression. Max dose of Robinul; add scopolamine patch. S/p extubation on 4/13/2020 (extubation #3); doing well. PT and OOB. On NC. Abx restarted 2/2 increasing white count. COVID PCR still positive; dispo back to group home is pending.
Acute hypoxemic respiratory failure 2/2 COVID, failed extubation x 2, last reintubation on 4/8. Intubation preceded by acute hypoxemia with inspiratory wheezing of sudden onset. Initial episode resolved with solumedrol dose and pulmonary toilet; second episode required intubation. CT neck without significant extrinsic airway compression. Max dose of Robinul; add scopolamine patch. S/p extubation on 4/13/2020 (extubation #3); doing well. PT and OOB. On NC. Finished abx; stop steroids (started for possible laryngeal edema).
Acute hypoxemic respiratory failure 2/2 COVID, failed extubation x 2, last reintubation on 4/8. Intubation preceded by acute hypoxemia with inspiratory wheezing of sudden onset. Initial episode resolved with solumedrol dose and pulmonary toilet; second episode required intubation. Now with leukocytosis most likely secondary to aspiration; c/w antibiotics. CT neck without significant extrinsic airway compression. Max dose of Robinul. Pressure support trials.
Acute hypoxemic respiratory failure 2/2 COVID, failed extubation x 2, last reintubation on 4/8. Intubation preceded by acute hypoxemia with inspiratory wheezing of sudden onset. Initial episode resolved with solumedrol dose and pulmonary toilet; second episode required intubation. Now with leukocytosis most likely secondary to aspiration; c/w antibiotics. CT neck without significant extrinsic airway compression. Max dose of Robinul; add scopolamine patch. Pressure support trial went well; extubate.
Acute hypoxemic respiratory failure 2/2 COVID, failed extubation x 2, last reintubation on 4/8. Intubation preceded by acute hypoxemia with inspiratory wheezing of sudden onset. Initial episode resolved with solumedrol dose and pulmonary toilet; second episode required intubation. Now with leukocytosis most likely secondary to aspiration; c/w antibiotics. CT neck without significant extrinsic airway compression. Max dose of Robinul; add scopolamine patch. S/p extubation on 4/13/2020 (extubation #3); doing well. PT and OOB. Transition to NC.
Acute hypoxemic respiratory failure 2/2 COVID, s/p extubation x 3, last extubation on 4/13. CT neck without significant extrinsic airway compression. Max dose of Robinul; scopolamine patch. PT and OOB. On NC. Abx restarted 2/2 increasing white count. COVID PCR still positive; dispo back to group home is pending.
Acute hypoxemic respiratory failure 2/2 COVID, s/p extubation x 3, last extubation on 4/13. CT neck without significant extrinsic airway compression. Max dose of Robinul; scopolamine patch. PT and OOB. On NC. Abx restarted 2/2 increasing white count. COVID PCR still positive; will check every few days; dispo back to group home is pending.
Acute hypoxemic respiratory failure 2/2 COVID, s/p extubation x 3, last extubation on 4/13. CT neck without significant extrinsic airway compression. Max dose of Robinul; scopolamine patch. PT and OOB. On NC. Abx restarted 2/2 increasing white count. COVID PCR still positive; will check every few days; dispo back to group home is pending.
Acute hypoxemic respiratory failure 2/2 COVID, s/p extubation x 3, last extubation on 4/13. Recent pseudomonal bacteremia; now cleared; Cipro sensitive; two week course. Max dose of Robinul; scopolamine patch. PT and OOB. On NC. Pending dispo back to group home.
Acute hypoxemic respiratory failure 2/2 COVID, s/p extubation x 3, last extubation on 4/13. Recent pseudomonal bacteremia; now cleared; Cipro sensitive; two week course. Max dose of Robinul; scopolamine patch. PT and OOB. On NC. Pending dispo back to group home; covid negative x 2.
Acute hypoxemic respiratory failure 2/2 COVID, doing well s/p extubation. Continue with steroids. Finish 5 days of abx. PT as tolerated. Full anticoagulation for microthrombi.
Acute hypoxemic respiratory failure 2/2 COVID, failed extubation x 2, last reintubation on 4/8. Intubation preceded by acute hypoxemia with inspiratory wheezing of sudden onset. Initial episode resolved with solumedrol dose and pulmonary toilet; second episode required intubation. CT neck without significant extrinsic airway compression. Max dose of Robinul; add scopolamine patch. S/p extubation on 4/13/2020 (extubation #3); doing well. PT and OOB. On NC. Finished abx; stop steroids (started for possible laryngeal edema).
Acute hypoxemic respiratory failure 2/2 COVID, s/p extubation x 3, last extubation on 4/13. Recent pseudomonal bacteremia; now cleared; Cipro sensitive. Max dose of Robinul; scopolamine patch. PT and OOB. On NC. Pending dispo back to group home.
Acute hypoxemic respiratory failure 2/2 COVID, s/p extubation x 3, last extubation on 4/13. Recent pseudomonal bacteremia; now cleared; Cipro sensitive. Max dose of Robinul; scopolamine patch. PT and OOB. On NC. Pending dispo back to group home.
Reviewed with ZAID Lamar acting as my scribe. Trial of extubation in progress.
Acute hypoxemic respiratory failure 2/2 COVID, failed extubation x 2, last reintubation on 4/8. Intubation preceded by acute hypoxemia with inspiratory wheezing of sudden onset. Initial episode resolved with solumedrol dose and pulmonary toilet; second episode required intubation. CT neck without significant extrinsic airway compression. Max dose of Robinul; add scopolamine patch. S/p extubation on 4/13/2020 (extubation #3); doing well. PT and OOB. On NC. Abx restarted 2/2 increasing white count.
Acute hypoxemic respiratory failure 2/2 COVID, failed extubation x 2, last reintubation on 4/8. Intubation preceded by acute hypoxemia with inspiratory wheezing of sudden onset. Initial episode resolved with solumedrol dose and pulmonary toilet; second episode required intubation. Now with leukocytosis most likely secondary to aspiration; c/w antibiotics. Will get CT neck to evaluate for airway pathology since this is the 2 second intubation for this stridorous event. Pressure support trials.
Acute hypoxemic respiratory failure 2/2 COVID, s/p extubation x 3, last extubation on 4/13. Recent pseudomonal bacteremia; now cleared; Cipro sensitive; two week course. Max dose of Robinul; scopolamine patch. PT and OOB. On NC. Pending dispo back to group home; facility requesting covid negative x 2.
Acute hypoxemic respiratory failure 2/2 COVID, s/p extubation x 3, last extubation on 4/13. Recent pseudomonal bacteremia; now cleared; Cipro sensitive; two week course. Max dose of Robinul; scopolamine patch. PT and OOB. On NC. Pending dispo back to group home; facility requesting covid negative x 2.
Reviewed with ZAID Mccann acting as my scribe.
Reviewed with ZAID Zepeda acting as my scribe.
Acute hypoxemic respiratory failure 2/2 COVID, s/p extubation x 3, last extubation on 4/13. Recent pseudomonal bacteremia; now cleared; Cipro sensitive; two week course. Max dose of Robinul; scopolamine patch. PT and OOB. On NC. Pending dispo back to group home; facility requesting covid negative x

## 2020-05-04 NOTE — PROGRESS NOTE ADULT - ASSESSMENT
74F w/PMH cerebral palsy, DVT/PE, GERD, chronic PEG, HTN, T spine fusion admitted to ICU due to acute hypoxic respiratory failure, s/p extubation x 3 (last performed 4/13). Patient has improved in oxygenation requirements (currently 96% on 2LNC), yet continued to test positive for COVID 19 (most recent positive 4/29), preventing her from returning to her home long term care facility. COVID PCR negative x2 as of 5/3.     Neurology  Alert and awake off of sedation    #Intellectual disability with cerebral palsy and functional quadriplegia  -At baseline mental status    Cardiovascular  -NSR. No active issues    Pulmonary:  #Acute hypoxic respiratory failure 2/2 COVID-19 pneumonia + VAP  -VAP resolved, s/p vanc/zosyn.   -s/p hydroxychloroquine for COVID-19  -c/w 2L NC-Wean as tolerated  -C/w glycopyrrolate and scopolamine for secretions    #History of pulmonary embolism  -Continue with Eliquis    Renal  -No active issues   -BUN/Cr: 14/0.50  -Continue to monitor I/O  -net positive 860 in AM    Endocrine  -No active issues  -MISS    Gastroenterology:  No active issues.  -Pantoprazole 40mg PO Daily for prophylaxis    Hematology  continue with Eliquis 5 BID for DVT/PE    #Normocytic anemia  -H/H stable at 9.7/32.1  -Consider iron studies. No signs of symptoms of active bleeding.     Infectious Disease:  #COVID-19 pneumonia  -Resolving. s/p hydroxychloroquine  -2L NC-Wean as tolerated  -COVID SWAB 5/3 & 5/1-negative.    #Pseudomonas bacteremia  -C/w cipro 750mg q12h x 2 weeks (started 4/27, ending 5/10 evening)  -Blood cx NGTD 4/27  -Patient afebrile no leukocytosis on CBC     #VAP  -Resolved.     Lines/Tubes:  Rectal tube (to be discontinued on discharge)    Musculoskeletal/Myopathy  -No active issue. c/w OOB    Fluids/Lytes/Nutrition  -Fluids: None  -Electrolytes: Replete K<4, Mg<2  -Nutrition: Jevity    Pain  -No active issues.     Code: FULL CODE  Disposition: [facility] pending discharge to [facility]

## 2020-05-04 NOTE — DISCHARGE NOTE NURSING/CASE MANAGEMENT/SOCIAL WORK - PATIENT PORTAL LINK FT
You can access the FollowMyHealth Patient Portal offered by [facility] by registering at the following website: [email or web address]. By joining [employer]’s FollowMyHealth portal, you will also be able to view your health information using other applications (apps) compatible with our system.

## 2020-05-04 NOTE — CHART NOTE - NSCHARTNOTEFT_GEN_A_CORE
Admitting Diagnosis:   Patient is a 74y old  Female who presents with a chief complaint of covid (03 May 2020 14:41)      PAST MEDICAL & SURGICAL HISTORY:  HTN (hypertension)  Spastic quadriplegia  DVT (deep venous thrombosis)  PE (pulmonary thromboembolism)  GERD (gastroesophageal reflux disease)  Dysphagia  Mental retardation  CP (cerebral palsy)  PEG tube malfunction      Current Nutrition Order:  Jevity 1.5 30ml/hr x24hrs +1PS/day    PO Intake: Good (%) [   ]  Fair (50-75%) [   ] Poor (<25%) [   ]  NA, TF    GI Issues:   Rectal Tube: 100ml 5/4, 50ml 5/3   RN reports +Solid BM over night   +Order for lactobacillus 4/23 / Metamucil 5/1   Antibiotic use note      Pain:  none per flow sheets     Skin Integrity:  1+ generalized edema  No pressure ulcers     Labs:   05-04    146<H>  |  107  |  15  ----------------------------<  93  4.3   |  28  |  0.54    Ca    9.2      04 May 2020 03:23  Phos  3.3     05-04  Mg     1.9     05-04    TPro  6.5  /  Alb  3.5  /  TBili  0.3  /  DBili  x   /  AST  35  /  ALT  16  /  AlkPhos  57  05-04    CAPILLARY BLOOD GLUCOSE      POCT Blood Glucose.: 113 mg/dL (04 May 2020 11:04)  POCT Blood Glucose.: 119 mg/dL (04 May 2020 06:02)  POCT Blood Glucose.: 109 mg/dL (03 May 2020 23:11)  POCT Blood Glucose.: 116 mg/dL (03 May 2020 12:51)      Medications:  MEDICATIONS  (STANDING):  apixaban 5 milliGRAM(s) Oral every 12 hours  chlorhexidine 2% Cloths 1 Application(s) Topical <User Schedule>  ciprofloxacin     Tablet 750 milliGRAM(s) Oral every 12 hours  dextrose 5%. 1000 milliLiter(s) (50 mL/Hr) IV Continuous <Continuous>  dextrose 50% Injectable 12.5 Gram(s) IV Push once  dextrose 50% Injectable 25 Gram(s) IV Push once  dextrose 50% Injectable 25 Gram(s) IV Push once  glycopyrrolate Injectable 0.2 milliGRAM(s) IV Push every 6 hours  insulin lispro (HumaLOG) corrective regimen sliding scale   SubCutaneous every 6 hours  lactobacillus acidophilus 1 Tablet(s) Oral daily  pantoprazole   Suspension 40 milliGRAM(s) Oral daily  psyllium Powder 1 Packet(s) Oral daily  scopolamine   Patch 1 Patch Transdermal every 72 hours    MEDICATIONS  (PRN):  acetaminophen    Suspension .. 650 milliGRAM(s) Oral every 6 hours PRN Temp greater or equal to 38C (100.4F), Mild Pain (1 - 3)  dextrose 40% Gel 15 Gram(s) Oral once PRN Blood Glucose LESS THAN 70 milliGRAM(s)/deciliter  glucagon  Injectable 1 milliGRAM(s) IntraMuscular once PRN Glucose LESS THAN 70 milligrams/deciliter  sodium chloride 0.9% lock flush 10 milliLiter(s) IV Push every 1 hour PRN Pre/post blood products, medications, blood draw, and to maintain line patency      4'7'' IBW 90 pounds +-10%  (4/3) 122 pounds   (4/6) 117 pounds  (4/8) 116.6 pounds   (4/12) 116.6 pounds   %ASG=206% BMI=27.19  Based on most recent EMR wt    Nutrition Focused Physical Exam: Completed [   ]  Not Pertinent [   ]- NA d/t COVID     Estimated energy needs:   IBW used to calculate energy needs due to pt's current body weight exceeding 120% of IBW   Needs adjusted for age based on hypermetabolic state 2/2 viral infection  1025-1230kcal/day (25-30kcal/kg)  49-57g protein/day (1.2-1.4g pro/kg)  Fluids per team     Subjective:   74F with hx of CP, MR, hx of DVT/PE, GERD, chronic PEG, HTN 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, presented to [facility] for AHRF, intubated, sedated on propofol (5 ml/hr providing 132kcal) tx to [facility] for ICU care 4/3. Pt COVID positive with coronavirus pna, acute hypoxemic respiratory failure/ARDS/VAP. Pt extubated to NRB 4/4, however later re-intubated. Pt extuabted 4/6 to NRB. Pt reintuabted 4/8. Extubated for third time 4/12, doing well on non-rebreather and Switched to nasal cannula 4/13; +NC use per EMR. Noted need for negative test prior to returning to group home, pt had continued to test positive for COVID19, however COVID PCR negative x2 as of 5/3 - appears d/c is pending, per RN possibly today.   Noted PEG dislodged on 4/23, Replaced by Sx - Tube study shows no leak, SX signed off. Current TF order for Jevity 1.5 goal rate 30ml/hr x24hrs +1PS/day, provides 720ml, 1180kcal, 61gm prot, 547ml water. Spoke with RN who reports pt tolerating feeds at goal rate without issues, noted use of glycopyrrolate and scopolamine for secretions.   Allergic to caffeine, high acid, and chocolate, tomatoes- noted in EMR.  Please see below for nutritions recommendations.    Previous Nutrition Diagnosis: Inadequate Energy Intake Etiology RT inability to meet est needs 2/2 acute critical status Signs/Symptoms AEB NPO diet meeting 0% EER.   RESOLVED - ordered for EN   Previous Nutrition Diagnosis: Increased nutrient needs RT increased demand for energy and protein AEB hypermetabolic state 2/2 viral infection (COVID19+)   ON GOING AT THIS TIME   Goal/Expected Outcome: Pt will meet at least 75% of nutrient needs     Recommendations:  1. Current TF order for Jevity 1.5 goal rate 30ml/hr x24hrs +1PS/day, provides 720ml, 1180kcal, 61gm prot, 547ml water - meeting EER. Given pump shortage d/t COVID 19, if bolus feeds feasible, consider use of 4 cans Jevity 1.2/day, will provide ~948ml, 1140kcal, 53gm prot, 765ml water.   >> Additional water PRN per team; pt noted with slightly elevated sodium at this time - prior free water order d/c.   2. Monitor tolerance, GI distress- continue with probiotic/fiber agent PRN.   3. Maintain ASP Precautions at all times.   4. Monitor Skin, Wts, GOC.  5. Monitor Labs; monitor BMP, CBC, glucose, lytes - replete PRN, trend renal indices, LFTs.   6. RD to remain available for additional nutrition interventions as needed.     Education: NA   Risk Level: High [ X  ] Moderate [   ] Low [   ].

## 2020-05-05 DIAGNOSIS — U07.1 COVID-19: ICD-10-CM

## 2020-05-05 DIAGNOSIS — J96.01 ACUTE RESPIRATORY FAILURE WITH HYPOXIA: ICD-10-CM

## 2020-05-05 DIAGNOSIS — R78.81 BACTEREMIA: ICD-10-CM

## 2020-05-05 DIAGNOSIS — Z29.9 ENCOUNTER FOR PROPHYLACTIC MEASURES, UNSPECIFIED: ICD-10-CM

## 2020-05-05 DIAGNOSIS — I26.99 OTHER PULMONARY EMBOLISM WITHOUT ACUTE COR PULMONALE: ICD-10-CM

## 2020-05-05 DIAGNOSIS — G80.9 CEREBRAL PALSY, UNSPECIFIED: ICD-10-CM

## 2020-05-05 DIAGNOSIS — I82.409 ACUTE EMBOLISM AND THROMBOSIS OF UNSPECIFIED DEEP VEINS OF UNSPECIFIED LOWER EXTREMITY: ICD-10-CM

## 2020-05-05 LAB
ALBUMIN SERPL ELPH-MCNC: 3.1 G/DL — LOW (ref 3.3–5)
ALP SERPL-CCNC: 55 U/L — SIGNIFICANT CHANGE UP (ref 40–120)
ALT FLD-CCNC: 11 U/L — SIGNIFICANT CHANGE UP (ref 10–45)
ANION GAP SERPL CALC-SCNC: 12 MMOL/L — SIGNIFICANT CHANGE UP (ref 5–17)
AST SERPL-CCNC: 28 U/L — SIGNIFICANT CHANGE UP (ref 10–40)
BILIRUB SERPL-MCNC: 0.4 MG/DL — SIGNIFICANT CHANGE UP (ref 0.2–1.2)
BUN SERPL-MCNC: 19 MG/DL — SIGNIFICANT CHANGE UP (ref 7–23)
CALCIUM SERPL-MCNC: 8.9 MG/DL — SIGNIFICANT CHANGE UP (ref 8.4–10.5)
CHLORIDE SERPL-SCNC: 106 MMOL/L — SIGNIFICANT CHANGE UP (ref 96–108)
CO2 SERPL-SCNC: 27 MMOL/L — SIGNIFICANT CHANGE UP (ref 22–31)
CREAT SERPL-MCNC: 0.53 MG/DL — SIGNIFICANT CHANGE UP (ref 0.5–1.3)
GLUCOSE SERPL-MCNC: 120 MG/DL — HIGH (ref 70–99)
HCT VFR BLD CALC: 31.1 % — LOW (ref 34.5–45)
HGB BLD-MCNC: 9.2 G/DL — LOW (ref 11.5–15.5)
MAGNESIUM SERPL-MCNC: 1.6 MG/DL — SIGNIFICANT CHANGE UP (ref 1.6–2.6)
MCHC RBC-ENTMCNC: 29.3 PG — SIGNIFICANT CHANGE UP (ref 27–34)
MCHC RBC-ENTMCNC: 29.6 GM/DL — LOW (ref 32–36)
MCV RBC AUTO: 99 FL — SIGNIFICANT CHANGE UP (ref 80–100)
NRBC # BLD: 0 /100 WBCS — SIGNIFICANT CHANGE UP (ref 0–0)
PHOSPHATE SERPL-MCNC: 2.9 MG/DL — SIGNIFICANT CHANGE UP (ref 2.5–4.5)
PLATELET # BLD AUTO: 473 K/UL — HIGH (ref 150–400)
POTASSIUM SERPL-MCNC: 3.8 MMOL/L — SIGNIFICANT CHANGE UP (ref 3.5–5.3)
POTASSIUM SERPL-SCNC: 3.8 MMOL/L — SIGNIFICANT CHANGE UP (ref 3.5–5.3)
PROT SERPL-MCNC: 6.3 G/DL — SIGNIFICANT CHANGE UP (ref 6–8.3)
RBC # BLD: 3.14 M/UL — LOW (ref 3.8–5.2)
RBC # FLD: 15 % — HIGH (ref 10.3–14.5)
SODIUM SERPL-SCNC: 145 MMOL/L — SIGNIFICANT CHANGE UP (ref 135–145)
WBC # BLD: 6.03 K/UL — SIGNIFICANT CHANGE UP (ref 3.8–10.5)
WBC # FLD AUTO: 6.03 K/UL — SIGNIFICANT CHANGE UP (ref 3.8–10.5)

## 2020-05-05 PROCEDURE — 99233 SBSQ HOSP IP/OBS HIGH 50: CPT | Mod: CS,GC

## 2020-05-05 RX ORDER — ROBINUL 0.2 MG/ML
0.1 INJECTION INTRAMUSCULAR; INTRAVENOUS EVERY 6 HOURS
Refills: 0 | Status: DISCONTINUED | OUTPATIENT
Start: 2020-05-05 | End: 2020-05-06

## 2020-05-05 RX ADMIN — Medication 1 PACKET(S): at 12:32

## 2020-05-05 RX ADMIN — PANTOPRAZOLE SODIUM 40 MILLIGRAM(S): 20 TABLET, DELAYED RELEASE ORAL at 12:31

## 2020-05-05 RX ADMIN — ROBINUL 0.1 MILLIGRAM(S): 0.2 INJECTION INTRAMUSCULAR; INTRAVENOUS at 12:31

## 2020-05-05 RX ADMIN — SCOPALAMINE 1 PATCH: 1 PATCH, EXTENDED RELEASE TRANSDERMAL at 19:24

## 2020-05-05 RX ADMIN — Medication 750 MILLIGRAM(S): at 18:32

## 2020-05-05 RX ADMIN — APIXABAN 5 MILLIGRAM(S): 2.5 TABLET, FILM COATED ORAL at 12:30

## 2020-05-05 RX ADMIN — Medication 750 MILLIGRAM(S): at 05:49

## 2020-05-05 RX ADMIN — Medication 1 TABLET(S): at 12:31

## 2020-05-05 RX ADMIN — SCOPALAMINE 1 PATCH: 1 PATCH, EXTENDED RELEASE TRANSDERMAL at 07:49

## 2020-05-05 RX ADMIN — SCOPALAMINE 1 PATCH: 1 PATCH, EXTENDED RELEASE TRANSDERMAL at 12:33

## 2020-05-05 RX ADMIN — ROBINUL 0.1 MILLIGRAM(S): 0.2 INJECTION INTRAMUSCULAR; INTRAVENOUS at 18:32

## 2020-05-05 RX ADMIN — ROBINUL 0.2 MILLIGRAM(S): 0.2 INJECTION INTRAMUSCULAR; INTRAVENOUS at 05:48

## 2020-05-05 RX ADMIN — SCOPALAMINE 1 PATCH: 1 PATCH, EXTENDED RELEASE TRANSDERMAL at 12:32

## 2020-05-05 NOTE — PROGRESS NOTE ADULT - ASSESSMENT
74F w/PMH cerebral palsy, DVT/PE, GERD, chronic PEG, HTN, T spine fusion admitted to ICU due to acute hypoxic respiratory failure, s/p extubation x 3 (last performed 4/13). Patient has improved in oxygenation requirements (currently 96% on 2LNC), yet continued to test positive for COVID 19 (most recent positive 4/29), preventing her from returning to her home long term care facility. COVID PCR negative x2 as of 5/3. Pending dispo to [facility].

## 2020-05-05 NOTE — PROGRESS NOTE ADULT - PROBLEM SELECTOR PLAN 1
-VAP resolved, s/p vanc/zosyn.   -s/p hydroxychloroquine for COVID-19  -c/w 2L NC-Wean as tolerated  -C/w glycopyrrolate and scopolamine for secretions  - downtitrated glycopyrrolate from 0.2 q6 to 0.1 q6

## 2020-05-05 NOTE — PROGRESS NOTE ADULT - PROBLEM SELECTOR PLAN 7
-Fluids: None  -Electrolytes: Replete K<4, Mg<2  -Nutrition: Jevity    Pain  -No active issues.     Code: FULL CODE  Disposition: [facility] pending discharge to [facility] home

## 2020-05-05 NOTE — PROGRESS NOTE ADULT - SUBJECTIVE AND OBJECTIVE BOX
**INCOMPLETE NOTE    OVERNIGHT EVENTS:    SUBJECTIVE:  Patient seen and examined at bedside.    Vital Signs Last 12 Hrs  T(F): 97.4 (05-05-20 @ 12:58), Max: 97.9 (05-05-20 @ 05:00)  HR: 98 (05-05-20 @ 12:58) (69 - 98)  BP: 138/64 (05-05-20 @ 12:58) (101/58 - 138/64)  BP(mean): --  RR: 18 (05-05-20 @ 12:58) (16 - 18)  SpO2: 95% (05-05-20 @ 12:58) (95% - 100%)  I&O's Summary    04 May 2020 07:01  -  05 May 2020 07:00  --------------------------------------------------------  IN: 340 mL / OUT: 700 mL / NET: -360 mL        PHYSICAL EXAM:  Constitutional: NAD, comfortable in bed.  HEENT: NC/AT, PERRLA, EOMI, no conjunctival pallor or scleral icterus, MMM  Neck: Supple, no JVD  Respiratory: CTA B/L. No w/r/r.   Cardiovascular: RRR, normal S1 and S2, no m/r/g.   Gastrointestinal: +BS, soft NTND, no guarding or rebound tenderness, no palpable masses   Extremities: wwp; no cyanosis, clubbing or edema.   Vascular: Pulses equal and strong throughout.   Neurological: AAOx3, no CN deficits, strength and sensation intact throughout.   Skin: No gross skin abnormalities or rashes        LABS:                        9.2    6.03  )-----------( 473      ( 05 May 2020 10:10 )             31.1     05-05    145  |  106  |  19  ----------------------------<  120<H>  3.8   |  27  |  0.53    Ca    8.9      05 May 2020 10:10  Phos  2.9     05-05  Mg     1.6     05-05    TPro  6.3  /  Alb  3.1<L>  /  TBili  0.4  /  DBili  x   /  AST  28  /  ALT  11  /  AlkPhos  55  05-05            RADIOLOGY & ADDITIONAL TESTS:    MEDICATIONS  (STANDING):  apixaban 5 milliGRAM(s) Oral every 12 hours  chlorhexidine 2% Cloths 1 Application(s) Topical <User Schedule>  ciprofloxacin     Tablet 750 milliGRAM(s) Oral every 12 hours  dextrose 5%. 1000 milliLiter(s) (50 mL/Hr) IV Continuous <Continuous>  dextrose 50% Injectable 12.5 Gram(s) IV Push once  dextrose 50% Injectable 25 Gram(s) IV Push once  dextrose 50% Injectable 25 Gram(s) IV Push once  glycopyrrolate Injectable 0.1 milliGRAM(s) IV Push every 6 hours  insulin lispro (HumaLOG) corrective regimen sliding scale   SubCutaneous every 6 hours  lactobacillus acidophilus 1 Tablet(s) Oral daily  pantoprazole   Suspension 40 milliGRAM(s) Oral daily  psyllium Powder 1 Packet(s) Oral daily  scopolamine   Patch 1 Patch Transdermal every 72 hours    MEDICATIONS  (PRN):  acetaminophen    Suspension .. 650 milliGRAM(s) Oral every 6 hours PRN Temp greater or equal to 38C (100.4F), Mild Pain (1 - 3)  dextrose 40% Gel 15 Gram(s) Oral once PRN Blood Glucose LESS THAN 70 milliGRAM(s)/deciliter  glucagon  Injectable 1 milliGRAM(s) IntraMuscular once PRN Glucose LESS THAN 70 milligrams/deciliter  sodium chloride 0.9% lock flush 10 milliLiter(s) IV Push every 1 hour PRN Pre/post blood products, medications, blood draw, and to maintain line patency OVERNIGHT EVENTS: LIZABETH    SUBJECTIVE: Patient seen and examined at bedside. She appears comfortable and in no acute distress breathing well on 4L NC. She is laying with contracted extremities and nonpurposeful movements. She verbalizes that she wants to go home. Cannot assess ROS given mental status. She is pending discharge to her group home.    Vital Signs Last 12 Hrs  T(F): 97.4 (05-05-20 @ 12:58), Max: 97.9 (05-05-20 @ 05:00)  HR: 98 (05-05-20 @ 12:58) (69 - 98)  BP: 138/64 (05-05-20 @ 12:58) (101/58 - 138/64)  BP(mean): --  RR: 18 (05-05-20 @ 12:58) (16 - 18)  SpO2: 95% (05-05-20 @ 12:58) (95% - 100%)  I&O's Summary    04 May 2020 07:01  -  05 May 2020 07:00  --------------------------------------------------------  IN: 340 mL / OUT: 700 mL / NET: -360 mL        PHYSICAL EXAM:  Constitutional: NAD, comfortable in bed on 4L NC sating at 95%, contracted extremities with nonpurposeful movements   HEENT: NC/AT, PERRLA, EOMI, no conjunctival pallor or scleral icterus, MMM  Neck: Supple, no JVD  Respiratory: CTA B/L. No w/r/r.   Cardiovascular: RRR, normal S1 and S2, no m/r/g.   Gastrointestinal: +BS, soft NTND, no guarding or rebound tenderness, no palpable masses   Extremities: wwp; no cyanosis, clubbing or edema.   Vascular: Pulses equal and strong throughout.   Neurological: AAOx3, incoherent able to acknowledge questions and commands by incoherently answering. Contracted extremities with nonpurposeful movements   Skin: No gross skin abnormalities or rashes        LABS:                        9.2    6.03  )-----------( 473      ( 05 May 2020 10:10 )             31.1     05-05    145  |  106  |  19  ----------------------------<  120<H>  3.8   |  27  |  0.53    Ca    8.9      05 May 2020 10:10  Phos  2.9     05-05  Mg     1.6     05-05    TPro  6.3  /  Alb  3.1<L>  /  TBili  0.4  /  DBili  x   /  AST  28  /  ALT  11  /  AlkPhos  55  05-05            RADIOLOGY & ADDITIONAL TESTS:    MEDICATIONS  (STANDING):  apixaban 5 milliGRAM(s) Oral every 12 hours  chlorhexidine 2% Cloths 1 Application(s) Topical <User Schedule>  ciprofloxacin     Tablet 750 milliGRAM(s) Oral every 12 hours  dextrose 5%. 1000 milliLiter(s) (50 mL/Hr) IV Continuous <Continuous>  dextrose 50% Injectable 12.5 Gram(s) IV Push once  dextrose 50% Injectable 25 Gram(s) IV Push once  dextrose 50% Injectable 25 Gram(s) IV Push once  glycopyrrolate Injectable 0.1 milliGRAM(s) IV Push every 6 hours  insulin lispro (HumaLOG) corrective regimen sliding scale   SubCutaneous every 6 hours  lactobacillus acidophilus 1 Tablet(s) Oral daily  pantoprazole   Suspension 40 milliGRAM(s) Oral daily  psyllium Powder 1 Packet(s) Oral daily  scopolamine   Patch 1 Patch Transdermal every 72 hours    MEDICATIONS  (PRN):  acetaminophen    Suspension .. 650 milliGRAM(s) Oral every 6 hours PRN Temp greater or equal to 38C (100.4F), Mild Pain (1 - 3)  dextrose 40% Gel 15 Gram(s) Oral once PRN Blood Glucose LESS THAN 70 milliGRAM(s)/deciliter  glucagon  Injectable 1 milliGRAM(s) IntraMuscular once PRN Glucose LESS THAN 70 milligrams/deciliter  sodium chloride 0.9% lock flush 10 milliLiter(s) IV Push every 1 hour PRN Pre/post blood products, medications, blood draw, and to maintain line patency

## 2020-05-05 NOTE — PROGRESS NOTE ADULT - PROBLEM SELECTOR PLAN 2
-C/w cipro 750mg q12h x 2 weeks (started 4/27, ending 5/10 evening)  -Blood cx NGTD 4/27  -Patient afebrile no leukocytosis on CBC

## 2020-05-06 LAB
ALBUMIN SERPL ELPH-MCNC: 3 G/DL — LOW (ref 3.3–5)
ALP SERPL-CCNC: 52 U/L — SIGNIFICANT CHANGE UP (ref 40–120)
ALT FLD-CCNC: 15 U/L — SIGNIFICANT CHANGE UP (ref 10–45)
ANION GAP SERPL CALC-SCNC: 15 MMOL/L — SIGNIFICANT CHANGE UP (ref 5–17)
AST SERPL-CCNC: 42 U/L — HIGH (ref 10–40)
BILIRUB SERPL-MCNC: 0.4 MG/DL — SIGNIFICANT CHANGE UP (ref 0.2–1.2)
BUN SERPL-MCNC: 16 MG/DL — SIGNIFICANT CHANGE UP (ref 7–23)
CALCIUM SERPL-MCNC: 9.1 MG/DL — SIGNIFICANT CHANGE UP (ref 8.4–10.5)
CHLORIDE SERPL-SCNC: 108 MMOL/L — SIGNIFICANT CHANGE UP (ref 96–108)
CO2 SERPL-SCNC: 19 MMOL/L — LOW (ref 22–31)
CREAT SERPL-MCNC: 0.45 MG/DL — LOW (ref 0.5–1.3)
CRP SERPL-MCNC: 0.28 MG/DL — SIGNIFICANT CHANGE UP (ref 0–0.4)
D DIMER BLD IA.RAPID-MCNC: 728 NG/ML DDU — HIGH
FERRITIN SERPL-MCNC: 1079 NG/ML — HIGH (ref 15–150)
GLUCOSE BLDC GLUCOMTR-MCNC: 102 MG/DL — HIGH (ref 70–99)
GLUCOSE BLDC GLUCOMTR-MCNC: 105 MG/DL — HIGH (ref 70–99)
GLUCOSE BLDC GLUCOMTR-MCNC: 97 MG/DL — SIGNIFICANT CHANGE UP (ref 70–99)
GLUCOSE BLDC GLUCOMTR-MCNC: 99 MG/DL — SIGNIFICANT CHANGE UP (ref 70–99)
GLUCOSE SERPL-MCNC: 113 MG/DL — HIGH (ref 70–99)
HCT VFR BLD CALC: 30.8 % — LOW (ref 34.5–45)
HGB BLD-MCNC: 9.3 G/DL — LOW (ref 11.5–15.5)
MAGNESIUM SERPL-MCNC: 1.7 MG/DL — SIGNIFICANT CHANGE UP (ref 1.6–2.6)
MCHC RBC-ENTMCNC: 30.2 GM/DL — LOW (ref 32–36)
MCHC RBC-ENTMCNC: 30.2 PG — SIGNIFICANT CHANGE UP (ref 27–34)
MCV RBC AUTO: 100 FL — SIGNIFICANT CHANGE UP (ref 80–100)
NRBC # BLD: 0 /100 WBCS — SIGNIFICANT CHANGE UP (ref 0–0)
PHOSPHATE SERPL-MCNC: 3.4 MG/DL — SIGNIFICANT CHANGE UP (ref 2.5–4.5)
PLATELET # BLD AUTO: 466 K/UL — HIGH (ref 150–400)
POTASSIUM SERPL-MCNC: 4.8 MMOL/L — SIGNIFICANT CHANGE UP (ref 3.5–5.3)
POTASSIUM SERPL-SCNC: 4.8 MMOL/L — SIGNIFICANT CHANGE UP (ref 3.5–5.3)
PROT SERPL-MCNC: 6.3 G/DL — SIGNIFICANT CHANGE UP (ref 6–8.3)
RBC # BLD: 3.08 M/UL — LOW (ref 3.8–5.2)
RBC # FLD: 14.8 % — HIGH (ref 10.3–14.5)
SODIUM SERPL-SCNC: 142 MMOL/L — SIGNIFICANT CHANGE UP (ref 135–145)
WBC # BLD: 6.5 K/UL — SIGNIFICANT CHANGE UP (ref 3.8–10.5)
WBC # FLD AUTO: 6.5 K/UL — SIGNIFICANT CHANGE UP (ref 3.8–10.5)

## 2020-05-06 PROCEDURE — 99233 SBSQ HOSP IP/OBS HIGH 50: CPT

## 2020-05-06 RX ORDER — MAGNESIUM SULFATE 500 MG/ML
2 VIAL (ML) INJECTION ONCE
Refills: 0 | Status: COMPLETED | OUTPATIENT
Start: 2020-05-06 | End: 2020-05-06

## 2020-05-06 RX ORDER — ROBINUL 0.2 MG/ML
1 INJECTION INTRAMUSCULAR; INTRAVENOUS EVERY 12 HOURS
Refills: 0 | Status: DISCONTINUED | OUTPATIENT
Start: 2020-05-06 | End: 2020-05-06

## 2020-05-06 RX ORDER — ROBINUL 0.2 MG/ML
0.1 INJECTION INTRAMUSCULAR; INTRAVENOUS EVERY 12 HOURS
Refills: 0 | Status: DISCONTINUED | OUTPATIENT
Start: 2020-05-06 | End: 2020-05-06

## 2020-05-06 RX ADMIN — ROBINUL 0.1 MILLIGRAM(S): 0.2 INJECTION INTRAMUSCULAR; INTRAVENOUS at 00:41

## 2020-05-06 RX ADMIN — Medication 50 GRAM(S): at 09:03

## 2020-05-06 RX ADMIN — Medication 750 MILLIGRAM(S): at 17:27

## 2020-05-06 RX ADMIN — APIXABAN 5 MILLIGRAM(S): 2.5 TABLET, FILM COATED ORAL at 00:41

## 2020-05-06 RX ADMIN — SCOPALAMINE 1 PATCH: 1 PATCH, EXTENDED RELEASE TRANSDERMAL at 17:28

## 2020-05-06 RX ADMIN — ROBINUL 0.1 MILLIGRAM(S): 0.2 INJECTION INTRAMUSCULAR; INTRAVENOUS at 05:42

## 2020-05-06 RX ADMIN — APIXABAN 5 MILLIGRAM(S): 2.5 TABLET, FILM COATED ORAL at 22:00

## 2020-05-06 RX ADMIN — Medication 1 PACKET(S): at 12:16

## 2020-05-06 RX ADMIN — APIXABAN 5 MILLIGRAM(S): 2.5 TABLET, FILM COATED ORAL at 12:16

## 2020-05-06 RX ADMIN — Medication 750 MILLIGRAM(S): at 05:42

## 2020-05-06 RX ADMIN — SCOPALAMINE 1 PATCH: 1 PATCH, EXTENDED RELEASE TRANSDERMAL at 07:01

## 2020-05-06 RX ADMIN — PANTOPRAZOLE SODIUM 40 MILLIGRAM(S): 20 TABLET, DELAYED RELEASE ORAL at 12:16

## 2020-05-06 RX ADMIN — Medication 1 TABLET(S): at 12:16

## 2020-05-06 NOTE — PROGRESS NOTE ADULT - ASSESSMENT
74F w/PMH cerebral palsy, DVT/PE, GERD, chronic PEG, HTN, T spine fusion admitted to ICU due to acute hypoxic respiratory failure, s/p extubation x 3 (last performed 4/13). Patient has improved in oxygenation requirements (currently 96% on 2LNC), yet continued to test positive for COVID 19 (most recent positive 4/29), preventing her from returning to her home long term care facility. COVID PCR negative x2 as of 5/3. Pending dispo to [facility].

## 2020-05-06 NOTE — PROGRESS NOTE ADULT - SUBJECTIVE AND OBJECTIVE BOX
OVERNIGHT EVENTS: LIZABETH    SUBJECTIVE: Patient seen and examined at bedside. She appears comfortable and in no acute distress breathing well on 3L NC. She is laying with nonpurposeful movements of her extremities (baseline given her cerebral palsy). Cannot assess ROS given mental status. She is pending discharge to her group home.    Vital Signs Last 12 Hrs  T(F): 98.4 (05-06-20 @ 05:27), Max: 98.4 (05-06-20 @ 05:27)  HR: 73 (05-06-20 @ 05:27) (73 - 86)  BP: 114/58 (05-06-20 @ 05:27) (114/58 - 122/68)  BP(mean): --  RR: 20 (05-06-20 @ 05:27) (18 - 20)  SpO2: 98% (05-06-20 @ 05:27) (94% - 98%)  I&O's Summary    05 May 2020 07:01  -  06 May 2020 07:00  --------------------------------------------------------  IN: 1020 mL / OUT: 0 mL / NET: 1020 mL        PHYSICAL EXAM:  Constitutional: NAD, comfortable in bed on 3L NC sating at 98%, contracted extremities with nonpurposeful movements   HEENT: NC/AT, PERRLA, EOMI, no conjunctival pallor or scleral icterus, MMM  Neck: Supple, no JVD  Respiratory: CTA B/L. No w/r/r.   Cardiovascular: RRR, normal S1 and S2, no m/r/g.   Gastrointestinal: +BS, soft NTND, no guarding or rebound tenderness, no palpable masses   Extremities: wwp; no cyanosis, clubbing or edema.   Vascular: Pulses equal and strong throughout.   Neurological: AAOx3, incoherent able to acknowledge questions and commands by incoherently answering. Contracted extremities with nonpurposeful movements       LABS:                        9.2    6.03  )-----------( 473      ( 05 May 2020 10:10 )             31.1     05-06    142  |  108  |  16  ----------------------------<  113<H>  4.8   |  19<L>  |  0.45<L>    Ca    9.1      06 May 2020 06:19  Phos  3.4     05-06  Mg     1.7     05-06    TPro  6.3  /  Alb  3.0<L>  /  TBili  0.4  /  DBili  x   /  AST  42<H>  /  ALT  15  /  AlkPhos  52  05-06            RADIOLOGY & ADDITIONAL TESTS:    MEDICATIONS  (STANDING):  apixaban 5 milliGRAM(s) Oral every 12 hours  chlorhexidine 2% Cloths 1 Application(s) Topical <User Schedule>  ciprofloxacin     Tablet 750 milliGRAM(s) Oral every 12 hours  dextrose 5%. 1000 milliLiter(s) (50 mL/Hr) IV Continuous <Continuous>  dextrose 50% Injectable 12.5 Gram(s) IV Push once  dextrose 50% Injectable 25 Gram(s) IV Push once  dextrose 50% Injectable 25 Gram(s) IV Push once  glycopyrrolate Injectable 0.1 milliGRAM(s) IV Push every 12 hours  insulin lispro (HumaLOG) corrective regimen sliding scale   SubCutaneous every 6 hours  lactobacillus acidophilus 1 Tablet(s) Oral daily  pantoprazole   Suspension 40 milliGRAM(s) Oral daily  psyllium Powder 1 Packet(s) Oral daily  scopolamine   Patch 1 Patch Transdermal every 72 hours    MEDICATIONS  (PRN):  acetaminophen    Suspension .. 650 milliGRAM(s) Oral every 6 hours PRN Temp greater or equal to 38C (100.4F), Mild Pain (1 - 3)  dextrose 40% Gel 15 Gram(s) Oral once PRN Blood Glucose LESS THAN 70 milliGRAM(s)/deciliter  glucagon  Injectable 1 milliGRAM(s) IntraMuscular once PRN Glucose LESS THAN 70 milligrams/deciliter  sodium chloride 0.9% lock flush 10 milliLiter(s) IV Push every 1 hour PRN Pre/post blood products, medications, blood draw, and to maintain line patency

## 2020-05-06 NOTE — PROGRESS NOTE ADULT - PROBLEM SELECTOR PLAN 1
-VAP resolved, s/p vanc/zosyn.   -s/p hydroxychloroquine for COVID-19  -c/w 2L NC-Wean as tolerated  -C/w glycopyrrolate and scopolamine for secretions  - downtitrated glycopyrrolate from 0.1 q6 to 0.1 q12 -VAP resolved, s/p vanc/zosyn.   -s/p hydroxychloroquine for COVID-19  -c/w 3L NC-Wean as tolerated  -C/w glycopyrrolate and scopolamine for secretions  - downtitrated glycopyrrolate from 0.1 q6 to 0.1 q12

## 2020-05-06 NOTE — PROGRESS NOTE ADULT - PROBLEM SELECTOR PLAN 3
-Resolving. s/p hydroxychloroquine  -2L NC-Wean as tolerated  -COVID SWAB 5/3 & 5/1-negative. -Resolving. s/p hydroxychloroquine  -3L NC-Wean as tolerated  -COVID SWAB 5/3 & 5/1-negative.

## 2020-05-06 NOTE — PROGRESS NOTE ADULT - PROBLEM SELECTOR PLAN 7
-Fluids: None  -Electrolytes: Replete K<4, Mg<2  -Nutrition: Jevity    Pain  -No active issues.     Code: FULL CODE  Disposition: RMF pending discharge to [facility] home

## 2020-05-07 LAB
ALBUMIN SERPL ELPH-MCNC: 3.3 G/DL — SIGNIFICANT CHANGE UP (ref 3.3–5)
ALP SERPL-CCNC: 55 U/L — SIGNIFICANT CHANGE UP (ref 40–120)
ALT FLD-CCNC: 13 U/L — SIGNIFICANT CHANGE UP (ref 10–45)
ANION GAP SERPL CALC-SCNC: 11 MMOL/L — SIGNIFICANT CHANGE UP (ref 5–17)
AST SERPL-CCNC: 25 U/L — SIGNIFICANT CHANGE UP (ref 10–40)
BASOPHILS # BLD AUTO: 0.06 K/UL — SIGNIFICANT CHANGE UP (ref 0–0.2)
BASOPHILS NFR BLD AUTO: 1.1 % — SIGNIFICANT CHANGE UP (ref 0–2)
BILIRUB SERPL-MCNC: 0.4 MG/DL — SIGNIFICANT CHANGE UP (ref 0.2–1.2)
BUN SERPL-MCNC: 13 MG/DL — SIGNIFICANT CHANGE UP (ref 7–23)
CALCIUM SERPL-MCNC: 9.1 MG/DL — SIGNIFICANT CHANGE UP (ref 8.4–10.5)
CHLORIDE SERPL-SCNC: 105 MMOL/L — SIGNIFICANT CHANGE UP (ref 96–108)
CO2 SERPL-SCNC: 27 MMOL/L — SIGNIFICANT CHANGE UP (ref 22–31)
CREAT SERPL-MCNC: 0.49 MG/DL — LOW (ref 0.5–1.3)
EOSINOPHIL # BLD AUTO: 0.13 K/UL — SIGNIFICANT CHANGE UP (ref 0–0.5)
EOSINOPHIL NFR BLD AUTO: 2.5 % — SIGNIFICANT CHANGE UP (ref 0–6)
GLUCOSE BLDC GLUCOMTR-MCNC: 106 MG/DL — HIGH (ref 70–99)
GLUCOSE BLDC GLUCOMTR-MCNC: 109 MG/DL — HIGH (ref 70–99)
GLUCOSE BLDC GLUCOMTR-MCNC: 110 MG/DL — HIGH (ref 70–99)
GLUCOSE BLDC GLUCOMTR-MCNC: 120 MG/DL — HIGH (ref 70–99)
GLUCOSE BLDC GLUCOMTR-MCNC: 84 MG/DL — SIGNIFICANT CHANGE UP (ref 70–99)
GLUCOSE BLDC GLUCOMTR-MCNC: 97 MG/DL — SIGNIFICANT CHANGE UP (ref 70–99)
GLUCOSE SERPL-MCNC: 102 MG/DL — HIGH (ref 70–99)
HCT VFR BLD CALC: 30.8 % — LOW (ref 34.5–45)
HGB BLD-MCNC: 9.2 G/DL — LOW (ref 11.5–15.5)
IMM GRANULOCYTES NFR BLD AUTO: 0.2 % — SIGNIFICANT CHANGE UP (ref 0–1.5)
LYMPHOCYTES # BLD AUTO: 1.21 K/UL — SIGNIFICANT CHANGE UP (ref 1–3.3)
LYMPHOCYTES # BLD AUTO: 22.9 % — SIGNIFICANT CHANGE UP (ref 13–44)
MAGNESIUM SERPL-MCNC: 1.8 MG/DL — SIGNIFICANT CHANGE UP (ref 1.6–2.6)
MCHC RBC-ENTMCNC: 29.6 PG — SIGNIFICANT CHANGE UP (ref 27–34)
MCHC RBC-ENTMCNC: 29.9 GM/DL — LOW (ref 32–36)
MCV RBC AUTO: 99 FL — SIGNIFICANT CHANGE UP (ref 80–100)
MONOCYTES # BLD AUTO: 0.54 K/UL — SIGNIFICANT CHANGE UP (ref 0–0.9)
MONOCYTES NFR BLD AUTO: 10.2 % — SIGNIFICANT CHANGE UP (ref 2–14)
NEUTROPHILS # BLD AUTO: 3.33 K/UL — SIGNIFICANT CHANGE UP (ref 1.8–7.4)
NEUTROPHILS NFR BLD AUTO: 63.1 % — SIGNIFICANT CHANGE UP (ref 43–77)
NRBC # BLD: 0 /100 WBCS — SIGNIFICANT CHANGE UP (ref 0–0)
PHOSPHATE SERPL-MCNC: 3 MG/DL — SIGNIFICANT CHANGE UP (ref 2.5–4.5)
PLATELET # BLD AUTO: 464 K/UL — HIGH (ref 150–400)
POTASSIUM SERPL-MCNC: 3.9 MMOL/L — SIGNIFICANT CHANGE UP (ref 3.5–5.3)
POTASSIUM SERPL-SCNC: 3.9 MMOL/L — SIGNIFICANT CHANGE UP (ref 3.5–5.3)
PROT SERPL-MCNC: 6.3 G/DL — SIGNIFICANT CHANGE UP (ref 6–8.3)
RBC # BLD: 3.11 M/UL — LOW (ref 3.8–5.2)
RBC # FLD: 14.8 % — HIGH (ref 10.3–14.5)
SODIUM SERPL-SCNC: 143 MMOL/L — SIGNIFICANT CHANGE UP (ref 135–145)
WBC # BLD: 5.28 K/UL — SIGNIFICANT CHANGE UP (ref 3.8–10.5)
WBC # FLD AUTO: 5.28 K/UL — SIGNIFICANT CHANGE UP (ref 3.8–10.5)

## 2020-05-07 PROCEDURE — 99233 SBSQ HOSP IP/OBS HIGH 50: CPT

## 2020-05-07 RX ORDER — MAGNESIUM SULFATE 500 MG/ML
1 VIAL (ML) INJECTION ONCE
Refills: 0 | Status: COMPLETED | OUTPATIENT
Start: 2020-05-07 | End: 2020-05-07

## 2020-05-07 RX ADMIN — Medication 750 MILLIGRAM(S): at 17:11

## 2020-05-07 RX ADMIN — Medication 1 PACKET(S): at 11:53

## 2020-05-07 RX ADMIN — Medication 100 GRAM(S): at 10:41

## 2020-05-07 RX ADMIN — Medication 750 MILLIGRAM(S): at 05:54

## 2020-05-07 RX ADMIN — CHLORHEXIDINE GLUCONATE 1 APPLICATION(S): 213 SOLUTION TOPICAL at 05:53

## 2020-05-07 RX ADMIN — APIXABAN 5 MILLIGRAM(S): 2.5 TABLET, FILM COATED ORAL at 11:53

## 2020-05-07 RX ADMIN — Medication 1 TABLET(S): at 11:53

## 2020-05-07 RX ADMIN — PANTOPRAZOLE SODIUM 40 MILLIGRAM(S): 20 TABLET, DELAYED RELEASE ORAL at 11:53

## 2020-05-07 RX ADMIN — SCOPALAMINE 1 PATCH: 1 PATCH, EXTENDED RELEASE TRANSDERMAL at 19:37

## 2020-05-07 RX ADMIN — SCOPALAMINE 1 PATCH: 1 PATCH, EXTENDED RELEASE TRANSDERMAL at 05:54

## 2020-05-07 NOTE — PROGRESS NOTE ADULT - PROBLEM SELECTOR PROBLEM 7
[facility] [employer] [facility]        NAME: Sally Platt is a 52 y o  female  : 1968    MRN: [id number]  DATE: 2018  TIME: 9:59 PM    Assessment and Plan   Acute pharyngitis, unspecified etiology [J02 9]  1  Acute pharyngitis, unspecified etiology  lidocaine viscous (XYLOCAINE) 2 % mucosal solution   2  Allergic rhinitis, unspecified seasonality, unspecified trigger  Triamcinolone Acetonide 55 MCG/ACT AERO         Patient Instructions       Follow up with PCP in 3-5 days  Proceed to  ER if symptoms worsen  Chief Complaint     Chief Complaint   Patient presents with    Cold Like Symptoms     sore throat with PND and cold symptoms for a few days         History of Present Illness       Patient complaining 4 day history of postnasal drip  Describes severe nasal congestion  Also describes a nonproductive cough  She has been taking over-the-counter Mucinex for symptoms with no significant improvement  She currently takes singular for allergies and asthma  Her cough seems to be nonproductive but describes some mild shortness of breath  Has been using a albuterol inhaler with mild improvement  Also describes a 2 day history of mild to moderate body aches  Refers to the left ear pressure  Review of Systems   Review of Systems   Constitutional: Negative  HENT: Positive for congestion, ear pain and sore throat  Respiratory: Positive for cough            Current Medications       Current Outpatient Prescriptions:     montelukast (SINGULAIR) 10 mg tablet, Take 1 tablet (10 mg total) by mouth daily at bedtime, Disp: 90 tablet, Rfl: 0    omeprazole (PriLOSEC) 40 MG capsule, TAKE 1 CAPSULE(40 MG) BY MOUTH DAILY, Disp: 90 capsule, Rfl: 0    VENTOLIN  (90 Base) MCG/ACT inhaler, INHALE 1 PUFF BY MOUTH EVERY 6 HOURS, Disp: 18 g, Rfl: 0    lidocaine viscous (XYLOCAINE) 2 % mucosal solution, Swish and spit 15 mL 4 (four) times a day as needed for mild pain for up to 7 days, Disp: 100 mL, Rfl: 0    loratadine (CLARITIN) 10 mg tablet, TAKE 1 TABLET(10 MG) BY MOUTH DAILY (Patient not taking: Reported on 11/5/2018), Disp: 90 tablet, Rfl: 0    Triamcinolone Acetonide 55 MCG/ACT AERO, 2 Act (110 mcg total) into each nostril daily for 30 days, Disp: 60 Act, Rfl: 0    Current Allergies     Allergies as of 11/05/2018 - Reviewed 11/05/2018   Allergen Reaction Noted    Other  12/14/2017    Penicillin g  11/04/2017    Penicillins  12/14/2017            The following portions of the patient's history were reviewed and updated as appropriate: allergies, current medications, past family history, past medical history, past social history, past surgical history and problem list      Past Medical History:   Diagnosis Date    Asthma     Gastritis        Past Surgical History:   Procedure Laterality Date    ENDOMETRIAL ABLATION W/ Sarah Somers         Family History   Problem Relation Age of Onset    Cancer Mother     No Known Problems Father          Medications have been verified  Objective   /66   Pulse 82   Temp 98 2 °F (36 8 °C) (Tympanic)   Resp 18   Ht 5' 5" (1 651 m)   Wt 97 5 kg (215 lb)   SpO2 97%   BMI 35 78 kg/m²        Physical Exam     Physical Exam   HENT:   Right Ear: External ear normal    Left Ear: External ear normal    Hypertrophic turbinates  Posterior pharynx reveals some cobblestoning  Small tonsil stone observed left tonsil area  Neck: Normal range of motion  Neck supple  Pulmonary/Chest: Effort normal and breath sounds normal    Nursing note and vitals reviewed  Prophylactic measure

## 2020-05-07 NOTE — PROGRESS NOTE ADULT - PROBLEM SELECTOR PLAN 7
-Fluids: None  -Electrolytes: Replete K<4, Mg<2  -Nutrition: Jevity    Pain  -No active issues.     Code: FULL CODE  Disposition: RMF pending discharge to [facility] home

## 2020-05-07 NOTE — PROGRESS NOTE ADULT - REASON FOR ADMISSION
Covid
AHRF 2/2 COVID
covid
covid +
covid

## 2020-05-07 NOTE — PROGRESS NOTE ADULT - SUBJECTIVE AND OBJECTIVE BOX
OVERNIGHT EVENTS: LIZABETH    SUBJECTIVE: Patient seen and examined at bedside. She appears comfortable and in no acute distress breathing well on 3L NC. No secretions noted on exam. She is laying with nonpurposeful movements of her extremities (baseline given her cerebral palsy). Cannot assess ROS given mental status. She is pending discharge to her group home.      Vital Signs Last 12 Hrs  T(F): 98.4 (05-07-20 @ 09:13), Max: 98.4 (05-07-20 @ 09:13)  HR: 86 (05-07-20 @ 09:13) (77 - 86)  BP: 138/81 (05-07-20 @ 09:13) (138/81 - 139/64)  BP(mean): --  RR: 18 (05-07-20 @ 09:13) (18 - 18)  SpO2: 96% (05-07-20 @ 09:13) (95% - 96%)  I&O's Summary    06 May 2020 07:01  -  07 May 2020 07:00  --------------------------------------------------------  IN: 930 mL / OUT: 300 mL / NET: 630 mL        PHYSICAL EXAM:  Constitutional: NAD, comfortable in bed on 3L NC sating at 98%, contracted extremities with nonpurposeful movements   HEENT: NC/AT, PERRLA, EOMI, no conjunctival pallor or scleral icterus, MMM  Neck: Supple, no JVD  Respiratory: CTA B/L. No w/r/r.   Cardiovascular: RRR, normal S1 and S2, no m/r/g.   Gastrointestinal: +BS, soft NTND, no guarding or rebound tenderness, no palpable masses. PEG tube in place, clean, dry, and intact around the site.  Extremities: wwp; no cyanosis, clubbing or edema.   Vascular: Pulses equal and strong throughout.   Neurological: AAOx3, incoherent able to acknowledge questions and commands by incoherently answering. Contracted extremities with nonpurposeful movements       LABS:                        9.2    5.28  )-----------( 464      ( 07 May 2020 06:55 )             30.8     05-07    143  |  105  |  13  ----------------------------<  102<H>  3.9   |  27  |  0.49<L>    Ca    9.1      07 May 2020 06:55  Phos  3.0     05-07  Mg     1.8     05-07    TPro  6.3  /  Alb  3.3  /  TBili  0.4  /  DBili  x   /  AST  25  /  ALT  13  /  AlkPhos  55  05-07            RADIOLOGY & ADDITIONAL TESTS:    MEDICATIONS  (STANDING):  apixaban 5 milliGRAM(s) Oral every 12 hours  chlorhexidine 2% Cloths 1 Application(s) Topical <User Schedule>  ciprofloxacin     Tablet 750 milliGRAM(s) Oral every 12 hours  dextrose 5%. 1000 milliLiter(s) (50 mL/Hr) IV Continuous <Continuous>  dextrose 50% Injectable 12.5 Gram(s) IV Push once  dextrose 50% Injectable 25 Gram(s) IV Push once  dextrose 50% Injectable 25 Gram(s) IV Push once  insulin lispro (HumaLOG) corrective regimen sliding scale   SubCutaneous every 6 hours  lactobacillus acidophilus 1 Tablet(s) Oral daily  pantoprazole   Suspension 40 milliGRAM(s) Oral daily  psyllium Powder 1 Packet(s) Oral daily  scopolamine   Patch 1 Patch Transdermal every 72 hours    MEDICATIONS  (PRN):  acetaminophen    Suspension .. 650 milliGRAM(s) Oral every 6 hours PRN Temp greater or equal to 38C (100.4F), Mild Pain (1 - 3)  dextrose 40% Gel 15 Gram(s) Oral once PRN Blood Glucose LESS THAN 70 milliGRAM(s)/deciliter  glucagon  Injectable 1 milliGRAM(s) IntraMuscular once PRN Glucose LESS THAN 70 milligrams/deciliter  sodium chloride 0.9% lock flush 10 milliLiter(s) IV Push every 1 hour PRN Pre/post blood products, medications, blood draw, and to maintain line patency

## 2020-05-07 NOTE — PROGRESS NOTE ADULT - ASSESSMENT
74F w/PMH cerebral palsy, DVT/PE, GERD, chronic PEG, HTN, T spine fusion admitted to ICU due to acute hypoxic respiratory failure, s/p extubation x 3 (last performed 4/13). Patient has improved in oxygenation requirements (currently 96% on 2LNC), yet continued to test positive for COVID 19 (most recent positive 4/29), preventing her from returning to her home long term care facility. COVID PCR negative x2 as of 5/3. Pending dispo to [facility].

## 2020-05-07 NOTE — PROGRESS NOTE ADULT - PROBLEM SELECTOR PLAN 1
- VAP resolved, s/p vanc/zosyn.   - s/p hydroxychloroquine for COVID-19  - c/w 3L NC-Wean as tolerated  - scopolamine for secretions  - glycopyrrolate discontinued  - need to be monitored for secretions for 48 hours after glycopyrrolate discontinuation in order to be discharged

## 2020-05-08 VITALS
DIASTOLIC BLOOD PRESSURE: 68 MMHG | RESPIRATION RATE: 20 BRPM | HEART RATE: 81 BPM | SYSTOLIC BLOOD PRESSURE: 147 MMHG | TEMPERATURE: 98 F | OXYGEN SATURATION: 95 %

## 2020-05-08 PROBLEM — Z00.00 ENCOUNTER FOR PREVENTIVE HEALTH EXAMINATION: Status: ACTIVE | Noted: 2020-05-08

## 2020-05-08 LAB
GLUCOSE BLDC GLUCOMTR-MCNC: 101 MG/DL — HIGH (ref 70–99)
GLUCOSE BLDC GLUCOMTR-MCNC: 102 MG/DL — HIGH (ref 70–99)
GLUCOSE BLDC GLUCOMTR-MCNC: 108 MG/DL — HIGH (ref 70–99)
GLUCOSE BLDC GLUCOMTR-MCNC: 110 MG/DL — HIGH (ref 70–99)

## 2020-05-08 PROCEDURE — 70490 CT SOFT TISSUE NECK W/O DYE: CPT

## 2020-05-08 PROCEDURE — 99239 HOSP IP/OBS DSCHRG MGMT >30: CPT

## 2020-05-08 PROCEDURE — 84145 PROCALCITONIN (PCT): CPT

## 2020-05-08 PROCEDURE — 85610 PROTHROMBIN TIME: CPT

## 2020-05-08 PROCEDURE — 87040 BLOOD CULTURE FOR BACTERIA: CPT

## 2020-05-08 PROCEDURE — 80202 ASSAY OF VANCOMYCIN: CPT

## 2020-05-08 PROCEDURE — 97161 PT EVAL LOW COMPLEX 20 MIN: CPT

## 2020-05-08 PROCEDURE — 82330 ASSAY OF CALCIUM: CPT

## 2020-05-08 PROCEDURE — 96361 HYDRATE IV INFUSION ADD-ON: CPT

## 2020-05-08 PROCEDURE — 87641 MR-STAPH DNA AMP PROBE: CPT

## 2020-05-08 PROCEDURE — 87186 SC STD MICRODIL/AGAR DIL: CPT

## 2020-05-08 PROCEDURE — 85384 FIBRINOGEN ACTIVITY: CPT

## 2020-05-08 PROCEDURE — 86160 COMPLEMENT ANTIGEN: CPT

## 2020-05-08 PROCEDURE — 83735 ASSAY OF MAGNESIUM: CPT

## 2020-05-08 PROCEDURE — 97163 PT EVAL HIGH COMPLEX 45 MIN: CPT

## 2020-05-08 PROCEDURE — 87324 CLOSTRIDIUM AG IA: CPT

## 2020-05-08 PROCEDURE — 82728 ASSAY OF FERRITIN: CPT

## 2020-05-08 PROCEDURE — 96375 TX/PRO/DX INJ NEW DRUG ADDON: CPT | Mod: XU

## 2020-05-08 PROCEDURE — 71045 X-RAY EXAM CHEST 1 VIEW: CPT

## 2020-05-08 PROCEDURE — 84132 ASSAY OF SERUM POTASSIUM: CPT

## 2020-05-08 PROCEDURE — 97164 PT RE-EVAL EST PLAN CARE: CPT

## 2020-05-08 PROCEDURE — 83615 LACTATE (LD) (LDH) ENZYME: CPT

## 2020-05-08 PROCEDURE — 86140 C-REACTIVE PROTEIN: CPT

## 2020-05-08 PROCEDURE — 96365 THER/PROPH/DIAG IV INF INIT: CPT | Mod: XU

## 2020-05-08 PROCEDURE — 94003 VENT MGMT INPAT SUBQ DAY: CPT

## 2020-05-08 PROCEDURE — 83605 ASSAY OF LACTIC ACID: CPT

## 2020-05-08 PROCEDURE — 84478 ASSAY OF TRIGLYCERIDES: CPT

## 2020-05-08 PROCEDURE — 87635 SARS-COV-2 COVID-19 AMP PRB: CPT

## 2020-05-08 PROCEDURE — 97110 THERAPEUTIC EXERCISES: CPT

## 2020-05-08 PROCEDURE — 99291 CRITICAL CARE FIRST HOUR: CPT | Mod: 25

## 2020-05-08 PROCEDURE — 80053 COMPREHEN METABOLIC PANEL: CPT

## 2020-05-08 PROCEDURE — 97535 SELF CARE MNGMENT TRAINING: CPT

## 2020-05-08 PROCEDURE — 87449 NOS EACH ORGANISM AG IA: CPT

## 2020-05-08 PROCEDURE — 85027 COMPLETE CBC AUTOMATED: CPT

## 2020-05-08 PROCEDURE — 84295 ASSAY OF SERUM SODIUM: CPT

## 2020-05-08 PROCEDURE — 87150 DNA/RNA AMPLIFIED PROBE: CPT

## 2020-05-08 PROCEDURE — 87070 CULTURE OTHR SPECIMN AEROBIC: CPT

## 2020-05-08 PROCEDURE — 31500 INSERT EMERGENCY AIRWAY: CPT

## 2020-05-08 PROCEDURE — 82803 BLOOD GASES ANY COMBINATION: CPT

## 2020-05-08 PROCEDURE — 80048 BASIC METABOLIC PNL TOTAL CA: CPT

## 2020-05-08 PROCEDURE — 86803 HEPATITIS C AB TEST: CPT

## 2020-05-08 PROCEDURE — 94640 AIRWAY INHALATION TREATMENT: CPT

## 2020-05-08 PROCEDURE — 94002 VENT MGMT INPAT INIT DAY: CPT

## 2020-05-08 PROCEDURE — 83036 HEMOGLOBIN GLYCOSYLATED A1C: CPT

## 2020-05-08 PROCEDURE — 87184 SC STD DISK METHOD PER PLATE: CPT

## 2020-05-08 PROCEDURE — 84100 ASSAY OF PHOSPHORUS: CPT

## 2020-05-08 PROCEDURE — 36415 COLL VENOUS BLD VENIPUNCTURE: CPT

## 2020-05-08 PROCEDURE — 93005 ELECTROCARDIOGRAM TRACING: CPT | Mod: XU

## 2020-05-08 PROCEDURE — 85730 THROMBOPLASTIN TIME PARTIAL: CPT

## 2020-05-08 PROCEDURE — 85379 FIBRIN DEGRADATION QUANT: CPT

## 2020-05-08 PROCEDURE — 97530 THERAPEUTIC ACTIVITIES: CPT

## 2020-05-08 PROCEDURE — 82962 GLUCOSE BLOOD TEST: CPT

## 2020-05-08 PROCEDURE — 97116 GAIT TRAINING THERAPY: CPT

## 2020-05-08 PROCEDURE — 81001 URINALYSIS AUTO W/SCOPE: CPT

## 2020-05-08 PROCEDURE — 85025 COMPLETE CBC W/AUTO DIFF WBC: CPT

## 2020-05-08 PROCEDURE — 74018 RADEX ABDOMEN 1 VIEW: CPT

## 2020-05-08 RX ORDER — CIPROFLOXACIN LACTATE 400MG/40ML
1 VIAL (ML) INTRAVENOUS
Qty: 6 | Refills: 0
Start: 2020-05-08 | End: 2020-05-10

## 2020-05-08 RX ORDER — SIMETHICONE 80 MG/1
80 TABLET, CHEWABLE ORAL ONCE
Refills: 0 | Status: COMPLETED | OUTPATIENT
Start: 2020-05-08 | End: 2020-05-08

## 2020-05-08 RX ORDER — LACTOBACILLUS ACIDOPHILUS 100MM CELL
1 CAPSULE ORAL
Qty: 30 | Refills: 0
Start: 2020-05-08 | End: 2020-06-06

## 2020-05-08 RX ORDER — ACETAMINOPHEN 500 MG
650 TABLET ORAL ONCE
Refills: 0 | Status: DISCONTINUED | OUTPATIENT
Start: 2020-05-08 | End: 2020-05-08

## 2020-05-08 RX ORDER — ACETAMINOPHEN 500 MG
650 TABLET ORAL ONCE
Refills: 0 | Status: COMPLETED | OUTPATIENT
Start: 2020-05-08 | End: 2020-05-08

## 2020-05-08 RX ORDER — SCOPALAMINE 1 MG/3D
1 PATCH, EXTENDED RELEASE TRANSDERMAL
Qty: 10 | Refills: 0
Start: 2020-05-08

## 2020-05-08 RX ORDER — APIXABAN 2.5 MG/1
1 TABLET, FILM COATED ORAL
Qty: 60 | Refills: 0
Start: 2020-05-08 | End: 2020-06-06

## 2020-05-08 RX ADMIN — SCOPALAMINE 1 PATCH: 1 PATCH, EXTENDED RELEASE TRANSDERMAL at 10:25

## 2020-05-08 RX ADMIN — APIXABAN 5 MILLIGRAM(S): 2.5 TABLET, FILM COATED ORAL at 11:16

## 2020-05-08 RX ADMIN — APIXABAN 5 MILLIGRAM(S): 2.5 TABLET, FILM COATED ORAL at 04:10

## 2020-05-08 RX ADMIN — SIMETHICONE 80 MILLIGRAM(S): 80 TABLET, CHEWABLE ORAL at 08:41

## 2020-05-08 RX ADMIN — PANTOPRAZOLE SODIUM 40 MILLIGRAM(S): 20 TABLET, DELAYED RELEASE ORAL at 11:16

## 2020-05-08 RX ADMIN — Medication 650 MILLIGRAM(S): at 08:41

## 2020-05-08 RX ADMIN — CHLORHEXIDINE GLUCONATE 1 APPLICATION(S): 213 SOLUTION TOPICAL at 05:25

## 2020-05-08 RX ADMIN — Medication 1 TABLET(S): at 11:16

## 2020-05-08 RX ADMIN — SCOPALAMINE 1 PATCH: 1 PATCH, EXTENDED RELEASE TRANSDERMAL at 11:16

## 2020-05-08 RX ADMIN — Medication 750 MILLIGRAM(S): at 05:26

## 2020-05-08 RX ADMIN — Medication 1 PACKET(S): at 11:16

## 2020-05-13 DIAGNOSIS — J96.02 ACUTE RESPIRATORY FAILURE WITH HYPERCAPNIA: ICD-10-CM

## 2020-05-13 DIAGNOSIS — Z93.1 GASTROSTOMY STATUS: ICD-10-CM

## 2020-05-13 DIAGNOSIS — I10 ESSENTIAL (PRIMARY) HYPERTENSION: ICD-10-CM

## 2020-05-13 DIAGNOSIS — F79 UNSPECIFIED INTELLECTUAL DISABILITIES: ICD-10-CM

## 2020-05-13 DIAGNOSIS — K21.9 GASTRO-ESOPHAGEAL REFLUX DISEASE WITHOUT ESOPHAGITIS: ICD-10-CM

## 2020-05-13 DIAGNOSIS — Z86.718 PERSONAL HISTORY OF OTHER VENOUS THROMBOSIS AND EMBOLISM: ICD-10-CM

## 2020-05-13 DIAGNOSIS — Z91.018 ALLERGY TO OTHER FOODS: ICD-10-CM

## 2020-05-13 DIAGNOSIS — I95.9 HYPOTENSION, UNSPECIFIED: ICD-10-CM

## 2020-05-13 DIAGNOSIS — Z86.711 PERSONAL HISTORY OF PULMONARY EMBOLISM: ICD-10-CM

## 2020-05-13 DIAGNOSIS — Z78.1 PHYSICAL RESTRAINT STATUS: ICD-10-CM

## 2020-05-13 DIAGNOSIS — R33.9 RETENTION OF URINE, UNSPECIFIED: ICD-10-CM

## 2020-05-13 DIAGNOSIS — D64.9 ANEMIA, UNSPECIFIED: ICD-10-CM

## 2020-05-13 DIAGNOSIS — J12.89 OTHER VIRAL PNEUMONIA: ICD-10-CM

## 2020-05-13 DIAGNOSIS — U07.1 COVID-19: ICD-10-CM

## 2020-05-13 DIAGNOSIS — A41.89 OTHER SPECIFIED SEPSIS: ICD-10-CM

## 2020-05-13 DIAGNOSIS — R06.02 SHORTNESS OF BREATH: ICD-10-CM

## 2020-05-13 DIAGNOSIS — G82.50 QUADRIPLEGIA, UNSPECIFIED: ICD-10-CM

## 2020-05-13 DIAGNOSIS — J96.01 ACUTE RESPIRATORY FAILURE WITH HYPOXIA: ICD-10-CM

## 2020-05-14 ENCOUNTER — APPOINTMENT (OUTPATIENT)
Dept: INTERNAL MEDICINE | Facility: CLINIC | Age: 75
End: 2020-05-14

## 2020-05-14 ENCOUNTER — EMERGENCY (EMERGENCY)
Facility: HOSPITAL | Age: 75
LOS: 1 days | Discharge: HOME | End: 2020-05-14
Attending: EMERGENCY MEDICINE | Admitting: EMERGENCY MEDICINE
Payer: MEDICARE

## 2020-05-14 VITALS
SYSTOLIC BLOOD PRESSURE: 120 MMHG | DIASTOLIC BLOOD PRESSURE: 80 MMHG | HEART RATE: 98 BPM | OXYGEN SATURATION: 100 % | RESPIRATION RATE: 19 BRPM

## 2020-05-14 VITALS
RESPIRATION RATE: 16 BRPM | SYSTOLIC BLOOD PRESSURE: 147 MMHG | OXYGEN SATURATION: 97 % | DIASTOLIC BLOOD PRESSURE: 72 MMHG | TEMPERATURE: 98 F | HEART RATE: 101 BPM

## 2020-05-14 DIAGNOSIS — K94.23 GASTROSTOMY MALFUNCTION: ICD-10-CM

## 2020-05-14 DIAGNOSIS — K94.23 GASTROSTOMY MALFUNCTION: Chronic | ICD-10-CM

## 2020-05-14 PROCEDURE — 71045 X-RAY EXAM CHEST 1 VIEW: CPT | Mod: 26

## 2020-05-14 PROCEDURE — 43762 RPLC GTUBE NO REVJ TRC: CPT

## 2020-05-14 PROCEDURE — 74018 RADEX ABDOMEN 1 VIEW: CPT | Mod: 26

## 2020-05-14 PROCEDURE — 71250 CT THORAX DX C-: CPT | Mod: 26

## 2020-05-14 PROCEDURE — 99284 EMERGENCY DEPT VISIT MOD MDM: CPT | Mod: 25

## 2020-05-14 NOTE — ED PROVIDER NOTE - NSFOLLOWUPINSTRUCTIONS_ED_ALL_ED_FT
THE FEEDING TUBE WAS REPLACED AND PLACEMENT WAS CONFIRMED WITH XRAY.  THE FEEDING TUBE IS OK TO USE.     CONTINUE ALL PRESCRIBED MEDICATIONS AND OXYGEN, ALONG WITH NEBULIZER TREATMENTS.  FOLLOW UP WITH YOUR PRIMARY CARE DOCTOR IN 1-2 WEEKS.

## 2020-05-14 NOTE — ED PROVIDER NOTE - PROGRESS NOTE DETAILS
consulted dr osborn from ID, discussed CT and CXR findings, since patient has no new oxygen requirement and appears overall to be recovering, persistent infiltrate most likely atelectasis, pt has no new cough or SOB and is comfortable on intermittent albuterol nebs being given outpatient.  he recc no further abx, outpatient followup.

## 2020-05-14 NOTE — ED PROVIDER NOTE - PATIENT PORTAL LINK FT
You can access the FollowMyHealth Patient Portal offered by [facility] by registering at the following website: [email or web address]. By joining [employer]’s FollowMyHealth portal, you will also be able to view your health information using other applications (apps) compatible with our system.

## 2020-05-14 NOTE — ED PROVIDER NOTE - OBJECTIVE STATEMENT
75 y/o female with PMHx of cerebral palsy, DVT, PE, GERD, chronic PEG tube, HTN, and T spine infusion, was admitted for hypoxic respiratory failure and intubated, was extubated on 4/13, was hospitalized for oxygen requirement and was discharged from [facility] on 5/4 after hospitalization for bacteremia and respiratory failure. Patient has a chronic PEG tube that was dislodged and appears to be anchored to the skin with sutures. Patient returns today with a crack in the end of the tube with leakage of contents. Denies any other complaints. 75 y/o female with PMHx of cerebral palsy, DVT, PE, GERD, chronic PEG tube, HTN, and T spine infusion, was admitted for hypoxic respiratory failure and intubated, was extubated on 4/13, was hospitalized for oxygen requirement and was discharged from [facility] on 5/4 after hospitalization for bacteremia and respiratory failure. Completed 14 days of abx for PNA, last dose was 2-3 days ago. Patient has a chronic PEG tube that was dislodged and appears to be anchored to the skin with sutures. Patient returns today with a crack in the end of the tube with leakage of contents. Denies any other complaints.

## 2020-05-14 NOTE — ED ADULT NURSE NOTE - NSIMPLEMENTINTERV_GEN_ALL_ED
Implemented All Universal Safety Interventions:  [place] to call system. Call bell, personal items and telephone within reach. Instruct patient to call for assistance. Room bathroom lighting operational. Non-slip footwear when patient is off stretcher. Physically safe environment: no spills, clutter or unnecessary equipment. Stretcher in lowest position, wheels locked, appropriate side rails in place.

## 2020-05-14 NOTE — ED PROVIDER NOTE - CLINICAL SUMMARY MEDICAL DECISION MAKING FREE TEXT BOX
73 y/o female presents with crack in her G tube with leakage of contents. Will attempt to replace the feeding tube in the ED. 73 y/o female presents with crack in her G tube with leakage of contents. Will attempt to replace the feeding tube in the ED.    see progress notes for details

## 2020-05-14 NOTE — ED ADULT NURSE NOTE - TEMPLATE LIST FOR HEAD TO TOE ASSESSMENT
Initial / Assessment/Plan of Care Note     Baseline Assessment  60 year old admitted 3/10/2019 as an Observation with a diagnosis of CAD.   Prior to admission the patient was living Alone and residing in a House(lower apartment of a home).  The pt's girlfriend lives in the upper apartment of the home.  The patient does not  have a Power of  for Healthcare.  Pt agrees to complete a POAHC, will assist with completion.  Patient’s Primary Care Provider is David Alvarado MD.     Medical History  Past Medical History:   Diagnosis Date   • Acute MI (CMS/[facility])     1st  at age 48 with stent placement, 2nd 1 year later   • Cardiac failure congestive    • Cerebral artery occlusion with cerebral infarction (CMS/[facility])     2012   • COPD (chronic obstructive pulmonary disease) (CMS/[facility]) 3/2/2015   • Coronary artery disease    • Diabetes mellitus (CMS/[facility])    • Essential (primary) hypertension    • Gastroesophageal reflux disease    • MRSA (methicillin resistant staph aureus) culture positive     (12/30/10 MRSA positive nares) pt has had 2 negative MRSA results 10/1/2011 and 4/26/2013   • Pharyngeal edema 10/10    pharyngeal edema since 10/10 after PO med intake; severe gagging   • RAD (reactive airway disease)        Prior to Admission Status  Functional Status  Ambulation: Independent/Self  Bathing: Independent/Self  Dressing: Independent/Self  Toileting: Independent/Self  Meal Preparation: Significant Other  Shopping: Independent/Self, Significant Other  Medication Preparation: Independent/Self  Medication Administration: Independent/Self  Housekeeping: Significant Other  Laundry: Independent/Self, Significant Other  Transportation: Independent/Self    Agency/Support  Type of Services Prior to Hospitalization: Nurse (specify)([facility])  Support Systems: Spouse/Significant other, Friends/neighbors, Other (Comment)([facility])  Home Devices/Equipment: Blood glucose monitor  Mobility Assist Devices: None  Sensory  Support Devices: None    Current Status  PT Ambulation Tips:    PT Transfer Tips:    OT Bathing Tips:    OT Dressing Tips:    OT Toileting Tips:    OT Feeding Tips:    SLP Swallow/Feeding Tips:    SLP Comm/Cog Tips:    Current Mental Status: Cooperative, Pleasant  Stressors: Health Status    Insurance  Primary: [employer] MEDICARE COMPLETE  Secondary: N/A    Barriers to Discharge  Identified Barriers to Discharge/Transition Planning: Assessment/stabilization in progress    Progress Note  Reviewed the pt's medical chart.  Met with the pt, Dr. Radford, RN and pharmacy to discuss the pt's plan of care.  Pt reported he was doing well at the time of discharge last week; however, two days later began to feel ill again.  Pt reported the nurse from [facility] came to see him at which time she prompted him to go to the ED.  The pt reported no needs/concerns at this time or anticipated at the time of discharge.  Continue to follow for discharge planning.      Plan  SW/CM - Recommendations for Discharge: Home care  PT - Recommendations for Discharge:    OT - Recommendations for Discharge:    SLP - Recommendations for Discharge:    Anticipate patient will need post-hospital services. Necessary services are available.  Anticipate patient can return to the environment from which patient entered the hospital.   Anticipate patient can provide self-care at discharge.    Refer to SW/CM Flowsheet for Goals and objective data.      General

## 2020-05-15 ENCOUNTER — APPOINTMENT (OUTPATIENT)
Dept: HEART AND VASCULAR | Facility: CLINIC | Age: 75
End: 2020-05-15
Payer: MEDICARE

## 2020-05-15 DIAGNOSIS — A49.8 OTHER BACTERIAL INFECTIONS OF UNSPECIFIED SITE: ICD-10-CM

## 2020-05-15 PROCEDURE — 99204 OFFICE O/P NEW MOD 45 MIN: CPT | Mod: CS,95

## 2020-05-15 NOTE — PHYSICAL EXAM
[FreeTextEntry1] : no PE performed, pt with significant cognitive impairment, limited speech.  comfortable on 2L NC

## 2020-05-15 NOTE — ASSESSMENT
[FreeTextEntry1] : 74 F\par \par COVID-19 PNA\par Hx of VTE - unclear chronicity, location, provoked/unprovoked\par Cerebral Palsy\par Pseudomonas Bacteremia \par \par Plan:\par - cont eliquis 5mg BID\par - need collateral info regarding hx of VTE\par - no indication for imaging at this time\par - can return for office visit in 2 weeks \par - discussed with  to call if any issues.  He was unable to get in touch with the nurse who cares for Ms. Soliman at the time of our telehealth encounter.

## 2020-05-15 NOTE — HISTORY OF PRESENT ILLNESS
[FreeTextEntry1] : Telehealth Encounter 5/15/20: verbal consent given by Mari Soliman [birth year] at 4:15PM\par Post COVID-19 Admission 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  Course complicated by pseudomonal bacteremia, s/p antibiotics\par Discharged to group home on 2L NC, eliquis 5mg BID, and cipro.   Patient with limited ability to speak.  Most of history per  at the group home.  Patient has been doing well since dc.  No SOB/CP, leg pain/swelling.  Wheelchair bound at baseline.  Takes eliquis 5mg BID for hx of VTE, unable to obtain details from patient.  No bleeding episodes.  During admission no venous duplex or CTA performed.   \par

## 2020-05-19 DIAGNOSIS — T85.528A DISPLACEMENT OF OTHER GASTROINTESTINAL PROSTHETIC DEVICES, IMPLANTS AND GRAFTS, INITIAL ENCOUNTER: ICD-10-CM

## 2020-05-19 DIAGNOSIS — M81.0 AGE-RELATED OSTEOPOROSIS WITHOUT CURRENT PATHOLOGICAL FRACTURE: ICD-10-CM
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
## 2020-05-19 DIAGNOSIS — B96.5 PSEUDOMONAS (AERUGINOSA) (MALLEI) (PSEUDOMALLEI) AS THE CAUSE OF DISEASES CLASSIFIED ELSEWHERE: ICD-10-CM

## 2020-05-19 DIAGNOSIS — J95.851 VENTILATOR ASSOCIATED PNEUMONIA: ICD-10-CM

## 2020-05-19 DIAGNOSIS — A41.52 SEPSIS DUE TO PSEUDOMONAS: ICD-10-CM

## 2020-05-19 DIAGNOSIS — G80.0 SPASTIC QUADRIPLEGIC CEREBRAL PALSY: ICD-10-CM

## 2020-05-19 DIAGNOSIS — E78.2 MIXED HYPERLIPIDEMIA: ICD-10-CM

## 2020-05-19 DIAGNOSIS — J80 ACUTE RESPIRATORY DISTRESS SYNDROME: ICD-10-CM

## 2020-05-26 ENCOUNTER — APPOINTMENT (OUTPATIENT)
Dept: PULMONOLOGY | Facility: CLINIC | Age: 75
End: 2020-05-26

## 2020-07-22 ENCOUNTER — APPOINTMENT (OUTPATIENT)
Dept: HEART AND VASCULAR | Facility: CLINIC | Age: 75
End: 2020-07-22
Payer: MEDICARE

## 2020-07-22 VITALS
OXYGEN SATURATION: 96 % | TEMPERATURE: 97.6 F | SYSTOLIC BLOOD PRESSURE: 150 MMHG | WEIGHT: 106 LBS | DIASTOLIC BLOOD PRESSURE: 80 MMHG | HEART RATE: 98 BPM

## 2020-07-22 DIAGNOSIS — U07.1 COVID-19: ICD-10-CM

## 2020-07-22 DIAGNOSIS — J12.82 COVID-19: ICD-10-CM

## 2020-07-22 PROCEDURE — 93000 ELECTROCARDIOGRAM COMPLETE: CPT | Mod: CS

## 2020-07-22 PROCEDURE — 99214 OFFICE O/P EST MOD 30 MIN: CPT | Mod: CS

## 2020-07-22 PROCEDURE — 93970 EXTREMITY STUDY: CPT

## 2020-07-22 NOTE — ASSESSMENT
[FreeTextEntry1] : 74 F\par \par Hx of VTE - DVT/PE > 5 yr ago, unclear circumstances, had been on eliquis 5mg BID until recently \par COVID-19 PNA c/b Pseudomonas Bacteremia \par Cerebral Palsy\par HTN\par \par Plan:\par - LE venous duplex today with no evidence of DVT.  Given remote hx of DVT/PE, chronic immobility, patient would benefit from extended VTE ppx with eliquis 2.5mg BID to be continued indefinitely.\par - ECHO with very poor windows due to patient body habitus and inability to cooperate.  LVEF looks grossly normal.  Moderate LVH present.  Unable to accurately assess valves. \par - cont simvastatin\par - BP mildly elevated in office.  Recommend BP's be recorded at home.  If consistently > 130/80 would start antihypertensive.\par - labs \par \par

## 2020-07-22 NOTE — PHYSICAL EXAM
[Normal Conjunctiva] : the conjunctiva exhibited no abnormalities [Eyelids - No Xanthelasma] : the eyelids demonstrated no xanthelasmas [Normal Oral Mucosa] : normal oral mucosa [No Oral Pallor] : no oral pallor [No Oral Cyanosis] : no oral cyanosis [Normal Jugular Venous A Waves Present] : normal jugular venous A waves present [Normal Jugular Venous V Waves Present] : normal jugular venous V waves present [No Jugular Venous Zheng A Waves] : no jugular venous zheng A waves [Respiration, Rhythm And Depth] : normal respiratory rhythm and effort [Exaggerated Use Of Accessory Muscles For Inspiration] : no accessory muscle use [Auscultation Breath Sounds / Voice Sounds] : lungs were clear to auscultation bilaterally [Heart Rate And Rhythm] : heart rate and rhythm were normal [Heart Sounds] : normal S1 and S2 [Murmurs] : no murmurs present [Arterial Pulses Normal] : the arterial pulses were normal [Veins - Varicosity Changes] : no varicosital changes were noted in the lower extremities [Abdomen Soft] : soft [Abdomen Tenderness] : non-tender [Abdomen Mass (___ Cm)] : no abdominal mass palpated [Nail Clubbing] : no clubbing of the fingernails [Cyanosis, Localized] : no localized cyanosis [Petechial Hemorrhages (___cm)] : no petechial hemorrhages [Skin Color & Pigmentation] : normal skin color and pigmentation [] : no rash [Skin Lesions] : no skin lesions [No Venous Stasis] : no venous stasis [No Skin Ulcers] : no skin ulcer [No Xanthoma] : no  xanthoma was observed [FreeTextEntry1] : wheelchair bound

## 2020-07-22 NOTE — HISTORY OF PRESENT ILLNESS
[FreeTextEntry1] : \par \par 74F w/PMH cerebral palsy with functional quadriplegia, DVT/PE, GERD, chronic PEG, HTN, T spine fusion, \par Post COVID-19 Admission [facility] 4/3-5/8/20\par admitted to ICU due to acute hypoxic respiratory failure requiring intubation (extubated 4/13).  Course complicated by pseudomonal bacteremia, s/p antibiotics\par Discharged to group home on 2L NC, eliquis 5mg BID, and cipro.   Patient with limited ability to speak.  Most of history per  at the group home.  Patient has been doing well since dc.  No SOB/CP, leg pain/swelling.  Wheelchair bound at baseline.  Takes eliquis 5mg BID for hx of VTE, unable to obtain details from patient.  No bleeding episodes.  During admission no venous duplex or CTA performed.   \par \par 7/22: returns for initial office visit.  hx per aide and nurse as pt with significant cognitive impairment.  she is doing well, back to her baseline pre covid infection.  she does require continuous 2L NC now as opposed to intermittently prior to covid.  no chest pain or sob.  the staff stopped her eliquis after 3 months as that was the rec from the hospital.  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 \par

## 2020-07-23 LAB
25(OH)D3 SERPL-MCNC: 32.9 NG/ML
ALBUMIN SERPL ELPH-MCNC: 4.2 G/DL
ALP BLD-CCNC: 60 U/L
ALT SERPL-CCNC: 12 U/L
ANION GAP SERPL CALC-SCNC: 18 MMOL/L
APTT BLD: 35.5 SEC
AST SERPL-CCNC: 26 U/L
BASOPHILS # BLD AUTO: 0.05 K/UL
BASOPHILS NFR BLD AUTO: 1 %
BILIRUB SERPL-MCNC: 0.2 MG/DL
BUN SERPL-MCNC: 16 MG/DL
CALCIUM SERPL-MCNC: 10 MG/DL
CHLORIDE SERPL-SCNC: 94 MMOL/L
CHOLEST SERPL-MCNC: 187 MG/DL
CHOLEST/HDLC SERPL: 3.1 RATIO
CO2 SERPL-SCNC: 27 MMOL/L
CREAT SERPL-MCNC: 0.51 MG/DL
DEPRECATED D DIMER PPP IA-ACNC: <150 NG/ML DDU
EOSINOPHIL # BLD AUTO: 0.07 K/UL
EOSINOPHIL NFR BLD AUTO: 1.4 %
ESTIMATED AVERAGE GLUCOSE: 114 MG/DL
GLUCOSE SERPL-MCNC: 100 MG/DL
HBA1C MFR BLD HPLC: 5.6 %
HCT VFR BLD CALC: 41.4 %
HDLC SERPL-MCNC: 59 MG/DL
HGB BLD-MCNC: 12.9 G/DL
IMM GRANULOCYTES NFR BLD AUTO: 0.2 %
INR PPP: 0.99 RATIO
LDLC SERPL CALC-MCNC: 104 MG/DL
LYMPHOCYTES # BLD AUTO: 1.44 K/UL
LYMPHOCYTES NFR BLD AUTO: 28.5 %
MAGNESIUM SERPL-MCNC: 1.4 MG/DL
MAN DIFF?: NORMAL
MCHC RBC-ENTMCNC: 29.6 PG
MCHC RBC-ENTMCNC: 31.2 GM/DL
MCV RBC AUTO: 95 FL
MONOCYTES # BLD AUTO: 0.58 K/UL
MONOCYTES NFR BLD AUTO: 11.5 %
NEUTROPHILS # BLD AUTO: 2.9 K/UL
NEUTROPHILS NFR BLD AUTO: 57.4 %
NT-PROBNP SERPL-MCNC: 31 PG/ML
PLATELET # BLD AUTO: 195 K/UL
POTASSIUM SERPL-SCNC: 3.8 MMOL/L
PROT SERPL-MCNC: 7.1 G/DL
PT BLD: 11.7 SEC
RBC # BLD: 4.36 M/UL
RBC # FLD: 13.2 %
SARS-COV-2 IGG SERPL IA-ACNC: 4.34 INDEX
SARS-COV-2 IGG SERPL QL IA: POSITIVE
SODIUM SERPL-SCNC: 138 MMOL/L
TRIGL SERPL-MCNC: 120 MG/DL
TSH SERPL-ACNC: 3.9 UIU/ML
WBC # FLD AUTO: 5.05 K/UL

## 2020-08-04 ENCOUNTER — APPOINTMENT (OUTPATIENT)
Dept: HEART AND VASCULAR | Facility: CLINIC | Age: 75
End: 2020-08-04

## 2020-08-24 ENCOUNTER — APPOINTMENT (OUTPATIENT)
Dept: HEART AND VASCULAR | Facility: CLINIC | Age: 75
End: 2020-08-24
Payer: MEDICARE

## 2020-08-24 PROCEDURE — 93306 TTE W/DOPPLER COMPLETE: CPT | Mod: CS

## 2020-08-29 ENCOUNTER — EMERGENCY (EMERGENCY)
Facility: HOSPITAL | Age: 75
LOS: 1 days | Discharge: HOME | End: 2020-08-29
Attending: EMERGENCY MEDICINE | Admitting: EMERGENCY MEDICINE
Payer: MEDICARE

## 2020-08-29 VITALS
SYSTOLIC BLOOD PRESSURE: 154 MMHG | RESPIRATION RATE: 18 BRPM | HEART RATE: 99 BPM | DIASTOLIC BLOOD PRESSURE: 86 MMHG | OXYGEN SATURATION: 97 % | TEMPERATURE: 98 F | WEIGHT: 130.07 LBS | HEIGHT: 60 IN

## 2020-08-29 VITALS
OXYGEN SATURATION: 96 % | HEART RATE: 91 BPM | RESPIRATION RATE: 18 BRPM | SYSTOLIC BLOOD PRESSURE: 144 MMHG | DIASTOLIC BLOOD PRESSURE: 80 MMHG

## 2020-08-29 DIAGNOSIS — I10 ESSENTIAL (PRIMARY) HYPERTENSION: ICD-10-CM

## 2020-08-29 DIAGNOSIS — Z91.018 ALLERGY TO OTHER FOODS: ICD-10-CM

## 2020-08-29 DIAGNOSIS — K94.23 GASTROSTOMY MALFUNCTION: Chronic | ICD-10-CM

## 2020-08-29 DIAGNOSIS — Z79.899 OTHER LONG TERM (CURRENT) DRUG THERAPY: ICD-10-CM

## 2020-08-29 DIAGNOSIS — K94.23 GASTROSTOMY MALFUNCTION: ICD-10-CM

## 2020-08-29 PROCEDURE — 43753 TX GASTRO INTUB W/ASP: CPT

## 2020-08-29 PROCEDURE — 74018 RADEX ABDOMEN 1 VIEW: CPT | Mod: 26

## 2020-08-29 PROCEDURE — 99283 EMERGENCY DEPT VISIT LOW MDM: CPT | Mod: 25

## 2020-08-29 RX ORDER — DIATRIZOATE MEGLUMINE 180 MG/ML
50 INJECTION, SOLUTION INTRAVESICAL ONCE
Refills: 0 | Status: COMPLETED | OUTPATIENT
Start: 2020-08-29 | End: 2020-08-29

## 2020-08-29 RX ADMIN — DIATRIZOATE MEGLUMINE 50 MILLILITER(S): 180 INJECTION, SOLUTION INTRAVESICAL at 14:10

## 2020-08-29 NOTE — ED ADULT NURSE NOTE - CHPI ED NUR SYMPTOMS NEG
no pain/no tingling/no chills/no vomiting/no decreased eating/drinking/no dizziness/no fever/no nausea/no weakness

## 2020-08-29 NOTE — ED PROVIDER NOTE - DIAGNOSTIC INTERPRETATION
abd x ray 1 view  ER PA: Jamila Coker  INTERPRETATION:  no acute fracture; no soft tissue swelling noted; normal bony alignment. + gastrograffin in stomach.

## 2020-08-29 NOTE — ED CLERICAL - CLERICAL COMMENTS
Discharge transportation scheduled for patient using [facility], sent to [facility]. Trip number: [id number], ETA: 90 minutes.

## 2020-08-29 NOTE — ED PROVIDER NOTE - GASTROINTESTINAL, MLM
Abdomen soft, non-tender, no guarding. well healed g tube to left upper abd without any redness, warmth, induration. g tube in place but not flushing.

## 2020-08-29 NOTE — ED PROVIDER NOTE - OBJECTIVE STATEMENT
73yo F with h/o EDP, PE, DVT, constipation, aspiration pna presents today from group home with clogged g tube. pt with peg tube which was most recently exchanged ~2 months ago per HHA. tube was last used last night but was clogged when they tried to medicate her this morning. no pain, nausea, vomiting, any other concerns.

## 2020-08-29 NOTE — ED PROVIDER NOTE - NSFOLLOWUPINSTRUCTIONS_ED_ALL_ED_FT
KEEP TUBE CLEAN.     FLUSH WITH WARM WATER EVERY 4 HOURS TO PREVENT CLOGGING.     FOLLOW UP WITH YOUR DOCTOR THIS WEEK.     RETURN TO ER FOR ANY NEW OR CONCERNING SYMPTOMS INCLUDING FEVER, CHILLS, ABDOMINAL PAIN, NAUSEA, VOMITING, ANY OTHER CONCERNS.

## 2020-08-29 NOTE — ED PROVIDER NOTE - CLINICAL SUMMARY MEDICAL DECISION MAKING FREE TEXT BOX
pt presents today c/o clogged peg tube. exchanged with Dr. Raya. confirmed with x ray. flushes easily. will d/c. pt presents today c/o clogged peg tube. exchanged with Dr. Raya. confirmed with x ray. flushes easily. will d/c. .

## 2020-08-29 NOTE — ED PROVIDER NOTE - ATTENDING CONTRIBUTION TO CARE
Pt presents for PEG tube replacement since it was clogged. PEG changed by PA under my direct supervision. 16 [place]. 15 mL of saline used to inflate balloon. Will confirm w xray and gastrografin.

## 2020-08-29 NOTE — ED ADULT NURSE NOTE - NSIMPLEMENTINTERV_GEN_ALL_ED
Implemented All Fall Risk Interventions:  [place] to call system. Call bell, personal items and telephone within reach. Instruct patient to call for assistance. Room bathroom lighting operational. Non-slip footwear when patient is off stretcher. Physically safe environment: no spills, clutter or unnecessary equipment. Stretcher in lowest position, wheels locked, appropriate side rails in place. Provide visual cue, wrist band, yellow gown, etc. Monitor gait and stability. Monitor for mental status changes and reorient to person, place, and time. Review medications for side effects contributing to fall risk. Reinforce activity limits and safety measures with patient and family.

## 2020-08-29 NOTE — ED ADULT NURSE NOTE - OBJECTIVE STATEMENT
Pt presents to ED with HHA c/o clogged peg tube, HHA tried multiple times and was not able to unclog.

## 2020-08-29 NOTE — ED PROVIDER NOTE - GASTROINTESTINAL NEGATIVE STATEMENT, MLM
no abdominal pain, no bloating, no constipation, no diarrhea, no nausea and no vomiting. + g tube clogged

## 2021-01-01 ENCOUNTER — EMERGENCY (EMERGENCY)
Facility: HOSPITAL | Age: 76
LOS: 1 days | Discharge: HOME | End: 2021-01-01
Attending: EMERGENCY MEDICINE | Admitting: EMERGENCY MEDICINE
Payer: MEDICARE

## 2021-01-01 VITALS
RESPIRATION RATE: 18 BRPM | TEMPERATURE: 99 F | HEART RATE: 89 BPM | HEIGHT: 60 IN | DIASTOLIC BLOOD PRESSURE: 76 MMHG | SYSTOLIC BLOOD PRESSURE: 119 MMHG | OXYGEN SATURATION: 96 %

## 2021-01-01 DIAGNOSIS — K94.23 GASTROSTOMY MALFUNCTION: ICD-10-CM

## 2021-01-01 DIAGNOSIS — K94.23 GASTROSTOMY MALFUNCTION: Chronic | ICD-10-CM

## 2021-01-01 DIAGNOSIS — Z88.8 ALLERGY STATUS TO OTHER DRUGS, MEDICAMENTS AND BIOLOGICAL SUBSTANCES: ICD-10-CM

## 2021-01-01 DIAGNOSIS — Z91.018 ALLERGY TO OTHER FOODS: ICD-10-CM

## 2021-01-01 PROCEDURE — 99283 EMERGENCY DEPT VISIT LOW MDM: CPT | Mod: 25

## 2021-01-01 PROCEDURE — 74018 RADEX ABDOMEN 1 VIEW: CPT | Mod: 26

## 2021-01-01 RX ORDER — ALBUTEROL 90 UG/1
2 AEROSOL, METERED ORAL ONCE
Refills: 0 | Status: DISCONTINUED | OUTPATIENT
Start: 2021-01-01 | End: 2021-01-01

## 2021-01-01 NOTE — ED PROVIDER NOTE - NSFOLLOWUPINSTRUCTIONS_ED_ALL_ED_FT
resuming feeding     follow up with doctor as scheduled       return to ER for any problems with feeding tube vomiting or any other concern

## 2021-01-01 NOTE — ED PROVIDER NOTE - WR INTERPRETATION 1
ABD XR negative - non-specific, No free air, No air-fluid levels seen    contrast in stomach and intestine

## 2021-01-01 NOTE — ED PROVIDER NOTE - PATIENT PORTAL LINK FT
You can access the FollowMyHealth Patient Portal offered by [facility] by registering at the following website: [email or web address]. By joining [employer]’s FollowMyHealth portal, you will also be able to view your health information using other applications (apps) compatible with our system.

## 2021-01-01 NOTE — ED ADULT NURSE NOTE - NSIMPLEMENTINTERV_GEN_ALL_ED
Implemented All Fall Risk Interventions:  [place] to call system. Call bell, personal items and telephone within reach. Instruct patient to call for assistance. Room bathroom lighting operational. Non-slip footwear when patient is off stretcher. Physically safe environment: no spills, clutter or unnecessary equipment. Stretcher in lowest position, wheels locked, appropriate side rails in place. Provide visual cue, wrist band, yellow gown, etc. Monitor gait and stability. Monitor for mental status changes and reorient to person, place, and time. Review medications for side effects contributing to fall risk. Reinforce activity limits and safety measures with patient and family.

## 2021-01-01 NOTE — ED PROVIDER NOTE - CLINICAL SUMMARY MEDICAL DECISION MAKING FREE TEXT BOX
ED evaluation and management discussed with the HHA  in detail.  Close PMD follow up encouraged.  Strict ED return instructions discussed in detail and patient given the opportunity to ask any questions about their discharge diagnosis and instructions. Patient verbalized understanding.

## 2021-01-01 NOTE — ED PROVIDER NOTE - OBJECTIVE STATEMENT
74 yo woman w HHA present with complaint of PEF tube malfunction x 1 days  pt is non verbal - w history of cerebral palsy    as per HHA - no recent history of fever nausea/vomiting change in behavior or any other concern noted by housing staff

## 2021-02-23 ENCOUNTER — EMERGENCY (EMERGENCY)
Facility: HOSPITAL | Age: 76
LOS: 1 days | Discharge: HOME | End: 2021-02-23
Admitting: EMERGENCY MEDICINE
Payer: MEDICARE

## 2021-02-23 VITALS
OXYGEN SATURATION: 96 % | HEART RATE: 98 BPM | DIASTOLIC BLOOD PRESSURE: 78 MMHG | SYSTOLIC BLOOD PRESSURE: 138 MMHG | RESPIRATION RATE: 18 BRPM

## 2021-02-23 VITALS
SYSTOLIC BLOOD PRESSURE: 130 MMHG | DIASTOLIC BLOOD PRESSURE: 88 MMHG | OXYGEN SATURATION: 98 % | HEART RATE: 102 BPM | RESPIRATION RATE: 18 BRPM | HEIGHT: 60 IN | TEMPERATURE: 99 F

## 2021-02-23 DIAGNOSIS — R05 COUGH: ICD-10-CM

## 2021-02-23 DIAGNOSIS — Z88.8 ALLERGY STATUS TO OTHER DRUGS, MEDICAMENTS AND BIOLOGICAL SUBSTANCES: ICD-10-CM

## 2021-02-23 DIAGNOSIS — J18.9 PNEUMONIA, UNSPECIFIED ORGANISM: ICD-10-CM

## 2021-02-23 DIAGNOSIS — K94.23 GASTROSTOMY MALFUNCTION: Chronic | ICD-10-CM

## 2021-02-23 DIAGNOSIS — Z91.018 ALLERGY TO OTHER FOODS: ICD-10-CM

## 2021-02-23 LAB
RAPID RVP RESULT: SIGNIFICANT CHANGE UP
SARS-COV-2 RNA SPEC QL NAA+PROBE: SIGNIFICANT CHANGE UP

## 2021-02-23 PROCEDURE — 99284 EMERGENCY DEPT VISIT MOD MDM: CPT | Mod: 25

## 2021-02-23 PROCEDURE — 71045 X-RAY EXAM CHEST 1 VIEW: CPT | Mod: 26

## 2021-02-23 PROCEDURE — 71045 X-RAY EXAM CHEST 1 VIEW: CPT | Mod: 26,77

## 2021-02-23 RX ORDER — IPRATROPIUM/ALBUTEROL SULFATE 18-103MCG
3 AEROSOL WITH ADAPTER (GRAM) INHALATION ONCE
Refills: 0 | Status: COMPLETED | OUTPATIENT
Start: 2021-02-23 | End: 2021-02-23

## 2021-02-23 RX ORDER — PREDNISOLONE 5 MG
50 TABLET ORAL ONCE
Refills: 0 | Status: COMPLETED | OUTPATIENT
Start: 2021-02-23 | End: 2021-02-23

## 2021-02-23 RX ADMIN — Medication 3 MILLILITER(S): at 17:01

## 2021-02-23 RX ADMIN — Medication 3 MILLILITER(S): at 13:31

## 2021-02-23 RX ADMIN — Medication 50 MILLIGRAM(S): at 16:48

## 2021-02-23 RX ADMIN — Medication 200 MILLIGRAM(S): at 16:48

## 2021-02-23 RX ADMIN — Medication 3 MILLILITER(S): at 16:50

## 2021-02-23 RX ADMIN — Medication 875 MILLIGRAM(S): at 16:52

## 2021-02-23 NOTE — ED PROVIDER NOTE - NSFOLLOWUPINSTRUCTIONS_ED_ALL_ED_FT
Pneumonia    WHAT YOU NEED TO KNOW:    Pneumonia is an infection in your lungs caused by bacteria, viruses, fungi, or parasites. You can become infected if you come in contact with someone who is sick. You can get pneumonia if you recently had surgery or needed a ventilator to help you breathe. Pneumonia can also be caused by accidentally inhaling saliva or small pieces of food. Pneumonia may cause mild symptoms, or it can be severe and life-threatening. The Lungs         DISCHARGE INSTRUCTIONS:    Return to the emergency department if:     You cough up blood.       Your heart beats more than 100 beats in 1 minute.       You are very tired, confused, and cannot think clearly.      You have chest pain or trouble breathing.       Your lips or fingernails turn gray or blue.     Contact your healthcare provider if:     Your symptoms are the same or get worse 48 hours after you start antibiotics.      Your fever is not below 99°F (37.2°C) 48 hours after you start antibiotics.       You have a fever higher than 101°F (38.3°C).       You cannot eat, or you have loss of appetite, nausea, or are vomiting.      You have questions or concerns about your condition or care.    Medicines:     Antibiotics treat pneumonia caused by bacteria.      Acetaminophen decreases pain and fever. It is available without a doctor's order. Ask how much to take and how often to take it. Follow directions. Read the labels of all other medicines you are using to see if they also contain acetaminophen, or ask your doctor or pharmacist. Acetaminophen can cause liver damage if not taken correctly. Do not use more than 4 grams (4,000 milligrams) total of acetaminophen in one day.       NSAIDs, such as ibuprofen, help decrease swelling, pain, and fever. This medicine is available with or without a doctor's order. NSAIDs can cause stomach bleeding or kidney problems in certain people. If you take blood thinner medicine, always ask your healthcare provider if NSAIDs are safe for you. Always read the medicine label and follow directions.      Take your medicine as directed. Contact your healthcare provider if you think your medicine is not helping or if you have side effects. Tell him or her if you are allergic to any medicine. Keep a list of the medicines, vitamins, and herbs you take. Include the amounts, and when and why you take them. Bring the list or the pill bottles to follow-up visits. Carry your medicine list with you in case of an emergency.    Follow up with your healthcare provider as directed: You will need to return for more tests. Write down your questions so you remember to ask them during your visits.     Manage your symptoms:     Rest as needed. Rest often throughout the day. Alternate times of activity with times of rest.      Drink liquids as directed. Ask how much liquid to drink each day and which liquids are best for you. Liquids help thin your mucus, which may make it easier for you to cough it up.       Do not smoke. Avoid secondhand smoke. Smoking increases your risk for pneumonia. Smoking also makes it harder for you to get better after you have had pneumonia. Ask your healthcare provider for information if you need help to quit smoking.       Use a cool mist humidifier. A humidifier will help increase air moisture in your home. This may make it easier for you to breathe and help decrease your cough.       Keep your head elevated. You may be able to breathe better if you lie down with the head of your bed up.     Prevent pneumonia:     Prevent the spread of germs. Wash your hands often with soap and water. Use gel hand cleanser when there is no soap and water available. Do not touch your eyes, nose, or mouth unless you have washed your hands first. Cover your mouth when you cough. Cough into a tissue or your shirtsleeve so you do not spread germs from your hands. If you are sick, stay away from others as much as possible.       Ask about vaccines. You may need a vaccine to help prevent pneumonia. Get an influenza (flu) vaccine every year as soon as it becomes available.     TAKE ANTIBIOTICS AS PRESCRIBED FOR PNEUMONIA.     FOLLOW UP WITH YOUR PMD IN 2-3 DAYS FOR REPEAT EVALUATION.    USE YOUR HOME NEBULIZERS EVERY 6 HOURS FOR 3 DAYS.

## 2021-02-23 NOTE — ED PROVIDER NOTE - WR INTERPRETATION 1
CXR negative - bilat lower lobe infiltrates increased from prior (discussed with radiology resident), No atelectasis seen, no pneumothorax

## 2021-02-23 NOTE — ED PROVIDER NOTE - CLINICAL SUMMARY MEDICAL DECISION MAKING FREE TEXT BOX
pt presents today c/o cough and congestion. lungs without rhonchi, rales, or wheezing. cxr shows increased opacities at bases concerning for pna. symptoms improved after duonebs. also given guaifenesin, steroids, and augmentin. VSS, minimally tachycardic likely 2/2 albuterol and dehydration with dry mucus membranes noted and peg tube reliant. discussed with RN at group home.

## 2021-02-23 NOTE — ED ADULT NURSE NOTE - NSIMPLEMENTINTERV_GEN_ALL_ED
Implemented All Fall with Harm Risk Interventions:  [place] to call system. Call bell, personal items and telephone within reach. Instruct patient to call for assistance. Room bathroom lighting operational. Non-slip footwear when patient is off stretcher. Physically safe environment: no spills, clutter or unnecessary equipment. Stretcher in lowest position, wheels locked, appropriate side rails in place. Provide visual cue, wrist band, yellow gown, etc. Monitor gait and stability. Monitor for mental status changes and reorient to person, place, and time. Review medications for side effects contributing to fall risk. Reinforce activity limits and safety measures with patient and family. Provide visual clues: red socks.

## 2021-02-23 NOTE — ED PROVIDER NOTE - PROGRESS NOTE DETAILS
discussed with JERI Zavala at [facility] who notes pt had an allergic rash to bilat legs after receiving cefepime at [facility] but has not had reaction to penicillins.

## 2021-02-23 NOTE — ED ADULT TRIAGE NOTE - CHIEF COMPLAINT QUOTE
pt. brought in from an assisted living as per attendant pt. has chest congestion and cough. Pt. uses O2 nasal cannula daily since having covid in April.

## 2021-02-23 NOTE — ED PROVIDER NOTE - PATIENT PORTAL LINK FT
You can access the FollowMyHealth Patient Portal offered by [facility] by registering at the following website: [email or web address]. By joining [employer]’s FollowMyHealth portal, you will also be able to view your health information using other applications (apps) compatible with our system.

## 2021-02-23 NOTE — ED PROVIDER NOTE - OBJECTIVE STATEMENT
74yo F with h/o CP with spastic paraplegia, HTN, PE, DVT, constipation, aspiration pna presents today from group home with c/o cough and congestion since this morning. attempted tx with one duoneb pta without much improvement. pt has worn 2L per NC since being diagnosed with COVID in April. she was admitted at that time with respiratory failure and intubated. she denies SOB or chest pain at this time. history limited 2/2 limited verbal skills. HHA denies fever, vomiting.

## 2021-05-13 NOTE — ED PROVIDER NOTE - PATIENT PORTAL LINK FT
Procedure Name: bilateral trochanteric bursa with u/s   on 5/6/2021 with Dr. Dunham    Date of procedure: 5/6/21    Pain Diary:   Prior to procedure: 8    Current: 2     Pain relief:   75%    Per pt, her hips are feeling better.  The worst pain is by her knees and back.    Next pain management appointment: Next appointment with provider:  7/1/21       You can access the FollowMyHealth Patient Portal offered by [facility] by registering at the following website: [email or web address]. By joining [employer]’s FollowMyHealth portal, you will also be able to view your health information using other applications (apps) compatible with our system.

## 2021-08-17 NOTE — DIETITIAN INITIAL EVALUATION ADULT. - ADD RECOMMEND
There are no Wet Read(s) to document. 1. Cont with NPO diet with EN of jevity 1.2 @ goal rate of 30 ml/hr x24 hrs via PEG providing 1080 kcal, 46 g pro, and 547 ml free water, 1.12 g/kg IBW. 2. Recommend add prostat once daily (100 kcal, 15 g pro) 3. Recommend weekly weights 4. Cont with bowel regime 5. Replace lytes prn

## 2021-08-24 ENCOUNTER — EMERGENCY (EMERGENCY)
Facility: HOSPITAL | Age: 76
LOS: 1 days | Discharge: HOME | End: 2021-08-24
Admitting: EMERGENCY MEDICINE
Payer: MEDICARE

## 2021-08-24 VITALS
RESPIRATION RATE: 16 BRPM | HEART RATE: 90 BPM | OXYGEN SATURATION: 98 % | DIASTOLIC BLOOD PRESSURE: 80 MMHG | SYSTOLIC BLOOD PRESSURE: 140 MMHG

## 2021-08-24 VITALS
HEART RATE: 97 BPM | OXYGEN SATURATION: 94 % | HEIGHT: 60 IN | DIASTOLIC BLOOD PRESSURE: 86 MMHG | SYSTOLIC BLOOD PRESSURE: 146 MMHG | TEMPERATURE: 98 F | WEIGHT: 147.05 LBS | RESPIRATION RATE: 17 BRPM

## 2021-08-24 DIAGNOSIS — Z79.1 LONG TERM (CURRENT) USE OF NON-STEROIDAL ANTI-INFLAMMATORIES (NSAID): ICD-10-CM

## 2021-08-24 DIAGNOSIS — F79 UNSPECIFIED INTELLECTUAL DISABILITIES: ICD-10-CM

## 2021-08-24 DIAGNOSIS — K94.23 GASTROSTOMY MALFUNCTION: Chronic | ICD-10-CM

## 2021-08-24 DIAGNOSIS — K21.9 GASTRO-ESOPHAGEAL REFLUX DISEASE WITHOUT ESOPHAGITIS: ICD-10-CM

## 2021-08-24 DIAGNOSIS — K94.23 GASTROSTOMY MALFUNCTION: ICD-10-CM

## 2021-08-24 DIAGNOSIS — Z91.018 ALLERGY TO OTHER FOODS: ICD-10-CM

## 2021-08-24 DIAGNOSIS — I10 ESSENTIAL (PRIMARY) HYPERTENSION: ICD-10-CM

## 2021-08-24 DIAGNOSIS — Z79.810 LONG TERM (CURRENT) USE OF SELECTIVE ESTROGEN RECEPTOR MODULATORS (SERMS): ICD-10-CM

## 2021-08-24 DIAGNOSIS — G80.9 CEREBRAL PALSY, UNSPECIFIED: ICD-10-CM

## 2021-08-24 DIAGNOSIS — Z88.1 ALLERGY STATUS TO OTHER ANTIBIOTIC AGENTS STATUS: ICD-10-CM

## 2021-08-24 DIAGNOSIS — Z88.8 ALLERGY STATUS TO OTHER DRUGS, MEDICAMENTS AND BIOLOGICAL SUBSTANCES STATUS: ICD-10-CM

## 2021-08-24 DIAGNOSIS — Z79.811 LONG TERM (CURRENT) USE OF AROMATASE INHIBITORS: ICD-10-CM

## 2021-08-24 PROCEDURE — 74018 RADEX ABDOMEN 1 VIEW: CPT | Mod: 26

## 2021-08-24 PROCEDURE — 99283 EMERGENCY DEPT VISIT LOW MDM: CPT | Mod: 25

## 2021-08-24 RX ORDER — IOHEXOL 300 MG/ML
30 INJECTION, SOLUTION INTRAVENOUS ONCE
Refills: 0 | Status: COMPLETED | OUTPATIENT
Start: 2021-08-24 | End: 2021-08-24

## 2021-08-24 RX ADMIN — IOHEXOL 30 MILLILITER(S): 300 INJECTION, SOLUTION INTRAVENOUS at 14:32

## 2021-08-24 NOTE — ED PROVIDER NOTE - NSFOLLOWUPINSTRUCTIONS_ED_ALL_ED_FT
PEG tube placed  Clear for Return to Facility     Follow up with your primary care doctor or clinics listed below if you do not have a doctor  Return immediately for any new or worsening symptoms or any new concerns

## 2021-08-24 NOTE — ED ADULT NURSE NOTE - OBJECTIVE STATEMENT
Pt with HHA, pt at neuro baseline. HHA states peg tube is working for meds and food but leaking after use x 3days. pt skin around peg is clean and dry, no open wounds.

## 2021-08-24 NOTE — ED PROVIDER NOTE - NSICDXPASTSURGICALHX_GEN_ALL_CORE_FT
PAST SURGICAL HISTORY:  PEG tube malfunction      PAST SURGICAL HISTORY:  PEG tube malfunction

## 2021-08-24 NOTE — ED ADULT NURSE NOTE - NSICDXPASTMEDICALHX_GEN_ALL_CORE_FT
PAST MEDICAL HISTORY:  CP (cerebral palsy)     DVT (deep venous thrombosis)     Dysphagia     GERD (gastroesophageal reflux disease)     HTN (hypertension)     Mental retardation     PE (pulmonary thromboembolism)     Spastic quadriplegia

## 2021-08-24 NOTE — ED PROVIDER NOTE - OBJECTIVE STATEMENT
74 y/o female with PMHx of cerebral palsy, DVT, dysphagia, GERD, HTN, PE, MR, and spastic quadriplegia presents to the ED from her group home for a leaking peg tube. Aid states the tube hasn't been replaced in a while, and when flushing the tube, contents leak everywhere. No other associated medical complaints at this time. Denies fever or chills.

## 2021-08-24 NOTE — ED PROVIDER NOTE - NSICDXPASTMEDICALHX_GEN_ALL_CORE_FT
PAST MEDICAL HISTORY:  CP (cerebral palsy)     DVT (deep venous thrombosis)     Dysphagia     GERD (gastroesophageal reflux disease)     HTN (hypertension)     Mental retardation     PE (pulmonary thromboembolism)     Spastic quadriplegia      PAST MEDICAL HISTORY:  CP (cerebral palsy)     DVT (deep venous thrombosis)     Dysphagia     GERD (gastroesophageal reflux disease)     HTN (hypertension)     Mental retardation     PE (pulmonary thromboembolism)     Spastic quadriplegia

## 2021-08-24 NOTE — ED PROVIDER NOTE - PATIENT PORTAL LINK FT
You can access the FollowMyHealth Patient Portal offered by [facility] by registering at the following website: [email or web address]. By joining [employer]’s FollowMyHealth portal, you will also be able to view your health information using other applications (apps) compatible with our system.

## 2021-08-24 NOTE — ED ADULT NURSE NOTE - NURSING GU BLADDER
After the patient's informed consent was obtained, an informational brochure was given to the patient. The patient's left knee was sterilely prepped and 20 mg Kenalog and 4 cc of 1% lidocaine was given into the knee. Patient tolerated the procedure well.
non-distended/non-tender

## 2021-08-25 ENCOUNTER — EMERGENCY (EMERGENCY)
Facility: HOSPITAL | Age: 76
LOS: 1 days | Discharge: HOME | End: 2021-08-25
Attending: EMERGENCY MEDICINE | Admitting: EMERGENCY MEDICINE
Payer: MEDICARE

## 2021-08-25 VITALS
OXYGEN SATURATION: 93 % | SYSTOLIC BLOOD PRESSURE: 154 MMHG | TEMPERATURE: 98 F | DIASTOLIC BLOOD PRESSURE: 80 MMHG | HEIGHT: 60 IN | RESPIRATION RATE: 16 BRPM | HEART RATE: 93 BPM

## 2021-08-25 VITALS
HEART RATE: 76 BPM | OXYGEN SATURATION: 97 % | SYSTOLIC BLOOD PRESSURE: 129 MMHG | TEMPERATURE: 98 F | DIASTOLIC BLOOD PRESSURE: 76 MMHG | RESPIRATION RATE: 16 BRPM

## 2021-08-25 DIAGNOSIS — I82.409 ACUTE EMBOLISM AND THROMBOSIS OF UNSPECIFIED DEEP VEINS OF UNSPECIFIED LOWER EXTREMITY: ICD-10-CM

## 2021-08-25 DIAGNOSIS — Z86.711 PERSONAL HISTORY OF PULMONARY EMBOLISM: ICD-10-CM

## 2021-08-25 DIAGNOSIS — Z91.018 ALLERGY TO OTHER FOODS: ICD-10-CM

## 2021-08-25 DIAGNOSIS — Z43.1 ENCOUNTER FOR ATTENTION TO GASTROSTOMY: ICD-10-CM

## 2021-08-25 DIAGNOSIS — Z88.8 ALLERGY STATUS TO OTHER DRUGS, MEDICAMENTS AND BIOLOGICAL SUBSTANCES STATUS: ICD-10-CM

## 2021-08-25 DIAGNOSIS — K94.23 GASTROSTOMY MALFUNCTION: Chronic | ICD-10-CM

## 2021-08-25 DIAGNOSIS — I10 ESSENTIAL (PRIMARY) HYPERTENSION: ICD-10-CM

## 2021-08-25 DIAGNOSIS — Z87.19 PERSONAL HISTORY OF OTHER DISEASES OF THE DIGESTIVE SYSTEM: ICD-10-CM

## 2021-08-25 PROCEDURE — 99283 EMERGENCY DEPT VISIT LOW MDM: CPT | Mod: 25

## 2021-08-25 PROCEDURE — 74018 RADEX ABDOMEN 1 VIEW: CPT | Mod: 26

## 2021-08-25 RX ORDER — IOHEXOL 300 MG/ML
30 INJECTION, SOLUTION INTRAVENOUS ONCE
Refills: 0 | Status: COMPLETED | OUTPATIENT
Start: 2021-08-25 | End: 2021-08-25

## 2021-08-25 RX ADMIN — IOHEXOL 30 MILLILITER(S): 300 INJECTION, SOLUTION INTRAVENOUS at 16:15

## 2021-08-25 NOTE — ED PROVIDER NOTE - WR INTERPRETATION 1
PEG tube seen in appropriate position with balloon inflated and contrast within lumen of tube and GI tract

## 2021-08-25 NOTE — ED PROVIDER NOTE - PATIENT PORTAL LINK FT
You can access the FollowMyHealth Patient Portal offered by [facility] by registering at the following website: [email or web address]. By joining [employer]’s FollowMyHealth portal, you will also be able to view your health information using other applications (apps) compatible with our system.

## 2021-08-25 NOTE — ED PROVIDER NOTE - CLINICAL SUMMARY MEDICAL DECISION MAKING FREE TEXT BOX
Patient with longstanding PEG tube that was replaced yesterday in ED brought in for accidental removal today. Tube appears intact, balloon is deflated, functions well when inflated with appropriate volume of air. VS normal. Patient in no distress and with no evidence of aspiration or bowel obstruction. Tube replaced with same size 20 Fr. tube and balloon inflated with 10cc air. Will confirm placement with contrast xray.

## 2021-08-25 NOTE — ED ADULT TRIAGE NOTE - NS ED NURSE BANDS TYPE
MEDICATION/SUPPLIES REQUESTED:florinef    LAST OFFICE VISIT:10/28/2020    FOLLOW UP VISIT: 3/4/2021    Medication is not listed in office visit notes. Per verbal order from Dr. Germain patient should remain on this medication.     REFILL SENT     Name band;

## 2021-08-25 NOTE — ED PROVIDER NOTE - OBJECTIVE STATEMENT
74 y/o female with PMHx of cerebral palsy, DVT, dysphagia, GERD, HTN, PE, MR, and spastic quadriplegia who was seen in the ED yesterday due to leaking PEG tube and had it replaced (confirmed with xray) who is now brought back to the ED by aid from [facility] because the tube came out about an hour ago. She has the tube with her, it is a 20Fr and the balloon is deflated. She had a feed just prior to it becoming dislodged and does not have any apparent complaints or discomfort.

## 2021-08-25 NOTE — ED PROVIDER NOTE - CARE PLAN
Principal Discharge DX:	Adjustment, replacement, or removal of percutaneous endoscopic gastrostomy (PEG) tube   1

## 2021-08-25 NOTE — ED PROVIDER NOTE - PHYSICAL EXAMINATION
General: no acute distress  Cardiovascular: regular rate and rhythm  Respiratory: no respiratory distress  Abdomen: nontender and nondistended. PEG tube insertion site in LUQ with no tube in place, no surrounding erythema or skin breakdown  Extremities: no acute deformity  Neuro: awake and alert, answers yes/no questions appropriately

## 2021-08-25 NOTE — ED PROVIDER NOTE - NSFOLLOWUPINSTRUCTIONS_ED_ALL_ED_FT
You may resume using the tube as usual. Return to ED if there are any concerns about the tube or any new problems. Mari may resume normal activities. You may resume using the tube as usual. Return to ED if there are any concerns about the tube or any new problems.

## 2021-08-25 NOTE — ED ADULT TRIAGE NOTE - CHIEF COMPLAINT QUOTE
BIB homecare attendant for dislodged PEG tube, was placed here yesterday in ED. Attendant brought PEG with them, noted deflated balloon.

## 2021-08-26 ENCOUNTER — EMERGENCY (EMERGENCY)
Facility: HOSPITAL | Age: 76
LOS: 1 days | Discharge: HOME | End: 2021-08-26
Attending: EMERGENCY MEDICINE | Admitting: EMERGENCY MEDICINE
Payer: MEDICARE

## 2021-08-26 VITALS
SYSTOLIC BLOOD PRESSURE: 137 MMHG | HEART RATE: 89 BPM | TEMPERATURE: 98 F | OXYGEN SATURATION: 98 % | DIASTOLIC BLOOD PRESSURE: 76 MMHG | RESPIRATION RATE: 17 BRPM

## 2021-08-26 VITALS
OXYGEN SATURATION: 99 % | HEART RATE: 88 BPM | SYSTOLIC BLOOD PRESSURE: 138 MMHG | WEIGHT: 154.98 LBS | HEIGHT: 60 IN | TEMPERATURE: 98 F | DIASTOLIC BLOOD PRESSURE: 74 MMHG | RESPIRATION RATE: 18 BRPM

## 2021-08-26 DIAGNOSIS — F79 UNSPECIFIED INTELLECTUAL DISABILITIES: ICD-10-CM

## 2021-08-26 DIAGNOSIS — I26.99 OTHER PULMONARY EMBOLISM WITHOUT ACUTE COR PULMONALE: ICD-10-CM

## 2021-08-26 DIAGNOSIS — I10 ESSENTIAL (PRIMARY) HYPERTENSION: ICD-10-CM

## 2021-08-26 DIAGNOSIS — G80.9 CEREBRAL PALSY, UNSPECIFIED: ICD-10-CM

## 2021-08-26 DIAGNOSIS — K94.23 GASTROSTOMY MALFUNCTION: Chronic | ICD-10-CM

## 2021-08-26 DIAGNOSIS — Z91.018 ALLERGY TO OTHER FOODS: ICD-10-CM

## 2021-08-26 DIAGNOSIS — Z43.1 ENCOUNTER FOR ATTENTION TO GASTROSTOMY: ICD-10-CM

## 2021-08-26 DIAGNOSIS — K94.23 GASTROSTOMY MALFUNCTION: ICD-10-CM

## 2021-08-26 DIAGNOSIS — K21.9 GASTRO-ESOPHAGEAL REFLUX DISEASE WITHOUT ESOPHAGITIS: ICD-10-CM

## 2021-08-26 DIAGNOSIS — Z88.8 ALLERGY STATUS TO OTHER DRUGS, MEDICAMENTS AND BIOLOGICAL SUBSTANCES STATUS: ICD-10-CM

## 2021-08-26 DIAGNOSIS — Z88.1 ALLERGY STATUS TO OTHER ANTIBIOTIC AGENTS STATUS: ICD-10-CM

## 2021-08-26 PROCEDURE — 74018 RADEX ABDOMEN 1 VIEW: CPT | Mod: 26

## 2021-08-26 PROCEDURE — 71045 X-RAY EXAM CHEST 1 VIEW: CPT | Mod: 26

## 2021-08-26 PROCEDURE — 99284 EMERGENCY DEPT VISIT MOD MDM: CPT | Mod: GC

## 2021-08-26 RX ORDER — IOHEXOL 300 MG/ML
30 INJECTION, SOLUTION INTRAVENOUS ONCE
Refills: 0 | Status: COMPLETED | OUTPATIENT
Start: 2021-08-26 | End: 2021-08-26

## 2021-08-26 RX ADMIN — IOHEXOL 30 MILLILITER(S): 300 INJECTION, SOLUTION INTRAVENOUS at 12:31

## 2021-08-26 NOTE — ED PROVIDER NOTE - CLINICAL SUMMARY MEDICAL DECISION MAKING FREE TEXT BOX
Spastic quadriplegic pt presents to ED for PEG tube dislodging. Will order Xray to confirm proper placement. Will discharge with GI follow up as pt likely needs a PEG tube up size.

## 2021-08-26 NOTE — ED PROVIDER NOTE - PATIENT PORTAL LINK FT
You can access the FollowMyHealth Patient Portal offered by [facility] by registering at the following website: [email or web address]. By joining [employer]’s FollowMyHealth portal, you will also be able to view your health information using other applications (apps) compatible with our system.

## 2021-08-26 NOTE — ED PROVIDER NOTE - NSFOLLOWUPCLINICSTOKEN_GEN_ALL_ED_FT
[id number]: || ||00\01||False;[id number]: || ||00\01||False;[id number]: || ||00\01||False;[id number]: || ||00\01||False;

## 2021-08-26 NOTE — ED ADULT NURSE NOTE - OBJECTIVE STATEMENT
Pt presents for dislodgement of g-tube.  MD and imaging at bedside to confirm proper placement of tube for DC.

## 2021-08-26 NOTE — ED PROVIDER NOTE - OBJECTIVE STATEMENT
75 year old quadriplegic F with PMHx of CP, DVT, dysphagia, GERD, HTN presents to ED complaining of PEG tube displacement. Pt presents from a group home after PEG tube dislodging in the middle of a feed. The aid replaced the feeding tube but presents to ED to make sure the tube is in the right place. Unclear if displacement is causing any pain. Pt denies fever, chills, nausea, and vomiting. This is patient's third visit in a row. The past two days X ray was taken to confirm PEG tube was placed in the right place and pt was sent home.

## 2021-08-26 NOTE — ED PROVIDER NOTE - NSFOLLOWUPINSTRUCTIONS_ED_ALL_ED_FT
Rest and stay well hydrated.  Follow-up with Gastroenterology or whoever placed your PEG tube for further evaluation - you may need a larger tube size.   Return to the ED for any worsening pain, problems with PEG tube, bleeding, fever, or any new concerning symptoms.

## 2021-08-26 NOTE — ED PROVIDER NOTE - NSFOLLOWUPCLINICS_GEN_ALL_ED_FT
A Gastroenterologist  Gastroenterology  .  [place]   Phone:   Fax:     [facility]  Gastroenterology  [address] [place] [address]  Phone: [phone]  Fax:     [facility]  Primary Care  [address] [place] [address]  Phone: [phone]  Fax:     [facility]  Gastroenterology  [address], [address]  [place] [address]  Phone: [phone]  Fax:

## 2021-08-31 ENCOUNTER — APPOINTMENT (OUTPATIENT)
Dept: HEART AND VASCULAR | Facility: CLINIC | Age: 76
End: 2021-08-31

## 2021-09-21 ENCOUNTER — NON-APPOINTMENT (OUTPATIENT)
Age: 76
End: 2021-09-21

## 2021-09-21 ENCOUNTER — APPOINTMENT (OUTPATIENT)
Dept: HEART AND VASCULAR | Facility: CLINIC | Age: 76
End: 2021-09-21
Payer: MEDICARE

## 2021-09-21 VITALS
SYSTOLIC BLOOD PRESSURE: 124 MMHG | TEMPERATURE: 98 F | DIASTOLIC BLOOD PRESSURE: 80 MMHG | OXYGEN SATURATION: 96 % | HEART RATE: 88 BPM

## 2021-09-21 DIAGNOSIS — Z79.01 LONG TERM (CURRENT) USE OF ANTICOAGULANTS: ICD-10-CM

## 2021-09-21 DIAGNOSIS — I10 ESSENTIAL (PRIMARY) HYPERTENSION: ICD-10-CM

## 2021-09-21 DIAGNOSIS — I82.90 ACUTE EMBOLISM AND THROMBOSIS OF UNSPECIFIED VEIN: ICD-10-CM

## 2021-09-21 DIAGNOSIS — G80.9 CEREBRAL PALSY, UNSPECIFIED: ICD-10-CM

## 2021-09-21 PROCEDURE — 99214 OFFICE O/P EST MOD 30 MIN: CPT

## 2021-09-21 PROCEDURE — 93000 ELECTROCARDIOGRAM COMPLETE: CPT

## 2021-09-21 NOTE — PROCEDURE
[de-identified] : MD Christian  [de-identified] : NADIA AngelT  [de-identified] : Edema of the limbs - R60.9  [FreeTextEntry1] : Limited visualization due to patient's positioning (in the wheelchair). \par \par Procedure:\par Bilateral lower extremity venous duplex imaging was performed.\par \par FINDINGS: \par Right lower extremity:\par The right common femoral, deep femoral, saphenofemoral junction, femoral, popliteal, great saphenous, and small saphenous veins were visualized and were normally compressible.  Color and Spectral Doppler flow pattern and response to augmentation maneuvers in the right common femoral, and popliteal veins were normal.\par \par Left lower extremity:\par The left common femoral, deep femoral, saphenofemoral junction, femoral, popliteal, great saphenous, and small saphenous veins were visualized and were normally compressible. Color and Spectral Doppler flow pattern and response to augmentation maneuvers in the left common femoral, and popliteal  veins were normal.\par

## 2021-09-21 NOTE — IMPRESSION
[FreeTextEntry1] : 1. RIGHT: No evidence of right lower extremity deep or superficial venous thrombosis.\par 2. LEFT: No evidence of left lower extremity deep or superficial venous thrombosis.\par

## 2021-09-21 NOTE — HISTORY OF PRESENT ILLNESS
[FreeTextEntry1] : \par \par 74F w/PMH cerebral palsy with functional quadriplegia, DVT/PE, GERD, chronic PEG, HTN, T spine fusion, \par Post COVID-19 Admission [facility] 4/3-5/8/20, admitted to ICU due to acute hypoxic respiratory failure requiring intubation (extubated 4/13).  Course complicated by pseudomonal bacteremia, s/p antibiotics\par Discharged to group home on 2L NC, eliquis 5mg BID, and cipro.   Patient with limited ability to speak.  Most of history per  at the group home.  Patient has been doing well since dc.  No SOB/CP, leg pain/swelling.  Wheelchair bound at baseline.  Takes eliquis 5mg BID for hx of VTE, unable to obtain details from patient.  No bleeding episodes.  During admission no venous duplex or CTA performed.   \par \par 7/22: returns for initial office visit.  hx per aide and nurse as pt with significant cognitive impairment.  she is doing well, back to her baseline pre covid infection.  she does require continuous 2L NC now as opposed to intermittently prior to covid.  no chest pain or sob.  the staff stopped her eliquis after 3 months as that was the rec from the hospital.  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 \par \par 9/21/21:  returns for annual visit.  tolerating eliquis, no bleeding.  no cp or sob.  accompanied by her assisted living support person.  she brought bp log showing avg BPs over last two months in 120s-130s systolic.  On chlorthalidone.  no hospitalizations since last visit. On 2L NC continuously at home.  \par \par

## 2021-09-21 NOTE — ASSESSMENT
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  Would not start additional meds at this time.  Continue home monitoring.  \par - no further cv testing warranted at this time. \par \par

## 2021-10-06 PROBLEM — U07.1 PNEUMONIA DUE TO COVID-19 VIRUS: Status: ACTIVE | Noted: 2020-05-15

## 2021-12-08 ENCOUNTER — RX RENEWAL (OUTPATIENT)
Age: 76
End: 2021-12-08

## 2021-12-08 RX ORDER — APIXABAN 2.5 MG/1
2.5 TABLET, FILM COATED ORAL
Qty: 60 | Refills: 10 | Status: ACTIVE | COMMUNITY
Start: 2021-12-08 | End: 1900-01-01

## 2022-01-31 NOTE — ED ADULT NURSE NOTE - RN DISCHARGE SIGNATURE
Thank you for allowing us to be a part of your care today. We strive to provide the best care for you today and in the future.     Here are a few important reminders:     · Our goal is to review and report all test/imaging results within 24-48 hours (unless otherwise specified by the clinician). If you have not received your test results within this time period, please contact the clinic at [phone] and ask to speak to a RN Specialist or Cancer Care Coordinator.  My nurse, Tika, is usually available at her direct number: [phone]  · You can also receive your test results on-line quickly and easily by enrolling in Retention Education by visiting [email or web address].  · Central Scheduling should contact you in 24-48 hours if any imaging is ordered during this visit. Please contact Central Scheduling at [phone] in case you do not receive a call.   · Due to the recent change in narcotic laws and our commitment to patient safety and well-being, narcotic refills will be evaluated on a strict case by case basis. We will not address any requests for re-fills after noon on Fridays.   · If you have paperwork, complete your portion of the paperwork and either drop off or fax the forms to [phone]. Make sure to leave clear instruction of where you would like the completed paperwork to go and include a valid phone number. It may take staff up to 7 days to complete.  · With regard to what was discussed today, it is possible that you might have questions to today's appointment.  It is my goal that all aspects of your care plan are fully understood.  If you have any remaining questions, please feel free to call me at the general number listed above or call my nurse Tika at [phone].       Please finalize the following details for your followup:  1. Date and location of your followup visit.  Confirm the date and location of your next office and/or surgery appointment.  If you would like to confirm your  appointment, feel free to call Tika at the numbers listed above.  Remember that I see patients in 2 office locations and at [facility]  Be sure that your followup appointment is at the location you prefer.     2. Please take a card with contact information.    3. If you have not done so already, consider signing up for [facility].  This will allow you to set up an online account to check labs, notes, future appointments, and to send me an email which I will respond to within a few days.  If you feel you have an immediate need, please call and/or email and state your concern is urgent.  We will respond right away.    4. We value your feedback!  If you have any feedback he would like to share with me, feel free to call me or to send an e-mail and I will respond personally.    Thank you.    Toribio Hubbard MD  [phone]    [email or web address]    Tika LEE RN:  [phone]  Jeannette VILA, : [phone]    Prostate Specific Antigen (ng/mL)   Date Value   07/29/2021 17.30 (H)        29-Apr-2017

## 2022-03-03 ENCOUNTER — INPATIENT (INPATIENT)
Facility: HOSPITAL | Age: 77
LOS: 33 days | Discharge: NURSING, REHAB OR LONG-TERM CARE | DRG: 4 | End: 2022-04-06
Attending: INTERNAL MEDICINE
Payer: MEDICARE

## 2022-03-03 VITALS
WEIGHT: 110.23 LBS | RESPIRATION RATE: 18 BRPM | DIASTOLIC BLOOD PRESSURE: 58 MMHG | OXYGEN SATURATION: 100 % | SYSTOLIC BLOOD PRESSURE: 91 MMHG | HEART RATE: 89 BPM | HEIGHT: 60 IN

## 2022-03-03 DIAGNOSIS — K21.9 GASTRO-ESOPHAGEAL REFLUX DISEASE WITHOUT ESOPHAGITIS: ICD-10-CM

## 2022-03-03 DIAGNOSIS — J80 ACUTE RESPIRATORY DISTRESS SYNDROME: ICD-10-CM

## 2022-03-03 DIAGNOSIS — J96.01 ACUTE RESPIRATORY FAILURE WITH HYPOXIA: ICD-10-CM

## 2022-03-03 DIAGNOSIS — I82.90 ACUTE EMBOLISM AND THROMBOSIS OF UNSPECIFIED VEIN: ICD-10-CM

## 2022-03-03 DIAGNOSIS — Z87.19 PERSONAL HISTORY OF OTHER DISEASES OF THE DIGESTIVE SYSTEM: ICD-10-CM

## 2022-03-03 DIAGNOSIS — Z86.69 PERSONAL HISTORY OF OTHER DISEASES OF THE NERVOUS SYSTEM AND SENSE ORGANS: ICD-10-CM

## 2022-03-03 DIAGNOSIS — Z29.9 ENCOUNTER FOR PROPHYLACTIC MEASURES, UNSPECIFIED: ICD-10-CM

## 2022-03-03 DIAGNOSIS — K94.23 GASTROSTOMY MALFUNCTION: Chronic | ICD-10-CM

## 2022-03-03 LAB
ALBUMIN SERPL ELPH-MCNC: 3.3 G/DL — LOW (ref 3.4–5)
ALBUMIN SERPL ELPH-MCNC: 3.6 G/DL — SIGNIFICANT CHANGE UP (ref 3.3–5)
ALP SERPL-CCNC: 59 U/L — SIGNIFICANT CHANGE UP (ref 40–120)
ALP SERPL-CCNC: SIGNIFICANT CHANGE UP U/L (ref 40–120)
ALT FLD-CCNC: 50 U/L — HIGH (ref 12–42)
ALT FLD-CCNC: SIGNIFICANT CHANGE UP U/L (ref 10–45)
ANION GAP SERPL CALC-SCNC: 12 MMOL/L — SIGNIFICANT CHANGE UP (ref 5–17)
ANION GAP SERPL CALC-SCNC: 9 MMOL/L — SIGNIFICANT CHANGE UP (ref 9–16)
ANISOCYTOSIS BLD QL: SLIGHT — SIGNIFICANT CHANGE UP
APPEARANCE UR: CLEAR — SIGNIFICANT CHANGE UP
APTT BLD: 27.8 SEC — SIGNIFICANT CHANGE UP (ref 27.5–35.5)
APTT BLD: 31.6 SEC — SIGNIFICANT CHANGE UP (ref 27.5–35.5)
AST SERPL-CCNC: 61 U/L — HIGH (ref 15–37)
AST SERPL-CCNC: SIGNIFICANT CHANGE UP U/L (ref 10–40)
BACTERIA # UR AUTO: PRESENT /HPF
BASE EXCESS BLDA CALC-SCNC: 5.5 MMOL/L — HIGH (ref -2–3)
BASOPHILS # BLD AUTO: 0 K/UL — SIGNIFICANT CHANGE UP (ref 0–0.2)
BASOPHILS # BLD AUTO: 0.03 K/UL — SIGNIFICANT CHANGE UP (ref 0–0.2)
BASOPHILS NFR BLD AUTO: 0 % — SIGNIFICANT CHANGE UP (ref 0–2)
BASOPHILS NFR BLD AUTO: 0.3 % — SIGNIFICANT CHANGE UP (ref 0–2)
BILIRUB SERPL-MCNC: 0.5 MG/DL — SIGNIFICANT CHANGE UP (ref 0.2–1.2)
BILIRUB SERPL-MCNC: 0.9 MG/DL — SIGNIFICANT CHANGE UP (ref 0.2–1.2)
BILIRUB UR-MCNC: NEGATIVE — SIGNIFICANT CHANGE UP
BUN SERPL-MCNC: 20 MG/DL — SIGNIFICANT CHANGE UP (ref 7–23)
BUN SERPL-MCNC: 24 MG/DL — HIGH (ref 7–23)
CALCIUM SERPL-MCNC: 8.5 MG/DL — SIGNIFICANT CHANGE UP (ref 8.4–10.5)
CALCIUM SERPL-MCNC: 9 MG/DL — SIGNIFICANT CHANGE UP (ref 8.5–10.5)
CHLORIDE SERPL-SCNC: 94 MMOL/L — LOW (ref 96–108)
CHLORIDE SERPL-SCNC: 98 MMOL/L — SIGNIFICANT CHANGE UP (ref 96–108)
CK MB CFR SERPL CALC: 4.2 NG/ML — SIGNIFICANT CHANGE UP (ref 0–6.7)
CK SERPL-CCNC: SIGNIFICANT CHANGE UP U/L (ref 25–170)
CO2 BLDA-SCNC: 33 MMOL/L — HIGH (ref 19–24)
CO2 SERPL-SCNC: 24 MMOL/L — SIGNIFICANT CHANGE UP (ref 22–31)
CO2 SERPL-SCNC: 32 MMOL/L — HIGH (ref 22–31)
COD CRY URNS QL: ABNORMAL /HPF
COLOR SPEC: YELLOW — SIGNIFICANT CHANGE UP
CREAT SERPL-MCNC: 0.52 MG/DL — SIGNIFICANT CHANGE UP (ref 0.5–1.3)
CREAT SERPL-MCNC: 0.88 MG/DL — SIGNIFICANT CHANGE UP (ref 0.5–1.3)
DIFF PNL FLD: ABNORMAL
EGFR: 69 ML/MIN/1.73M2 — SIGNIFICANT CHANGE UP
EGFR: 97 ML/MIN/1.73M2 — SIGNIFICANT CHANGE UP
EOSINOPHIL # BLD AUTO: 0 K/UL — SIGNIFICANT CHANGE UP (ref 0–0.5)
EOSINOPHIL # BLD AUTO: 0.07 K/UL — SIGNIFICANT CHANGE UP (ref 0–0.5)
EOSINOPHIL NFR BLD AUTO: 0 % — SIGNIFICANT CHANGE UP (ref 0–6)
EOSINOPHIL NFR BLD AUTO: 0.8 % — SIGNIFICANT CHANGE UP (ref 0–6)
EPI CELLS # UR: SIGNIFICANT CHANGE UP /HPF (ref 0–5)
GAS PNL BLDA: SIGNIFICANT CHANGE UP
GIANT PLATELETS BLD QL SMEAR: PRESENT — SIGNIFICANT CHANGE UP
GLUCOSE BLDC GLUCOMTR-MCNC: 117 MG/DL — HIGH (ref 70–99)
GLUCOSE BLDC GLUCOMTR-MCNC: 155 MG/DL — HIGH (ref 70–99)
GLUCOSE SERPL-MCNC: 115 MG/DL — HIGH (ref 70–99)
GLUCOSE SERPL-MCNC: 233 MG/DL — HIGH (ref 70–99)
GLUCOSE UR QL: NEGATIVE — SIGNIFICANT CHANGE UP
GRAM STN FLD: SIGNIFICANT CHANGE UP
GRAN CASTS # UR COMP ASSIST: ABNORMAL /LPF
HCO3 BLDA-SCNC: 32 MMOL/L — HIGH (ref 21–28)
HCT VFR BLD CALC: 41.6 % — SIGNIFICANT CHANGE UP (ref 34.5–45)
HCT VFR BLD CALC: 43.4 % — SIGNIFICANT CHANGE UP (ref 34.5–45)
HGB BLD-MCNC: 12.8 G/DL — SIGNIFICANT CHANGE UP (ref 11.5–15.5)
HGB BLD-MCNC: 13.3 G/DL — SIGNIFICANT CHANGE UP (ref 11.5–15.5)
HYALINE CASTS # UR AUTO: ABNORMAL /LPF (ref 0–2)
HYPOCHROMIA BLD QL: SIGNIFICANT CHANGE UP
IMM GRANULOCYTES NFR BLD AUTO: 1.2 % — SIGNIFICANT CHANGE UP (ref 0–1.5)
INR BLD: 0.98 — SIGNIFICANT CHANGE UP (ref 0.88–1.16)
INR BLD: 1.11 — SIGNIFICANT CHANGE UP (ref 0.88–1.16)
KETONES UR-MCNC: NEGATIVE — SIGNIFICANT CHANGE UP
LACTATE SERPL-SCNC: 1.6 MMOL/L — SIGNIFICANT CHANGE UP (ref 0.5–2)
LACTATE SERPL-SCNC: 5.4 MMOL/L — CRITICAL HIGH (ref 0.4–2)
LEGIONELLA AG UR QL: NEGATIVE — SIGNIFICANT CHANGE UP
LEUKOCYTE ESTERASE UR-ACNC: NEGATIVE — SIGNIFICANT CHANGE UP
LYMPHOCYTES # BLD AUTO: 0.15 K/UL — LOW (ref 1–3.3)
LYMPHOCYTES # BLD AUTO: 1.7 % — LOW (ref 13–44)
LYMPHOCYTES # BLD AUTO: 2.25 K/UL — SIGNIFICANT CHANGE UP (ref 1–3.3)
LYMPHOCYTES # BLD AUTO: 25.5 % — SIGNIFICANT CHANGE UP (ref 13–44)
MAGNESIUM SERPL-MCNC: 1.6 MG/DL — SIGNIFICANT CHANGE UP (ref 1.6–2.6)
MANUAL SMEAR VERIFICATION: SIGNIFICANT CHANGE UP
MCHC RBC-ENTMCNC: 30.6 GM/DL — LOW (ref 32–36)
MCHC RBC-ENTMCNC: 30.6 PG — SIGNIFICANT CHANGE UP (ref 27–34)
MCHC RBC-ENTMCNC: 30.8 GM/DL — LOW (ref 32–36)
MCHC RBC-ENTMCNC: 31.4 PG — SIGNIFICANT CHANGE UP (ref 27–34)
MCV RBC AUTO: 100 FL — SIGNIFICANT CHANGE UP (ref 80–100)
MCV RBC AUTO: 102 FL — HIGH (ref 80–100)
MONOCYTES # BLD AUTO: 0.35 K/UL — SIGNIFICANT CHANGE UP (ref 0–0.9)
MONOCYTES # BLD AUTO: 0.39 K/UL — SIGNIFICANT CHANGE UP (ref 0–0.9)
MONOCYTES NFR BLD AUTO: 4 % — SIGNIFICANT CHANGE UP (ref 2–14)
MONOCYTES NFR BLD AUTO: 4.4 % — SIGNIFICANT CHANGE UP (ref 2–14)
MRSA PCR RESULT.: NEGATIVE — SIGNIFICANT CHANGE UP
NEUTROPHILS # BLD AUTO: 6 K/UL — SIGNIFICANT CHANGE UP (ref 1.8–7.4)
NEUTROPHILS # BLD AUTO: 8.27 K/UL — HIGH (ref 1.8–7.4)
NEUTROPHILS NFR BLD AUTO: 68.2 % — SIGNIFICANT CHANGE UP (ref 43–77)
NEUTROPHILS NFR BLD AUTO: 93.9 % — HIGH (ref 43–77)
NITRITE UR-MCNC: NEGATIVE — SIGNIFICANT CHANGE UP
NRBC # BLD: 0 /100 WBCS — SIGNIFICANT CHANGE UP (ref 0–0)
PCO2 BLDA: 51 MMHG — HIGH (ref 32–35)
PCO2 BLDV: 87 MMHG — HIGH (ref 39–42)
PH BLDA: 7.4 — SIGNIFICANT CHANGE UP (ref 7.35–7.45)
PH BLDV: 7.19 — LOW (ref 7.32–7.43)
PH UR: 5.5 — SIGNIFICANT CHANGE UP (ref 5–8)
PLAT MORPH BLD: ABNORMAL
PLATELET # BLD AUTO: 165 K/UL — SIGNIFICANT CHANGE UP (ref 150–400)
PLATELET # BLD AUTO: 173 K/UL — SIGNIFICANT CHANGE UP (ref 150–400)
PO2 BLDA: 75 MMHG — LOW (ref 83–108)
PO2 BLDV: 79 MMHG — HIGH (ref 25–45)
POLYCHROMASIA BLD QL SMEAR: SLIGHT — SIGNIFICANT CHANGE UP
POTASSIUM SERPL-MCNC: 3.5 MMOL/L — SIGNIFICANT CHANGE UP (ref 3.5–5.3)
POTASSIUM SERPL-MCNC: SIGNIFICANT CHANGE UP MMOL/L (ref 3.5–5.3)
POTASSIUM SERPL-SCNC: 3.5 MMOL/L — SIGNIFICANT CHANGE UP (ref 3.5–5.3)
POTASSIUM SERPL-SCNC: SIGNIFICANT CHANGE UP MMOL/L (ref 3.5–5.3)
PROT SERPL-MCNC: 6.3 G/DL — SIGNIFICANT CHANGE UP (ref 6–8.3)
PROT SERPL-MCNC: 7.4 G/DL — SIGNIFICANT CHANGE UP (ref 6.4–8.2)
PROT UR-MCNC: >=300 MG/DL
PROTHROM AB SERPL-ACNC: 11.7 SEC — SIGNIFICANT CHANGE UP (ref 10.5–13.4)
PROTHROM AB SERPL-ACNC: 12.9 SEC — SIGNIFICANT CHANGE UP (ref 10.5–13.4)
RBC # BLD: 4.08 M/UL — SIGNIFICANT CHANGE UP (ref 3.8–5.2)
RBC # BLD: 4.34 M/UL — SIGNIFICANT CHANGE UP (ref 3.8–5.2)
RBC # FLD: 12.4 % — SIGNIFICANT CHANGE UP (ref 10.3–14.5)
RBC # FLD: 12.6 % — SIGNIFICANT CHANGE UP (ref 10.3–14.5)
RBC BLD AUTO: ABNORMAL
RBC CASTS # UR COMP ASSIST: ABNORMAL /HPF
S AUREUS DNA NOSE QL NAA+PROBE: NEGATIVE — SIGNIFICANT CHANGE UP
S PNEUM AG UR QL: NEGATIVE — SIGNIFICANT CHANGE UP
SAO2 % BLDA: 97.6 % — SIGNIFICANT CHANGE UP (ref 94–98)
SAO2 % BLDV: 95.3 % — HIGH (ref 67–88)
SARS-COV-2 RNA SPEC QL NAA+PROBE: SIGNIFICANT CHANGE UP
SODIUM SERPL-SCNC: 134 MMOL/L — LOW (ref 135–145)
SODIUM SERPL-SCNC: 135 MMOL/L — SIGNIFICANT CHANGE UP (ref 132–145)
SP GR SPEC: >=1.03 — SIGNIFICANT CHANGE UP (ref 1–1.03)
SPECIMEN SOURCE: SIGNIFICANT CHANGE UP
STOMATOCYTES BLD QL SMEAR: SLIGHT — SIGNIFICANT CHANGE UP
UROBILINOGEN FLD QL: 0.2 E.U./DL — SIGNIFICANT CHANGE UP
WBC # BLD: 8.81 K/UL — SIGNIFICANT CHANGE UP (ref 3.8–10.5)
WBC # BLD: 8.81 K/UL — SIGNIFICANT CHANGE UP (ref 3.8–10.5)
WBC # FLD AUTO: 8.81 K/UL — SIGNIFICANT CHANGE UP (ref 3.8–10.5)
WBC # FLD AUTO: 8.81 K/UL — SIGNIFICANT CHANGE UP (ref 3.8–10.5)
WBC UR QL: < 5 /HPF — SIGNIFICANT CHANGE UP

## 2022-03-03 PROCEDURE — 93010 ELECTROCARDIOGRAM REPORT: CPT

## 2022-03-03 PROCEDURE — 76937 US GUIDE VASCULAR ACCESS: CPT | Mod: 26

## 2022-03-03 PROCEDURE — 71045 X-RAY EXAM CHEST 1 VIEW: CPT | Mod: 26

## 2022-03-03 PROCEDURE — 36000 PLACE NEEDLE IN VEIN: CPT | Mod: 59

## 2022-03-03 PROCEDURE — 36000 PLACE NEEDLE IN VEIN: CPT

## 2022-03-03 PROCEDURE — 99233 SBSQ HOSP IP/OBS HIGH 50: CPT | Mod: GC

## 2022-03-03 PROCEDURE — 99233 SBSQ HOSP IP/OBS HIGH 50: CPT

## 2022-03-03 PROCEDURE — 99291 CRITICAL CARE FIRST HOUR: CPT | Mod: CS

## 2022-03-03 RX ORDER — SODIUM CHLORIDE 9 MG/ML
500 INJECTION, SOLUTION INTRAVENOUS ONCE
Refills: 0 | Status: COMPLETED | OUTPATIENT
Start: 2022-03-03 | End: 2022-03-03

## 2022-03-03 RX ORDER — APIXABAN 2.5 MG/1
2.5 TABLET, FILM COATED ORAL EVERY 12 HOURS
Refills: 0 | Status: DISCONTINUED | OUTPATIENT
Start: 2022-03-03 | End: 2022-03-03

## 2022-03-03 RX ORDER — METRONIDAZOLE 500 MG
500 TABLET ORAL ONCE
Refills: 0 | Status: COMPLETED | OUTPATIENT
Start: 2022-03-03 | End: 2022-03-03

## 2022-03-03 RX ORDER — NOREPINEPHRINE BITARTRATE/D5W 8 MG/250ML
0.05 PLASTIC BAG, INJECTION (ML) INTRAVENOUS
Qty: 8 | Refills: 0 | Status: DISCONTINUED | OUTPATIENT
Start: 2022-03-03 | End: 2022-03-03

## 2022-03-03 RX ORDER — CHLORHEXIDINE GLUCONATE 213 G/1000ML
15 SOLUTION TOPICAL EVERY 12 HOURS
Refills: 0 | Status: DISCONTINUED | OUTPATIENT
Start: 2022-03-03 | End: 2022-03-04

## 2022-03-03 RX ORDER — DEXMEDETOMIDINE HYDROCHLORIDE IN 0.9% SODIUM CHLORIDE 4 UG/ML
0.2 INJECTION INTRAVENOUS
Qty: 400 | Refills: 0 | Status: DISCONTINUED | OUTPATIENT
Start: 2022-03-03 | End: 2022-03-04

## 2022-03-03 RX ORDER — AZTREONAM 2 G
500 VIAL (EA) INJECTION ONCE
Refills: 0 | Status: DISCONTINUED | OUTPATIENT
Start: 2022-03-03 | End: 2022-03-03

## 2022-03-03 RX ORDER — MEROPENEM 1 G/30ML
1000 INJECTION INTRAVENOUS ONCE
Refills: 0 | Status: COMPLETED | OUTPATIENT
Start: 2022-03-03 | End: 2022-03-03

## 2022-03-03 RX ORDER — VANCOMYCIN HCL 1 G
1000 VIAL (EA) INTRAVENOUS ONCE
Refills: 0 | Status: COMPLETED | OUTPATIENT
Start: 2022-03-03 | End: 2022-03-03

## 2022-03-03 RX ORDER — GLUCAGON INJECTION, SOLUTION 0.5 MG/.1ML
1 INJECTION, SOLUTION SUBCUTANEOUS ONCE
Refills: 0 | Status: DISCONTINUED | OUTPATIENT
Start: 2022-03-03 | End: 2022-04-06

## 2022-03-03 RX ORDER — ALBUTEROL 90 UG/1
3 AEROSOL, METERED ORAL
Qty: 0 | Refills: 0 | DISCHARGE

## 2022-03-03 RX ORDER — ENOXAPARIN SODIUM 100 MG/ML
50 INJECTION SUBCUTANEOUS EVERY 12 HOURS
Refills: 0 | Status: DISCONTINUED | OUTPATIENT
Start: 2022-03-03 | End: 2022-03-10

## 2022-03-03 RX ORDER — SODIUM CHLORIDE 9 MG/ML
1000 INJECTION, SOLUTION INTRAVENOUS
Refills: 0 | Status: DISCONTINUED | OUTPATIENT
Start: 2022-03-03 | End: 2022-04-06

## 2022-03-03 RX ORDER — ACETAMINOPHEN 500 MG
650 TABLET ORAL EVERY 6 HOURS
Refills: 0 | Status: DISCONTINUED | OUTPATIENT
Start: 2022-03-03 | End: 2022-03-03

## 2022-03-03 RX ORDER — PANTOPRAZOLE SODIUM 20 MG/1
40 TABLET, DELAYED RELEASE ORAL EVERY 24 HOURS
Refills: 0 | Status: DISCONTINUED | OUTPATIENT
Start: 2022-03-03 | End: 2022-03-09

## 2022-03-03 RX ORDER — MIDAZOLAM HYDROCHLORIDE 1 MG/ML
5 INJECTION, SOLUTION INTRAMUSCULAR; INTRAVENOUS ONCE
Refills: 0 | Status: DISCONTINUED | OUTPATIENT
Start: 2022-03-03 | End: 2022-03-03

## 2022-03-03 RX ORDER — POLYETHYLENE GLYCOL 3350 17 G/17G
17 POWDER, FOR SOLUTION ORAL
Qty: 0 | Refills: 0 | DISCHARGE

## 2022-03-03 RX ORDER — ACETYLCYSTEINE 200 MG/ML
2 VIAL (ML) MISCELLANEOUS
Qty: 0 | Refills: 0 | DISCHARGE

## 2022-03-03 RX ORDER — APIXABAN 2.5 MG/1
1 TABLET, FILM COATED ORAL
Qty: 0 | Refills: 0 | DISCHARGE

## 2022-03-03 RX ORDER — INSULIN LISPRO 100/ML
VIAL (ML) SUBCUTANEOUS EVERY 6 HOURS
Refills: 0 | Status: DISCONTINUED | OUTPATIENT
Start: 2022-03-03 | End: 2022-03-17

## 2022-03-03 RX ORDER — ARIPIPRAZOLE 15 MG/1
1 TABLET ORAL
Qty: 0 | Refills: 0 | DISCHARGE

## 2022-03-03 RX ORDER — DEXTROSE 50 % IN WATER 50 %
15 SYRINGE (ML) INTRAVENOUS ONCE
Refills: 0 | Status: DISCONTINUED | OUTPATIENT
Start: 2022-03-03 | End: 2022-03-29

## 2022-03-03 RX ORDER — DEXTROSE 50 % IN WATER 50 %
25 SYRINGE (ML) INTRAVENOUS ONCE
Refills: 0 | Status: DISCONTINUED | OUTPATIENT
Start: 2022-03-03 | End: 2022-03-29

## 2022-03-03 RX ORDER — HEPARIN SODIUM 5000 [USP'U]/ML
5000 INJECTION INTRAVENOUS; SUBCUTANEOUS EVERY 8 HOURS
Refills: 0 | Status: DISCONTINUED | OUTPATIENT
Start: 2022-03-03 | End: 2022-03-03

## 2022-03-03 RX ORDER — ERGOCALCIFEROL 1.25 MG/1
6.5 CAPSULE ORAL
Qty: 0 | Refills: 0 | DISCHARGE

## 2022-03-03 RX ORDER — ETOMIDATE 2 MG/ML
20 INJECTION INTRAVENOUS ONCE
Refills: 0 | Status: COMPLETED | OUTPATIENT
Start: 2022-03-03 | End: 2022-03-03

## 2022-03-03 RX ORDER — DOCUSATE SODIUM 100 MG
1 CAPSULE ORAL
Qty: 0 | Refills: 0 | DISCHARGE

## 2022-03-03 RX ORDER — FENTANYL CITRATE 50 UG/ML
0.5 INJECTION INTRAVENOUS
Qty: 2500 | Refills: 0 | Status: DISCONTINUED | OUTPATIENT
Start: 2022-03-03 | End: 2022-03-03

## 2022-03-03 RX ORDER — ACETAMINOPHEN 500 MG
1000 TABLET ORAL ONCE
Refills: 0 | Status: COMPLETED | OUTPATIENT
Start: 2022-03-03 | End: 2022-03-03

## 2022-03-03 RX ORDER — NOREPINEPHRINE BITARTRATE/D5W 8 MG/250ML
0.05 PLASTIC BAG, INJECTION (ML) INTRAVENOUS
Qty: 8 | Refills: 0 | Status: DISCONTINUED | OUTPATIENT
Start: 2022-03-03 | End: 2022-03-05

## 2022-03-03 RX ORDER — ALENDRONATE SODIUM 70 MG/1
75 TABLET ORAL
Qty: 0 | Refills: 0 | DISCHARGE

## 2022-03-03 RX ORDER — PROPOFOL 10 MG/ML
1 INJECTION, EMULSION INTRAVENOUS
Qty: 1000 | Refills: 0 | Status: DISCONTINUED | OUTPATIENT
Start: 2022-03-03 | End: 2022-03-03

## 2022-03-03 RX ORDER — FAMOTIDINE 10 MG/ML
1 INJECTION INTRAVENOUS
Qty: 0 | Refills: 0 | DISCHARGE

## 2022-03-03 RX ORDER — VANCOMYCIN HCL 1 G
750 VIAL (EA) INTRAVENOUS ONCE
Refills: 0 | Status: DISCONTINUED | OUTPATIENT
Start: 2022-03-03 | End: 2022-03-03

## 2022-03-03 RX ORDER — MIDAZOLAM HYDROCHLORIDE 1 MG/ML
0.02 INJECTION, SOLUTION INTRAMUSCULAR; INTRAVENOUS
Qty: 100 | Refills: 0 | Status: DISCONTINUED | OUTPATIENT
Start: 2022-03-03 | End: 2022-03-03

## 2022-03-03 RX ORDER — DEXTROSE 50 % IN WATER 50 %
12.5 SYRINGE (ML) INTRAVENOUS ONCE
Refills: 0 | Status: DISCONTINUED | OUTPATIENT
Start: 2022-03-03 | End: 2022-03-29

## 2022-03-03 RX ORDER — SODIUM CHLORIDE 0.65 %
1 AEROSOL, SPRAY (ML) NASAL
Qty: 0 | Refills: 0 | DISCHARGE

## 2022-03-03 RX ORDER — DEXMEDETOMIDINE HYDROCHLORIDE IN 0.9% SODIUM CHLORIDE 4 UG/ML
0.2 INJECTION INTRAVENOUS
Qty: 400 | Refills: 0 | Status: DISCONTINUED | OUTPATIENT
Start: 2022-03-03 | End: 2022-03-03

## 2022-03-03 RX ORDER — MEROPENEM 1 G/30ML
1000 INJECTION INTRAVENOUS EVERY 8 HOURS
Refills: 0 | Status: DISCONTINUED | OUTPATIENT
Start: 2022-03-03 | End: 2022-03-05

## 2022-03-03 RX ORDER — SIMVASTATIN 20 MG/1
1 TABLET, FILM COATED ORAL
Qty: 0 | Refills: 0 | DISCHARGE

## 2022-03-03 RX ORDER — CHLORTHALIDONE 50 MG
1 TABLET ORAL
Qty: 0 | Refills: 0 | DISCHARGE

## 2022-03-03 RX ORDER — SOD,AMMONIUM,POTASSIUM LACTATE
1 CREAM (GRAM) TOPICAL
Qty: 0 | Refills: 0 | DISCHARGE

## 2022-03-03 RX ORDER — CHLORHEXIDINE GLUCONATE 213 G/1000ML
1 SOLUTION TOPICAL
Refills: 0 | Status: DISCONTINUED | OUTPATIENT
Start: 2022-03-03 | End: 2022-03-10

## 2022-03-03 RX ORDER — SODIUM CHLORIDE 9 MG/ML
1500 INJECTION INTRAMUSCULAR; INTRAVENOUS; SUBCUTANEOUS ONCE
Refills: 0 | Status: COMPLETED | OUTPATIENT
Start: 2022-03-03 | End: 2022-03-03

## 2022-03-03 RX ORDER — MAGNESIUM SULFATE 500 MG/ML
2 VIAL (ML) INJECTION ONCE
Refills: 0 | Status: COMPLETED | OUTPATIENT
Start: 2022-03-03 | End: 2022-03-03

## 2022-03-03 RX ORDER — ROCURONIUM BROMIDE 10 MG/ML
60 VIAL (ML) INTRAVENOUS ONCE
Refills: 0 | Status: COMPLETED | OUTPATIENT
Start: 2022-03-03 | End: 2022-03-03

## 2022-03-03 RX ORDER — ZINC OXIDE 200 MG/G
1 OINTMENT TOPICAL
Qty: 0 | Refills: 0 | DISCHARGE

## 2022-03-03 RX ADMIN — ENOXAPARIN SODIUM 50 MILLIGRAM(S): 100 INJECTION SUBCUTANEOUS at 15:46

## 2022-03-03 RX ADMIN — MEROPENEM 100 MILLIGRAM(S): 1 INJECTION INTRAVENOUS at 09:43

## 2022-03-03 RX ADMIN — MEROPENEM 100 MILLIGRAM(S): 1 INJECTION INTRAVENOUS at 17:41

## 2022-03-03 RX ADMIN — Medication 1000 MILLIGRAM(S): at 14:18

## 2022-03-03 RX ADMIN — MIDAZOLAM HYDROCHLORIDE 5 MILLIGRAM(S): 1 INJECTION, SOLUTION INTRAMUSCULAR; INTRAVENOUS at 02:51

## 2022-03-03 RX ADMIN — MIDAZOLAM HYDROCHLORIDE 1 MG/KG/HR: 1 INJECTION, SOLUTION INTRAMUSCULAR; INTRAVENOUS at 03:13

## 2022-03-03 RX ADMIN — Medication 60 MILLIGRAM(S): at 02:35

## 2022-03-03 RX ADMIN — Medication 250 MILLIGRAM(S): at 08:40

## 2022-03-03 RX ADMIN — Medication 2: at 23:13

## 2022-03-03 RX ADMIN — Medication 500 MILLIGRAM(S): at 04:11

## 2022-03-03 RX ADMIN — ETOMIDATE 20 MILLIGRAM(S): 2 INJECTION INTRAVENOUS at 02:39

## 2022-03-03 RX ADMIN — Medication 100 MILLIGRAM(S): at 03:41

## 2022-03-03 RX ADMIN — HEPARIN SODIUM 5000 UNIT(S): 5000 INJECTION INTRAVENOUS; SUBCUTANEOUS at 09:42

## 2022-03-03 RX ADMIN — Medication 25 GRAM(S): at 11:00

## 2022-03-03 RX ADMIN — DEXMEDETOMIDINE HYDROCHLORIDE IN 0.9% SODIUM CHLORIDE 2.84 MICROGRAM(S)/KG/HR: 4 INJECTION INTRAVENOUS at 22:11

## 2022-03-03 RX ADMIN — CHLORHEXIDINE GLUCONATE 15 MILLILITER(S): 213 SOLUTION TOPICAL at 19:02

## 2022-03-03 RX ADMIN — Medication 5.33 MICROGRAM(S)/KG/MIN: at 12:22

## 2022-03-03 RX ADMIN — PANTOPRAZOLE SODIUM 40 MILLIGRAM(S): 20 TABLET, DELAYED RELEASE ORAL at 09:42

## 2022-03-03 RX ADMIN — FENTANYL CITRATE 2.5 MICROGRAM(S)/KG/HR: 50 INJECTION INTRAVENOUS at 03:04

## 2022-03-03 RX ADMIN — SODIUM CHLORIDE 1500 MILLILITER(S): 9 INJECTION INTRAMUSCULAR; INTRAVENOUS; SUBCUTANEOUS at 02:47

## 2022-03-03 RX ADMIN — Medication 400 MILLIGRAM(S): at 13:15

## 2022-03-03 RX ADMIN — SODIUM CHLORIDE 500 MILLILITER(S): 9 INJECTION, SOLUTION INTRAVENOUS at 12:35

## 2022-03-03 RX ADMIN — Medication 4.69 MICROGRAM(S)/KG/MIN: at 03:38

## 2022-03-03 NOTE — H&P ADULT - HISTORY OF PRESENT ILLNESS
74 year old female with a PMH of cerebral palsy with functional quadroplegia, DVT/PE on eliquis, GERD, Chronic dyshagia with PEG, Thoracic spine fusion and a history of prior admission in 2020 for aspiration PNA requiring intubation presents from her assisted living facility in acute hypoxic respiratory failure. Ahe was found by staff with difficulty breathing.  +cough.  ~1am.  EMS arrival noted pt w/ resp distress, O2 84% in RA, intubated in field for airway protection, noted "thick viscous" liquid in her oropharynx during intubation. Patient lost conciousness multiple times upon transfer to [facility] with subsequent transfer to [facility] to be cared for in the MICU.     ED Course:  Vitals: T 99.1, HR 81, BP 91/58, RR 18, 100% O2 sat Intubated   Labs: cbc wnl, Cl 94, HCO3- 32, Lactate 5.4, VBG with pH 7.19, pCO2 87, venous O2 sat 95%  Imaging: CXR BL opacities worse on the left   Interventions: Metronidazole 500mg x1, Fent drip  74 year old female with a PMH of cerebral palsy with functional quadroplegia, DVT/PE on eliquis, GERD, Chronic dyshagia with PEG, Thoracic spine fusion and a history of prior admission in 2020 for aspiration PNA requiring intubation presents from her assisted living facility in acute hypoxic respiratory failure. Ahe was found by staff with difficulty breathing.  +cough.  ~1am.  EMS arrival noted pt w/ resp distress, O2 84% in RA, intubated in field for airway protection, noted "thick viscous" liquid in her oropharynx during intubation. Patient lost conciousness multiple times upon transfer to [facility] with subsequent transfer to [facility] to be cared for in the MICU.     ED Course:  Vitals: T 99.1, HR 81, BP 91/58, RR 18, 100% O2 sat Intubated VC/AC  Labs: cbc wnl, Cl 94, HCO3- 32, Lactate 5.4, VBG with pH 7.19, pCO2 87, venous O2 sat 95%  Imaging: CXR BL opacities worse on the left   Interventions: Metronidazole 500mg x1, Fent drip

## 2022-03-03 NOTE — PROCEDURE NOTE - NSICDXPROCEDURE_GEN_ALL_CORE_FT
PROCEDURES:  Insertion, catheter, intravenous 03-Mar-2022 06:59:42  Angélica Emmanuel  Blood draw 03-Mar-2022 06:59:54  Angélica Emmanuel

## 2022-03-03 NOTE — ED ADULT TRIAGE NOTE - CHIEF COMPLAINT QUOTE
Pt. BIBEMS. As per EMS "as per family pt. was found in bed at around 0115 coughing up fluid and foaming around the mouth pt. became unconscious for about 3 mins then woke up agitated and then became unconscious again."  Pt. BIBEMS. As per EMS "as per family pt. was found in bed at around 0115 coughing up fluid and foaming around the mouth pt. became unconscious for about 3 mins then woke up agitated and then became unconscious again." , IO to R. tamra, peg tube noted.

## 2022-03-03 NOTE — PROGRESS NOTE ADULT - SUBJECTIVE AND OBJECTIVE BOX
***Incomplete*** Pt admitted overnight. No acute events since then.    Pt seen and examined at the bedside. Intubated and sedated. Able to follow simple commands. No subjective complaints at this time. Nurse reports bloody sputum suctioned from ET tube.    Constitutional - NAD, intubated & sedated.  HEENT - NCAT, anicteric sclera, intubated, mucous membranes dry  Neck - Supple, no lymphadenopathy  Pulm - Synchronous breathes with ventilator. Rhonchi present L >R, upper > lower lobes  Cardio -  RRR, S1/S2 present, no murmur  GI -  Soft, NTND, BSx4, PEG tube in place   - lu in place  Extremities - Non-pitting edema b/l LE, 2+ pulses   Neurologic Exam: intubated and sedated but responsive to verbal stimuli. Follows simple commands.                Skin: warm, dry, intact, no rashes or wounds    Lines: Lu, multiple peripheral, left EJ central       Pt admitted overnight. No acute events since then.    Pt seen and examined at the bedside. Intubated and sedated. Able to follow simple commands. No subjective complaints at this time. Nurse reports bloody sputum suctioned from ET tube.    ICU Vital Signs Last 24 Hrs  T(C): 37.3 (03 Mar 2022 02:47), Max: 37.3 (03 Mar 2022 02:47)  T(F): 99.1 (03 Mar 2022 02:47), Max: 99.1 (03 Mar 2022 02:47)  HR: 89 (03 Mar 2022 11:19) (72 - 94)  BP: 119/97 (03 Mar 2022 11:00) (77/50 - 161/79)  BP(mean): 87 (03 Mar 2022 11:00) (70 - 99)  ABP: --  ABP(mean): --  RR: 19 (03 Mar 2022 11:19) (12 - 32)  SpO2: 94% (03 Mar 2022 11:19) (87% - 100%)    Constitutional - NAD, intubated & sedated.  HEENT - NCAT, anicteric sclera, intubated, mucous membranes dry  Neck - Supple, no lymphadenopathy  Pulm - Synchronous breaths with ventilator. Rhonchi present L >R, upper > lower lobes  Cardio -  RRR, S1/S2 present, no murmurs  GI -  Soft, NTND, BSx4, PEG tube in place   - lu in place  Extremities - Non-pitting edema b/l LE, 2+ pulses   Neurologic Exam: intubated and sedated but responsive to verbal stimuli. Follows simple commands.                Skin: warm, dry, intact, no rashes or wounds  Lines: Lu, multiple peripheral IVs    Results:                      13.3   8.81  )-----------( 165      ( 03 Mar 2022 06:40 )             43.4   03-03    134<L>  |  98  |  20  ----------------------------<  115<H>    X   |  24  |  0.52    Ca    8.5      03 Mar 2022 06:40  Mg     1.6     03-03    TPro  6.3  /  Alb  3.6  /  TBili  0.9  /  DBili  x   /  AST  see  note Moderate  hemolysis  observed  /  ALT  see note  /  AlkPhos  see  note Moderate  hemolysis  observed  03-03    ABG - ( 03 Mar 2022 06:33 )  pH, Arterial: 7.40  pH, Blood: x     /  pCO2: 51    /  pO2: 75    / HCO3: 32    / Base Excess: 5.5   /  SaO2: 97.6      PT/INR - ( 03 Mar 2022 06:40 )   PT: 11.7 sec;   INR: 0.98     PTT - ( 03 Mar 2022 06:40 )  PTT:31.6 sec    I&O's Summary:  03 Mar 2022 07:01  -  03 Mar 2022 13:14  --------------------------------------------------------  IN:    FentaNYL: 30 mL    IV PiggyBack: 250 mL    IV PiggyBack: 50 mL    IV PiggyBack: 25 mL    Norepinephrine: 11.4 mL    Propofol: 18 mL  Total IN: 384.4 mL    OUT:    Indwelling Catheter - Urethral (mL): 70 mL  Total OUT: 70 mL    Total NET: 314.4 mL

## 2022-03-03 NOTE — H&P ADULT - ASSESSMENT
74 year old female with a PMH of cerebral palsy with functional quadroplegia, DVT/PE on eliquis, GERD, Chronic dyshagia with PEG admitted to the MICU for acute hypoxic respiratory failure s/p intubation.     Neuro   #Cerebral palsy   #Functional quadriplegia  - patient intubated and sedated      Resp  #Acute hypoxic respiratory failure   - likely 2/2 aspiration event given history of similar episodes in conjunction with CXR  - s/p 1 dose metronidazole at [facility]  - Consider CT chest   - c/w ceftriaxone and azithromycin   - f/u BCx   - f/u urine legionella, strep     Card  stable     GI  #GERD   - home med of pantoprazole 40mg daily   - c/w pantoprazole inpt but IV     Renal  stable     Heme  #History of DVT/PE  - on eliquis as an outpatient, unsure of compliance   -     Endo  stable     ID  #c/f aspiration PNA  - given Hx and CXR findings   - c/w abx as above  - follow BCx     Misc:  F: none   E: replete prn   N: NPO  DVT:   GI: Pantoprazole 40mg IV daily   Dispo: MICU 74 year old female with a PMH of cerebral palsy with functional quadroplegia, DVT/PE on eliquis, GERD, Chronic dyshagia with PEG admitted to the MICU for acute hypoxic respiratory failure s/p intubation.     Neuro   #Cerebral palsy   #Functional quadriplegia  - patient intubated and sedated      Resp  #Acute hypoxic respiratory failure   - likely 2/2 aspiration event given history of similar episodes in conjunction with CXR  - s/p 1 dose metronidazole at [facility]  - Consider CT chest   - c/w meropenem and vanc   - f/u BCx   - f/u urine legionella, strep, MRSA swab     Card  stable     GI  #GERD   - home med of pantoprazole 40mg daily   - c/w pantoprazole inpt but IV     Renal  stable     Heme  #History of DVT/PE  - on eliquis as an outpatient, unsure of compliance   - subQ heparin while inpatient     Endo  stable     ID  #c/f aspiration PNA  - given Hx and CXR findings   - c/w abx as above  - follow BCx     Misc:  F: none   E: replete prn   N: NPO  DVT: heparin 5000U q8hrs   GI: Pantoprazole 40mg IV daily   Dispo: MICU 74 year old female with a PMH of cerebral palsy with functional quadroplegia, DVT/PE on eliquis, GERD, Chronic dyshagia with PEG admitted to the MICU for acute hypoxic respiratory failure s/p intubation.     Neuro   #Cerebral palsy   #Functional quadriplegia  - patient intubated and sedated      Resp  #Acute hypoxic respiratory failure   - likely 2/2 aspiration event given history of similar episodes in conjunction with CXR  - s/p 1 dose metronidazole at [facility]  - Consider CT chest   - c/w meropenem and vanc (history of pseudomonal PNA in 2020 after similar episode)   - f/u BCx   - f/u urine legionella, strep, MRSA swab     Card  stable     GI  #GERD   - home med of pantoprazole 40mg daily   - c/w pantoprazole inpt but IV     Renal  stable     Heme  #History of DVT/PE  - on eliquis as an outpatient, unsure of compliance   - subQ heparin while inpatient     Endo  stable     ID  #c/f aspiration PNA  - given Hx and CXR findings   - c/w abx as above  - follow BCx     Misc:  F: none   E: replete prn   N: NPO  DVT: heparin 5000U q8hrs   GI: Pantoprazole 40mg IV daily   Dispo: MICU 74 year old female with a PMH of cerebral palsy with functional quadroplegia, DVT/PE on eliquis, GERD, Chronic dyshagia with PEG admitted to the MICU for acute hypoxic respiratory failure s/p intubation.     Neuro   #Cerebral palsy   #Functional quadriplegia  - patient intubated and sedated      Resp  #Acute hypoxic respiratory failure   #ARDS  - likely 2/2 aspiration event given history of similar episodes in conjunction with CXR  - s/p 1 dose metronidazole at [facility]  - Consider CT chest   - c/w meropenem and vanc (history of pseudomonal PNA in 2020 after similar episode)   - f/u BCx   - f/u urine legionella, strep, MRSA swab     Card  stable     GI  #GERD   - home med of pantoprazole 40mg daily   - c/w pantoprazole inpt but IV     Renal  stable     Heme  #History of DVT/PE  - on eliquis as an outpatient, unsure of compliance   - subQ heparin while inpatient     Endo  stable     ID  #c/f aspiration PNA  - given Hx and CXR findings   - c/w abx as above  - follow BCx     Misc:  F: none   E: replete prn   N: NPO  DVT: heparin 5000U q8hrs   GI: Pantoprazole 40mg IV daily   Dispo: MICU

## 2022-03-03 NOTE — DIETITIAN INITIAL EVALUATION ADULT. - ORAL INTAKE PTA/DIET HISTORY
Pt intubated/ sedated at visit, however, pt's PCA from facility was in room. PCA states her TF regimen is Jevity 1.2 @ 60ml/hr with total volume per day of 1000ml. Suspect pt on intermittent feeds. PCA unsure how long pt with feeding tube, but states pt was previously on high infusion rate of TF, but had significant weight gain so TF decreased to current regimen. Reports pt's weight being stable. Per EMR, noted food allergies: chocolate, high acid, tomatoes- unsure reaction.

## 2022-03-03 NOTE — ED PROVIDER NOTE - CARE PLAN
Principal Discharge DX:	Acute respiratory failure with hypoxia   1 Principal Discharge DX:	Acute respiratory failure with hypoxia  Secondary Diagnosis:	Hypercarbia  Secondary Diagnosis:	Pneumonia, aspiration

## 2022-03-03 NOTE — PROGRESS NOTE ADULT - ASSESSMENT
73 yo F with PMH of CP with functional quadriplegia, DVT/PE on eliquis, GERD, chronic dysphagia with PEG, thoracic spine fusion admitted for acute hypoxic respiratory failure likely secondary to pneumonia.

## 2022-03-03 NOTE — ED PROVIDER NOTE - CLINICAL SUMMARY MEDICAL DECISION MAKING FREE TEXT BOX
----- Message from Ra Johns sent at 12/8/2020  9:42 AM CST -----  Contact: pt  Who Called: PT  Regarding: Pt called to r/s her upcoming appointment, but I'm unable to reschedule this appointment due to scheduling restrictions. Please contact the pt for scheduling prior to the scheduled appointment.   Would the patient rather a call back or a response via [facility]? Call back  Best Call Back Number: [phone]  Additional Information:         cough, hypoxia, resp distress, intubated in field, hx of GERD w/ PEG tube, ?aspiration PNA, in ED noted mild hypotension, levophed started, broad spectrum abx started,

## 2022-03-03 NOTE — PROGRESS NOTE ADULT - PROBLEM SELECTOR PLAN 2
P/F ratio 75. Severe ARDS.  C/w ventilator support, wean as tolerated. - C/w ventilator support, Abx, wean FiO2 as tolerated. - c/w pantoprazole 40mg QD

## 2022-03-03 NOTE — PROGRESS NOTE ADULT - ATTENDING COMMENTS
Patient seen and examined with house-staff during bedside rounds.  Resident note read, including vitals, physical findings, laboratory data, and radiological reports.   Revisions included below.  Direct personal management at bed side and extensive interpretation of the data.  Plan was outlined and discussed in details with the housestaff.  Decision making of high complexity  Action taken for acute disease activity to reflect the level of care provided:  - medication reconciliation  - review laboratory data  Patient admitted with acute hypoxic respiratory failure secondary to hospital-acquired nosocomial pneumonia with severe sepsis.  The patient was in his state of shock which resolved the most likely related to sedation after intubation.  I suctioned the patient and send sputum culture.  Follow-up on the blood culture.  Continue meropenem.  DC vancomycin.  Restart tube feeding.  The sedation and on physical monitor mental status.  Adjust the ventilator settings.  The plateau pressure is 20.  Gross pulmonary toilet.

## 2022-03-03 NOTE — ED ADULT NURSE NOTE - CAS EDN DISCHARGE ASSESSMENT
Patient transferred by EMS to [facility] hemodynamically stable , with sedation infusing.

## 2022-03-03 NOTE — ED PROVIDER NOTE - OBJECTIVE STATEMENT
76 yof pw resp failure.  hx of quadriplegia, dvt, dysphagia w/ gerd w/ PEG tube placement.  pt in assisted living, found by staff there w/ difficulty breathing.  EMS arrival noted pt w/ resp distress, intubated in field for airway protection, noted "thick viscous" liquid in her oropharynx during intubation. 76 yof pw resp failure.  hx of quadriplegia, dvt, dysphagia w/ gerd w/ PEG tube placement.  pt in assisted living, found by staff there w/ difficulty breathing.  +cough.  ~1am.  EMS arrival noted pt w/ resp distress, O2 84% in RA, intubated in field for airway protection, noted "thick viscous" liquid in her oropharynx during intubation.

## 2022-03-03 NOTE — H&P ADULT - NSHPLABSRESULTS_GEN_ALL_CORE
LABS:                         12.8   8.81  )-----------( 173      ( 03 Mar 2022 02:50 )             41.6     03-03    135  |  94<L>  |  24<H>  ----------------------------<  233<H>  3.5   |  32<H>  |  0.88    Ca    9.0      03 Mar 2022 02:50    TPro  7.4  /  Alb  3.3<L>  /  TBili  0.5  /  DBili  x   /  AST  61<H>  /  ALT  50<H>  /  AlkPhos  59  03-03    PT/INR - ( 03 Mar 2022 02:50 )   PT: 12.9 sec;   INR: 1.11          PTT - ( 03 Mar 2022 02:50 )  PTT:27.8 sec  Urinalysis Basic - ( 03 Mar 2022 03:05 )    Color: Yellow / Appearance: Clear / SG: >=1.030 / pH: x  Gluc: x / Ketone: NEGATIVE  / Bili: NEGATIVE / Urobili: 0.2 E.U./dL   Blood: x / Protein: >=300 mg/dL / Nitrite: NEGATIVE   Leuk Esterase: NEGATIVE / RBC: 5-10 /HPF / WBC < 5 /HPF   Sq Epi: x / Non Sq Epi: 0-5 /HPF / Bacteria: Present /HPF            Lactate, Blood: 5.4 mmoL/L (03-03 @ 02:50)      RADIOLOGY, EKG & ADDITIONAL TESTS: Reviewed.

## 2022-03-03 NOTE — CHART NOTE - NSCHARTNOTEFT_GEN_A_CORE
Infectious Diseases Anti-infective Approval Note    Medication: meropenem  Dose*: 1g  Route: IV  Frequency: q8h  Duration**: 2d    *Dose may be adjusted as needed for alterations in renal function.    **Reflects duration of approval and not necessarily duration of therapy     THIS IS NOT AN INFECTIOUS DISEASES CONSULTATION

## 2022-03-03 NOTE — PROGRESS NOTE ADULT - PROBLEM SELECTOR PLAN 5
- functional quadriplegia, health aide at bedside reports at baseline she is able to follow commands and communicate "when she wants to"  - skin checks? Positioning? - PEG tube in place  - resume tube feeds

## 2022-03-03 NOTE — ED PROVIDER NOTE - PHYSICAL EXAMINATION
Physical Exam  GEN: intubated  EYES: equal bl,   ENT: sp intubation, no pooling of secretion, thick white discharge in larynx during ET tube verification  HEAD: atraumatic  NECK: FROM neck, supple,   CV: rrr  RESP: sp intubation, bl BS, rhonchi bl,   GI: nondistended, PEG tube in place w/ content in proximal tube  MSK: no bony deformity  SKIN: no rash  NEURO: sp sedation and intubation

## 2022-03-03 NOTE — DIETITIAN INITIAL EVALUATION ADULT. - OTHER CALCULATIONS
Fluids per MD. IBW used to estimate needs as pt weighs >100% of IBW and per critical care nutrition guidelines. Needs estimated for age and adjusted for vent

## 2022-03-03 NOTE — ED PROVIDER NOTE - CRITICAL CARE ATTENDING CONTRIBUTION TO CARE
Patient presented with high probability of imminent or life threatening conditions of hypoxia, respiratory failure, likely 2/2 aspiration pna, which required my direct attention, intervention, and personal management at the bedside. I have personally provided critical care time of 34 minutes, exclusive of time spent on separately billable procedures.  Time includes review of laboratory data, radiology results, discussion with consultants, discussion with the patient's family, and monitoring for potential decompensation

## 2022-03-03 NOTE — ED ADULT NURSE NOTE - OBJECTIVE STATEMENT
Resus note...    BIBEMS from nursing home where at approx. 115 AM nursing home staff found pt to be coughing, possibly foaming at mouth. Per staff, pt then became unresponsive for approx. 3 min, woke up agitated, and became unresponsive again. EMS intubated pt using 7.0 ETT tube, right IO access placed to right tibia, and pt also received 20 mg etomidate via EMS.     Upon arrival to trauma slot, pt is intubated, being bagged. ET tube placement confirmed via MD Caldera and ZAID Coker. Positive breath sounds, positive ETCO2. Difficulty bagging pt due to secretions, pt suctioned with improvement. Given 60 mg lennox and 20 mg etomidate for additional sedation. 20 g PIV placed to right foot and left ej. Labs sent, f/s performed. 16 Fr lu placed. IV fluids initiated for hypotension, versed given IVP for sedation and pt started on fentanyl and versed drips for sedation.   PEG tube in place to LUQ.

## 2022-03-03 NOTE — PROGRESS NOTE ADULT - PROBLEM SELECTOR PLAN 4
- History of PE 5 years ago. On eliquis at home.  - Bedside ultrasound with no evidence of clot  - switch heparin to Lovenox 50mg Q12 - functional quadriplegia, health aide at bedside reports at baseline she is able to follow commands and communicate "when she wants to"

## 2022-03-03 NOTE — ED PROVIDER NOTE - PROGRESS NOTE DETAILS
et tube verified via direct visualization sedation w/ versed and fentanyl given hypotension levophed started

## 2022-03-03 NOTE — DIETITIAN INITIAL EVALUATION ADULT. - ENTERAL
Jevity 1.2 @ 50ml/hr x24hrs + 1 LPS. Provides 1200ml TV, 1540kcals, 82g protein, 968ml free water.  Water flush minimum 60ml q4hrs for tube patency

## 2022-03-03 NOTE — DIETITIAN INITIAL EVALUATION ADULT. - OTHER INFO
Pt 74 year old female with a PMH of cerebral palsy with functional quadriplegia, DVT/PE on eliquis, GERD, Chronic dysphagia with PEG admitted to the MICU for acute hypoxic respiratory failure s/p intubation. Transitioned from propofol to precedex for sedation and levophed stopped today. Remains intubated. MAP 70-80s. Discussed pt during interdisciplinary rounds, plan for trial of extubation. Initiated TF today.     GI: Pt's PCA from facility states typical BM is every 2-3 days. Abd-soft, + normal bowel outs  -Prevent constipation, rec scheduled bowel regimen   Skin: Intact, no pressure breakdown   Roberto Carlos Score: No documented     PCA from facility states pt with weight loss, but unsure amount or over what time frame. Did state pt with significant weight increase on TF, then TF rate decreased which intern pt weight declined. Seems that some weight loss was intentional with change in TF rate. Per EMR, 9.9kg (15%) wt loss x 7 months which is severe per ASPEN standards.   8/24/21: 66.7kg

## 2022-03-03 NOTE — PROGRESS NOTE ADULT - PROBLEM SELECTOR PLAN 6
- PEG tube in place  - resume tube feeds  - replete electrolytes prn - PEG tube in place  - resume tube feeds  - replete electrolytes prn  - ISS N: Jevity 1.2 at 50cc/hr  E: PRN  C: Full Code, pending goals of care conversation  DVT: Lovenox therapeutic  D: MICU

## 2022-03-03 NOTE — DIETITIAN INITIAL EVALUATION ADULT. - NUTRITIONGOAL OUTCOME1
Thanks for coming today.  [facility]  [address] [place] [address]    To schedule future appointments in Ortho Clinic, you may call [phone].    To schedule ordered imaging by your Provider: Call [facility] at [phone]    Unomy available online at:   [email or web address]    Please call if any further questions or concerns [phone] and ask for the Orthopedic Department. Clinic hours 8 am to 5 pm.    Return to clinic if symptoms worsen.    
Initiated TF via G-tube in 24hrs, Meet >75% of EER

## 2022-03-03 NOTE — PROCEDURE NOTE - NSICDXPROCEDURE_GEN_ALL_CORE_FT
PROCEDURES:  US guided vascular access 03-Mar-2022 09:22:56  Shirin Moon   PROCEDURES:  US guided vascular access 03-Mar-2022 09:22:56  Shirin Moon  Insertion of 2 intravenous catheters 03-Mar-2022 10:22:37  Sam Gaytan

## 2022-03-03 NOTE — ED ADULT NURSE NOTE - CHIEF COMPLAINT QUOTE
Pt. BIBEMS. As per EMS "as per family pt. was found in bed at around 0115 coughing up fluid and foaming around the mouth pt. became unconscious for about 3 mins then woke up agitated and then became unconscious again." , IO to R. tamra, peg tube noted.

## 2022-03-03 NOTE — H&P ADULT - NSHPPHYSICALEXAM_GEN_ALL_CORE
VITAL SIGNS:  T(C): 37.3 (03-03-22 @ 02:47), Max: 37.3 (03-03-22 @ 02:47)  T(F): 99.1 (03-03-22 @ 02:47), Max: 99.1 (03-03-22 @ 02:47)  HR: 75 (03-03-22 @ 05:00) (75 - 89)  BP: 116/78 (03-03-22 @ 05:00) (91/58 - 133/79)  BP(mean): 91 (03-03-22 @ 05:00) (88 - 99)  RR: 16 (03-03-22 @ 05:00) (16 - 18)  SpO2: 100% (03-03-22 @ 05:00) (100% - 100%)  Wt(kg): --    PHYSICAL EXAM: incomplete     Constitutional: WDWN resting comfortably in bed; NAD  Head: NC/AT  Eyes: PERRL, EOMI, anicteric sclera  ENT: no nasal discharge; uvula midline, no oropharyngeal erythema or exudates; MMM  Neck: supple; no JVD or thyromegaly  Respiratory: CTA B/L; no W/R/R, no retractions  Cardiac: +S1/S2; RRR; no M/R/G; PMI non-displaced  Gastrointestinal: soft, NT/ND; no rebound or guarding; +BSx4  Genitourinary: normal external genitalia  Back: spine midline, no bony tenderness or step-offs; no CVAT B/L  Extremities: WWP, no clubbing or cyanosis; no peripheral edema  Musculoskeletal: NROM x4; no joint swelling, tenderness or erythema  Vascular: 2+ radial, femoral, DP/PT pulses B/L  Dermatologic: skin warm, dry and intact; no rashes, wounds, or scars  Lymphatic: no submandibular or cervical LAD  Neurologic: AAOx3; CNII-XII grossly intact; no focal deficits  Psychiatric: affect and characteristics of appearance, verbalizations, behaviors are appropriate VITAL SIGNS:  T(C): 37.3 (03-03-22 @ 02:47), Max: 37.3 (03-03-22 @ 02:47)  T(F): 99.1 (03-03-22 @ 02:47), Max: 99.1 (03-03-22 @ 02:47)  HR: 75 (03-03-22 @ 05:00) (75 - 89)  BP: 116/78 (03-03-22 @ 05:00) (91/58 - 133/79)  BP(mean): 91 (03-03-22 @ 05:00) (88 - 99)  RR: 16 (03-03-22 @ 05:00) (16 - 18)  SpO2: 100% (03-03-22 @ 05:00) (100% - 100%)  Wt(kg): --    PHYSICAL EXAM:     Constitutional: intubated and sedated   Head: NC/AT  Eyes: , anicteric sclera  ENT: dry MM with crusted blood around the nose   Respiratory: Rhonchi of the left upper and lower lobes, worst in left upper lobe   Cardiac: +S1/S2; RRR; no M/R/G  Gastrointestinal: soft, ; +BSx4  Extremities: WWP, no clubbing or cyanosis; mild non pitting edema of BL LE   Vascular: 2+ radial,  DP/PT pulses B/L  Dermatologic: skin warm, dry and intact; no rashes, wounds, or scars  Lymphatic: no submandibular or cervical LAD  Neurologic: intubated and sedated   Lines: Quintero, multiple peripherals and Left EJ central line

## 2022-03-03 NOTE — PROGRESS NOTE ADULT - PROBLEM SELECTOR PLAN 1
- Likely secondary to aspiration event given similar history in conjunction with CXR  - less likely atypical source given bloody sputum on suction. F/U urine legionella and urine strep Ag  - MRSA swab negative, d/c vancomycin  - c/w meropenem 1g Q8 x 7 days  - f/u BCx, sputum cultures - Likely secondary to aspiration event given similar history in conjunction with CXR  - less likely atypical source given bloody sputum on suction. F/U urine legionella and urine strep Ag  - MRSA swab negative, d/c vancomycin  - c/w meropenem 1g Q8 x 7 days for pseudomonal coverage (hx of pseudomonas bacteremia)  - f/u BCx, sputum cultures, narrow Abx coverage when appropriate - Likely secondary to aspiration event given similar history in conjunction with CXR  - less likely atypical source given bloody sputum on suction. F/U urine legionella and urine strep Ag  - MRSA swab negative, d/c vancomycin  - c/w meropenem 1g Q8 x 7 days for pseudomonal coverage (hx of pseudomonas bacteremia requiring meropenem coverage)  - f/u BCx, sputum cultures, narrow ABx coverage when appropriate

## 2022-03-04 LAB
ANION GAP SERPL CALC-SCNC: 10 MMOL/L — SIGNIFICANT CHANGE UP (ref 5–17)
BASE EXCESS BLDA CALC-SCNC: 3.4 MMOL/L — HIGH (ref -2–3)
BASOPHILS # BLD AUTO: 0.04 K/UL — SIGNIFICANT CHANGE UP (ref 0–0.2)
BASOPHILS NFR BLD AUTO: 0.4 % — SIGNIFICANT CHANGE UP (ref 0–2)
BILIRUB SERPL-MCNC: 0.5 MG/DL — SIGNIFICANT CHANGE UP (ref 0.2–1.2)
BUN SERPL-MCNC: 21 MG/DL — SIGNIFICANT CHANGE UP (ref 7–23)
CALCIUM SERPL-MCNC: 8.3 MG/DL — LOW (ref 8.4–10.5)
CHLORIDE SERPL-SCNC: 99 MMOL/L — SIGNIFICANT CHANGE UP (ref 96–108)
CO2 BLDA-SCNC: 31 MMOL/L — HIGH (ref 19–24)
CO2 SERPL-SCNC: 29 MMOL/L — SIGNIFICANT CHANGE UP (ref 22–31)
CREAT SERPL-MCNC: 0.62 MG/DL — SIGNIFICANT CHANGE UP (ref 0.5–1.3)
EGFR: 93 ML/MIN/1.73M2 — SIGNIFICANT CHANGE UP
EOSINOPHIL # BLD AUTO: 0.09 K/UL — SIGNIFICANT CHANGE UP (ref 0–0.5)
EOSINOPHIL NFR BLD AUTO: 0.8 % — SIGNIFICANT CHANGE UP (ref 0–6)
GAS PNL BLDA: SIGNIFICANT CHANGE UP
GAS PNL BLDA: SIGNIFICANT CHANGE UP
GLUCOSE BLDC GLUCOMTR-MCNC: 115 MG/DL — HIGH (ref 70–99)
GLUCOSE BLDC GLUCOMTR-MCNC: 126 MG/DL — HIGH (ref 70–99)
GLUCOSE BLDC GLUCOMTR-MCNC: 161 MG/DL — HIGH (ref 70–99)
GLUCOSE SERPL-MCNC: 143 MG/DL — HIGH (ref 70–99)
HCO3 BLDA-SCNC: 30 MMOL/L — HIGH (ref 21–28)
HCT VFR BLD CALC: 35.4 % — SIGNIFICANT CHANGE UP (ref 34.5–45)
HGB BLD-MCNC: 11.7 G/DL — SIGNIFICANT CHANGE UP (ref 11.5–15.5)
IMM GRANULOCYTES NFR BLD AUTO: 0.4 % — SIGNIFICANT CHANGE UP (ref 0–1.5)
INR BLD: 1.27 — HIGH (ref 0.88–1.16)
LYMPHOCYTES # BLD AUTO: 0.65 K/UL — LOW (ref 1–3.3)
LYMPHOCYTES # BLD AUTO: 5.7 % — LOW (ref 13–44)
MAGNESIUM SERPL-MCNC: 2.3 MG/DL — SIGNIFICANT CHANGE UP (ref 1.6–2.6)
MCHC RBC-ENTMCNC: 31.5 PG — SIGNIFICANT CHANGE UP (ref 27–34)
MCHC RBC-ENTMCNC: 33.1 GM/DL — SIGNIFICANT CHANGE UP (ref 32–36)
MCV RBC AUTO: 95.2 FL — SIGNIFICANT CHANGE UP (ref 80–100)
MELD SCORE WITH DIALYSIS: 22 POINTS — SIGNIFICANT CHANGE UP
MELD SCORE WITHOUT DIALYSIS: 9 POINTS — SIGNIFICANT CHANGE UP
MONOCYTES # BLD AUTO: 0.61 K/UL — SIGNIFICANT CHANGE UP (ref 0–0.9)
MONOCYTES NFR BLD AUTO: 5.4 % — SIGNIFICANT CHANGE UP (ref 2–14)
NEUTROPHILS # BLD AUTO: 9.9 K/UL — HIGH (ref 1.8–7.4)
NEUTROPHILS NFR BLD AUTO: 87.3 % — HIGH (ref 43–77)
NRBC # BLD: 0 /100 WBCS — SIGNIFICANT CHANGE UP (ref 0–0)
PCO2 BLDA: 50 MMHG — HIGH (ref 32–35)
PH BLDA: 7.38 — SIGNIFICANT CHANGE UP (ref 7.35–7.45)
PHOSPHATE SERPL-MCNC: 1.5 MG/DL — LOW (ref 2.5–4.5)
PLATELET # BLD AUTO: 165 K/UL — SIGNIFICANT CHANGE UP (ref 150–400)
PO2 BLDA: 59 MMHG — LOW (ref 83–108)
POTASSIUM SERPL-MCNC: 3.2 MMOL/L — LOW (ref 3.5–5.3)
POTASSIUM SERPL-SCNC: 3.2 MMOL/L — LOW (ref 3.5–5.3)
PROTHROM AB SERPL-ACNC: 15.1 SEC — HIGH (ref 10.5–13.4)
RBC # BLD: 3.72 M/UL — LOW (ref 3.8–5.2)
RBC # FLD: 12.8 % — SIGNIFICANT CHANGE UP (ref 10.3–14.5)
SAO2 % BLDA: 92.4 % — LOW (ref 94–98)
SODIUM SERPL-SCNC: 138 MMOL/L — SIGNIFICANT CHANGE UP (ref 135–145)
WBC # BLD: 11.33 K/UL — HIGH (ref 3.8–10.5)
WBC # FLD AUTO: 11.33 K/UL — HIGH (ref 3.8–10.5)

## 2022-03-04 PROCEDURE — 99233 SBSQ HOSP IP/OBS HIGH 50: CPT | Mod: GC

## 2022-03-04 PROCEDURE — 71045 X-RAY EXAM CHEST 1 VIEW: CPT | Mod: 26,76

## 2022-03-04 RX ORDER — ACETAMINOPHEN 500 MG
975 TABLET ORAL ONCE
Refills: 0 | Status: DISCONTINUED | OUTPATIENT
Start: 2022-03-04 | End: 2022-03-04

## 2022-03-04 RX ORDER — CHLORHEXIDINE GLUCONATE 213 G/1000ML
15 SOLUTION TOPICAL EVERY 12 HOURS
Refills: 0 | Status: DISCONTINUED | OUTPATIENT
Start: 2022-03-04 | End: 2022-03-08

## 2022-03-04 RX ORDER — POTASSIUM PHOSPHATE, MONOBASIC POTASSIUM PHOSPHATE, DIBASIC 236; 224 MG/ML; MG/ML
15 INJECTION, SOLUTION INTRAVENOUS ONCE
Refills: 0 | Status: COMPLETED | OUTPATIENT
Start: 2022-03-04 | End: 2022-03-04

## 2022-03-04 RX ORDER — POTASSIUM CHLORIDE 20 MEQ
20 PACKET (EA) ORAL
Refills: 0 | Status: DISCONTINUED | OUTPATIENT
Start: 2022-03-04 | End: 2022-03-04

## 2022-03-04 RX ORDER — POTASSIUM CHLORIDE 20 MEQ
10 PACKET (EA) ORAL
Refills: 0 | Status: COMPLETED | OUTPATIENT
Start: 2022-03-04 | End: 2022-03-04

## 2022-03-04 RX ORDER — POTASSIUM CHLORIDE 20 MEQ
40 PACKET (EA) ORAL ONCE
Refills: 0 | Status: COMPLETED | OUTPATIENT
Start: 2022-03-04 | End: 2022-03-04

## 2022-03-04 RX ORDER — POTASSIUM CHLORIDE 20 MEQ
40 PACKET (EA) ORAL ONCE
Refills: 0 | Status: DISCONTINUED | OUTPATIENT
Start: 2022-03-04 | End: 2022-03-04

## 2022-03-04 RX ORDER — ACETAMINOPHEN 500 MG
1000 TABLET ORAL ONCE
Refills: 0 | Status: COMPLETED | OUTPATIENT
Start: 2022-03-04 | End: 2022-03-04

## 2022-03-04 RX ORDER — SODIUM,POTASSIUM PHOSPHATES 278-250MG
1 POWDER IN PACKET (EA) ORAL ONCE
Refills: 0 | Status: DISCONTINUED | OUTPATIENT
Start: 2022-03-04 | End: 2022-03-04

## 2022-03-04 RX ORDER — PROPOFOL 10 MG/ML
20 INJECTION, EMULSION INTRAVENOUS
Qty: 1000 | Refills: 0 | Status: DISCONTINUED | OUTPATIENT
Start: 2022-03-04 | End: 2022-03-05

## 2022-03-04 RX ADMIN — MEROPENEM 100 MILLIGRAM(S): 1 INJECTION INTRAVENOUS at 17:48

## 2022-03-04 RX ADMIN — Medication 100 MILLIEQUIVALENT(S): at 09:19

## 2022-03-04 RX ADMIN — PROPOFOL 6.82 MICROGRAM(S)/KG/MIN: 10 INJECTION, EMULSION INTRAVENOUS at 10:36

## 2022-03-04 RX ADMIN — DEXMEDETOMIDINE HYDROCHLORIDE IN 0.9% SODIUM CHLORIDE 2.84 MICROGRAM(S)/KG/HR: 4 INJECTION INTRAVENOUS at 07:35

## 2022-03-04 RX ADMIN — PANTOPRAZOLE SODIUM 40 MILLIGRAM(S): 20 TABLET, DELAYED RELEASE ORAL at 05:44

## 2022-03-04 RX ADMIN — Medication 40 MILLIEQUIVALENT(S): at 17:48

## 2022-03-04 RX ADMIN — ENOXAPARIN SODIUM 50 MILLIGRAM(S): 100 INJECTION SUBCUTANEOUS at 04:31

## 2022-03-04 RX ADMIN — Medication 5.33 MICROGRAM(S)/KG/MIN: at 22:20

## 2022-03-04 RX ADMIN — Medication 100 MILLIEQUIVALENT(S): at 15:23

## 2022-03-04 RX ADMIN — Medication 1 MILLIGRAM(S): at 07:38

## 2022-03-04 RX ADMIN — Medication 100 MILLIEQUIVALENT(S): at 07:45

## 2022-03-04 RX ADMIN — MEROPENEM 100 MILLIGRAM(S): 1 INJECTION INTRAVENOUS at 00:06

## 2022-03-04 RX ADMIN — MEROPENEM 100 MILLIGRAM(S): 1 INJECTION INTRAVENOUS at 09:18

## 2022-03-04 RX ADMIN — Medication 400 MILLIGRAM(S): at 00:48

## 2022-03-04 RX ADMIN — Medication 2: at 06:08

## 2022-03-04 RX ADMIN — Medication 1000 MILLIGRAM(S): at 01:34

## 2022-03-04 RX ADMIN — CHLORHEXIDINE GLUCONATE 15 MILLILITER(S): 213 SOLUTION TOPICAL at 17:48

## 2022-03-04 RX ADMIN — POTASSIUM PHOSPHATE, MONOBASIC POTASSIUM PHOSPHATE, DIBASIC 62.5 MILLIMOLE(S): 236; 224 INJECTION, SOLUTION INTRAVENOUS at 09:18

## 2022-03-04 RX ADMIN — CHLORHEXIDINE GLUCONATE 15 MILLILITER(S): 213 SOLUTION TOPICAL at 05:44

## 2022-03-04 RX ADMIN — ENOXAPARIN SODIUM 50 MILLIGRAM(S): 100 INJECTION SUBCUTANEOUS at 15:23

## 2022-03-04 RX ADMIN — CHLORHEXIDINE GLUCONATE 1 APPLICATION(S): 213 SOLUTION TOPICAL at 06:13

## 2022-03-04 NOTE — PROGRESS NOTE ADULT - PROBLEM SELECTOR PLAN 4
- functional quadriplegia, health aide at bedside reports at baseline she is able to follow commands and communicate "when she wants to"

## 2022-03-04 NOTE — PROGRESS NOTE ADULT - SUBJECTIVE AND OBJECTIVE BOX
***Incomplete*** Overnight: No overnight events. Nurse reports less secretions overnight.    Subjective:  Patient seen and examined at the bedside this morning while intubated and lightly sedated. Alert and following simple commands but unable to answer questions. Pt became uncomfortable and anxious, flexing arms, biting ET tube and tachypneic >30s on ventilator. Pt given 1mg ativan as anxiolytic with improvement in respiratory rate. Later extubation to HFNC was attempted but patient did not tolerate the transition, became hypoxic and cyanotic. Pt reintubated by fellow Dr. Gao.    Objective:  ICU Vital Signs Last 24 Hrs  T(C): 37.7 (04 Mar 2022 09:00), Max: 38.5 (03 Mar 2022 14:27)  T(F): 99.9 (04 Mar 2022 09:00), Max: 101.3 (03 Mar 2022 14:27)  HR: 77 (04 Mar 2022 13:00) (61 - 107)  BP: 122/58 (04 Mar 2022 13:00) (80/48 - 142/67)  BP(mean): 83 (04 Mar 2022 13:00) (60 - 96)  ABP: --  ABP(mean): --  RR: 17 (04 Mar 2022 13:00) (16 - 43)  SpO2: 94% (04 Mar 2022 13:00) (87% - 98%)    I&O's Detail    03 Mar 2022 07:01  -  04 Mar 2022 07:00  --------------------------------------------------------  IN:    Dexmedetomidine: 133.7 mL    Dexmedetomidine: 12.8 mL    FentaNYL: 30 mL    IV PiggyBack: 100 mL    IV PiggyBack: 250 mL    IV PiggyBack: 50 mL    IV PiggyBack: 150 mL    IV PiggyBack: 150 mL    Jevity 1.2: 620 mL    Norepinephrine: 125.9 mL    Norepinephrine: 11.4 mL    Propofol: 18 mL  Total IN: 1651.8 mL    OUT:    Indwelling Catheter - Urethral (mL): 560 mL  Total OUT: 560 mL    Total NET: 1091.8 mL      04 Mar 2022 07:01  -  04 Mar 2022 13:25  --------------------------------------------------------  IN:    Dexmedetomidine: 11.4 mL    Jevity 1.2: 50 mL    Norepinephrine: 4.3 mL  Total IN: 65.7 mL    OUT:    Indwelling Catheter - Urethral (mL): 15 mL  Total OUT: 15 mL    Total NET: 50.7 mL    Constitutional - Intubated but awake. Appears anxious, biting ET tube, overbreathing ventilator, flexing arms intermittently.  HEENT - NCAT, anicteric sclera, intubated, mucous membranes dry  Neck - Supple, no lymphadenopathy  Pulm - Shallow breaths, CTAB, improved from yesterday  Cardio -  RRR, S1/S2 present, no murmurs  GI -  Soft, NTND, BSx4, PEG tube in place   - lu in place, producing urine  Extremities - Non-pitting edema b/l LE up to knees, 2+ pulses x4   Neurologic Exam: intubated and lightly sedated but responsive to verbal stimuli. Follows simple commands.                Skin: warm, dry, intact, no rashes or wounds,  Lines: Lu, multiple peripheral IVs    Results:                        11.7   11.33 )-----------( 165      ( 04 Mar 2022 06:39 )             35.4   03-04    138  |  99  |  21  ----------------------------<  143<H>  3.2<L>   |  29  |  0.62    Ca    8.3<L>      04 Mar 2022 06:39  Phos  1.5     03-04  Mg     2.3     03-04    PT/INR - ( 04 Mar 2022 06:39 )   PT: 15.1 sec;   INR: 1.27       PTT - ( 03 Mar 2022 06:40 )  PTT:31.6 sec    TPro  x   /  Alb  x   /  TBili  0.5  /  DBili  x   /  AST  x   /  ALT  x   /  AlkPhos  x   03-04    ABG - ( 04 Mar 2022 11:09 )  pH, Arterial: 7.38  pH, Blood: x     /  pCO2: 50    /  pO2: 59    / HCO3: 30    / Base Excess: 3.4   /  SaO2: 92.4

## 2022-03-04 NOTE — PROGRESS NOTE ADULT - PROBLEM SELECTOR PLAN 6
N: Jevity 1.2 at 50cc/hr  E: PRN  C: Full Code, pending goals of care conversation  DVT: Lovenox therapeutic  D: MICU

## 2022-03-04 NOTE — PROGRESS NOTE ADULT - PROBLEM SELECTOR PLAN 1
- Likely secondary to aspiration event given similar history in conjunction with CXR  - less likely atypical source given bloody sputum on suction. F/U urine legionella and urine strep Ag  - MRSA swab negative, d/c vancomycin  - c/w meropenem 1g Q8 x 7 days for pseudomonal coverage (hx of pseudomonas bacteremia requiring meropenem coverage)  - f/u BCx, sputum cultures, narrow ABx coverage when appropriate - Likely secondary to aspiration event given similar history in conjunction with CXR  - Sputum culture with "few pseudomonas aeruginosa", waiting for sensitivities  - c/w meropenem 1g Q8 x 7 days for pseudomonal coverage   - Blood culture with no growth to date  - Urine legionella and urine strep Ag negative  - f/u sputum culture sensitivity, narrow ABx coverage if appropriate

## 2022-03-04 NOTE — PROGRESS NOTE ADULT - PROBLEM SELECTOR PLAN 3
- History of PE 5 years ago. On Eliquis at home  - Bedside ultrasound with no evidence of clot  - switch heparin to Lovenox 50mg Q12 - c/w Lovenox 50mg Q12  - History of PE 5 years ago. On Eliquis at home  - Bedside ultrasound with no evidence of clot

## 2022-03-04 NOTE — PROGRESS NOTE ADULT - ATTENDING COMMENTS
Patient seen and examined with house-staff during bedside rounds.  Resident note read, including vitals, physical findings, laboratory data, and radiological reports.   Revisions included below.  Direct personal management at bed side and extensive interpretation of the data.  Plan was outlined and discussed in details with the housestaff.  Decision making of high complexity  Action taken for acute disease activity to reflect the level of care provided:  - medication reconciliation  - review laboratory data  Patient has stable overnight.  The secretion decreased.  I suctioned the patient this morning of scanty secretion.  The patient is on pressor most likely related to the sedation.  The map is 80 and will wean off pressors.  Patient is agitated once awake.  Continue Precedex and transition for extubation.  Trial extubation to high flow.  Patient might need pulmonary toilet.  Hold tube feed until after extubation.  Fluid status is stable.

## 2022-03-05 LAB
-  AZTREONAM: SIGNIFICANT CHANGE UP
-  CEFEPIME: SIGNIFICANT CHANGE UP
-  CIPROFLOXACIN: SIGNIFICANT CHANGE UP
-  GENTAMICIN: SIGNIFICANT CHANGE UP
-  MEROPENEM: SIGNIFICANT CHANGE UP
-  MEROPENEM: SIGNIFICANT CHANGE UP
-  PIPERACILLIN/TAZOBACTAM: SIGNIFICANT CHANGE UP
-  TOBRAMYCIN: SIGNIFICANT CHANGE UP
ANION GAP SERPL CALC-SCNC: 11 MMOL/L — SIGNIFICANT CHANGE UP (ref 5–17)
ANION GAP SERPL CALC-SCNC: 7 MMOL/L — SIGNIFICANT CHANGE UP (ref 5–17)
BASOPHILS # BLD AUTO: 0.04 K/UL — SIGNIFICANT CHANGE UP (ref 0–0.2)
BASOPHILS NFR BLD AUTO: 0.4 % — SIGNIFICANT CHANGE UP (ref 0–2)
BUN SERPL-MCNC: 17 MG/DL — SIGNIFICANT CHANGE UP (ref 7–23)
BUN SERPL-MCNC: 17 MG/DL — SIGNIFICANT CHANGE UP (ref 7–23)
CALCIUM SERPL-MCNC: 8 MG/DL — LOW (ref 8.4–10.5)
CALCIUM SERPL-MCNC: 8.2 MG/DL — LOW (ref 8.4–10.5)
CHLORIDE SERPL-SCNC: 100 MMOL/L — SIGNIFICANT CHANGE UP (ref 96–108)
CHLORIDE SERPL-SCNC: 104 MMOL/L — SIGNIFICANT CHANGE UP (ref 96–108)
CO2 SERPL-SCNC: 27 MMOL/L — SIGNIFICANT CHANGE UP (ref 22–31)
CO2 SERPL-SCNC: 29 MMOL/L — SIGNIFICANT CHANGE UP (ref 22–31)
CREAT SERPL-MCNC: 0.51 MG/DL — SIGNIFICANT CHANGE UP (ref 0.5–1.3)
CREAT SERPL-MCNC: 0.54 MG/DL — SIGNIFICANT CHANGE UP (ref 0.5–1.3)
CULTURE RESULTS: SIGNIFICANT CHANGE UP
EGFR: 97 ML/MIN/1.73M2 — SIGNIFICANT CHANGE UP
EGFR: 98 ML/MIN/1.73M2 — SIGNIFICANT CHANGE UP
EOSINOPHIL # BLD AUTO: 0.08 K/UL — SIGNIFICANT CHANGE UP (ref 0–0.5)
EOSINOPHIL NFR BLD AUTO: 0.8 % — SIGNIFICANT CHANGE UP (ref 0–6)
GLUCOSE BLDC GLUCOMTR-MCNC: 137 MG/DL — HIGH (ref 70–99)
GLUCOSE BLDC GLUCOMTR-MCNC: 144 MG/DL — HIGH (ref 70–99)
GLUCOSE BLDC GLUCOMTR-MCNC: 154 MG/DL — HIGH (ref 70–99)
GLUCOSE BLDC GLUCOMTR-MCNC: 176 MG/DL — HIGH (ref 70–99)
GLUCOSE BLDC GLUCOMTR-MCNC: 91 MG/DL — SIGNIFICANT CHANGE UP (ref 70–99)
GLUCOSE SERPL-MCNC: 149 MG/DL — HIGH (ref 70–99)
GLUCOSE SERPL-MCNC: 174 MG/DL — HIGH (ref 70–99)
HCT VFR BLD CALC: 35 % — SIGNIFICANT CHANGE UP (ref 34.5–45)
HGB BLD-MCNC: 11.4 G/DL — LOW (ref 11.5–15.5)
IMM GRANULOCYTES NFR BLD AUTO: 0.3 % — SIGNIFICANT CHANGE UP (ref 0–1.5)
LYMPHOCYTES # BLD AUTO: 0.69 K/UL — LOW (ref 1–3.3)
LYMPHOCYTES # BLD AUTO: 6.8 % — LOW (ref 13–44)
MAGNESIUM SERPL-MCNC: 1.9 MG/DL — SIGNIFICANT CHANGE UP (ref 1.6–2.6)
MAGNESIUM SERPL-MCNC: 2 MG/DL — SIGNIFICANT CHANGE UP (ref 1.6–2.6)
MCHC RBC-ENTMCNC: 31.3 PG — SIGNIFICANT CHANGE UP (ref 27–34)
MCHC RBC-ENTMCNC: 32.6 GM/DL — SIGNIFICANT CHANGE UP (ref 32–36)
MCV RBC AUTO: 96.2 FL — SIGNIFICANT CHANGE UP (ref 80–100)
METHOD TYPE: SIGNIFICANT CHANGE UP
MONOCYTES # BLD AUTO: 0.67 K/UL — SIGNIFICANT CHANGE UP (ref 0–0.9)
MONOCYTES NFR BLD AUTO: 6.6 % — SIGNIFICANT CHANGE UP (ref 2–14)
NEUTROPHILS # BLD AUTO: 8.63 K/UL — HIGH (ref 1.8–7.4)
NEUTROPHILS NFR BLD AUTO: 85.1 % — HIGH (ref 43–77)
NRBC # BLD: 0 /100 WBCS — SIGNIFICANT CHANGE UP (ref 0–0)
ORGANISM # SPEC MICROSCOPIC CNT: SIGNIFICANT CHANGE UP
PHOSPHATE SERPL-MCNC: 1.3 MG/DL — LOW (ref 2.5–4.5)
PHOSPHATE SERPL-MCNC: 3.3 MG/DL — SIGNIFICANT CHANGE UP (ref 2.5–4.5)
PLATELET # BLD AUTO: 142 K/UL — LOW (ref 150–400)
POTASSIUM SERPL-MCNC: 3.6 MMOL/L — SIGNIFICANT CHANGE UP (ref 3.5–5.3)
POTASSIUM SERPL-MCNC: 4.5 MMOL/L — SIGNIFICANT CHANGE UP (ref 3.5–5.3)
POTASSIUM SERPL-SCNC: 3.6 MMOL/L — SIGNIFICANT CHANGE UP (ref 3.5–5.3)
POTASSIUM SERPL-SCNC: 4.5 MMOL/L — SIGNIFICANT CHANGE UP (ref 3.5–5.3)
RBC # BLD: 3.64 M/UL — LOW (ref 3.8–5.2)
RBC # FLD: 13.1 % — SIGNIFICANT CHANGE UP (ref 10.3–14.5)
SODIUM SERPL-SCNC: 138 MMOL/L — SIGNIFICANT CHANGE UP (ref 135–145)
SODIUM SERPL-SCNC: 140 MMOL/L — SIGNIFICANT CHANGE UP (ref 135–145)
SPECIMEN SOURCE: SIGNIFICANT CHANGE UP
WBC # BLD: 10.14 K/UL — SIGNIFICANT CHANGE UP (ref 3.8–10.5)
WBC # FLD AUTO: 10.14 K/UL — SIGNIFICANT CHANGE UP (ref 3.8–10.5)

## 2022-03-05 PROCEDURE — 99233 SBSQ HOSP IP/OBS HIGH 50: CPT | Mod: GC

## 2022-03-05 PROCEDURE — 99222 1ST HOSP IP/OBS MODERATE 55: CPT

## 2022-03-05 RX ORDER — CIPROFLOXACIN LACTATE 400MG/40ML
400 VIAL (ML) INTRAVENOUS EVERY 8 HOURS
Refills: 0 | Status: DISCONTINUED | OUTPATIENT
Start: 2022-03-05 | End: 2022-03-10

## 2022-03-05 RX ORDER — CIPROFLOXACIN LACTATE 400MG/40ML
400 VIAL (ML) INTRAVENOUS ONCE
Refills: 0 | Status: COMPLETED | OUTPATIENT
Start: 2022-03-05 | End: 2022-03-05

## 2022-03-05 RX ORDER — PROPOFOL 10 MG/ML
20 INJECTION, EMULSION INTRAVENOUS
Qty: 1000 | Refills: 0 | Status: DISCONTINUED | OUTPATIENT
Start: 2022-03-05 | End: 2022-03-05

## 2022-03-05 RX ORDER — ACETAMINOPHEN 500 MG
650 TABLET ORAL ONCE
Refills: 0 | Status: COMPLETED | OUTPATIENT
Start: 2022-03-05 | End: 2022-03-05

## 2022-03-05 RX ORDER — CIPROFLOXACIN LACTATE 400MG/40ML
VIAL (ML) INTRAVENOUS
Refills: 0 | Status: DISCONTINUED | OUTPATIENT
Start: 2022-03-05 | End: 2022-03-10

## 2022-03-05 RX ORDER — ACETAMINOPHEN 500 MG
650 TABLET ORAL ONCE
Refills: 0 | Status: DISCONTINUED | OUTPATIENT
Start: 2022-03-05 | End: 2022-03-05

## 2022-03-05 RX ORDER — POTASSIUM CHLORIDE 20 MEQ
20 PACKET (EA) ORAL ONCE
Refills: 0 | Status: COMPLETED | OUTPATIENT
Start: 2022-03-05 | End: 2022-03-05

## 2022-03-05 RX ORDER — SODIUM CHLORIDE 9 MG/ML
500 INJECTION INTRAMUSCULAR; INTRAVENOUS; SUBCUTANEOUS ONCE
Refills: 0 | Status: COMPLETED | OUTPATIENT
Start: 2022-03-05 | End: 2022-03-05

## 2022-03-05 RX ORDER — DEXMEDETOMIDINE HYDROCHLORIDE IN 0.9% SODIUM CHLORIDE 4 UG/ML
0.2 INJECTION INTRAVENOUS
Qty: 400 | Refills: 0 | Status: DISCONTINUED | OUTPATIENT
Start: 2022-03-05 | End: 2022-03-09

## 2022-03-05 RX ORDER — POTASSIUM PHOSPHATE, MONOBASIC POTASSIUM PHOSPHATE, DIBASIC 236; 224 MG/ML; MG/ML
30 INJECTION, SOLUTION INTRAVENOUS ONCE
Refills: 0 | Status: COMPLETED | OUTPATIENT
Start: 2022-03-05 | End: 2022-03-05

## 2022-03-05 RX ORDER — MAGNESIUM SULFATE 500 MG/ML
2 VIAL (ML) INJECTION ONCE
Refills: 0 | Status: COMPLETED | OUTPATIENT
Start: 2022-03-05 | End: 2022-03-05

## 2022-03-05 RX ADMIN — Medication 25 GRAM(S): at 18:52

## 2022-03-05 RX ADMIN — CHLORHEXIDINE GLUCONATE 15 MILLILITER(S): 213 SOLUTION TOPICAL at 17:41

## 2022-03-05 RX ADMIN — PANTOPRAZOLE SODIUM 40 MILLIGRAM(S): 20 TABLET, DELAYED RELEASE ORAL at 06:22

## 2022-03-05 RX ADMIN — Medication 200 MILLIGRAM(S): at 17:41

## 2022-03-05 RX ADMIN — Medication 200 MILLIGRAM(S): at 22:40

## 2022-03-05 RX ADMIN — Medication 2: at 23:17

## 2022-03-05 RX ADMIN — Medication 650 MILLIGRAM(S): at 10:07

## 2022-03-05 RX ADMIN — MEROPENEM 100 MILLIGRAM(S): 1 INJECTION INTRAVENOUS at 08:01

## 2022-03-05 RX ADMIN — DEXMEDETOMIDINE HYDROCHLORIDE IN 0.9% SODIUM CHLORIDE 2.84 MICROGRAM(S)/KG/HR: 4 INJECTION INTRAVENOUS at 22:42

## 2022-03-05 RX ADMIN — ENOXAPARIN SODIUM 50 MILLIGRAM(S): 100 INJECTION SUBCUTANEOUS at 02:28

## 2022-03-05 RX ADMIN — PROPOFOL 6.82 MICROGRAM(S)/KG/MIN: 10 INJECTION, EMULSION INTRAVENOUS at 06:06

## 2022-03-05 RX ADMIN — PROPOFOL 6.82 MICROGRAM(S)/KG/MIN: 10 INJECTION, EMULSION INTRAVENOUS at 00:57

## 2022-03-05 RX ADMIN — MEROPENEM 100 MILLIGRAM(S): 1 INJECTION INTRAVENOUS at 00:48

## 2022-03-05 RX ADMIN — Medication 50 MILLIEQUIVALENT(S): at 09:24

## 2022-03-05 RX ADMIN — DEXMEDETOMIDINE HYDROCHLORIDE IN 0.9% SODIUM CHLORIDE 2.84 MICROGRAM(S)/KG/HR: 4 INJECTION INTRAVENOUS at 09:57

## 2022-03-05 RX ADMIN — ENOXAPARIN SODIUM 50 MILLIGRAM(S): 100 INJECTION SUBCUTANEOUS at 16:04

## 2022-03-05 RX ADMIN — SODIUM CHLORIDE 500 MILLILITER(S): 9 INJECTION INTRAMUSCULAR; INTRAVENOUS; SUBCUTANEOUS at 21:14

## 2022-03-05 RX ADMIN — CHLORHEXIDINE GLUCONATE 15 MILLILITER(S): 213 SOLUTION TOPICAL at 05:40

## 2022-03-05 RX ADMIN — Medication 2: at 06:19

## 2022-03-05 RX ADMIN — Medication 260 MILLIGRAM(S): at 08:54

## 2022-03-05 RX ADMIN — POTASSIUM PHOSPHATE, MONOBASIC POTASSIUM PHOSPHATE, DIBASIC 83.33 MILLIMOLE(S): 236; 224 INJECTION, SOLUTION INTRAVENOUS at 08:56

## 2022-03-05 NOTE — CHART NOTE - NSCHARTNOTEFT_GEN_A_CORE
Infectious Diseases Anti-infective Approval Note    Medication:  Meropenem  Dose:  1 gram  Route:  IV  Frequency:  q8hrs  Duration:  5 days**    **Duration refers to duration of approval, not recommended duration of antibiotics     Dose may be adjusted as needed for alterations in renal function.    *THIS IS NOT AN INFECTIOUS DISEASES CONSULTATION*

## 2022-03-05 NOTE — PATIENT PROFILE ADULT - FALL HARM RISK - HARM RISK INTERVENTIONS
Assistance with ambulation/Assistance OOB with selected safe patient handling equipment/Communicate Risk of Fall with Harm to all staff/Discuss with provider need for PT consult/Monitor gait and stability/Reinforce activity limits and safety measures with patient and family/Tailored Fall Risk Interventions/Visual Cue: Yellow wristband and red socks/Bed in lowest position, wheels locked, appropriate side rails in place/Call bell, personal items and telephone in reach/Instruct patient to call for assistance before getting out of bed or chair/Non-slip footwear when patient is out of bed/[place] to call system/Physically safe environment - no spills, clutter or unnecessary equipment/Purposeful Proactive Rounding/Room/bathroom lighting operational, light cord in reach

## 2022-03-05 NOTE — CONSULT NOTE ADULT - ASSESSMENT
IMPRESSION:  Hypoxic respiratory failure secondary to pneumonia.  Sputum culture with P aeruginosa.  Unfortunately the isolate has a high INGRID with zosyn.  Meropenem is susceptible on the microscan but when this was checked using a Newman-Mixon method it was intermediate with meropenem    Recommend:  1.  Can stop Meropenem.  2. Start Cipro 400 mg IV q8hrs  3.  Check EKG to monitor QTc  4.  I have requested microlab run E test for meropenem     ID team 1 will follow

## 2022-03-05 NOTE — PROGRESS NOTE ADULT - PROBLEM SELECTOR PLAN 1
- Likely secondary to aspiration event given similar history in conjunction with Chest X ray  - Sputum culture grew pseudomonas  - Started on Cipro 400mg IV Q8  - Blood culture with no growth to date

## 2022-03-05 NOTE — CONSULT NOTE ADULT - SUBJECTIVE AND OBJECTIVE BOX
HPI:  74 year old female with a PMH of cerebral palsy with functional quadroplegia, DVT/PE on eliquis, GERD, Chronic dyshagia with PEG, Thoracic spine fusion and a history of prior admission in 2020 for aspiration PNA requiring intubation presents from her assisted living facility in acute hypoxic respiratory failure. Ahe was found by staff with difficulty breathing.  +cough.  ~1am.  EMS arrival noted pt w/ resp distress, O2 84% in RA, intubated in field for airway protection, noted "thick viscous" liquid in her oropharynx during intubation. Patient lost conciousness multiple times upon transfer to [facility] with subsequent transfer to [facility] to be cared for in the MICU.     Patient was extubated on 3/4 but reintubated 10 minutes later.  Now intubated.  Not on pressors.  FiO2 increased from 50 % to 60 %        PAST MEDICAL & SURGICAL HISTORY:  CP (cerebral palsy)    Mental retardation    Dysphagia    GERD (gastroesophageal reflux disease)    PE (pulmonary thromboembolism)    DVT (deep venous thrombosis)    Spastic quadriplegia    HTN (hypertension)    PEG tube malfunction          REVIEW OF SYSTEMS:    General:	 no weakness; no fevers, no chills  Skin/Breast: no rash  Respiratory and Thorax: no SOB, no cough  Cardiovascular:	No chest pain  Gastrointestinal:	 no nausea, vomiting , diarrhea  Genitourinary:	no dysuria, no difficulty urinating, no hematuria  Musculoskeletal:	no weakness, no joint swelling/pain  Neurological:	no focal weakness/numbness  Endocrine:	no polyuria, no polydipsia      ANTIBIOTICS:  MEDICATIONS  (STANDING):  chlorhexidine 0.12% Liquid 15 milliLiter(s) Oral Mucosa every 12 hours  chlorhexidine 4% Liquid 1 Application(s) Topical <User Schedule>  dexMEDEtomidine Infusion 0.2 MICROgram(s)/kG/Hr (2.84 mL/Hr) IV Continuous <Continuous>  dextrose 40% Gel 15 Gram(s) Oral once  dextrose 5%. 1000 milliLiter(s) (50 mL/Hr) IV Continuous <Continuous>  dextrose 5%. 1000 milliLiter(s) (100 mL/Hr) IV Continuous <Continuous>  dextrose 50% Injectable 25 Gram(s) IV Push once  dextrose 50% Injectable 12.5 Gram(s) IV Push once  dextrose 50% Injectable 25 Gram(s) IV Push once  enoxaparin Injectable 50 milliGRAM(s) SubCutaneous every 12 hours  glucagon  Injectable 1 milliGRAM(s) IntraMuscular once  insulin lispro (ADMELOG) corrective regimen sliding scale   SubCutaneous every 6 hours  norepinephrine Infusion 0.05 MICROgram(s)/kG/Min (5.33 mL/Hr) IV Continuous <Continuous>  pantoprazole  Injectable 40 milliGRAM(s) IV Push every 24 hours  propofol Infusion 20 MICROgram(s)/kG/Min (6.82 mL/Hr) IV Continuous <Continuous>    MEDICATIONS  (PRN):      Allergies    ace inhibitors (Anaphylaxis)  caffeine (Rash)  cefepime (Rash)  chocolate (Rash)  high acid (Rash)  Tomatoes (Hives)    Intolerances        SOCIAL HISTORY:    FAMILY HISTORY:      Vital Signs Last 24 Hrs  T(C): 37.9 (05 Mar 2022 15:00), Max: 38.1 (05 Mar 2022 09:00)  T(F): 100.2 (05 Mar 2022 15:00), Max: 100.6 (05 Mar 2022 09:00)  HR: 69 (05 Mar 2022 15:02) (69 - 93)  BP: 93/59 (05 Mar 2022 15:02) (77/42 - 155/65)  BP(mean): 71 (05 Mar 2022 15:02) (54 - 93)  RR: 22 (05 Mar 2022 15:02) (17 - 29)  SpO2: 96% (05 Mar 2022 15:02) (90% - 99%)    PHYSICAL EXAM:  Constitutional:  intubated  Eyes:  no icterus   Ear/Nose/Throat: no oral lesion   Neck:  supple  Respiratory: CTA dayanna  Cardiovascular: S1S2 RRR, no murmurs  Gastrointestinal:soft, (+) BS, no HSM  Extremities:no edema   Vascular: DP Pulse:	right normal; left normal            LABS:                        11.4   10.14 )-----------( 142      ( 05 Mar 2022 05:35 )             35.0     03-05    138  |  100  |  17  ----------------------------<  174<H>  3.6   |  27  |  0.54    Ca    8.2<L>      05 Mar 2022 05:35  Phos  1.3     03-05  Mg     2.0     03-05    TPro  x   /  Alb  x   /  TBili  0.5  /  DBili  x   /  AST  x   /  ALT  x   /  AlkPhos  x   03-04    PT/INR - ( 04 Mar 2022 06:39 )   PT: 15.1 sec;   INR: 1.27                MICROBIOLOGY:    Culture - Sputum . (03.03.22 @ 12:52)    -  Meropenem: I    -  Meropenem: S <=1    -  Piperacillin/Tazobactam: I 32    -  Tobramycin: S <=2    Gram Stain:   No epithelial cells seen  Few WBC's  Rare Gram positive cocci in pairs  Rare Gram Negative Diplococci    -  Aztreonam: S 8    -  Cefepime: S 8    -  Ciprofloxacin: S <=0.25    -  Gentamicin: S <=2    Specimen Source: .Sputum Sputum    Culture Results:   Few Pseudomonas aeruginosa  Accompanied by normal respiratory teto    Organism Identification: Pseudomonas aeruginosa  Pseudomonas aeruginosa  Pseudomonas aeruginosa  Pseudomonas aeruginosa    Organism: Pseudomonas aeruginosa    Organism: Pseudomonas aeruginosa    Organism: Pseudomonas aeruginosa    Organism: Pseudomonas aeruginosa    Method Type: INGRID    Method Type: KB    Method Type: KB    Method Type: INGRID      RADIOLOGY & ADDITIONAL STUDIES:    < from: Xray Chest 1 View- PORTABLE-Urgent (Xray Chest 1 View- PORTABLE-Urgent .) (03.04.22 @ 10:55) >    Single frontal view of the chest demonstrates the endotracheal tube tip   to be above the paula. Bilateral effusions/opacities, unchanged. The   cardiomediastinal silhouette is enlarged. No acute osseous abnormalities.   Overlying EKG leads and wires are noted    < end of copied text >

## 2022-03-05 NOTE — PROGRESS NOTE ADULT - ASSESSMENT
75 yo F with PMH of CP with functional quadriplegia, DVT/PE on eliquis, GERD, chronic dysphagia with PEG, thoracic spine fusion admitted for acute hypoxic respiratory failure likely secondary to pneumonia.

## 2022-03-05 NOTE — PROGRESS NOTE ADULT - SUBJECTIVE AND OBJECTIVE BOX
SUBJECTIVE/OVERNIGHT EVENTS: No acute overnight events. Patient seen and examined at the bedside this morning while intubated and lightly sedated. Alert and following simple commands but unable to answer questions.    VITAL SIGNS:  Vital Signs Last 24 Hrs  T(C): 37.9 (05 Mar 2022 15:00), Max: 38.1 (05 Mar 2022 09:00)  T(F): 100.2 (05 Mar 2022 15:00), Max: 100.6 (05 Mar 2022 09:00)  HR: 76 (05 Mar 2022 21:00) (67 - 93)  BP: 85/49 (05 Mar 2022 21:00) (84/45 - 155/65)  BP(mean): 64 (05 Mar 2022 21:00) (63 - 100)  RR: 24 (05 Mar 2022 21:00) (18 - 29)  SpO2: 95% (05 Mar 2022 21:00) (90% - 99%)    Constitutional - Intubated but awake.  HEENT - NCAT, anicteric sclera, intubated, mucous membranes dry  Neck - Supple, no lymphadenopathy  Pulm - CTAB  Cardio -  RRR, S1/S2 present, no murmurs  GI -  Soft, NTND, BSx4, PEG tube in place   - lu in place, producing urine  Extremities - Non-pitting edema b/l LE up to knees, 2+ pulses x4   Neurologic Exam: intubated and lightly sedated but responsive to verbal stimuli. Follows simple commands.                Skin: warm, dry, intact, no rashes or wounds,  Lines: Lu, multiple peripheral IVs    MEDICATIONS:  MEDICATIONS  (STANDING):  chlorhexidine 0.12% Liquid 15 milliLiter(s) Oral Mucosa every 12 hours  chlorhexidine 4% Liquid 1 Application(s) Topical <User Schedule>  ciprofloxacin   IVPB      ciprofloxacin   IVPB 400 milliGRAM(s) IV Intermittent every 8 hours  dexMEDEtomidine Infusion 0.2 MICROgram(s)/kG/Hr (2.84 mL/Hr) IV Continuous <Continuous>  dextrose 40% Gel 15 Gram(s) Oral once  dextrose 5%. 1000 milliLiter(s) (50 mL/Hr) IV Continuous <Continuous>  dextrose 5%. 1000 milliLiter(s) (100 mL/Hr) IV Continuous <Continuous>  dextrose 50% Injectable 25 Gram(s) IV Push once  dextrose 50% Injectable 12.5 Gram(s) IV Push once  dextrose 50% Injectable 25 Gram(s) IV Push once  enoxaparin Injectable 50 milliGRAM(s) SubCutaneous every 12 hours  glucagon  Injectable 1 milliGRAM(s) IntraMuscular once  insulin lispro (ADMELOG) corrective regimen sliding scale   SubCutaneous every 6 hours  pantoprazole  Injectable 40 milliGRAM(s) IV Push every 24 hours    MEDICATIONS  (PRN):      ALLERGIES:  Allergies    ace inhibitors (Anaphylaxis)  caffeine (Rash)  cefepime (Rash)  chocolate (Rash)  high acid (Rash)  Tomatoes (Hives)    Intolerances        LABS:                        11.4   10.14 )-----------( 142      ( 05 Mar 2022 05:35 )             35.0     03-05    140  |  104  |  17  ----------------------------<  149<H>  4.5   |  29  |  0.51    Ca    8.0<L>      05 Mar 2022 16:30  Phos  3.3     03-05  Mg     1.9     03-05    TPro  x   /  Alb  x   /  TBili  0.5  /  DBili  x   /  AST  x   /  ALT  x   /  AlkPhos  x   03-04    PT/INR - ( 04 Mar 2022 06:39 )   PT: 15.1 sec;   INR: 1.27              RADIOLOGY & ADDITIONAL TESTS: Reviewed.

## 2022-03-05 NOTE — PROGRESS NOTE ADULT - ATTENDING COMMENTS
Patient seen and examined with house-staff during bedside rounds.  Resident note read, including vitals, physical findings, laboratory data, and radiological reports.   Revisions included below.  Direct personal management at bed side and extensive interpretation of the data.  Plan was outlined and discussed in details with the housestaff.  Decision making of high complexity  Action taken for acute disease activity to reflect the level of care provided:  - medication reconciliation  - review laboratory data      Patient is clinically stable.  Secretions are moderate.  Sputum grew Pseudomonas.  Continue current antibiotic.  The patient is hemodynamically stable and her sepsis picture resolving.  Continue to feed.  Renal function is stable.  The decrease Precedex.  A peak pressure of 22.

## 2022-03-05 NOTE — PROGRESS NOTE ADULT - PROBLEM SELECTOR PLAN 3
- c/w Lovenox 50mg Q12  - History of PE 5 years ago. On Eliquis at home  - Bedside ultrasound with no evidence of clot

## 2022-03-06 DIAGNOSIS — E83.39 OTHER DISORDERS OF PHOSPHORUS METABOLISM: ICD-10-CM

## 2022-03-06 LAB
-  AMIKACIN: SIGNIFICANT CHANGE UP
-  AMOXICILLIN/CLAVULANIC ACID: SIGNIFICANT CHANGE UP
-  AMPICILLIN/SULBACTAM: SIGNIFICANT CHANGE UP
-  AMPICILLIN: SIGNIFICANT CHANGE UP
-  AZTREONAM: SIGNIFICANT CHANGE UP
-  CEFAZOLIN: SIGNIFICANT CHANGE UP
-  CEFEPIME: SIGNIFICANT CHANGE UP
-  CEFOXITIN: SIGNIFICANT CHANGE UP
-  CEFTRIAXONE: SIGNIFICANT CHANGE UP
-  CIPROFLOXACIN: SIGNIFICANT CHANGE UP
-  ERTAPENEM: SIGNIFICANT CHANGE UP
-  GENTAMICIN: SIGNIFICANT CHANGE UP
-  IMIPENEM: SIGNIFICANT CHANGE UP
-  LEVOFLOXACIN: SIGNIFICANT CHANGE UP
-  MEROPENEM: SIGNIFICANT CHANGE UP
-  NITROFURANTOIN: SIGNIFICANT CHANGE UP
-  PIPERACILLIN/TAZOBACTAM: SIGNIFICANT CHANGE UP
-  TIGECYCLINE: SIGNIFICANT CHANGE UP
-  TOBRAMYCIN: SIGNIFICANT CHANGE UP
-  TRIMETHOPRIM/SULFAMETHOXAZOLE: SIGNIFICANT CHANGE UP
ANION GAP SERPL CALC-SCNC: 8 MMOL/L — SIGNIFICANT CHANGE UP (ref 5–17)
BASOPHILS # BLD AUTO: 0.03 K/UL — SIGNIFICANT CHANGE UP (ref 0–0.2)
BASOPHILS NFR BLD AUTO: 0.3 % — SIGNIFICANT CHANGE UP (ref 0–2)
BUN SERPL-MCNC: 18 MG/DL — SIGNIFICANT CHANGE UP (ref 7–23)
CALCIUM SERPL-MCNC: 7.8 MG/DL — LOW (ref 8.4–10.5)
CHLORIDE SERPL-SCNC: 104 MMOL/L — SIGNIFICANT CHANGE UP (ref 96–108)
CO2 SERPL-SCNC: 27 MMOL/L — SIGNIFICANT CHANGE UP (ref 22–31)
CREAT ?TM UR-MCNC: 83 MG/DL — SIGNIFICANT CHANGE UP
CREAT SERPL-MCNC: 0.49 MG/DL — LOW (ref 0.5–1.3)
EGFR: 99 ML/MIN/1.73M2 — SIGNIFICANT CHANGE UP
EOSINOPHIL # BLD AUTO: 0.16 K/UL — SIGNIFICANT CHANGE UP (ref 0–0.5)
EOSINOPHIL NFR BLD AUTO: 1.8 % — SIGNIFICANT CHANGE UP (ref 0–6)
GLUCOSE BLDC GLUCOMTR-MCNC: 130 MG/DL — HIGH (ref 70–99)
GLUCOSE BLDC GLUCOMTR-MCNC: 136 MG/DL — HIGH (ref 70–99)
GLUCOSE BLDC GLUCOMTR-MCNC: 145 MG/DL — HIGH (ref 70–99)
GLUCOSE BLDC GLUCOMTR-MCNC: 156 MG/DL — HIGH (ref 70–99)
GLUCOSE SERPL-MCNC: 131 MG/DL — HIGH (ref 70–99)
HCT VFR BLD CALC: 34.1 % — LOW (ref 34.5–45)
HGB BLD-MCNC: 10.6 G/DL — LOW (ref 11.5–15.5)
IMM GRANULOCYTES NFR BLD AUTO: 0.3 % — SIGNIFICANT CHANGE UP (ref 0–1.5)
LYMPHOCYTES # BLD AUTO: 1.28 K/UL — SIGNIFICANT CHANGE UP (ref 1–3.3)
LYMPHOCYTES # BLD AUTO: 14.5 % — SIGNIFICANT CHANGE UP (ref 13–44)
MAGNESIUM SERPL-MCNC: 2.4 MG/DL — SIGNIFICANT CHANGE UP (ref 1.6–2.6)
MCHC RBC-ENTMCNC: 31 PG — SIGNIFICANT CHANGE UP (ref 27–34)
MCHC RBC-ENTMCNC: 31.1 GM/DL — LOW (ref 32–36)
MCV RBC AUTO: 99.7 FL — SIGNIFICANT CHANGE UP (ref 80–100)
METHOD TYPE: SIGNIFICANT CHANGE UP
MONOCYTES # BLD AUTO: 0.88 K/UL — SIGNIFICANT CHANGE UP (ref 0–0.9)
MONOCYTES NFR BLD AUTO: 10 % — SIGNIFICANT CHANGE UP (ref 2–14)
NEUTROPHILS # BLD AUTO: 6.43 K/UL — SIGNIFICANT CHANGE UP (ref 1.8–7.4)
NEUTROPHILS NFR BLD AUTO: 73.1 % — SIGNIFICANT CHANGE UP (ref 43–77)
NRBC # BLD: 0 /100 WBCS — SIGNIFICANT CHANGE UP (ref 0–0)
PHOSPHATE 24H UR-MCNC: 112.2 MG/DL — SIGNIFICANT CHANGE UP
PHOSPHATE SERPL-MCNC: 1.8 MG/DL — LOW (ref 2.5–4.5)
PLATELET # BLD AUTO: 142 K/UL — LOW (ref 150–400)
POTASSIUM SERPL-MCNC: 4 MMOL/L — SIGNIFICANT CHANGE UP (ref 3.5–5.3)
POTASSIUM SERPL-SCNC: 4 MMOL/L — SIGNIFICANT CHANGE UP (ref 3.5–5.3)
RBC # BLD: 3.42 M/UL — LOW (ref 3.8–5.2)
RBC # FLD: 13.1 % — SIGNIFICANT CHANGE UP (ref 10.3–14.5)
SODIUM SERPL-SCNC: 139 MMOL/L — SIGNIFICANT CHANGE UP (ref 135–145)
WBC # BLD: 8.81 K/UL — SIGNIFICANT CHANGE UP (ref 3.8–10.5)
WBC # FLD AUTO: 8.81 K/UL — SIGNIFICANT CHANGE UP (ref 3.8–10.5)

## 2022-03-06 PROCEDURE — 71045 X-RAY EXAM CHEST 1 VIEW: CPT | Mod: 26,77

## 2022-03-06 PROCEDURE — 99291 CRITICAL CARE FIRST HOUR: CPT

## 2022-03-06 PROCEDURE — 99232 SBSQ HOSP IP/OBS MODERATE 35: CPT

## 2022-03-06 PROCEDURE — 71045 X-RAY EXAM CHEST 1 VIEW: CPT | Mod: 26

## 2022-03-06 RX ORDER — SODIUM CHLORIDE 9 MG/ML
500 INJECTION INTRAMUSCULAR; INTRAVENOUS; SUBCUTANEOUS ONCE
Refills: 0 | Status: COMPLETED | OUTPATIENT
Start: 2022-03-06 | End: 2022-03-06

## 2022-03-06 RX ORDER — MIDAZOLAM HYDROCHLORIDE 1 MG/ML
2 INJECTION, SOLUTION INTRAMUSCULAR; INTRAVENOUS ONCE
Refills: 0 | Status: DISCONTINUED | OUTPATIENT
Start: 2022-03-06 | End: 2022-03-06

## 2022-03-06 RX ORDER — NOREPINEPHRINE BITARTRATE/D5W 8 MG/250ML
0.05 PLASTIC BAG, INJECTION (ML) INTRAVENOUS
Qty: 8 | Refills: 0 | Status: DISCONTINUED | OUTPATIENT
Start: 2022-03-06 | End: 2022-03-07

## 2022-03-06 RX ORDER — FENTANYL CITRATE 50 UG/ML
50 INJECTION INTRAVENOUS ONCE
Refills: 0 | Status: DISCONTINUED | OUTPATIENT
Start: 2022-03-06 | End: 2022-03-06

## 2022-03-06 RX ORDER — QUETIAPINE FUMARATE 200 MG/1
25 TABLET, FILM COATED ORAL EVERY 12 HOURS
Refills: 0 | Status: DISCONTINUED | OUTPATIENT
Start: 2022-03-06 | End: 2022-03-06

## 2022-03-06 RX ORDER — QUETIAPINE FUMARATE 200 MG/1
25 TABLET, FILM COATED ORAL ONCE
Refills: 0 | Status: COMPLETED | OUTPATIENT
Start: 2022-03-06 | End: 2022-03-06

## 2022-03-06 RX ORDER — QUETIAPINE FUMARATE 200 MG/1
50 TABLET, FILM COATED ORAL EVERY 12 HOURS
Refills: 0 | Status: DISCONTINUED | OUTPATIENT
Start: 2022-03-07 | End: 2022-03-09

## 2022-03-06 RX ORDER — MIDAZOLAM HYDROCHLORIDE 1 MG/ML
1 INJECTION, SOLUTION INTRAMUSCULAR; INTRAVENOUS ONCE
Refills: 0 | Status: DISCONTINUED | OUTPATIENT
Start: 2022-03-06 | End: 2022-03-06

## 2022-03-06 RX ORDER — MIDAZOLAM HYDROCHLORIDE 1 MG/ML
4 INJECTION, SOLUTION INTRAMUSCULAR; INTRAVENOUS ONCE
Refills: 0 | Status: DISCONTINUED | OUTPATIENT
Start: 2022-03-06 | End: 2022-03-06

## 2022-03-06 RX ORDER — FENTANYL CITRATE 50 UG/ML
25 INJECTION INTRAVENOUS ONCE
Refills: 0 | Status: DISCONTINUED | OUTPATIENT
Start: 2022-03-06 | End: 2022-03-06

## 2022-03-06 RX ADMIN — FENTANYL CITRATE 50 MICROGRAM(S): 50 INJECTION INTRAVENOUS at 02:17

## 2022-03-06 RX ADMIN — MIDAZOLAM HYDROCHLORIDE 1 MILLIGRAM(S): 1 INJECTION, SOLUTION INTRAMUSCULAR; INTRAVENOUS at 09:45

## 2022-03-06 RX ADMIN — CHLORHEXIDINE GLUCONATE 15 MILLILITER(S): 213 SOLUTION TOPICAL at 05:29

## 2022-03-06 RX ADMIN — ENOXAPARIN SODIUM 50 MILLIGRAM(S): 100 INJECTION SUBCUTANEOUS at 14:53

## 2022-03-06 RX ADMIN — FENTANYL CITRATE 25 MICROGRAM(S): 50 INJECTION INTRAVENOUS at 05:51

## 2022-03-06 RX ADMIN — CHLORHEXIDINE GLUCONATE 15 MILLILITER(S): 213 SOLUTION TOPICAL at 17:27

## 2022-03-06 RX ADMIN — FENTANYL CITRATE 50 MICROGRAM(S): 50 INJECTION INTRAVENOUS at 05:37

## 2022-03-06 RX ADMIN — FENTANYL CITRATE 25 MICROGRAM(S): 50 INJECTION INTRAVENOUS at 06:01

## 2022-03-06 RX ADMIN — Medication 200 MILLIGRAM(S): at 14:53

## 2022-03-06 RX ADMIN — Medication 200 MILLIGRAM(S): at 21:10

## 2022-03-06 RX ADMIN — Medication 200 MILLIGRAM(S): at 05:29

## 2022-03-06 RX ADMIN — SODIUM CHLORIDE 500 MILLILITER(S): 9 INJECTION INTRAMUSCULAR; INTRAVENOUS; SUBCUTANEOUS at 05:18

## 2022-03-06 RX ADMIN — MIDAZOLAM HYDROCHLORIDE 4 MILLIGRAM(S): 1 INJECTION, SOLUTION INTRAMUSCULAR; INTRAVENOUS at 01:41

## 2022-03-06 RX ADMIN — FENTANYL CITRATE 50 MICROGRAM(S): 50 INJECTION INTRAVENOUS at 06:09

## 2022-03-06 RX ADMIN — Medication 85 MILLIMOLE(S): at 09:24

## 2022-03-06 RX ADMIN — QUETIAPINE FUMARATE 25 MILLIGRAM(S): 200 TABLET, FILM COATED ORAL at 09:23

## 2022-03-06 RX ADMIN — ENOXAPARIN SODIUM 50 MILLIGRAM(S): 100 INJECTION SUBCUTANEOUS at 02:20

## 2022-03-06 RX ADMIN — Medication 2: at 23:42

## 2022-03-06 RX ADMIN — Medication 5.33 MICROGRAM(S)/KG/MIN: at 02:16

## 2022-03-06 RX ADMIN — FENTANYL CITRATE 50 MICROGRAM(S): 50 INJECTION INTRAVENOUS at 06:01

## 2022-03-06 RX ADMIN — PANTOPRAZOLE SODIUM 40 MILLIGRAM(S): 20 TABLET, DELAYED RELEASE ORAL at 05:30

## 2022-03-06 RX ADMIN — MIDAZOLAM HYDROCHLORIDE 2 MILLIGRAM(S): 1 INJECTION, SOLUTION INTRAMUSCULAR; INTRAVENOUS at 06:12

## 2022-03-06 RX ADMIN — CHLORHEXIDINE GLUCONATE 1 APPLICATION(S): 213 SOLUTION TOPICAL at 05:19

## 2022-03-06 RX ADMIN — DEXMEDETOMIDINE HYDROCHLORIDE IN 0.9% SODIUM CHLORIDE 2.84 MICROGRAM(S)/KG/HR: 4 INJECTION INTRAVENOUS at 17:04

## 2022-03-06 RX ADMIN — QUETIAPINE FUMARATE 25 MILLIGRAM(S): 200 TABLET, FILM COATED ORAL at 15:37

## 2022-03-06 RX ADMIN — FENTANYL CITRATE 50 MICROGRAM(S): 50 INJECTION INTRAVENOUS at 01:11

## 2022-03-06 RX ADMIN — QUETIAPINE FUMARATE 25 MILLIGRAM(S): 200 TABLET, FILM COATED ORAL at 21:10

## 2022-03-06 NOTE — PROGRESS NOTE ADULT - PROBLEM SELECTOR PLAN 7
N: Jevity 1.2 at 50cc/hr  E: PRN  DVT: Lovenox therapeutic  C: Full Code, pending goals of care conversation  D: MICU

## 2022-03-06 NOTE — PROGRESS NOTE ADULT - PROBLEM SELECTOR PLAN 6
N: Jevity 1.2 at 50cc/hr  E: PRN  C: Full Code, pending goals of care conversation  DVT: Lovenox therapeutic  D: MICU - PEG tube in place  - resume tube feeds

## 2022-03-06 NOTE — PROGRESS NOTE ADULT - ATTENDING COMMENTS
Patient seen and examined with house-staff during bedside rounds.  Resident note read, including vitals, physical findings, laboratory data, and radiological reports.   Revisions included below.  Direct personal management at bed side and extensive interpretation of the data.  Plan was outlined and discussed in details with the housestaff.  Decision making of high complexity  Action taken for acute disease activity to reflect the level of care provided:  - medication reconciliation  - review laboratory data  Patient is clinically stable. Patient is awake agitated versus anxious. Patient responded to the Versed. Patient on Precedex. Restart Seroquel. The sputum grew Pseudomonas and sent in Cipro and will change to Cipro. Secretions are decreasing. Oxygen saturation is adequate and decreased FiO2 to 40%. Hold on weaning today. Continue tube feeds. The patient intermittently on pressors but he seems to be the shock state is sepsis plus hyperal bulimia. Hemoglobin is stable renal function is stable

## 2022-03-06 NOTE — PROGRESS NOTE ADULT - ASSESSMENT
74 F with PMHx of CP with functional quadriplegia, DVT/PE on eliquis, GERD, chronic dysphagia with PEG, thoracic spine fusion admitted for acute hypoxic respiratory failure found to have pseudomonal PNA 74 F with PMHx of CP with functional quadriplegia, DVT/PE on eliquis, GERD, chronic dysphagia with PEG, thoracic spine fusion admitted for acute hypoxic respiratory failure found to have pseudomonal PNA cb periods of agitation/anxiety preventing SBT

## 2022-03-06 NOTE — PROGRESS NOTE ADULT - SUBJECTIVE AND OBJECTIVE BOX
INTERVAL HPI/OVERNIGHT EVENTS:    Patient was seen and examined at bedside.  Still intubated and on small dose of pressors.  FIO2 at 40 %.  Fever curve decreased     ROS:    unable to obtain         ANTIBIOTICS/RELEVANT:    MEDICATIONS  (STANDING):  chlorhexidine 0.12% Liquid 15 milliLiter(s) Oral Mucosa every 12 hours  chlorhexidine 4% Liquid 1 Application(s) Topical <User Schedule>  ciprofloxacin   IVPB 400 milliGRAM(s) IV Intermittent every 8 hours  ciprofloxacin   IVPB      dexMEDEtomidine Infusion 0.2 MICROgram(s)/kG/Hr (2.84 mL/Hr) IV Continuous <Continuous>  dextrose 40% Gel 15 Gram(s) Oral once  dextrose 5%. 1000 milliLiter(s) (50 mL/Hr) IV Continuous <Continuous>  dextrose 5%. 1000 milliLiter(s) (100 mL/Hr) IV Continuous <Continuous>  dextrose 50% Injectable 25 Gram(s) IV Push once  dextrose 50% Injectable 12.5 Gram(s) IV Push once  dextrose 50% Injectable 25 Gram(s) IV Push once  enoxaparin Injectable 50 milliGRAM(s) SubCutaneous every 12 hours  glucagon  Injectable 1 milliGRAM(s) IntraMuscular once  insulin lispro (ADMELOG) corrective regimen sliding scale   SubCutaneous every 6 hours  norepinephrine Infusion 0.05 MICROgram(s)/kG/Min (5.33 mL/Hr) IV Continuous <Continuous>  pantoprazole  Injectable 40 milliGRAM(s) IV Push every 24 hours  QUEtiapine 25 milliGRAM(s) Oral every 12 hours    MEDICATIONS  (PRN):        Vital Signs Last 24 Hrs  T(C): 37.5 (06 Mar 2022 09:44), Max: 37.9 (05 Mar 2022 15:00)  T(F): 99.5 (06 Mar 2022 09:44), Max: 100.2 (05 Mar 2022 15:00)  HR: 72 (06 Mar 2022 12:00) (60 - 90)  BP: 106/59 (06 Mar 2022 12:00) (69/41 - 139/66)  BP(mean): 80 (06 Mar 2022 12:00) (50 - 100)  RR: 26 (06 Mar 2022 12:00) (18 - 33)  SpO2: 95% (06 Mar 2022 12:00) (93% - 100%)    PHYSICAL EXAM:  Constitutional:  non-toxic, no distress  Eyes:RACHEL, EOMI  Ear/Nose/Throat: no oral lesion, no sinus tenderness on percussion	  Neck:  supple  Respiratory: CTA dayanna  Cardiovascular: S1S2 RRR, no murmurs  Gastrointestinal:soft, (+) BS, no HSM  Extremities:no e/e/c  Vascular: DP Pulse:	right normal; left normal      LABS:                        10.6   8.81  )-----------( 142      ( 06 Mar 2022 07:08 )             34.1     03-06    139  |  104  |  18  ----------------------------<  131<H>  4.0   |  27  |  0.49<L>    Ca    7.8<L>      06 Mar 2022 07:08  Phos  1.8     03-06  Mg     2.4     03-06            MICROBIOLOGY:    Culture - Sputum . (03.03.22 @ 12:52)    -  Meropenem: S <=1    -  Meropenem: I    -  Piperacillin/Tazobactam: I 32    -  Tobramycin: S <=2    Gram Stain:   No epithelial cells seen  Few WBC's  Rare Gram positive cocci in pairs  Rare Gram Negative Diplococci    -  Aztreonam: S 8    -  Cefepime: S 8    -  Ciprofloxacin: S <=0.25    -  Gentamicin: S <=2    Specimen Source: .Sputum Sputum    Culture Results:   Few Pseudomonas aeruginosa  Accompanied by normal respiratory teto    Organism Identification: Pseudomonas aeruginosa  Pseudomonas aeruginosa  Pseudomonas aeruginosa  Pseudomonas aeruginosa    Organism: Pseudomonas aeruginosa    Organism: Pseudomonas aeruginosa    Organism: Pseudomonas aeruginosa    Organism: Pseudomonas aeruginosa    Method Type: INGRID    Method Type: KB    Method Type: KB    Method Type: INGRID        RADIOLOGY & ADDITIONAL STUDIES:

## 2022-03-06 NOTE — PROGRESS NOTE ADULT - PROBLEM SELECTOR PLAN 4
- functional quadriplegia, health aide at bedside reports at baseline she is able to follow commands and communicate "when she wants to" Persistent hypophosphatemia   -F/u urine Phos and creatinine

## 2022-03-06 NOTE — PROGRESS NOTE ADULT - PROBLEM SELECTOR PLAN 5
- PEG tube in place  - resume tube feeds - functional quadriplegia, health aide at bedside reports at baseline she is able to follow commands and communicate "when she wants to"

## 2022-03-06 NOTE — PROGRESS NOTE ADULT - SUBJECTIVE AND OBJECTIVE BOX
OVERNIGHT EVENTS:    SUBJECTIVE / INTERVAL HPI: Patient seen and examined at bedside.     VITAL SIGNS:  Vital Signs Last 24 Hrs  T(C): 37.5 (06 Mar 2022 05:16), Max: 38.1 (05 Mar 2022 09:00)  T(F): 99.5 (06 Mar 2022 05:16), Max: 100.6 (05 Mar 2022 09:00)  HR: 70 (06 Mar 2022 05:17) (60 - 93)  BP: 139/66 (06 Mar 2022 05:00) (69/41 - 155/65)  BP(mean): 95 (06 Mar 2022 05:00) (50 - 100)  RR: 22 (06 Mar 2022 05:17) (18 - 32)  SpO2: 95% (06 Mar 2022 05:17) (92% - 99%)    PHYSICAL EXAM:    General: No acute distress  HEENT: NC/AT; PERRL, anicteric sclera; MMM  Neck: supple  Cardiovascular: +S1/S2, RRR, no murmurs, rubs, gallops  Respiratory: CTA B/L; no W/R/R  Gastrointestinal: soft, NT/ND; +BSx4  Extremities: WWP; no edema, clubbing or cyanosis  Vascular: 2+ radial, DP/PT pulses B/L  Neurological: AAOx3; no focal deficits    MEDICATIONS:  MEDICATIONS  (STANDING):  chlorhexidine 0.12% Liquid 15 milliLiter(s) Oral Mucosa every 12 hours  chlorhexidine 4% Liquid 1 Application(s) Topical <User Schedule>  ciprofloxacin   IVPB 400 milliGRAM(s) IV Intermittent every 8 hours  ciprofloxacin   IVPB      dexMEDEtomidine Infusion 0.2 MICROgram(s)/kG/Hr (2.84 mL/Hr) IV Continuous <Continuous>  dextrose 40% Gel 15 Gram(s) Oral once  dextrose 5%. 1000 milliLiter(s) (50 mL/Hr) IV Continuous <Continuous>  dextrose 5%. 1000 milliLiter(s) (100 mL/Hr) IV Continuous <Continuous>  dextrose 50% Injectable 25 Gram(s) IV Push once  dextrose 50% Injectable 12.5 Gram(s) IV Push once  dextrose 50% Injectable 25 Gram(s) IV Push once  enoxaparin Injectable 50 milliGRAM(s) SubCutaneous every 12 hours  glucagon  Injectable 1 milliGRAM(s) IntraMuscular once  insulin lispro (ADMELOG) corrective regimen sliding scale   SubCutaneous every 6 hours  midazolam Injectable 2 milliGRAM(s) IV Push once  norepinephrine Infusion 0.05 MICROgram(s)/kG/Min (5.33 mL/Hr) IV Continuous <Continuous>  pantoprazole  Injectable 40 milliGRAM(s) IV Push every 24 hours    MEDICATIONS  (PRN):      ALLERGIES:  Allergies    ace inhibitors (Anaphylaxis)  caffeine (Rash)  cefepime (Rash)  chocolate (Rash)  high acid (Rash)  Tomatoes (Hives)    Intolerances        LABS:                        11.4   10.14 )-----------( 142      ( 05 Mar 2022 05:35 )             35.0     03-05    140  |  104  |  17  ----------------------------<  149<H>  4.5   |  29  |  0.51    Ca    8.0<L>      05 Mar 2022 16:30  Phos  3.3     03-05  Mg     1.9     03-05    TPro  x   /  Alb  x   /  TBili  0.5  /  DBili  x   /  AST  x   /  ALT  x   /  AlkPhos  x   03-04    PT/INR - ( 04 Mar 2022 06:39 )   PT: 15.1 sec;   INR: 1.27              CAPILLARY BLOOD GLUCOSE      POCT Blood Glucose.: 130 mg/dL (06 Mar 2022 05:27)      RADIOLOGY & ADDITIONAL TESTS: Reviewed.    PLAN:  OVERNIGHT EVENTS: Hypotensive to ordered 500cc bolus NSx2. Decreased FiO2 to 50%. later that night tachypneic distressed tachycardic biting on tube high PIP, 50 fent didnt work, 4mg versed overnight. happened again ~5 am, then 2mg versed worked.      SUBJECTIVE / INTERVAL HPI: Patient seen and examined at bedside.     VITAL SIGNS:  Vital Signs Last 24 Hrs  T(C): 37.5 (06 Mar 2022 05:16), Max: 38.1 (05 Mar 2022 09:00)  T(F): 99.5 (06 Mar 2022 05:16), Max: 100.6 (05 Mar 2022 09:00)  HR: 70 (06 Mar 2022 05:17) (60 - 93)  BP: 139/66 (06 Mar 2022 05:00) (69/41 - 155/65)  BP(mean): 95 (06 Mar 2022 05:00) (50 - 100)  RR: 22 (06 Mar 2022 05:17) (18 - 32)  SpO2: 95% (06 Mar 2022 05:17) (92% - 99%)    PHYSICAL EXAM:    Constitutional - Intubated and sedated  HEENT - NCAT, anicteric sclera, intubated, mucous membranes dry  Neck - Supple, no lymphadenopathy  Pulm - CTAB  Cardio -  RRR, S1/S2 present, no murmurs  GI -  Soft, NTND, BSx4, PEG tube in place   - Quintero in place  Extremities - Non-pitting edema b/l LE up to knees, 2+ pulses x4   Neurologic Exam: intubated and sedated. Not responsing to verbal stimuli           Skin: warm, dry, intact, no rashes or wounds,  Lines: Quintero, peripheral IVs    MEDICATIONS:  MEDICATIONS  (STANDING):  chlorhexidine 0.12% Liquid 15 milliLiter(s) Oral Mucosa every 12 hours  chlorhexidine 4% Liquid 1 Application(s) Topical <User Schedule>  ciprofloxacin   IVPB 400 milliGRAM(s) IV Intermittent every 8 hours  ciprofloxacin   IVPB      dexMEDEtomidine Infusion 0.2 MICROgram(s)/kG/Hr (2.84 mL/Hr) IV Continuous <Continuous>  dextrose 40% Gel 15 Gram(s) Oral once  dextrose 5%. 1000 milliLiter(s) (50 mL/Hr) IV Continuous <Continuous>  dextrose 5%. 1000 milliLiter(s) (100 mL/Hr) IV Continuous <Continuous>  dextrose 50% Injectable 25 Gram(s) IV Push once  dextrose 50% Injectable 12.5 Gram(s) IV Push once  dextrose 50% Injectable 25 Gram(s) IV Push once  enoxaparin Injectable 50 milliGRAM(s) SubCutaneous every 12 hours  glucagon  Injectable 1 milliGRAM(s) IntraMuscular once  insulin lispro (ADMELOG) corrective regimen sliding scale   SubCutaneous every 6 hours  midazolam Injectable 2 milliGRAM(s) IV Push once  norepinephrine Infusion 0.05 MICROgram(s)/kG/Min (5.33 mL/Hr) IV Continuous <Continuous>  pantoprazole  Injectable 40 milliGRAM(s) IV Push every 24 hours    MEDICATIONS  (PRN):      ALLERGIES:  Allergies    ace inhibitors (Anaphylaxis)  caffeine (Rash)  cefepime (Rash)  chocolate (Rash)  high acid (Rash)  Tomatoes (Hives)    Intolerances        LABS:                        11.4   10.14 )-----------( 142      ( 05 Mar 2022 05:35 )             35.0     03-05    140  |  104  |  17  ----------------------------<  149<H>  4.5   |  29  |  0.51    Ca    8.0<L>      05 Mar 2022 16:30  Phos  3.3     03-05  Mg     1.9     03-05    TPro  x   /  Alb  x   /  TBili  0.5  /  DBili  x   /  AST  x   /  ALT  x   /  AlkPhos  x   03-04    PT/INR - ( 04 Mar 2022 06:39 )   PT: 15.1 sec;   INR: 1.27              CAPILLARY BLOOD GLUCOSE      POCT Blood Glucose.: 130 mg/dL (06 Mar 2022 05:27)      RADIOLOGY & ADDITIONAL TESTS: Reviewed.    PLAN:  OVERNIGHT EVENTS: Hypotensive to ordered 500cc bolus NSx2. Decreased FiO2 to 50%. later that night tachypneic distressed tachycardic biting on tube high PIP, 50 fent didnt work, 4mg versed overnight. happened again ~5 am, then 2mg versed worked.      SUBJECTIVE / INTERVAL HPI: Patient seen and examined at bedside. Unable to participate in subjective    VITAL SIGNS:  Vital Signs Last 24 Hrs  T(C): 37.5 (06 Mar 2022 05:16), Max: 38.1 (05 Mar 2022 09:00)  T(F): 99.5 (06 Mar 2022 05:16), Max: 100.6 (05 Mar 2022 09:00)  HR: 70 (06 Mar 2022 05:17) (60 - 93)  BP: 139/66 (06 Mar 2022 05:00) (69/41 - 155/65)  BP(mean): 95 (06 Mar 2022 05:00) (50 - 100)  RR: 22 (06 Mar 2022 05:17) (18 - 32)  SpO2: 95% (06 Mar 2022 05:17) (92% - 99%)    PHYSICAL EXAM:    Constitutional - Intubated and sedated  HEENT - NCAT, anicteric sclera, intubated, mucous membranes dry  Neck - Supple, no lymphadenopathy  Pulm - Mechanical breath sounds. Breathing over ventilator 50% FIO2 PEEP 5 RR 16  Cardio -  RRR, S1/S2 present, no murmurs  GI -  Soft, NTND, BSx4, PEG tube in place   - Quintero in place  Extremities - Non-pitting edema b/l LE up to knees, 2+ pulses x4   Neurologic Exam: intubated and sedated. Not responding to verbal stimuli           Skin: warm, dry, intact, no rashes or wounds,  Lines: Quintero, peripheral IVs    MEDICATIONS:  MEDICATIONS  (STANDING):  chlorhexidine 0.12% Liquid 15 milliLiter(s) Oral Mucosa every 12 hours  chlorhexidine 4% Liquid 1 Application(s) Topical <User Schedule>  ciprofloxacin   IVPB 400 milliGRAM(s) IV Intermittent every 8 hours  ciprofloxacin   IVPB      dexMEDEtomidine Infusion 0.2 MICROgram(s)/kG/Hr (2.84 mL/Hr) IV Continuous <Continuous>  dextrose 40% Gel 15 Gram(s) Oral once  dextrose 5%. 1000 milliLiter(s) (50 mL/Hr) IV Continuous <Continuous>  dextrose 5%. 1000 milliLiter(s) (100 mL/Hr) IV Continuous <Continuous>  dextrose 50% Injectable 25 Gram(s) IV Push once  dextrose 50% Injectable 12.5 Gram(s) IV Push once  dextrose 50% Injectable 25 Gram(s) IV Push once  enoxaparin Injectable 50 milliGRAM(s) SubCutaneous every 12 hours  glucagon  Injectable 1 milliGRAM(s) IntraMuscular once  insulin lispro (ADMELOG) corrective regimen sliding scale   SubCutaneous every 6 hours  midazolam Injectable 2 milliGRAM(s) IV Push once  norepinephrine Infusion 0.05 MICROgram(s)/kG/Min (5.33 mL/Hr) IV Continuous <Continuous>  pantoprazole  Injectable 40 milliGRAM(s) IV Push every 24 hours    MEDICATIONS  (PRN):      ALLERGIES:  Allergies    ace inhibitors (Anaphylaxis)  caffeine (Rash)  cefepime (Rash)  chocolate (Rash)  high acid (Rash)  Tomatoes (Hives)    Intolerances        LABS:                        11.4   10.14 )-----------( 142      ( 05 Mar 2022 05:35 )             35.0     03-05    140  |  104  |  17  ----------------------------<  149<H>  4.5   |  29  |  0.51    Ca    8.0<L>      05 Mar 2022 16:30  Phos  3.3     03-05  Mg     1.9     03-05    TPro  x   /  Alb  x   /  TBili  0.5  /  DBili  x   /  AST  x   /  ALT  x   /  AlkPhos  x   03-04    PT/INR - ( 04 Mar 2022 06:39 )   PT: 15.1 sec;   INR: 1.27              CAPILLARY BLOOD GLUCOSE      POCT Blood Glucose.: 130 mg/dL (06 Mar 2022 05:27)      RADIOLOGY & ADDITIONAL TESTS: Reviewed.    PLAN:

## 2022-03-06 NOTE — PROGRESS NOTE ADULT - PROBLEM SELECTOR PLAN 1
- Likely secondary to aspiration event given similar history in conjunction with Chest X ray  - Sputum culture: pseudomonas  - Started on Cipro 400mg IV Q8  - Blood culture with no growth to date - Likely secondary to aspiration event given similar history in conjunction with Chest X ray  - Sputum culture: pseudomonas  - Cipro 400mg IV Q8  - Blood culture with no growth to date  - Start seroquel 25 BID

## 2022-03-06 NOTE — PROGRESS NOTE ADULT - ASSESSMENT
IMPRESSION:  Pneumonia secondary to Pseudomonas Aeruginosa.  Fever curve improved and FIO2 improving    Recommend:  1.  Continue Cipro 400 mg IV q8hrs x at least 7 days total.  Duration may need to be extended if patient still with signs/symptoms of infection    ID team 1 will sign off. Reconsult as needed

## 2022-03-07 LAB
ALBUMIN SERPL ELPH-MCNC: 3 G/DL — LOW (ref 3.3–5)
ALP SERPL-CCNC: 53 U/L — SIGNIFICANT CHANGE UP (ref 40–120)
ALT FLD-CCNC: 29 U/L — SIGNIFICANT CHANGE UP (ref 10–45)
ANION GAP SERPL CALC-SCNC: 9 MMOL/L — SIGNIFICANT CHANGE UP (ref 5–17)
AST SERPL-CCNC: 43 U/L — HIGH (ref 10–40)
BASOPHILS # BLD AUTO: 0.03 K/UL — SIGNIFICANT CHANGE UP (ref 0–0.2)
BASOPHILS NFR BLD AUTO: 0.6 % — SIGNIFICANT CHANGE UP (ref 0–2)
BILIRUB SERPL-MCNC: 0.3 MG/DL — SIGNIFICANT CHANGE UP (ref 0.2–1.2)
BLD GP AB SCN SERPL QL: NEGATIVE — SIGNIFICANT CHANGE UP
BUN SERPL-MCNC: 13 MG/DL — SIGNIFICANT CHANGE UP (ref 7–23)
CALCIUM SERPL-MCNC: 7.7 MG/DL — LOW (ref 8.4–10.5)
CHLORIDE SERPL-SCNC: 106 MMOL/L — SIGNIFICANT CHANGE UP (ref 96–108)
CO2 SERPL-SCNC: 29 MMOL/L — SIGNIFICANT CHANGE UP (ref 22–31)
CREAT SERPL-MCNC: 0.51 MG/DL — SIGNIFICANT CHANGE UP (ref 0.5–1.3)
EGFR: 98 ML/MIN/1.73M2 — SIGNIFICANT CHANGE UP
EOSINOPHIL # BLD AUTO: 0.24 K/UL — SIGNIFICANT CHANGE UP (ref 0–0.5)
EOSINOPHIL NFR BLD AUTO: 4.8 % — SIGNIFICANT CHANGE UP (ref 0–6)
GLUCOSE BLDC GLUCOMTR-MCNC: 116 MG/DL — HIGH (ref 70–99)
GLUCOSE BLDC GLUCOMTR-MCNC: 120 MG/DL — HIGH (ref 70–99)
GLUCOSE BLDC GLUCOMTR-MCNC: 136 MG/DL — HIGH (ref 70–99)
GLUCOSE SERPL-MCNC: 147 MG/DL — HIGH (ref 70–99)
HCT VFR BLD CALC: 32.5 % — LOW (ref 34.5–45)
HGB BLD-MCNC: 10.1 G/DL — LOW (ref 11.5–15.5)
IMM GRANULOCYTES NFR BLD AUTO: 0.6 % — SIGNIFICANT CHANGE UP (ref 0–1.5)
LYMPHOCYTES # BLD AUTO: 1.27 K/UL — SIGNIFICANT CHANGE UP (ref 1–3.3)
LYMPHOCYTES # BLD AUTO: 25.6 % — SIGNIFICANT CHANGE UP (ref 13–44)
MAGNESIUM SERPL-MCNC: 2 MG/DL — SIGNIFICANT CHANGE UP (ref 1.6–2.6)
MCHC RBC-ENTMCNC: 30.8 PG — SIGNIFICANT CHANGE UP (ref 27–34)
MCHC RBC-ENTMCNC: 31.1 GM/DL — LOW (ref 32–36)
MCV RBC AUTO: 99.1 FL — SIGNIFICANT CHANGE UP (ref 80–100)
MONOCYTES # BLD AUTO: 0.55 K/UL — SIGNIFICANT CHANGE UP (ref 0–0.9)
MONOCYTES NFR BLD AUTO: 11.1 % — SIGNIFICANT CHANGE UP (ref 2–14)
NEUTROPHILS # BLD AUTO: 2.85 K/UL — SIGNIFICANT CHANGE UP (ref 1.8–7.4)
NEUTROPHILS NFR BLD AUTO: 57.3 % — SIGNIFICANT CHANGE UP (ref 43–77)
NRBC # BLD: 0 /100 WBCS — SIGNIFICANT CHANGE UP (ref 0–0)
PHOSPHATE SERPL-MCNC: 2 MG/DL — LOW (ref 2.5–4.5)
PLATELET # BLD AUTO: 157 K/UL — SIGNIFICANT CHANGE UP (ref 150–400)
POTASSIUM SERPL-MCNC: 4.4 MMOL/L — SIGNIFICANT CHANGE UP (ref 3.5–5.3)
POTASSIUM SERPL-SCNC: 4.4 MMOL/L — SIGNIFICANT CHANGE UP (ref 3.5–5.3)
PROT SERPL-MCNC: 5.8 G/DL — LOW (ref 6–8.3)
RBC # BLD: 3.28 M/UL — LOW (ref 3.8–5.2)
RBC # FLD: 13.2 % — SIGNIFICANT CHANGE UP (ref 10.3–14.5)
RH IG SCN BLD-IMP: POSITIVE — SIGNIFICANT CHANGE UP
SODIUM SERPL-SCNC: 144 MMOL/L — SIGNIFICANT CHANGE UP (ref 135–145)
WBC # BLD: 4.97 K/UL — SIGNIFICANT CHANGE UP (ref 3.8–10.5)
WBC # FLD AUTO: 4.97 K/UL — SIGNIFICANT CHANGE UP (ref 3.8–10.5)

## 2022-03-07 PROCEDURE — 99233 SBSQ HOSP IP/OBS HIGH 50: CPT | Mod: GC

## 2022-03-07 RX ORDER — FUROSEMIDE 40 MG
40 TABLET ORAL ONCE
Refills: 0 | Status: COMPLETED | OUTPATIENT
Start: 2022-03-07 | End: 2022-03-07

## 2022-03-07 RX ORDER — ACETAMINOPHEN 500 MG
1000 TABLET ORAL ONCE
Refills: 0 | Status: COMPLETED | OUTPATIENT
Start: 2022-03-07 | End: 2022-03-07

## 2022-03-07 RX ORDER — SODIUM,POTASSIUM PHOSPHATES 278-250MG
2 POWDER IN PACKET (EA) ORAL ONCE
Refills: 0 | Status: COMPLETED | OUTPATIENT
Start: 2022-03-07 | End: 2022-03-07

## 2022-03-07 RX ORDER — IPRATROPIUM/ALBUTEROL SULFATE 18-103MCG
3 AEROSOL WITH ADAPTER (GRAM) INHALATION ONCE
Refills: 0 | Status: DISCONTINUED | OUTPATIENT
Start: 2022-03-07 | End: 2022-03-07

## 2022-03-07 RX ORDER — MIDAZOLAM HYDROCHLORIDE 1 MG/ML
1 INJECTION, SOLUTION INTRAMUSCULAR; INTRAVENOUS ONCE
Refills: 0 | Status: DISCONTINUED | OUTPATIENT
Start: 2022-03-07 | End: 2022-03-07

## 2022-03-07 RX ADMIN — Medication 200 MILLIGRAM(S): at 05:12

## 2022-03-07 RX ADMIN — DEXMEDETOMIDINE HYDROCHLORIDE IN 0.9% SODIUM CHLORIDE 2.84 MICROGRAM(S)/KG/HR: 4 INJECTION INTRAVENOUS at 15:39

## 2022-03-07 RX ADMIN — PANTOPRAZOLE SODIUM 40 MILLIGRAM(S): 20 TABLET, DELAYED RELEASE ORAL at 05:12

## 2022-03-07 RX ADMIN — CHLORHEXIDINE GLUCONATE 15 MILLILITER(S): 213 SOLUTION TOPICAL at 05:12

## 2022-03-07 RX ADMIN — Medication 1000 MILLIGRAM(S): at 17:44

## 2022-03-07 RX ADMIN — CHLORHEXIDINE GLUCONATE 15 MILLILITER(S): 213 SOLUTION TOPICAL at 17:22

## 2022-03-07 RX ADMIN — QUETIAPINE FUMARATE 50 MILLIGRAM(S): 200 TABLET, FILM COATED ORAL at 08:59

## 2022-03-07 RX ADMIN — ENOXAPARIN SODIUM 50 MILLIGRAM(S): 100 INJECTION SUBCUTANEOUS at 15:04

## 2022-03-07 RX ADMIN — MIDAZOLAM HYDROCHLORIDE 1 MILLIGRAM(S): 1 INJECTION, SOLUTION INTRAMUSCULAR; INTRAVENOUS at 05:05

## 2022-03-07 RX ADMIN — DEXMEDETOMIDINE HYDROCHLORIDE IN 0.9% SODIUM CHLORIDE 2.84 MICROGRAM(S)/KG/HR: 4 INJECTION INTRAVENOUS at 03:00

## 2022-03-07 RX ADMIN — Medication 40 MILLIGRAM(S): at 09:00

## 2022-03-07 RX ADMIN — Medication 400 MILLIGRAM(S): at 17:22

## 2022-03-07 RX ADMIN — Medication 2 PACKET(S): at 12:06

## 2022-03-07 RX ADMIN — QUETIAPINE FUMARATE 50 MILLIGRAM(S): 200 TABLET, FILM COATED ORAL at 22:05

## 2022-03-07 RX ADMIN — Medication 200 MILLIGRAM(S): at 13:03

## 2022-03-07 RX ADMIN — Medication 200 MILLIGRAM(S): at 22:04

## 2022-03-07 RX ADMIN — ENOXAPARIN SODIUM 50 MILLIGRAM(S): 100 INJECTION SUBCUTANEOUS at 03:00

## 2022-03-07 NOTE — PROGRESS NOTE ADULT - ATTENDING COMMENTS
Patient seen and examined with house-staff during bedside rounds.  Resident note read, including vitals, physical findings, laboratory data, and radiological reports.   Revisions included below.  Direct personal management at bed side and extensive interpretation of the data.  Plan was outlined and discussed in details with the housestaff.  Decision making of high complexity  Action taken for acute disease activity to reflect the level of care provided:  - medication reconciliation  - review laboratory data  The patient has stable she is more alert with agitation.  The patient is getting as needed Versed and will try to hold that was started Seroquel yesterday.  The secretions are minimal.  Trial of PSV.  Continue tube feed .  off pressors.  renal function is stable given lasix to decrease 3 rd space

## 2022-03-07 NOTE — PROGRESS NOTE ADULT - PROBLEM SELECTOR PLAN 3
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

## 2022-03-07 NOTE — PROGRESS NOTE ADULT - PROBLEM SELECTOR PLAN 1
- Likely secondary to aspiration event given similar history in conjunction with Chest X ray  - Sputum culture: pseudomonas  - Cipro 400mg IV Q8  - Blood culture with no growth to date  - Start seroquel 25 BID - Likely secondary to aspiration event given similar history in conjunction with Chest X ray  - Sputum culture: pseudomonas  - c/w Cipro 400mg IV Q8  - Blood culture with no growth to date  - c/w Seroquel 50mg Q12  - evidence of small effusions b/l on POCUS  - Lasix 40mg IV given 3/7. Monitor urine output

## 2022-03-07 NOTE — PROGRESS NOTE ADULT - SUBJECTIVE AND OBJECTIVE BOX
***Incomplete*** Overnight: Pt became anxious, tachypneic, and asynchronous with ventilator ~5am, given 1mg Versed with resolution. No other events.    Subjective: Pt seen and examined at bedside. Intubated but awake and alert. Resting comfortably, following simple commands, answering yes/no questions. Denies pain except for left calf. Pt has not had a BM since prior to admission.    Objective:  ICU Vital Signs Last 24 Hrs  T(C): 37.8 (07 Mar 2022 09:06), Max: 37.8 (07 Mar 2022 09:06)  T(F): 100 (07 Mar 2022 09:06), Max: 100 (07 Mar 2022 09:06)  HR: 72 (07 Mar 2022 09:00) (57 - 96)  BP: 108/54 (07 Mar 2022 09:00) (83/50 - 121/58)  BP(mean): 77 (07 Mar 2022 09:00) (62 - 83)  ABP: --  ABP(mean): --  RR: 19 (07 Mar 2022 09:00) (17 - 40)  SpO2: 93% (07 Mar 2022 09:00) (90% - 100%)    I&O's Detail:  06 Mar 2022 07:01  -  07 Mar 2022 07:00  --------------------------------------------------------  IN:    Dexmedetomidine: 183.7 mL    Enteral Tube Flush: 500 mL    IV PiggyBack: 400 mL    IV PiggyBack: 581 mL    IV PiggyBack: 200 mL    Jevity 1.2: 1200 mL    Norepinephrine: 8 mL  Total IN: 3072.7 mL    OUT:    Indwelling Catheter - Urethral (mL): 2140 mL  Total OUT: 2140 mL    Total NET: 932.7 mL    07 Mar 2022 07:01  -  07 Mar 2022 10:18  --------------------------------------------------------  IN:    Dexmedetomidine: 34 mL    Enteral Tube Flush: 200 mL    Jevity 1.2: 150 mL  Total IN: 384 mL    OUT:    Indwelling Catheter - Urethral (mL): 925 mL  Total OUT: 925 mL    Total NET: -541 mL    PHYSICAL EXAM:  Constitutional - Intubated, awake, alert, resting comfortably  HEENT - NCAT, anicteric sclera, intubated, mucous membranes moist  Neck - Supple, no lymphadenopathy  Pulm - Poor chest expansion, shallow breaths. Upper lobes CTA b/l, coarse breath sounds in lower lobes b/l. Ventilator on VCAC 40% FIO2  PEEP 5 RR 16  Cardio -  RRR, S1/S2 present, no murmurs  GI -  Soft, NT, mildly distended, BSx4, PEG tube in place   - Quintero in place, making urine  Extremities - Non-pitting edema b/l LE up to knees, edema in hands, 2+ pulses x4   Neurologic Exam: intubated, awake & alert, answering yes/no questions, follows simple commands.        Skin: warm, dry, intact, edema in all 4 extremities, no rashes or wounds  Lines: Quintero, peripheral IVs    MEDICATIONS  (STANDING):  chlorhexidine 0.12% Liquid 15 milliLiter(s) Oral Mucosa every 12 hours  chlorhexidine 4% Liquid 1 Application(s) Topical <User Schedule>  ciprofloxacin   IVPB      ciprofloxacin   IVPB 400 milliGRAM(s) IV Intermittent every 8 hours  dexMEDEtomidine Infusion 0.2 MICROgram(s)/kG/Hr (2.84 mL/Hr) IV Continuous <Continuous>  dextrose 40% Gel 15 Gram(s) Oral once  dextrose 5%. 1000 milliLiter(s) (50 mL/Hr) IV Continuous <Continuous>  dextrose 5%. 1000 milliLiter(s) (100 mL/Hr) IV Continuous <Continuous>  dextrose 50% Injectable 25 Gram(s) IV Push once  dextrose 50% Injectable 25 Gram(s) IV Push once  dextrose 50% Injectable 12.5 Gram(s) IV Push once  enoxaparin Injectable 50 milliGRAM(s) SubCutaneous every 12 hours  glucagon  Injectable 1 milliGRAM(s) IntraMuscular once  insulin lispro (ADMELOG) corrective regimen sliding scale   SubCutaneous every 6 hours  pantoprazole  Injectable 40 milliGRAM(s) IV Push every 24 hours  QUEtiapine 50 milliGRAM(s) Oral every 12 hours    MEDICATIONS  (PRN):    DIET ORDER:  Diet, NPO with Tube Feed:   Tube Feeding Modality: Gastrostomy  Jevity 1.2 Doug (JEVITY1.2RTH)  Total Volume for 24 Hours (mL): 1200  Total Number of Cans: 5  Continuous  Starting Tube Feed Rate mL per Hour: 30     Every 4 hours  Until Goal Tube Feed Rate (mL per Hour): 50  Tube Feed Duration (in Hours): 24  Tube Feed Start Time: 11:00  Liquid Protein Supplement     Qty per Day:  1 (03-03-22 @ 10:48) [Active]    Diet, NPO with Tube Feed:   Tube Feeding Modality: Gastrostomy  Jevity 1.2 Doug (JEVITY1.2RTH)  Total Volume for 24 Hours (mL): 1200  Total Number of Cans: 5  Continuous  Starting Tube Feed Rate mL per Hour: 30     Every 4 hours  Until Goal Tube Feed Rate (mL per Hour): 50  Tube Feed Duration (in Hours): 24  Tube Feed Start Time: 11:00  Liquid Protein Supplement     Qty per Day:  1 (03-03-22 @ 10:48)    OTHER ORDERS (30 days):  Enema:     Frequency:  daily (03-07-22 @ 08:26)    LABS:                     10.1   4.97  )-----------( 157      ( 07 Mar 2022 05:37 )             32.5   03-07    144  |  106  |  13  ----------------------------<  147<H>  4.4   |  29  |  0.51    Ca    7.7<L>      07 Mar 2022 05:37  Phos  2.0     03-07  Mg     2.0     03-07    TPro  5.8<L>  /  Alb  3.0<L>  /  TBili  0.3  /  DBili  x   /  AST  43<H>  /  ALT  29  /  AlkPhos  53  03-07     Hospital Course  (...)    Overnight: Pt became anxious, tachypneic, and asynchronous with ventilator ~5am, given 1mg Versed with resolution. No other events.    Subjective: Pt seen and examined at bedside. Intubated but awake and alert. Resting comfortably, following simple commands, answering yes/no questions. Denies pain except for left calf. Pt has not had a BM since prior to admission.    Objective:  ICU Vital Signs Last 24 Hrs  T(C): 37.8 (07 Mar 2022 09:06), Max: 37.8 (07 Mar 2022 09:06)  T(F): 100 (07 Mar 2022 09:06), Max: 100 (07 Mar 2022 09:06)  HR: 72 (07 Mar 2022 09:00) (57 - 96)  BP: 108/54 (07 Mar 2022 09:00) (83/50 - 121/58)  BP(mean): 77 (07 Mar 2022 09:00) (62 - 83)  ABP: --  ABP(mean): --  RR: 19 (07 Mar 2022 09:00) (17 - 40)  SpO2: 93% (07 Mar 2022 09:00) (90% - 100%)    I&O's Detail:  06 Mar 2022 07:01  -  07 Mar 2022 07:00  --------------------------------------------------------  IN:    Dexmedetomidine: 183.7 mL    Enteral Tube Flush: 500 mL    IV PiggyBack: 400 mL    IV PiggyBack: 581 mL    IV PiggyBack: 200 mL    Jevity 1.2: 1200 mL    Norepinephrine: 8 mL  Total IN: 3072.7 mL    OUT:    Indwelling Catheter - Urethral (mL): 2140 mL  Total OUT: 2140 mL    Total NET: 932.7 mL    07 Mar 2022 07:01  -  07 Mar 2022 10:18  --------------------------------------------------------  IN:    Dexmedetomidine: 34 mL    Enteral Tube Flush: 200 mL    Jevity 1.2: 150 mL  Total IN: 384 mL    OUT:    Indwelling Catheter - Urethral (mL): 925 mL  Total OUT: 925 mL    Total NET: -541 mL    PHYSICAL EXAM:  Constitutional - Intubated, awake, alert, resting comfortably  HEENT - NCAT, anicteric sclera, intubated, mucous membranes moist  Neck - Supple, no lymphadenopathy  Pulm - Poor chest expansion, shallow breaths. Upper lobes CTA b/l, coarse breath sounds in lower lobes b/l. Ventilator on VCAC 40% FIO2  PEEP 5 RR 16  Cardio -  RRR, S1/S2 present, no murmurs  GI -  Soft, NT, mildly distended, BSx4, PEG tube in place   - Quintero in place, making urine  Extremities - Non-pitting edema b/l LE up to knees, edema in hands, 2+ pulses x4   Neurologic Exam: intubated, awake & alert, answering yes/no questions, follows simple commands.        Skin: warm, dry, intact, edema in all 4 extremities, no rashes or wounds  Lines: Quintero, peripheral IVs    MEDICATIONS  (STANDING):  chlorhexidine 0.12% Liquid 15 milliLiter(s) Oral Mucosa every 12 hours  chlorhexidine 4% Liquid 1 Application(s) Topical <User Schedule>  ciprofloxacin   IVPB      ciprofloxacin   IVPB 400 milliGRAM(s) IV Intermittent every 8 hours  dexMEDEtomidine Infusion 0.2 MICROgram(s)/kG/Hr (2.84 mL/Hr) IV Continuous <Continuous>  dextrose 40% Gel 15 Gram(s) Oral once  dextrose 5%. 1000 milliLiter(s) (50 mL/Hr) IV Continuous <Continuous>  dextrose 5%. 1000 milliLiter(s) (100 mL/Hr) IV Continuous <Continuous>  dextrose 50% Injectable 25 Gram(s) IV Push once  dextrose 50% Injectable 25 Gram(s) IV Push once  dextrose 50% Injectable 12.5 Gram(s) IV Push once  enoxaparin Injectable 50 milliGRAM(s) SubCutaneous every 12 hours  glucagon  Injectable 1 milliGRAM(s) IntraMuscular once  insulin lispro (ADMELOG) corrective regimen sliding scale   SubCutaneous every 6 hours  pantoprazole  Injectable 40 milliGRAM(s) IV Push every 24 hours  QUEtiapine 50 milliGRAM(s) Oral every 12 hours    MEDICATIONS  (PRN):    DIET ORDER:  Diet, NPO with Tube Feed:   Tube Feeding Modality: Gastrostomy  Jevity 1.2 Doug (JEVITY1.2RTH)  Total Volume for 24 Hours (mL): 1200  Total Number of Cans: 5  Continuous  Starting Tube Feed Rate mL per Hour: 30     Every 4 hours  Until Goal Tube Feed Rate (mL per Hour): 50  Tube Feed Duration (in Hours): 24  Tube Feed Start Time: 11:00  Liquid Protein Supplement     Qty per Day:  1 (03-03-22 @ 10:48) [Active]    Diet, NPO with Tube Feed:   Tube Feeding Modality: Gastrostomy  Jevity 1.2 Doug (JEVITY1.2RTH)  Total Volume for 24 Hours (mL): 1200  Total Number of Cans: 5  Continuous  Starting Tube Feed Rate mL per Hour: 30     Every 4 hours  Until Goal Tube Feed Rate (mL per Hour): 50  Tube Feed Duration (in Hours): 24  Tube Feed Start Time: 11:00  Liquid Protein Supplement     Qty per Day:  1 (03-03-22 @ 10:48)    OTHER ORDERS (30 days):  Enema:     Frequency:  daily (03-07-22 @ 08:26)    LABS:                     10.1   4.97  )-----------( 157      ( 07 Mar 2022 05:37 )             32.5   03-07    144  |  106  |  13  ----------------------------<  147<H>  4.4   |  29  |  0.51    Ca    7.7<L>      07 Mar 2022 05:37  Phos  2.0     03-07  Mg     2.0     03-07    TPro  5.8<L>  /  Alb  3.0<L>  /  TBili  0.3  /  DBili  x   /  AST  43<H>  /  ALT  29  /  AlkPhos  53  03-07     Hospital Course  Patient is a 74 year old female with a PMH of cerebral 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 Patient has a history of prior admission in 2020 for aspiration pneumoniarequiring intubation. Patient presented from her assisted living facility in acute hypoxemic respiratory failure. Patient was found by assisted living facility staff with difficulty breathing. EMS noted that the patient was in respiratory distress upon arrival with SaO2 84% on RA. Patient was intubated in the field for airway protection. "Thick viscous" secretions were noticed in her oropharynx during intubation. Acute hypoxemic respiratory failure likely 2/2 aspiration pneumonia. Patient started on Meropenem due to previous hospitalization with Pseudomonas pneumonia requiring Meropenem. Antibiotic switched to Ciprofloxacin for adequate coverage of Pseudomonas pneumonia. One unsuccessful attempt was made at extubation. Patient started on Seroquel due to persistent anxiety and agitation making extubation increasingly difficult.     Overnight: Pt became anxious, tachypneic, and asynchronous with ventilator ~5am, given 1mg Versed with resolution. No other events.    Subjective: Pt seen and examined at bedside. Intubated but awake and alert. Resting comfortably, following simple commands, answering yes/no questions. Denies pain except for left calf. Pt has not had a BM since prior to admission.    Objective:  ICU Vital Signs Last 24 Hrs  T(C): 37.8 (07 Mar 2022 09:06), Max: 37.8 (07 Mar 2022 09:06)  T(F): 100 (07 Mar 2022 09:06), Max: 100 (07 Mar 2022 09:06)  HR: 72 (07 Mar 2022 09:00) (57 - 96)  BP: 108/54 (07 Mar 2022 09:00) (83/50 - 121/58)  BP(mean): 77 (07 Mar 2022 09:00) (62 - 83)  ABP: --  ABP(mean): --  RR: 19 (07 Mar 2022 09:00) (17 - 40)  SpO2: 93% (07 Mar 2022 09:00) (90% - 100%)    I&O's Detail:  06 Mar 2022 07:01  -  07 Mar 2022 07:00  --------------------------------------------------------  IN:    Dexmedetomidine: 183.7 mL    Enteral Tube Flush: 500 mL    IV PiggyBack: 400 mL    IV PiggyBack: 581 mL    IV PiggyBack: 200 mL    Jevity 1.2: 1200 mL    Norepinephrine: 8 mL  Total IN: 3072.7 mL    OUT:    Indwelling Catheter - Urethral (mL): 2140 mL  Total OUT: 2140 mL    Total NET: 932.7 mL    07 Mar 2022 07:01  -  07 Mar 2022 10:18  --------------------------------------------------------  IN:    Dexmedetomidine: 34 mL    Enteral Tube Flush: 200 mL    Jevity 1.2: 150 mL  Total IN: 384 mL    OUT:    Indwelling Catheter - Urethral (mL): 925 mL  Total OUT: 925 mL    Total NET: -541 mL    PHYSICAL EXAM:  Constitutional - Intubated, awake, alert, resting comfortably  HEENT - NCAT, anicteric sclera, intubated, mucous membranes moist  Neck - Supple, no lymphadenopathy  Pulm - Poor chest expansion, shallow breaths. Upper lobes CTA b/l, coarse breath sounds in lower lobes b/l. Ventilator on VCAC 40% FIO2  PEEP 5 RR 16  Cardio -  RRR, S1/S2 present, no murmurs  GI -  Soft, NT, mildly distended, BSx4, PEG tube in place   - Quintero in place, making urine  Extremities - Non-pitting edema b/l LE up to knees, edema in hands, 2+ pulses x4   Neurologic Exam: intubated, awake & alert, answering yes/no questions, follows simple commands.        Skin: warm, dry, intact, edema in all 4 extremities, no rashes or wounds  Lines: Quintero, peripheral IVs    MEDICATIONS  (STANDING):  chlorhexidine 0.12% Liquid 15 milliLiter(s) Oral Mucosa every 12 hours  chlorhexidine 4% Liquid 1 Application(s) Topical <User Schedule>  ciprofloxacin   IVPB      ciprofloxacin   IVPB 400 milliGRAM(s) IV Intermittent every 8 hours  dexMEDEtomidine Infusion 0.2 MICROgram(s)/kG/Hr (2.84 mL/Hr) IV Continuous <Continuous>  dextrose 40% Gel 15 Gram(s) Oral once  dextrose 5%. 1000 milliLiter(s) (50 mL/Hr) IV Continuous <Continuous>  dextrose 5%. 1000 milliLiter(s) (100 mL/Hr) IV Continuous <Continuous>  dextrose 50% Injectable 25 Gram(s) IV Push once  dextrose 50% Injectable 25 Gram(s) IV Push once  dextrose 50% Injectable 12.5 Gram(s) IV Push once  enoxaparin Injectable 50 milliGRAM(s) SubCutaneous every 12 hours  glucagon  Injectable 1 milliGRAM(s) IntraMuscular once  insulin lispro (ADMELOG) corrective regimen sliding scale   SubCutaneous every 6 hours  pantoprazole  Injectable 40 milliGRAM(s) IV Push every 24 hours  QUEtiapine 50 milliGRAM(s) Oral every 12 hours    MEDICATIONS  (PRN):    DIET ORDER:  Diet, NPO with Tube Feed:   Tube Feeding Modality: Gastrostomy  Jevity 1.2 Doug (JEVITY1.2RTH)  Total Volume for 24 Hours (mL): 1200  Total Number of Cans: 5  Continuous  Starting Tube Feed Rate mL per Hour: 30     Every 4 hours  Until Goal Tube Feed Rate (mL per Hour): 50  Tube Feed Duration (in Hours): 24  Tube Feed Start Time: 11:00  Liquid Protein Supplement     Qty per Day:  1 (03-03-22 @ 10:48) [Active]    Diet, NPO with Tube Feed:   Tube Feeding Modality: Gastrostomy  Jevity 1.2 Doug (JEVITY1.2RTH)  Total Volume for 24 Hours (mL): 1200  Total Number of Cans: 5  Continuous  Starting Tube Feed Rate mL per Hour: 30     Every 4 hours  Until Goal Tube Feed Rate (mL per Hour): 50  Tube Feed Duration (in Hours): 24  Tube Feed Start Time: 11:00  Liquid Protein Supplement     Qty per Day:  1 (03-03-22 @ 10:48)    OTHER ORDERS (30 days):  Enema:     Frequency:  daily (03-07-22 @ 08:26)    LABS:                     10.1   4.97  )-----------( 157      ( 07 Mar 2022 05:37 )             32.5   03-07    144  |  106  |  13  ----------------------------<  147<H>  4.4   |  29  |  0.51    Ca    7.7<L>      07 Mar 2022 05:37  Phos  2.0     03-07  Mg     2.0     03-07    TPro  5.8<L>  /  Alb  3.0<L>  /  TBili  0.3  /  DBili  x   /  AST  43<H>  /  ALT  29  /  AlkPhos  53  03-07

## 2022-03-07 NOTE — PROGRESS NOTE ADULT - PROBLEM SELECTOR PLAN 6
- PEG tube in place  - resume tube feeds - PEG tube in place  - resume tube feeds  - no BM since prior to admission.  - Daily enema starting today 3/7. N: Jevity 1.2 at 50cc/hr  E: PRN  DVT: Lovenox therapeutic  C: Full Code, pending goals of care conversation  D: MICU

## 2022-03-07 NOTE — PROGRESS NOTE ADULT - PROBLEM SELECTOR PLAN 4
Persistent hypophosphatemia   -F/u urine Phos and creatinine Persistent hypophosphatemia.  -F/u urine Phos and creatinine  -Serum Phos 2.0, replete with sodium phosphate IV? Persistent hypophosphatemia.  -F/u urine Phos and creatinine  -Serum Phos 2.0, Phos-NaK ordered - functional quadriplegia, health aide at bedside reports at baseline she is able to follow commands and communicate "when she wants to"

## 2022-03-07 NOTE — PROGRESS NOTE ADULT - ASSESSMENT
74 F with PMHx of CP with functional quadriplegia, DVT/PE on eliquis, GERD, chronic dysphagia with PEG, thoracic spine fusion admitted for acute hypoxic respiratory failure found to have pseudomonal PNA cb periods of agitation/anxiety preventing SBT

## 2022-03-07 NOTE — PROGRESS NOTE ADULT - PROBLEM SELECTOR PLAN 5
- functional quadriplegia, health aide at bedside reports at baseline she is able to follow commands and communicate "when she wants to" - PEG tube in place  - no BM since prior to admission  - Daily enema starting today 3/7

## 2022-03-08 DIAGNOSIS — N39.0 URINARY TRACT INFECTION, SITE NOT SPECIFIED: ICD-10-CM

## 2022-03-08 LAB
ANION GAP SERPL CALC-SCNC: 7 MMOL/L — SIGNIFICANT CHANGE UP (ref 5–17)
BASOPHILS # BLD AUTO: 0.03 K/UL — SIGNIFICANT CHANGE UP (ref 0–0.2)
BASOPHILS NFR BLD AUTO: 0.5 % — SIGNIFICANT CHANGE UP (ref 0–2)
BUN SERPL-MCNC: 14 MG/DL — SIGNIFICANT CHANGE UP (ref 7–23)
CALCIUM SERPL-MCNC: 8.5 MG/DL — SIGNIFICANT CHANGE UP (ref 8.4–10.5)
CHLORIDE SERPL-SCNC: 104 MMOL/L — SIGNIFICANT CHANGE UP (ref 96–108)
CO2 SERPL-SCNC: 30 MMOL/L — SIGNIFICANT CHANGE UP (ref 22–31)
CREAT SERPL-MCNC: 0.52 MG/DL — SIGNIFICANT CHANGE UP (ref 0.5–1.3)
CULTURE RESULTS: SIGNIFICANT CHANGE UP
CULTURE RESULTS: SIGNIFICANT CHANGE UP
EGFR: 97 ML/MIN/1.73M2 — SIGNIFICANT CHANGE UP
EOSINOPHIL # BLD AUTO: 0.22 K/UL — SIGNIFICANT CHANGE UP (ref 0–0.5)
EOSINOPHIL NFR BLD AUTO: 3.7 % — SIGNIFICANT CHANGE UP (ref 0–6)
GLUCOSE BLDC GLUCOMTR-MCNC: 111 MG/DL — HIGH (ref 70–99)
GLUCOSE BLDC GLUCOMTR-MCNC: 117 MG/DL — HIGH (ref 70–99)
GLUCOSE BLDC GLUCOMTR-MCNC: 89 MG/DL — SIGNIFICANT CHANGE UP (ref 70–99)
GLUCOSE BLDC GLUCOMTR-MCNC: 91 MG/DL — SIGNIFICANT CHANGE UP (ref 70–99)
GLUCOSE SERPL-MCNC: 134 MG/DL — HIGH (ref 70–99)
HCT VFR BLD CALC: 34.7 % — SIGNIFICANT CHANGE UP (ref 34.5–45)
HGB BLD-MCNC: 10.6 G/DL — LOW (ref 11.5–15.5)
IMM GRANULOCYTES NFR BLD AUTO: 0.5 % — SIGNIFICANT CHANGE UP (ref 0–1.5)
LYMPHOCYTES # BLD AUTO: 1.34 K/UL — SIGNIFICANT CHANGE UP (ref 1–3.3)
LYMPHOCYTES # BLD AUTO: 22.5 % — SIGNIFICANT CHANGE UP (ref 13–44)
MAGNESIUM SERPL-MCNC: 1.8 MG/DL — SIGNIFICANT CHANGE UP (ref 1.6–2.6)
MCHC RBC-ENTMCNC: 30.1 PG — SIGNIFICANT CHANGE UP (ref 27–34)
MCHC RBC-ENTMCNC: 30.5 GM/DL — LOW (ref 32–36)
MCV RBC AUTO: 98.6 FL — SIGNIFICANT CHANGE UP (ref 80–100)
MONOCYTES # BLD AUTO: 0.61 K/UL — SIGNIFICANT CHANGE UP (ref 0–0.9)
MONOCYTES NFR BLD AUTO: 10.2 % — SIGNIFICANT CHANGE UP (ref 2–14)
NEUTROPHILS # BLD AUTO: 3.73 K/UL — SIGNIFICANT CHANGE UP (ref 1.8–7.4)
NEUTROPHILS NFR BLD AUTO: 62.6 % — SIGNIFICANT CHANGE UP (ref 43–77)
NRBC # BLD: 0 /100 WBCS — SIGNIFICANT CHANGE UP (ref 0–0)
PHOSPHATE SERPL-MCNC: 3 MG/DL — SIGNIFICANT CHANGE UP (ref 2.5–4.5)
PLATELET # BLD AUTO: 191 K/UL — SIGNIFICANT CHANGE UP (ref 150–400)
POTASSIUM SERPL-MCNC: 4.2 MMOL/L — SIGNIFICANT CHANGE UP (ref 3.5–5.3)
POTASSIUM SERPL-SCNC: 4.2 MMOL/L — SIGNIFICANT CHANGE UP (ref 3.5–5.3)
RBC # BLD: 3.52 M/UL — LOW (ref 3.8–5.2)
RBC # FLD: 13.1 % — SIGNIFICANT CHANGE UP (ref 10.3–14.5)
SODIUM SERPL-SCNC: 141 MMOL/L — SIGNIFICANT CHANGE UP (ref 135–145)
SPECIMEN SOURCE: SIGNIFICANT CHANGE UP
SPECIMEN SOURCE: SIGNIFICANT CHANGE UP
WBC # BLD: 5.96 K/UL — SIGNIFICANT CHANGE UP (ref 3.8–10.5)
WBC # FLD AUTO: 5.96 K/UL — SIGNIFICANT CHANGE UP (ref 3.8–10.5)

## 2022-03-08 PROCEDURE — 99291 CRITICAL CARE FIRST HOUR: CPT

## 2022-03-08 PROCEDURE — 99223 1ST HOSP IP/OBS HIGH 75: CPT

## 2022-03-08 PROCEDURE — 99497 ADVNCD CARE PLAN 30 MIN: CPT | Mod: 25

## 2022-03-08 PROCEDURE — 99498 ADVNCD CARE PLAN ADDL 30 MIN: CPT

## 2022-03-08 PROCEDURE — 99358 PROLONG SERVICE W/O CONTACT: CPT | Mod: NC

## 2022-03-08 RX ORDER — FUROSEMIDE 40 MG
40 TABLET ORAL ONCE
Refills: 0 | Status: COMPLETED | OUTPATIENT
Start: 2022-03-08 | End: 2022-03-08

## 2022-03-08 RX ORDER — ACETAMINOPHEN 500 MG
1000 TABLET ORAL ONCE
Refills: 0 | Status: COMPLETED | OUTPATIENT
Start: 2022-03-08 | End: 2022-03-08

## 2022-03-08 RX ADMIN — Medication 200 MILLIGRAM(S): at 05:14

## 2022-03-08 RX ADMIN — Medication 200 MILLIGRAM(S): at 22:36

## 2022-03-08 RX ADMIN — CHLORHEXIDINE GLUCONATE 15 MILLILITER(S): 213 SOLUTION TOPICAL at 05:16

## 2022-03-08 RX ADMIN — QUETIAPINE FUMARATE 50 MILLIGRAM(S): 200 TABLET, FILM COATED ORAL at 09:13

## 2022-03-08 RX ADMIN — ENOXAPARIN SODIUM 50 MILLIGRAM(S): 100 INJECTION SUBCUTANEOUS at 14:01

## 2022-03-08 RX ADMIN — Medication 1000 MILLIGRAM(S): at 16:19

## 2022-03-08 RX ADMIN — Medication 400 MILLIGRAM(S): at 15:11

## 2022-03-08 RX ADMIN — Medication 1 MILLIGRAM(S): at 06:19

## 2022-03-08 RX ADMIN — Medication 200 MILLIGRAM(S): at 14:00

## 2022-03-08 RX ADMIN — ENOXAPARIN SODIUM 50 MILLIGRAM(S): 100 INJECTION SUBCUTANEOUS at 03:56

## 2022-03-08 RX ADMIN — Medication 40 MILLIGRAM(S): at 09:13

## 2022-03-08 RX ADMIN — DEXMEDETOMIDINE HYDROCHLORIDE IN 0.9% SODIUM CHLORIDE 2.84 MICROGRAM(S)/KG/HR: 4 INJECTION INTRAVENOUS at 12:03

## 2022-03-08 RX ADMIN — PANTOPRAZOLE SODIUM 40 MILLIGRAM(S): 20 TABLET, DELAYED RELEASE ORAL at 05:14

## 2022-03-08 RX ADMIN — QUETIAPINE FUMARATE 50 MILLIGRAM(S): 200 TABLET, FILM COATED ORAL at 22:36

## 2022-03-08 NOTE — CONSULT NOTE ADULT - ASSESSMENT
Recommendation: At this time, Medical Ethics identifies/recommends [facility]’s Consumer Advisory Board (CAB) as the appropriate surrogate decision-maker. Consistent with the ethical principle of beneficence, the clinical team must critically assess the efficacy of the therapies on a case-by-case basis to ascertain if a procedure will cause more harm than benefit. Pending discussion with patient’s CAB state advocate for goals of care and medical decisions.    Thank you for this challenging case. Please do not hesitate to call us if there are any questions.   	  DISCUSSED WITH MEDICAL ETHICS ATTENDINGS: MARIALUISA PAIGE MD & JORGE CRAWLEY DNP (DIRECTOR OF MEDICAL ETHICS)  More than 50% of the time of this consultation was spent in coordination of Care of Patient					      References: 		    1Bewelina TL & Melly JF. Principles of Biomedical Ethics. [place]: [employer]; 2019.   214 CRR-.11 (2020).   3SCP Section 1750 et seq. (2020). Recommendation: At this time, Medical Ethics identifies/recommends [facility]’s Consumer Advisory Board (CAB) as the appropriate surrogate decision-maker. Consistent with the ethical principle of beneficence, the clinical team must critically assess the efficacy of the therapies on a case-by-case basis to ascertain if a procedure will cause more harm than benefit. Pending discussion with patient’s CAB state advocate for goals of care and medical decisions.    ADDENDUM: Patient will attempt to be extubated today, 3/8/22, and if unsuccessful, she will be reintubated and further conversations will be had with CAB for goals of care and medical decisions.    Thank you for this challenging case. Please do not hesitate to call us if there are any questions.   	  DISCUSSED WITH MEDICAL ETHICS ATTENDINGS: MARIALUISA PAIGE MD & JORGE CRAWLEY DNP (DIRECTOR OF MEDICAL ETHICS)  More than 50% of the time of this consultation was spent in coordination of Care of Patient					      References: 		    1Beajoel TL & Melly JF. Principles of Biomedical Ethics. [place]: [employer]; 2019.   214 CRR-.11 (2020).   3SCP Section 1750 et seq. (2020).

## 2022-03-08 NOTE — CONSULT NOTE ADULT - PROBLEM SELECTOR RECOMMENDATION 9
- Patient with aspiration PNA.  - Currently on broad spectrum antibiotics.   - Recently intubated, now extubated.   - Secretions are still present requiring suctioning.   - Patient with a history of aspiration, with PEG tube feeds already in place.   - Risk of re-intubation remains high.

## 2022-03-08 NOTE — CONSULT NOTE ADULT - PROBLEM SELECTOR RECOMMENDATION 2
- Patient with long standing CP.  - Used to be a resident of [facility].  - Now resides in a group home.   - Although some family is present, not involved in patients care.  - [facility] and [facility] are mainly involved in shared decision making.

## 2022-03-08 NOTE — CONSULT NOTE ADULT - SUBJECTIVE AND OBJECTIVE BOX
Consult requested by: Zhang Amos MD	   	Role: Chief of Palliative Medicine		    Service: Palliative  Attending: Shelly Jalloh MD  Consultant: Vera Montes MS, [facility] (Medical Ethicist)                             Contact #s: [phone] (c)  [phone] (o)  Consult purpose:  Assist the team in ethical dilemma posed by an unbefriended 76-year-old female with developmental disability, who lacks decision-making capacity.  									  Clinical summary: This is a 76-year-old female with a past medical history of cerebral palsy with functional quadriplegia, mental retardation, deep vein thrombosis/pulmonary thromboembolism (on Eliquis), gastroesophageal reflux disease (GERD), chronic dysphagia with PEG, and thoracic spine fusion. Patient was admitted to ED from a [facility] ([facility]) for acute hypoxic respiratory failure likely secondary to pneumonia. Patient was intubated in the field for airway protection and "thick viscous" secretions were noted in oropharynx during intubation. On admission, patient found to have Pseudomonas pneumonia. Infectious Disease on consult. On 3/4, patient extubated to high flow nasal canula as improved secretion and chest x-ray improved. However, patient quickly reintubated due to tachypnea, hypoxia and secretions. On 3/5, patient with fever and tachypneic/tachycardic overnight. On 3/7, patient had daily enema since last bowel movement was 3 days prior. Patient failed spontaneous breathing trial on 3/7 as she was tachypneic. Throughout hospital course, patient started on Seroquel due to persistent anxiety and agitation, making extubation increasingly difficult. Palliative on consult. Currently, patient is intubated, sedated and intermittently on pressors. When sedation is reduced, patient alert and follows simple commands. Ethics consult initiated to assist the team in appropriate surrogate decision maker for medical decisions.      Prognosis Estimate (survival in days, wks, mos, yrs):	 Guarded				  Patient Decision-Making Capacity:    Has capacity 	  Lacks capacity (patient with developmental disability)  Patient Aware of:  Diagnosis:   Yes    No   Unknown    Prognosis:   Yes    No   Unknown       Name of medical decision-maker should patient lack capacity: Consumer Advisory Board (CAB), [facility] Office of Patients with Developmental Disability, including patient’s state advocate from CAB  Role:   Health Care Proxy      Legal Surrogate  	Contact #(s): [phone]  Other Stake-Holders:  NONE  Evidence of Patient’s Preference of Life-Sustaining Treatment (Written or Oral): None  Resuscitation status:  DNR:  Yes   No      DNI:  Yes   No    Discussions:   Discussion with Zhang Amos MD (Palliative) on 3/8/22 (12:30 – 12:45): Discussed patient’s case and clinical condition. Patient is part of the [place] Class for [facility], and so there is a specific consent process for medical decisions (see below).   Discussion with Meng ([facility] Manager [facility]) on 3/8/22 (12:50 – 12:55): Confirmed that patient has a state advocate from [employer] advisory board. Awaiting call back for name/number of state advocate.    Bioethics analysis:  THE CENTRAL ETHICAL ISSUES PRESENTED IN THIS CASE ARE (A) RESPECTING PATIENT AUTONOMY AND (B) PROVIDER BENEFICENCE/NON-MALEFICENCE AND JUSTICE.    We have a duty to respect a patient’s moral status and best interests by first identifying the appropriate surrogate decision maker (the person who can give a voice to the patient). This way we preserve her dignity and autonomy1. An analysis of this autonomy is legally determined by fact that she is registered with OPWDD and lacks the capacity to give informed consent. She is unable to communicate, understand, appreciate, or reason regarding her medical condition and therefore lacks capacity for medical decision-making.     [place] regulations under OPWDD2 for consent to treatment state that if a patient is:   eighteen (18) years of age or older and  lacks capacity to understand appropriate disclosures regarding proposed medical treatment or   it was already determined that the patient pursuant to those regulations, the medical team should seek consent to the proposed treatment from one of the surrogates below, in that order:   a duly appointed guardian or health care agent or alternative agent: the Consumer Advisory Board for the [facility];  an actively involved spouse (none in this case);   an actively involved parent (none in this case);  an actively involved adult child (none in this case);   an actively involved adult sibling (none in this case);   an actively involved adult family member (none in this case); or   a surrogate decision-making committee or a court of competent jurisdiction    Due to atrocities in [place] at the [facility] in the 1970’s, society imposed additional protections for Ms. Soliman and others with developmental disability who experienced unethical, experimental and negligent horrors in that facility.     In cases such as ours, [place] regulations laws have created a process for reviewing non-emergent cases absent the patient’s assent or consent. In cases where an adult patient has a mental illness and no guardian has been appointed, or there is no family, the regulations allow the [place] Consumer Advisory Board, now known as the Surrogate Decision-Making Committee (SDMC) to act as guardian for members of this class3. However, in situations when an urgent or emergent need arises to treat the patient, a two-physician consent is required.     As mentioned above, with this historical background, [facility]’s Consumer Advisory Board is, at this time, the appropriate stewards of the patient’s autonomy and should be involved directly in her care.    In this case, the health team is applauded for creating an atmosphere of clarity in communication regarding the current plan of care and for their virtue and invoking justice – by providing fairness and equitable care to a patient in a vulnerable population.     As Ms. Soliman is under the auspices of the Office for People with Developmental Disabilities (OPWDD), decisions that would alter or have the potential of altering bodily integrity have to go through state approval.  The clinician’s domain of VIRTUE is the source of the bioethical principles of beneficence and nonmaleficence –both of which should be aimed to honor a patient’s moral status: wanting to help a patient live optimally, while avoiding inflicting harm (which may be experienced psycho-spiritually and physically)1. Thus, the health care team must show respect for Ms. Soliman as a person with dignity and a sense of self.  A central ethical issue in this case is whether placing a tracheostomy will pose more risks than benefit in this patient. A particular concern regarding this ethical dilemma is the framework for determining the balance between nonmaleficence and beneficence. Nonmaleficence guides the healthcare provider to "do no harm" by the treatments we propose, their benefits must be weighed against the risks of treatment (i.e. infection, patient pulling it out which can result in bleeding and damage). Additionally, in this particular case, placement of a tracheostomy will likely have a more profound impact on this patient’s quality of life and psychosocial situation (i.e. relocation from her group home). Consult requested by: Zhang Amos MD	   	Role: Chief of Palliative Medicine		    Service: Palliative  Attending: Shelly Jalloh MD  Consultant: Vera Montes MS, [facility] (Medical Ethicist)                             Contact #s: [phone] (c)  [phone] (o)  Consult purpose:  Assist the team in ethical dilemma posed by an unbefriended 76-year-old female with developmental disability, who lacks decision-making capacity.  									  Clinical summary: This is a 76-year-old female with a past medical history of cerebral palsy with functional quadriplegia, mental retardation, deep vein thrombosis/pulmonary thromboembolism (on Eliquis), gastroesophageal reflux disease (GERD), chronic dysphagia with PEG, and thoracic spine fusion. Patient was admitted to ED from a [facility] ([facility]) for acute hypoxic respiratory failure likely secondary to pneumonia. Patient was intubated in the field for airway protection and "thick viscous" secretions were noted in oropharynx during intubation. On admission, patient found to have Pseudomonas pneumonia. Infectious Disease on consult. On 3/4, patient extubated to high flow nasal canula as improved secretion and chest x-ray improved. However, patient quickly reintubated due to tachypnea, hypoxia and secretions. On 3/5, patient with fever and tachypneic/tachycardic overnight. On 3/7, patient had daily enema since last bowel movement was 3 days prior. Patient failed spontaneous breathing trial on 3/7 as she was tachypneic. Throughout hospital course, patient started on Seroquel due to persistent anxiety and agitation, making extubation increasingly difficult. Palliative on consult. Currently, patient is intubated, sedated and intermittently on pressors. When sedation is reduced, patient alert and follows simple commands. Ethics consult initiated to assist the team in appropriate surrogate decision maker for medical decisions.      Prognosis Estimate (survival in days, wks, mos, yrs):	 Guarded				  Patient Decision-Making Capacity:    Has capacity 	  Lacks capacity (patient with developmental disability)  Patient Aware of:  Diagnosis:   Yes    No   Unknown    Prognosis:   Yes    No   Unknown       Name of medical decision-maker should patient lack capacity: Consumer Advisory Board (CAB), [facility] Office of Patients with Developmental Disability, including patient’s state advocate from CAB  Role:   Health Care Proxy      Legal Surrogate  	Contact #(s): [phone]  Other Stake-Holders:  NONE  Evidence of Patient’s Preference of Life-Sustaining Treatment (Written or Oral): None  Resuscitation status:  DNR:  Yes   No      DNI:  Yes   No    Discussions:   Discussion with Zhang Amos MD (Palliative) on 3/8/22 (12:30 – 12:45): Discussed patient’s case and clinical condition. Patient is part of the [place] Class for OPWDD, and so there is a specific consent process for medical decisions (see below).   Discussion with Meng (RN Manager [facility]) on 3/8/22 (12:50 – 12:55): Confirmed that patient has a state advocate from consumer advisory board. Awaiting call back for name/number of state advocate.  Discussion with Consumer Advisory Board (CAB) on 3/8/22 (14:00 – 14:10): Spoke with CAB regarding patient. Medical team should reach out to CAB regarding any medical decisions that need to be made [phone].  Discussion with Zhang Amos MD (Palliative) on 3/8/22 (14:15 – 14:25): Updated on patient’s case. Medical team will reach out to CAB to discuss patient’s case. Team is going to attempt to extubate patient today and if unsuccessful, patient will be reintubated.    Bioethics analysis:  THE CENTRAL ETHICAL ISSUES PRESENTED IN THIS CASE ARE (A) RESPECTING PATIENT AUTONOMY AND (B) PROVIDER BENEFICENCE/NON-MALEFICENCE AND JUSTICE.    We have a duty to respect a patient’s moral status and best interests by first identifying the appropriate surrogate decision maker (the person who can give a voice to the patient). This way we preserve her dignity and autonomy1. An analysis of this autonomy is legally determined by fact that she is registered with OPWDD and lacks the capacity to give informed consent. She is unable to communicate, understand, appreciate, or reason regarding her medical condition and therefore lacks capacity for medical decision-making.     [place] regulations under OPWDD2 for consent to treatment state that if a patient is:   eighteen (18) years of age or older and  lacks capacity to understand appropriate disclosures regarding proposed medical treatment or   it was already determined that the patient pursuant to those regulations, the medical team should seek consent to the proposed treatment from one of the surrogates below, in that order:   a duly appointed guardian or health care agent or alternative agent: the Consumer Advisory Board for the [place] Class;  an actively involved spouse (none in this case);   an actively involved parent (none in this case);  an actively involved adult child (none in this case);   an actively involved adult sibling (none in this case);   an actively involved adult family member (none in this case); or   a surrogate decision-making committee or a court of competent jurisdiction    Due to atrocities in [place] at the [facility] in the 1970’s, society imposed additional protections for Ms. Soliman and others with developmental disability who experienced unethical, experimental and negligent horrors in that facility.     In cases such as ours, [place] regulations laws have created a process for reviewing non-emergent cases absent the patient’s assent or consent. In cases where an adult patient has a mental illness and no guardian has been appointed, or there is no family, the regulations allow the [place] Consumer Advisory Board, now known as the Surrogate Decision-Making Committee (SDMC) to act as guardian for members of this class3. However, in situations when an urgent or emergent need arises to treat the patient, a two-physician consent is required.     As mentioned above, with this historical background, [facility]’s Consumer Advisory Board is, at this time, the appropriate stewards of the patient’s autonomy and should be involved directly in her care.    In this case, the health team is applauded for creating an atmosphere of clarity in communication regarding the current plan of care and for their virtue and invoking justice – by providing fairness and equitable care to a patient in a vulnerable population.     As Ms. Soliman is under the auspices of the Office for People with Developmental Disabilities (OPWDD), decisions that would alter or have the potential of altering bodily integrity have to go through state approval.  The clinician’s domain of VIRTUE is the source of the bioethical principles of beneficence and nonmaleficence –both of which should be aimed to honor a patient’s moral status: wanting to help a patient live optimally, while avoiding inflicting harm (which may be experienced psycho-spiritually and physically)1. Thus, the health care team must show respect for Ms. Soliman as a person with dignity and a sense of self.  A central ethical issue in this case is whether placing a tracheostomy will pose more risks than benefit in this patient. A particular concern regarding this ethical dilemma is the framework for determining the balance between nonmaleficence and beneficence. Nonmaleficence guides the healthcare provider to "do no harm" by the treatments we propose, their benefits must be weighed against the risks of treatment (i.e. infection, patient pulling it out which can result in bleeding and damage). Additionally, in this particular case, placement of a tracheostomy will likely have a more profound impact on this patient’s quality of life and psychosocial situation (i.e. relocation from her group home).

## 2022-03-08 NOTE — CONSULT NOTE ADULT - PROBLEM SELECTOR RECOMMENDATION 5
- 60 minutes spent with OPWDD, Ethics and primary team.  - Patient is unable to participate.  - Given patients protected status with OPWDD, history of Cerebral Palsy, patient remains full code.   - If further deterioration occurs, or if discussions about tracheostomy need to occur, further meeting with OPWDD will need to occur.

## 2022-03-08 NOTE — CHART NOTE - NSCHARTNOTEFT_GEN_A_CORE
Admitting Diagnosis:   Patient is a 76y old  Female who presents with a chief complaint of Acute hypoxic respiratory failure 2/2 pseudomonas pneumonia (07 Mar 2022 05:14)    PAST MEDICAL & SURGICAL HISTORY:  CP (cerebral palsy)  Mental retardation  Dysphagia  GERD (gastroesophageal reflux disease)  PE (pulmonary thromboembolism)  DVT (deep venous thrombosis)  Spastic quadriplegia  HTN (hypertension)  PEG tube malfunction      Current Nutrition Order:  Diet, NPO with Tube Feed:   Tube Feeding Modality: Gastrostomy  Jevity 1.2 Doug (JEVITY1.2RTH)  Total Volume for 24 Hours (mL): 1200  Total Number of Cans: 5  Continuous  Starting Tube Feed Rate {mL per Hour}: 30     Every 4 hours  Until Goal Tube Feed Rate (mL per Hour): 50  Tube Feed Duration (in Hours): 24  Tube Feed Start Time: 11:00  Liquid Protein Supplement     Qty per Day:  1 (03-03-22 @ 10:48)    Enteral Nutrition-Long term TF   Route: G-tube  Regimen: Jevity 1.2 @ 50ml/hr x24hrs + 1 LPS   Infusion: Per pump, 1074ml x24hrs which provides 1289kcals, 60g protein, 867ml free water + 1 LPS (100kcals, 15g protein). Total TF + LPS infusion is 1389 kcals, 75g protein which meets 100% of nutrient needs.   Water flush: 500ml total x24hrs per I&Os   Tolerance: abd-soft, no abd distention/ bloating     GI Issues: Started daily enemas 3/07 d/t last BM 3/04  Pain: non-verbal indicators absent   Skin Integrity: intact, no pressure breakdown   Roberto Carlos score: 12    Labs: POCt glucose x48hrs: 116-156   03-08    141  |  104  |  14  ----------------------------<  134<H>  4.2   |  30  |  0.52    Ca    8.5      08 Mar 2022 05:25  Phos  3.0     03-08  Mg     1.8     03-08    TPro  5.8<L>  /  Alb  3.0<L>  /  TBili  0.3  /  DBili  x   /  AST  43<H>  /  ALT  29  /  AlkPhos  53  03-07    POCT Blood Glucose.: 117 mg/dL (08 Mar 2022 05:27)  POCT Blood Glucose.: 116 mg/dL (07 Mar 2022 17:09)  POCT Blood Glucose.: 120 mg/dL (07 Mar 2022 11:03)    Medications:  MEDICATIONS  (STANDING):  chlorhexidine 0.12% Liquid 15 milliLiter(s) Oral Mucosa every 12 hours  chlorhexidine 4% Liquid 1 Application(s) Topical <User Schedule>  ciprofloxacin   IVPB      ciprofloxacin   IVPB 400 milliGRAM(s) IV Intermittent every 8 hours  dexMEDEtomidine Infusion 0.2 MICROgram(s)/kG/Hr (2.84 mL/Hr) IV Continuous <Continuous>  dextrose 40% Gel 15 Gram(s) Oral once  dextrose 5%. 1000 milliLiter(s) (50 mL/Hr) IV Continuous <Continuous>  dextrose 5%. 1000 milliLiter(s) (100 mL/Hr) IV Continuous <Continuous>  dextrose 50% Injectable 25 Gram(s) IV Push once  dextrose 50% Injectable 12.5 Gram(s) IV Push once  dextrose 50% Injectable 25 Gram(s) IV Push once  enoxaparin Injectable 50 milliGRAM(s) SubCutaneous every 12 hours  glucagon  Injectable 1 milliGRAM(s) IntraMuscular once  insulin lispro (ADMELOG) corrective regimen sliding scale   SubCutaneous every 6 hours  pantoprazole  Injectable 40 milliGRAM(s) IV Push every 24 hours  QUEtiapine 50 milliGRAM(s) Oral every 12 hours    MEDICATIONS  (PRN):    Anthropometrics:  Ht: 152.4cm (60")  Wt: 56.8kg (3/03)-Admit     IBW: 100# (45.5kg)    % IBW: 125%    Weight Change: No new weight since admission. Obtain minimum biweekly weights to monitor trends especially being on TFs    Estimated energy needs: 45.3kg  Fluids per MD. IBW used to estimate needs as pt weighs >100% of IBW and per critical care nutrition guidelines. Needs estimated for age and adjusted for vent  Calories: 1150-1450kcals (25-32kcals/kg)  Protein: 65-75g (1.4-1.6g/kg)  Fluid: Per MD     Subjective:   74 F with PMHx of CP with functional quadriplegia, DVT/PE on eliquis, GERD, chronic dysphagia with PEG, thoracic spine fusion admitted for acute hypoxic respiratory failure found to have pseudomonal PNA cb periods of agitation/anxiety preventing SBT. Remains intubated, changed to CPAP mode today. Attempted extubation x1, however, pt became agitated so unsuccessful. On precedex, no pressor support. MAPs consistently >70s, TMAX 99.5. IV abx continues. Remains on TF via G-tube meeting 100% of EER.   *Saw pt in room today on vent with eyes open. Reviewed TF pump hx, noted turned off this morning suspect for possible extubation attempt today.     Previous Nutrition Diagnosis: Inadequate energy intake R/T meeting 0% EER AEB npo, TF via G-tube note started     Active [   ]  Resolved [ x  ]    If resolved, new PES: Increased nutrient needs R/T acute illness, hypermetabolic AEB 25-32kcals/kg energy and 1.4-1.6g/kg protein needs of ABW    Goal: Pt to meet >75% of EER via G-tube     Recommendations:  1. Continue TF regimen   -Jevity 1.2 @ 50ml/hr + 1 LPS   (Provides 1200ml TV, 1540kcals, 82g protein, 968ml free water)  2. Water flush per MD, minimum 30-60ml q4hrs for tube patency   3. Aggressive bowel regimen to prevent  constipation/ monitor BMs  4. Monitor labs  5. Obtain minimum biweekly weights     Education: N/A    Risk Level: High [   ] Moderate [ x  ] Low [   ]  Edie Gusman RD,CDN,,CNSC

## 2022-03-08 NOTE — PROGRESS NOTE ADULT - ASSESSMENT
74 F with PMHx of CP with functional quadriplegia, DVT/PE on eliquis, GERD, chronic dysphagia with PEG, thoracic spine fusion admitted for acute hypoxic respiratory failure found to have pseudomonal PNA cb periods of agitation/anxiety.

## 2022-03-08 NOTE — PROGRESS NOTE ADULT - ATTENDING COMMENTS
77 yo F with functional quadriplegia 2/2 cerebral palsy admitted for acute hypoxic respiratory failure 2/2 pseudomonal pneumonia. abx per ID. failed extubation on 3-4 because of secretions, tolerating cpap today, minimal secretions, plan for extubation. will f/u with palliative care, ethics consult to help with goals of care.

## 2022-03-08 NOTE — PROGRESS NOTE ADULT - PROBLEM SELECTOR PLAN 2
-Catheter culture from 3/3 with >100,000 E.coli  -New lu placed on admission.   -c/w cipro 400mg IV Q8, coverage and sensitivity adequate - Catheter culture from 3/3 with >100,000 E.coli  - New lu placed on admission  - c/w cipro 400mg IV Q8, coverage and sensitivity adequate

## 2022-03-08 NOTE — PROGRESS NOTE ADULT - SUBJECTIVE AND OBJECTIVE BOX
***Incomplete*** Overnight: Pt was febrile to 100.4F ~6pm, given tylenol and defervesced. Pt became anxious and agitated this morning, was given 1mg ativan ~6am with resolution.    Subjective: Pt seen and examined at the bedside this morning. Intubated but awake and alert. Resting comfortably. Making good eye contact, following simple commands, answering yes/no questions. Pt denies pain. Pt tolerating SBT with CPAP @ 40%FiO2.    Objective:    ICU Vital Signs Last 24 Hrs  T(C): 37.5 (08 Mar 2022 09:40), Max: 38 (07 Mar 2022 17:42)  T(F): 99.5 (08 Mar 2022 09:40), Max: 100.4 (07 Mar 2022 17:42)  HR: 85 (08 Mar 2022 13:00) (57 - 109)  BP: 91/51 (08 Mar 2022 13:00) (82/48 - 160/67)  BP(mean): 65 (08 Mar 2022 13:00) (61 - 98)  ABP: --  ABP(mean): --  RR: 27 (08 Mar 2022 13:00) (17 - 32)  SpO2: 94% (08 Mar 2022 13:00) (89% - 99%)    I&O's Detail    07 Mar 2022 07:01  -  08 Mar 2022 07:00  --------------------------------------------------------  IN:    Dexmedetomidine: 149.4 mL    Enteral Tube Flush: 500 mL    IV PiggyBack: 600 mL    IV PiggyBack: 100 mL    Jevity 1.2: 1200 mL  Total IN: 2549.4 mL    OUT:    Indwelling Catheter - Urethral (mL): 3330 mL  Total OUT: 3330 mL    Total NET: -780.6 mL      08 Mar 2022 07:01  -  08 Mar 2022 13:50  --------------------------------------------------------  IN:    Dexmedetomidine: 39.3 mL    Enteral Tube Flush: 150 mL    Jevity 1.2: 50 mL  Total IN: 239.3 mL    OUT:    Indwelling Catheter - Urethral (mL): 1775 mL  Total OUT: 1775 mL    Total NET: -1535.7 mL    PHYSICAL EXAM:  Constitutional - Intubated, awake, alert, resting comfortably  HEENT - NCAT, anicteric sclera, intubated, mucous membranes moist  Neck - Supple, no lymphadenopathy, no JVD  Pulm - Tachypneic, poor chest expansion, shallow breaths. Coarse breath sounds b/l worse in lower lobes. Ventilator @ SPN-CPAP 40% FIO2 PEEP 5  Cardio -  RRR, S1/S2 present, no murmurs  GI -  Soft, NT, mildly distended, BSx4, PEG tube in place   - Quintero in place, making urine  Extremities - Non-pitting edema b/l LE up to knees, 2+ pulses x4   Neurologic Exam: intubated, awake & alert, answering yes/no questions, follows simple commands.        Skin: warm, dry, intact, edema in b/l LE, no rashes or wounds  Lines: Quintero, peripheral IVs    MEDICATIONS  (STANDING):  chlorhexidine 0.12% Liquid 15 milliLiter(s) Oral Mucosa every 12 hours  chlorhexidine 4% Liquid 1 Application(s) Topical <User Schedule>  ciprofloxacin   IVPB      ciprofloxacin   IVPB 400 milliGRAM(s) IV Intermittent every 8 hours  dexMEDEtomidine Infusion 0.2 MICROgram(s)/kG/Hr (2.84 mL/Hr) IV Continuous <Continuous>  dextrose 40% Gel 15 Gram(s) Oral once  dextrose 5%. 1000 milliLiter(s) (50 mL/Hr) IV Continuous <Continuous>  dextrose 5%. 1000 milliLiter(s) (100 mL/Hr) IV Continuous <Continuous>  dextrose 50% Injectable 25 Gram(s) IV Push once  dextrose 50% Injectable 12.5 Gram(s) IV Push once  dextrose 50% Injectable 25 Gram(s) IV Push once  enoxaparin Injectable 50 milliGRAM(s) SubCutaneous every 12 hours  glucagon  Injectable 1 milliGRAM(s) IntraMuscular once  insulin lispro (ADMELOG) corrective regimen sliding scale   SubCutaneous every 6 hours  pantoprazole  Injectable 40 milliGRAM(s) IV Push every 24 hours  QUEtiapine 50 milliGRAM(s) Oral every 12 hours                          10.6   5.96  )-----------( 191      ( 08 Mar 2022 05:25 )             34.7   03-08    141  |  104  |  14  ----------------------------<  134<H>  4.2   |  30  |  0.52    Ca    8.5      08 Mar 2022 05:25  Phos  3.0     03-08  Mg     1.8     03-08    TPro  5.8<L>  /  Alb  3.0<L>  /  TBili  0.3  /  DBili  x   /  AST  43<H>  /  ALT  29  /  AlkPhos  53  03-07     Hospital Course  Patient is a 74 year old female with a PMH of cerebral palsy with functional quadriplegia, DVT/PE on Eliquis, GERD, chronic dysphagia with PEG, thoracic spine fusion. Patient has a history of prior admission in 2020 for aspiration pneumonia requiring intubation. Patient presented from her assisted living facility in acute hypoxemic respiratory failure. Patient was found by assisted living facility staff with difficulty breathing. EMS noted that the patient was in respiratory distress upon arrival with SaO2 84% on RA. Patient was intubated in the field for airway protection. "Thick viscous" secretions were noticed in her oropharynx during intubation. Acute hypoxemic respiratory failure likely 2/2 aspiration pneumonia. Patient started on Meropenem due to previous hospitalization with Pseudomonas pneumonia requiring Meropenem. Antibiotic switched to Ciprofloxacin for adequate coverage of Pseudomonas pneumonia. One unsuccessful attempt was made at extubation. Patient started on Seroquel due to persistent anxiety and agitation making extubation increasingly difficult. Patient extubated to [facility] (3/8).     Overnight: Pt was febrile to 100.4F ~6pm, given tylenol and defervesced. Pt became anxious and agitated this morning, was given 1mg ativan ~6am with resolution.    Subjective: Pt seen and examined at the bedside this morning. Intubated but awake and alert. Resting comfortably. Making good eye contact, following simple commands, answering yes/no questions. Pt denies pain. Pt tolerating SBT with CPAP @ 40%FiO2.    Objective:    ICU Vital Signs Last 24 Hrs  T(C): 37.5 (08 Mar 2022 09:40), Max: 38 (07 Mar 2022 17:42)  T(F): 99.5 (08 Mar 2022 09:40), Max: 100.4 (07 Mar 2022 17:42)  HR: 85 (08 Mar 2022 13:00) (57 - 109)  BP: 91/51 (08 Mar 2022 13:00) (82/48 - 160/67)  BP(mean): 65 (08 Mar 2022 13:00) (61 - 98)  ABP: --  ABP(mean): --  RR: 27 (08 Mar 2022 13:00) (17 - 32)  SpO2: 94% (08 Mar 2022 13:00) (89% - 99%)    I&O's Detail    07 Mar 2022 07:01  -  08 Mar 2022 07:00  --------------------------------------------------------  IN:    Dexmedetomidine: 149.4 mL    Enteral Tube Flush: 500 mL    IV PiggyBack: 600 mL    IV PiggyBack: 100 mL    Jevity 1.2: 1200 mL  Total IN: 2549.4 mL    OUT:    Indwelling Catheter - Urethral (mL): 3330 mL  Total OUT: 3330 mL    Total NET: -780.6 mL      08 Mar 2022 07:01  -  08 Mar 2022 13:50  --------------------------------------------------------  IN:    Dexmedetomidine: 39.3 mL    Enteral Tube Flush: 150 mL    Jevity 1.2: 50 mL  Total IN: 239.3 mL    OUT:    Indwelling Catheter - Urethral (mL): 1775 mL  Total OUT: 1775 mL    Total NET: -1535.7 mL    PHYSICAL EXAM:  Constitutional - Intubated, awake, alert, resting comfortably  HEENT - NCAT, anicteric sclera, intubated, mucous membranes moist  Neck - Supple, no lymphadenopathy, no JVD  Pulm - Tachypneic, poor chest expansion, shallow breaths. Coarse breath sounds b/l worse in lower lobes. Ventilator @ SPN-CPAP 40% FIO2 PEEP 5  Cardio -  RRR, S1/S2 present, no murmurs  GI -  Soft, NT, mildly distended, BSx4, PEG tube in place   - Quintero in place, making urine  Extremities - Non-pitting edema b/l LE up to knees, 2+ pulses x4   Neurologic Exam: intubated, awake & alert, answering yes/no questions, follows simple commands.        Skin: warm, dry, intact, edema in b/l LE, no rashes or wounds  Lines: Quintero, peripheral IVs    MEDICATIONS  (STANDING):  chlorhexidine 0.12% Liquid 15 milliLiter(s) Oral Mucosa every 12 hours  chlorhexidine 4% Liquid 1 Application(s) Topical <User Schedule>  ciprofloxacin   IVPB      ciprofloxacin   IVPB 400 milliGRAM(s) IV Intermittent every 8 hours  dexMEDEtomidine Infusion 0.2 MICROgram(s)/kG/Hr (2.84 mL/Hr) IV Continuous <Continuous>  dextrose 40% Gel 15 Gram(s) Oral once  dextrose 5%. 1000 milliLiter(s) (50 mL/Hr) IV Continuous <Continuous>  dextrose 5%. 1000 milliLiter(s) (100 mL/Hr) IV Continuous <Continuous>  dextrose 50% Injectable 25 Gram(s) IV Push once  dextrose 50% Injectable 12.5 Gram(s) IV Push once  dextrose 50% Injectable 25 Gram(s) IV Push once  enoxaparin Injectable 50 milliGRAM(s) SubCutaneous every 12 hours  glucagon  Injectable 1 milliGRAM(s) IntraMuscular once  insulin lispro (ADMELOG) corrective regimen sliding scale   SubCutaneous every 6 hours  pantoprazole  Injectable 40 milliGRAM(s) IV Push every 24 hours  QUEtiapine 50 milliGRAM(s) Oral every 12 hours                          10.6   5.96  )-----------( 191      ( 08 Mar 2022 05:25 )             34.7   03-08    141  |  104  |  14  ----------------------------<  134<H>  4.2   |  30  |  0.52    Ca    8.5      08 Mar 2022 05:25  Phos  3.0     03-08  Mg     1.8     03-08    TPro  5.8<L>  /  Alb  3.0<L>  /  TBili  0.3  /  DBili  x   /  AST  43<H>  /  ALT  29  /  AlkPhos  53  03-07

## 2022-03-08 NOTE — CONSULT NOTE ADULT - PROBLEM SELECTOR RECOMMENDATION 4
Patient with cerebral palsy, previously a [facility] resident, now resides in retirement, with [facility]. Presenting to the hospital with respiratory failure, PNA, intubation, attempt at extubation with subsequent reintubation.   Now with difficulty extubating due to poor mentation, tachypnea and agitation. If any decision needs to be made regarding advanced care planning, tracheostomy, [facility] will need to be involved and a MOLST checklist will need to be filled out.

## 2022-03-08 NOTE — CONSULT NOTE ADULT - ASSESSMENT
Patient seen and examined.  Full consult to follow.    Patient with cerebral palsy, previously a [place] resident, now resides in snf, with [facility]. Presenting to the hospital with respiratory failure, PNA, intubation, attempt at extubation with subsequent reintubation.   Now with difficulty extubating due to poor mentation, tachypnea and agitation. If any decision needs to be made regarding advanced care planning, tracheostomy, [facility] will need to be involved and a MOLST checklist will need to be filled out.  76 F with cerebral palsy, respiratory failure, debility, encounter for palliative care.

## 2022-03-08 NOTE — PROGRESS NOTE ADULT - PROBLEM SELECTOR PLAN 1
- Likely secondary to aspiration event given similar history in conjunction with Chest X ray  - Sputum culture: pseudomonas  - c/w Cipro 400mg IV Q8  - Blood culture with no growth to date  - c/w Seroquel 50mg Q12  - Lasix 40mg IV given today 3/8. Monitor urine output  - Pt tolerated SBT this morning, will contact home facility for info on HCP/GOC discussion. Need to discuss the potential need for tracheostomy, prior to attempting extubation. - Likely secondary to aspiration event given similar history in conjunction with Chest X ray  - Sputum culture: pseudomonas  - c/w Cipro 400mg IV Q8  - Blood culture with no growth to date  - c/w Seroquel 50mg Q12  - Lasix 40mg IV given today 3/8. Monitor urine output  - Patient extubated (3/8) to NC

## 2022-03-08 NOTE — PROGRESS NOTE ADULT - PROBLEM SELECTOR PLAN 7
N: Jevity 1.2 at 50cc/hr  E: PRN  DVT: Lovenox therapeutic  C: Full Code, pending goals of care conversation. Will contact facility & HCP.  D: MICU N: Jevity 1.2 at 50cc/hr  E: PRN  DVT: Lovenox therapeutic  C: Full Code, pending goals of care conversation  D: MICU

## 2022-03-08 NOTE — CONSULT NOTE ADULT - SUBJECTIVE AND OBJECTIVE BOX
[facility] Geriatrics and Palliative Care  April Amos Palliative Care Attending  Contact Info: Call [phone] (HEAL Line) or message on Microsoft Teams    HPI:  74 year old female with a PMH of cerebral palsy with functional quadroplegia, DVT/PE on eliquis, GERD, Chronic dyshagia with PEG, Thoracic spine fusion and a history of prior admission in 2020 for aspiration PNA requiring intubation presents from her assisted living facility in acute hypoxic respiratory failure. Ahe was found by staff with difficulty breathing.  +cough.  ~1am.  EMS arrival noted pt w/ resp distress, O2 84% in RA, intubated in field for airway protection, noted "thick viscous" liquid in her oropharynx during intubation. Patient lost conciousness multiple times upon transfer to [facility] with subsequent transfer to [facility] to be cared for in the MICU.     ED Course:  Vitals: T 99.1, HR 81, BP 91/58, RR 18, 100% O2 sat Intubated VC/AC  Labs: cbc wnl, Cl 94, HCO3- 32, Lactate 5.4, VBG with pH 7.19, pCO2 87, venous O2 sat 95%  Imaging: CXR BL opacities worse on the left   Interventions: Metronidazole 500mg x1, Fent drip  (03 Mar 2022 05:40)    PERTINENT PM/SXH:   CP (cerebral palsy)    Mental retardation    Dysphagia    GERD (gastroesophageal reflux disease)    PE (pulmonary thromboembolism)    DVT (deep venous thrombosis)    Spastic quadriplegia    HTN (hypertension)      PEG tube malfunction      FAMILY HISTORY:    ITEMS NOT CHECKED ARE NOT PRESENT    SOCIAL HISTORY:   Significant other/partner:  []  Children:  []   Substance hx:  []   Tobacco hx:  []   Alcohol hx: []   Home Opioid hx:  [] I-Stop Reference No:  - no active Rx's / see chart note  Living Situation: []Home  []Long term care  []Rehab []Other  Mormon/Spiritual practice: ; Role of organized Mandaen [ ] important [ ] some [ ] unable to assess  Coping: [ ] well [ ] with difficulty [ ] poor coping [ ] unable to assess  Support system: [ ] strong [ ] adequate [ ] inadequate    ADVANCE DIRECTIVES:    []MOLST  []Living Will  DECISION MAKER(s):  [] Health Care Proxy(s)  [] Surrogate(s)  [] Guardian           Name(s)/Phone Number(s):     BASELINE (I)ADLs (prior to admission):  [place]: []Total  [] Moderate []Dependent    ALLERGIES:  ace inhibitors (Anaphylaxis)  caffeine (Rash)  cefepime (Rash)  chocolate (Rash)  high acid (Rash)  Tomatoes (Hives)    MEDICATIONS  (STANDING):  chlorhexidine 4% Liquid 1 Application(s) Topical <User Schedule>  ciprofloxacin   IVPB      ciprofloxacin   IVPB 400 milliGRAM(s) IV Intermittent every 8 hours  dexMEDEtomidine Infusion 0.2 MICROgram(s)/kG/Hr (2.84 mL/Hr) IV Continuous <Continuous>  dextrose 40% Gel 15 Gram(s) Oral once  dextrose 5%. 1000 milliLiter(s) (50 mL/Hr) IV Continuous <Continuous>  dextrose 5%. 1000 milliLiter(s) (100 mL/Hr) IV Continuous <Continuous>  dextrose 50% Injectable 25 Gram(s) IV Push once  dextrose 50% Injectable 12.5 Gram(s) IV Push once  dextrose 50% Injectable 25 Gram(s) IV Push once  enoxaparin Injectable 50 milliGRAM(s) SubCutaneous every 12 hours  glucagon  Injectable 1 milliGRAM(s) IntraMuscular once  insulin lispro (ADMELOG) corrective regimen sliding scale   SubCutaneous every 6 hours  pantoprazole  Injectable 40 milliGRAM(s) IV Push every 24 hours  QUEtiapine 50 milliGRAM(s) Oral every 12 hours    MEDICATIONS  (PRN):    PRESENT SYMPTOMS: []Unable to obtain due to poor mentation/encephalopathy  Source if other than patient:  []Family   []Team     Pain: [ ] yes [ ] no  QOL impact -   Location -                    Aggravating Factors -  Quality -  Radiation -  Timing -  Severity (0-10 scale) -   Minimal Acceptable Level (0-10 scale) -    PAIN AD Score:  [email or web address] (press ctrl +  left click to view)    Dyspnea:                           [ ]Mild  [ ]Moderate [ ]Severe  Anxiety:                             [ ]Mild [ ]Moderate [ ]Severe  Fatigue:                             [ ]Mild [ ]Moderate [ ]Severe  Nausea:                             [ ]Mild [ ]Moderate [ ]Severe  Loss of Appetite:             [ ]Mild [ ]Moderate [ ]Severe  Constipation:                   [ ]Mild [ ]Moderate [ ]Severe    Other Symptoms:  [ ]All other review of systems negative     Palliative Performance Status Version 2:  %    http://npcrc.org/files/news/palliative_performance_scale_[email or web address].pdf    PHYSICAL EXAM:  GENERAL:  [ ]Alert  [ ]Oriented x   [ ]Lethargic  [ ]Cachexia  [ ]Unarousable  [ ]Verbal  [ ]Non-Verbal  Behavioral:   [ ]Anxiety  [ ]Delirium [ ]Agitation [ ]Cooperative  HEENT:  [ ]Normal   [ ]Dry mouth   [ ]ET Tube/Trach  [ ]Oral lesions  PULMONARY:   [ ]Clear []Tachypnea  []Audible excessive secretions   [ ]Rhonchi        [ ]Right [ ]Left [ ]Bilateral  [ ]Crackles        [ ]Right [ ]Left [ ]Bilateral  [ ]Wheezing     [ ]Right [ ]Left [ ]Bilateral  CARDIOVASCULAR:    [ ]Regular [ ]Irregular [ ]Tachy  [ ]Eliot [ ]Murmur [ ]Other  GASTROINTESTINAL:  [ ]Soft  [ ]Distended   [ ]+BS  [ ]Non tender [ ]Tender  [ ]PEG [ ]OGT/ NGT  Last BM:     GENITOURINARY:  [ ]Normal [ ] Incontinent   [ ]Oliguria/Anuria   [ ]Quintero  MUSCULOSKELETAL:   [ ]Normal   [ ]Weakness  [ ]Bed/Wheelchair bound [ ]Edema  NEUROLOGIC:   [ ]No focal deficits  [ ]Cognitive impairment  [ ]Dysphagia [ ]Dysarthria [ ]Paresis [ ]Encephalopathic   SKIN:   [ ]Normal   [ ]Pressure ulcer(s)  [ ]Rash    CRITICAL CARE:  [ ]Shock Present  [ ]Septic [ ]Cardiogenic [ ]Neurologic [ ]Hypovolemic  [ ]Vasopressors [ ]Inotropes   [ ]Respiratory failure present [ ]Mechanical Ventilation [ ]Non-invasive ventilatory support [ ]High-Flow  [ ]Acute  [ ]Chronic [ ]Hypoxic  [ ]Hypercarbic  [ ]Other organ failure    Vital Signs Last 24 Hrs  T(C): 37 (08 Mar 2022 21:24), Max: 38.1 (08 Mar 2022 14:09)  T(F): 98.6 (08 Mar 2022 21:24), Max: 100.6 (08 Mar 2022 14:09)  HR: 74 (08 Mar 2022 20:00) (57 - 109)  BP: 102/53 (08 Mar 2022 20:00) (83/46 - 160/67)  BP(mean): 74 (08 Mar 2022 20:00) (60 - 98)  RR: 25 (08 Mar 2022 20:00) (19 - 35)  SpO2: 91% (08 Mar 2022 20:00) (89% - 99%) I&O's Summary    07 Mar 2022 07:01  -  08 Mar 2022 07:00  --------------------------------------------------------  IN: 2549.4 mL / OUT: 3330 mL / NET: -780.6 mL    08 Mar 2022 07:01  -  08 Mar 2022 22:09  --------------------------------------------------------  IN: 561.9 mL / OUT: 2240 mL / NET: -1678.1 mL        LABS:                        10.6   5.96  )-----------( 191      ( 08 Mar 2022 05:25 )             34.7   03-08    141  |  104  |  14  ----------------------------<  134<H>  4.2   |  30  |  0.52    Ca    8.5      08 Mar 2022 05:25  Phos  3.0     03-08  Mg     1.8     03-08    TPro  5.8<L>  /  Alb  3.0<L>  /  TBili  0.3  /  DBili  x   /  AST  43<H>  /  ALT  29  /  AlkPhos  53  03-07      RADIOLOGY & ADDITIONAL STUDIES:      PROTEIN CALORIE MALNUTRITION PRESENT: [ ]mild [ ]moderate [ ]severe [ ]underweight [ ]morbid obesity  [ ]PPSV2 < or = to 30% [ ]significant weight loss  [ ]poor nutritional intake [ ]catabolic state [ ]anasarca     Artificial Nutrition [ ]     REFERRALS:  [x]Social Work  [ ]Case management [ ]PT/OT [ ]Chaplaincy  [ ]Hospice  [ ]Patient/Family Support    DISCUSSION OF CASE: Family - to obtain additional history and to provide emotional support; ( ) -     Care Coordination/Goals of Care Document:                                          Progress Notes    PROGRESS NOTE  Date & Time of Note   2022-03-08 14:00    Notes    Notes: Social work note:  Patient currently remains on 7 east, per medical team  in morning interdisciplinary rounds palliative team will be consulted, and team  working with patient on breathing trials to verify if she is able to be weaned  from ventilator.       recieved call from [place]  [place] phone  [phone], updated provided.  Alternated emergency contacts for patient are  residence clinical manager Nina Lynch  [phone], residence nursing manager Meng [phone].       Electronically signed by:  Faiza Ortega  Electronically signed on:  2022-03-08  14:23           PALLIATIVE MEDICINE COORDINATION OF CARE DOCUMENTATION: [x] Inpatient Consult  Non-Face-to-Face prolonged service provided that relates to (face-to-face) care that has or will occur and ongoing patient management, including one or more of the following: - Reviewed documentation from other physicians and other health care professional services - Reviewed medical records and diagnostic / radiology study results - Coordination with patient's support system  ************************************************************************  MEDICATION REVIEW:  - See Medication List Above    ISTOP REFERENCE:   - no active Rx's / see ISTOP Chart Note  - PRN usage: NO PRN'S  ------------------------------------------------------------------------  COORDINATION OF CARE:  - Palliative Care consulted for: GOC / Symptom Management  - Patient (to be) assessed:  - Patient previously seen by Palliative Care service: NO    ADVANCE CARE PLANNING  - Code status: FULL  - MOLST reviewed in chart: NONE; None found on Alpha  - HCP/Surrogate:  - GOC documents: NONE found on [place]  - HCP/Living will/Other Advanced Directives in Alpha: NONE found on Alpha  ------------------------------------------------------------------------  CARE PROVIDER DOCUMENTATION:  - SW/CM notes: Remains medically active  -   -   -     PLAN OF CARE  - Known admissions in past year:  - Current admit date:  - LOS:  - LACE score:   - Current dispo plan: TO BE DETERMINED    03-03-22 (5d)  ------------------------------------------------------------------------  - Time Spent/Chart reviewed: 31 Minutes [including time used to gather, review and transfer data]  - Start:  - End:    Prolonged services rendered, as part of this patient's care provided by Palliative Medicine, include: i. chart review for provider and ancillary service documentation, ii. pertinent diagnostics including laboratory and imaging studies, iii. medication review including PRN use, iv. admission history including previous palliative care encounters and GOC notes, v. advance care planning documents including HCP and MOLST forms in Alpha. Part of Palliative Medicine extended evaluation and management also involves coordination of care with our IDT, the primary and consulting teams, and unit CM/SW and Hospice if eligible. Recommendations based on the information gathered and discussed are outlined in the A/P of Palliative notes.   [facility] Geriatrics and Palliative Care  April Amos Palliative Care Attending  Contact Info: Call [phone] (HEAL Line) or message on Microsoft Teams    HPI:  74 year old female with a PMH of cerebral palsy with functional quadroplegia, DVT/PE on eliquis, GERD, Chronic dyshagia with PEG, Thoracic spine fusion and a history of prior admission in 2020 for aspiration PNA requiring intubation presents from her assisted living facility in acute hypoxic respiratory failure. Ahe was found by staff with difficulty breathing.  +cough.  ~1am.  EMS arrival noted pt w/ resp distress, O2 84% in RA, intubated in field for airway protection, noted "thick viscous" liquid in her oropharynx during intubation. Patient lost conciousness multiple times upon transfer to [facility] with subsequent transfer to [facility] to be cared for in the MICU.     ED Course:  Vitals: T 99.1, HR 81, BP 91/58, RR 18, 100% O2 sat Intubated VC/AC  Labs: cbc wnl, Cl 94, HCO3- 32, Lactate 5.4, VBG with pH 7.19, pCO2 87, venous O2 sat 95%  Imaging: CXR BL opacities worse on the left   Interventions: Metronidazole 500mg x1, Fent drip  (03 Mar 2022 05:40)    PERTINENT PM/SXH:   CP (cerebral palsy)    Mental retardation    Dysphagia    GERD (gastroesophageal reflux disease)    PE (pulmonary thromboembolism)    DVT (deep venous thrombosis)    Spastic quadriplegia    HTN (hypertension)      PEG tube malfunction      FAMILY HISTORY:    ITEMS NOT CHECKED ARE NOT PRESENT    SOCIAL HISTORY:   Significant other/partner:  []  Children:  []   Substance hx:  []   Tobacco hx:  []   Alcohol hx: []   Home Opioid hx:  [] I-Stop Reference No:  - no active Rx's / see chart note  Living Situation: []Home  []Long term care  []Rehab [x]Other - Group Home  Alevism/Spiritual practice: ; Role of organized Mormon [ ] important [ ] some [x ] unable to assess  Coping: [ ] well [ ] with difficulty [ ] poor coping [x ] unable to assess  Support system: [ ] strong [x ] adequate [ ] inadequate    ADVANCE DIRECTIVES:    []MOLST  []Living Will  DECISION MAKER(s):  [] Health Care Proxy(s)  [] Surrogate(s)  [x] Guardian           Name(s)/Phone Number(s): OPWDD and MHLS    BASELINE (I)ADLs (prior to admission):  [place]: []Total  [] Moderate []Dependent    ALLERGIES:  ace inhibitors (Anaphylaxis)  caffeine (Rash)  cefepime (Rash)  chocolate (Rash)  high acid (Rash)  Tomatoes (Hives)    MEDICATIONS  (STANDING):  chlorhexidine 4% Liquid 1 Application(s) Topical <User Schedule>  ciprofloxacin   IVPB      ciprofloxacin   IVPB 400 milliGRAM(s) IV Intermittent every 8 hours  dexMEDEtomidine Infusion 0.2 MICROgram(s)/kG/Hr (2.84 mL/Hr) IV Continuous <Continuous>  dextrose 40% Gel 15 Gram(s) Oral once  dextrose 5%. 1000 milliLiter(s) (50 mL/Hr) IV Continuous <Continuous>  dextrose 5%. 1000 milliLiter(s) (100 mL/Hr) IV Continuous <Continuous>  dextrose 50% Injectable 25 Gram(s) IV Push once  dextrose 50% Injectable 12.5 Gram(s) IV Push once  dextrose 50% Injectable 25 Gram(s) IV Push once  enoxaparin Injectable 50 milliGRAM(s) SubCutaneous every 12 hours  glucagon  Injectable 1 milliGRAM(s) IntraMuscular once  insulin lispro (ADMELOG) corrective regimen sliding scale   SubCutaneous every 6 hours  pantoprazole  Injectable 40 milliGRAM(s) IV Push every 24 hours  QUEtiapine 50 milliGRAM(s) Oral every 12 hours    MEDICATIONS  (PRN):    PRESENT SYMPTOMS: [x]Unable to obtain due to poor mentation/encephalopathy  Source if other than patient:  []Family   []Team     Pain: [ ] yes [x ] no  QOL impact -   Location -                    Aggravating Factors -  Quality -  Radiation -  Timing -  Severity (0-10 scale) -   Minimal Acceptable Level (0-10 scale) -    PAIN AD Score:  [email or web address] (press ctrl +  left click to view)    Dyspnea:                           [ ]Mild  [x ]Moderate [ ]Severe  Anxiety:                             [ x]Mild [ ]Moderate [ ]Severe  Fatigue:                             [ ]Mild [ ]Moderate [x ]Severe  Nausea:                             [ ]Mild [ ]Moderate [ ]Severe  Loss of Appetite:             [ ]Mild [ ]Moderate [ x]Severe  Constipation:                   [ ]Mild [ ]Moderate [ ]Severe    Other Symptoms:  [x ]All other review of systems negative     Palliative Performance Status Version 2:  30%    http://npcrc.org/files/news/palliative_performance_scale_[email or web address].pdf    PHYSICAL EXAM:  GENERAL:  [x ]Alert  [ x]Oriented x  2 [ ]Lethargic  [ ]Cachexia  [ ]Unarousable  [ ]Verbal  [ ]Non-Verbal  Behavioral:   [ ]Anxiety  [ ]Delirium [ ]Agitation [x ]Cooperative  HEENT:  [ ]Normal   [x ]Dry mouth   [ ]ET Tube/Trach  [ ]Oral lesions  PULMONARY:   [ ]Clear []Tachypnea  []Audible excessive secretions   [ x]Rhonchi        [ ]Right [ ]Left [x ]Bilateral  [ ]Crackles        [ ]Right [ ]Left [ ]Bilateral  [ ]Wheezing     [ ]Right [ ]Left [ ]Bilateral  CARDIOVASCULAR:    [x ]Regular [ ]Irregular [ ]Tachy  [ ]Eliot [ ]Murmur [ ]Other  GASTROINTESTINAL:  [ x]Soft  [ ]Distended   [x ]+BS  [x ]Non tender [ ]Tender  [ ]PEG [ ]OGT/ NGT  Last BM:     GENITOURINARY:  [ ]Normal [ ] Incontinent   [ ]Oliguria/Anuria   [ ]Quintero  MUSCULOSKELETAL:   [ ]Normal   [ ]Weakness  [ ]Bed/Wheelchair bound [ ]Edema  NEUROLOGIC:   [ ]No focal deficits  [ ]Cognitive impairment  [ ]Dysphagia [ ]Dysarthria [ ]Paresis [ ]Encephalopathic   SKIN:   [ ]Normal   [ ]Pressure ulcer(s)  [ ]Rash    CRITICAL CARE:  [ ]Shock Present  [ ]Septic [ ]Cardiogenic [ ]Neurologic [ ]Hypovolemic  [ ]Vasopressors [ ]Inotropes   [x ]Respiratory failure present [ ]Mechanical Ventilation [ ]Non-invasive ventilatory support [ x]High-Flow  [ ]Acute  [ ]Chronic [ ]Hypoxic  [ ]Hypercarbic  [ ]Other organ failure    Vital Signs Last 24 Hrs  T(C): 37 (08 Mar 2022 21:24), Max: 38.1 (08 Mar 2022 14:09)  T(F): 98.6 (08 Mar 2022 21:24), Max: 100.6 (08 Mar 2022 14:09)  HR: 74 (08 Mar 2022 20:00) (57 - 109)  BP: 102/53 (08 Mar 2022 20:00) (83/46 - 160/67)  BP(mean): 74 (08 Mar 2022 20:00) (60 - 98)  RR: 25 (08 Mar 2022 20:00) (19 - 35)  SpO2: 91% (08 Mar 2022 20:00) (89% - 99%) I&O's Summary    07 Mar 2022 07:01  -  08 Mar 2022 07:00  --------------------------------------------------------  IN: 2549.4 mL / OUT: 3330 mL / NET: -780.6 mL    08 Mar 2022 07:01  -  08 Mar 2022 22:09  --------------------------------------------------------  IN: 561.9 mL / OUT: 2240 mL / NET: -1678.1 mL        LABS:                        10.6   5.96  )-----------( 191      ( 08 Mar 2022 05:25 )             34.7   03-08    141  |  104  |  14  ----------------------------<  134<H>  4.2   |  30  |  0.52    Ca    8.5      08 Mar 2022 05:25  Phos  3.0     03-08  Mg     1.8     03-08    TPro  5.8<L>  /  Alb  3.0<L>  /  TBili  0.3  /  DBili  x   /  AST  43<H>  /  ALT  29  /  AlkPhos  53  03-07      RADIOLOGY & ADDITIONAL STUDIES:      PROTEIN CALORIE MALNUTRITION PRESENT: [ ]mild [ ]moderate [ ]severe [ ]underweight [ ]morbid obesity  [ ]PPSV2 < or = to 30% [ ]significant weight loss  [ ]poor nutritional intake [ ]catabolic state [ ]anasarca     Artificial Nutrition [ ]     REFERRALS:  [x]Social Work  [ ]Case management [ ]PT/OT [ ]Chaplaincy  [ ]Hospice  [ ]Patient/Family Support    DISCUSSION OF CASE: Family - to obtain additional history and to provide emotional support; ( ) -     Care Coordination/Goals of Care Document:                                          Progress Notes    PROGRESS NOTE  Date & Time of Note   2022-03-08 14:00    Notes    Notes: Social work note:  Patient currently remains on 7 east, per medical team  in morning interdisciplinary rounds palliative team will be consulted, and team  working with patient on breathing trials to verify if she is able to be weaned  from ventilator.       recieved call from [place]  [place] phone  [phone], updated provided.  Alternated emergency contacts for patient are  residence clinical manager Nina Lynch  [phone], residence nursing manager Meng [phone].       Electronically signed by:  Faiza Ortega  Electronically signed on:  2022-03-08  14:23           PALLIATIVE MEDICINE COORDINATION OF CARE DOCUMENTATION: [x] Inpatient Consult  Non-Face-to-Face prolonged service provided that relates to (face-to-face) care that has or will occur and ongoing patient management, including one or more of the following: - Reviewed documentation from other physicians and other health care professional services - Reviewed medical records and diagnostic / radiology study results - Coordination with patient's support system  ************************************************************************  MEDICATION REVIEW:  - See Medication List Above    ISTOP REFERENCE:   - no active Rx's / see ISTOP Chart Note  - PRN usage: NO PRN'S  ------------------------------------------------------------------------  COORDINATION OF CARE:  - Palliative Care consulted for: GOC / Symptom Management  - Patient (to be) assessed:  - Patient previously seen by Palliative Care service: NO    ADVANCE CARE PLANNING  - Code status: FULL  - MOLST reviewed in chart: NONE; None found on Alpha  - HCP/Surrogate:  - GOC documents: NONE found on [place]  - HCP/Living will/Other Advanced Directives in Alpha: NONE found on Alpha  ------------------------------------------------------------------------  CARE PROVIDER DOCUMENTATION:  - SW/CM notes: Remains medically active  -   -   -     PLAN OF CARE  - Known admissions in past year:   - Current admit date:  - LOS:  - LACE score:   - Current dispo plan: TO BE DETERMINED    03-03-22 (5d)  ------------------------------------------------------------------------  - Time Spent/Chart reviewed: 31 Minutes [including time used to gather, review and transfer data]  - Start:  - End:    Prolonged services rendered, as part of this patient's care provided by Palliative Medicine, include: i. chart review for provider and ancillary service documentation, ii. pertinent diagnostics including laboratory and imaging studies, iii. medication review including PRN use, iv. admission history including previous palliative care encounters and GOC notes, v. advance care planning documents including HCP and MOLST forms in Alpha. Part of Palliative Medicine extended evaluation and management also involves coordination of care with our IDT, the primary and consulting teams, and unit CM/SW and Hospice if eligible. Recommendations based on the information gathered and discussed are outlined in the A/P of Palliative notes.   [facility] Geriatrics and Palliative Care  April Amos Palliative Care Attending  Contact Info: Call [phone] (HEAL Line) or message on Microsoft Teams    HPI:  74 year old female with a PMH of cerebral palsy with functional quadroplegia, DVT/PE on eliquis, GERD, Chronic dyshagia with PEG, Thoracic spine fusion and a history of prior admission in 2020 for aspiration PNA requiring intubation presents from her assisted living facility in acute hypoxic respiratory failure. Ahe was found by staff with difficulty breathing.  +cough.  ~1am.  EMS arrival noted pt w/ resp distress, O2 84% in RA, intubated in field for airway protection, noted "thick viscous" liquid in her oropharynx during intubation. Patient lost conciousness multiple times upon transfer to [facility] with subsequent transfer to [facility] to be cared for in the MICU.     ED Course:  Vitals: T 99.1, HR 81, BP 91/58, RR 18, 100% O2 sat Intubated VC/AC  Labs: cbc wnl, Cl 94, HCO3- 32, Lactate 5.4, VBG with pH 7.19, pCO2 87, venous O2 sat 95%  Imaging: CXR BL opacities worse on the left   Interventions: Metronidazole 500mg x1, Fent drip  (03 Mar 2022 05:40)    PERTINENT PM/SXH:   CP (cerebral palsy)    Mental retardation    Dysphagia    GERD (gastroesophageal reflux disease)    PE (pulmonary thromboembolism)    DVT (deep venous thrombosis)    Spastic quadriplegia    HTN (hypertension)      PEG tube malfunction      FAMILY HISTORY:    ITEMS NOT CHECKED ARE NOT PRESENT    SOCIAL HISTORY:   Significant other/partner:  []  Children:  []   Substance hx:  []   Tobacco hx:  []   Alcohol hx: []   Home Opioid hx:  [] I-Stop Reference No:  - no active Rx's / see chart note  Living Situation: []Home  []Long term care  []Rehab [x]Other - Group Home  Oriental orthodox/Spiritual practice: ; Role of organized Episcopal [ ] important [ ] some [x ] unable to assess  Coping: [ ] well [ ] with difficulty [ ] poor coping [x ] unable to assess  Support system: [ ] strong [x ] adequate [ ] inadequate    ADVANCE DIRECTIVES:    []MOLST  []Living Will  DECISION MAKER(s):  [] Health Care Proxy(s)  [] Surrogate(s)  [x] Guardian           Name(s)/Phone Number(s): OPWDD and MHLS    BASELINE (I)ADLs (prior to admission):  [place]: []Total  [] Moderate []Dependent    ALLERGIES:  ace inhibitors (Anaphylaxis)  caffeine (Rash)  cefepime (Rash)  chocolate (Rash)  high acid (Rash)  Tomatoes (Hives)    MEDICATIONS  (STANDING):  chlorhexidine 4% Liquid 1 Application(s) Topical <User Schedule>  ciprofloxacin   IVPB      ciprofloxacin   IVPB 400 milliGRAM(s) IV Intermittent every 8 hours  dexMEDEtomidine Infusion 0.2 MICROgram(s)/kG/Hr (2.84 mL/Hr) IV Continuous <Continuous>  dextrose 40% Gel 15 Gram(s) Oral once  dextrose 5%. 1000 milliLiter(s) (50 mL/Hr) IV Continuous <Continuous>  dextrose 5%. 1000 milliLiter(s) (100 mL/Hr) IV Continuous <Continuous>  dextrose 50% Injectable 25 Gram(s) IV Push once  dextrose 50% Injectable 12.5 Gram(s) IV Push once  dextrose 50% Injectable 25 Gram(s) IV Push once  enoxaparin Injectable 50 milliGRAM(s) SubCutaneous every 12 hours  glucagon  Injectable 1 milliGRAM(s) IntraMuscular once  insulin lispro (ADMELOG) corrective regimen sliding scale   SubCutaneous every 6 hours  pantoprazole  Injectable 40 milliGRAM(s) IV Push every 24 hours  QUEtiapine 50 milliGRAM(s) Oral every 12 hours    MEDICATIONS  (PRN):    PRESENT SYMPTOMS: [x]Unable to obtain due to poor mentation/encephalopathy  Source if other than patient:  []Family   []Team     Pain: [ ] yes [x ] no  QOL impact -   Location -                    Aggravating Factors -  Quality -  Radiation -  Timing -  Severity (0-10 scale) -   Minimal Acceptable Level (0-10 scale) -    PAIN AD Score:  [email or web address].[email or web address]/[email or web address] (press ctrl +  left click to view)    Dyspnea:                           [ ]Mild  [x ]Moderate [ ]Severe  Anxiety:                             [ x]Mild [ ]Moderate [ ]Severe  Fatigue:                             [ ]Mild [ ]Moderate [x ]Severe  Nausea:                             [ ]Mild [ ]Moderate [ ]Severe  Loss of Appetite:             [ ]Mild [ ]Moderate [ x]Severe  Constipation:                   [ ]Mild [ ]Moderate [ ]Severe    Other Symptoms:  [x ]All other review of systems negative     Palliative Performance Status Version 2:  30%    http://npcrc.org/files/news/palliative_performance_scale_ppsv2.pdf    PHYSICAL EXAM:  GENERAL:  [x ]Alert  [ x]Oriented x  2 [ ]Lethargic  [ ]Cachexia  [ ]Unarousable  [ ]Verbal  [ ]Non-Verbal  Behavioral:   [ ]Anxiety  [ ]Delirium [ ]Agitation [x ]Cooperative  HEENT:  [ ]Normal   [x ]Dry mouth   [ ]ET Tube/Trach  [ ]Oral lesions  PULMONARY:   [ ]Clear []Tachypnea  []Audible excessive secretions   [ x]Rhonchi        [ ]Right [ ]Left [x ]Bilateral  [ ]Crackles        [ ]Right [ ]Left [ ]Bilateral  [ ]Wheezing     [ ]Right [ ]Left [ ]Bilateral  CARDIOVASCULAR:    [x ]Regular [ ]Irregular [ ]Tachy  [ ]Eliot [ ]Murmur [ ]Other  GASTROINTESTINAL:  [ x]Soft  [ ]Distended   [x ]+BS  [x ]Non tender [ ]Tender  [ ]PEG [ ]OGT/ NGT  Last BM:     GENITOURINARY:  [ ]Normal [ ] Incontinent   [ ]Oliguria/Anuria   [ ]Quintero  MUSCULOSKELETAL:   [ ]Normal   [ ]Weakness  [ ]Bed/Wheelchair bound [ ]Edema  NEUROLOGIC:   [ ]No focal deficits  [ ]Cognitive impairment  [ ]Dysphagia [ ]Dysarthria [ ]Paresis [ ]Encephalopathic   SKIN:   [ ]Normal   [ ]Pressure ulcer(s)  [ ]Rash    CRITICAL CARE:  [ ]Shock Present  [ ]Septic [ ]Cardiogenic [ ]Neurologic [ ]Hypovolemic  [ ]Vasopressors [ ]Inotropes   [x ]Respiratory failure present [ ]Mechanical Ventilation [ ]Non-invasive ventilatory support [ x]High-Flow  [ ]Acute  [ ]Chronic [ ]Hypoxic  [ ]Hypercarbic  [ ]Other organ failure    Vital Signs Last 24 Hrs  T(C): 37 (08 Mar 2022 21:24), Max: 38.1 (08 Mar 2022 14:09)  T(F): 98.6 (08 Mar 2022 21:24), Max: 100.6 (08 Mar 2022 14:09)  HR: 74 (08 Mar 2022 20:00) (57 - 109)  BP: 102/53 (08 Mar 2022 20:00) (83/46 - 160/67)  BP(mean): 74 (08 Mar 2022 20:00) (60 - 98)  RR: 25 (08 Mar 2022 20:00) (19 - 35)  SpO2: 91% (08 Mar 2022 20:00) (89% - 99%) I&O's Summary    07 Mar 2022 07:01  -  08 Mar 2022 07:00  --------------------------------------------------------  IN: 2549.4 mL / OUT: 3330 mL / NET: -780.6 mL    08 Mar 2022 07:01  -  08 Mar 2022 22:09  --------------------------------------------------------  IN: 561.9 mL / OUT: 2240 mL / NET: -1678.1 mL        LABS:                        10.6   5.96  )-----------( 191      ( 08 Mar 2022 05:25 )             34.7   03-08    141  |  104  |  14  ----------------------------<  134<H>  4.2   |  30  |  0.52    Ca    8.5      08 Mar 2022 05:25  Phos  3.0     03-08  Mg     1.8     03-08    TPro  5.8<L>  /  Alb  3.0<L>  /  TBili  0.3  /  DBili  x   /  AST  43<H>  /  ALT  29  /  AlkPhos  53  03-07      RADIOLOGY & ADDITIONAL STUDIES:      PROTEIN CALORIE MALNUTRITION PRESENT: [ ]mild [ ]moderate [ ]severe [ ]underweight [ ]morbid obesity  [ ]PPSV2 < or = to 30% [ ]significant weight loss  [ ]poor nutritional intake [ ]catabolic state [ ]anasarca     Artificial Nutrition [ ]     REFERRALS:  [x]Social Work  [ ]Case management [ ]PT/OT [ ]Chaplaincy  [ ]Hospice  [ ]Patient/Family Support    DISCUSSION OF CASE: Family - to obtain additional history and to provide emotional support; ( ) -     Care Coordination/Goals of Care Document:                                          Progress Notes    PROGRESS NOTE  Date & Time of Note   2022-03-08 14:00    Notes    Notes: Social work note:  Patient currently remains on 7 east, per medical team  in morning interdisciplinary rounds palliative team will be consulted, and team  working with patient on breathing trials to verify if she is able to be weaned  from ventilator.       recieved call from [place]  [place] phone  [phone], updated provided.  Alternated emergency contacts for patient are  residence clinical manager Nina Lynch  [phone], residence nursing manager Meng [phone].       Electronically signed by:  Faiza Ortega  Electronically signed on:  2022-03-08  14:23           PALLIATIVE MEDICINE COORDINATION OF CARE DOCUMENTATION: [x] Inpatient Consult  Non-Face-to-Face prolonged service provided that relates to (face-to-face) care that has or will occur and ongoing patient management, including one or more of the following: - Reviewed documentation from other physicians and other health care professional services - Reviewed medical records and diagnostic / radiology study results - Coordination with patient's support system  ************************************************************************  MEDICATION REVIEW:  - See Medication List Above    ISTOP REFERENCE:   - no active Rx's / see ISTOP Chart Note  - PRN usage: NO PRN'S  ------------------------------------------------------------------------  COORDINATION OF CARE:  - Palliative Care consulted for: GOC / Symptom Management  - Patient (to be) assessed:  - Patient previously seen by Palliative Care service: NO    ADVANCE CARE PLANNING  - Code status: FULL  - MOLST reviewed in chart: NONE; None found on [facility]  - HCP/Surrogate:  - GOC documents: NONE found on [place]  - HCP/Living will/Other Advanced Directives in Alpha: NONE found on [facility]  ------------------------------------------------------------------------  CARE PROVIDER DOCUMENTATION:  - SW/CM notes: Remains medically active  -   -   -     PLAN OF CARE  - Known admissions in past year: 1  - Current admit date: 3/3/22  - LOS: 5  - LACE score: 3   - Current dispo plan: TO BE DETERMINED    03-03-22 (5d)  ------------------------------------------------------------------------  - Time Spent/Chart reviewed: 41 Minutes [including time used to gather, review and transfer data]  - Start: 1000  - End:  1041    Prolonged services rendered, as part of this patient's care provided by Palliative Medicine, include: i. chart review for provider and ancillary service documentation, ii. pertinent diagnostics including laboratory and imaging studies, iii. medication review including PRN use, iv. admission history including previous palliative care encounters and GOC notes, v. advance care planning documents including HCP and MOLST forms in [facility]. Part of Palliative Medicine extended evaluation and management also involves coordination of care with our IDT, the primary and consulting teams, and unit CM/SW and Hospice if eligible. Recommendations based on the information gathered and discussed are outlined in the A/P of Palliative notes.

## 2022-03-09 LAB
ANION GAP SERPL CALC-SCNC: 11 MMOL/L — SIGNIFICANT CHANGE UP (ref 5–17)
BASOPHILS # BLD AUTO: 0.04 K/UL — SIGNIFICANT CHANGE UP (ref 0–0.2)
BASOPHILS NFR BLD AUTO: 0.6 % — SIGNIFICANT CHANGE UP (ref 0–2)
BUN SERPL-MCNC: 15 MG/DL — SIGNIFICANT CHANGE UP (ref 7–23)
CALCIUM SERPL-MCNC: 8.9 MG/DL — SIGNIFICANT CHANGE UP (ref 8.4–10.5)
CHLORIDE SERPL-SCNC: 101 MMOL/L — SIGNIFICANT CHANGE UP (ref 96–108)
CO2 SERPL-SCNC: 28 MMOL/L — SIGNIFICANT CHANGE UP (ref 22–31)
CREAT SERPL-MCNC: 0.55 MG/DL — SIGNIFICANT CHANGE UP (ref 0.5–1.3)
CULTURE RESULTS: SIGNIFICANT CHANGE UP
EGFR: 96 ML/MIN/1.73M2 — SIGNIFICANT CHANGE UP
EOSINOPHIL # BLD AUTO: 0.19 K/UL — SIGNIFICANT CHANGE UP (ref 0–0.5)
EOSINOPHIL NFR BLD AUTO: 3.1 % — SIGNIFICANT CHANGE UP (ref 0–6)
GLUCOSE BLDC GLUCOMTR-MCNC: 102 MG/DL — HIGH (ref 70–99)
GLUCOSE BLDC GLUCOMTR-MCNC: 111 MG/DL — HIGH (ref 70–99)
GLUCOSE BLDC GLUCOMTR-MCNC: 130 MG/DL — HIGH (ref 70–99)
GLUCOSE BLDC GLUCOMTR-MCNC: 92 MG/DL — SIGNIFICANT CHANGE UP (ref 70–99)
GLUCOSE SERPL-MCNC: 103 MG/DL — HIGH (ref 70–99)
HCT VFR BLD CALC: 37 % — SIGNIFICANT CHANGE UP (ref 34.5–45)
HGB BLD-MCNC: 11.4 G/DL — LOW (ref 11.5–15.5)
IMM GRANULOCYTES NFR BLD AUTO: 0.6 % — SIGNIFICANT CHANGE UP (ref 0–1.5)
LYMPHOCYTES # BLD AUTO: 1.2 K/UL — SIGNIFICANT CHANGE UP (ref 1–3.3)
LYMPHOCYTES # BLD AUTO: 19.3 % — SIGNIFICANT CHANGE UP (ref 13–44)
MAGNESIUM SERPL-MCNC: 1.8 MG/DL — SIGNIFICANT CHANGE UP (ref 1.6–2.6)
MCHC RBC-ENTMCNC: 30.3 PG — SIGNIFICANT CHANGE UP (ref 27–34)
MCHC RBC-ENTMCNC: 30.8 GM/DL — LOW (ref 32–36)
MCV RBC AUTO: 98.4 FL — SIGNIFICANT CHANGE UP (ref 80–100)
MONOCYTES # BLD AUTO: 0.6 K/UL — SIGNIFICANT CHANGE UP (ref 0–0.9)
MONOCYTES NFR BLD AUTO: 9.6 % — SIGNIFICANT CHANGE UP (ref 2–14)
NEUTROPHILS # BLD AUTO: 4.15 K/UL — SIGNIFICANT CHANGE UP (ref 1.8–7.4)
NEUTROPHILS NFR BLD AUTO: 66.8 % — SIGNIFICANT CHANGE UP (ref 43–77)
NRBC # BLD: 0 /100 WBCS — SIGNIFICANT CHANGE UP (ref 0–0)
ORGANISM # SPEC MICROSCOPIC CNT: SIGNIFICANT CHANGE UP
ORGANISM # SPEC MICROSCOPIC CNT: SIGNIFICANT CHANGE UP
PHOSPHATE SERPL-MCNC: 3.1 MG/DL — SIGNIFICANT CHANGE UP (ref 2.5–4.5)
PLATELET # BLD AUTO: 222 K/UL — SIGNIFICANT CHANGE UP (ref 150–400)
POTASSIUM SERPL-MCNC: 3.9 MMOL/L — SIGNIFICANT CHANGE UP (ref 3.5–5.3)
POTASSIUM SERPL-SCNC: 3.9 MMOL/L — SIGNIFICANT CHANGE UP (ref 3.5–5.3)
RBC # BLD: 3.76 M/UL — LOW (ref 3.8–5.2)
RBC # FLD: 12.9 % — SIGNIFICANT CHANGE UP (ref 10.3–14.5)
SARS-COV-2 RNA SPEC QL NAA+PROBE: SIGNIFICANT CHANGE UP
SODIUM SERPL-SCNC: 140 MMOL/L — SIGNIFICANT CHANGE UP (ref 135–145)
SPECIMEN SOURCE: SIGNIFICANT CHANGE UP
WBC # BLD: 6.22 K/UL — SIGNIFICANT CHANGE UP (ref 3.8–10.5)
WBC # FLD AUTO: 6.22 K/UL — SIGNIFICANT CHANGE UP (ref 3.8–10.5)

## 2022-03-09 PROCEDURE — 93010 ELECTROCARDIOGRAM REPORT: CPT

## 2022-03-09 PROCEDURE — 99233 SBSQ HOSP IP/OBS HIGH 50: CPT | Mod: GC

## 2022-03-09 RX ORDER — FUROSEMIDE 40 MG
40 TABLET ORAL ONCE
Refills: 0 | Status: COMPLETED | OUTPATIENT
Start: 2022-03-09 | End: 2022-03-09

## 2022-03-09 RX ORDER — MAGNESIUM SULFATE 500 MG/ML
1 VIAL (ML) INJECTION ONCE
Refills: 0 | Status: COMPLETED | OUTPATIENT
Start: 2022-03-09 | End: 2022-03-09

## 2022-03-09 RX ORDER — ARIPIPRAZOLE 15 MG/1
2 TABLET ORAL EVERY 24 HOURS
Refills: 0 | Status: DISCONTINUED | OUTPATIENT
Start: 2022-03-09 | End: 2022-04-06

## 2022-03-09 RX ORDER — SENNA PLUS 8.6 MG/1
2 TABLET ORAL AT BEDTIME
Refills: 0 | Status: DISCONTINUED | OUTPATIENT
Start: 2022-03-09 | End: 2022-03-09

## 2022-03-09 RX ORDER — POLYETHYLENE GLYCOL 3350 17 G/17G
17 POWDER, FOR SOLUTION ORAL DAILY
Refills: 0 | Status: DISCONTINUED | OUTPATIENT
Start: 2022-03-09 | End: 2022-03-09

## 2022-03-09 RX ORDER — FAMOTIDINE 10 MG/ML
20 INJECTION INTRAVENOUS AT BEDTIME
Refills: 0 | Status: DISCONTINUED | OUTPATIENT
Start: 2022-03-09 | End: 2022-03-13

## 2022-03-09 RX ORDER — ACETAMINOPHEN 500 MG
650 TABLET ORAL EVERY 6 HOURS
Refills: 0 | Status: DISCONTINUED | OUTPATIENT
Start: 2022-03-09 | End: 2022-03-15

## 2022-03-09 RX ADMIN — PANTOPRAZOLE SODIUM 40 MILLIGRAM(S): 20 TABLET, DELAYED RELEASE ORAL at 05:55

## 2022-03-09 RX ADMIN — QUETIAPINE FUMARATE 50 MILLIGRAM(S): 200 TABLET, FILM COATED ORAL at 09:10

## 2022-03-09 RX ADMIN — ENOXAPARIN SODIUM 50 MILLIGRAM(S): 100 INJECTION SUBCUTANEOUS at 14:10

## 2022-03-09 RX ADMIN — Medication 40 MILLIGRAM(S): at 08:45

## 2022-03-09 RX ADMIN — Medication 10 MILLIGRAM(S): at 14:10

## 2022-03-09 RX ADMIN — Medication 100 GRAM(S): at 07:33

## 2022-03-09 RX ADMIN — FAMOTIDINE 20 MILLIGRAM(S): 10 INJECTION INTRAVENOUS at 23:21

## 2022-03-09 RX ADMIN — ARIPIPRAZOLE 2 MILLIGRAM(S): 15 TABLET ORAL at 23:22

## 2022-03-09 RX ADMIN — ENOXAPARIN SODIUM 50 MILLIGRAM(S): 100 INJECTION SUBCUTANEOUS at 04:00

## 2022-03-09 RX ADMIN — Medication 200 MILLIGRAM(S): at 23:25

## 2022-03-09 RX ADMIN — Medication 200 MILLIGRAM(S): at 05:54

## 2022-03-09 RX ADMIN — CHLORHEXIDINE GLUCONATE 1 APPLICATION(S): 213 SOLUTION TOPICAL at 05:54

## 2022-03-09 RX ADMIN — Medication 200 MILLIGRAM(S): at 14:10

## 2022-03-09 RX ADMIN — Medication 650 MILLIGRAM(S): at 23:20

## 2022-03-09 NOTE — PROGRESS NOTE ADULT - PROBLEM SELECTOR PLAN 2
- Catheter culture from 3/3 with >100,000 E.coli  - New lu placed on admission  - c/w cipro 400mg IV Q8, coverage and sensitivity adequate

## 2022-03-09 NOTE — PROGRESS NOTE ADULT - PROBLEM SELECTOR PLAN 4
- c/w Lovenox 50mg Q12  - prior to step down, switch back to home med Eliquis 2.5mg Q12  - History of PE 5 years ago. On Eliquis at home  - Bedside ultrasound with no evidence of clot

## 2022-03-09 NOTE — PROGRESS NOTE ADULT - SUBJECTIVE AND OBJECTIVE BOX
*INCOMPLETE*     Hospital Course  Patient is a 74 year old female with a PMH of cerebral palsy with functional quadriplegia, DVT/PE on Eliquis, GERD, chronic dysphagia with PEG, thoracic spine fusion. Patient has a history of prior admission in 2020 for aspiration pneumonia requiring intubation. Patient presented from her assisted living facility in acute hypoxemic respiratory failure. Patient was found by assisted living facility staff with difficulty breathing. EMS noted that the patient was in respiratory distress upon arrival with SaO2 84% on RA. Patient was intubated in the field for airway protection. "Thick viscous" secretions were noticed in her oropharynx during intubation. Acute hypoxemic respiratory failure likely 2/2 aspiration pneumonia. Patient started on Meropenem due to previous hospitalization with Pseudomonas pneumonia requiring Meropenem. Antibiotic switched to Ciprofloxacin for adequate coverage of Pseudomonas pneumonia. One unsuccessful attempt was made at extubation. Patient started on Seroquel due to persistent anxiety and agitation making extubation increasingly difficult. Patient extubated to HFNC (3/8).     Overnight/Subjective:  No acute events overnight. Pt seen and examined at the bedside this morning. Resting comfortably on HFNC. Denies pain or any acute complaints. States she would like to go home.     Objective:    ICU Vital Signs Last 24 Hrs  T(C): 37.6 (09 Mar 2022 09:14), Max: 38.1 (08 Mar 2022 14:09)  T(F): 99.7 (09 Mar 2022 09:14), Max: 100.6 (08 Mar 2022 14:09)  HR: 121 (09 Mar 2022 11:00) (72 - 124)  BP: 113/59 (09 Mar 2022 11:00) (83/46 - 164/83)  BP(mean): 80 (09 Mar 2022 11:00) (60 - 102)  ABP: --  ABP(mean): --  RR: 28 (09 Mar 2022 11:00) (19 - 35)  SpO2: 90% (09 Mar 2022 11:00) (89% - 97%)    I&O's Detail    08 Mar 2022 07:01  -  09 Mar 2022 07:00  --------------------------------------------------------  IN:    Dexmedetomidine: 74.7 mL    Enteral Tube Flush: 250 mL    IV PiggyBack: 200 mL    IV PiggyBack: 100 mL    Jevity 1.2: 230 mL  Total IN: 854.7 mL    OUT:    Indwelling Catheter - Urethral (mL): 2540 mL  Total OUT: 2540 mL    Total NET: -1685.3 mL      09 Mar 2022 07:01  -  09 Mar 2022 11:35  --------------------------------------------------------  IN:    Dexmedetomidine: 8 mL    Enteral Tube Flush: 250 mL    Jevity 1.2: 50 mL  Total IN: 308 mL    OUT:    Indwelling Catheter - Urethral (mL): 1110 mL  Total OUT: 1110 mL    Total NET: -802 mL    PHYSICAL EXAM:  Constitutional -  Awake, alert, resting comfortably with HFNC  HEENT - NCAT, anicteric sclera, intubated, mucous membranes moist  Neck - Supple, no lymphadenopathy, no JVD  Pulm - Tachypneic, poor chest expansion, shallow breaths. Coarse breath sounds b/l worse in lower lobes. HFNC @ 45L/m 45%FiO2  Cardio -  Tachycardic, S1/S2 present, no murmurs  GI -  Soft, NT, mildly distended, BSx4, PEG tube in place   - Quintero in place, making urine  Extremities - Non-pitting edema b/l LE up to knees, 2+ pulses x4   Neurologic Exam: awake & alert, answering yes/no questions, follows simple commands.        Skin: warm, dry, intact, edema in b/l LE, no rashes or wounds  Lines: Quintero, peripheral IVs     Hospital Course  Patient is a 74 year old female with a PMH of cerebral palsy with functional quadriplegia, DVT/PE on Eliquis, GERD, chronic dysphagia with PEG, thoracic spine fusion. Patient has a history of prior admission in 2020 for aspiration pneumonia requiring intubation. Patient presented from her assisted living facility in acute hypoxemic respiratory failure. Patient was found by assisted living facility staff with difficulty breathing. EMS noted that the patient was in respiratory distress upon arrival with SaO2 84% on RA. Patient was intubated in the field for airway protection. "Thick viscous" secretions were noticed in her oropharynx during intubation. Acute hypoxemic respiratory failure likely 2/2 aspiration pneumonia. Patient started on Meropenem due to previous hospitalization with Pseudomonas pneumonia requiring Meropenem. Antibiotic switched to Ciprofloxacin for adequate coverage of Pseudomonas pneumonia. One unsuccessful attempt was made at extubation. Patient started on Seroquel due to persistent anxiety and agitation making extubation increasingly difficult. Patient extubated to HFNC (3/8).     Overnight/Subjective:  Pt given lasix 40 IV once. Pt extubated to HFNC.     This AM, pt seen and examined at the bedside this morning. Resting comfortably on HFNC. Denies pain including left calf pain reported on 3/8 or any acute complaints. States she would like to go home. Last BM on 3/4/22.     Objective:    ICU Vital Signs Last 24 Hrs  T(C): 37.6 (09 Mar 2022 09:14), Max: 38.1 (08 Mar 2022 14:09)  T(F): 99.7 (09 Mar 2022 09:14), Max: 100.6 (08 Mar 2022 14:09)  HR: 121 (09 Mar 2022 11:00) (72 - 124)  BP: 113/59 (09 Mar 2022 11:00) (83/46 - 164/83)  BP(mean): 80 (09 Mar 2022 11:00) (60 - 102)  ABP: --  ABP(mean): --  RR: 28 (09 Mar 2022 11:00) (19 - 35)  SpO2: 90% (09 Mar 2022 11:00) (89% - 97%)    I&O's Detail    08 Mar 2022 07:01  -  09 Mar 2022 07:00  --------------------------------------------------------  IN:    Dexmedetomidine: 74.7 mL    Enteral Tube Flush: 250 mL    IV PiggyBack: 200 mL    IV PiggyBack: 100 mL    Jevity 1.2: 230 mL  Total IN: 854.7 mL    OUT:    Indwelling Catheter - Urethral (mL): 2540 mL  Total OUT: 2540 mL    Total NET: -1685.3 mL      09 Mar 2022 07:01  -  09 Mar 2022 11:35  --------------------------------------------------------  IN:    Dexmedetomidine: 8 mL    Enteral Tube Flush: 250 mL    Jevity 1.2: 50 mL  Total IN: 308 mL    OUT:    Indwelling Catheter - Urethral (mL): 1110 mL  Total OUT: 1110 mL    Total NET: -802 mL    PHYSICAL EXAM:  Constitutional -  Awake, alert, resting comfortably with HFNC  HEENT - NCAT, anicteric sclera, intubated, dry mucous membranes    Neck - Supple, no lymphadenopathy, no JVD  Pulm - Tachypneic, poor chest expansion, shallow breaths. Coarse breath sounds b/l worse in lower lobes. HFNC @ 45L/m 45%FiO2  Cardio -  Tachycardic, S1/S2 present, no murmurs  GI -  Soft, NT, mildly distended, BSx4, PEG tube in place   - Quintero in place, making urine  Extremities - Non-pitting edema b/l LE up to knees, 2+ pulses x4   Neurologic Exam: awake & alert, answering yes/no questions, follows simple commands.        Skin: warm, dry, intact, edema in b/l LE, no rashes or wounds, no decubitus ulcer  Lines: Quintero, peripheral IVs x2 on left since 3/3 and x1 on right since 3/3     HOSPITAL COURSE  Patient is a 74 year old female with a PMH of cerebral palsy with functional quadriplegia, DVT/PE on Eliquis, GERD, chronic dysphagia with PEG, thoracic spine fusion. Patient has a history of prior admission in 2020 for aspiration pneumonia requiring intubation. Patient presented from her assisted living facility in acute hypoxemic respiratory failure. Patient was found by assisted living facility staff with difficulty breathing. EMS noted that the patient was in respiratory distress upon arrival with SaO2 84% on RA. Patient was intubated in the field for airway protection. "Thick viscous" secretions were noticed in her oropharynx during intubation. Acute hypoxemic respiratory failure likely 2/2 aspiration pneumonia. Patient started on Meropenem due to previous hospitalization with Pseudomonas pneumonia requiring Meropenem. Antibiotic switched to Ciprofloxacin for adequate coverage of Pseudomonas pneumonia. One unsuccessful attempt was made at extubation. Patient started on Seroquel due to persistent anxiety and agitation making extubation increasingly difficult. Patient extubated to HFNC (3/8). Pt further weaned to 6L--> 4L with oxygen sat of 94-95%.     Overnight/Subjective:  Pt given lasix 40 IV once. Pt extubated to HFNC.     This AM, pt seen and examined at the bedside this morning. Resting comfortably on HFNC. Denies pain including left calf pain reported on 3/8 or any acute complaints. States she would like to go home. Last BM on 3/4/22.     Objective:    ICU Vital Signs Last 24 Hrs  T(C): 37.6 (09 Mar 2022 09:14), Max: 38.1 (08 Mar 2022 14:09)  T(F): 99.7 (09 Mar 2022 09:14), Max: 100.6 (08 Mar 2022 14:09)  HR: 121 (09 Mar 2022 11:00) (72 - 124)  BP: 113/59 (09 Mar 2022 11:00) (83/46 - 164/83)  BP(mean): 80 (09 Mar 2022 11:00) (60 - 102)  ABP: --  ABP(mean): --  RR: 28 (09 Mar 2022 11:00) (19 - 35)  SpO2: 90% (09 Mar 2022 11:00) (89% - 97%)    I&O's Detail    08 Mar 2022 07:01  -  09 Mar 2022 07:00  --------------------------------------------------------  IN:    Dexmedetomidine: 74.7 mL    Enteral Tube Flush: 250 mL    IV PiggyBack: 200 mL    IV PiggyBack: 100 mL    Jevity 1.2: 230 mL  Total IN: 854.7 mL    OUT:    Indwelling Catheter - Urethral (mL): 2540 mL  Total OUT: 2540 mL    Total NET: -1685.3 mL      09 Mar 2022 07:01  -  09 Mar 2022 11:35  --------------------------------------------------------  IN:    Dexmedetomidine: 8 mL    Enteral Tube Flush: 250 mL    Jevity 1.2: 50 mL  Total IN: 308 mL    OUT:    Indwelling Catheter - Urethral (mL): 1110 mL  Total OUT: 1110 mL    Total NET: -802 mL    PHYSICAL EXAM:  Constitutional -  Awake, alert, resting comfortably with HFNC  HEENT - NCAT, anicteric sclera, intubated, dry mucous membranes    Neck - Supple, no lymphadenopathy, no JVD  Pulm - Tachypneic, poor chest expansion, shallow breaths. Coarse breath sounds b/l worse in lower lobes. HFNC @ 45L/m 45%FiO2  Cardio -  Tachycardic, S1/S2 present, no murmurs  GI -  Soft, NT, mildly distended, BSx4, PEG tube in place   - Quintero in place, making urine  Extremities - Non-pitting edema b/l LE up to knees, 2+ pulses x4   Neurologic Exam: awake & alert, answering yes/no questions, follows simple commands.        Skin: warm, dry, intact, edema in b/l LE, no rashes or wounds, no decubitus ulcer  Lines: Quintero, peripheral IVs x2 on left since 3/3 and x1 on right since 3/3

## 2022-03-09 NOTE — PROGRESS NOTE ADULT - PROBLEM SELECTOR PLAN 1
- Likely secondary to aspiration event given similar history in conjunction with Chest X ray  - Sputum culture: pseudomonas  - c/w Cipro 400mg IV Q8  - Blood culture with no growth to date  - d/c Seroquel 50mg Q12  - restart home med Abilify 2mg QHS  - Lasix 40mg IV given today 3/9. Monitor urine output  - Patient extubated (3/8) to HFNC. Saturations in low 90s @ 45L/m 45%FiO2  - attempt to wean to NC  - Chest PT Q6hrs  - EKG to check for sinus rhythm

## 2022-03-09 NOTE — PROGRESS NOTE ADULT - PROBLEM SELECTOR PLAN 5
- functional quadriplegia, health aide at bedside reports at baseline she is able to follow commands and communicate "when she wants to"  - Chair positioning of bed  - PT eval ordered

## 2022-03-09 NOTE — PROGRESS NOTE ADULT - PROBLEM SELECTOR PLAN 7
N: Jevity 1.2 at 50cc/hr  E: PRN  DVT: Lovenox therapeutic  C: Full Code, pending goals of care conversation  D: MICU N: Jevity 1.2 at 50cc/hr  E: PRN  DVT: Lovenox therapeutic  C: Full Code, pending discussion with pt's CAB state advocate for GOC and medical decisions  IN CASE OF EMERGENCY, 2-physician consent is required  D: MICU

## 2022-03-09 NOTE — PROGRESS NOTE ADULT - PROBLEM SELECTOR PLAN 6
- PEG tube in place  - no BM since 3/4 per nursing staff  - Daily enema started 3/7  - Suppository dulcolax ordered today. - PEG tube in place    #Constipation  - no BM since 3/4 per nursing staff  - Daily enema started 3/7  - Suppository dulcolax ordered today.

## 2022-03-09 NOTE — PROGRESS NOTE ADULT - ASSESSMENT
74 F with PMHx of CP with functional quadriplegia, DVT/PE on eliquis, GERD, chronic dysphagia with PEG, thoracic spine fusion admitted for acute hypoxic respiratory failure found to have pseudomonal PNA cb periods of agitation/anxiety, now s/p extubation to HFNC since 3/8. 74 F with PMHx of CP with functional quadriplegia, DVT/PE on eliquis, GERD, chronic dysphagia with PEG, thoracic spine fusion admitted for acute hypoxic respiratory failure found to have pseudomonal PNA c/b periods of agitation/anxiety, now s/p extubation to HFNC since 3/8, as well as e.coli UTI

## 2022-03-09 NOTE — PROGRESS NOTE ADULT - ATTENDING COMMENTS
77 yo F with functional quadriplegia 2/2 cerebral palsy admitted for acute hypoxic respiratory failure 2/2 pseudomonal pneumonia. abx per ID. failed extubation on 3-4 because of secretions, extubated on 3-8. doing well since on HFNC. plan to continue to wean as tolerated. needs chest pt, pt/ot. f/u with ethics/palliative care.

## 2022-03-10 DIAGNOSIS — Z71.89 OTHER SPECIFIED COUNSELING: ICD-10-CM

## 2022-03-10 DIAGNOSIS — J69.0 PNEUMONITIS DUE TO INHALATION OF FOOD AND VOMIT: ICD-10-CM

## 2022-03-10 LAB
ALBUMIN SERPL ELPH-MCNC: 4.1 G/DL — SIGNIFICANT CHANGE UP (ref 3.3–5)
ALP SERPL-CCNC: 87 U/L — SIGNIFICANT CHANGE UP (ref 40–120)
ALT FLD-CCNC: 55 U/L — HIGH (ref 10–45)
ANION GAP SERPL CALC-SCNC: 10 MMOL/L — SIGNIFICANT CHANGE UP (ref 5–17)
AST SERPL-CCNC: 67 U/L — HIGH (ref 10–40)
BASOPHILS # BLD AUTO: 0.06 K/UL — SIGNIFICANT CHANGE UP (ref 0–0.2)
BASOPHILS NFR BLD AUTO: 0.6 % — SIGNIFICANT CHANGE UP (ref 0–2)
BILIRUB SERPL-MCNC: 0.4 MG/DL — SIGNIFICANT CHANGE UP (ref 0.2–1.2)
BUN SERPL-MCNC: 21 MG/DL — SIGNIFICANT CHANGE UP (ref 7–23)
CALCIUM SERPL-MCNC: 10 MG/DL — SIGNIFICANT CHANGE UP (ref 8.4–10.5)
CHLORIDE SERPL-SCNC: 101 MMOL/L — SIGNIFICANT CHANGE UP (ref 96–108)
CO2 SERPL-SCNC: 32 MMOL/L — HIGH (ref 22–31)
CREAT SERPL-MCNC: 0.65 MG/DL — SIGNIFICANT CHANGE UP (ref 0.5–1.3)
EGFR: 92 ML/MIN/1.73M2 — SIGNIFICANT CHANGE UP
EOSINOPHIL # BLD AUTO: 0.11 K/UL — SIGNIFICANT CHANGE UP (ref 0–0.5)
EOSINOPHIL NFR BLD AUTO: 1.1 % — SIGNIFICANT CHANGE UP (ref 0–6)
GLUCOSE BLDC GLUCOMTR-MCNC: 108 MG/DL — HIGH (ref 70–99)
GLUCOSE BLDC GLUCOMTR-MCNC: 112 MG/DL — HIGH (ref 70–99)
GLUCOSE SERPL-MCNC: 138 MG/DL — HIGH (ref 70–99)
HCT VFR BLD CALC: 40.3 % — SIGNIFICANT CHANGE UP (ref 34.5–45)
HGB BLD-MCNC: 12.8 G/DL — SIGNIFICANT CHANGE UP (ref 11.5–15.5)
IMM GRANULOCYTES NFR BLD AUTO: 0.6 % — SIGNIFICANT CHANGE UP (ref 0–1.5)
LYMPHOCYTES # BLD AUTO: 1.31 K/UL — SIGNIFICANT CHANGE UP (ref 1–3.3)
LYMPHOCYTES # BLD AUTO: 13.5 % — SIGNIFICANT CHANGE UP (ref 13–44)
MAGNESIUM SERPL-MCNC: 2 MG/DL — SIGNIFICANT CHANGE UP (ref 1.6–2.6)
MCHC RBC-ENTMCNC: 31.2 PG — SIGNIFICANT CHANGE UP (ref 27–34)
MCHC RBC-ENTMCNC: 31.8 GM/DL — LOW (ref 32–36)
MCV RBC AUTO: 98.3 FL — SIGNIFICANT CHANGE UP (ref 80–100)
MONOCYTES # BLD AUTO: 1 K/UL — HIGH (ref 0–0.9)
MONOCYTES NFR BLD AUTO: 10.3 % — SIGNIFICANT CHANGE UP (ref 2–14)
NEUTROPHILS # BLD AUTO: 7.14 K/UL — SIGNIFICANT CHANGE UP (ref 1.8–7.4)
NEUTROPHILS NFR BLD AUTO: 73.9 % — SIGNIFICANT CHANGE UP (ref 43–77)
NRBC # BLD: 0 /100 WBCS — SIGNIFICANT CHANGE UP (ref 0–0)
PHOSPHATE SERPL-MCNC: 4.1 MG/DL — SIGNIFICANT CHANGE UP (ref 2.5–4.5)
PLATELET # BLD AUTO: 297 K/UL — SIGNIFICANT CHANGE UP (ref 150–400)
POTASSIUM SERPL-MCNC: 4.3 MMOL/L — SIGNIFICANT CHANGE UP (ref 3.5–5.3)
POTASSIUM SERPL-SCNC: 4.3 MMOL/L — SIGNIFICANT CHANGE UP (ref 3.5–5.3)
PROT SERPL-MCNC: 7.7 G/DL — SIGNIFICANT CHANGE UP (ref 6–8.3)
RBC # BLD: 4.1 M/UL — SIGNIFICANT CHANGE UP (ref 3.8–5.2)
RBC # FLD: 13.1 % — SIGNIFICANT CHANGE UP (ref 10.3–14.5)
SODIUM SERPL-SCNC: 143 MMOL/L — SIGNIFICANT CHANGE UP (ref 135–145)
WBC # BLD: 9.68 K/UL — SIGNIFICANT CHANGE UP (ref 3.8–10.5)
WBC # FLD AUTO: 9.68 K/UL — SIGNIFICANT CHANGE UP (ref 3.8–10.5)

## 2022-03-10 PROCEDURE — 71045 X-RAY EXAM CHEST 1 VIEW: CPT | Mod: 26

## 2022-03-10 PROCEDURE — 99232 SBSQ HOSP IP/OBS MODERATE 35: CPT

## 2022-03-10 PROCEDURE — 99233 SBSQ HOSP IP/OBS HIGH 50: CPT | Mod: GC

## 2022-03-10 RX ORDER — APIXABAN 2.5 MG/1
2.5 TABLET, FILM COATED ORAL EVERY 12 HOURS
Refills: 0 | Status: DISCONTINUED | OUTPATIENT
Start: 2022-03-10 | End: 2022-03-13

## 2022-03-10 RX ORDER — CIPROFLOXACIN LACTATE 400MG/40ML
500 VIAL (ML) INTRAVENOUS EVERY 8 HOURS
Refills: 0 | Status: DISCONTINUED | OUTPATIENT
Start: 2022-03-10 | End: 2022-03-10

## 2022-03-10 RX ORDER — APIXABAN 2.5 MG/1
5 TABLET, FILM COATED ORAL EVERY 12 HOURS
Refills: 0 | Status: DISCONTINUED | OUTPATIENT
Start: 2022-03-10 | End: 2022-03-10

## 2022-03-10 RX ORDER — CIPROFLOXACIN LACTATE 400MG/40ML
400 VIAL (ML) INTRAVENOUS EVERY 8 HOURS
Refills: 0 | Status: DISCONTINUED | OUTPATIENT
Start: 2022-03-10 | End: 2022-03-14

## 2022-03-10 RX ADMIN — Medication 200 MILLIGRAM(S): at 14:51

## 2022-03-10 RX ADMIN — Medication 650 MILLIGRAM(S): at 20:07

## 2022-03-10 RX ADMIN — ARIPIPRAZOLE 2 MILLIGRAM(S): 15 TABLET ORAL at 22:02

## 2022-03-10 RX ADMIN — Medication 650 MILLIGRAM(S): at 00:31

## 2022-03-10 RX ADMIN — CHLORHEXIDINE GLUCONATE 1 APPLICATION(S): 213 SOLUTION TOPICAL at 06:36

## 2022-03-10 RX ADMIN — Medication 200 MILLIGRAM(S): at 21:32

## 2022-03-10 RX ADMIN — Medication 650 MILLIGRAM(S): at 21:07

## 2022-03-10 RX ADMIN — FAMOTIDINE 20 MILLIGRAM(S): 10 INJECTION INTRAVENOUS at 22:02

## 2022-03-10 RX ADMIN — ENOXAPARIN SODIUM 50 MILLIGRAM(S): 100 INJECTION SUBCUTANEOUS at 03:35

## 2022-03-10 RX ADMIN — APIXABAN 2.5 MILLIGRAM(S): 2.5 TABLET, FILM COATED ORAL at 14:51

## 2022-03-10 RX ADMIN — Medication 200 MILLIGRAM(S): at 06:36

## 2022-03-10 NOTE — PROGRESS NOTE ADULT - ASSESSMENT
74 F with PMHx of CP with functional quadriplegia, DVT/PE on eliquis, GERD, chronic dysphagia with PEG, thoracic spine fusion admitted for acute hypoxic respiratory failure found to have pseudomonal PNA c/b periods of agitation/anxiety, now s/p extubation to HFNC since 3/8, as well as e.coli UTI          74 F with PMHx of CP with functional quadriplegia, DVT/PE on eliquis, GERD, chronic dysphagia with PEG, thoracic spine fusion admitted for acute hypoxic respiratory failure found to have pseudomonal PNA c/b periods of agitation/anxiety,  as well as e.coli UTI, now s/p extubation since 3/8.

## 2022-03-10 NOTE — PROGRESS NOTE ADULT - PROBLEM SELECTOR PLAN 4
Patient with history of GERD on famotidine 20 mg qHS at home. On pantoprazole 40 mg QD while in MICU    - c/w home med: famotidine 20mg QHS

## 2022-03-10 NOTE — PROGRESS NOTE ADULT - PROBLEM SELECTOR PLAN 8
Patient with cerebral palsy. Previously a [place] resident, now resides in group home, with [place] CAB per palliative care. Patient with repeat admission for aspiration pneumonia with history of dysphagia and copious secretions from oropharynx on exam.    - palliative care consulted  - pending [facility] discussion

## 2022-03-10 NOTE — PROGRESS NOTE ADULT - ATTENDING COMMENTS
75 yo F with functional quadriplegia 2/2 cerebral palsy admitted for acute hypoxic respiratory failure 2/2 pseudomonal pneumonia. abx per ID. failed extubation on 3-4 because of secretions, extubated on 3-8. doing well since, now on nasal canula 3L.  on HFNC. plan to continue to wean as tolerated. needs chest pt, pt/ot. f/u with ethics/palliative care. stable for transfer to the floor.

## 2022-03-10 NOTE — PROGRESS NOTE ADULT - ATTENDING COMMENTS
74 F with h/o cerebral palsy with functional quadriplegia, DVT/PE on eliquis, GERD, chronic dysphagia with PEG, thoracic spine fusion admitted to icu  and intubated for acute hypoxic respiratory failure due to aspiration Pneumonia . Hospital course complicated by periods of agitation which required sedation. Pt was extubated and switched to  HFNC and now to NC 3 lts. Pt was also Rx for e.coli UTI and downgraded to RMF when stable  cont PEG feeds  Taper o2  Palliative care f/u   dvt prophylaxis  2 pi for emergency procedures  Prognosis Guarded  Palliative care on board to discuss GOC  Functional quadriplegia PPS<30%  pending discussion with pt's CAB state advocate for GOC and medical decisions

## 2022-03-10 NOTE — PROGRESS NOTE ADULT - PROBLEM SELECTOR PLAN 3
Catheter culture from 3/3 with >100,000 E.coli. New lu placed on admission.    - c/w cipro 400mg IV Q8, coverage and sensitivity adequate

## 2022-03-10 NOTE — PROGRESS NOTE ADULT - PROBLEM SELECTOR PLAN 7
N: Jevity 1.2 at 50cc/hr  E: PRN  DVT: Lovenox therapeutic  C: Full Code, pending discussion with pt's CAB state advocate for GOC and medical decisions  IN CASE OF EMERGENCY, 2-physician consent is required  D: MICU N: Jevity 1.2 at 50cc/hr  E: PRN  DVT: Eliquis 2.5 BID  C: Full Code, pending discussion with pt's CAB state advocate for GOC and medical decisions  IN CASE OF EMERGENCY, 2-physician consent is required  D: F

## 2022-03-10 NOTE — PROGRESS NOTE ADULT - PROBLEM SELECTOR PLAN 7
- PEG tube in place    #Constipation  - no BM since 3/4 per nursing staff  - Daily enema started 3/7  - Suppository dulcolax ordered today. - PEG tube in place    #Constipation  Last BM 3/9 in PM per MICU team. Pt with somewhat tense abdomen.     - Suppository dulcolax ordered today.  - continue to monitor - PEG tube in place    #Constipation  Last BM 3/9 in PM per MICU team. Pt with somewhat tense abdomen.   - Suppository dulcolax ordered today.  - continue to monitor

## 2022-03-10 NOTE — PROGRESS NOTE ADULT - PROBLEM SELECTOR PLAN 6
- PEG tube in place    #Constipation  - no BM since 3/4 per nursing staff  - Daily enema started 3/7  - Suppository dulcolax ordered today. - PEG tube in place    #Constipation  - BM achieved 3/9 per nursing staff  - Daily enema started 3/7

## 2022-03-10 NOTE — PHYSICAL THERAPY INITIAL EVALUATION ADULT - PERTINENT HX OF CURRENT PROBLEM, REHAB EVAL
Pt. is a 76 y.o female with h/o CP, chronic dysphagia with + pEG, presenting for AHRF s/p intubation in the field due to thick oropharyngeal secretions. Admitted for further management og AHRF in settings of aspiration PNA. Extubated on 3/8, weaned to 3L/min. Pt. is a 76 y.o female with h/o CP, chronic dysphagia with + PEG, presenting for AHRF s/p intubation in the field due to thick oropharyngeal secretions. Admitted for further management og AHRF in settings of aspiration PNA. Extubated on 3/8, weaned to 3L/min.

## 2022-03-10 NOTE — PROGRESS NOTE ADULT - ASSESSMENT
74 F with PMHx of CP with functional quadriplegia, DVT/PE on eliquis, GERD, chronic dysphagia with PEG, thoracic spine fusion admitted for acute hypoxic respiratory failure found to have pseudomonal PNA c/b periods of agitation/anxiety, now s/p extubation to HFNC since 3/8, as well as e.coli UTI

## 2022-03-10 NOTE — PROGRESS NOTE ADULT - PROBLEM SELECTOR PLAN 9
N: Jevity 1.2 at 50cc/hr  E: PRN  DVT: Lovenox therapeutic  C: Full Code, pending discussion with pt's CAB state advocate for GOC and medical decisions  IN CASE OF EMERGENCY, 2-physician consent is required  D: LUCA N: Jevity 1.2 at 50cc/hr  E: PRN  DVT: Eliquis 2.5 mg BID  C: Full Code, pending discussion with pt's CAB state advocate for GOC and medical decisions  IN CASE OF EMERGENCY, 2-physician consent is required  D: RMF

## 2022-03-10 NOTE — PROGRESS NOTE ADULT - SUBJECTIVE AND OBJECTIVE BOX
HOSPITAL COURSE  Patient is a 74 year old female with a PMH of cerebral palsy with functional quadriplegia, DVT/PE on Eliquis, GERD, chronic dysphagia with PEG, thoracic spine fusion. Patient has a history of prior admission in 2020 for aspiration pneumonia requiring intubation. Patient presented from her assisted living facility in acute hypoxemic respiratory failure. Patient was found by assisted living facility staff with difficulty breathing. EMS noted that the patient was in respiratory distress upon arrival with SaO2 84% on RA. Patient was intubated in the field for airway protection. "Thick viscous" secretions were noticed in her oropharynx during intubation. Acute hypoxemic respiratory failure likely 2/2 aspiration pneumonia. Patient started on Meropenem due to previous hospitalization with Pseudomonas pneumonia requiring Meropenem. Antibiotic switched to Ciprofloxacin for adequate coverage of Pseudomonas pneumonia. One unsuccessful attempt was made at extubation. Patient started on Seroquel due to persistent anxiety and agitation making extubation increasingly difficult. Patient extubated to HFNC (3/8). Pt further weaned to 6L--> 4L with oxygen sat of 94-95%.      HOSPITAL COURSE  Patient is a 74 year old female with a PMH of cerebral palsy with functional quadriplegia, DVT/PE on Eliquis, GERD, chronic dysphagia with PEG, thoracic spine fusion. Patient has a history of prior admission in 2020 for aspiration pneumonia requiring intubation. Patient presented from her assisted living facility in acute hypoxemic respiratory failure. Patient was found by assisted living facility staff with difficulty breathing. EMS noted that the patient was in respiratory distress upon arrival with SaO2 84% on RA. Patient was intubated in the field for airway protection. "Thick viscous" secretions were noticed in her oropharynx during intubation. Acute hypoxemic respiratory failure likely 2/2 aspiration pneumonia. Patient started on Meropenem due to previous hospitalization with Pseudomonas pneumonia requiring Meropenem. Antibiotic switched to Ciprofloxacin for adequate coverage of Pseudomonas pneumonia. One unsuccessful attempt was made at extubation. Patient started on Seroquel due to persistent anxiety and agitation making extubation increasingly difficult. Patient extubated to HFNC (3/8). Pt further weaned to 6L--> 4L with oxygen sat of 94-95%.     SUBJECTIVE/OVERNIGHT EVENTS: No acute overnight events. Pt seen in AM at bedside, resting comfortably in bed, and does not appear to be in any acute distress. When asked, pt denies any recent or active fever, chills, nausea, vomiting, headache, acute sob, chest pain, abdominal pain, genitourinary sx, extremity pain or swelling.    VITAL SIGNS:  Vital Signs Last 24 Hrs  T(C): 36.2 (10 Mar 2022 09:00), Max: 37.9 (09 Mar 2022 23:00)  T(F): 97.1 (10 Mar 2022 09:00), Max: 100.2 (09 Mar 2022 23:00)  HR: 116 (10 Mar 2022 12:00) (96 - 121)  BP: 155/73 (10 Mar 2022 11:00) (101/67 - 159/84)  BP(mean): 105 (10 Mar 2022 11:00) (78 - 121)  RR: 29 (10 Mar 2022 12:00) (20 - 35)  SpO2: 93% (10 Mar 2022 12:00) (85% - 99%)    PHYSICAL EXAM:  Constitutional -  Awake, alert, with ball of yarn in bed  HEENT - NCAT, anicteric sclera, moist mucous membranes, copious secretions from mouth  Pulm - Tachypneic, poor chest expansion, shallow breaths. Coarse breath sounds b/l worse in lower lobes, improved from yesterday. NC @ 3L  Cardio -  Tachycardic, S1/S2 present, no murmurs  GI -  Soft, NTND, BSx4, PEG tube in place   - Quintero in place, making concentrated urine  Extremities - Non-pitting edema b/l LE up to knees, 2+ pulses x4   Neurologic Exam: awake & alert, answering yes/no questions, follows simple commands.        Skin: warm, dry, intact, edema in b/l LE, no rashes or wounds, no decubitus ulcer  Lines: Quintero, peripheral IVs x2 on left since 3/3 and x1 on right since 3/3      MEDICATIONS:  MEDICATIONS  (STANDING):  apixaban 2.5 milliGRAM(s) Oral every 12 hours  ARIPiprazole 2 milliGRAM(s) Oral every 24 hours  chlorhexidine 4% Liquid 1 Application(s) Topical <User Schedule>  ciprofloxacin   IVPB 400 milliGRAM(s) IV Intermittent every 8 hours  dextrose 40% Gel 15 Gram(s) Oral once  dextrose 5%. 1000 milliLiter(s) (50 mL/Hr) IV Continuous <Continuous>  dextrose 5%. 1000 milliLiter(s) (100 mL/Hr) IV Continuous <Continuous>  dextrose 50% Injectable 25 Gram(s) IV Push once  dextrose 50% Injectable 12.5 Gram(s) IV Push once  dextrose 50% Injectable 25 Gram(s) IV Push once  famotidine   Suspension 20 milliGRAM(s) Enteral Tube at bedtime  glucagon  Injectable 1 milliGRAM(s) IntraMuscular once  insulin lispro (ADMELOG) corrective regimen sliding scale   SubCutaneous every 6 hours    MEDICATIONS  (PRN):  acetaminophen     Tablet .. 650 milliGRAM(s) Oral every 6 hours PRN Temp greater or equal to 38C (100.4F), Mild Pain (1 - 3)      ALLERGIES:  Allergies    ace inhibitors (Anaphylaxis)  caffeine (Rash)  cefepime (Rash)  chocolate (Rash)  high acid (Rash)  Tomatoes (Hives)    Intolerances        LABS:                        12.8   9.68  )-----------( 297      ( 10 Mar 2022 05:31 )             40.3     03-10    143  |  101  |  21  ----------------------------<  138<H>  4.3   |  32<H>  |  0.65    Ca    10.0      10 Mar 2022 05:31  Phos  4.1     03-10  Mg     2.0     03-10    TPro  7.7  /  Alb  4.1  /  TBili  0.4  /  DBili  x   /  AST  67<H>  /  ALT  55<H>  /  AlkPhos  87  03-10        RADIOLOGY & ADDITIONAL TESTS: Reviewed. HOSPITAL COURSE  Patient is a 74 year old female with a PMH of cerebral palsy with functional quadriplegia, DVT/PE on Eliquis, GERD, chronic dysphagia with PEG, thoracic spine fusion. Patient has a history of prior admission in 2020 for aspiration pneumonia requiring intubation. Patient presented from her assisted living facility in acute hypoxemic respiratory failure. Patient was found by assisted living facility staff with difficulty breathing. EMS noted that the patient was in respiratory distress upon arrival with SaO2 84% on RA. Patient was intubated in the field for airway protection. "Thick viscous" secretions were noticed in her oropharynx during intubation. Acute hypoxemic respiratory failure likely 2/2 aspiration pneumonia. Patient started on Meropenem due to previous hospitalization with Pseudomonas pneumonia requiring Meropenem. Antibiotic switched to Ciprofloxacin for adequate coverage of Pseudomonas pneumonia. One unsuccessful attempt was made at extubation. Patient started on Seroquel due to persistent anxiety and agitation making extubation increasingly difficult. Patient extubated to HFNC (3/8). Pt further weaned to 6L--> 4L with oxygen sat of 94-95%.     SUBJECTIVE/OVERNIGHT EVENTS:  Pt seen at bedside, resting comfortably in bed, and does not appear to be in any acute distress. Patient with ball of yarn in bed. States "no" to having pain however is unable to communicate.     VITAL SIGNS:  Vital Signs Last 24 Hrs  T(C): 36.2 (10 Mar 2022 09:00), Max: 37.9 (09 Mar 2022 23:00)  T(F): 97.1 (10 Mar 2022 09:00), Max: 100.2 (09 Mar 2022 23:00)  HR: 116 (10 Mar 2022 12:00) (96 - 121)  BP: 155/73 (10 Mar 2022 11:00) (101/67 - 159/84)  BP(mean): 105 (10 Mar 2022 11:00) (78 - 121)  RR: 29 (10 Mar 2022 12:00) (20 - 35)  SpO2: 93% (10 Mar 2022 12:00) (85% - 99%)    PHYSICAL EXAM:  Constitutional -  Awake, alert, with ball of yarn in bed, kyphosis  HEENT - NCAT, anicteric sclera, moist mucous membranes, copious secretions from mouth  Pulm - Tachypneic, poor chest expansion, shallow breaths. Coarse breath sounds b/l worse in lower lobes, improved from yesterday. NC @ 3L  Cardio -  Tachycardic, S1/S2 present, no murmurs  GI -  somewhat tense, no TTP BSx4, PEG tube in place   - Quintero in place, making concentrated urine  Extremities: Non-pitting edema b/l LE up to knees, 2+ pulses x4   Neurologic Exam: awake & alert, answering yes/no questions, unclear if follows commands  Skin: warm, dry, intact, edema in b/l LE non-pitting vs unknown baseline leg size, no rashes or wounds  Lines: Quintero      MEDICATIONS:  MEDICATIONS  (STANDING):  apixaban 2.5 milliGRAM(s) Oral every 12 hours  ARIPiprazole 2 milliGRAM(s) Oral every 24 hours  chlorhexidine 4% Liquid 1 Application(s) Topical <User Schedule>  ciprofloxacin   IVPB 400 milliGRAM(s) IV Intermittent every 8 hours  dextrose 40% Gel 15 Gram(s) Oral once  dextrose 5%. 1000 milliLiter(s) (50 mL/Hr) IV Continuous <Continuous>  dextrose 5%. 1000 milliLiter(s) (100 mL/Hr) IV Continuous <Continuous>  dextrose 50% Injectable 25 Gram(s) IV Push once  dextrose 50% Injectable 12.5 Gram(s) IV Push once  dextrose 50% Injectable 25 Gram(s) IV Push once  famotidine   Suspension 20 milliGRAM(s) Enteral Tube at bedtime  glucagon  Injectable 1 milliGRAM(s) IntraMuscular once  insulin lispro (ADMELOG) corrective regimen sliding scale   SubCutaneous every 6 hours    MEDICATIONS  (PRN):  acetaminophen     Tablet .. 650 milliGRAM(s) Oral every 6 hours PRN Temp greater or equal to 38C (100.4F), Mild Pain (1 - 3)      ALLERGIES:  Allergies    ace inhibitors (Anaphylaxis)  caffeine (Rash)  cefepime (Rash)  chocolate (Rash)  high acid (Rash)  Tomatoes (Hives)    Intolerances        LABS:                        12.8   9.68  )-----------( 297      ( 10 Mar 2022 05:31 )             40.3     03-10    143  |  101  |  21  ----------------------------<  138<H>  4.3   |  32<H>  |  0.65    Ca    10.0      10 Mar 2022 05:31  Phos  4.1     03-10  Mg     2.0     03-10    TPro  7.7  /  Alb  4.1  /  TBili  0.4  /  DBili  x   /  AST  67<H>  /  ALT  55<H>  /  AlkPhos  87  03-10        RADIOLOGY & ADDITIONAL TESTS: Reviewed. HOSPITAL COURSE  Patient is a 74 year old female with a PMH of cerebral palsy with functional quadriplegia, DVT/PE on Eliquis, GERD, chronic dysphagia with PEG, thoracic spine fusion presenting with AHRF with intubation in the field with "thick secretions," found to have aspiration pneumonia with psuedomonas aeruginosa. Patient has a history of prior admission in 2020 for aspiration pneumonia requiring intubation. Patient presented from her assisted living facility in acute hypoxemic respiratory failure found by staff with difficulty breathing. EMS noted  respiratory distress with SaO2 84% on RA. Patient intubated in the field for airway protection, "thick viscous" secretions noted in oropharynx. CXR with bilateral lower lobe opacities on 3/3. Patient started on Meropenem due to previous hospitalization with Pseudomonas pneumonia requiring Meropenem. Antibiotic switched to Ciprofloxacin. Sputum cx with pseudomonas. Urine culture on admission collected growing E.Coli. One unsuccessful attempt was made at extubation. Patient started on Seroquel due to persistent anxiety and agitation making extubation increasingly difficult. Patient extubated to HFNC (3/8). Pt further weaned to 6L--> 4L with oxygen sat of 94-95%. Now on 3L NC and stable for stepdown to RMF.     SUBJECTIVE/OVERNIGHT EVENTS:  Pt seen at bedside, resting comfortably in bed, and does not appear to be in any acute distress. Patient with ball of yarn in bed. States "no" to having pain however is unable to communicate.     VITAL SIGNS:  Vital Signs Last 24 Hrs  T(C): 36.2 (10 Mar 2022 09:00), Max: 37.9 (09 Mar 2022 23:00)  T(F): 97.1 (10 Mar 2022 09:00), Max: 100.2 (09 Mar 2022 23:00)  HR: 116 (10 Mar 2022 12:00) (96 - 121)  BP: 155/73 (10 Mar 2022 11:00) (101/67 - 159/84)  BP(mean): 105 (10 Mar 2022 11:00) (78 - 121)  RR: 29 (10 Mar 2022 12:00) (20 - 35)  SpO2: 93% (10 Mar 2022 12:00) (85% - 99%)    PHYSICAL EXAM:  Constitutional -  Awake, alert, with ball of yarn in bed, kyphosis  HEENT - NCAT, anicteric sclera, moist mucous membranes, copious secretions from mouth  Pulm - Tachypneic, poor chest expansion, shallow breaths. Coarse breath sounds b/l worse in lower lobes. NC @ 3L  Cardio -  Tachycardic, S1/S2 present, no murmurs  GI -  somewhat tense, no TTP, BSx4, PEG tube in place   - Quintero in place, making concentrated urine  Extremities: Non-pitting edema b/l LE up to knees, 2+ pulses x4   Neurologic Exam: awake & alert, answering yes/no questions, unclear if follows commands  Skin: warm, dry, intact, no rashes or wounds  Lines: Quintero      MEDICATIONS:  MEDICATIONS  (STANDING):  apixaban 2.5 milliGRAM(s) Oral every 12 hours  ARIPiprazole 2 milliGRAM(s) Oral every 24 hours  chlorhexidine 4% Liquid 1 Application(s) Topical <User Schedule>  ciprofloxacin   IVPB 400 milliGRAM(s) IV Intermittent every 8 hours  dextrose 40% Gel 15 Gram(s) Oral once  dextrose 5%. 1000 milliLiter(s) (50 mL/Hr) IV Continuous <Continuous>  dextrose 5%. 1000 milliLiter(s) (100 mL/Hr) IV Continuous <Continuous>  dextrose 50% Injectable 25 Gram(s) IV Push once  dextrose 50% Injectable 12.5 Gram(s) IV Push once  dextrose 50% Injectable 25 Gram(s) IV Push once  famotidine   Suspension 20 milliGRAM(s) Enteral Tube at bedtime  glucagon  Injectable 1 milliGRAM(s) IntraMuscular once  insulin lispro (ADMELOG) corrective regimen sliding scale   SubCutaneous every 6 hours    MEDICATIONS  (PRN):  acetaminophen     Tablet .. 650 milliGRAM(s) Oral every 6 hours PRN Temp greater or equal to 38C (100.4F), Mild Pain (1 - 3)      ALLERGIES:  Allergies    ace inhibitors (Anaphylaxis)  caffeine (Rash)  cefepime (Rash)  chocolate (Rash)  high acid (Rash)  Tomatoes (Hives)    Intolerances        LABS:                        12.8   9.68  )-----------( 297      ( 10 Mar 2022 05:31 )             40.3     03-10    143  |  101  |  21  ----------------------------<  138<H>  4.3   |  32<H>  |  0.65    Ca    10.0      10 Mar 2022 05:31  Phos  4.1     03-10  Mg     2.0     03-10    TPro  7.7  /  Alb  4.1  /  TBili  0.4  /  DBili  x   /  AST  67<H>  /  ALT  55<H>  /  AlkPhos  87  03-10        RADIOLOGY & ADDITIONAL TESTS: Reviewed.

## 2022-03-10 NOTE — PROGRESS NOTE ADULT - PROBLEM SELECTOR PLAN 2
Patient presenting with ARHF requiring intubation. CXR with bilateral lower lobe opacities on 3/3. Sputum culture:  pseudomonas aeruginosa. Blood culture with no growth to date.    - c/w Cipro 400mg IV Q8 (3/5-3/19) x 14 day course   - d/c Seroquel 50mg Q12 via PEG for agitation  - c/w home med Abilify 2mg QHS via PEG Patient presenting with AHERF requiring intubation. CXR with bilateral lower lobe opacities on 3/3. Sputum culture:  pseudomonas aeruginosa. Blood culture with no growth to date.    - c/w Cipro 400mg IV Q8 (3/5-3/19) x 14 day course   - d/c Seroquel 50mg Q12 via PEG for agitation  - c/w home med Abilify 2mg QHS via PEG

## 2022-03-10 NOTE — PHYSICAL THERAPY INITIAL EVALUATION ADULT - ADDITIONAL COMMENTS
Pt. resides in a group home, requires one person assist for WC transfers; non-ambulatory secondary CP.

## 2022-03-10 NOTE — PHYSICAL THERAPY INITIAL EVALUATION ADULT - MODALITIES TREATMENT COMMENTS
Pt. dangled ~ 10 min with Bryce progressing to CG/CS for static sitting balance, performed ther-ex all 4 extremities, performed weight-shifting/core strengthening x10; required ModA for dynamic sitting balance with ther-ex.

## 2022-03-10 NOTE — PROGRESS NOTE ADULT - PROBLEM SELECTOR PLAN 1
- Likely secondary to aspiration event given similar history in conjunction with Chest X ray  - Sputum culture: pseudomonas  - c/w Cipro 400mg IV Q8  - Blood culture with no growth to date  - d/c Seroquel 50mg Q12  - restart home med Abilify 2mg QHS  - Lasix 40mg IV given today 3/9. Monitor urine output  - Patient extubated (3/8) to HFNC. Saturations in low 90s @ 45L/m 45%FiO2  - attempt to wean to NC  - Chest PT Q6hrs  - EKG to check for sinus rhythm - Likely secondary to aspiration event given similar history in conjunction with Chest X ray  - Sputum culture: pseudomonas  - c/w Cipro 400mg IV Q8 until 3/17.  - Blood culture with no growth to date  - c/w Abilify 2mg QHS  - Patient extubated (3/8) to HFNC. Now weaned and doing well on 3L NC. Saturations in low 90s  - Chest PT Q6hrs  - EKG shows sinus tachycardia

## 2022-03-10 NOTE — PHYSICAL THERAPY INITIAL EVALUATION ADULT - GENERAL OBSERVATIONS, REHAB EVAL
Pt. was received semi-supine, on NC=3l/min, + iV, +EKG, NAD. Pt. was received semi-supine, on NC=3l/min, + iV, +EKG, + PEG to feeding pump, NAD.

## 2022-03-10 NOTE — PROGRESS NOTE ADULT - PROBLEM SELECTOR PLAN 4
- c/w Lovenox 50mg Q12  - prior to step down, switch back to home med Eliquis 2.5mg Q12  - History of PE 5 years ago. On Eliquis at home  - Bedside ultrasound with no evidence of clot - c/w home medication Eliquis 2.5mg Q12  - History of PE 5 years ago. On Eliquis at home  - Bedside ultrasound with no evidence of clot

## 2022-03-10 NOTE — PROGRESS NOTE ADULT - SUBJECTIVE AND OBJECTIVE BOX
HOSPITAL COURSE  Patient is a 74 year old female with a PMH of cerebral palsy with functional quadriplegia, DVT/PE on Eliquis, GERD, chronic dysphagia with PEG, thoracic spine fusion. Patient has a history of prior admission in 2020 for aspiration pneumonia requiring intubation. Patient presented from her assisted living facility in acute hypoxemic respiratory failure. Patient was found by assisted living facility staff with difficulty breathing. EMS noted that the patient was in respiratory distress upon arrival with SaO2 84% on RA. Patient was intubated in the field for airway protection. "Thick viscous" secretions were noticed in her oropharynx during intubation. Acute hypoxemic respiratory failure likely 2/2 aspiration pneumonia. Patient started on Meropenem due to previous hospitalization with Pseudomonas pneumonia requiring Meropenem. Antibiotic switched to Ciprofloxacin for adequate coverage of Pseudomonas pneumonia. One unsuccessful attempt was made at extubation. Patient started on Seroquel due to persistent anxiety and agitation making extubation increasingly difficult. Patient extubated to HFNC (3/8). Pt further weaned to 6L--> 4L with oxygen sat of 94-95%.     Overnight:  ALIN Pt had BM at ~6pm. Weaned to 3L NC saturation 90-95%.    Subjective:  Pt seen and examined at the bedside this morning. Resting comfortably on 3L NC. Smiling and waving. Endorses a cough, denies any other complaints. Pt reports she is feeling better.     Objective:  ICU Vital Signs Last 24 Hrs  T(C): 36.2 (10 Mar 2022 09:00), Max: 37.9 (09 Mar 2022 23:00)  T(F): 97.1 (10 Mar 2022 09:00), Max: 100.2 (09 Mar 2022 23:00)  HR: 106 (10 Mar 2022 10:00) (96 - 121)  BP: 145/105 (10 Mar 2022 10:00) (101/67 - 159/84)  BP(mean): 121 (10 Mar 2022 10:00) (78 - 121)  ABP: --  ABP(mean): --  RR: 31 (10 Mar 2022 10:00) (20 - 35)  SpO2: 92% (10 Mar 2022 10:00) (85% - 99%)    I&O's Detail    09 Mar 2022 07:01  -  10 Mar 2022 07:00  --------------------------------------------------------  IN:    Dexmedetomidine: 8 mL    Enteral Tube Flush: 420 mL    IV PiggyBack: 400 mL    IV PiggyBack: 200 mL    Jevity 1.2: 1070 mL  Total IN: 2098 mL    OUT:    Indwelling Catheter - Urethral (mL): 2355 mL  Total OUT: 2355 mL    Total NET: -257 mL      10 Mar 2022 07:01  -  10 Mar 2022 10:57  --------------------------------------------------------  IN:    Jevity 1.2: 100 mL  Total IN: 100 mL    OUT:    Indwelling Catheter - Urethral (mL): 150 mL  Total OUT: 150 mL    Total NET: -50 mL    PHYSICAL EXAM:  Constitutional -  Awake, alert, resting comfortably with NC  HEENT - NCAT, anicteric sclera, intubated, moist mucous membranes    Neck - Supple, no lymphadenopathy, no JVD  Pulm - Tachypneic, poor chest expansion, shallow breaths. Coarse breath sounds b/l worse in lower lobes, improved from yesterday. NC @ 3L  Cardio -  Tachycardic, S1/S2 present, no murmurs  GI -  Soft, NTND, BSx4, PEG tube in place   - Quintero in place, making concentrated urine  Extremities - Non-pitting edema b/l LE up to knees, 2+ pulses x4   Neurologic Exam: awake & alert, answering yes/no questions, follows simple commands.        Skin: warm, dry, intact, edema in b/l LE, no rashes or wounds, no decubitus ulcer  Lines: Quintero, peripheral IVs x2 on left since 3/3 and x1 on right since 3/3                          12.8   9.68  )-----------( 297      ( 10 Mar 2022 05:31 )             40.3

## 2022-03-10 NOTE — PROGRESS NOTE ADULT - PROBLEM SELECTOR PLAN 5
History of PE 5 years ago. On Eliquis at home 2.5 mg BID at home. Bedside ultrasound performed in MICU with no evidence of clot. On Lovenox 50mg Q12, d/linda and returned to home medications.   - c/w home med Eliquis 2.5mg Q12 (age 76, BW <60, creatinine WNL) History of PE 5 years ago. On Eliquis at home 2.5 mg BID at home. Bedside ultrasound performed in MICU with no evidence of clot. On Lovenox 50mg Q12, d/linda and returned to home medications.     - c/w home med Eliquis 2.5mg Q12

## 2022-03-10 NOTE — PROGRESS NOTE ADULT - PROBLEM SELECTOR PLAN 1
Patient with h/o aspiration events requiring intubation. Patient found with respiratory distress and SaO2 of 84%. Intubated in field for airway protection, s/p extubation on 3/8 to [facility]. Weaned to 3L NC on transfer to [facility]. Given Lasix 40 mg IV x 2 (3/8 and 3/9) due to pleural effusion and net positive fluid status per MICU.     - attempt to wean to NC  - Chest PT Q6hrs

## 2022-03-10 NOTE — PROGRESS NOTE ADULT - PROBLEM SELECTOR PLAN 6
Patient with cerebral palsy. Has functional quadriplegia, health aide at reports at baseline she is able to follow commands and communicate "when she wants to".    - f/u PT consult  - see GOC

## 2022-03-11 LAB
ANION GAP SERPL CALC-SCNC: 10 MMOL/L — SIGNIFICANT CHANGE UP (ref 5–17)
BASOPHILS # BLD AUTO: 0.05 K/UL — SIGNIFICANT CHANGE UP (ref 0–0.2)
BASOPHILS NFR BLD AUTO: 0.7 % — SIGNIFICANT CHANGE UP (ref 0–2)
BILIRUB SERPL-MCNC: 0.4 MG/DL — SIGNIFICANT CHANGE UP (ref 0.2–1.2)
BUN SERPL-MCNC: 24 MG/DL — HIGH (ref 7–23)
CALCIUM SERPL-MCNC: 10.3 MG/DL — SIGNIFICANT CHANGE UP (ref 8.4–10.5)
CHLORIDE SERPL-SCNC: 104 MMOL/L — SIGNIFICANT CHANGE UP (ref 96–108)
CO2 SERPL-SCNC: 31 MMOL/L — SIGNIFICANT CHANGE UP (ref 22–31)
CREAT SERPL-MCNC: 0.52 MG/DL — SIGNIFICANT CHANGE UP (ref 0.5–1.3)
EGFR: 97 ML/MIN/1.73M2 — SIGNIFICANT CHANGE UP
EOSINOPHIL # BLD AUTO: 0.12 K/UL — SIGNIFICANT CHANGE UP (ref 0–0.5)
EOSINOPHIL NFR BLD AUTO: 1.7 % — SIGNIFICANT CHANGE UP (ref 0–6)
GLUCOSE BLDC GLUCOMTR-MCNC: 101 MG/DL — HIGH (ref 70–99)
GLUCOSE BLDC GLUCOMTR-MCNC: 103 MG/DL — HIGH (ref 70–99)
GLUCOSE BLDC GLUCOMTR-MCNC: 122 MG/DL — HIGH (ref 70–99)
GLUCOSE BLDC GLUCOMTR-MCNC: 124 MG/DL — HIGH (ref 70–99)
GLUCOSE SERPL-MCNC: 158 MG/DL — HIGH (ref 70–99)
HCT VFR BLD CALC: 39.8 % — SIGNIFICANT CHANGE UP (ref 34.5–45)
HGB BLD-MCNC: 11.8 G/DL — SIGNIFICANT CHANGE UP (ref 11.5–15.5)
IMM GRANULOCYTES NFR BLD AUTO: 0.4 % — SIGNIFICANT CHANGE UP (ref 0–1.5)
INR BLD: 1.18 — HIGH (ref 0.88–1.16)
LYMPHOCYTES # BLD AUTO: 1.03 K/UL — SIGNIFICANT CHANGE UP (ref 1–3.3)
LYMPHOCYTES # BLD AUTO: 15 % — SIGNIFICANT CHANGE UP (ref 13–44)
MAGNESIUM SERPL-MCNC: 1.6 MG/DL — SIGNIFICANT CHANGE UP (ref 1.6–2.6)
MCHC RBC-ENTMCNC: 29.6 GM/DL — LOW (ref 32–36)
MCHC RBC-ENTMCNC: 30.1 PG — SIGNIFICANT CHANGE UP (ref 27–34)
MCV RBC AUTO: 101.5 FL — HIGH (ref 80–100)
MELD SCORE WITH DIALYSIS: 22 POINTS — SIGNIFICANT CHANGE UP
MELD SCORE WITHOUT DIALYSIS: 8 POINTS — SIGNIFICANT CHANGE UP
MONOCYTES # BLD AUTO: 0.64 K/UL — SIGNIFICANT CHANGE UP (ref 0–0.9)
MONOCYTES NFR BLD AUTO: 9.3 % — SIGNIFICANT CHANGE UP (ref 2–14)
NEUTROPHILS # BLD AUTO: 5 K/UL — SIGNIFICANT CHANGE UP (ref 1.8–7.4)
NEUTROPHILS NFR BLD AUTO: 72.9 % — SIGNIFICANT CHANGE UP (ref 43–77)
NRBC # BLD: 0 /100 WBCS — SIGNIFICANT CHANGE UP (ref 0–0)
PHOSPHATE SERPL-MCNC: 3.3 MG/DL — SIGNIFICANT CHANGE UP (ref 2.5–4.5)
PLATELET # BLD AUTO: 292 K/UL — SIGNIFICANT CHANGE UP (ref 150–400)
POTASSIUM SERPL-MCNC: 4.2 MMOL/L — SIGNIFICANT CHANGE UP (ref 3.5–5.3)
POTASSIUM SERPL-SCNC: 4.2 MMOL/L — SIGNIFICANT CHANGE UP (ref 3.5–5.3)
PROTHROM AB SERPL-ACNC: 14.1 SEC — HIGH (ref 10.5–13.4)
RBC # BLD: 3.92 M/UL — SIGNIFICANT CHANGE UP (ref 3.8–5.2)
RBC # FLD: 13 % — SIGNIFICANT CHANGE UP (ref 10.3–14.5)
SODIUM SERPL-SCNC: 145 MMOL/L — SIGNIFICANT CHANGE UP (ref 135–145)
WBC # BLD: 6.87 K/UL — SIGNIFICANT CHANGE UP (ref 3.8–10.5)
WBC # FLD AUTO: 6.87 K/UL — SIGNIFICANT CHANGE UP (ref 3.8–10.5)

## 2022-03-11 PROCEDURE — 99233 SBSQ HOSP IP/OBS HIGH 50: CPT

## 2022-03-11 PROCEDURE — 99497 ADVNCD CARE PLAN 30 MIN: CPT | Mod: 25

## 2022-03-11 PROCEDURE — 99498 ADVNCD CARE PLAN ADDL 30 MIN: CPT | Mod: 25

## 2022-03-11 RX ORDER — IPRATROPIUM/ALBUTEROL SULFATE 18-103MCG
3 AEROSOL WITH ADAPTER (GRAM) INHALATION EVERY 6 HOURS
Refills: 0 | Status: DISCONTINUED | OUTPATIENT
Start: 2022-03-11 | End: 2022-03-21

## 2022-03-11 RX ORDER — IPRATROPIUM/ALBUTEROL SULFATE 18-103MCG
3 AEROSOL WITH ADAPTER (GRAM) INHALATION ONCE
Refills: 0 | Status: DISCONTINUED | OUTPATIENT
Start: 2022-03-11 | End: 2022-03-15

## 2022-03-11 RX ORDER — ACETYLCYSTEINE 200 MG/ML
4 VIAL (ML) MISCELLANEOUS EVERY 12 HOURS
Refills: 0 | Status: ACTIVE | OUTPATIENT
Start: 2022-03-11 | End: 2023-02-07

## 2022-03-11 RX ORDER — SODIUM CHLORIDE 9 MG/ML
4 INJECTION INTRAMUSCULAR; INTRAVENOUS; SUBCUTANEOUS EVERY 12 HOURS
Refills: 0 | Status: DISCONTINUED | OUTPATIENT
Start: 2022-03-11 | End: 2022-03-19

## 2022-03-11 RX ADMIN — Medication 200 MILLIGRAM(S): at 21:33

## 2022-03-11 RX ADMIN — ARIPIPRAZOLE 2 MILLIGRAM(S): 15 TABLET ORAL at 21:33

## 2022-03-11 RX ADMIN — APIXABAN 2.5 MILLIGRAM(S): 2.5 TABLET, FILM COATED ORAL at 15:22

## 2022-03-11 RX ADMIN — APIXABAN 2.5 MILLIGRAM(S): 2.5 TABLET, FILM COATED ORAL at 02:04

## 2022-03-11 RX ADMIN — Medication 4 MILLILITER(S): at 05:27

## 2022-03-11 RX ADMIN — Medication 650 MILLIGRAM(S): at 01:46

## 2022-03-11 RX ADMIN — Medication 200 MILLIGRAM(S): at 15:22

## 2022-03-11 RX ADMIN — Medication 3 MILLILITER(S): at 21:34

## 2022-03-11 RX ADMIN — Medication 200 MILLIGRAM(S): at 05:33

## 2022-03-11 RX ADMIN — SODIUM CHLORIDE 4 MILLILITER(S): 9 INJECTION INTRAMUSCULAR; INTRAVENOUS; SUBCUTANEOUS at 18:50

## 2022-03-11 RX ADMIN — SODIUM CHLORIDE 4 MILLILITER(S): 9 INJECTION INTRAMUSCULAR; INTRAVENOUS; SUBCUTANEOUS at 05:27

## 2022-03-11 RX ADMIN — Medication 650 MILLIGRAM(S): at 02:46

## 2022-03-11 RX ADMIN — Medication 4 MILLILITER(S): at 18:50

## 2022-03-11 RX ADMIN — FAMOTIDINE 20 MILLIGRAM(S): 10 INJECTION INTRAVENOUS at 21:33

## 2022-03-11 NOTE — PROGRESS NOTE ADULT - PROBLEM SELECTOR PLAN 4
Patient with cerebral palsy, previously a [place] resident, now resides in skilled nursing, with [facility]. Presenting to the hospital with respiratory failure, PNA, intubation, attempt at extubation with subsequent reintubation.   Now with difficulty extubating due to poor mentation, tachypnea and agitation. If any decision needs to be made regarding advanced care planning, tracheostomy, [facility] will need to be involved and a MOLST checklist will need to be filled out.

## 2022-03-11 NOTE — PROGRESS NOTE ADULT - PROBLEM SELECTOR PLAN 2
- Patient with long standing CP.  - Used to be a resident of [facility].  - Now resides in a group home.   - Although some family is present, not involved in patients care.  - [facility] and [facility] are mainly involved in shared decision making.

## 2022-03-11 NOTE — PROGRESS NOTE ADULT - SUBJECTIVE AND OBJECTIVE BOX
[facility] Geriatrics and Palliative Care  April Amos Palliative Care Attending  Contact Info: Call [phone] (HEAL Line) or message on Microsoft Teams    HPI:  74 year old female with a PMH of cerebral palsy with functional quadroplegia, DVT/PE on eliquis, GERD, Chronic dyshagia with PEG, Thoracic spine fusion and a history of prior admission in 2020 for aspiration PNA requiring intubation presents from her assisted living facility in acute hypoxic respiratory failure. Ahe was found by staff with difficulty breathing.  +cough.  ~1am.  EMS arrival noted pt w/ resp distress, O2 84% in RA, intubated in field for airway protection, noted "thick viscous" liquid in her oropharynx during intubation. Patient lost conciousness multiple times upon transfer to [facility] with subsequent transfer to [facility] to be cared for in the MICU.     ED Course:  Vitals: T 99.1, HR 81, BP 91/58, RR 18, 100% O2 sat Intubated VC/AC  Labs: cbc wnl, Cl 94, HCO3- 32, Lactate 5.4, VBG with pH 7.19, pCO2 87, venous O2 sat 95%  Imaging: CXR BL opacities worse on the left   Interventions: Metronidazole 500mg x1, Fent drip  (03 Mar 2022 05:40)    PERTINENT PM/SXH:   CP (cerebral palsy)    Mental retardation    Dysphagia    GERD (gastroesophageal reflux disease)    PE (pulmonary thromboembolism)    DVT (deep venous thrombosis)    Spastic quadriplegia    HTN (hypertension)      PEG tube malfunction      FAMILY HISTORY:    ITEMS NOT CHECKED ARE NOT PRESENT    SOCIAL HISTORY:   Significant other/partner:  []  Children:  []   Substance hx:  []   Tobacco hx:  []   Alcohol hx: []   Home Opioid hx:  [] I-Stop Reference No:  - no active Rx's / see chart note  Living Situation: []Home  []Long term care  []Rehab [x]Other - Group Home  Jew/Spiritual practice: ; Role of organized Caodaism [ ] important [ ] some [x ] unable to assess  Coping: [ ] well [ ] with difficulty [ ] poor coping [x ] unable to assess  Support system: [ ] strong [x ] adequate [ ] inadequate    ADVANCE DIRECTIVES:    []MOLST  []Living Will  DECISION MAKER(s):  [] Health Care Proxy(s)  [] Surrogate(s)  [x] Guardian           Name(s)/Phone Number(s): OPWDD and MHLS    BASELINE (I)ADLs (prior to admission):  [place]: []Total  [] Moderate []Dependent    ALLERGIES:  ace inhibitors (Anaphylaxis)  caffeine (Rash)  cefepime (Rash)  chocolate (Rash)  high acid (Rash)  Tomatoes (Hives)    MEDICATIONS  (STANDING):  chlorhexidine 4% Liquid 1 Application(s) Topical <User Schedule>  ciprofloxacin   IVPB      ciprofloxacin   IVPB 400 milliGRAM(s) IV Intermittent every 8 hours  dexMEDEtomidine Infusion 0.2 MICROgram(s)/kG/Hr (2.84 mL/Hr) IV Continuous <Continuous>  dextrose 40% Gel 15 Gram(s) Oral once  dextrose 5%. 1000 milliLiter(s) (50 mL/Hr) IV Continuous <Continuous>  dextrose 5%. 1000 milliLiter(s) (100 mL/Hr) IV Continuous <Continuous>  dextrose 50% Injectable 25 Gram(s) IV Push once  dextrose 50% Injectable 12.5 Gram(s) IV Push once  dextrose 50% Injectable 25 Gram(s) IV Push once  enoxaparin Injectable 50 milliGRAM(s) SubCutaneous every 12 hours  glucagon  Injectable 1 milliGRAM(s) IntraMuscular once  insulin lispro (ADMELOG) corrective regimen sliding scale   SubCutaneous every 6 hours  pantoprazole  Injectable 40 milliGRAM(s) IV Push every 24 hours  QUEtiapine 50 milliGRAM(s) Oral every 12 hours    MEDICATIONS  (PRN):    PRESENT SYMPTOMS: [x]Unable to obtain due to poor mentation/encephalopathy  Source if other than patient:  []Family   []Team     Pain: [ ] yes [x ] no  QOL impact -   Location -                    Aggravating Factors -  Quality -  Radiation -  Timing -  Severity (0-10 scale) -   Minimal Acceptable Level (0-10 scale) -    PAIN AD Score:  [email or web address] (press ctrl +  left click to view)    Dyspnea:                           [ ]Mild  [x ]Moderate [ ]Severe  Anxiety:                             [ x]Mild [ ]Moderate [ ]Severe  Fatigue:                             [ ]Mild [ ]Moderate [x ]Severe  Nausea:                             [ ]Mild [ ]Moderate [ ]Severe  Loss of Appetite:             [ ]Mild [ ]Moderate [ x]Severe  Constipation:                   [ ]Mild [ ]Moderate [ ]Severe    Other Symptoms:  [x ]All other review of systems negative     Palliative Performance Status Version 2:  30%    http://npcrc.org/files/news/palliative_performance_scale_[email or web address].pdf    PHYSICAL EXAM:  GENERAL:  [x ]Alert  [ x]Oriented x  2 [ ]Lethargic  [ ]Cachexia  [ ]Unarousable  [ ]Verbal  [ ]Non-Verbal  Behavioral:   [ ]Anxiety  [ ]Delirium [ ]Agitation [x ]Cooperative  HEENT:  [ ]Normal   [x ]Dry mouth   [ ]ET Tube/Trach  [ ]Oral lesions  PULMONARY:   [ ]Clear []Tachypnea  []Audible excessive secretions   [ x]Rhonchi        [ ]Right [ ]Left [x ]Bilateral  [ ]Crackles        [ ]Right [ ]Left [ ]Bilateral  [ ]Wheezing     [ ]Right [ ]Left [ ]Bilateral  CARDIOVASCULAR:    [x ]Regular [ ]Irregular [ ]Tachy  [ ]Eliot [ ]Murmur [ ]Other  GASTROINTESTINAL:  [ x]Soft  [ ]Distended   [x ]+BS  [x ]Non tender [ ]Tender  [ ]PEG [ ]OGT/ NGT  Last BM:     GENITOURINARY:  [ ]Normal [ ] Incontinent   [ ]Oliguria/Anuria   [ ]Quintero  MUSCULOSKELETAL:   [ ]Normal   [ ]Weakness  [ ]Bed/Wheelchair bound [ ]Edema  NEUROLOGIC:   [ ]No focal deficits  [ ]Cognitive impairment  [ ]Dysphagia [ ]Dysarthria [ ]Paresis [ ]Encephalopathic   SKIN:   [ ]Normal   [ ]Pressure ulcer(s)  [ ]Rash    CRITICAL CARE:  [ ]Shock Present  [ ]Septic [ ]Cardiogenic [ ]Neurologic [ ]Hypovolemic  [ ]Vasopressors [ ]Inotropes   [x ]Respiratory failure present [ ]Mechanical Ventilation [ ]Non-invasive ventilatory support [ x]High-Flow  [ ]Acute  [ ]Chronic [ ]Hypoxic  [ ]Hypercarbic  [ ]Other organ failure    Vital Signs Last 24 Hrs  T(C): 37 (08 Mar 2022 21:24), Max: 38.1 (08 Mar 2022 14:09)  T(F): 98.6 (08 Mar 2022 21:24), Max: 100.6 (08 Mar 2022 14:09)  HR: 74 (08 Mar 2022 20:00) (57 - 109)  BP: 102/53 (08 Mar 2022 20:00) (83/46 - 160/67)  BP(mean): 74 (08 Mar 2022 20:00) (60 - 98)  RR: 25 (08 Mar 2022 20:00) (19 - 35)  SpO2: 91% (08 Mar 2022 20:00) (89% - 99%) I&O's Summary    07 Mar 2022 07:01  -  08 Mar 2022 07:00  --------------------------------------------------------  IN: 2549.4 mL / OUT: 3330 mL / NET: -780.6 mL    08 Mar 2022 07:01  -  08 Mar 2022 22:09  --------------------------------------------------------  IN: 561.9 mL / OUT: 2240 mL / NET: -1678.1 mL        LABS:                        10.6   5.96  )-----------( 191      ( 08 Mar 2022 05:25 )             34.7   03-08    141  |  104  |  14  ----------------------------<  134<H>  4.2   |  30  |  0.52    Ca    8.5      08 Mar 2022 05:25  Phos  3.0     03-08  Mg     1.8     03-08    TPro  5.8<L>  /  Alb  3.0<L>  /  TBili  0.3  /  DBili  x   /  AST  43<H>  /  ALT  29  /  AlkPhos  53  03-07      RADIOLOGY & ADDITIONAL STUDIES:      PROTEIN CALORIE MALNUTRITION PRESENT: [ ]mild [ ]moderate [ ]severe [ ]underweight [ ]morbid obesity  [ ]PPSV2 < or = to 30% [ ]significant weight loss  [ ]poor nutritional intake [ ]catabolic state [ ]anasarca     Artificial Nutrition [ ]     REFERRALS:  [x]Social Work  [ ]Case management [ ]PT/OT [ ]Chaplaincy  [ ]Hospice  [ ]Patient/Family Support    DISCUSSION OF CASE: Family - to obtain additional history and to provide emotional support; ( ) -     Care Coordination/Goals of Care Document:                                          Progress Notes    PROGRESS NOTE  Date & Time of Note   2022-03-08 14:00    Notes    Notes: Social work note:  Patient currently remains on 7 east, per medical team  in morning interdisciplinary rounds palliative team will be consulted, and team  working with patient on breathing trials to verify if she is able to be weaned  from ventilator.       recieved call from [place] phone  [phone], updated provided.  Alternated emergency contacts for patient are  residence clinical manager Nina Lynch  [phone], residence nursing manager Meng [phone].       Electronically signed by:  Faiza Ortega  Electronically signed on:  2022-03-08  14:23           PALLIATIVE MEDICINE COORDINATION OF CARE DOCUMENTATION: [x] Inpatient Consult  Non-Face-to-Face prolonged service provided that relates to (face-to-face) care that has or will occur and ongoing patient management, including one or more of the following: - Reviewed documentation from other physicians and other health care professional services - Reviewed medical records and diagnostic / radiology study results - Coordination with patient's support system  ************************************************************************  MEDICATION REVIEW:  - See Medication List Above    ISTOP REFERENCE:   - no active Rx's / see ISTOP Chart Note  - PRN usage: NO PRN'S  ------------------------------------------------------------------------  COORDINATION OF CARE:  - Palliative Care consulted for: GOC / Symptom Management  - Patient (to be) assessed:  - Patient previously seen by Palliative Care service: NO    ADVANCE CARE PLANNING  - Code status: FULL  - MOLST reviewed in chart: NONE; None found on Alpha  - HCP/Surrogate:  - GOC documents: NONE found on [place]  - HCP/Living will/Other Advanced Directives in Alpha: NONE found on Alpha  ------------------------------------------------------------------------  CARE PROVIDER DOCUMENTATION:  - SW/CM notes: Remains medically active  -   -   -     PLAN OF CARE  - Known admissions in past year: 1  - Current admit date: 3/3/22  - LOS: 5  - LACE score: 3   - Current dispo plan: TO BE DETERMINED    03-03-22 (5d)  ------------------------------------------------------------------------  - Time Spent/Chart reviewed: 41 Minutes [including time used to gather, review and transfer data]  - Start: 1000  - End:  1041    Prolonged services rendered, as part of this patient's care provided by Palliative Medicine, include: i. chart review for provider and ancillary service documentation, ii. pertinent diagnostics including laboratory and imaging studies, iii. medication review including PRN use, iv. admission history including previous palliative care encounters and GOC notes, v. advance care planning documents including HCP and MOLST forms in Alpha. Part of Palliative Medicine extended evaluation and management also involves coordination of care with our IDT, the primary and consulting teams, and unit CM/SW and Hospice if eligible. Recommendations based on the information gathered and discussed are outlined in the A/P of Palliative notes.

## 2022-03-11 NOTE — CHART NOTE - NSCHARTNOTEFT_GEN_A_CORE
Around 2pm patient agitated, uncomfortable, pointing to ceiling.  On exam patient with increased oropharyngeal secretions, suctioned which made her more comfortable, ordered Duoneb, Mucomyst, inhalation 3% saline, frequent suctioning, aspiration precautions. SaO2 94% and patient otherwise stable. There was residual in Peg tube, I asked nurse to hold feeds after giving Eliquis due to concern for possible aspiration.  If vitals in AM stable and SaO2 continues to be stable, will plan to feeds with aspiration precautions in place.

## 2022-03-12 DIAGNOSIS — J15.1 PNEUMONIA DUE TO PSEUDOMONAS: ICD-10-CM

## 2022-03-12 DIAGNOSIS — Z93.1 GASTROSTOMY STATUS: ICD-10-CM

## 2022-03-12 DIAGNOSIS — G80.9 CEREBRAL PALSY, UNSPECIFIED: ICD-10-CM

## 2022-03-12 DIAGNOSIS — E83.42 HYPOMAGNESEMIA: ICD-10-CM

## 2022-03-12 DIAGNOSIS — Z86.718 PERSONAL HISTORY OF OTHER VENOUS THROMBOSIS AND EMBOLISM: ICD-10-CM

## 2022-03-12 LAB
ANION GAP SERPL CALC-SCNC: 8 MMOL/L — SIGNIFICANT CHANGE UP (ref 5–17)
BUN SERPL-MCNC: 23 MG/DL — SIGNIFICANT CHANGE UP (ref 7–23)
CALCIUM SERPL-MCNC: 9.9 MG/DL — SIGNIFICANT CHANGE UP (ref 8.4–10.5)
CHLORIDE SERPL-SCNC: 101 MMOL/L — SIGNIFICANT CHANGE UP (ref 96–108)
CO2 SERPL-SCNC: 32 MMOL/L — HIGH (ref 22–31)
CREAT SERPL-MCNC: 0.5 MG/DL — SIGNIFICANT CHANGE UP (ref 0.5–1.3)
CULTURE RESULTS: SIGNIFICANT CHANGE UP
EGFR: 98 ML/MIN/1.73M2 — SIGNIFICANT CHANGE UP
GLUCOSE BLDC GLUCOMTR-MCNC: 108 MG/DL — HIGH (ref 70–99)
GLUCOSE BLDC GLUCOMTR-MCNC: 117 MG/DL — HIGH (ref 70–99)
GLUCOSE BLDC GLUCOMTR-MCNC: 123 MG/DL — HIGH (ref 70–99)
GLUCOSE BLDC GLUCOMTR-MCNC: 123 MG/DL — HIGH (ref 70–99)
GLUCOSE BLDC GLUCOMTR-MCNC: 133 MG/DL — HIGH (ref 70–99)
GLUCOSE SERPL-MCNC: 175 MG/DL — HIGH (ref 70–99)
HCT VFR BLD CALC: 38.1 % — SIGNIFICANT CHANGE UP (ref 34.5–45)
HGB BLD-MCNC: 11.3 G/DL — LOW (ref 11.5–15.5)
MAGNESIUM SERPL-MCNC: 1.5 MG/DL — LOW (ref 1.6–2.6)
MCHC RBC-ENTMCNC: 29.7 GM/DL — LOW (ref 32–36)
MCHC RBC-ENTMCNC: 30.5 PG — SIGNIFICANT CHANGE UP (ref 27–34)
MCV RBC AUTO: 102.7 FL — HIGH (ref 80–100)
NRBC # BLD: 0 /100 WBCS — SIGNIFICANT CHANGE UP (ref 0–0)
PHOSPHATE SERPL-MCNC: 3.3 MG/DL — SIGNIFICANT CHANGE UP (ref 2.5–4.5)
PLATELET # BLD AUTO: 320 K/UL — SIGNIFICANT CHANGE UP (ref 150–400)
POTASSIUM SERPL-MCNC: 4.1 MMOL/L — SIGNIFICANT CHANGE UP (ref 3.5–5.3)
POTASSIUM SERPL-SCNC: 4.1 MMOL/L — SIGNIFICANT CHANGE UP (ref 3.5–5.3)
RBC # BLD: 3.71 M/UL — LOW (ref 3.8–5.2)
RBC # FLD: 13 % — SIGNIFICANT CHANGE UP (ref 10.3–14.5)
SODIUM SERPL-SCNC: 141 MMOL/L — SIGNIFICANT CHANGE UP (ref 135–145)
SPECIMEN SOURCE: SIGNIFICANT CHANGE UP
WBC # BLD: 7.99 K/UL — SIGNIFICANT CHANGE UP (ref 3.8–10.5)
WBC # FLD AUTO: 7.99 K/UL — SIGNIFICANT CHANGE UP (ref 3.8–10.5)

## 2022-03-12 PROCEDURE — 99233 SBSQ HOSP IP/OBS HIGH 50: CPT | Mod: GC

## 2022-03-12 RX ORDER — MAGNESIUM SULFATE 500 MG/ML
2 VIAL (ML) INJECTION ONCE
Refills: 0 | Status: COMPLETED | OUTPATIENT
Start: 2022-03-12 | End: 2022-03-12

## 2022-03-12 RX ADMIN — Medication 650 MILLIGRAM(S): at 10:12

## 2022-03-12 RX ADMIN — Medication 200 MILLIGRAM(S): at 13:17

## 2022-03-12 RX ADMIN — Medication 200 MILLIGRAM(S): at 21:28

## 2022-03-12 RX ADMIN — APIXABAN 2.5 MILLIGRAM(S): 2.5 TABLET, FILM COATED ORAL at 17:50

## 2022-03-12 RX ADMIN — SODIUM CHLORIDE 4 MILLILITER(S): 9 INJECTION INTRAMUSCULAR; INTRAVENOUS; SUBCUTANEOUS at 17:50

## 2022-03-12 RX ADMIN — Medication 25 GRAM(S): at 17:50

## 2022-03-12 RX ADMIN — APIXABAN 2.5 MILLIGRAM(S): 2.5 TABLET, FILM COATED ORAL at 05:44

## 2022-03-12 RX ADMIN — Medication 4 MILLILITER(S): at 18:33

## 2022-03-12 RX ADMIN — Medication 4 MILLILITER(S): at 05:45

## 2022-03-12 RX ADMIN — ARIPIPRAZOLE 2 MILLIGRAM(S): 15 TABLET ORAL at 21:41

## 2022-03-12 RX ADMIN — FAMOTIDINE 20 MILLIGRAM(S): 10 INJECTION INTRAVENOUS at 21:28

## 2022-03-12 RX ADMIN — SODIUM CHLORIDE 4 MILLILITER(S): 9 INJECTION INTRAMUSCULAR; INTRAVENOUS; SUBCUTANEOUS at 05:45

## 2022-03-12 RX ADMIN — Medication 650 MILLIGRAM(S): at 11:12

## 2022-03-12 RX ADMIN — Medication 200 MILLIGRAM(S): at 05:45

## 2022-03-12 RX ADMIN — Medication 25 GRAM(S): at 14:37

## 2022-03-12 NOTE — PROGRESS NOTE ADULT - PROBLEM SELECTOR PLAN 8
Patient with cerebral palsy. Has functional quadriplegia, health aide at reports at baseline she is able to follow commands and communicate "when she wants to".    - PT recommending return to group home with prior level of assist   - see GOC Patient with cerebral palsy. Has functional quadriplegia, health aide at reports at baseline she is able to follow commands and communicate "when she wants to".    - PT recommending return to group home with prior level of assist, however if requires this level of oxygen and suctioning may need NUNU prior to group home   - see GOC

## 2022-03-12 NOTE — PROGRESS NOTE ADULT - PROBLEM SELECTOR PLAN 3
Patient with cerebral palsy. Previously a [facility] resident, now resides in group home, with [facility] per palliative care. Patient with repeat admission for aspiration pneumonia with history of dysphagia and copious secretions from oropharynx on exam.    - palliative care consulted  - Ethics counsulted   - pending [facility] discussion

## 2022-03-12 NOTE — PROGRESS NOTE ADULT - PROBLEM SELECTOR PLAN 2
Patient presenting with AHRF requiring intubation.   CXR with bilateral lower lobe opacities on 3/3. Sputum culture:  pseudomonas aeruginosa.   Blood culture with no growth to date.  - Mgmt as above   - s/p 7 day course of Cipro 400mg IV Q8 (3/5-3/12), will extend given persistent O2 requirement   - d/c Seroquel 50mg Q12 via PEG for agitation  - c/w home med Abilify 2mg QHS via PEG

## 2022-03-12 NOTE — PROGRESS NOTE ADULT - SUBJECTIVE AND OBJECTIVE BOX
INTERVAL HPI/OVERNIGHT EVENTS:  O/N: 92% on 6L     This morning: Patient was seen and examined at bedside. Interactive with, making noises in response to my questions, responding to commands, smiling, knitting yarn     VITAL SIGNS:  T(F): 97.6 (03-12-22 @ 05:19)  HR: 81 (03-12-22 @ 05:19)  BP: 130/69 (03-12-22 @ 05:19)  RR: 20 (03-12-22 @ 05:19)  SpO2: 95% (03-12-22 @ 15:00)    PHYSICAL EXAM:    Constitutional: NAD, smiling, interacting and responding to me   HEENT: PERRL, EOMI, no JVD, MMM  Respiratory: cough, increased secretions and upper airway sounds, BL rhonchi, on 4L NC, constantly clearing throat   Cardiovascular: RRR, normal S1S2, no M/R/G  Gastrointestinal: abdomen soft, NTND, no masses palpable, BS normal  Extremities: Warm, well perfused, pulses equal bilateral upper and lower extremities, no edema, no clubbing  Neurological: Alert, functional quadraplegia   Skin: Normal temperature, warm, dry    MEDICATIONS  (STANDING):  acetylcysteine 20%  Inhalation 4 milliLiter(s) Inhalation every 12 hours  albuterol/ipratropium for Nebulization. 3 milliLiter(s) Nebulizer once  apixaban 2.5 milliGRAM(s) Oral every 12 hours  ARIPiprazole 2 milliGRAM(s) Oral every 24 hours  ciprofloxacin   IVPB 400 milliGRAM(s) IV Intermittent every 8 hours  dextrose 40% Gel 15 Gram(s) Oral once  dextrose 5%. 1000 milliLiter(s) (50 mL/Hr) IV Continuous <Continuous>  dextrose 5%. 1000 milliLiter(s) (100 mL/Hr) IV Continuous <Continuous>  dextrose 50% Injectable 25 Gram(s) IV Push once  dextrose 50% Injectable 12.5 Gram(s) IV Push once  dextrose 50% Injectable 25 Gram(s) IV Push once  famotidine   Suspension 20 milliGRAM(s) Enteral Tube at bedtime  glucagon  Injectable 1 milliGRAM(s) IntraMuscular once  insulin lispro (ADMELOG) corrective regimen sliding scale   SubCutaneous every 6 hours  magnesium sulfate  IVPB 2 Gram(s) IV Intermittent once  sodium chloride 7% Inhalation 4 milliLiter(s) Inhalation every 12 hours    MEDICATIONS  (PRN):  acetaminophen     Tablet .. 650 milliGRAM(s) Oral every 6 hours PRN Temp greater or equal to 38C (100.4F), Mild Pain (1 - 3)  albuterol/ipratropium for Nebulization 3 milliLiter(s) Nebulizer every 6 hours PRN Shortness of Breath and/or Wheezing    Allergies    ace inhibitors (Anaphylaxis)  caffeine (Rash)  cefepime (Rash)  chocolate (Rash)  high acid (Rash)  Tomatoes (Hives)    Intolerances    LABS:                        11.3   7.99  )-----------( 320      ( 12 Mar 2022 07:55 )             38.1     03-12    141  |  101  |  23  ----------------------------<  175<H>  4.1   |  32<H>  |  0.50    Ca    9.9      12 Mar 2022 07:55  Phos  3.3     03-12  Mg     1.5     03-12    TPro  x   /  Alb  x   /  TBili  0.4  /  DBili  x   /  AST  x   /  ALT  x   /  AlkPhos  x   03-11    PT/INR - ( 11 Mar 2022 07:24 )   PT: 14.1 sec;   INR: 1.18       RADIOLOGY & ADDITIONAL TESTS:  Reviewed

## 2022-03-12 NOTE — PROGRESS NOTE ADULT - PROBLEM SELECTOR PLAN 7
History of PE 5 years ago. On Eliquis at home 2.5 mg BID at home. Bedside ultrasound performed in MICU with no evidence of clot. On Lovenox 50mg Q12, d/linda and returned to home medications.     - c/w home med Eliquis 2.5mg Q12

## 2022-03-12 NOTE — PROGRESS NOTE ADULT - SUBJECTIVE AND OBJECTIVE BOX
Patient is a 76y old  Female who presents with a chief complaint of Acute hypoxic respiratory failure 2/2 pseudomonas pneumonia (10 Mar 2022 12:13)      INTERVAL HPI/OVERNIGHT EVENTS:    Pt. seen and examined earlier today  No complaints elicited    Review of Systems: 12 point review of systems otherwise negative    MEDICATIONS  (STANDING):  acetylcysteine 20%  Inhalation 4 milliLiter(s) Inhalation every 12 hours  albuterol/ipratropium for Nebulization. 3 milliLiter(s) Nebulizer once  apixaban 2.5 milliGRAM(s) Oral every 12 hours  ARIPiprazole 2 milliGRAM(s) Oral every 24 hours  ciprofloxacin   IVPB 400 milliGRAM(s) IV Intermittent every 8 hours  dextrose 40% Gel 15 Gram(s) Oral once  dextrose 5%. 1000 milliLiter(s) (50 mL/Hr) IV Continuous <Continuous>  dextrose 5%. 1000 milliLiter(s) (100 mL/Hr) IV Continuous <Continuous>  dextrose 50% Injectable 25 Gram(s) IV Push once  dextrose 50% Injectable 12.5 Gram(s) IV Push once  dextrose 50% Injectable 25 Gram(s) IV Push once  famotidine   Suspension 20 milliGRAM(s) Enteral Tube at bedtime  glucagon  Injectable 1 milliGRAM(s) IntraMuscular once  insulin lispro (ADMELOG) corrective regimen sliding scale   SubCutaneous every 6 hours  magnesium sulfate  IVPB 2 Gram(s) IV Intermittent once  sodium chloride 7% Inhalation 4 milliLiter(s) Inhalation every 12 hours    MEDICATIONS  (PRN):  acetaminophen     Tablet .. 650 milliGRAM(s) Oral every 6 hours PRN Temp greater or equal to 38C (100.4F), Mild Pain (1 - 3)  albuterol/ipratropium for Nebulization 3 milliLiter(s) Nebulizer every 6 hours PRN Shortness of Breath and/or Wheezing      Allergies    ace inhibitors (Anaphylaxis)  caffeine (Rash)  cefepime (Rash)  chocolate (Rash)  high acid (Rash)  Tomatoes (Hives)    Intolerances          Vital Signs Last 24 Hrs  T(C): 36.4 (12 Mar 2022 13:20), Max: 36.7 (11 Mar 2022 20:23)  T(F): 97.5 (12 Mar 2022 13:20), Max: 98 (11 Mar 2022 20:23)  HR: 86 (12 Mar 2022 13:20) (81 - 86)  BP: 159/70 (12 Mar 2022 13:20) (120/64 - 159/70)  BP(mean): --  RR: 20 (12 Mar 2022 13:20) (19 - 20)  SpO2: 95% (12 Mar 2022 15:00) (89% - 97%)  CAPILLARY BLOOD GLUCOSE      POCT Blood Glucose.: 133 mg/dL (12 Mar 2022 11:36)  POCT Blood Glucose.: 108 mg/dL (12 Mar 2022 05:56)  POCT Blood Glucose.: 123 mg/dL (12 Mar 2022 00:11)  POCT Blood Glucose.: 122 mg/dL (11 Mar 2022 17:26)      03-11 @ 07:01  -  03-12 @ 07:00  --------------------------------------------------------  IN: 0 mL / OUT: 600 mL / NET: -600 mL        Physical Exam:  (earlier today)  Daily     Daily   General:  chronically-ill but comfortable-appearing in NAD  HEENT:  MMM  CV:  RRR  Lungs:  B/L rhonci, normal WOB on 5L NC  Abdomen:  soft NT ND +PEG  Extremities:  no edema B/L LE  Skin:  WWP  :  +Quintero  Neuro:  alert, unable to assess orientation +spasticity     LABS:                        11.3   7.99  )-----------( 320      ( 12 Mar 2022 07:55 )             38.1     03-12    141  |  101  |  23  ----------------------------<  175<H>  4.1   |  32<H>  |  0.50    Ca    9.9      12 Mar 2022 07:55  Phos  3.3     03-12  Mg     1.5     03-12    TPro  x   /  Alb  x   /  TBili  0.4  /  DBili  x   /  AST  x   /  ALT  x   /  AlkPhos  x   03-11    PT/INR - ( 11 Mar 2022 07:24 )   PT: 14.1 sec;   INR: 1.18

## 2022-03-12 NOTE — PROGRESS NOTE ADULT - PROBLEM SELECTOR PLAN 4
- PEG tube in place    #Constipation  Last BM 3/9 in PM per MICU team. Pt with somewhat tense abdomen.   - Suppository dulcolax ordered today.  - continue to monitor

## 2022-03-12 NOTE — PROGRESS NOTE ADULT - PROBLEM SELECTOR PLAN 1
Patient with h/o aspiration events requiring intubation.   Patient found with respiratory distress and SaO2 of 84%.   Intubated in field for airway protection, s/p extubation on 3/8 to NC.   Weaned to 3L NC on transfer to [facility].   Given Lasix 40 mg IV x 2 (3/8 and 3/9) due to pleural effusion and net positive fluid status per MICU.     - Patient has tenuous respiratory status given innability to clear secretions and fluctating mental status  - Currently on 4L NC, fluctuates but trending in right direction today  - Mucolytics   - Frequent suctioning   - attempt to wean to NC  - Chest PT Q6hrs  - Aspiration precautions

## 2022-03-12 NOTE — PROGRESS NOTE ADULT - PROBLEM SELECTOR PLAN 9
N: Jevity 1.2 at 50cc/hr  E: PRN  DVT: Eliquis 2.5 mg BID  C: Full Code, pending discussion with pt's CAB state advocate for GOC and medical decisions  IN CASE OF EMERGENCY, 2-physician consent is required  D: RMF

## 2022-03-13 LAB
ALBUMIN SERPL ELPH-MCNC: 3.4 G/DL — SIGNIFICANT CHANGE UP (ref 3.3–5)
ALBUMIN SERPL ELPH-MCNC: 3.8 G/DL — SIGNIFICANT CHANGE UP (ref 3.3–5)
ALP SERPL-CCNC: 78 U/L — SIGNIFICANT CHANGE UP (ref 40–120)
ALP SERPL-CCNC: 87 U/L — SIGNIFICANT CHANGE UP (ref 40–120)
ALT FLD-CCNC: 41 U/L — SIGNIFICANT CHANGE UP (ref 10–45)
ALT FLD-CCNC: 47 U/L — HIGH (ref 10–45)
ANION GAP SERPL CALC-SCNC: 12 MMOL/L — SIGNIFICANT CHANGE UP (ref 5–17)
ANION GAP SERPL CALC-SCNC: 13 MMOL/L — SIGNIFICANT CHANGE UP (ref 5–17)
ANISOCYTOSIS BLD QL: SLIGHT — SIGNIFICANT CHANGE UP
APTT BLD: 32.1 SEC — SIGNIFICANT CHANGE UP (ref 27.5–35.5)
AST SERPL-CCNC: 41 U/L — HIGH (ref 10–40)
AST SERPL-CCNC: 53 U/L — HIGH (ref 10–40)
BASE EXCESS BLDA CALC-SCNC: 10.2 MMOL/L — HIGH (ref -2–3)
BASOPHILS # BLD AUTO: 0.09 K/UL — SIGNIFICANT CHANGE UP (ref 0–0.2)
BASOPHILS NFR BLD AUTO: 0.9 % — SIGNIFICANT CHANGE UP (ref 0–2)
BILIRUB SERPL-MCNC: 0.4 MG/DL — SIGNIFICANT CHANGE UP (ref 0.2–1.2)
BILIRUB SERPL-MCNC: 0.5 MG/DL — SIGNIFICANT CHANGE UP (ref 0.2–1.2)
BLD GP AB SCN SERPL QL: NEGATIVE — SIGNIFICANT CHANGE UP
BUN SERPL-MCNC: 16 MG/DL — SIGNIFICANT CHANGE UP (ref 7–23)
BUN SERPL-MCNC: 22 MG/DL — SIGNIFICANT CHANGE UP (ref 7–23)
CALCIUM SERPL-MCNC: 8.8 MG/DL — SIGNIFICANT CHANGE UP (ref 8.4–10.5)
CALCIUM SERPL-MCNC: 9.2 MG/DL — SIGNIFICANT CHANGE UP (ref 8.4–10.5)
CHLORIDE SERPL-SCNC: 95 MMOL/L — LOW (ref 96–108)
CHLORIDE SERPL-SCNC: 95 MMOL/L — LOW (ref 96–108)
CK MB CFR SERPL CALC: 3.8 NG/ML — SIGNIFICANT CHANGE UP (ref 0–6.7)
CK SERPL-CCNC: 140 U/L — SIGNIFICANT CHANGE UP (ref 25–170)
CO2 BLDA-SCNC: 38 MMOL/L — HIGH (ref 19–24)
CO2 SERPL-SCNC: 29 MMOL/L — SIGNIFICANT CHANGE UP (ref 22–31)
CO2 SERPL-SCNC: 32 MMOL/L — HIGH (ref 22–31)
CREAT SERPL-MCNC: 0.68 MG/DL — SIGNIFICANT CHANGE UP (ref 0.5–1.3)
CREAT SERPL-MCNC: 0.76 MG/DL — SIGNIFICANT CHANGE UP (ref 0.5–1.3)
EGFR: 82 ML/MIN/1.73M2 — SIGNIFICANT CHANGE UP
EGFR: 91 ML/MIN/1.73M2 — SIGNIFICANT CHANGE UP
EOSINOPHIL # BLD AUTO: 0.09 K/UL — SIGNIFICANT CHANGE UP (ref 0–0.5)
EOSINOPHIL NFR BLD AUTO: 0.9 % — SIGNIFICANT CHANGE UP (ref 0–6)
GAS PNL BLDA: SIGNIFICANT CHANGE UP
GIANT PLATELETS BLD QL SMEAR: PRESENT — SIGNIFICANT CHANGE UP
GLUCOSE BLDC GLUCOMTR-MCNC: 109 MG/DL — HIGH (ref 70–99)
GLUCOSE BLDC GLUCOMTR-MCNC: 124 MG/DL — HIGH (ref 70–99)
GLUCOSE BLDC GLUCOMTR-MCNC: 128 MG/DL — HIGH (ref 70–99)
GLUCOSE BLDC GLUCOMTR-MCNC: 224 MG/DL — HIGH (ref 70–99)
GLUCOSE BLDC GLUCOMTR-MCNC: 91 MG/DL — SIGNIFICANT CHANGE UP (ref 70–99)
GLUCOSE SERPL-MCNC: 263 MG/DL — HIGH (ref 70–99)
GLUCOSE SERPL-MCNC: 87 MG/DL — SIGNIFICANT CHANGE UP (ref 70–99)
HCO3 BLDA-SCNC: 36 MMOL/L — HIGH (ref 21–28)
HCT VFR BLD CALC: 40.4 % — SIGNIFICANT CHANGE UP (ref 34.5–45)
HGB BLD-MCNC: 12 G/DL — SIGNIFICANT CHANGE UP (ref 11.5–15.5)
INR BLD: 1.16 — SIGNIFICANT CHANGE UP (ref 0.88–1.16)
LACTATE SERPL-SCNC: 1.5 MMOL/L — SIGNIFICANT CHANGE UP (ref 0.5–2)
LACTATE SERPL-SCNC: 3.7 MMOL/L — HIGH (ref 0.5–2)
LYMPHOCYTES # BLD AUTO: 1.38 K/UL — SIGNIFICANT CHANGE UP (ref 1–3.3)
LYMPHOCYTES # BLD AUTO: 13.3 % — SIGNIFICANT CHANGE UP (ref 13–44)
MACROCYTES BLD QL: SLIGHT — SIGNIFICANT CHANGE UP
MAGNESIUM SERPL-MCNC: 1.6 MG/DL — SIGNIFICANT CHANGE UP (ref 1.6–2.6)
MAGNESIUM SERPL-MCNC: 2.2 MG/DL — SIGNIFICANT CHANGE UP (ref 1.6–2.6)
MANUAL SMEAR VERIFICATION: SIGNIFICANT CHANGE UP
MCHC RBC-ENTMCNC: 29.7 GM/DL — LOW (ref 32–36)
MCHC RBC-ENTMCNC: 31.8 PG — SIGNIFICANT CHANGE UP (ref 27–34)
MCV RBC AUTO: 107.2 FL — HIGH (ref 80–100)
MONOCYTES # BLD AUTO: 0.46 K/UL — SIGNIFICANT CHANGE UP (ref 0–0.9)
MONOCYTES NFR BLD AUTO: 4.4 % — SIGNIFICANT CHANGE UP (ref 2–14)
MYELOCYTES NFR BLD: 0.9 % — HIGH (ref 0–0)
NEUTROPHILS # BLD AUTO: 8.18 K/UL — HIGH (ref 1.8–7.4)
NEUTROPHILS NFR BLD AUTO: 78.7 % — HIGH (ref 43–77)
PCO2 BLDA: 52 MMHG — HIGH (ref 32–35)
PH BLDA: 7.45 — SIGNIFICANT CHANGE UP (ref 7.35–7.45)
PHOSPHATE SERPL-MCNC: 0.5 MG/DL — CRITICAL LOW (ref 2.5–4.5)
PHOSPHATE SERPL-MCNC: 6.5 MG/DL — HIGH (ref 2.5–4.5)
PLAT MORPH BLD: NORMAL — SIGNIFICANT CHANGE UP
PLATELET # BLD AUTO: 404 K/UL — HIGH (ref 150–400)
PO2 BLDA: 84 MMHG — SIGNIFICANT CHANGE UP (ref 83–108)
POLYCHROMASIA BLD QL SMEAR: SLIGHT — SIGNIFICANT CHANGE UP
POTASSIUM SERPL-MCNC: 3.9 MMOL/L — SIGNIFICANT CHANGE UP (ref 3.5–5.3)
POTASSIUM SERPL-MCNC: 5.6 MMOL/L — HIGH (ref 3.5–5.3)
POTASSIUM SERPL-SCNC: 3.9 MMOL/L — SIGNIFICANT CHANGE UP (ref 3.5–5.3)
POTASSIUM SERPL-SCNC: 5.6 MMOL/L — HIGH (ref 3.5–5.3)
PROT SERPL-MCNC: 6.4 G/DL — SIGNIFICANT CHANGE UP (ref 6–8.3)
PROT SERPL-MCNC: 7 G/DL — SIGNIFICANT CHANGE UP (ref 6–8.3)
PROTHROM AB SERPL-ACNC: 13.8 SEC — HIGH (ref 10.5–13.4)
RBC # BLD: 3.77 M/UL — LOW (ref 3.8–5.2)
RBC # FLD: 12.6 % — SIGNIFICANT CHANGE UP (ref 10.3–14.5)
RBC BLD AUTO: ABNORMAL
RH IG SCN BLD-IMP: POSITIVE — SIGNIFICANT CHANGE UP
SAO2 % BLDA: 98.5 % — HIGH (ref 94–98)
SODIUM SERPL-SCNC: 137 MMOL/L — SIGNIFICANT CHANGE UP (ref 135–145)
SODIUM SERPL-SCNC: 139 MMOL/L — SIGNIFICANT CHANGE UP (ref 135–145)
STOMATOCYTES BLD QL SMEAR: SLIGHT — SIGNIFICANT CHANGE UP
VARIANT LYMPHS # BLD: 0.9 % — SIGNIFICANT CHANGE UP (ref 0–6)
WBC # BLD: 10.4 K/UL — SIGNIFICANT CHANGE UP (ref 3.8–10.5)
WBC # FLD AUTO: 10.4 K/UL — SIGNIFICANT CHANGE UP (ref 3.8–10.5)

## 2022-03-13 PROCEDURE — 99291 CRITICAL CARE FIRST HOUR: CPT | Mod: 25

## 2022-03-13 PROCEDURE — 71045 X-RAY EXAM CHEST 1 VIEW: CPT | Mod: 26

## 2022-03-13 PROCEDURE — 76937 US GUIDE VASCULAR ACCESS: CPT | Mod: 26,AS

## 2022-03-13 PROCEDURE — 74018 RADEX ABDOMEN 1 VIEW: CPT | Mod: 26

## 2022-03-13 PROCEDURE — 43753 TX GASTRO INTUB W/ASP: CPT | Mod: AS

## 2022-03-13 PROCEDURE — 99233 SBSQ HOSP IP/OBS HIGH 50: CPT | Mod: GC

## 2022-03-13 PROCEDURE — 36680 INSERT NEEDLE BONE CAVITY: CPT | Mod: AS

## 2022-03-13 PROCEDURE — 99291 CRITICAL CARE FIRST HOUR: CPT

## 2022-03-13 RX ORDER — NOREPINEPHRINE BITARTRATE/D5W 8 MG/250ML
0.05 PLASTIC BAG, INJECTION (ML) INTRAVENOUS
Qty: 8 | Refills: 0 | Status: DISCONTINUED | OUTPATIENT
Start: 2022-03-13 | End: 2022-03-15

## 2022-03-13 RX ORDER — FENTANYL CITRATE 50 UG/ML
50 INJECTION INTRAVENOUS ONCE
Refills: 0 | Status: DISCONTINUED | OUTPATIENT
Start: 2022-03-13 | End: 2022-03-13

## 2022-03-13 RX ORDER — PANTOPRAZOLE SODIUM 20 MG/1
40 TABLET, DELAYED RELEASE ORAL DAILY
Refills: 0 | Status: DISCONTINUED | OUTPATIENT
Start: 2022-03-14 | End: 2022-03-16

## 2022-03-13 RX ORDER — ENOXAPARIN SODIUM 100 MG/ML
50 INJECTION SUBCUTANEOUS EVERY 12 HOURS
Refills: 0 | Status: DISCONTINUED | OUTPATIENT
Start: 2022-03-13 | End: 2022-03-23

## 2022-03-13 RX ORDER — PROPOFOL 10 MG/ML
10 INJECTION, EMULSION INTRAVENOUS
Qty: 1000 | Refills: 0 | Status: DISCONTINUED | OUTPATIENT
Start: 2022-03-13 | End: 2022-03-19

## 2022-03-13 RX ORDER — MAGNESIUM SULFATE 500 MG/ML
2 VIAL (ML) INJECTION ONCE
Refills: 0 | Status: COMPLETED | OUTPATIENT
Start: 2022-03-13 | End: 2022-03-13

## 2022-03-13 RX ORDER — POTASSIUM PHOSPHATE, MONOBASIC POTASSIUM PHOSPHATE, DIBASIC 236; 224 MG/ML; MG/ML
30 INJECTION, SOLUTION INTRAVENOUS ONCE
Refills: 0 | Status: COMPLETED | OUTPATIENT
Start: 2022-03-13 | End: 2022-03-13

## 2022-03-13 RX ORDER — CHLORHEXIDINE GLUCONATE 213 G/1000ML
15 SOLUTION TOPICAL EVERY 12 HOURS
Refills: 0 | Status: DISCONTINUED | OUTPATIENT
Start: 2022-03-13 | End: 2022-03-14

## 2022-03-13 RX ORDER — ONDANSETRON 8 MG/1
4 TABLET, FILM COATED ORAL ONCE
Refills: 0 | Status: COMPLETED | OUTPATIENT
Start: 2022-03-13 | End: 2022-03-13

## 2022-03-13 RX ORDER — SODIUM CHLORIDE 9 MG/ML
500 INJECTION, SOLUTION INTRAVENOUS ONCE
Refills: 0 | Status: COMPLETED | OUTPATIENT
Start: 2022-03-13 | End: 2022-03-13

## 2022-03-13 RX ADMIN — PROPOFOL 3.41 MICROGRAM(S)/KG/MIN: 10 INJECTION, EMULSION INTRAVENOUS at 17:57

## 2022-03-13 RX ADMIN — Medication 200 MILLIGRAM(S): at 21:20

## 2022-03-13 RX ADMIN — Medication 4 MILLILITER(S): at 21:42

## 2022-03-13 RX ADMIN — Medication 200 MILLIGRAM(S): at 16:35

## 2022-03-13 RX ADMIN — Medication 4 MILLILITER(S): at 05:26

## 2022-03-13 RX ADMIN — ENOXAPARIN SODIUM 50 MILLIGRAM(S): 100 INJECTION SUBCUTANEOUS at 18:47

## 2022-03-13 RX ADMIN — Medication 5.33 MICROGRAM(S)/KG/MIN: at 15:00

## 2022-03-13 RX ADMIN — FENTANYL CITRATE 50 MICROGRAM(S): 50 INJECTION INTRAVENOUS at 16:30

## 2022-03-13 RX ADMIN — FENTANYL CITRATE 50 MICROGRAM(S): 50 INJECTION INTRAVENOUS at 15:15

## 2022-03-13 RX ADMIN — SODIUM CHLORIDE 4 MILLILITER(S): 9 INJECTION INTRAMUSCULAR; INTRAVENOUS; SUBCUTANEOUS at 05:26

## 2022-03-13 RX ADMIN — CHLORHEXIDINE GLUCONATE 15 MILLILITER(S): 213 SOLUTION TOPICAL at 21:20

## 2022-03-13 RX ADMIN — SODIUM CHLORIDE 1000 MILLILITER(S): 9 INJECTION, SOLUTION INTRAVENOUS at 16:05

## 2022-03-13 RX ADMIN — POTASSIUM PHOSPHATE, MONOBASIC POTASSIUM PHOSPHATE, DIBASIC 85 MILLIMOLE(S): 236; 224 INJECTION, SOLUTION INTRAVENOUS at 22:37

## 2022-03-13 RX ADMIN — Medication 200 MILLIGRAM(S): at 05:26

## 2022-03-13 RX ADMIN — ARIPIPRAZOLE 2 MILLIGRAM(S): 15 TABLET ORAL at 22:00

## 2022-03-13 RX ADMIN — APIXABAN 2.5 MILLIGRAM(S): 2.5 TABLET, FILM COATED ORAL at 05:26

## 2022-03-13 RX ADMIN — Medication 25 GRAM(S): at 22:13

## 2022-03-13 RX ADMIN — PROPOFOL 3.41 MICROGRAM(S)/KG/MIN: 10 INJECTION, EMULSION INTRAVENOUS at 22:37

## 2022-03-13 RX ADMIN — Medication 3 MILLILITER(S): at 08:55

## 2022-03-13 RX ADMIN — ONDANSETRON 4 MILLIGRAM(S): 8 TABLET, FILM COATED ORAL at 10:33

## 2022-03-13 NOTE — PROCEDURE NOTE - NSICDXPROCEDURE_GEN_ALL_CORE_FT
PROCEDURES:  Arterial puncture 13-Mar-2022 17:06:00  Shirin Moon  Arterial blood gas 13-Mar-2022 17:06:19  Shirin Moon

## 2022-03-13 NOTE — CHART NOTE - NSCHARTNOTEFT_GEN_A_CORE
***Rapid Response Clinical Impact Advanced Care Provider Note***    Patient is a 76y old Female w/PMH of CP w/functional quadriplegia, DVT/PE on eliquis, GERD, chronic dysphagia w/PEG, thoracic spine fusion admitted for acute hypoxic respiratory failure found to have pseudomonal PNA c/b periods of agitation/anxiety, s/p extubation to HFNC on 3/8, as well as e.coli UTI    Rapid response team called because patient with respiratory arrest.     Patient was seen and examined at the bedside by the rapid response team. Upon arrival to room, patient w/palpable pulses, however is apneic, gray and dusky appearing, w/copious secretions and w/unreadable oxygen saturation. Rescue breathing initiated with ambu bag, IO & IV placed. Anesthesia called d/t history of trach. Anesthesia intubated patient w/7.5 ETT with succinylcholine and propofol. Patient with +color change on CO2, breaths sounds bilaterally and symmetric chest rise with bagging. Oxygen saturation improved to 97%, patient sedated with propofol drip. Patient became hypotensive to 70s/40s, so peripheral levophed started.  BP improved to 100s/60s and patient transferred to MICU. En route, patient had episode of emesis with continued copious secretions, requiring frequent suctioning.    Review of Systems: Unable to perform ROS d/t patient is intubated and sedated.    Allergies    ace inhibitors (Anaphylaxis)  caffeine (Rash)  cefepime (Rash)  chocolate (Rash)  high acid (Rash)  Tomatoes (Hives)    Intolerances        PAST MEDICAL & SURGICAL HISTORY:  CP (cerebral palsy)    Mental retardation    Dysphagia    GERD (gastroesophageal reflux disease)    PE (pulmonary thromboembolism)    DVT (deep venous thrombosis)    Spastic quadriplegia    HTN (hypertension)    PEG tube malfunction        Vital Signs Last 24 Hrs  T(C): 36.8 (13 Mar 2022 05:51), Max: 36.8 (13 Mar 2022 05:51)  T(F): 98.3 (13 Mar 2022 05:51), Max: 98.3 (13 Mar 2022 05:51)  HR: 80 (13 Mar 2022 15:00) (80 - 88)  BP: 82/44 (13 Mar 2022 15:00) (82/44 - 137/64)  BP(mean): 60 (13 Mar 2022 15:00) (60 - 60)  RR: 39 (13 Mar 2022 15:00) (18 - 39)  SpO2: 100% (13 Mar 2022 15:00) (92% - 100%)      PHYSICAL EXAM:  Constitutional: intubated & sedated  Neurologic: Sedated, with purposeful movement in all extremities. CNII-XII grossly intact; no focal deficits  HEENT: NC/AT; clear conjunctiva, anicteric sclera; no nasal discharge; no oropharyngeal erythema or exudates, MMM  Neck: supple; no JVD or thyromegaly, no cervical, post-auricular or supraclavicular lymphadenopathy  Respiratory: coarse breath sounds bilaterally B/L, no increased work of breathing or accessory muscle use  Cardiac: +S1/S2, no murmurs/rubs/gallops, RRR  Gastrointestinal: abdomen soft, NT/ND; +BSx4, PEG in place  Genitourinary: no suprapubic tenderness, Quintero draining yellow urine  Back: spine midline, no bony tenderness or step-offs  Extremities: WWP, no clubbing or cyanosis; no peripheral edema b/l  Musculoskeletal: NROM x4; no joint swelling, tenderness or erythema  Vascular: 2+ radial, femoral, DP/PT pulses B/L  Dermatologic: skin warm, dry and intact; no rashes, wounds, or scars      03-12 @ 06:01  -  03-13 @ 07:00  --------------------------------------------------------  IN: 0 mL / OUT: 800 mL / NET: -800 mL                              12.0   10.40 )-----------( 404      ( 13 Mar 2022 14:34 )             40.4     03-13    x   |  x   |  16  ----------------------------<  263<H>  x    |  x   |  0.68    Ca    9.2      13 Mar 2022 14:34  Phos  3.3     03-12  Mg     2.2     03-13      ABG - ( 13 Mar 2022 14:05 )  pH, Arterial: 6.98  pH, Blood: x     /  pCO2: 148   /  pO2: 277   / HCO3: 35    / Base Excess: -1.1  /  SaO2: 100.0                     PT/INR - ( 13 Mar 2022 14:34 )   PT: 13.8 sec;   INR: 1.16          PTT - ( 13 Mar 2022 14:34 )  PTT:32.1 sec    MEDICATIONS  (STANDING):  acetylcysteine 20%  Inhalation 4 milliLiter(s) Inhalation every 12 hours  albuterol/ipratropium for Nebulization. 3 milliLiter(s) Nebulizer once  apixaban 2.5 milliGRAM(s) Oral every 12 hours  ARIPiprazole 2 milliGRAM(s) Oral every 24 hours  ciprofloxacin   IVPB 400 milliGRAM(s) IV Intermittent every 8 hours  dextrose 40% Gel 15 Gram(s) Oral once  dextrose 5%. 1000 milliLiter(s) (50 mL/Hr) IV Continuous <Continuous>  dextrose 5%. 1000 milliLiter(s) (100 mL/Hr) IV Continuous <Continuous>  dextrose 50% Injectable 25 Gram(s) IV Push once  dextrose 50% Injectable 12.5 Gram(s) IV Push once  dextrose 50% Injectable 25 Gram(s) IV Push once  famotidine   Suspension 20 milliGRAM(s) Enteral Tube at bedtime  glucagon  Injectable 1 milliGRAM(s) IntraMuscular once  insulin lispro (ADMELOG) corrective regimen sliding scale   SubCutaneous every 6 hours  lactated ringers Bolus 500 milliLiter(s) IV Bolus once  norepinephrine Infusion 0.05 MICROgram(s)/kG/Min (5.33 mL/Hr) IV Continuous <Continuous>  sodium chloride 7% Inhalation 4 milliLiter(s) Inhalation every 12 hours    MEDICATIONS  (PRN):  acetaminophen     Tablet .. 650 milliGRAM(s) Oral every 6 hours PRN Temp greater or equal to 38C (100.4F), Mild Pain (1 - 3)  albuterol/ipratropium for Nebulization 3 milliLiter(s) Nebulizer every 6 hours PRN Shortness of Breath and/or Wheezing      Assessment & Plan- Rapid Response called for 76y year old Female w/PMH of CP w/functional quadriplegia, DVT/PE on eliquis, GERD, chronic dysphagia w/PEG, thoracic spine fusion admitted for acute hypoxic respiratory failure found to have pseudomonal PNA c/b periods of agitation/anxiety, s/p extubation to HFNC on 3/8, p/w hypoxic respiratory failure now intubated, likely iso aspiration vs. mucous plug, also with e.coli UTI.  -vent settings 400/18/100%/5, repeat ABG  -titrate levophed for goal MAP >65  -titrate propofol for goal RASS of -1  -aggressive pulmonary toileting  -mucomyst for secretions  -duonebs prn for wheezing  -decompress PEG  -c/w abx ***Rapid Response Clinical Impact Advanced Care Provider Note***    Patient is a 76y old Female w/PMH of CP w/functional quadriplegia, DVT/PE on eliquis, GERD, chronic dysphagia w/PEG, thoracic spine fusion admitted for acute hypoxic respiratory failure found to have pseudomonal PNA c/b periods of agitation/anxiety, s/p extubation to HFNC on 3/8, as well as e.coli UTI    Rapid response team called because patient with respiratory arrest.     Patient was seen and examined at the bedside by the rapid response team. Upon arrival to room, patient w/palpable pulses, however is apneic, gray and dusky appearing, w/copious secretions and w/unreadable oxygen saturation. Rescue breathing initiated with ambu bag, IO & IV placed. Oxygen saturation improved to 90% with bagging. Anesthesia called d/t history of trach. Anesthesia intubated patient w/7.5 ETT with succinylcholine and propofol. Patient with +color change on CO2, breaths sounds bilaterally and symmetric chest rise with bagging. Oxygen saturation improved to 97%, patient sedated with propofol drip. Patient became hypotensive to 70s/40s, so peripheral levophed started.  BP improved to 100s/60s and patient transferred to MICU. En route, patient had episode of emesis with continued copious secretions, requiring frequent suctioning.    Review of Systems: Unable to perform ROS d/t patient is intubated and sedated.    Allergies    ace inhibitors (Anaphylaxis)  caffeine (Rash)  cefepime (Rash)  chocolate (Rash)  high acid (Rash)  Tomatoes (Hives)    Intolerances        PAST MEDICAL & SURGICAL HISTORY:  CP (cerebral palsy)    Mental retardation    Dysphagia    GERD (gastroesophageal reflux disease)    PE (pulmonary thromboembolism)    DVT (deep venous thrombosis)    Spastic quadriplegia    HTN (hypertension)    PEG tube malfunction        Vital Signs Last 24 Hrs  T(C): 36.8 (13 Mar 2022 05:51), Max: 36.8 (13 Mar 2022 05:51)  T(F): 98.3 (13 Mar 2022 05:51), Max: 98.3 (13 Mar 2022 05:51)  HR: 80 (13 Mar 2022 15:00) (80 - 88)  BP: 82/44 (13 Mar 2022 15:00) (82/44 - 137/64)  BP(mean): 60 (13 Mar 2022 15:00) (60 - 60)  RR: 39 (13 Mar 2022 15:00) (18 - 39)  SpO2: 100% (13 Mar 2022 15:00) (92% - 100%)      PHYSICAL EXAM:  Constitutional: intubated & sedated  Neurologic: Sedated, with purposeful movement in all extremities. CNII-XII grossly intact; no focal deficits  HEENT: NC/AT; clear conjunctiva, anicteric sclera; no nasal discharge; no oropharyngeal erythema or exudates, MMM  Neck: supple; no JVD or thyromegaly, no cervical, post-auricular or supraclavicular lymphadenopathy  Respiratory: coarse breath sounds bilaterally B/L, no increased work of breathing or accessory muscle use  Cardiac: +S1/S2, no murmurs/rubs/gallops, RRR  Gastrointestinal: abdomen soft, NT/ND; +BSx4, PEG in place  Genitourinary: no suprapubic tenderness, Quintero draining yellow urine  Back: spine midline, no bony tenderness or step-offs  Extremities: WWP, no clubbing or cyanosis; no peripheral edema b/l  Musculoskeletal: NROM x4; no joint swelling, tenderness or erythema  Vascular: 2+ radial, femoral, DP/PT pulses B/L  Dermatologic: skin warm, dry and intact; no rashes, wounds, or scars      03-12 @ 06:01  -  03-13 @ 07:00  --------------------------------------------------------  IN: 0 mL / OUT: 800 mL / NET: -800 mL                              12.0   10.40 )-----------( 404      ( 13 Mar 2022 14:34 )             40.4     03-13    x   |  x   |  16  ----------------------------<  263<H>  x    |  x   |  0.68    Ca    9.2      13 Mar 2022 14:34  Phos  3.3     03-12  Mg     2.2     03-13      ABG - ( 13 Mar 2022 14:05 )  pH, Arterial: 6.98  pH, Blood: x     /  pCO2: 148   /  pO2: 277   / HCO3: 35    / Base Excess: -1.1  /  SaO2: 100.0                     PT/INR - ( 13 Mar 2022 14:34 )   PT: 13.8 sec;   INR: 1.16          PTT - ( 13 Mar 2022 14:34 )  PTT:32.1 sec    MEDICATIONS  (STANDING):  acetylcysteine 20%  Inhalation 4 milliLiter(s) Inhalation every 12 hours  albuterol/ipratropium for Nebulization. 3 milliLiter(s) Nebulizer once  apixaban 2.5 milliGRAM(s) Oral every 12 hours  ARIPiprazole 2 milliGRAM(s) Oral every 24 hours  ciprofloxacin   IVPB 400 milliGRAM(s) IV Intermittent every 8 hours  dextrose 40% Gel 15 Gram(s) Oral once  dextrose 5%. 1000 milliLiter(s) (50 mL/Hr) IV Continuous <Continuous>  dextrose 5%. 1000 milliLiter(s) (100 mL/Hr) IV Continuous <Continuous>  dextrose 50% Injectable 25 Gram(s) IV Push once  dextrose 50% Injectable 12.5 Gram(s) IV Push once  dextrose 50% Injectable 25 Gram(s) IV Push once  famotidine   Suspension 20 milliGRAM(s) Enteral Tube at bedtime  glucagon  Injectable 1 milliGRAM(s) IntraMuscular once  insulin lispro (ADMELOG) corrective regimen sliding scale   SubCutaneous every 6 hours  lactated ringers Bolus 500 milliLiter(s) IV Bolus once  norepinephrine Infusion 0.05 MICROgram(s)/kG/Min (5.33 mL/Hr) IV Continuous <Continuous>  sodium chloride 7% Inhalation 4 milliLiter(s) Inhalation every 12 hours    MEDICATIONS  (PRN):  acetaminophen     Tablet .. 650 milliGRAM(s) Oral every 6 hours PRN Temp greater or equal to 38C (100.4F), Mild Pain (1 - 3)  albuterol/ipratropium for Nebulization 3 milliLiter(s) Nebulizer every 6 hours PRN Shortness of Breath and/or Wheezing      Assessment & Plan- Rapid Response called for 76y year old Female w/PMH of CP w/functional quadriplegia, DVT/PE on eliquis, GERD, chronic dysphagia w/PEG, thoracic spine fusion admitted for acute hypoxic respiratory failure found to have pseudomonal PNA c/b periods of agitation/anxiety, s/p extubation to HFNC on 3/8, p/w hypoxic respiratory failure now intubated, likely iso aspiration vs. mucous plug, also with e.coli UTI.  -vent settings 400/18/100%/5  -repeat ABG, CXR  -titrate levophed for goal MAP >65  -titrate propofol for goal RASS of -1  -aggressive pulmonary toileting  -mucomyst for secretions  -duonebs prn for wheezing  -decompress PEG  -c/w abx ***Rapid Response Clinical Impact Advanced Care Provider Note***    Patient is a 76y old Female w/PMH of CP w/functional quadriplegia, DVT/PE on eliquis, GERD, chronic dysphagia w/PEG, thoracic spine fusion admitted for acute hypoxic respiratory failure found to have pseudomonal PNA c/b periods of agitation/anxiety, s/p extubation to HFNC on 3/8, as well as e.coli UTI    Rapid response team called because patient with respiratory arrest.     Patient was seen and examined at the bedside by the rapid response team. Upon arrival to room, patient w/palpable pulses, however is apneic, gray and dusky appearing, w/copious secretions and w/unreadable oxygen saturation. Rescue breathing initiated with ambu bag, IO & IV placed. Oxygen saturation improved to 90% with bagging. Anesthesia called d/t history of trach. Anesthesia intubated patient w/7.5 ETT with succinylcholine and propofol. Patient with +color change on CO2, breaths sounds bilaterally and symmetric chest rise with bagging. Oxygen saturation improved to 97%, patient sedated with propofol drip. Patient became hypotensive to 70s/40s, so peripheral levophed started.  BP improved to 100s/60s and patient transferred to MICU. En route, patient had episode of emesis with continued copious secretions, requiring frequent suctioning.    Review of Systems: Unable to perform ROS d/t patient is intubated and sedated.    Allergies    ace inhibitors (Anaphylaxis)  caffeine (Rash)  cefepime (Rash)  chocolate (Rash)  high acid (Rash)  Tomatoes (Hives)    Intolerances        PAST MEDICAL & SURGICAL HISTORY:  CP (cerebral palsy)    Mental retardation    Dysphagia    GERD (gastroesophageal reflux disease)    PE (pulmonary thromboembolism)    DVT (deep venous thrombosis)    Spastic quadriplegia    HTN (hypertension)    PEG tube malfunction        Vital Signs Last 24 Hrs  T(C): 36.8 (13 Mar 2022 05:51), Max: 36.8 (13 Mar 2022 05:51)  T(F): 98.3 (13 Mar 2022 05:51), Max: 98.3 (13 Mar 2022 05:51)  HR: 80 (13 Mar 2022 15:00) (80 - 88)  BP: 82/44 (13 Mar 2022 15:00) (82/44 - 137/64)  BP(mean): 60 (13 Mar 2022 15:00) (60 - 60)  RR: 39 (13 Mar 2022 15:00) (18 - 39)  SpO2: 100% (13 Mar 2022 15:00) (92% - 100%)      PHYSICAL EXAM:  Constitutional: intubated & sedated  Neurologic: Sedated, with purposeful movement in all extremities. CNII-XII grossly intact; no focal deficits  HEENT: NC/AT; clear conjunctiva, anicteric sclera; no nasal discharge; no oropharyngeal erythema or exudates, MMM  Neck: supple; no JVD or thyromegaly, no cervical, post-auricular or supraclavicular lymphadenopathy  Respiratory: coarse breath sounds bilaterally B/L, no increased work of breathing or accessory muscle use  Cardiac: +S1/S2, no murmurs/rubs/gallops, RRR  Gastrointestinal: abdomen soft, NT/ND; +BSx4, PEG in place  Genitourinary: no suprapubic tenderness, Quintero draining yellow urine  Back: spine midline, no bony tenderness or step-offs  Extremities: WWP, no clubbing or cyanosis; no peripheral edema b/l  Musculoskeletal: NROM x4; no joint swelling, tenderness or erythema  Vascular: 2+ radial, femoral, DP/PT pulses B/L  Dermatologic: skin warm, dry and intact; no rashes, wounds, or scars      03-12 @ 06:01  -  03-13 @ 07:00  --------------------------------------------------------  IN: 0 mL / OUT: 800 mL / NET: -800 mL                              12.0   10.40 )-----------( 404      ( 13 Mar 2022 14:34 )             40.4     03-13    x   |  x   |  16  ----------------------------<  263<H>  x    |  x   |  0.68    Ca    9.2      13 Mar 2022 14:34  Phos  3.3     03-12  Mg     2.2     03-13      ABG - ( 13 Mar 2022 14:05 )  pH, Arterial: 6.98  pH, Blood: x     /  pCO2: 148   /  pO2: 277   / HCO3: 35    / Base Excess: -1.1  /  SaO2: 100.0                     PT/INR - ( 13 Mar 2022 14:34 )   PT: 13.8 sec;   INR: 1.16          PTT - ( 13 Mar 2022 14:34 )  PTT:32.1 sec    MEDICATIONS  (STANDING):  acetylcysteine 20%  Inhalation 4 milliLiter(s) Inhalation every 12 hours  albuterol/ipratropium for Nebulization. 3 milliLiter(s) Nebulizer once  apixaban 2.5 milliGRAM(s) Oral every 12 hours  ARIPiprazole 2 milliGRAM(s) Oral every 24 hours  ciprofloxacin   IVPB 400 milliGRAM(s) IV Intermittent every 8 hours  dextrose 40% Gel 15 Gram(s) Oral once  dextrose 5%. 1000 milliLiter(s) (50 mL/Hr) IV Continuous <Continuous>  dextrose 5%. 1000 milliLiter(s) (100 mL/Hr) IV Continuous <Continuous>  dextrose 50% Injectable 25 Gram(s) IV Push once  dextrose 50% Injectable 12.5 Gram(s) IV Push once  dextrose 50% Injectable 25 Gram(s) IV Push once  famotidine   Suspension 20 milliGRAM(s) Enteral Tube at bedtime  glucagon  Injectable 1 milliGRAM(s) IntraMuscular once  insulin lispro (ADMELOG) corrective regimen sliding scale   SubCutaneous every 6 hours  lactated ringers Bolus 500 milliLiter(s) IV Bolus once  norepinephrine Infusion 0.05 MICROgram(s)/kG/Min (5.33 mL/Hr) IV Continuous <Continuous>  sodium chloride 7% Inhalation 4 milliLiter(s) Inhalation every 12 hours    MEDICATIONS  (PRN):  acetaminophen     Tablet .. 650 milliGRAM(s) Oral every 6 hours PRN Temp greater or equal to 38C (100.4F), Mild Pain (1 - 3)  albuterol/ipratropium for Nebulization 3 milliLiter(s) Nebulizer every 6 hours PRN Shortness of Breath and/or Wheezing      Assessment & Plan- Rapid Response called for 76y year old Female w/PMH of CP w/functional quadriplegia, DVT/PE on eliquis, GERD, chronic dysphagia w/PEG, thoracic spine fusion admitted for acute hypoxic respiratory failure found to have pseudomonal PNA c/b periods of agitation/anxiety, s/p extubation to HFNC on 3/8, p/w hypoxic respiratory failure now intubated, likely iso aspiration vs. mucous plug, also with e.coli UTI.  -vent settings 400/18/100%/5  -repeat ABG, CXR  -titrate levophed for goal MAP >65  -titrate propofol for goal RASS of -1  -aggressive pulmonary toileting  -mucomyst for secretions  -duonebs prn for wheezing  -decompress PEG  -c/w abx    Case discussed with Pulm Fellow and ICU team.    I have personally and independently provided 31 minutes of critical care services.  This excludes any time spent on separate procedures or teaching. ***Rapid Response Clinical Impact Advanced Care Provider Note***    Patient is a 76y old Female w/PMH of CP w/functional quadriplegia, DVT/PE on eliquis, GERD, chronic dysphagia w/PEG, thoracic spine fusion admitted for acute hypoxic respiratory failure found to have pseudomonal PNA c/b periods of agitation/anxiety, s/p extubation to HFNC on 3/8, as well as e.coli UTI    Rapid response team called because patient with respiratory arrest.     Patient was seen and examined at the bedside by the rapid response team. Upon arrival to room, patient w/palpable pulses, however is apneic, gray and dusky appearing, w/copious secretions and w/unreadable oxygen saturation. Rescue breathing initiated with ambu bag, IO & IV placed. Oxygen saturation improved to 90% with bagging. Anesthesia called d/t history of trach. Anesthesia intubated patient w/7.5 ETT with succinylcholine and propofol. Patient with +color change on CO2, breaths sounds bilaterally and symmetric chest rise with bagging. Oxygen saturation improved to 97%, patient sedated with propofol drip. Patient became hypotensive to 70s/40s, so peripheral levophed started.  BP improved to 100s/60s and patient transferred to MICU. En route, patient had episode of emesis with continued copious secretions, requiring frequent suctioning.    Review of Systems: Unable to perform ROS d/t patient is intubated and sedated.    Allergies    ace inhibitors (Anaphylaxis)  caffeine (Rash)  cefepime (Rash)  chocolate (Rash)  high acid (Rash)  Tomatoes (Hives)    Intolerances        PAST MEDICAL & SURGICAL HISTORY:  CP (cerebral palsy)    Mental retardation    Dysphagia    GERD (gastroesophageal reflux disease)    PE (pulmonary thromboembolism)    DVT (deep venous thrombosis)    Spastic quadriplegia    HTN (hypertension)    PEG tube malfunction        Vital Signs Last 24 Hrs  T(C): 36.8 (13 Mar 2022 05:51), Max: 36.8 (13 Mar 2022 05:51)  T(F): 98.3 (13 Mar 2022 05:51), Max: 98.3 (13 Mar 2022 05:51)  HR: 80 (13 Mar 2022 15:00) (80 - 88)  BP: 82/44 (13 Mar 2022 15:00) (82/44 - 137/64)  BP(mean): 60 (13 Mar 2022 15:00) (60 - 60)  RR: 39 (13 Mar 2022 15:00) (18 - 39)  SpO2: 100% (13 Mar 2022 15:00) (92% - 100%)      PHYSICAL EXAM:  Constitutional: intubated & sedated  Neurologic: Sedated, with purposeful movement in all extremities. CNII-XII grossly intact; no focal deficits  HEENT: NC/AT; clear conjunctiva, anicteric sclera; no nasal discharge; no oropharyngeal erythema or exudates, MMM  Neck: supple; no JVD or thyromegaly, no cervical, post-auricular or supraclavicular lymphadenopathy  Respiratory: coarse breath sounds bilaterally B/L, no increased work of breathing or accessory muscle use  Cardiac: +S1/S2, no murmurs/rubs/gallops, RRR  Gastrointestinal: abdomen soft, NT/ND; +BSx4, PEG in place  Genitourinary: no suprapubic tenderness, Quintero draining yellow urine  Back: spine midline, no bony tenderness or step-offs  Extremities: WWP, no clubbing or cyanosis; no peripheral edema b/l  Musculoskeletal: NROM x4; no joint swelling, tenderness or erythema  Vascular: 2+ radial, femoral, DP/PT pulses B/L  Dermatologic: skin warm, dry and intact; no rashes, wounds, or scars      03-12 @ 06:01  -  03-13 @ 07:00  --------------------------------------------------------  IN: 0 mL / OUT: 800 mL / NET: -800 mL                              12.0   10.40 )-----------( 404      ( 13 Mar 2022 14:34 )             40.4     03-13    x   |  x   |  16  ----------------------------<  263<H>  x    |  x   |  0.68    Ca    9.2      13 Mar 2022 14:34  Phos  3.3     03-12  Mg     2.2     03-13      ABG - ( 13 Mar 2022 14:05 )  pH, Arterial: 6.98  pH, Blood: x     /  pCO2: 148   /  pO2: 277   / HCO3: 35    / Base Excess: -1.1  /  SaO2: 100.0                     PT/INR - ( 13 Mar 2022 14:34 )   PT: 13.8 sec;   INR: 1.16          PTT - ( 13 Mar 2022 14:34 )  PTT:32.1 sec    MEDICATIONS  (STANDING):  acetylcysteine 20%  Inhalation 4 milliLiter(s) Inhalation every 12 hours  albuterol/ipratropium for Nebulization. 3 milliLiter(s) Nebulizer once  apixaban 2.5 milliGRAM(s) Oral every 12 hours  ARIPiprazole 2 milliGRAM(s) Oral every 24 hours  ciprofloxacin   IVPB 400 milliGRAM(s) IV Intermittent every 8 hours  dextrose 40% Gel 15 Gram(s) Oral once  dextrose 5%. 1000 milliLiter(s) (50 mL/Hr) IV Continuous <Continuous>  dextrose 5%. 1000 milliLiter(s) (100 mL/Hr) IV Continuous <Continuous>  dextrose 50% Injectable 25 Gram(s) IV Push once  dextrose 50% Injectable 12.5 Gram(s) IV Push once  dextrose 50% Injectable 25 Gram(s) IV Push once  famotidine   Suspension 20 milliGRAM(s) Enteral Tube at bedtime  glucagon  Injectable 1 milliGRAM(s) IntraMuscular once  insulin lispro (ADMELOG) corrective regimen sliding scale   SubCutaneous every 6 hours  lactated ringers Bolus 500 milliLiter(s) IV Bolus once  norepinephrine Infusion 0.05 MICROgram(s)/kG/Min (5.33 mL/Hr) IV Continuous <Continuous>  sodium chloride 7% Inhalation 4 milliLiter(s) Inhalation every 12 hours    MEDICATIONS  (PRN):  acetaminophen     Tablet .. 650 milliGRAM(s) Oral every 6 hours PRN Temp greater or equal to 38C (100.4F), Mild Pain (1 - 3)  albuterol/ipratropium for Nebulization 3 milliLiter(s) Nebulizer every 6 hours PRN Shortness of Breath and/or Wheezing      Assessment & Plan- Rapid Response called for 76y year old Female w/PMH of CP w/functional quadriplegia, DVT/PE on eliquis, GERD, chronic dysphagia w/PEG, thoracic spine fusion admitted for acute hypoxic respiratory failure found to have pseudomonal PNA c/b periods of agitation/anxiety, s/p extubation to HFNC on 3/8, p/w hypoxic respiratory failure now intubated, likely iso aspiration vs. mucous plug, also with e.coli UTI.  -vent settings 400/18/100%/5  -repeat ABG, CXR, CBC, BMP, blood cultures, sputum cultures  -titrate levophed for goal MAP >65  -titrate propofol for goal RASS of -1  -aggressive pulmonary toileting  -mucomyst for secretions  -duonebs prn for wheezing  -decompress PEG  -c/w abx    Case discussed with Pulm Fellow and ICU team.    I have personally and independently provided 31 minutes of critical care services.  This excludes any time spent on separate procedures or teaching. ***Rapid Response Clinical Impact Advanced Care Provider Note***    Patient is a 76y old Female w/PMH of CP w/functional quadriplegia, DVT/PE on eliquis, GERD, chronic dysphagia w/PEG, thoracic spine fusion admitted for acute hypoxic respiratory failure found to have pseudomonal PNA c/b periods of agitation/anxiety, s/p extubation to HFNC on 3/8, as well as e.coli UTI    Rapid response team called because patient with respiratory arrest.     Patient was seen and examined at the bedside by the rapid response team. Upon arrival to room, patient w/palpable pulses, however is apneic, gray and dusky appearing, w/copious secretions and w/unreadable oxygen saturation. Rescue breathing initiated with ambu bag, IO & IV placed. Oxygen saturation improved to 90% with bagging. Anesthesia called d/t history of trach. Anesthesia intubated patient w/7.5 ETT with succinylcholine and propofol. Patient with +color change on CO2, breaths sounds bilaterally and symmetric chest rise with bagging. Oxygen saturation improved to 97%, patient sedated with propofol drip. Patient became hypotensive to 70s/40s, so peripheral levophed started.  BP improved to 100s/60s and patient transferred to MICU. En route, patient had episode of emesis with continued copious secretions, requiring frequent suctioning.    Review of Systems: Unable to perform ROS d/t patient is intubated and sedated.    Allergies    ace inhibitors (Anaphylaxis)  caffeine (Rash)  cefepime (Rash)  chocolate (Rash)  high acid (Rash)  Tomatoes (Hives)    Intolerances        PAST MEDICAL & SURGICAL HISTORY:  CP (cerebral palsy)    Mental retardation    Dysphagia    GERD (gastroesophageal reflux disease)    PE (pulmonary thromboembolism)    DVT (deep venous thrombosis)    Spastic quadriplegia    HTN (hypertension)    PEG tube malfunction        Vital Signs Last 24 Hrs  T(C): 36.8 (13 Mar 2022 05:51), Max: 36.8 (13 Mar 2022 05:51)  T(F): 98.3 (13 Mar 2022 05:51), Max: 98.3 (13 Mar 2022 05:51)  HR: 80 (13 Mar 2022 15:00) (80 - 88)  BP: 82/44 (13 Mar 2022 15:00) (82/44 - 137/64)  BP(mean): 60 (13 Mar 2022 15:00) (60 - 60)  RR: 39 (13 Mar 2022 15:00) (18 - 39)  SpO2: 100% (13 Mar 2022 15:00) (92% - 100%)      PHYSICAL EXAM:  Constitutional: intubated & sedated  Neurologic: Sedated, with purposeful movement in all extremities. CNII-XII grossly intact; no focal deficits  HEENT: NC/AT; clear conjunctiva, anicteric sclera; no nasal discharge; no oropharyngeal erythema or exudates, MMM  Neck: supple; no JVD or thyromegaly, no cervical, post-auricular or supraclavicular lymphadenopathy  Respiratory: coarse breath sounds bilaterally B/L, no increased work of breathing or accessory muscle use  Cardiac: +S1/S2, no murmurs/rubs/gallops, RRR  Gastrointestinal: abdomen soft, NT/ND; +BSx4, PEG in place  Genitourinary: no suprapubic tenderness, Quintero draining yellow urine  Back: spine midline, no bony tenderness or step-offs  Extremities: WWP, no clubbing or cyanosis; no peripheral edema b/l  Musculoskeletal: NROM x4; no joint swelling, tenderness or erythema  Vascular: 2+ radial, femoral, DP/PT pulses B/L  Dermatologic: skin warm, dry and intact; no rashes, wounds, or scars      03-12 @ 06:01  -  03-13 @ 07:00  --------------------------------------------------------  IN: 0 mL / OUT: 800 mL / NET: -800 mL                              12.0   10.40 )-----------( 404      ( 13 Mar 2022 14:34 )             40.4     03-13    x   |  x   |  16  ----------------------------<  263<H>  x    |  x   |  0.68    Ca    9.2      13 Mar 2022 14:34  Phos  3.3     03-12  Mg     2.2     03-13      ABG - ( 13 Mar 2022 14:05 )  pH, Arterial: 6.98  pH, Blood: x     /  pCO2: 148   /  pO2: 277   / HCO3: 35    / Base Excess: -1.1  /  SaO2: 100.0                     PT/INR - ( 13 Mar 2022 14:34 )   PT: 13.8 sec;   INR: 1.16          PTT - ( 13 Mar 2022 14:34 )  PTT:32.1 sec    MEDICATIONS  (STANDING):  acetylcysteine 20%  Inhalation 4 milliLiter(s) Inhalation every 12 hours  albuterol/ipratropium for Nebulization. 3 milliLiter(s) Nebulizer once  apixaban 2.5 milliGRAM(s) Oral every 12 hours  ARIPiprazole 2 milliGRAM(s) Oral every 24 hours  ciprofloxacin   IVPB 400 milliGRAM(s) IV Intermittent every 8 hours  dextrose 40% Gel 15 Gram(s) Oral once  dextrose 5%. 1000 milliLiter(s) (50 mL/Hr) IV Continuous <Continuous>  dextrose 5%. 1000 milliLiter(s) (100 mL/Hr) IV Continuous <Continuous>  dextrose 50% Injectable 25 Gram(s) IV Push once  dextrose 50% Injectable 12.5 Gram(s) IV Push once  dextrose 50% Injectable 25 Gram(s) IV Push once  famotidine   Suspension 20 milliGRAM(s) Enteral Tube at bedtime  glucagon  Injectable 1 milliGRAM(s) IntraMuscular once  insulin lispro (ADMELOG) corrective regimen sliding scale   SubCutaneous every 6 hours  lactated ringers Bolus 500 milliLiter(s) IV Bolus once  norepinephrine Infusion 0.05 MICROgram(s)/kG/Min (5.33 mL/Hr) IV Continuous <Continuous>  sodium chloride 7% Inhalation 4 milliLiter(s) Inhalation every 12 hours    MEDICATIONS  (PRN):  acetaminophen     Tablet .. 650 milliGRAM(s) Oral every 6 hours PRN Temp greater or equal to 38C (100.4F), Mild Pain (1 - 3)  albuterol/ipratropium for Nebulization 3 milliLiter(s) Nebulizer every 6 hours PRN Shortness of Breath and/or Wheezing      Assessment & Plan- Rapid Response called for 76y year old Female w/PMH of CP w/functional quadriplegia, DVT/PE on eliquis, GERD, chronic dysphagia w/PEG, thoracic spine fusion admitted for acute hypoxic respiratory failure found to have pseudomonal PNA c/b periods of agitation/anxiety, s/p extubation to HFNC on 3/8, p/w hypoxic respiratory failure now intubated, likely iso aspiration vs. mucous plug, also with e.coli UTI.  -vent settings 400/18/100%/5  -repeat ABG, CXR, CBC, BMP, blood cultures, sputum cultures  -titrate levophed for goal MAP >65  -titrate propofol for goal RASS of -1  -aggressive pulmonary toileting  -mucomyst for secretions  -duonebs prn for wheezing  -decompress w/OG  -c/w abx    Case discussed with Pulm Fellow and ICU team.    I have personally and independently provided 31 minutes of critical care services.  This excludes any time spent on separate procedures or teaching. ***Rapid Response Clinical Impact Advanced Care Provider Note***    Patient is a 76y old Female w/PMH of CP w/functional quadriplegia, DVT/PE on eliquis, GERD, chronic dysphagia w/PEG, thoracic spine fusion admitted for acute hypoxic respiratory failure found to have pseudomonal PNA c/b periods of agitation/anxiety, s/p extubation to HFNC on 3/8, as well as e.coli UTI    Rapid response team called because patient with respiratory arrest.     Patient was seen and examined at the bedside by the rapid response team. Upon arrival to room, patient w/palpable pulses, however is apneic, gray and dusky appearing, w/copious secretions and w/unreadable oxygen saturation. Rescue breathing initiated with ambu bag, IO & IV placed. Oxygen saturation improved to 90% with bagging. Anesthesia called d/t history of trach. Anesthesia intubated patient w/7.5 ETT with succinylcholine and propofol. Patient with +color change on CO2, breath sounds bilaterally and symmetric chest rise with bagging. Oxygen saturation improved to 97%, patient sedated with propofol drip. Patient became hypotensive to 70s/40s, so peripheral levophed started.  BP improved to 100s/60s and patient transferred to MICU. En route, patient had episode of emesis with continued copious secretions, requiring frequent suctioning.    Review of Systems: Unable to perform ROS d/t patient is intubated and sedated.    Allergies    ace inhibitors (Anaphylaxis)  caffeine (Rash)  cefepime (Rash)  chocolate (Rash)  high acid (Rash)  Tomatoes (Hives)    Intolerances        PAST MEDICAL & SURGICAL HISTORY:  CP (cerebral palsy)    Mental retardation    Dysphagia    GERD (gastroesophageal reflux disease)    PE (pulmonary thromboembolism)    DVT (deep venous thrombosis)    Spastic quadriplegia    HTN (hypertension)    PEG tube malfunction        Vital Signs Last 24 Hrs  T(C): 36.8 (13 Mar 2022 05:51), Max: 36.8 (13 Mar 2022 05:51)  T(F): 98.3 (13 Mar 2022 05:51), Max: 98.3 (13 Mar 2022 05:51)  HR: 80 (13 Mar 2022 15:00) (80 - 88)  BP: 82/44 (13 Mar 2022 15:00) (82/44 - 137/64)  BP(mean): 60 (13 Mar 2022 15:00) (60 - 60)  RR: 39 (13 Mar 2022 15:00) (18 - 39)  SpO2: 100% (13 Mar 2022 15:00) (92% - 100%)      PHYSICAL EXAM:  Constitutional: intubated & sedated  Neurologic: Sedated, with purposeful movement in all extremities. CNII-XII grossly intact; no focal deficits  HEENT: NC/AT; clear conjunctiva, anicteric sclera; no nasal discharge; no oropharyngeal erythema or exudates, MMM  Neck: supple; no JVD or thyromegaly, no cervical, post-auricular or supraclavicular lymphadenopathy  Respiratory: coarse breath sounds bilaterally B/L, no increased work of breathing or accessory muscle use  Cardiac: +S1/S2, no murmurs/rubs/gallops, RRR  Gastrointestinal: abdomen soft, NT/ND; +BSx4, PEG in place  Genitourinary: no suprapubic tenderness, Quintero draining yellow urine  Back: spine midline, no bony tenderness or step-offs  Extremities: WWP, no clubbing or cyanosis; no peripheral edema b/l  Musculoskeletal: NROM x4; no joint swelling, tenderness or erythema  Vascular: 2+ radial, femoral, DP/PT pulses B/L  Dermatologic: skin warm, dry and intact; no rashes, wounds, or scars      03-12 @ 06:01  -  03-13 @ 07:00  --------------------------------------------------------  IN: 0 mL / OUT: 800 mL / NET: -800 mL                              12.0   10.40 )-----------( 404      ( 13 Mar 2022 14:34 )             40.4     03-13    x   |  x   |  16  ----------------------------<  263<H>  x    |  x   |  0.68    Ca    9.2      13 Mar 2022 14:34  Phos  3.3     03-12  Mg     2.2     03-13      ABG - ( 13 Mar 2022 14:05 )  pH, Arterial: 6.98  pH, Blood: x     /  pCO2: 148   /  pO2: 277   / HCO3: 35    / Base Excess: -1.1  /  SaO2: 100.0                     PT/INR - ( 13 Mar 2022 14:34 )   PT: 13.8 sec;   INR: 1.16          PTT - ( 13 Mar 2022 14:34 )  PTT:32.1 sec    MEDICATIONS  (STANDING):  acetylcysteine 20%  Inhalation 4 milliLiter(s) Inhalation every 12 hours  albuterol/ipratropium for Nebulization. 3 milliLiter(s) Nebulizer once  apixaban 2.5 milliGRAM(s) Oral every 12 hours  ARIPiprazole 2 milliGRAM(s) Oral every 24 hours  ciprofloxacin   IVPB 400 milliGRAM(s) IV Intermittent every 8 hours  dextrose 40% Gel 15 Gram(s) Oral once  dextrose 5%. 1000 milliLiter(s) (50 mL/Hr) IV Continuous <Continuous>  dextrose 5%. 1000 milliLiter(s) (100 mL/Hr) IV Continuous <Continuous>  dextrose 50% Injectable 25 Gram(s) IV Push once  dextrose 50% Injectable 12.5 Gram(s) IV Push once  dextrose 50% Injectable 25 Gram(s) IV Push once  famotidine   Suspension 20 milliGRAM(s) Enteral Tube at bedtime  glucagon  Injectable 1 milliGRAM(s) IntraMuscular once  insulin lispro (ADMELOG) corrective regimen sliding scale   SubCutaneous every 6 hours  lactated ringers Bolus 500 milliLiter(s) IV Bolus once  norepinephrine Infusion 0.05 MICROgram(s)/kG/Min (5.33 mL/Hr) IV Continuous <Continuous>  sodium chloride 7% Inhalation 4 milliLiter(s) Inhalation every 12 hours    MEDICATIONS  (PRN):  acetaminophen     Tablet .. 650 milliGRAM(s) Oral every 6 hours PRN Temp greater or equal to 38C (100.4F), Mild Pain (1 - 3)  albuterol/ipratropium for Nebulization 3 milliLiter(s) Nebulizer every 6 hours PRN Shortness of Breath and/or Wheezing      Assessment & Plan- Rapid Response called for 76y year old Female w/PMH of CP w/functional quadriplegia, DVT/PE on eliquis, GERD, chronic dysphagia w/PEG, thoracic spine fusion admitted for acute hypoxic respiratory failure found to have pseudomonal PNA c/b periods of agitation/anxiety, s/p extubation to HFNC on 3/8, p/w hypoxic respiratory failure now intubated, likely iso aspiration vs. mucous plug, also with e.coli UTI.  -vent settings 400/18/100%/5  -repeat ABG, CXR, CBC, BMP, blood cultures, sputum cultures  -titrate levophed for goal MAP >65  -titrate propofol for goal RASS of -1  -aggressive pulmonary toileting  -mucomyst for secretions  -duonebs prn for wheezing  -decompress w/OG  -c/w abx    Case discussed with Pulm Fellow and ICU team.    I have personally and independently provided 31 minutes of critical care services.  This excludes any time spent on separate procedures or teaching.

## 2022-03-13 NOTE — PROGRESS NOTE ADULT - SUBJECTIVE AND OBJECTIVE BOX
TRANSFER [facility] TO MICU  HOSPITAL COURSE:  Patient is a 74 year old female with a PMH of cerebral palsy with functional quadriplegia, DVT/PE on Eliquis, GERD, chronic dysphagia with PEG, thoracic spine fusion. Patient has a history of prior admission in 2020 for aspiration pneumonia requiring intubation. Patient presented from her assisted living facility in acute hypoxemic respiratory failure intubated in the field for airway protection. Acute hypoxemic respiratory failure likely 2/2 aspiration pneumonia which has grown pseudomonas sensitive to ciprofloxacin. While in the MICU One unsuccessful attempt was made at extubation requiring reintubation attributed to pt's anxiety. Subsequently, pt was successfully extubated in MICU with seroquel regimen. Pt was weaned to HFNC then 4L NC satting 94-95%. 3/10 pt was stepped down to the floors and had fluctuating O2 requirements 2L-6L requiring frequent suctioning. 3/13 Pt was found to be hypoxic and unresponsive pulse ox showed O2 sat 70s. Pt was suctioned nasopharyngeal removing significant amount of PEG tube feed products and mucus. There was no change in pt's O2 sat, and rapid repose and anesthesia were called. Pt was intubated, started on propofol and levophed on 7uris and transported to MICU.    INTERVAL HPI/OVERNIGHT EVENTS:    Pt seen and examined this morning with no complaints elicited. Improving O2 requirements  Review of Systems: 12 point review of systems otherwise negative      VITAL SIGNS:  T(F): 98.3 (03-13-22 @ 05:51)  HR: 71 (03-13-22 @ 16:41)  BP: 113/55 (03-13-22 @ 16:30)  RR: 64 (03-13-22 @ 16:30)  SpO2: 97% (03-13-22 @ 16:41)  Wt(kg): --    PHYSICAL EXAM:    Constitutional: smiling, interacting and responding to questions   HEENT: PERRL, EOMI, no JVD, MMM  Respiratory: cough, increased secretions and upper airway sounds, BL rhonchi, on 4L NC, constantly clearing throat   Cardiovascular: RRR, normal S1S2, no M/R/G  Gastrointestinal: abdomen soft, NTND, no masses palpable, BS normal  Extremities: Warm, well perfused, pulses equal bilateral upper and lower extremities, no edema, no clubbing  Neurological: Alert, functional quadraplegia   Skin: Normal temperature, warm, dry    MEDICATIONS  (STANDING):  acetylcysteine 20%  Inhalation 4 milliLiter(s) Inhalation every 12 hours  albuterol/ipratropium for Nebulization. 3 milliLiter(s) Nebulizer once  apixaban 2.5 milliGRAM(s) Oral every 12 hours  ARIPiprazole 2 milliGRAM(s) Oral every 24 hours  ciprofloxacin   IVPB 400 milliGRAM(s) IV Intermittent every 8 hours  dextrose 40% Gel 15 Gram(s) Oral once  dextrose 5%. 1000 milliLiter(s) (50 mL/Hr) IV Continuous <Continuous>  dextrose 5%. 1000 milliLiter(s) (100 mL/Hr) IV Continuous <Continuous>  dextrose 50% Injectable 25 Gram(s) IV Push once  dextrose 50% Injectable 12.5 Gram(s) IV Push once  dextrose 50% Injectable 25 Gram(s) IV Push once  famotidine   Suspension 20 milliGRAM(s) Enteral Tube at bedtime  glucagon  Injectable 1 milliGRAM(s) IntraMuscular once  insulin lispro (ADMELOG) corrective regimen sliding scale   SubCutaneous every 6 hours  norepinephrine Infusion 0.05 MICROgram(s)/kG/Min (5.33 mL/Hr) IV Continuous <Continuous>  sodium chloride 7% Inhalation 4 milliLiter(s) Inhalation every 12 hours    MEDICATIONS  (PRN):  acetaminophen     Tablet .. 650 milliGRAM(s) Oral every 6 hours PRN Temp greater or equal to 38C (100.4F), Mild Pain (1 - 3)  albuterol/ipratropium for Nebulization 3 milliLiter(s) Nebulizer every 6 hours PRN Shortness of Breath and/or Wheezing      Allergies    ace inhibitors (Anaphylaxis)  caffeine (Rash)  cefepime (Rash)  chocolate (Rash)  high acid (Rash)  Tomatoes (Hives)    Intolerances        LABS:                        12.0   10.40 )-----------( 404      ( 13 Mar 2022 14:34 )             40.4     03-13    139  |  95<L>  |  16  ----------------------------<  263<H>  5.6<H>   |  32<H>  |  0.68    Ca    9.2      13 Mar 2022 14:34  Phos  6.5     03-13  Mg     2.2     03-13    TPro  7.0  /  Alb  3.8  /  TBili  0.4  /  DBili  x   /  AST  53<H>  /  ALT  47<H>  /  AlkPhos  87  03-13    PT/INR - ( 13 Mar 2022 14:34 )   PT: 13.8 sec;   INR: 1.16          PTT - ( 13 Mar 2022 14:34 )  PTT:32.1 sec      RADIOLOGY & ADDITIONAL TESTS:  Reviewed

## 2022-03-13 NOTE — AIRWAY PLACEMENT NOTE ADULT - INDICATIONS:
March 6, 2020      Everardo Atkins  [address] [address]      To the school of: Everardo Atkins    I am verifying an appointment at our office today.    Sincerely,        Sukumar Marmolejo MD / Josey Montelongo RN  Pediatrics Department    [address]  [address] [phone]        
Respiratory distress
Respiratory distress

## 2022-03-13 NOTE — PROGRESS NOTE ADULT - SUBJECTIVE AND OBJECTIVE BOX
*INCOMPLETE*     *INCOMPLETE*         *INCOMPLETE*               *INCOMPLETE*    OVERNIGHT EVENTS:    SUBJECTIVE / INTERVAL HPI: Patient seen and examined at bedside. Denies f/c, n/v, HA, chest pain, SOB, abdominal pain, diarrhea, constipation, melena, hematochezia, hematuria, dysuria    MEDICATIONS  (STANDING):  acetylcysteine 20%  Inhalation 4 milliLiter(s) Inhalation every 12 hours  albuterol/ipratropium for Nebulization. 3 milliLiter(s) Nebulizer once  apixaban 2.5 milliGRAM(s) Oral every 12 hours  ARIPiprazole 2 milliGRAM(s) Oral every 24 hours  ciprofloxacin   IVPB 400 milliGRAM(s) IV Intermittent every 8 hours  dextrose 40% Gel 15 Gram(s) Oral once  dextrose 5%. 1000 milliLiter(s) (50 mL/Hr) IV Continuous <Continuous>  dextrose 5%. 1000 milliLiter(s) (100 mL/Hr) IV Continuous <Continuous>  dextrose 50% Injectable 25 Gram(s) IV Push once  dextrose 50% Injectable 12.5 Gram(s) IV Push once  dextrose 50% Injectable 25 Gram(s) IV Push once  glucagon  Injectable 1 milliGRAM(s) IntraMuscular once  insulin lispro (ADMELOG) corrective regimen sliding scale   SubCutaneous every 6 hours  norepinephrine Infusion 0.05 MICROgram(s)/kG/Min (5.33 mL/Hr) IV Continuous <Continuous>  propofol Infusion 10 MICROgram(s)/kG/Min (3.41 mL/Hr) IV Continuous <Continuous>  sodium chloride 7% Inhalation 4 milliLiter(s) Inhalation every 12 hours    MEDICATIONS  (PRN):  acetaminophen     Tablet .. 650 milliGRAM(s) Oral every 6 hours PRN Temp greater or equal to 38C (100.4F), Mild Pain (1 - 3)  albuterol/ipratropium for Nebulization 3 milliLiter(s) Nebulizer every 6 hours PRN Shortness of Breath and/or Wheezing    Allergies    ace inhibitors (Anaphylaxis)  caffeine (Rash)  cefepime (Rash)  chocolate (Rash)  high acid (Rash)  Tomatoes (Hives)    Intolerances        VITAL SIGNS:  Vital Signs Last 24 Hrs  T(C): 36.8 (13 Mar 2022 05:51), Max: 36.8 (13 Mar 2022 05:51)  T(F): 98.3 (13 Mar 2022 05:51), Max: 98.3 (13 Mar 2022 05:51)  HR: 77 (13 Mar 2022 17:00) (61 - 98)  BP: 87/50 (13 Mar 2022 17:00) (82/44 - 143/71)  BP(mean): 65 (13 Mar 2022 17:00) (60 - 99)  RR: 75 (13 Mar 2022 17:00) (18 - 75)  SpO2: 93% (13 Mar 2022 17:00) (92% - 100%)      03-12-22 @ 06:01  -  03-13-22 @ 07:00  --------------------------------------------------------  IN: 0 mL / OUT: 800 mL / NET: -800 mL    03-13-22 @ 07:01  -  03-13-22 @ 17:10  --------------------------------------------------------  IN: 348 mL / OUT: 90 mL / NET: 258 mL        PHYSICAL EXAM:  General: NAD, Laying comfortably in bed  HEENT: NC/AT, anicteric sclera, MMM  Neck: supple  Cardiovascular: +S1/S2, RRR, No murmurs, rubs, gallops  Respiratory: CTA B/L, no W/R/R  Gastrointestinal: soft, NT/ND, +BSx4  Extremities: WWP, no edema, clubbing or cyanosis  Vascular: 2+ radial, DP/PT pulses B/L  Neurological: AAOx3, no focal deficits      LABS:                        12.0   10.40 )-----------( 404      ( 13 Mar 2022 14:34 )             40.4     03-13    139  |  95<L>  |  16  ----------------------------<  263<H>  5.6<H>   |  32<H>  |  0.68    Ca    9.2      13 Mar 2022 14:34  Phos  6.5     03-13  Mg     2.2     03-13    TPro  7.0  /  Alb  3.8  /  TBili  0.4  /  DBili  x   /  AST  53<H>  /  ALT  47<H>  /  AlkPhos  87  03-13    PT/INR - ( 13 Mar 2022 14:34 )   PT: 13.8 sec;   INR: 1.16          PTT - ( 13 Mar 2022 14:34 )  PTT:32.1 sec    CAPILLARY BLOOD GLUCOSE      POCT Blood Glucose.: 224 mg/dL (13 Mar 2022 13:54)  POCT Blood Glucose.: 124 mg/dL (13 Mar 2022 12:11)  POCT Blood Glucose.: 128 mg/dL (13 Mar 2022 05:30)  POCT Blood Glucose.: 123 mg/dL (12 Mar 2022 23:24)  POCT Blood Glucose.: 117 mg/dL (12 Mar 2022 17:38)          RADIOLOGY & ADDITIONAL TESTS: Reviewed.               *INCOMPLETE*    ACCEPTANCE NOTE FROM [facility] TO MICU  HOSPITAL COURSE  74 year old female with a PMH of cerebral palsy with functional quadriplegia, DVT/PE on Eliquis, GERD, chronic dysphagia with PEG, thoracic spine fusion. Patient has a history of prior admission in 2020 for aspiration pneumonia requiring intubation. Patient presented from her assisted living facility in acute hypoxemic respiratory failure intubated in the field for airway protection. Acute hypoxemic respiratory failure likely 2/2 aspiration pneumonia which has grown pseudomonas sensitive to ciprofloxacin. While in the MICU One unsuccessful attempt was made at extubation requiring reintubation attributed to pt's anxiety. Subsequently, pt was successfully extubated in MICU with seroquel regimen. Pt was weaned to HFNC then 4L NC satting 94-95%. 3/10 pt was stepped down to the floors and had fluctuating O2 requirements 2L-6L requiring frequent suctioning. 3/13 Pt was found to be hypoxic and unresponsive pulse ox showed O2 sat 70s. Pt was suctioned nasopharyngeal removing significant amount of PEG tube feed products and mucus. There was no change in pt's O2 sat, and rapid response and anesthesia were called. Pt was intubated, started on propofol and levophed on 7uris and transported to MICU.    SUBJECTIVE / INTERVAL HPI: Patient seen and examined at bedside. Unable to ROS 2/2 mental status.     MEDICATIONS  (STANDING):  acetylcysteine 20%  Inhalation 4 milliLiter(s) Inhalation every 12 hours  albuterol/ipratropium for Nebulization. 3 milliLiter(s) Nebulizer once  apixaban 2.5 milliGRAM(s) Oral every 12 hours  ARIPiprazole 2 milliGRAM(s) Oral every 24 hours  ciprofloxacin   IVPB 400 milliGRAM(s) IV Intermittent every 8 hours  dextrose 40% Gel 15 Gram(s) Oral once  dextrose 5%. 1000 milliLiter(s) (50 mL/Hr) IV Continuous <Continuous>  dextrose 5%. 1000 milliLiter(s) (100 mL/Hr) IV Continuous <Continuous>  dextrose 50% Injectable 25 Gram(s) IV Push once  dextrose 50% Injectable 12.5 Gram(s) IV Push once  dextrose 50% Injectable 25 Gram(s) IV Push once  glucagon  Injectable 1 milliGRAM(s) IntraMuscular once  insulin lispro (ADMELOG) corrective regimen sliding scale   SubCutaneous every 6 hours  norepinephrine Infusion 0.05 MICROgram(s)/kG/Min (5.33 mL/Hr) IV Continuous <Continuous>  propofol Infusion 10 MICROgram(s)/kG/Min (3.41 mL/Hr) IV Continuous <Continuous>  sodium chloride 7% Inhalation 4 milliLiter(s) Inhalation every 12 hours    MEDICATIONS  (PRN):  acetaminophen     Tablet .. 650 milliGRAM(s) Oral every 6 hours PRN Temp greater or equal to 38C (100.4F), Mild Pain (1 - 3)  albuterol/ipratropium for Nebulization 3 milliLiter(s) Nebulizer every 6 hours PRN Shortness of Breath and/or Wheezing    Allergies    ace inhibitors (Anaphylaxis)  caffeine (Rash)  cefepime (Rash)  chocolate (Rash)  high acid (Rash)  Tomatoes (Hives)    Intolerances        VITAL SIGNS:  Vital Signs Last 24 Hrs  T(C): 36.8 (13 Mar 2022 05:51), Max: 36.8 (13 Mar 2022 05:51)  T(F): 98.3 (13 Mar 2022 05:51), Max: 98.3 (13 Mar 2022 05:51)  HR: 77 (13 Mar 2022 17:00) (61 - 98)  BP: 87/50 (13 Mar 2022 17:00) (82/44 - 143/71)  BP(mean): 65 (13 Mar 2022 17:00) (60 - 99)  RR: 75 (13 Mar 2022 17:00) (18 - 75)  SpO2: 93% (13 Mar 2022 17:00) (92% - 100%)      03-12-22 @ 06:01  -  03-13-22 @ 07:00  --------------------------------------------------------  IN: 0 mL / OUT: 800 mL / NET: -800 mL    03-13-22 @ 07:01  -  03-13-22 @ 17:10  --------------------------------------------------------  IN: 348 mL / OUT: 90 mL / NET: 258 mL        PHYSICAL EXAM:  Constitutional: Sedated, intubated  HEENT: PERRL, EOMI, no JVD  Respiratory: cough, increased secretions and upper airway sounds, BL rhonchi, intubated  Cardiovascular: RRR, normal S1S2, no M/R/G  Gastrointestinal: abdomen soft, NTND, no masses palpable, BS normal  Extremities: Warm, well perfused, pulses equal bilateral upper and lower extremities, no edema, no clubbing  Neurological: Alert, functional quadraplegia   Skin: Normal temperature, warm, dry      LABS:                        12.0   10.40 )-----------( 404      ( 13 Mar 2022 14:34 )             40.4     03-13    139  |  95<L>  |  16  ----------------------------<  263<H>  5.6<H>   |  32<H>  |  0.68    Ca    9.2      13 Mar 2022 14:34  Phos  6.5     03-13  Mg     2.2     03-13    TPro  7.0  /  Alb  3.8  /  TBili  0.4  /  DBili  x   /  AST  53<H>  /  ALT  47<H>  /  AlkPhos  87  03-13    PT/INR - ( 13 Mar 2022 14:34 )   PT: 13.8 sec;   INR: 1.16          PTT - ( 13 Mar 2022 14:34 )  PTT:32.1 sec    CAPILLARY BLOOD GLUCOSE      POCT Blood Glucose.: 224 mg/dL (13 Mar 2022 13:54)  POCT Blood Glucose.: 124 mg/dL (13 Mar 2022 12:11)  POCT Blood Glucose.: 128 mg/dL (13 Mar 2022 05:30)  POCT Blood Glucose.: 123 mg/dL (12 Mar 2022 23:24)  POCT Blood Glucose.: 117 mg/dL (12 Mar 2022 17:38)          RADIOLOGY & ADDITIONAL TESTS: Reviewed.           ACCEPTANCE NOTE FROM [facility] TO MICU  HOSPITAL COURSE  74 year old female with a PMH of cerebral palsy with functional quadriplegia, DVT/PE on Eliquis, GERD, chronic dysphagia with PEG, thoracic spine fusion. Patient has a history of prior admission in 2020 for aspiration pneumonia requiring intubation. Patient presented from her assisted living facility in acute hypoxemic respiratory failure intubated in the field for airway protection. Acute hypoxemic respiratory failure likely 2/2 aspiration pneumonia which has grown pseudomonas sensitive to ciprofloxacin. While in the MICU One unsuccessful attempt was made at extubation requiring reintubation attributed to pt's anxiety. Subsequently, pt was successfully extubated in MICU with seroquel regimen. Pt was weaned to HFNC then 4L NC satting 94-95%. 3/10 pt was stepped down to the floors and had fluctuating O2 requirements 2L-6L requiring frequent suctioning. 3/13 Pt was found to be hypoxic and unresponsive pulse ox showed O2 sat 70s. Pt was suctioned nasopharyngeal removing significant amount of PEG tube feed products and mucus. There was no change in pt's O2 sat, and rapid response and anesthesia were called. Pt was intubated, sedated on propofol and started on levophed for hypotension 70s/40s. BP improved to 100s/60s and patient transferred to MICU. En route, patient had episode of emesis with continued copious secretions, requiring frequent suctioning.      SUBJECTIVE / INTERVAL HPI: Patient seen and examined at bedside. Unable to ROS 2/2 mental status.     MEDICATIONS  (STANDING):  acetylcysteine 20%  Inhalation 4 milliLiter(s) Inhalation every 12 hours  albuterol/ipratropium for Nebulization. 3 milliLiter(s) Nebulizer once  apixaban 2.5 milliGRAM(s) Oral every 12 hours  ARIPiprazole 2 milliGRAM(s) Oral every 24 hours  ciprofloxacin   IVPB 400 milliGRAM(s) IV Intermittent every 8 hours  dextrose 40% Gel 15 Gram(s) Oral once  dextrose 5%. 1000 milliLiter(s) (50 mL/Hr) IV Continuous <Continuous>  dextrose 5%. 1000 milliLiter(s) (100 mL/Hr) IV Continuous <Continuous>  dextrose 50% Injectable 25 Gram(s) IV Push once  dextrose 50% Injectable 12.5 Gram(s) IV Push once  dextrose 50% Injectable 25 Gram(s) IV Push once  glucagon  Injectable 1 milliGRAM(s) IntraMuscular once  insulin lispro (ADMELOG) corrective regimen sliding scale   SubCutaneous every 6 hours  norepinephrine Infusion 0.05 MICROgram(s)/kG/Min (5.33 mL/Hr) IV Continuous <Continuous>  propofol Infusion 10 MICROgram(s)/kG/Min (3.41 mL/Hr) IV Continuous <Continuous>  sodium chloride 7% Inhalation 4 milliLiter(s) Inhalation every 12 hours    MEDICATIONS  (PRN):  acetaminophen     Tablet .. 650 milliGRAM(s) Oral every 6 hours PRN Temp greater or equal to 38C (100.4F), Mild Pain (1 - 3)  albuterol/ipratropium for Nebulization 3 milliLiter(s) Nebulizer every 6 hours PRN Shortness of Breath and/or Wheezing    Allergies    ace inhibitors (Anaphylaxis)  caffeine (Rash)  cefepime (Rash)  chocolate (Rash)  high acid (Rash)  Tomatoes (Hives)    Intolerances        VITAL SIGNS:  Vital Signs Last 24 Hrs  T(C): 36.8 (13 Mar 2022 05:51), Max: 36.8 (13 Mar 2022 05:51)  T(F): 98.3 (13 Mar 2022 05:51), Max: 98.3 (13 Mar 2022 05:51)  HR: 77 (13 Mar 2022 17:00) (61 - 98)  BP: 87/50 (13 Mar 2022 17:00) (82/44 - 143/71)  BP(mean): 65 (13 Mar 2022 17:00) (60 - 99)  RR: 75 (13 Mar 2022 17:00) (18 - 75)  SpO2: 93% (13 Mar 2022 17:00) (92% - 100%)      03-12-22 @ 06:01  -  03-13-22 @ 07:00  --------------------------------------------------------  IN: 0 mL / OUT: 800 mL / NET: -800 mL    03-13-22 @ 07:01  -  03-13-22 @ 17:10  --------------------------------------------------------  IN: 348 mL / OUT: 90 mL / NET: 258 mL    Drips:  - c/w Levophed  - c/w propofol      PHYSICAL EXAM:  Constitutional: intubated & sedated  Neurologic: Sedated, with purposeful movement in all extremities. CNII-XII grossly intact; no focal deficits  HEENT: NC/AT; clear conjunctiva, anicteric sclera; no nasal discharge; no oropharyngeal erythema or exudates, MMM  Neck: supple; no JVD or thyromegaly, no cervical, post-auricular or supraclavicular lymphadenopathy  Respiratory: coarse breath sounds bilaterally B/L, no increased work of breathing or accessory muscle use  Cardiac: +S1/S2, no murmurs/rubs/gallops, RRR  Gastrointestinal: abdomen soft, NT/ND; +BSx4, PEG in place  Genitourinary: no suprapubic tenderness, Quintero draining yellow urine  Back: spine midline, no bony tenderness or step-offs  Extremities: WWP, no clubbing or cyanosis; no peripheral edema b/l  Musculoskeletal: NROM x4; no joint swelling, tenderness or erythema  Vascular: 2+ radial, femoral, DP/PT pulses B/L  Dermatologic: skin warm, dry and intact; no rashes, wounds, or scars        LABS:                        12.0   10.40 )-----------( 404      ( 13 Mar 2022 14:34 )             40.4     03-13    139  |  95<L>  |  16  ----------------------------<  263<H>  5.6<H>   |  32<H>  |  0.68    Ca    9.2      13 Mar 2022 14:34  Phos  6.5     03-13  Mg     2.2     03-13    TPro  7.0  /  Alb  3.8  /  TBili  0.4  /  DBili  x   /  AST  53<H>  /  ALT  47<H>  /  AlkPhos  87  03-13    PT/INR - ( 13 Mar 2022 14:34 )   PT: 13.8 sec;   INR: 1.16          PTT - ( 13 Mar 2022 14:34 )  PTT:32.1 sec    CAPILLARY BLOOD GLUCOSE      POCT Blood Glucose.: 224 mg/dL (13 Mar 2022 13:54)  POCT Blood Glucose.: 124 mg/dL (13 Mar 2022 12:11)  POCT Blood Glucose.: 128 mg/dL (13 Mar 2022 05:30)  POCT Blood Glucose.: 123 mg/dL (12 Mar 2022 23:24)  POCT Blood Glucose.: 117 mg/dL (12 Mar 2022 17:38)          RADIOLOGY & ADDITIONAL TESTS: Reviewed.           ACCEPTANCE NOTE FROM [facility] TO MICU  HOSPITAL COURSE  74 year old female with a PMH of cerebral palsy with functional quadriplegia, DVT/PE on Eliquis, GERD, chronic dysphagia with PEG, thoracic spine fusion. Patient has a history of prior admission in 2020 for aspiration pneumonia requiring intubation. Patient presented from her assisted living facility in acute hypoxemic respiratory failure intubated in the field for airway protection. Acute hypoxemic respiratory failure likely 2/2 aspiration pneumonia which has grown pseudomonas sensitive to ciprofloxacin. While in the MICU One unsuccessful attempt was made at extubation requiring reintubation attributed to pt's anxiety. Subsequently, pt was successfully extubated in MICU with seroquel regimen. Pt was weaned to HFNC then 4L NC satting 94-95%. 3/10 pt was stepped down to the floors and had fluctuating O2 requirements 2L-6L requiring frequent suctioning. 3/13 Pt was found to be hypoxic and unresponsive pulse ox showed O2 sat 70s. Pt was suctioned nasopharyngeal removing significant amount of PEG tube feed products and mucus. There was no change in pt's O2 sat, and rapid response and anesthesia were called. Pt was intubated, sedated on propofol and started on levophed for hypotension 70s/40s. BP improved to 100s/60s and patient transferred to MICU. En route, patient had episode of emesis with continued copious secretions, requiring frequent suctioning.      SUBJECTIVE / INTERVAL HPI: Patient seen and examined at bedside. Unable to ROS 2/2 mental status.     MEDICATIONS  (STANDING):  acetylcysteine 20%  Inhalation 4 milliLiter(s) Inhalation every 12 hours  albuterol/ipratropium for Nebulization. 3 milliLiter(s) Nebulizer once  apixaban 2.5 milliGRAM(s) Oral every 12 hours  ARIPiprazole 2 milliGRAM(s) Oral every 24 hours  ciprofloxacin   IVPB 400 milliGRAM(s) IV Intermittent every 8 hours  dextrose 40% Gel 15 Gram(s) Oral once  dextrose 5%. 1000 milliLiter(s) (50 mL/Hr) IV Continuous <Continuous>  dextrose 5%. 1000 milliLiter(s) (100 mL/Hr) IV Continuous <Continuous>  dextrose 50% Injectable 25 Gram(s) IV Push once  dextrose 50% Injectable 12.5 Gram(s) IV Push once  dextrose 50% Injectable 25 Gram(s) IV Push once  glucagon  Injectable 1 milliGRAM(s) IntraMuscular once  insulin lispro (ADMELOG) corrective regimen sliding scale   SubCutaneous every 6 hours  norepinephrine Infusion 0.05 MICROgram(s)/kG/Min (5.33 mL/Hr) IV Continuous <Continuous>  propofol Infusion 10 MICROgram(s)/kG/Min (3.41 mL/Hr) IV Continuous <Continuous>  sodium chloride 7% Inhalation 4 milliLiter(s) Inhalation every 12 hours    MEDICATIONS  (PRN):  acetaminophen     Tablet .. 650 milliGRAM(s) Oral every 6 hours PRN Temp greater or equal to 38C (100.4F), Mild Pain (1 - 3)  albuterol/ipratropium for Nebulization 3 milliLiter(s) Nebulizer every 6 hours PRN Shortness of Breath and/or Wheezing    Allergies    ace inhibitors (Anaphylaxis)  caffeine (Rash)  cefepime (Rash)  chocolate (Rash)  high acid (Rash)  Tomatoes (Hives)    Intolerances        VITAL SIGNS:  Vital Signs Last 24 Hrs  T(C): 36.8 (13 Mar 2022 05:51), Max: 36.8 (13 Mar 2022 05:51)  T(F): 98.3 (13 Mar 2022 05:51), Max: 98.3 (13 Mar 2022 05:51)  HR: 77 (13 Mar 2022 17:00) (61 - 98)  BP: 87/50 (13 Mar 2022 17:00) (82/44 - 143/71)  BP(mean): 65 (13 Mar 2022 17:00) (60 - 99)  RR: 75 (13 Mar 2022 17:00) (18 - 75)  SpO2: 93% (13 Mar 2022 17:00) (92% - 100%)      03-12-22 @ 06:01  -  03-13-22 @ 07:00  --------------------------------------------------------  IN: 0 mL / OUT: 800 mL / NET: -800 mL    03-13-22 @ 07:01  -  03-13-22 @ 17:10  --------------------------------------------------------  IN: 348 mL / OUT: 90 mL / NET: 258 mL    Drips:  - c/w Levophed  - c/w propofol      PHYSICAL EXAM:  Constitutional: intubated & sedated  Neurologic: Sedated, with purposeful movement in all extremities. CNII-XII grossly intact; no focal deficits  HEENT: NC/AT; clear conjunctiva, anicteric sclera; with ET and OG tube  Neck: supple; no JVD or thyromegaly, no cervical, post-auricular or supraclavicular lymphadenopathy  Respiratory: coarse breath sounds bilaterally B/L, no increased work of breathing or accessory muscle use  Cardiac: +S1/S2, no murmurs/rubs/gallops, RRR  Gastrointestinal: abdomen soft, NT/ND; +BSx4, PEG in place  Genitourinary: no suprapubic tenderness, Quintero draining yellow urine  Back: spine midline, no bony tenderness or step-offs  Extremities: WWP, no clubbing or cyanosis; no peripheral edema b/l  Musculoskeletal: NROM x4; no joint swelling, tenderness or erythema  Vascular: 2+ radial, femoral, DP/PT pulses B/L  Dermatologic: skin warm, dry and intact; no rashes, wounds, or scars        LABS:                        12.0   10.40 )-----------( 404      ( 13 Mar 2022 14:34 )             40.4     03-13    139  |  95<L>  |  16  ----------------------------<  263<H>  5.6<H>   |  32<H>  |  0.68    Ca    9.2      13 Mar 2022 14:34  Phos  6.5     03-13  Mg     2.2     03-13    TPro  7.0  /  Alb  3.8  /  TBili  0.4  /  DBili  x   /  AST  53<H>  /  ALT  47<H>  /  AlkPhos  87  03-13    PT/INR - ( 13 Mar 2022 14:34 )   PT: 13.8 sec;   INR: 1.16          PTT - ( 13 Mar 2022 14:34 )  PTT:32.1 sec    CAPILLARY BLOOD GLUCOSE      POCT Blood Glucose.: 224 mg/dL (13 Mar 2022 13:54)  POCT Blood Glucose.: 124 mg/dL (13 Mar 2022 12:11)  POCT Blood Glucose.: 128 mg/dL (13 Mar 2022 05:30)  POCT Blood Glucose.: 123 mg/dL (12 Mar 2022 23:24)  POCT Blood Glucose.: 117 mg/dL (12 Mar 2022 17:38)          RADIOLOGY & ADDITIONAL TESTS: Reviewed.

## 2022-03-13 NOTE — PROGRESS NOTE ADULT - PROBLEM SELECTOR PLAN 8
Patient with cerebral palsy. Has functional quadriplegia, health aide at reports at baseline she is able to follow commands and communicate "when she wants to".    - PT recommending return to group home with prior level of assist, however if requires this level of oxygen and suctioning may need NUNU prior to group home   - see GOC

## 2022-03-13 NOTE — CHART NOTE - NSCHARTNOTEFT_GEN_A_CORE
PIV access established. Right prox tibial IO removed. Direct pressure applied. +Hemostasis. DSD applied. RN made aware.

## 2022-03-13 NOTE — PROGRESS NOTE ADULT - SUBJECTIVE AND OBJECTIVE BOX
Patient is a 76y old  Female who presents with a chief complaint of Acute hypoxic respiratory failure 2/2 pseudomonas pneumonia (10 Mar 2022 12:13)      INTERVAL HPI/OVERNIGHT EVENTS:    Pt. seen and examined earlier today  No complaints elicited  O2 requirements decreasing    Review of Systems: 12 point review of systems otherwise negative    MEDICATIONS  (STANDING):  acetylcysteine 20%  Inhalation 4 milliLiter(s) Inhalation every 12 hours  albuterol/ipratropium for Nebulization. 3 milliLiter(s) Nebulizer once  apixaban 2.5 milliGRAM(s) Oral every 12 hours  ARIPiprazole 2 milliGRAM(s) Oral every 24 hours  ciprofloxacin   IVPB 400 milliGRAM(s) IV Intermittent every 8 hours  dextrose 40% Gel 15 Gram(s) Oral once  dextrose 5%. 1000 milliLiter(s) (50 mL/Hr) IV Continuous <Continuous>  dextrose 5%. 1000 milliLiter(s) (100 mL/Hr) IV Continuous <Continuous>  dextrose 50% Injectable 25 Gram(s) IV Push once  dextrose 50% Injectable 12.5 Gram(s) IV Push once  dextrose 50% Injectable 25 Gram(s) IV Push once  famotidine   Suspension 20 milliGRAM(s) Enteral Tube at bedtime  glucagon  Injectable 1 milliGRAM(s) IntraMuscular once  insulin lispro (ADMELOG) corrective regimen sliding scale   SubCutaneous every 6 hours  sodium chloride 7% Inhalation 4 milliLiter(s) Inhalation every 12 hours    MEDICATIONS  (PRN):  acetaminophen     Tablet .. 650 milliGRAM(s) Oral every 6 hours PRN Temp greater or equal to 38C (100.4F), Mild Pain (1 - 3)  albuterol/ipratropium for Nebulization 3 milliLiter(s) Nebulizer every 6 hours PRN Shortness of Breath and/or Wheezing      Allergies    ace inhibitors (Anaphylaxis)  caffeine (Rash)  cefepime (Rash)  chocolate (Rash)  high acid (Rash)  Tomatoes (Hives)    Intolerances          Vital Signs Last 24 Hrs  T(C): 36.8 (13 Mar 2022 05:51), Max: 36.8 (13 Mar 2022 05:51)  T(F): 98.3 (13 Mar 2022 05:51), Max: 98.3 (13 Mar 2022 05:51)  HR: 88 (13 Mar 2022 05:51) (81 - 88)  BP: 137/64 (13 Mar 2022 05:51) (119/61 - 159/70)  BP(mean): --  RR: 20 (13 Mar 2022 05:51) (18 - 20)  SpO2: 95% (13 Mar 2022 05:51) (92% - 95%)  CAPILLARY BLOOD GLUCOSE      POCT Blood Glucose.: 128 mg/dL (13 Mar 2022 05:30)  POCT Blood Glucose.: 123 mg/dL (12 Mar 2022 23:24)  POCT Blood Glucose.: 117 mg/dL (12 Mar 2022 17:38)      03-12 @ 06:01  -  03-13 @ 07:00  --------------------------------------------------------  IN: 0 mL / OUT: 800 mL / NET: -800 mL        Physical Exam:  (earlier today)  Daily     Daily   General:  chronically-ill but comfortable-appearing in NAD  HEENT:  MMM  CV:  RRR  Lungs:  B/L rhonci, normal WOB on 4L NC  Abdomen:  soft NT ND +PEG  Extremities:  no edema B/L LE  Skin:  WWP  :  +Quintero  Neuro:  alert, unable to assess orientation +spasticity     LABS:                        11.3   7.99  )-----------( 320      ( 12 Mar 2022 07:55 )             38.1     03-12    141  |  101  |  23  ----------------------------<  175<H>  4.1   |  32<H>  |  0.50    Ca    9.9      12 Mar 2022 07:55  Phos  3.3     03-12  Mg     1.5     03-12

## 2022-03-13 NOTE — PROGRESS NOTE ADULT - PROBLEM SELECTOR PLAN 3
Patient with cerebral palsy. Previously a [facility] resident, now resides in group home, with [facility] per palliative care. Patient with repeat admission for aspiration pneumonia with history of dysphagia and copious secretions from oropharynx on exam.    - palliative care consulted  - Ethics counsulted   - pending [facility] discussion

## 2022-03-13 NOTE — PROGRESS NOTE ADULT - PROBLEM SELECTOR PLAN 1
Patient with h/o aspiration events requiring intubation.   Patient found with respiratory distress and SaO2 of 84%.   Intubated in field for airway protection, s/p extubation on 3/8 to NC.   Weaned to 3L NC on transfer to [facility].   Given Lasix 40 mg IV x 2 (3/8 and 3/9) due to pleural effusion and net positive fluid status per MICU.     - Patient has tenuous respiratory status given innability to clear secretions and fluctating mental status  - Currently on 4L NC, fluctuates but trending in right direction today  - Mucolytics   - Frequent suctioning   - attempt to wean to NC  - Chest PT Q6hrs  - Aspiration precautions

## 2022-03-13 NOTE — PROCEDURE NOTE - NSICDXPROCEDURE_GEN_ALL_CORE_FT
PROCEDURES:  Insertion of 2 intravenous catheters 13-Mar-2022 16:02:53  Sam Gaytan  Ultrasound guided venous access 13-Mar-2022 16:03:12  Sam Gaytan  Blood draw, venipuncture 13-Mar-2022 16:03:29  Sam Gaytan

## 2022-03-13 NOTE — PROGRESS NOTE ADULT - TIME BILLING
treatment of pneumonia
Dispo: d/c planning  appreciate Ethics input
Dispo: safe d/c planning, pending wean off O2 (vs. assess need for portable O2)  appreciate Ethics input
case complexity as above.

## 2022-03-13 NOTE — AIRWAY PLACEMENT NOTE ADULT - POST AIRWAY PLACEMENT ASSESSMENT:
breath sounds bilateral/breath sounds equal/positive end tidal CO2 noted
breath sounds bilateral/positive end tidal CO2 noted/CXR pending/chest excursion noted/skin color improved

## 2022-03-14 LAB
ALBUMIN SERPL ELPH-MCNC: 3.3 G/DL — SIGNIFICANT CHANGE UP (ref 3.3–5)
ALP SERPL-CCNC: 74 U/L — SIGNIFICANT CHANGE UP (ref 40–120)
ALT FLD-CCNC: 35 U/L — SIGNIFICANT CHANGE UP (ref 10–45)
ANION GAP SERPL CALC-SCNC: 13 MMOL/L — SIGNIFICANT CHANGE UP (ref 5–17)
ANION GAP SERPL CALC-SCNC: 14 MMOL/L — SIGNIFICANT CHANGE UP (ref 5–17)
AST SERPL-CCNC: 39 U/L — SIGNIFICANT CHANGE UP (ref 10–40)
BASE EXCESS BLDA CALC-SCNC: 12.2 MMOL/L — HIGH (ref -2–3)
BASE EXCESS BLDA CALC-SCNC: 13 MMOL/L — HIGH (ref -2–3)
BASOPHILS # BLD AUTO: 0.04 K/UL — SIGNIFICANT CHANGE UP (ref 0–0.2)
BASOPHILS NFR BLD AUTO: 0.3 % — SIGNIFICANT CHANGE UP (ref 0–2)
BILIRUB SERPL-MCNC: 0.7 MG/DL — SIGNIFICANT CHANGE UP (ref 0.2–1.2)
BUN SERPL-MCNC: 21 MG/DL — SIGNIFICANT CHANGE UP (ref 7–23)
BUN SERPL-MCNC: 22 MG/DL — SIGNIFICANT CHANGE UP (ref 7–23)
CALCIUM SERPL-MCNC: 8.2 MG/DL — LOW (ref 8.4–10.5)
CALCIUM SERPL-MCNC: 8.7 MG/DL — SIGNIFICANT CHANGE UP (ref 8.4–10.5)
CHLORIDE SERPL-SCNC: 98 MMOL/L — SIGNIFICANT CHANGE UP (ref 96–108)
CHLORIDE SERPL-SCNC: 98 MMOL/L — SIGNIFICANT CHANGE UP (ref 96–108)
CO2 BLDA-SCNC: 35 MMOL/L — HIGH (ref 19–24)
CO2 BLDA-SCNC: 40 MMOL/L — HIGH (ref 19–24)
CO2 SERPL-SCNC: 30 MMOL/L — SIGNIFICANT CHANGE UP (ref 22–31)
CO2 SERPL-SCNC: 30 MMOL/L — SIGNIFICANT CHANGE UP (ref 22–31)
CREAT SERPL-MCNC: 0.65 MG/DL — SIGNIFICANT CHANGE UP (ref 0.5–1.3)
CREAT SERPL-MCNC: 0.82 MG/DL — SIGNIFICANT CHANGE UP (ref 0.5–1.3)
EGFR: 75 ML/MIN/1.73M2 — SIGNIFICANT CHANGE UP
EGFR: 92 ML/MIN/1.73M2 — SIGNIFICANT CHANGE UP
EOSINOPHIL # BLD AUTO: 0.03 K/UL — SIGNIFICANT CHANGE UP (ref 0–0.5)
EOSINOPHIL NFR BLD AUTO: 0.3 % — SIGNIFICANT CHANGE UP (ref 0–6)
GAS PNL BLDA: SIGNIFICANT CHANGE UP
GLUCOSE BLDC GLUCOMTR-MCNC: 120 MG/DL — HIGH (ref 70–99)
GLUCOSE BLDC GLUCOMTR-MCNC: 75 MG/DL — SIGNIFICANT CHANGE UP (ref 70–99)
GLUCOSE BLDC GLUCOMTR-MCNC: 84 MG/DL — SIGNIFICANT CHANGE UP (ref 70–99)
GLUCOSE BLDC GLUCOMTR-MCNC: 97 MG/DL — SIGNIFICANT CHANGE UP (ref 70–99)
GLUCOSE SERPL-MCNC: 108 MG/DL — HIGH (ref 70–99)
GLUCOSE SERPL-MCNC: 92 MG/DL — SIGNIFICANT CHANGE UP (ref 70–99)
GRAM STN FLD: SIGNIFICANT CHANGE UP
GRAM STN FLD: SIGNIFICANT CHANGE UP
HCO3 BLDA-SCNC: 34 MMOL/L — HIGH (ref 21–28)
HCO3 BLDA-SCNC: 38 MMOL/L — HIGH (ref 21–28)
HCT VFR BLD CALC: 34.4 % — LOW (ref 34.5–45)
HGB BLD-MCNC: 10.9 G/DL — LOW (ref 11.5–15.5)
IMM GRANULOCYTES NFR BLD AUTO: 0.3 % — SIGNIFICANT CHANGE UP (ref 0–1.5)
LACTATE SERPL-SCNC: 0.6 MMOL/L — SIGNIFICANT CHANGE UP (ref 0.5–2)
LYMPHOCYTES # BLD AUTO: 1.05 K/UL — SIGNIFICANT CHANGE UP (ref 1–3.3)
LYMPHOCYTES # BLD AUTO: 9 % — LOW (ref 13–44)
MAGNESIUM SERPL-MCNC: 2 MG/DL — SIGNIFICANT CHANGE UP (ref 1.6–2.6)
MAGNESIUM SERPL-MCNC: 2.5 MG/DL — SIGNIFICANT CHANGE UP (ref 1.6–2.6)
MCHC RBC-ENTMCNC: 31.3 PG — SIGNIFICANT CHANGE UP (ref 27–34)
MCHC RBC-ENTMCNC: 31.7 GM/DL — LOW (ref 32–36)
MCV RBC AUTO: 98.9 FL — SIGNIFICANT CHANGE UP (ref 80–100)
MONOCYTES # BLD AUTO: 0.97 K/UL — HIGH (ref 0–0.9)
MONOCYTES NFR BLD AUTO: 8.3 % — SIGNIFICANT CHANGE UP (ref 2–14)
NEUTROPHILS # BLD AUTO: 9.55 K/UL — HIGH (ref 1.8–7.4)
NEUTROPHILS NFR BLD AUTO: 81.8 % — HIGH (ref 43–77)
NRBC # BLD: 0 /100 WBCS — SIGNIFICANT CHANGE UP (ref 0–0)
PCO2 BLDA: 34 MMHG — SIGNIFICANT CHANGE UP (ref 32–35)
PCO2 BLDA: 49 MMHG — HIGH (ref 32–35)
PH BLDA: 7.5 — HIGH (ref 7.35–7.45)
PH BLDA: 7.61 — CRITICAL HIGH (ref 7.35–7.45)
PHOSPHATE SERPL-MCNC: 2.6 MG/DL — SIGNIFICANT CHANGE UP (ref 2.5–4.5)
PHOSPHATE SERPL-MCNC: 3.2 MG/DL — SIGNIFICANT CHANGE UP (ref 2.5–4.5)
PLATELET # BLD AUTO: 357 K/UL — SIGNIFICANT CHANGE UP (ref 150–400)
PO2 BLDA: 72 MMHG — LOW (ref 83–108)
PO2 BLDA: 96 MMHG — SIGNIFICANT CHANGE UP (ref 83–108)
POTASSIUM SERPL-MCNC: 3.4 MMOL/L — LOW (ref 3.5–5.3)
POTASSIUM SERPL-MCNC: 4 MMOL/L — SIGNIFICANT CHANGE UP (ref 3.5–5.3)
POTASSIUM SERPL-SCNC: 3.4 MMOL/L — LOW (ref 3.5–5.3)
POTASSIUM SERPL-SCNC: 4 MMOL/L — SIGNIFICANT CHANGE UP (ref 3.5–5.3)
PROT SERPL-MCNC: 6.3 G/DL — SIGNIFICANT CHANGE UP (ref 6–8.3)
RBC # BLD: 3.48 M/UL — LOW (ref 3.8–5.2)
RBC # FLD: 12.5 % — SIGNIFICANT CHANGE UP (ref 10.3–14.5)
SAO2 % BLDA: 97.6 % — SIGNIFICANT CHANGE UP (ref 94–98)
SAO2 % BLDA: 98.8 % — HIGH (ref 94–98)
SODIUM SERPL-SCNC: 141 MMOL/L — SIGNIFICANT CHANGE UP (ref 135–145)
SODIUM SERPL-SCNC: 142 MMOL/L — SIGNIFICANT CHANGE UP (ref 135–145)
SPECIMEN SOURCE: SIGNIFICANT CHANGE UP
SPECIMEN SOURCE: SIGNIFICANT CHANGE UP
WBC # BLD: 11.68 K/UL — HIGH (ref 3.8–10.5)
WBC # FLD AUTO: 11.68 K/UL — HIGH (ref 3.8–10.5)

## 2022-03-14 PROCEDURE — 31645 BRNCHSC W/THER ASPIR 1ST: CPT | Mod: GC

## 2022-03-14 PROCEDURE — 76937 US GUIDE VASCULAR ACCESS: CPT | Mod: 26

## 2022-03-14 PROCEDURE — 99291 CRITICAL CARE FIRST HOUR: CPT

## 2022-03-14 PROCEDURE — 36600 WITHDRAWAL OF ARTERIAL BLOOD: CPT

## 2022-03-14 PROCEDURE — 36000 PLACE NEEDLE IN VEIN: CPT

## 2022-03-14 PROCEDURE — 71045 X-RAY EXAM CHEST 1 VIEW: CPT | Mod: 26

## 2022-03-14 PROCEDURE — 99233 SBSQ HOSP IP/OBS HIGH 50: CPT

## 2022-03-14 RX ORDER — LIDOCAINE HCL 20 MG/ML
10 VIAL (ML) INJECTION ONCE
Refills: 0 | Status: COMPLETED | OUTPATIENT
Start: 2022-03-14 | End: 2022-03-14

## 2022-03-14 RX ORDER — MIDAZOLAM HYDROCHLORIDE 1 MG/ML
4 INJECTION, SOLUTION INTRAMUSCULAR; INTRAVENOUS ONCE
Refills: 0 | Status: DISCONTINUED | OUTPATIENT
Start: 2022-03-14 | End: 2022-03-14

## 2022-03-14 RX ORDER — FENTANYL CITRATE 50 UG/ML
25 INJECTION INTRAVENOUS ONCE
Refills: 0 | Status: DISCONTINUED | OUTPATIENT
Start: 2022-03-14 | End: 2022-03-14

## 2022-03-14 RX ORDER — CHLORHEXIDINE GLUCONATE 213 G/1000ML
15 SOLUTION TOPICAL EVERY 12 HOURS
Refills: 0 | Status: DISCONTINUED | OUTPATIENT
Start: 2022-03-14 | End: 2022-04-05

## 2022-03-14 RX ORDER — FENTANYL CITRATE 50 UG/ML
100 INJECTION INTRAVENOUS ONCE
Refills: 0 | Status: DISCONTINUED | OUTPATIENT
Start: 2022-03-14 | End: 2022-03-14

## 2022-03-14 RX ORDER — MIDAZOLAM HYDROCHLORIDE 1 MG/ML
2 INJECTION, SOLUTION INTRAMUSCULAR; INTRAVENOUS ONCE
Refills: 0 | Status: DISCONTINUED | OUTPATIENT
Start: 2022-03-14 | End: 2022-03-14

## 2022-03-14 RX ORDER — LIDOCAINE HCL 20 MG/ML
20 VIAL (ML) INJECTION ONCE
Refills: 0 | Status: DISCONTINUED | OUTPATIENT
Start: 2022-03-14 | End: 2022-03-14

## 2022-03-14 RX ADMIN — ARIPIPRAZOLE 2 MILLIGRAM(S): 15 TABLET ORAL at 21:16

## 2022-03-14 RX ADMIN — ENOXAPARIN SODIUM 50 MILLIGRAM(S): 100 INJECTION SUBCUTANEOUS at 18:09

## 2022-03-14 RX ADMIN — MIDAZOLAM HYDROCHLORIDE 2 MILLIGRAM(S): 1 INJECTION, SOLUTION INTRAMUSCULAR; INTRAVENOUS at 13:20

## 2022-03-14 RX ADMIN — CHLORHEXIDINE GLUCONATE 15 MILLILITER(S): 213 SOLUTION TOPICAL at 05:27

## 2022-03-14 RX ADMIN — SODIUM CHLORIDE 4 MILLILITER(S): 9 INJECTION INTRAMUSCULAR; INTRAVENOUS; SUBCUTANEOUS at 18:09

## 2022-03-14 RX ADMIN — Medication 10 MILLILITER(S): at 14:04

## 2022-03-14 RX ADMIN — PANTOPRAZOLE SODIUM 40 MILLIGRAM(S): 20 TABLET, DELAYED RELEASE ORAL at 11:17

## 2022-03-14 RX ADMIN — FENTANYL CITRATE 100 MICROGRAM(S): 50 INJECTION INTRAVENOUS at 14:10

## 2022-03-14 RX ADMIN — Medication 200 MILLIGRAM(S): at 05:27

## 2022-03-14 RX ADMIN — FENTANYL CITRATE 100 MICROGRAM(S): 50 INJECTION INTRAVENOUS at 13:44

## 2022-03-14 RX ADMIN — CHLORHEXIDINE GLUCONATE 15 MILLILITER(S): 213 SOLUTION TOPICAL at 17:30

## 2022-03-14 RX ADMIN — PROPOFOL 3.41 MICROGRAM(S)/KG/MIN: 10 INJECTION, EMULSION INTRAVENOUS at 12:53

## 2022-03-14 RX ADMIN — ENOXAPARIN SODIUM 50 MILLIGRAM(S): 100 INJECTION SUBCUTANEOUS at 05:27

## 2022-03-14 RX ADMIN — Medication 4 MILLILITER(S): at 04:08

## 2022-03-14 NOTE — PROCEDURE NOTE - NSICDXPROCEDURE_GEN_ALL_CORE_FT
PROCEDURES:  Arterial puncture 13-Mar-2022 17:06:00  Shirin Moon  Arterial blood gas 13-Mar-2022 17:06:19  Shirin Moon  Arterial puncture for blood collection 14-Mar-2022 09:35:42  Shirin Moon   PROCEDURES:  Arterial puncture for blood collection 14-Mar-2022 09:35:42  Shirin Moon

## 2022-03-14 NOTE — PROGRESS NOTE ADULT - SUBJECTIVE AND OBJECTIVE BOX
*INCOMPLETE* *INCOMPLETE*  HOSPITAL COURSE  74 year old female with a PMH of cerebral palsy with functional quadriplegia, DVT/PE on Eliquis, GERD, chronic dysphagia with PEG, thoracic spine fusion. Patient has a history of prior admission in 2020 for aspiration pneumonia requiring intubation. Patient presented from her assisted living facility in acute hypoxemic respiratory failure intubated in the field for airway protection. Acute hypoxemic respiratory failure likely 2/2 aspiration pneumonia which has grown pseudomonas sensitive to ciprofloxacin. While in the MICU One unsuccessful attempt was made at extubation requiring reintubation attributed to pt's anxiety. Subsequently, pt was successfully extubated in MICU with seroquel regimen. Pt was weaned to HFNC then 4L NC satting 94-95%. 3/10 pt was stepped down to the floors and had fluctuating O2 requirements 2L-6L requiring frequent suctioning. 3/13 Pt was found to be hypoxic and unresponsive pulse ox showed O2 sat 70s. Pt was suctioned nasopharyngeal removing significant amount of PEG tube feed products and mucus. There was no change in pt's O2 sat, and rapid response and anesthesia were called. Pt was intubated (ETT), sedated on propofol and started on levophed for hypotension 70s/40s. BP improved to 100s/60s and patient transferred to MICU. En route, patient had episode of emesis with continued copious secretions, requiring frequent suctioning. In ICU, OGT placed, CBC with WBC uptrending from 8 to 10, lactate 3.7 which eventually cleared, BCx and sputum culture collected. ABG showed respiratory alkalosis with pH 7.61, vent settings changed RR 18 to 10 and PEEP 5 to 10, repeat ABG with pH 7.5.     SUBJECTIVE / INTERVAL HPI: Patient seen and examined at bedside. Pt nodding to ROS, denies any F/C, pain including CP. Pt nodding yes to "would you like the bilateral restraints removed".     MEDICATIONS  (STANDING):  albuterol/ipratropium for Nebulization. 3 milliLiter(s) Nebulizer once  ARIPiprazole 2 milliGRAM(s) Oral every 24 hours  chlorhexidine 0.12% Liquid 15 milliLiter(s) Oral Mucosa every 12 hours  dextrose 40% Gel 15 Gram(s) Oral once  dextrose 5%. 1000 milliLiter(s) (50 mL/Hr) IV Continuous <Continuous>  dextrose 5%. 1000 milliLiter(s) (100 mL/Hr) IV Continuous <Continuous>  dextrose 50% Injectable 25 Gram(s) IV Push once  dextrose 50% Injectable 12.5 Gram(s) IV Push once  dextrose 50% Injectable 25 Gram(s) IV Push once  enoxaparin Injectable 50 milliGRAM(s) SubCutaneous every 12 hours  fentaNYL    Injectable 100 MICROGram(s) IV Push once  glucagon  Injectable 1 milliGRAM(s) IntraMuscular once  insulin lispro (ADMELOG) corrective regimen sliding scale   SubCutaneous every 6 hours  midazolam Injectable 4 milliGRAM(s) IV Push once  norepinephrine Infusion 0.05 MICROgram(s)/kG/Min (5.33 mL/Hr) IV Continuous <Continuous>  pantoprazole  Injectable 40 milliGRAM(s) IV Push daily  propofol Infusion 10 MICROgram(s)/kG/Min (3.41 mL/Hr) IV Continuous <Continuous>  sodium chloride 7% Inhalation 4 milliLiter(s) Inhalation every 12 hours    MEDICATIONS  (PRN):  acetaminophen     Tablet .. 650 milliGRAM(s) Oral every 6 hours PRN Temp greater or equal to 38C (100.4F), Mild Pain (1 - 3)  albuterol/ipratropium for Nebulization 3 milliLiter(s) Nebulizer every 6 hours PRN Shortness of Breath and/or Wheezing    Allergies    ace inhibitors (Anaphylaxis)  caffeine (Rash)  cefepime (Rash)  chocolate (Rash)  high acid (Rash)  Tomatoes (Hives)    Intolerances        VITAL SIGNS:  Vital Signs Last 24 Hrs  T(C): 37 (14 Mar 2022 09:00), Max: 37.4 (14 Mar 2022 01:02)  T(F): 98.6 (14 Mar 2022 09:00), Max: 99.4 (14 Mar 2022 01:02)  HR: 77 (14 Mar 2022 11:00) (61 - 98)  BP: 119/58 (14 Mar 2022 11:00) (73/41 - 143/71)  BP(mean): 83 (14 Mar 2022 11:00) (52 - 99)  RR: 20 (14 Mar 2022 11:00) (10 - 181)  SpO2: 93% (14 Mar 2022 11:00) (91% - 100%)      03-13-22 @ 07:01  -  03-14-22 @ 07:00  --------------------------------------------------------  IN: 1907.5 mL / OUT: 1146 mL / NET: 761.5 mL    03-14-22 @ 07:01  -  03-14-22 @ 11:58  --------------------------------------------------------  IN: 13.6 mL / OUT: 108 mL / NET: -94.4 mL        PHYSICAL EXAM:  Constitutional: intubated   Neurologic: following simple commands, maintaining eye contact>10 secs, flexures of extremities.   HEENT: NC/AT; clear conjunctiva, anicteric sclera; with ET and OG tube with suctioning brownish non-bloody fluid  Neck: supple; no JVD or thyromegaly, no cervical, post-auricular or supraclavicular lymphadenopathy  Respiratory: decreased breath sounds on the left side mid to lower fields, no increased work of breathing or accessory muscle use  Cardiac: +S1/S2, no murmurs/rubs/gallops, RRR  Gastrointestinal: abdomen soft, NT/ND; +BSx4, PEG in place with dressing from 3/11 C/D/I  Genitourinary: no suprapubic tenderness, Quintero draining concentrated yellow urine  Extremities: WWP, no clubbing, edema or cyanosis  Vascular: 2+ radial, femoral, DP/PT pulses B/L  Dermatologic: skin warm, dry and intact; no rashes, wounds, or scars  Lines: 18 gauge peripheral line in right UE and right LE.       LABS:                        10.9   11.68 )-----------( 357      ( 14 Mar 2022 04:21 )             34.4     03-14    142  |  98  |  22  ----------------------------<  108<H>  4.0   |  30  |  0.82    Ca    8.7      14 Mar 2022 04:21  Phos  3.2     03-14  Mg     2.5     03-14    TPro  6.3  /  Alb  3.3  /  TBili  0.7  /  DBili  x   /  AST  39  /  ALT  35  /  AlkPhos  74  03-14    PT/INR - ( 13 Mar 2022 14:34 )   PT: 13.8 sec;   INR: 1.16          PTT - ( 13 Mar 2022 14:34 )  PTT:32.1 sec    CAPILLARY BLOOD GLUCOSE      POCT Blood Glucose.: 120 mg/dL (14 Mar 2022 11:19)  POCT Blood Glucose.: 97 mg/dL (14 Mar 2022 05:31)  POCT Blood Glucose.: 91 mg/dL (13 Mar 2022 23:31)  POCT Blood Glucose.: 109 mg/dL (13 Mar 2022 18:42)  POCT Blood Glucose.: 224 mg/dL (13 Mar 2022 13:54)  POCT Blood Glucose.: 124 mg/dL (13 Mar 2022 12:11)      Sputum culture: no organisms  CXR AM 3/14: compared to prior, change in rotation, increase opacification in upper and mid lung fields.   ABG AM 3/14: pH 7.5 with 49/72/38/97.6   HOSPITAL COURSE  74 year old female with a PMH of cerebral palsy with functional quadriplegia, DVT/PE on Eliquis, GERD, chronic dysphagia with PEG, thoracic spine fusion. Patient has a history of prior admission in 2020 for aspiration pneumonia requiring intubation. Patient presented from her assisted living facility in acute hypoxemic respiratory failure intubated in the field for airway protection. Acute hypoxemic respiratory failure likely 2/2 aspiration pneumonia which has grown pseudomonas sensitive to ciprofloxacin. While in the MICU One unsuccessful attempt was made at extubation requiring reintubation attributed to pt's anxiety. Subsequently, pt was successfully extubated in MICU with seroquel regimen. Pt was weaned to HFNC then 4L NC satting 94-95%. 3/10 pt was stepped down to the floors and had fluctuating O2 requirements 2L-6L requiring frequent suctioning. 3/13 Pt was found to be hypoxic and unresponsive pulse ox showed O2 sat 70s. Pt was suctioned nasopharyngeal removing significant amount of PEG tube feed products and mucus. There was no change in pt's O2 sat, and rapid response and anesthesia were called. Pt was intubated (ETT), sedated on propofol and started on levophed for hypotension 70s/40s. BP improved to 100s/60s and patient transferred to MICU. En route, patient had episode of emesis with continued copious secretions, requiring frequent suctioning. In ICU, OGT placed, CBC with WBC uptrending from 8 to 10, lactate 3.7 which eventually cleared, BCx and sputum culture collected. ABG showed respiratory alkalosis with pH 7.61, vent settings changed RR 18 to 10 and PEEP 5 to 10, repeat ABG with pH 7.5.     SUBJECTIVE / INTERVAL HPI: Patient seen and examined at bedside. Pt nodding to ROS, denies any F/C, pain including CP. Pt nodding yes to "would you like the bilateral restraints removed".     MEDICATIONS  (STANDING):  albuterol/ipratropium for Nebulization. 3 milliLiter(s) Nebulizer once  ARIPiprazole 2 milliGRAM(s) Oral every 24 hours  chlorhexidine 0.12% Liquid 15 milliLiter(s) Oral Mucosa every 12 hours  dextrose 40% Gel 15 Gram(s) Oral once  dextrose 5%. 1000 milliLiter(s) (50 mL/Hr) IV Continuous <Continuous>  dextrose 5%. 1000 milliLiter(s) (100 mL/Hr) IV Continuous <Continuous>  dextrose 50% Injectable 25 Gram(s) IV Push once  dextrose 50% Injectable 12.5 Gram(s) IV Push once  dextrose 50% Injectable 25 Gram(s) IV Push once  enoxaparin Injectable 50 milliGRAM(s) SubCutaneous every 12 hours  fentaNYL    Injectable 100 MICROGram(s) IV Push once  glucagon  Injectable 1 milliGRAM(s) IntraMuscular once  insulin lispro (ADMELOG) corrective regimen sliding scale   SubCutaneous every 6 hours  midazolam Injectable 4 milliGRAM(s) IV Push once  norepinephrine Infusion 0.05 MICROgram(s)/kG/Min (5.33 mL/Hr) IV Continuous <Continuous>  pantoprazole  Injectable 40 milliGRAM(s) IV Push daily  propofol Infusion 10 MICROgram(s)/kG/Min (3.41 mL/Hr) IV Continuous <Continuous>  sodium chloride 7% Inhalation 4 milliLiter(s) Inhalation every 12 hours    MEDICATIONS  (PRN):  acetaminophen     Tablet .. 650 milliGRAM(s) Oral every 6 hours PRN Temp greater or equal to 38C (100.4F), Mild Pain (1 - 3)  albuterol/ipratropium for Nebulization 3 milliLiter(s) Nebulizer every 6 hours PRN Shortness of Breath and/or Wheezing    Allergies    ace inhibitors (Anaphylaxis)  caffeine (Rash)  cefepime (Rash)  chocolate (Rash)  high acid (Rash)  Tomatoes (Hives)    Intolerances        VITAL SIGNS:  Vital Signs Last 24 Hrs  T(C): 37 (14 Mar 2022 09:00), Max: 37.4 (14 Mar 2022 01:02)  T(F): 98.6 (14 Mar 2022 09:00), Max: 99.4 (14 Mar 2022 01:02)  HR: 77 (14 Mar 2022 11:00) (61 - 98)  BP: 119/58 (14 Mar 2022 11:00) (73/41 - 143/71)  BP(mean): 83 (14 Mar 2022 11:00) (52 - 99)  RR: 20 (14 Mar 2022 11:00) (10 - 181)  SpO2: 93% (14 Mar 2022 11:00) (91% - 100%)      03-13-22 @ 07:01  -  03-14-22 @ 07:00  --------------------------------------------------------  IN: 1907.5 mL / OUT: 1146 mL / NET: 761.5 mL    03-14-22 @ 07:01  -  03-14-22 @ 11:58  --------------------------------------------------------  IN: 13.6 mL / OUT: 108 mL / NET: -94.4 mL        PHYSICAL EXAM:  Constitutional: intubated   Neurologic: following simple commands, maintaining eye contact>10 secs, flexures of extremities.   HEENT: NC/AT; clear conjunctiva, anicteric sclera; with ET and OG tube with suctioning brownish non-bloody fluid  Neck: supple; no JVD or thyromegaly, no cervical, post-auricular or supraclavicular lymphadenopathy  Respiratory: decreased breath sounds on the left side mid to lower fields, no increased work of breathing or accessory muscle use  Cardiac: +S1/S2, no murmurs/rubs/gallops, RRR  Gastrointestinal: abdomen soft, NT/ND; +BSx4, PEG in place with dressing from 3/11 C/D/I  Genitourinary: no suprapubic tenderness, Quintero draining concentrated yellow urine  Extremities: WWP, no clubbing, edema or cyanosis, with bilateral restraints  Vascular: 2+ radial, femoral, DP/PT pulses B/L  Dermatologic: skin warm, dry and intact; no rashes, wounds, or scars  Lines: 18 gauge peripheral line in right UE and right LE, 24 gauge a-line on left arm      LABS:                        10.9   11.68 )-----------( 357      ( 14 Mar 2022 04:21 )             34.4     03-14    142  |  98  |  22  ----------------------------<  108<H>  4.0   |  30  |  0.82    Ca    8.7      14 Mar 2022 04:21  Phos  3.2     03-14  Mg     2.5     03-14    TPro  6.3  /  Alb  3.3  /  TBili  0.7  /  DBili  x   /  AST  39  /  ALT  35  /  AlkPhos  74  03-14    PT/INR - ( 13 Mar 2022 14:34 )   PT: 13.8 sec;   INR: 1.16          PTT - ( 13 Mar 2022 14:34 )  PTT:32.1 sec    CAPILLARY BLOOD GLUCOSE      POCT Blood Glucose.: 120 mg/dL (14 Mar 2022 11:19)  POCT Blood Glucose.: 97 mg/dL (14 Mar 2022 05:31)  POCT Blood Glucose.: 91 mg/dL (13 Mar 2022 23:31)  POCT Blood Glucose.: 109 mg/dL (13 Mar 2022 18:42)  POCT Blood Glucose.: 224 mg/dL (13 Mar 2022 13:54)  POCT Blood Glucose.: 124 mg/dL (13 Mar 2022 12:11)      Sputum culture: no organisms  CXR AM 3/14: compared to prior, change in rotation, increase opacification in upper and mid lung fields.   ABG AM 3/14: pH 7.5 with 49/72/38/97.6   HOSPITAL COURSE  74 year old female with a PMH of cerebral palsy with functional quadriplegia, DVT/PE on Eliquis, GERD, chronic dysphagia with PEG, thoracic spine fusion. Patient has a history of prior admission in 2020 for aspiration pneumonia requiring intubation. Patient presented from her assisted living facility in acute hypoxemic respiratory failure intubated in the field for airway protection. Acute hypoxemic respiratory failure likely 2/2 aspiration pneumonia which has grown pseudomonas sensitive to ciprofloxacin. While in the MICU One unsuccessful attempt was made at extubation requiring reintubation attributed to pt's anxiety. Subsequently, pt was successfully extubated in MICU with seroquel regimen. Pt was weaned to HFNC then 4L NC satting 94-95%. 3/10 pt was stepped down to the floors and had fluctuating O2 requirements 2L-6L requiring frequent suctioning. 3/13 Pt was found to be hypoxic and unresponsive pulse ox showed O2 sat 70s. Pt was suctioned nasopharyngeal removing significant amount of PEG tube feed products and mucus. There was no change in pt's O2 sat, and rapid response and anesthesia were called. Pt was intubated (ETT), sedated on propofol and started on levophed for hypotension 70s/40s. BP improved to 100s/60s and patient transferred to MICU. En route, patient had episode of emesis with continued copious secretions, requiring frequent suctioning. In ICU, OGT placed, CBC with WBC uptrending from 8 to 10, lactate 3.7 which eventually cleared, BCx and sputum culture collected. ABG showed respiratory alkalosis with pH 7.61, vent settings changed RR 18 to 10 and PEEP 5 to 10, repeat ABG with pH 7.5. Underwent bronchoscopy which showed no signs of purulent secretions or endobronchial lesions.    SUBJECTIVE / INTERVAL HPI: Patient seen and examined at bedside. Pt nodding to ROS, denies any F/C, pain including CP. Pt nodding yes to "would you like the bilateral restraints removed".     MEDICATIONS  (STANDING):  albuterol/ipratropium for Nebulization. 3 milliLiter(s) Nebulizer once  ARIPiprazole 2 milliGRAM(s) Oral every 24 hours  chlorhexidine 0.12% Liquid 15 milliLiter(s) Oral Mucosa every 12 hours  dextrose 40% Gel 15 Gram(s) Oral once  dextrose 5%. 1000 milliLiter(s) (50 mL/Hr) IV Continuous <Continuous>  dextrose 5%. 1000 milliLiter(s) (100 mL/Hr) IV Continuous <Continuous>  dextrose 50% Injectable 25 Gram(s) IV Push once  dextrose 50% Injectable 12.5 Gram(s) IV Push once  dextrose 50% Injectable 25 Gram(s) IV Push once  enoxaparin Injectable 50 milliGRAM(s) SubCutaneous every 12 hours  fentaNYL    Injectable 100 MICROGram(s) IV Push once  glucagon  Injectable 1 milliGRAM(s) IntraMuscular once  insulin lispro (ADMELOG) corrective regimen sliding scale   SubCutaneous every 6 hours  midazolam Injectable 4 milliGRAM(s) IV Push once  norepinephrine Infusion 0.05 MICROgram(s)/kG/Min (5.33 mL/Hr) IV Continuous <Continuous>  pantoprazole  Injectable 40 milliGRAM(s) IV Push daily  propofol Infusion 10 MICROgram(s)/kG/Min (3.41 mL/Hr) IV Continuous <Continuous>  sodium chloride 7% Inhalation 4 milliLiter(s) Inhalation every 12 hours    MEDICATIONS  (PRN):  acetaminophen     Tablet .. 650 milliGRAM(s) Oral every 6 hours PRN Temp greater or equal to 38C (100.4F), Mild Pain (1 - 3)  albuterol/ipratropium for Nebulization 3 milliLiter(s) Nebulizer every 6 hours PRN Shortness of Breath and/or Wheezing    Allergies    ace inhibitors (Anaphylaxis)  caffeine (Rash)  cefepime (Rash)  chocolate (Rash)  high acid (Rash)  Tomatoes (Hives)    Intolerances        VITAL SIGNS:  Vital Signs Last 24 Hrs  T(C): 37 (14 Mar 2022 09:00), Max: 37.4 (14 Mar 2022 01:02)  T(F): 98.6 (14 Mar 2022 09:00), Max: 99.4 (14 Mar 2022 01:02)  HR: 77 (14 Mar 2022 11:00) (61 - 98)  BP: 119/58 (14 Mar 2022 11:00) (73/41 - 143/71)  BP(mean): 83 (14 Mar 2022 11:00) (52 - 99)  RR: 20 (14 Mar 2022 11:00) (10 - 181)  SpO2: 93% (14 Mar 2022 11:00) (91% - 100%)      03-13-22 @ 07:01 - 03-14-22 @ 07:00  --------------------------------------------------------  IN: 1907.5 mL / OUT: 1146 mL / NET: 761.5 mL    03-14-22 @ 07:01  -  03-14-22 @ 11:58  --------------------------------------------------------  IN: 13.6 mL / OUT: 108 mL / NET: -94.4 mL        PHYSICAL EXAM:  Constitutional: intubated   Neurologic: following simple commands, maintaining eye contact>10 secs, flexures of extremities.   HEENT: NC/AT; clear conjunctiva, anicteric sclera; with ET and OG tube with suctioning brownish non-bloody fluid  Neck: supple; no JVD or thyromegaly, no cervical, post-auricular or supraclavicular lymphadenopathy  Respiratory: decreased breath sounds on the left side mid to lower fields, no increased work of breathing or accessory muscle use  Cardiac: +S1/S2, no murmurs/rubs/gallops, RRR  Gastrointestinal: abdomen soft, NT/ND; +BSx4, PEG in place with dressing from 3/11 C/D/I  Genitourinary: no suprapubic tenderness, Quintero draining concentrated yellow urine  Extremities: WWP, no clubbing, edema or cyanosis, with bilateral restraints  Vascular: 2+ radial, femoral, DP/PT pulses B/L  Dermatologic: skin warm, dry and intact; no rashes, wounds, or scars  Lines: 18 gauge peripheral line in right UE and right LE, 24 gauge a-line on left arm      LABS:                        10.9   11.68 )-----------( 357      ( 14 Mar 2022 04:21 )             34.4     03-14    142  |  98  |  22  ----------------------------<  108<H>  4.0   |  30  |  0.82    Ca    8.7      14 Mar 2022 04:21  Phos  3.2     03-14  Mg     2.5     03-14    TPro  6.3  /  Alb  3.3  /  TBili  0.7  /  DBili  x   /  AST  39  /  ALT  35  /  AlkPhos  74  03-14    PT/INR - ( 13 Mar 2022 14:34 )   PT: 13.8 sec;   INR: 1.16          PTT - ( 13 Mar 2022 14:34 )  PTT:32.1 sec    CAPILLARY BLOOD GLUCOSE      POCT Blood Glucose.: 120 mg/dL (14 Mar 2022 11:19)  POCT Blood Glucose.: 97 mg/dL (14 Mar 2022 05:31)  POCT Blood Glucose.: 91 mg/dL (13 Mar 2022 23:31)  POCT Blood Glucose.: 109 mg/dL (13 Mar 2022 18:42)  POCT Blood Glucose.: 224 mg/dL (13 Mar 2022 13:54)  POCT Blood Glucose.: 124 mg/dL (13 Mar 2022 12:11)      Sputum culture: no organisms  CXR AM 3/14: compared to prior, change in rotation, increase opacification in upper and mid lung fields.   ABG AM 3/14: pH 7.5 with 49/72/38/97.6   HOSPITAL COURSE  73 yo F with a PMHx of cerebral palsy with functional quadriplegia, DVT/PE on Eliquis, GERD, chronic dysphagia with PEG, thoracic spine fusion with a prior admission in 2020 for aspiration PNA requiring intubation. Patient presented from her assisted living facility in [facility] likely 2/2 aspiration pneumonia which has grown pseudomonas sensitive to ciprofloxacin. Patient was intubated in the field prior to arrival for airway protection. While in the MICU, one unsuccessful attempt was made at extubation requiring reintubation attributed to pt's anxiety. Subsequently, pt was successfully extubated in MICU with a seroquel regimen. Pt was weaned to HFNC then 4L NC satting 94-95%.     On 3/10, pt was stepped down to the floors with increasing frequency of suctioning. On 3/13, pt's oxygen requirement increased and 2L NC titrated up to 6L NC, however pt was found to be hypoxic to 70s. Pt was suctioned and removed significant amount of PEG tube feed products and mucus. However, no change in pulse ox was noted and rapid response was called.     Pt was intubated, sedated on propofol and started on levophed for hypotension 70s/40s. BP improved to 100s/60s and patient transferred to MICU. En route, patient had episode of emesis with continued copious secretions, requiring frequent suctioning. In ICU, pt underwent bronchoscopy which showed no signs of purulent secretions or endobronchial lesions.    SUBJECTIVE / INTERVAL HPI: Patient seen and examined at bedside. Pt nodding to ROS, denies any F/C, pain including CP. Pt nodding yes to "would you like the bilateral restraints removed".     MEDICATIONS  (STANDING):  albuterol/ipratropium for Nebulization. 3 milliLiter(s) Nebulizer once  ARIPiprazole 2 milliGRAM(s) Oral every 24 hours  chlorhexidine 0.12% Liquid 15 milliLiter(s) Oral Mucosa every 12 hours  dextrose 40% Gel 15 Gram(s) Oral once  dextrose 5%. 1000 milliLiter(s) (50 mL/Hr) IV Continuous <Continuous>  dextrose 5%. 1000 milliLiter(s) (100 mL/Hr) IV Continuous <Continuous>  dextrose 50% Injectable 25 Gram(s) IV Push once  dextrose 50% Injectable 12.5 Gram(s) IV Push once  dextrose 50% Injectable 25 Gram(s) IV Push once  enoxaparin Injectable 50 milliGRAM(s) SubCutaneous every 12 hours  fentaNYL    Injectable 100 MICROGram(s) IV Push once  glucagon  Injectable 1 milliGRAM(s) IntraMuscular once  insulin lispro (ADMELOG) corrective regimen sliding scale   SubCutaneous every 6 hours  midazolam Injectable 4 milliGRAM(s) IV Push once  norepinephrine Infusion 0.05 MICROgram(s)/kG/Min (5.33 mL/Hr) IV Continuous <Continuous>  pantoprazole  Injectable 40 milliGRAM(s) IV Push daily  propofol Infusion 10 MICROgram(s)/kG/Min (3.41 mL/Hr) IV Continuous <Continuous>  sodium chloride 7% Inhalation 4 milliLiter(s) Inhalation every 12 hours    MEDICATIONS  (PRN):  acetaminophen     Tablet .. 650 milliGRAM(s) Oral every 6 hours PRN Temp greater or equal to 38C (100.4F), Mild Pain (1 - 3)  albuterol/ipratropium for Nebulization 3 milliLiter(s) Nebulizer every 6 hours PRN Shortness of Breath and/or Wheezing    Allergies    ace inhibitors (Anaphylaxis)  caffeine (Rash)  cefepime (Rash)  chocolate (Rash)  high acid (Rash)  Tomatoes (Hives)    Intolerances        VITAL SIGNS:  Vital Signs Last 24 Hrs  T(C): 37 (14 Mar 2022 09:00), Max: 37.4 (14 Mar 2022 01:02)  T(F): 98.6 (14 Mar 2022 09:00), Max: 99.4 (14 Mar 2022 01:02)  HR: 77 (14 Mar 2022 11:00) (61 - 98)  BP: 119/58 (14 Mar 2022 11:00) (73/41 - 143/71)  BP(mean): 83 (14 Mar 2022 11:00) (52 - 99)  RR: 20 (14 Mar 2022 11:00) (10 - 181)  SpO2: 93% (14 Mar 2022 11:00) (91% - 100%)    on AC/CMV with FiO2 50%, , PEEP 5, RR 10       03-13-22 @ 07:01  -  03-14-22 @ 07:00  --------------------------------------------------------  IN: 1907.5 mL / OUT: 1146 mL / NET: 761.5 mL    03-14-22 @ 07:01  -  03-14-22 @ 11:58  --------------------------------------------------------  IN: 13.6 mL / OUT: 108 mL / NET: -94.4 mL    Drips:  - Levophed: stopped at 5 AM 3/14  - Propofol: 3.4 cc/hr at 5 PM 3/14      PHYSICAL EXAM:  Constitutional: Calm, cooperative, intubated   HEENT: NC/AT; clear conjunctiva, anicteric sclera; with ET and OG tube with suctioning brownish non-bloody fluid  Neck: supple; no JVD or thyromegaly, no cervical, post-auricular or supraclavicular lymphadenopathy  Respiratory: decreased breath sounds on the left side mid to lower fields, no increased work of breathing or accessory muscle use  Cardiac: +S1/S2, no murmurs/rubs/gallops, RRR  Gastrointestinal: abdomen soft, NT/ND; +BSx4, PEG in place with dressing from 3/11 C/D/I  Genitourinary: no suprapubic tenderness, Lu draining concentrated yellow urine  Extremities: WWP, no clubbing, edema or cyanosis, with bilateral restraints  Vascular: 2+ radial, femoral, DP/PT pulses B/L  Neurologic: following simple commands, maintaining eye contact>10 secs, flexures of extremities.   Dermatologic: skin warm, dry and intact; no rashes, wounds, or scars  Lines: 18 gauge peripheral line in right UE and right LE since 3/13, 24 gauge a-line on left arm since 3/14, lu since X      LABS:                        10.9   11.68 )-----------( 357      ( 14 Mar 2022 04:21 )             34.4     03-14    142  |  98  |  22  ----------------------------<  108<H>  4.0   |  30  |  0.82    Ca    8.7      14 Mar 2022 04:21  Phos  3.2     03-14  Mg     2.5     03-14    TPro  6.3  /  Alb  3.3  /  TBili  0.7  /  DBili  x   /  AST  39  /  ALT  35  /  AlkPhos  74  03-14    PT/INR - ( 13 Mar 2022 14:34 )   PT: 13.8 sec;   INR: 1.16       PTT - ( 13 Mar 2022 14:34 )  PTT:32.1 sec    ABG AM 3/14: pH 7.5 with 49/72/38/97.6  ABG AM 3/15: X    CAPILLARY BLOOD GLUCOSE      POCT Blood Glucose.: 120 mg/dL (14 Mar 2022 11:19)  POCT Blood Glucose.: 97 mg/dL (14 Mar 2022 05:31)  POCT Blood Glucose.: 91 mg/dL (13 Mar 2022 23:31)  POCT Blood Glucose.: 109 mg/dL (13 Mar 2022 18:42)  POCT Blood Glucose.: 224 mg/dL (13 Mar 2022 13:54)  POCT Blood Glucose.: 124 mg/dL (13 Mar 2022 12:11)      Sputum culture 3/13: no organisms  Blood culture 3/13: NGTD 1d  Bronchial wash 3/14: no organisms  CXR AM 3/14: compared to prior, change in rotation, increase opacification in upper and mid lung fields.   CXR AM 3/15: X

## 2022-03-14 NOTE — PROGRESS NOTE ADULT - PROBLEM SELECTOR PLAN 4
Patient with cerebral palsy, previously a [place] resident, now resides in [facility], with [facility]. Presenting to the hospital with respiratory failure, PNA, intubation, attempt at extubation with subsequent reintubation.   Although patient was recently extubated, required reintubation over the weekend, and concern over a need of a trache is high.  - Awaiting callback from [facility] to schedule a follow up family meeting.

## 2022-03-14 NOTE — PROCEDURE NOTE - NSSITEPREP_SKIN_A_CORE
chlorhexidine chlorhexidine/Adherence to aseptic technique: hand hygiene prior to donning barriers (gown, gloves), don cap and mask, sterile drape over patient

## 2022-03-14 NOTE — PROGRESS NOTE ADULT - SUBJECTIVE AND OBJECTIVE BOX
[facility]  April Amos Palliative Care Attending  Contact Info: Call [phone] (HEAL Line) or message on [employer] Teams    HPI:  74 year old female with a PMH of cerebral palsy with functional quadroplegia, DVT/PE on eliquis, GERD, Chronic dyshagia with PEG, Thoracic spine fusion and a history of prior admission in 2020 for aspiration PNA requiring intubation presents from her assisted living facility in acute hypoxic respiratory failure. Ahe was found by staff with difficulty breathing.  +cough.  ~1am.  EMS arrival noted pt w/ resp distress, O2 84% in RA, intubated in field for airway protection, noted "thick viscous" liquid in her oropharynx during intubation. Patient lost conciousness multiple times upon transfer to [facility] with subsequent transfer to [facility] to be cared for in the MICU.     ED Course:  Vitals: T 99.1, HR 81, BP 91/58, RR 18, 100% O2 sat Intubated VC/AC  Labs: cbc wnl, Cl 94, HCO3- 32, Lactate 5.4, VBG with pH 7.19, pCO2 87, venous O2 sat 95%  Imaging: CXR BL opacities worse on the left   Interventions: Metronidazole 500mg x1, Fent drip  (03 Mar 2022 05:40)    PERTINENT PM/SXH:   CP (cerebral palsy)    Mental retardation    Dysphagia    GERD (gastroesophageal reflux disease)    PE (pulmonary thromboembolism)    DVT (deep venous thrombosis)    Spastic quadriplegia    HTN (hypertension)      PEG tube malfunction      FAMILY HISTORY:    ITEMS NOT CHECKED ARE NOT PRESENT    SOCIAL HISTORY:   Significant other/partner:  []  Children:  []   Substance hx:  []   Tobacco hx:  []   Alcohol hx: []   Home Opioid hx:  [] I-Stop Reference No:  - no active Rx's / see chart note  Living Situation: []Home  []Long term care  []Rehab [x]Other - Group Home  Evangelical/Spiritual practice: ; Role of organized Religious [ ] important [ ] some [x ] unable to assess  Coping: [ ] well [ ] with difficulty [ ] poor coping [x ] unable to assess  Support system: [ ] strong [x ] adequate [ ] inadequate    ADVANCE DIRECTIVES:    []MOLST  []Living Will  DECISION MAKER(s):  [] Health Care Proxy(s)  [] Surrogate(s)  [x] Guardian           Name(s)/Phone Number(s): OPWDD and MHLS    BASELINE (I)ADLs (prior to admission):  [place]: []Total  [] Moderate []Dependent    ALLERGIES:  ace inhibitors (Anaphylaxis)  caffeine (Rash)  cefepime (Rash)  chocolate (Rash)  high acid (Rash)  Tomatoes (Hives)    MEDICATIONS  (STANDING):  chlorhexidine 4% Liquid 1 Application(s) Topical <User Schedule>  ciprofloxacin   IVPB      ciprofloxacin   IVPB 400 milliGRAM(s) IV Intermittent every 8 hours  dexMEDEtomidine Infusion 0.2 MICROgram(s)/kG/Hr (2.84 mL/Hr) IV Continuous <Continuous>  dextrose 40% Gel 15 Gram(s) Oral once  dextrose 5%. 1000 milliLiter(s) (50 mL/Hr) IV Continuous <Continuous>  dextrose 5%. 1000 milliLiter(s) (100 mL/Hr) IV Continuous <Continuous>  dextrose 50% Injectable 25 Gram(s) IV Push once  dextrose 50% Injectable 12.5 Gram(s) IV Push once  dextrose 50% Injectable 25 Gram(s) IV Push once  enoxaparin Injectable 50 milliGRAM(s) SubCutaneous every 12 hours  glucagon  Injectable 1 milliGRAM(s) IntraMuscular once  insulin lispro (ADMELOG) corrective regimen sliding scale   SubCutaneous every 6 hours  pantoprazole  Injectable 40 milliGRAM(s) IV Push every 24 hours  QUEtiapine 50 milliGRAM(s) Oral every 12 hours    MEDICATIONS  (PRN):    PRESENT SYMPTOMS: [x]Unable to obtain due to poor mentation/encephalopathy  Source if other than patient:  []Family   []Team     Pain: [ ] yes [x ] no  QOL impact -   Location -                    Aggravating Factors -  Quality -  Radiation -  Timing -  Severity (0-10 scale) -   Minimal Acceptable Level (0-10 scale) -    PAIN AD Score:  [email or web address].[email or web address]/painad.pdf (press ctrl +  left click to view)    Dyspnea:                           [ ]Mild  [x ]Moderate [ ]Severe  Anxiety:                             [ x]Mild [ ]Moderate [ ]Severe  Fatigue:                             [ ]Mild [ ]Moderate [x ]Severe  Nausea:                             [ ]Mild [ ]Moderate [ ]Severe  Loss of Appetite:             [ ]Mild [ ]Moderate [ x]Severe  Constipation:                   [ ]Mild [ ]Moderate [ ]Severe    Other Symptoms:  [x ]All other review of systems negative     Palliative Performance Status Version 2:  30%    http://npcrc.org/files/news/palliative_performance_scale_ppsv2.pdf    PHYSICAL EXAM:  GENERAL:  [x ]Alert  [ x]Oriented x  2 [ ]Lethargic  [ ]Cachexia  [ ]Unarousable  [ ]Verbal  [ ]Non-Verbal  Behavioral:   [ ]Anxiety  [ ]Delirium [ ]Agitation [x ]Cooperative  HEENT:  [ ]Normal   [x ]Dry mouth   [ ]ET Tube/Trach  [ ]Oral lesions  PULMONARY:   [ ]Clear []Tachypnea  []Audible excessive secretions   [ x]Rhonchi        [ ]Right [ ]Left [x ]Bilateral  [ ]Crackles        [ ]Right [ ]Left [ ]Bilateral  [ ]Wheezing     [ ]Right [ ]Left [ ]Bilateral  CARDIOVASCULAR:    [x ]Regular [ ]Irregular [ ]Tachy  [ ]Eliot [ ]Murmur [ ]Other  GASTROINTESTINAL:  [ x]Soft  [ ]Distended   [x ]+BS  [x ]Non tender [ ]Tender  [ ]PEG [ ]OGT/ NGT  Last BM:     GENITOURINARY:  [ ]Normal [ ] Incontinent   [ ]Oliguria/Anuria   [ ]Quintero  MUSCULOSKELETAL:   [ ]Normal   [ ]Weakness  [ ]Bed/Wheelchair bound [ ]Edema  NEUROLOGIC:   [ ]No focal deficits  [ ]Cognitive impairment  [ ]Dysphagia [ ]Dysarthria [ ]Paresis [ ]Encephalopathic   SKIN:   [ ]Normal   [ ]Pressure ulcer(s)  [ ]Rash    CRITICAL CARE:  [ ]Shock Present  [ ]Septic [ ]Cardiogenic [ ]Neurologic [ ]Hypovolemic  [ ]Vasopressors [ ]Inotropes   [x ]Respiratory failure present [ ]Mechanical Ventilation [ ]Non-invasive ventilatory support [ x]High-Flow  [ ]Acute  [ ]Chronic [ ]Hypoxic  [ ]Hypercarbic  [ ]Other organ failure    Vital Signs Last 24 Hrs  T(C): 37 (08 Mar 2022 21:24), Max: 38.1 (08 Mar 2022 14:09)  T(F): 98.6 (08 Mar 2022 21:24), Max: 100.6 (08 Mar 2022 14:09)  HR: 74 (08 Mar 2022 20:00) (57 - 109)  BP: 102/53 (08 Mar 2022 20:00) (83/46 - 160/67)  BP(mean): 74 (08 Mar 2022 20:00) (60 - 98)  RR: 25 (08 Mar 2022 20:00) (19 - 35)  SpO2: 91% (08 Mar 2022 20:00) (89% - 99%) I&O's Summary    07 Mar 2022 07:01  -  08 Mar 2022 07:00  --------------------------------------------------------  IN: 2549.4 mL / OUT: 3330 mL / NET: -780.6 mL    08 Mar 2022 07:01  -  08 Mar 2022 22:09  --------------------------------------------------------  IN: 561.9 mL / OUT: 2240 mL / NET: -1678.1 mL        LABS:                        10.6   5.96  )-----------( 191      ( 08 Mar 2022 05:25 )             34.7   03-08    141  |  104  |  14  ----------------------------<  134<H>  4.2   |  30  |  0.52    Ca    8.5      08 Mar 2022 05:25  Phos  3.0     03-08  Mg     1.8     03-08    TPro  5.8<L>  /  Alb  3.0<L>  /  TBili  0.3  /  DBili  x   /  AST  43<H>  /  ALT  29  /  AlkPhos  53  03-07      RADIOLOGY & ADDITIONAL STUDIES:      PROTEIN CALORIE MALNUTRITION PRESENT: [ ]mild [ ]moderate [ ]severe [ ]underweight [ ]morbid obesity  [ ]PPSV2 < or = to 30% [ ]significant weight loss  [ ]poor nutritional intake [ ]catabolic state [ ]anasarca     Artificial Nutrition [ ]     REFERRALS:  [x]Social Work  [ ]Case management [ ]PT/OT [ ]Chaplaincy  [ ]Hospice  [ ]Patient/Family Support    DISCUSSION OF CASE: Family - to obtain additional history and to provide emotional support; ( ) -     Care Coordination/Goals of Care Document:                                          Progress Notes    PROGRESS NOTE  Date & Time of Note   2022-03-08 14:00    Notes    Notes: Social work note:  Patient currently remains on 7 east, per medical team  in morning interdisciplinary rounds palliative team will be consulted, and team  working with patient on breathing trials to verify if she is able to be weaned  from ventilator.       recieved call from [place] phone  [phone], updated provided.  Alternated emergency contacts for patient are  residence clinical manager Nina Lynch  [phone], residence nursing manager Meng [phone].       Electronically signed by:  Faiza Ortega  Electronically signed on:  2022-03-08  14:23           PALLIATIVE MEDICINE COORDINATION OF CARE DOCUMENTATION: [x] Inpatient Consult  Non-Face-to-Face prolonged service provided that relates to (face-to-face) care that has or will occur and ongoing patient management, including one or more of the following: - Reviewed documentation from other physicians and other health care professional services - Reviewed medical records and diagnostic / radiology study results - Coordination with patient's support system  ************************************************************************  MEDICATION REVIEW:  - See Medication List Above    ISTOP REFERENCE:   - no active Rx's / see ISTOP Chart Note  - PRN usage: NO PRN'S  ------------------------------------------------------------------------  COORDINATION OF CARE:  - Palliative Care consulted for: GOC / Symptom Management  - Patient (to be) assessed:  - Patient previously seen by Palliative Care service: NO    ADVANCE CARE PLANNING  - Code status: FULL  - MOLST reviewed in chart: NONE; None found on [facility]  - HCP/Surrogate:  - GOC documents: NONE found on [place]  - HCP/Living will/Other Advanced Directives in Alpha: NONE found on [facility]  ------------------------------------------------------------------------  CARE PROVIDER DOCUMENTATION:  - SW/CM notes: Remains medically active  -   -   -     PLAN OF CARE  - Known admissions in past year: 1  - Current admit date: 3/3/22  - LOS: 5  - LACE score: 3   - Current dispo plan: TO BE DETERMINED    03-03-22 (5d)  ------------------------------------------------------------------------  - Time Spent/Chart reviewed: 41 Minutes [including time used to gather, review and transfer data]  - Start: 1000  - End:  1041    Prolonged services rendered, as part of this patient's care provided by Palliative Medicine, include: i. chart review for provider and ancillary service documentation, ii. pertinent diagnostics including laboratory and imaging studies, iii. medication review including PRN use, iv. admission history including previous palliative care encounters and GOC notes, v. advance care planning documents including HCP and MOLST forms in Alpha. Part of Palliative Medicine extended evaluation and management also involves coordination of care with our IDT, the primary and consulting teams, and unit CM/SW and Hospice if eligible. Recommendations based on the information gathered and discussed are outlined in the A/P of Palliative notes.

## 2022-03-14 NOTE — CHART NOTE - NSCHARTNOTEFT_GEN_A_CORE
GI called for clogged PEG tube.     Recommendations:    - Techniques to unclog the peg tube:        1) flush with warm water (can clamp tube for 20min allowing the water to soak)        2) flush with soda.        3) If that does not work, can flush with Pancrealipase (creon) tab + bicarb tab - crush with approx 4mL warm water to dissolve the mixture. Flush the mixture into the tube and clamp for 5-15 minutes. Milk the tube to help dissolve. Afterwards flush with warm water.    -  caregiver on proper PEG care with frequent flushing (30ml of water) in between tube feeds and medication administrations

## 2022-03-14 NOTE — PROCEDURE NOTE - NSPOSTCAREGUIDE_GEN_A_CORE
Verbal/written post procedure instructions were given to patient/caregiver/Instructed patient/caregiver to follow-up with primary care physician/Instructed patient/caregiver regarding signs and symptoms of infection Verbal/written post procedure instructions were given to patient/caregiver/Instructed patient/caregiver regarding signs and symptoms of infection/Keep the cast/splint/dressing clean and dry

## 2022-03-14 NOTE — PROGRESS NOTE ADULT - PROBLEM SELECTOR PLAN 2
- Patient with long standing CP.  - Used to be a resident of [place].  - Now resides in a group home.   - Although some family is present, not involved in patients care.  - OPWDD and MHLS are mainly involved in shared decision making.

## 2022-03-14 NOTE — PROGRESS NOTE ADULT - ASSESSMENT
74 F with PMHx of CP with functional quadriplegia, DVT/PE on eliquis, GERD, chronic dysphagia with PEG, thoracic spine fusion admitted for acute hypoxic respiratory failure found to have pseudomonal PNA c/b periods of agitation/anxiety now resolved, s/p extubation to HFNC on 3/8 however course c/b increasing suctioning and oxygen requirement, course c/b AHRF 2/2 aspiration PNA requiring re-intubation, pending bronch on 3/14    NEURO  #H/O cerebral palsy.   Patient with cerebral palsy. Has functional quadriplegia, health aide at reports at baseline she is able to follow commands and communicate "when she wants to".  - PT recommending return to group home with prior level of assist, however if requires this level of oxygen and suctioning may need NUNU prior to group home   - see GOC.    #Goals of care, counseling/discussion.   Patient with cerebral palsy. Previously a [place] resident, now resides in group home, with [facility] per palliative care. Patient with repeat admission for aspiration pneumonia with history of dysphagia and copious secretions from oropharynx on exam.  - palliative care consulted  - Ethics counsulted   - pending GOC discussion.    CV  No active issues    PULM  #Acute respiratory failure with hypoxia  Patient with h/o aspiration events requiring intubation.   Patient found with respiratory distress and SaO2 of 84%.   Intubated in field for airway protection, s/p extubation on 3/8 to HFNC.   Weaned to 3L NC on transfer to [facility].   Given Lasix 40 mg IV x 2 (3/8 and 3/9) due to pleural effusion and net positive fluid status per MICU.   - Patient has tenuous respiratory status given innability to clear secretions and fluctating mental status  - Currently on 4L NC, fluctuates but trending in right direction today  - Mucolytics   - Frequent suctioning   - attempt to wean to NC  - Chest PT Q6hrs  - Aspiration precautions.  - ABG on 3/13 wnl    #Tracheal narrowing  - possible trach placement    #Aspiration pneumonia  Patient presenting with AHRF requiring intubation.   CXR with bilateral lower lobe opacities on 3/3. Sputum culture:  pseudomonas aeruginosa.   Blood culture with no growth to date.  - Mgmt as above   - s/p 7 day course of Cipro 400mg IV Q8 (3/5-3/12), will extend given persistent O2 requirement   - d/c Seroquel 50mg Q12 via PEG for agitation  - c/w home med Abilify 2mg QHS via PEG.    #On medication for venous thromboembolism (VTE).   History of PE 5 years ago. On Eliquis at home 2.5 mg BID at home. Bedside ultrasound performed in MICU with no evidence of clot. On Lovenox 50mg Q12, d/linda and returned to home medications.   - Holding home med Eliquis 2.5mg Q12 for possible trach placement  - c/w lovenox 50 mg BID    RENAL  No active issues    GI  #History of dysphagia  - PEG tube in place     #GERD (gastroesophageal reflux disease)  Patient with history of GERD on famotidine 20 mg qHS at home. On pantoprazole 40 mg QD while in MICU  - c/w home med: famotidine 20mg QHS.      #E-coli UTI  Catheter culture from 3/3 with >100,000 E.coli. New lu placed on admission.  - c/w cipro 400mg IV Q8, coverage and sensitivity adequate.    ENDO  No Active issues    ID  #Aspiration pneumonia  #E. coli UTI    HEME  No active issues    PROPHYLAXIS  F: None  E: Replete PRN  N: Jevity 1.2 at 50cc/hr  DVT: Lovenox 50 mg BID  GI: protonix 40 mg IV qd  C: Full Code, pending discussion with pt's CAB state advocate for GOC and medical decisions  IN CASE OF EMERGENCY, 2-physician consent is required  D: MICU   74 F with PMHx of CP with functional quadriplegia, DVT/PE on eliquis, GERD, chronic dysphagia with PEG, thoracic spine fusion admitted for acute hypoxic respiratory failure found to have pseudomonal PNA c/b periods of agitation/anxiety now resolved, s/p extubation to HFNC on 3/8 however course c/b increasing suctioning and oxygen requirement, course c/b AHRF 2/2 aspiration PNA requiring re-intubation, s/p bronchoscopy and PEG exchange evaluation    NEURO  #H/O cerebral palsy.   Patient with cerebral palsy. Has functional quadriplegia, health aide at reports at baseline she is able to follow commands and communicate "when she wants to".  - pending discussion with pt's CAB state advocate for GOC and medical decisions  - IN CASE OF EMERGENCY, 2-physician consent is required  - see GOC as below    #Goals of care, counseling/discussion.   Patient with cerebral palsy. Previously a [place] resident, now resides in group home, with [place] [facility] per palliative care. Patient with repeat admission for aspiration pneumonia with history of dysphagia and copious secretions from oropharynx on exam.  - palliative care consulted - follow up recs  - Ethics counsulted - follow up recs  - pending GOC discussion with CAB    #Anxiety  - c/w home med Abilify 2mg QHS via PEG    CV  No active issues    PULM  #Acute respiratory failure with hypoxia  Patient with h/o aspiration events requiring intubation.   Patient found with respiratory distress and SaO2 of 84%.   Intubated in field for airway protection, s/p extubation on 3/8 to HFNC, re-intubation on 3/13/22  - currently on VC/AC FiO2 50%, PEEP 5, -280, RR 10, flow 4L/min  - Frequent suctioning  - Chest PT Q6hrs  - Aspiration precautions  - ABG qd, improvement in resp alkalosis on 3/14    #Aspiration pneumonia  Patient presenting with AHRF requiring intubation.   CXR with bilateral lower lobe opacities on 3/3. Sputum culture:  pseudomonas aeruginosa.   AHRF 2/2 aspiration PNA on 3/13/22 with blood culture with no growth to date.  CXR with worsening opacification on left upper and mid lung fields on 3/14  - CXR qd  - s/p 7 day course of Cipro 400mg IV Q8 (3/5-3/12) - holding Abx for the moment    #On medication for venous thromboembolism (VTE).   History of PE 5 years ago. On Eliquis at home 2.5 mg BID at home. Bedside ultrasound performed in MICU with no evidence of clot. On Lovenox 50mg Q12, d/linda and returned to home medications.   - c/w lovenox 50 mg BID    RENAL  No active issues    GI  #History of dysphagia  - PEG tube in place and functional  - in case of PEG clogging  Per GI Chart Note       ''1) flush with warm water (can clamp tube for 20min allowing the water to soak)        2) flush with soda.        3) If that does not work, can flush with Pancrealipase (creon) tab + bicarb tab - crush with approx 4mL warm water to dissolve the mixture. Flush the mixture into the tube and clamp for 5-15 minutes. Milk the tube to help dissolve. Afterwards flush with warm water.''    #GERD (gastroesophageal reflux disease)  Patient with history of GERD on famotidine 20 mg qHS at home. On pantoprazole 40 mg QD while in MICU  - c/w home med famotidine 20mg QHS.    #OGT for decompression      Quintero in place    ENDO  No Active issues    ID  #Aspiration pneumonia  Patient presenting with AHRF requiring intubation.   CXR with bilateral lower lobe opacities on 3/3. Sputum culture:  pseudomonas aeruginosa.   AHRF 2/2 aspiration PNA on 3/13/22 with blood culture with no growth to date.  CXR with worsening opacification on left upper and mid lung fields on 3/14  - CXR qd  - s/p 7 day course of Cipro 400mg IV Q8 (3/5-3/12) - holding Abx for the moment    HEME  No active issues    PROPHYLAXIS  F: None  E: Replete PRN  N: None  DVT: Lovenox 50 mg BID  GI: protonix 40 mg IV qd  C: Full Code, pending discussion with pt's CAB state advocate for GOC and medical decisions  IN CASE OF EMERGENCY, 2-physician consent is required  D: MICU   74 F with PMHx of CP with functional quadriplegia, DVT/PE on eliquis, GERD, chronic dysphagia with PEG, thoracic spine fusion admitted for acute hypoxic respiratory failure found to have pseudomonal PNA c/b periods of agitation/anxiety now resolved, s/p extubation to HFNC on 3/8 however course c/b increasing suctioning and oxygen requirement, course c/b AHRF 2/2 aspiration PNA requiring re-intubation, s/p bronchoscopy and PEG exchange evaluation    NEURO  #H/O cerebral palsy.   Patient with cerebral palsy. Has functional quadriplegia, health aide at reports at baseline she is able to follow commands and communicate "when she wants to".  - pending discussion with pt's CAB state advocate for GOC and medical decisions  - IN CASE OF EMERGENCY, 2-physician consent is required  - see GOC as below    #Goals of care, counseling/discussion.   Patient with cerebral palsy. Previously a [place] resident, now resides in group home, with [place] [facility] per palliative care. Patient with repeat admission for aspiration pneumonia with history of dysphagia and copious secretions from oropharynx on exam.  - palliative care consulted - follow up recs  - Ethics counsulted - follow up recs  - pending GOC discussion with CAB    #Anxiety  - c/w home med Abilify 2mg QHS via PEG    CV  No active issues    PULM  #Acute respiratory failure with hypoxia  Patient with h/o aspiration events requiring intubation.   Patient found with respiratory distress and SaO2 of 84%.   Intubated in field for airway protection, s/p extubation on 3/8 to HFNC, re-intubation on 3/13/22  - currently on VC/AC FiO2 50%, PEEP 5, -280, RR 10, flow 4L/min  - Frequent suctioning  - Chest PT Q6hrs  - Aspiration precautions  - ABG qd, improvement in resp alkalosis on 3/14    #Aspiration pneumonia  Patient presenting with AHRF requiring intubation.   CXR with bilateral lower lobe opacities on 3/3. Sputum culture:  pseudomonas aeruginosa.   AHRF 2/2 aspiration PNA on 3/13/22 with blood culture with no growth to date.  CXR with worsening opacification on left upper and mid lung fields on 3/14  - CXR qd  - s/p 7 day course of Cipro 400mg IV Q8 (3/5-3/12) - holding Abx for the moment  - underwent bronchoscopy with following findings '' Airway evaluation revealed Sharp Lise. DONAVAN and LLL evaluation revealed Patent airways with no signs of mucous plugging. RUL, RML, RLL revealed Patient airways with compression of superior segment of RLL, did open but minimally with saline injection. ETT was then confirmed to be in good position.''    #On medication for venous thromboembolism (VTE).   History of PE 5 years ago. On Eliquis at home 2.5 mg BID at home. Bedside ultrasound performed in MICU with no evidence of clot. On Lovenox 50mg Q12, d/linda and returned to home medications.   - c/w lovenox 50 mg BID    RENAL  No active issues    GI  #History of dysphagia  - PEG tube in place and functional  - in case of PEG clogging  Per GI Chart Note       ''1) flush with warm water (can clamp tube for 20min allowing the water to soak)        2) flush with soda.        3) If that does not work, can flush with Pancrealipase (creon) tab + bicarb tab - crush with approx 4mL warm water to dissolve the mixture. Flush the mixture into the tube and clamp for 5-15 minutes. Milk the tube to help dissolve. Afterwards flush with warm water.''    #GERD (gastroesophageal reflux disease)  Patient with history of GERD on famotidine 20 mg qHS at home. On pantoprazole 40 mg QD while in MICU  - c/w home med famotidine 20mg QHS.    #OGT for decompression      Quinetro in place    ENDO  No Active issues    ID  #Aspiration pneumonia  Patient presenting with AHRF requiring intubation.   CXR with bilateral lower lobe opacities on 3/3. Sputum culture:  pseudomonas aeruginosa.   AHRF 2/2 aspiration PNA on 3/13/22 with blood culture with no growth to date.  CXR with worsening opacification on left upper and mid lung fields on 3/14  - CXR qd  - s/p 7 day course of Cipro 400mg IV Q8 (3/5-3/12) - holding Abx for the moment    HEME  No active issues    PROPHYLAXIS  F: None  E: Replete PRN  N: None  DVT: Lovenox 50 mg BID  GI: protonix 40 mg IV qd  C: Full Code, pending discussion with pt's CAB state advocate for GOC and medical decisions  IN CASE OF EMERGENCY, 2-physician consent is required  D: MICU   75 yo F with PMHx of cerebral palsy with functional quadriplegia, Hx of PE/DVTs, GERD, chronic dysphagia with PEG, thoracic spine fusion initially admitted for acute hypoxic respiratory failure found to have pseudomonal PNA, now intubated for third time i/s/o aspiration PNA of PEG feeds and copious secretions    NEURO  #H/O cerebral palsy.   Patient with cerebral palsy. Has functional quadriplegia, health aide at assisted living facility reports at baseline she is able to follow commands and communicate "when she wants to".  - pending discussion with pt's CAB state advocate for GOC and medical decisions  - FOR CONSENT, 2-physician consent is required  - see GOC as below    #Goals of care, counseling/discussion.   Patient with cerebral palsy. Previously a [place] resident, now resides in group home, with [place] [facility] per palliative care. Patient with repeat admission for aspiration pneumonia with history of dysphagia and copious secretions from oropharynx on exam.  - palliative care consulted - follow up recs  - Ethics counsulted - follow up recs  - pending GOC discussion with CAB    #Anxiety  previously, attempt for extubation was delayed 2/2 agitation/anxiety  - currently not anxious or agitated  - c/w home med Abilify 2mg qd    HEMODYNAMICS      PERFUSION  Pt's mental status     CV  No active issues    PULM  #Acute respiratory failure with hypoxia i/s/o of aspiration pneumonia of PEG feeds and copious oral secretions  Patient with h/o aspiration events requiring intubation.   Patient found with respiratory distress and SaO2 of 84%, intubated in field for airway protection, s/p extubation on 3/8 to HFNC, re-intubation on 3/13/22  - currently on VC/AC FiO2 50%, PEEP 5, -280, RR 10, flow 4L/min  - Frequent suctioning at a rate of X  - Chest PT q6h  - Aspiration precautions  - ABG qd, improvement in resp alkalosis on 3/14  - treatment plan of aspiration pneumonia as below    #On medication for venous thromboembolism (VTE).   History of PE 5 years ago. On Eliquis at home 2.5 mg BID at home. Bedside ultrasound performed in MICU with no evidence of clot.    - c/w lovenox 50 mg BID    RENAL  No active issues    GI  #History of dysphagia  - PEG tube in place and functional  - in case of PEG clogging  Per GI Chart Note       ''1) flush with warm water (can clamp tube for 20min allowing the water to soak)        2) flush with soda.        3) If that does not work, can flush with Pancrealipase (creon) tab + bicarb tab - crush with approx 4mL warm water to dissolve the mixture. Flush the mixture into the tube and clamp for 5-15 minutes. Milk the tube to help dissolve. Afterwards flush with warm water.''    #GERD (gastroesophageal reflux disease)  Patient with history of GERD on famotidine 20 mg qHS at home. On pantoprazole 40 mg QD while in MICU  - c/w PPI 40 mg IV qd      Quintero in place draining concentrated urine    ENDO  No Active issues    ID  #Aspiration pneumonia  Patient presenting with AHRF requiring intubation.   CXR with bilateral lower lobe opacities on 3/3. Sputum culture:  pseudomonas aeruginosa.   AHRF 2/2 aspiration PNA on 3/13/22 with blood culture with no growth to date.  CXR with worsening opacification on left upper and mid lung fields on 3/14  - CXR qd  - s/p 7 day course of Cipro 400mg IV Q8 (3/5-3/12) - holding Abx for the moment  - underwent bronchoscopy with following findings '' Airway evaluation revealed Sharp Lise. DONAVAN and LLL evaluation revealed Patent airways with no signs of mucous plugging. RUL, RML, RLL revealed Patient airways with compression of superior segment of RLL, did open but minimally with saline injection. ETT was then confirmed to be in good position.''  - discontinued Abx to prevent resistance     HEME  #Anemia, macrocytic  Hgb downtrending 12.8 > 10.9  MCV   No active bleeding noted  most likely 2/2 nutritional deficiency  - Follow-up Folate, B12  - Transfuse if Hgb<7 (would require 2-physician consent)    PROPHYLAXIS  F: None  E: Replete PRN  N: None  DVT: Lovenox 50 mg BID  GI: protonix 40 mg IV qd  C: Full Code, pending discussion with pt's CAB state advocate for GOC and medical decisions  FOR CONSENT 2-physician consent is required  D: MICU   73 yo F with PMHx of cerebral palsy with functional quadriplegia, Hx of PE/DVTs, GERD, chronic dysphagia with PEG, thoracic spine fusion initially admitted for acute hypoxic respiratory failure found to have pseudomonal PNA, now intubated for third time i/s/o aspiration PNA of PEG feeds and copious secretions    NEURO  #H/O cerebral palsy.   Patient with cerebral palsy. Has functional quadriplegia, health aide at assisted living facility reports at baseline she is able to follow commands and communicate "when she wants to".  - pending discussion with pt's CAB state advocate for GOC and medical decisions  - FOR CONSENT, 2-physician consent is required  - see GOC as below    #Goals of care, counseling/discussion.   Patient with cerebral palsy. Previously a [place] resident, now resides in group home, with [place] [facility] per palliative care. Patient with repeat admission for aspiration pneumonia with history of dysphagia and copious secretions from oropharynx on exam.  - palliative care consulted - follow up recs  - Ethics counsulted - follow up recs  - pending GOC discussion with CAB    #Anxiety  previously, attempt for extubation was delayed 2/2 agitation/anxiety  - currently not anxious or agitated  - c/w home med Abilify 2mg qd    HEMODYNAMICS  Shock:   VS:   Drips:    PERFUSION  Mental status:   UOP:   Extremities:     CV  No active issues    PULM  #Acute respiratory failure with hypoxia i/s/o of aspiration pneumonia of PEG feeds and copious oral secretions  Patient with h/o aspiration events requiring intubation.   Patient found with respiratory distress and SaO2 of 84%, intubated in field for airway protection, s/p extubation on 3/8 to HFNC, re-intubation on 3/13/22  - currently on VC/AC FiO2 50%, PEEP 5, -280, RR 10, flow 4L/min  - Frequent suctioning at a rate of X  - Chest PT q6h  - Aspiration precautions  - ABG qd, improvement in resp alkalosis on 3/14  - treatment plan of aspiration pneumonia as below    #On medication for venous thromboembolism (VTE).   History of PE 5 years ago. On Eliquis at home 2.5 mg BID at home. Bedside ultrasound performed in MICU with no evidence of clot.    - c/w lovenox 50 mg BID    RENAL  No active issues    GI  #History of dysphagia  - PEG tube in place and functional  - in case of PEG clogging  Per GI Chart Note       ''1) flush with warm water (can clamp tube for 20min allowing the water to soak)        2) flush with soda.        3) If that does not work, can flush with Pancrealipase (creon) tab + bicarb tab - crush with approx 4mL warm water to dissolve the mixture. Flush the mixture into the tube and clamp for 5-15 minutes. Milk the tube to help dissolve. Afterwards flush with warm water.''    #GERD (gastroesophageal reflux disease)  Patient with history of GERD on famotidine 20 mg qHS at home. On pantoprazole 40 mg QD while in MICU  - c/w PPI 40 mg IV qd      Quintero in place draining concentrated urine    ENDO  No Active issues    ID  #Aspiration pneumonia  Patient presenting with AHRF requiring intubation.   CXR with bilateral lower lobe opacities on 3/3. Sputum culture:  pseudomonas aeruginosa.   AHRF 2/2 aspiration PNA on 3/13/22 with blood culture with no growth to date.  CXR with worsening opacification on left upper and mid lung fields on 3/14  - CXR qd  - s/p 7 day course of Cipro 400mg IV Q8 (3/5-3/12) - holding Abx for the moment  - underwent bronchoscopy with following findings '' Airway evaluation revealed Sharp Lise. DONAVAN and LLL evaluation revealed Patent airways with no signs of mucous plugging. RUL, RML, RLL revealed Patient airways with compression of superior segment of RLL, did open but minimally with saline injection. ETT was then confirmed to be in good position.''  - discontinued Abx to prevent resistance     HEME  #Anemia, macrocytic  Hgb downtrending 12.8 > 10.9  MCV   No active bleeding noted  most likely 2/2 nutritional deficiency  - Follow-up Folate, B12  - Transfuse if Hgb<7 (would require 2-physician consent)    PROPHYLAXIS  F: None  E: Replete PRN  N: None  DVT: Lovenox 50 mg BID  GI: protonix 40 mg IV qd  C: Full Code, pending discussion with pt's CAB state advocate for GOC and medical decisions  FOR CONSENT 2-physician consent is required  D: MICU   No pertinent family history in first degree relatives

## 2022-03-14 NOTE — PROGRESS NOTE ADULT - ATTENDING COMMENTS
73 yo F with functional quadriplegia 2/2 CP now thrid intubation this admission, this time for aspiration and mucus plugging leading to sudden decompensation. plan for bronchoscopy today, stop antibiotics, GI to eval PEG. Ethics/palliative care f/u, she likely needs tracheostomy however it does not prevent aspiration and she will still need frequent suctioning and secretion management.

## 2022-03-14 NOTE — PROGRESS NOTE ADULT - PROBLEM SELECTOR PLAN 1
- Patient with aspiration PNA.  - Currently on broad spectrum antibiotics.   - Recently intubated, extubated and now reintubated over the weekend.   - Secretions are still present requiring suctioning.   - Patient with a history of aspiration, with PEG tube feeds already in place.   - High concern over need of tracheostomy.  - Bronch to occur today

## 2022-03-14 NOTE — PROCEDURE NOTE - NSICDXPROCEDURE_GEN_ALL_CORE_FT
PROCEDURES:  Insertion of intravenous catheter with ultrasound guidance 14-Mar-2022 00:46:20  Angélica Emmanuel

## 2022-03-15 DIAGNOSIS — R53.81 OTHER MALAISE: ICD-10-CM

## 2022-03-15 DIAGNOSIS — Z51.5 ENCOUNTER FOR PALLIATIVE CARE: ICD-10-CM

## 2022-03-15 DIAGNOSIS — Z71.89 OTHER SPECIFIED COUNSELING: ICD-10-CM

## 2022-03-15 LAB
ALBUMIN SERPL ELPH-MCNC: 3.2 G/DL — LOW (ref 3.3–5)
ALP SERPL-CCNC: 73 U/L — SIGNIFICANT CHANGE UP (ref 40–120)
ALT FLD-CCNC: 29 U/L — SIGNIFICANT CHANGE UP (ref 10–45)
ANION GAP SERPL CALC-SCNC: 12 MMOL/L — SIGNIFICANT CHANGE UP (ref 5–17)
AST SERPL-CCNC: 34 U/L — SIGNIFICANT CHANGE UP (ref 10–40)
B PERT IGG+IGM PNL SER: SIGNIFICANT CHANGE UP
BASE EXCESS BLDA CALC-SCNC: 6.9 MMOL/L — HIGH (ref -2–3)
BASOPHILS # BLD AUTO: 0.05 K/UL — SIGNIFICANT CHANGE UP (ref 0–0.2)
BASOPHILS NFR BLD AUTO: 0.6 % — SIGNIFICANT CHANGE UP (ref 0–2)
BILIRUB SERPL-MCNC: 0.6 MG/DL — SIGNIFICANT CHANGE UP (ref 0.2–1.2)
BUN SERPL-MCNC: 20 MG/DL — SIGNIFICANT CHANGE UP (ref 7–23)
CALCIUM SERPL-MCNC: 8.2 MG/DL — LOW (ref 8.4–10.5)
CHLORIDE SERPL-SCNC: 102 MMOL/L — SIGNIFICANT CHANGE UP (ref 96–108)
CO2 BLDA-SCNC: 34 MMOL/L — HIGH (ref 19–24)
CO2 SERPL-SCNC: 28 MMOL/L — SIGNIFICANT CHANGE UP (ref 22–31)
COLOR FLD: SIGNIFICANT CHANGE UP
COMMENT - FLUIDS: SIGNIFICANT CHANGE UP
CREAT SERPL-MCNC: 0.63 MG/DL — SIGNIFICANT CHANGE UP (ref 0.5–1.3)
EGFR: 93 ML/MIN/1.73M2 — SIGNIFICANT CHANGE UP
EOSINOPHIL # BLD AUTO: 0.18 K/UL — SIGNIFICANT CHANGE UP (ref 0–0.5)
EOSINOPHIL # FLD: 1 % — SIGNIFICANT CHANGE UP
EOSINOPHIL NFR BLD AUTO: 2.3 % — SIGNIFICANT CHANGE UP (ref 0–6)
FLUID INTAKE SUBSTANCE CLASS: SIGNIFICANT CHANGE UP
FLUID SEGMENTED GRANULOCYTES: 93 % — SIGNIFICANT CHANGE UP
FOLATE SERPL-MCNC: 17.8 NG/ML — SIGNIFICANT CHANGE UP
GLUCOSE BLDC GLUCOMTR-MCNC: 116 MG/DL — HIGH (ref 70–99)
GLUCOSE BLDC GLUCOMTR-MCNC: 69 MG/DL — LOW (ref 70–99)
GLUCOSE BLDC GLUCOMTR-MCNC: 69 MG/DL — LOW (ref 70–99)
GLUCOSE BLDC GLUCOMTR-MCNC: 76 MG/DL — SIGNIFICANT CHANGE UP (ref 70–99)
GLUCOSE BLDC GLUCOMTR-MCNC: 77 MG/DL — SIGNIFICANT CHANGE UP (ref 70–99)
GLUCOSE BLDC GLUCOMTR-MCNC: 83 MG/DL — SIGNIFICANT CHANGE UP (ref 70–99)
GLUCOSE BLDC GLUCOMTR-MCNC: 86 MG/DL — SIGNIFICANT CHANGE UP (ref 70–99)
GLUCOSE SERPL-MCNC: 90 MG/DL — SIGNIFICANT CHANGE UP (ref 70–99)
HCO3 BLDA-SCNC: 32 MMOL/L — HIGH (ref 21–28)
HCT VFR BLD CALC: 34.6 % — SIGNIFICANT CHANGE UP (ref 34.5–45)
HGB BLD-MCNC: 10.6 G/DL — LOW (ref 11.5–15.5)
IMM GRANULOCYTES NFR BLD AUTO: 0.4 % — SIGNIFICANT CHANGE UP (ref 0–1.5)
LYMPHOCYTES # BLD AUTO: 1.18 K/UL — SIGNIFICANT CHANGE UP (ref 1–3.3)
LYMPHOCYTES # BLD AUTO: 15.2 % — SIGNIFICANT CHANGE UP (ref 13–44)
LYMPHOCYTES # FLD: 4 % — SIGNIFICANT CHANGE UP
MAGNESIUM SERPL-MCNC: 2 MG/DL — SIGNIFICANT CHANGE UP (ref 1.6–2.6)
MCHC RBC-ENTMCNC: 30.4 PG — SIGNIFICANT CHANGE UP (ref 27–34)
MCHC RBC-ENTMCNC: 30.6 GM/DL — LOW (ref 32–36)
MCV RBC AUTO: 99.1 FL — SIGNIFICANT CHANGE UP (ref 80–100)
MONOCYTES # BLD AUTO: 0.74 K/UL — SIGNIFICANT CHANGE UP (ref 0–0.9)
MONOCYTES NFR BLD AUTO: 9.5 % — SIGNIFICANT CHANGE UP (ref 2–14)
MONOS+MACROS # FLD: 2 % — SIGNIFICANT CHANGE UP
NEUTROPHILS # BLD AUTO: 5.58 K/UL — SIGNIFICANT CHANGE UP (ref 1.8–7.4)
NEUTROPHILS NFR BLD AUTO: 72 % — SIGNIFICANT CHANGE UP (ref 43–77)
NRBC # BLD: 0 /100 WBCS — SIGNIFICANT CHANGE UP (ref 0–0)
PCO2 BLDA: 49 MMHG — HIGH (ref 32–35)
PH BLDA: 7.43 — SIGNIFICANT CHANGE UP (ref 7.35–7.45)
PHOSPHATE SERPL-MCNC: 2 MG/DL — LOW (ref 2.5–4.5)
PLATELET # BLD AUTO: 348 K/UL — SIGNIFICANT CHANGE UP (ref 150–400)
PO2 BLDA: 89 MMHG — SIGNIFICANT CHANGE UP (ref 83–108)
POTASSIUM SERPL-MCNC: 3.9 MMOL/L — SIGNIFICANT CHANGE UP (ref 3.5–5.3)
POTASSIUM SERPL-SCNC: 3.9 MMOL/L — SIGNIFICANT CHANGE UP (ref 3.5–5.3)
PROT SERPL-MCNC: 6.5 G/DL — SIGNIFICANT CHANGE UP (ref 6–8.3)
RBC # BLD: 3.49 M/UL — LOW (ref 3.8–5.2)
RBC # FLD: 13.2 % — SIGNIFICANT CHANGE UP (ref 10.3–14.5)
RCV VOL RI: 7 /UL — HIGH (ref 0–0)
SAO2 % BLDA: 99 % — HIGH (ref 94–98)
SARS-COV-2 RNA SPEC QL NAA+PROBE: SIGNIFICANT CHANGE UP
SODIUM SERPL-SCNC: 142 MMOL/L — SIGNIFICANT CHANGE UP (ref 135–145)
SPECIMEN SOURCE FLD: SIGNIFICANT CHANGE UP
TOTAL NUCLEATED CELL COUNT, BODY FLUID: 206 /UL — SIGNIFICANT CHANGE UP
TRIGL SERPL-MCNC: 129 MG/DL — SIGNIFICANT CHANGE UP
TUBE TYPE: SIGNIFICANT CHANGE UP
VIT B12 SERPL-MCNC: 1422 PG/ML — HIGH (ref 232–1245)
WBC # BLD: 7.76 K/UL — SIGNIFICANT CHANGE UP (ref 3.8–10.5)
WBC # FLD AUTO: 7.76 K/UL — SIGNIFICANT CHANGE UP (ref 3.8–10.5)

## 2022-03-15 PROCEDURE — 71045 X-RAY EXAM CHEST 1 VIEW: CPT | Mod: 26

## 2022-03-15 PROCEDURE — 99233 SBSQ HOSP IP/OBS HIGH 50: CPT | Mod: GC

## 2022-03-15 PROCEDURE — 74019 RADEX ABDOMEN 2 VIEWS: CPT | Mod: 26

## 2022-03-15 RX ORDER — POTASSIUM PHOSPHATE, MONOBASIC POTASSIUM PHOSPHATE, DIBASIC 236; 224 MG/ML; MG/ML
30 INJECTION, SOLUTION INTRAVENOUS ONCE
Refills: 0 | Status: COMPLETED | OUTPATIENT
Start: 2022-03-15 | End: 2022-03-15

## 2022-03-15 RX ORDER — ACETAMINOPHEN 500 MG
650 TABLET ORAL EVERY 6 HOURS
Refills: 0 | Status: DISCONTINUED | OUTPATIENT
Start: 2022-03-15 | End: 2022-04-06

## 2022-03-15 RX ORDER — SODIUM CHLORIDE 9 MG/ML
1000 INJECTION, SOLUTION INTRAVENOUS
Refills: 0 | Status: DISCONTINUED | OUTPATIENT
Start: 2022-03-15 | End: 2022-03-15

## 2022-03-15 RX ORDER — DEXTROSE 10 % IN WATER 10 %
1000 INTRAVENOUS SOLUTION INTRAVENOUS
Refills: 0 | Status: DISCONTINUED | OUTPATIENT
Start: 2022-03-15 | End: 2022-03-16

## 2022-03-15 RX ORDER — CHLORHEXIDINE GLUCONATE 213 G/1000ML
1 SOLUTION TOPICAL
Refills: 0 | Status: DISCONTINUED | OUTPATIENT
Start: 2022-03-15 | End: 2022-04-06

## 2022-03-15 RX ORDER — POTASSIUM CHLORIDE 20 MEQ
10 PACKET (EA) ORAL
Refills: 0 | Status: DISCONTINUED | OUTPATIENT
Start: 2022-03-15 | End: 2022-03-15

## 2022-03-15 RX ORDER — POTASSIUM CHLORIDE 20 MEQ
40 PACKET (EA) ORAL ONCE
Refills: 0 | Status: COMPLETED | OUTPATIENT
Start: 2022-03-15 | End: 2022-03-15

## 2022-03-15 RX ORDER — IOHEXOL 300 MG/ML
30 INJECTION, SOLUTION INTRAVENOUS ONCE
Refills: 0 | Status: COMPLETED | OUTPATIENT
Start: 2022-03-15 | End: 2022-03-15

## 2022-03-15 RX ORDER — METOCLOPRAMIDE HCL 10 MG
10 TABLET ORAL EVERY 8 HOURS
Refills: 0 | Status: DISCONTINUED | OUTPATIENT
Start: 2022-03-15 | End: 2022-03-18

## 2022-03-15 RX ADMIN — SODIUM CHLORIDE 30 MILLILITER(S): 9 INJECTION, SOLUTION INTRAVENOUS at 12:35

## 2022-03-15 RX ADMIN — CHLORHEXIDINE GLUCONATE 15 MILLILITER(S): 213 SOLUTION TOPICAL at 18:08

## 2022-03-15 RX ADMIN — CHLORHEXIDINE GLUCONATE 1 APPLICATION(S): 213 SOLUTION TOPICAL at 12:10

## 2022-03-15 RX ADMIN — Medication 40 MILLIEQUIVALENT(S): at 01:40

## 2022-03-15 RX ADMIN — ENOXAPARIN SODIUM 50 MILLIGRAM(S): 100 INJECTION SUBCUTANEOUS at 05:32

## 2022-03-15 RX ADMIN — CHLORHEXIDINE GLUCONATE 15 MILLILITER(S): 213 SOLUTION TOPICAL at 05:32

## 2022-03-15 RX ADMIN — ARIPIPRAZOLE 2 MILLIGRAM(S): 15 TABLET ORAL at 21:08

## 2022-03-15 RX ADMIN — SODIUM CHLORIDE 4 MILLILITER(S): 9 INJECTION INTRAMUSCULAR; INTRAVENOUS; SUBCUTANEOUS at 05:35

## 2022-03-15 RX ADMIN — Medication 10 MILLIGRAM(S): at 21:08

## 2022-03-15 RX ADMIN — ENOXAPARIN SODIUM 50 MILLIGRAM(S): 100 INJECTION SUBCUTANEOUS at 18:08

## 2022-03-15 RX ADMIN — SODIUM CHLORIDE 4 MILLILITER(S): 9 INJECTION INTRAMUSCULAR; INTRAVENOUS; SUBCUTANEOUS at 17:49

## 2022-03-15 RX ADMIN — POTASSIUM PHOSPHATE, MONOBASIC POTASSIUM PHOSPHATE, DIBASIC 83.33 MILLIMOLE(S): 236; 224 INJECTION, SOLUTION INTRAVENOUS at 09:00

## 2022-03-15 RX ADMIN — IOHEXOL 30 MILLILITER(S): 300 INJECTION, SOLUTION INTRAVENOUS at 18:48

## 2022-03-15 RX ADMIN — PROPOFOL 3.41 MICROGRAM(S)/KG/MIN: 10 INJECTION, EMULSION INTRAVENOUS at 15:33

## 2022-03-15 RX ADMIN — Medication 30 MILLILITER(S): at 14:10

## 2022-03-15 RX ADMIN — PANTOPRAZOLE SODIUM 40 MILLIGRAM(S): 20 TABLET, DELAYED RELEASE ORAL at 12:10

## 2022-03-15 NOTE — PROGRESS NOTE ADULT - ATTENDING COMMENTS
AHRF, cerebral palsy, chronic aspiration  physical as above  patient appears more competent now, to discuss trach  continue MV with CPAP trials but no extubation  contrast seen going into SB on AXR, restart feeds with reglan  decision making of high complexity

## 2022-03-15 NOTE — PROGRESS NOTE ADULT - SUBJECTIVE AND OBJECTIVE BOX
*INCOMPLETE* *INCOMPLETE*    OVERNIGHT EVENTS: NAOE    SUBJECTIVE / INTERVAL HPI: Patient seen and examined at bedside. Pt nodding to ROS, denies any pain including CP. Per RN, pt is being suctioned every 1h.     Pt's asynchronous with AC/VC and kept propofol drip at the same rate.     MEDICATIONS  (STANDING):  ARIPiprazole 2 milliGRAM(s) Oral every 24 hours  chlorhexidine 0.12% Liquid 15 milliLiter(s) Oral Mucosa every 12 hours  chlorhexidine 2% Cloths 1 Application(s) Topical <User Schedule>  dextrose 40% Gel 15 Gram(s) Oral once  dextrose 5%. 1000 milliLiter(s) (50 mL/Hr) IV Continuous <Continuous>  dextrose 5%. 1000 milliLiter(s) (100 mL/Hr) IV Continuous <Continuous>  dextrose 5%. 1000 milliLiter(s) (30 mL/Hr) IV Continuous <Continuous>  dextrose 50% Injectable 25 Gram(s) IV Push once  dextrose 50% Injectable 12.5 Gram(s) IV Push once  dextrose 50% Injectable 25 Gram(s) IV Push once  enoxaparin Injectable 50 milliGRAM(s) SubCutaneous every 12 hours  glucagon  Injectable 1 milliGRAM(s) IntraMuscular once  insulin lispro (ADMELOG) corrective regimen sliding scale   SubCutaneous every 6 hours  pantoprazole  Injectable 40 milliGRAM(s) IV Push daily  propofol Infusion 10 MICROgram(s)/kG/Min (3.41 mL/Hr) IV Continuous <Continuous>  sodium chloride 7% Inhalation 4 milliLiter(s) Inhalation every 12 hours    MEDICATIONS  (PRN):  acetaminophen    Suspension .. 650 milliGRAM(s) Enteral Tube every 6 hours PRN Temp greater or equal to 38C (100.4F), Mild Pain (1 - 3)  albuterol/ipratropium for Nebulization 3 milliLiter(s) Nebulizer every 6 hours PRN Shortness of Breath and/or Wheezing    Allergies    ace inhibitors (Anaphylaxis)  caffeine (Rash)  cefepime (Rash)  chocolate (Rash)  high acid (Rash)  Tomatoes (Hives)    Intolerances        VITAL SIGNS:  Vital Signs Last 24 Hrs  T(C): 37.1 (15 Mar 2022 10:08), Max: 37.3 (14 Mar 2022 17:50)  T(F): 98.8 (15 Mar 2022 10:08), Max: 99.2 (14 Mar 2022 17:50)  HR: 98 (15 Mar 2022 13:00) (70 - 98)  BP: 135/73 (15 Mar 2022 10:00) (84/45 - 142/66)  BP(mean): 95 (15 Mar 2022 09:00) (62 - 95)  RR: 24 (15 Mar 2022 12:03) (10 - 27)  SpO2: 93% (15 Mar 2022 13:00) (92% - 100%)    on VC/AC with FiO2 50%, , PEEP 5, RR 10       03-14-22 @ 07:01  -  03-15-22 @ 07:00  --------------------------------------------------------  IN: 287.2 mL / OUT: 604 mL / NET: -316.8 mL    03-15-22 @ 07:01  -  03-15-22 @ 13:19  --------------------------------------------------------  IN: 352 mL / OUT: 165 mL / NET: 187 mL    Drips:  - Propofol: 10 micro      PHYSICAL EXAM:  Constitutional: Calm, cooperative, intubated   HEENT: NC/AT; clear conjunctiva, anicteric sclera; with ET and OG tube with suctioning brownish non-bloody fluid  Neck: supple; no JVD or thyromegaly, no cervical, post-auricular or supraclavicular lymphadenopathy  Respiratory: decreased breath sounds on the left side mid to lower fields, no increased work of breathing or accessory muscle use  Cardiac: +S1/S2, no murmurs/rubs/gallops, RRR  Gastrointestinal: abdomen soft, NT/ND; +BSx4, PEG in place with dressing from 3/11 C/D/I  Genitourinary: no suprapubic tenderness, Lu draining concentrated yellow urine  Extremities: WWP, no clubbing, edema or cyanosis, with bilateral restraints  Vascular: 2+ radial, femoral, DP/PT pulses B/L  Neurologic: following simple commands, maintaining eye contact>10 secs, flexures of extremities.   Dermatologic: skin warm, dry and intact; no rashes, wounds, or scars  Lines: 18 gauge peripheral line in right UE and right LE since 3/13, 24 gauge a-line on left arm since 3/14, lu since X      LABS:                        10.6   7.76  )-----------( 348      ( 15 Mar 2022 06:31 )             34.6     03-15    142  |  102  |  20  ----------------------------<  90  3.9   |  28  |  0.63    Ca    8.2<L>      15 Mar 2022 06:31  Phos  2.0     03-15  Mg     2.0     03-15    TPro  6.5  /  Alb  3.2<L>  /  TBili  0.6  /  DBili  x   /  AST  34  /  ALT  29  /  AlkPhos  73  03-15    PT/INR - ( 13 Mar 2022 14:34 )   PT: 13.8 sec;   INR: 1.16          PTT - ( 13 Mar 2022 14:34 )  PTT:32.1 sec    CAPILLARY BLOOD GLUCOSE      POCT Blood Glucose.: 77 mg/dL (15 Mar 2022 12:05)  POCT Blood Glucose.: 69 mg/dL (15 Mar 2022 12:03)  POCT Blood Glucose.: 83 mg/dL (15 Mar 2022 05:37)  POCT Blood Glucose.: 84 mg/dL (14 Mar 2022 23:03)  POCT Blood Glucose.: 75 mg/dL (14 Mar 2022 17:37)        Culture - Bronchial (collected 03-14-22 @ 14:47)  Source: .Bronchial Bronchial wash  Gram Stain (03-14-22 @ 15:50):    No epithelial cells seen    Rare WBC's    No organisms seen  Preliminary Report (03-15-22 @ 11:43):    No growth to date.        RADIOLOGY & ADDITIONAL TESTS: Reviewed. *INCOMPLETE*    OVERNIGHT EVENTS: NAOE    SUBJECTIVE / INTERVAL HPI: Patient seen and examined at bedside. Pt nodding to ROS, denies any pain including CP. Per RN, pt is being suctioned every 1h.     Pt's asynchronous with AC/VC and kept propofol drip at the same rate.     MEDICATIONS  (STANDING):  ARIPiprazole 2 milliGRAM(s) Oral every 24 hours  chlorhexidine 0.12% Liquid 15 milliLiter(s) Oral Mucosa every 12 hours  chlorhexidine 2% Cloths 1 Application(s) Topical <User Schedule>  dextrose 40% Gel 15 Gram(s) Oral once  dextrose 5%. 1000 milliLiter(s) (50 mL/Hr) IV Continuous <Continuous>  dextrose 5%. 1000 milliLiter(s) (100 mL/Hr) IV Continuous <Continuous>  dextrose 5%. 1000 milliLiter(s) (30 mL/Hr) IV Continuous <Continuous>  dextrose 50% Injectable 25 Gram(s) IV Push once  dextrose 50% Injectable 12.5 Gram(s) IV Push once  dextrose 50% Injectable 25 Gram(s) IV Push once  enoxaparin Injectable 50 milliGRAM(s) SubCutaneous every 12 hours  glucagon  Injectable 1 milliGRAM(s) IntraMuscular once  insulin lispro (ADMELOG) corrective regimen sliding scale   SubCutaneous every 6 hours  pantoprazole  Injectable 40 milliGRAM(s) IV Push daily  propofol Infusion 10 MICROgram(s)/kG/Min (3.41 mL/Hr) IV Continuous <Continuous>  sodium chloride 7% Inhalation 4 milliLiter(s) Inhalation every 12 hours    MEDICATIONS  (PRN):  acetaminophen    Suspension .. 650 milliGRAM(s) Enteral Tube every 6 hours PRN Temp greater or equal to 38C (100.4F), Mild Pain (1 - 3)  albuterol/ipratropium for Nebulization 3 milliLiter(s) Nebulizer every 6 hours PRN Shortness of Breath and/or Wheezing    Allergies    ace inhibitors (Anaphylaxis)  caffeine (Rash)  cefepime (Rash)  chocolate (Rash)  high acid (Rash)  Tomatoes (Hives)    Intolerances        VITAL SIGNS:  Vital Signs Last 24 Hrs  T(C): 37.1 (15 Mar 2022 10:08), Max: 37.3 (14 Mar 2022 17:50)  T(F): 98.8 (15 Mar 2022 10:08), Max: 99.2 (14 Mar 2022 17:50)  HR: 98 (15 Mar 2022 13:00) (70 - 98)  BP: 135/73 (15 Mar 2022 10:00) (84/45 - 142/66)  BP(mean): 95 (15 Mar 2022 09:00) (62 - 95)  RR: 24 (15 Mar 2022 12:03) (10 - 27)  SpO2: 93% (15 Mar 2022 13:00) (92% - 100%)    on VC/AC with FiO2 50%, , PEEP 5, RR 10       03-14-22 @ 07:01  -  03-15-22 @ 07:00  --------------------------------------------------------  IN: 287.2 mL / OUT: 604 mL / NET: -316.8 mL    03-15-22 @ 07:01  -  03-15-22 @ 13:19  --------------------------------------------------------  IN: 352 mL / OUT: 165 mL / NET: 187 mL    Drips:  - Propofol: 10 microgram/kg/min      PHYSICAL EXAM:  Constitutional: Calm, cooperative, intubated   HEENT: NC/AT; clear conjunctiva, anicteric sclera; with ET and OG tube with suctioning brownish non-bloody fluid  Neck: supple; no JVD or thyromegaly, no cervical, post-auricular or supraclavicular lymphadenopathy  Respiratory: decreased breath sounds on the left side mid to lower fields, no increased work of breathing or accessory muscle use  Cardiac: +S1/S2, no murmurs/rubs/gallops, RRR  Gastrointestinal: abdomen soft, NT/ND; +BSx4, PEG in place with dressing from 3/11 C/D/I  Genitourinary: no suprapubic tenderness, Lu draining concentrated yellow urine  Extremities: WWP, no clubbing, edema or cyanosis, with bilateral restraints  Vascular: 2+ radial, femoral, DP/PT pulses B/L  Neurologic: following simple commands, maintaining eye contact>10 secs, flexures of extremities.   Dermatologic: skin warm, dry and intact; no rashes, wounds, or scars  Lines: 18 gauge peripheral line in right UE and right LE since 3/13, 24 gauge a-line on left arm since 3/14, lu since X      LABS:                        10.6   7.76  )-----------( 348      ( 15 Mar 2022 06:31 )             34.6     03-15    142  |  102  |  20  ----------------------------<  90  3.9   |  28  |  0.63    Ca    8.2<L>      15 Mar 2022 06:31  Phos  2.0     03-15  Mg     2.0     03-15    TPro  6.5  /  Alb  3.2<L>  /  TBili  0.6  /  DBili  x   /  AST  34  /  ALT  29  /  AlkPhos  73  03-15    PT/INR - ( 13 Mar 2022 14:34 )   PT: 13.8 sec;   INR: 1.16          PTT - ( 13 Mar 2022 14:34 )  PTT:32.1 sec    CAPILLARY BLOOD GLUCOSE      POCT Blood Glucose.: 77 mg/dL (15 Mar 2022 12:05)  POCT Blood Glucose.: 69 mg/dL (15 Mar 2022 12:03)  POCT Blood Glucose.: 83 mg/dL (15 Mar 2022 05:37)  POCT Blood Glucose.: 84 mg/dL (14 Mar 2022 23:03)  POCT Blood Glucose.: 75 mg/dL (14 Mar 2022 17:37)        Culture - Bronchial (collected 03-14-22 @ 14:47)  Source: .Bronchial Bronchial wash  Gram Stain (03-14-22 @ 15:50):    No epithelial cells seen    Rare WBC's    No organisms seen  Preliminary Report (03-15-22 @ 11:43):    No growth to date.        RADIOLOGY & ADDITIONAL TESTS: Reviewed. OVERNIGHT EVENTS: NAOE    SUBJECTIVE / INTERVAL HPI: Patient seen and examined at bedside. Pt nodding to ROS, denies any pain including CP. Per RN, pt is being suctioned every 1h.     Pt's asynchronous with AC/VC and kept propofol drip at the same rate.     MEDICATIONS  (STANDING):  ARIPiprazole 2 milliGRAM(s) Oral every 24 hours  chlorhexidine 0.12% Liquid 15 milliLiter(s) Oral Mucosa every 12 hours  chlorhexidine 2% Cloths 1 Application(s) Topical <User Schedule>  dextrose 40% Gel 15 Gram(s) Oral once  dextrose 5%. 1000 milliLiter(s) (50 mL/Hr) IV Continuous <Continuous>  dextrose 5%. 1000 milliLiter(s) (100 mL/Hr) IV Continuous <Continuous>  dextrose 5%. 1000 milliLiter(s) (30 mL/Hr) IV Continuous <Continuous>  dextrose 50% Injectable 25 Gram(s) IV Push once  dextrose 50% Injectable 12.5 Gram(s) IV Push once  dextrose 50% Injectable 25 Gram(s) IV Push once  enoxaparin Injectable 50 milliGRAM(s) SubCutaneous every 12 hours  glucagon  Injectable 1 milliGRAM(s) IntraMuscular once  insulin lispro (ADMELOG) corrective regimen sliding scale   SubCutaneous every 6 hours  pantoprazole  Injectable 40 milliGRAM(s) IV Push daily  propofol Infusion 10 MICROgram(s)/kG/Min (3.41 mL/Hr) IV Continuous <Continuous>  sodium chloride 7% Inhalation 4 milliLiter(s) Inhalation every 12 hours    MEDICATIONS  (PRN):  acetaminophen    Suspension .. 650 milliGRAM(s) Enteral Tube every 6 hours PRN Temp greater or equal to 38C (100.4F), Mild Pain (1 - 3)  albuterol/ipratropium for Nebulization 3 milliLiter(s) Nebulizer every 6 hours PRN Shortness of Breath and/or Wheezing    Allergies    ace inhibitors (Anaphylaxis)  caffeine (Rash)  cefepime (Rash)  chocolate (Rash)  high acid (Rash)  Tomatoes (Hives)    Intolerances        VITAL SIGNS:  Vital Signs Last 24 Hrs  T(C): 37.1 (15 Mar 2022 10:08), Max: 37.3 (14 Mar 2022 17:50)  T(F): 98.8 (15 Mar 2022 10:08), Max: 99.2 (14 Mar 2022 17:50)  HR: 98 (15 Mar 2022 13:00) (70 - 98)  BP: 135/73 (15 Mar 2022 10:00) (84/45 - 142/66)  BP(mean): 95 (15 Mar 2022 09:00) (62 - 95)  RR: 24 (15 Mar 2022 12:03) (10 - 27)  SpO2: 93% (15 Mar 2022 13:00) (92% - 100%)    on VC/AC with FiO2 50%, , PEEP 5, RR 10       03-14-22 @ 07:01  -  03-15-22 @ 07:00  --------------------------------------------------------  IN: 287.2 mL / OUT: 604 mL / NET: -316.8 mL    03-15-22 @ 07:01  -  03-15-22 @ 13:19  --------------------------------------------------------  IN: 352 mL / OUT: 165 mL / NET: 187 mL    Drips:  - Propofol: 10 microgram/kg/min      PHYSICAL EXAM:  Constitutional: Calm, cooperative, intubated   HEENT: NC/AT; clear conjunctiva, anicteric sclera, left eye with limited movement, with ETT  Neck: supple; no JVD or thyromegaly, no cervical, post-auricular or supraclavicular lymphadenopathy  Respiratory: rhonchi in bilateral upper lung fields, decreased breath sounds on the left side mid to lower fields, no increased work of breathing or accessory muscle use  Cardiac: +S1/S2, no murmurs/rubs/gallops, RRR  Gastrointestinal: abdomen soft, NT/ND; +BSx4, PEG in place with dressing from 3/11 C/D/I  Genitourinary: no suprapubic tenderness, Lu draining concentrated yellow urine  Extremities: WWP, no clubbing, edema or cyanosis, with bilateral mittens  Vascular: 2+ radial, femoral, DP/PT pulses B/L  Neurologic: following simple commands such as wiggle toes, maintaining eye contact>10 secs, contracture of extremities.   Dermatologic: skin warm, dry and intact; no rashes, wounds, or scars, no decubitus ulcers  Lines: 18 gauge peripheral line in right UE since 3/13, 24 gauge a-line on left arm since 3/14, lu since 3/3/22    LABS:                        10.6   7.76  )-----------( 348      ( 15 Mar 2022 06:31 )             34.6     03-15    142  |  102  |  20  ----------------------------<  90  3.9   |  28  |  0.63    Ca    8.2<L>      15 Mar 2022 06:31  Phos  2.0     03-15  Mg     2.0     03-15    TPro  6.5  /  Alb  3.2<L>  /  TBili  0.6  /  DBili  x   /  AST  34  /  ALT  29  /  AlkPhos  73  03-15    PT/INR - ( 13 Mar 2022 14:34 )   PT: 13.8 sec;   INR: 1.16          PTT - ( 13 Mar 2022 14:34 )  PTT:32.1 sec    ABG AM 3/14: pH 7.5 with 49/72/38/97.6  ABG AM 3/15: pH 7.43 with 49/89/32/99    CAPILLARY BLOOD GLUCOSE      POCT Blood Glucose.: 77 mg/dL (15 Mar 2022 12:05)  POCT Blood Glucose.: 69 mg/dL (15 Mar 2022 12:03)  POCT Blood Glucose.: 83 mg/dL (15 Mar 2022 05:37)  POCT Blood Glucose.: 84 mg/dL (14 Mar 2022 23:03)  POCT Blood Glucose.: 75 mg/dL (14 Mar 2022 17:37)        Culture - Bronchial (collected 03-14-22 @ 14:47)  Source: .Bronchial Bronchial wash  Gram Stain (03-14-22 @ 15:50):    No epithelial cells seen    Rare WBC's    No organisms seen  Preliminary Report (03-15-22 @ 11:43):    No growth to date.        RADIOLOGY & ADDITIONAL TESTS: Reviewed.    Sputum culture 3/13: no organisms  Blood culture 3/13: NGTD 1d  Bronchial wash 3/14: no organisms  CXR AM 3/14: compared to prior, change in rotation, increase opacification in upper and mid lung fields.   CXR AM 3/15: unchanged with focal atelectasis and infiltrates, bilaterally

## 2022-03-15 NOTE — CONSULT NOTE ADULT - ASSESSMENT
General surgery consulted for evaluation of G tube for possible exchange given that tube is not able to be vented and patient is high aspiration risk. Evaluated tube bedside, double lumen 20 Fr. Went to OR supply and found that we have no 20Fr G tubes and 22Fr G tube is out of stock. Given that tube is 20Fr, do not recommend downsize to 18Fr because would likely result in leakage.     Recommendations:   - would consult IR for G tube exchange  - no 20Fr or 22Fr G tubes available currently  - if concern for aspiration, NGT decompression without clamping of tube  - surgery team 4c signing off  - seen and examined in MICU, discussed with Dr. Olvera

## 2022-03-15 NOTE — PROGRESS NOTE ADULT - ASSESSMENT
73 yo F with PMHx of cerebral palsy with functional quadriplegia, Hx of PE/DVTs, GERD, chronic dysphagia with PEG, thoracic spine fusion initially admitted for acute hypoxic respiratory failure found to have pseudomonal PNA, now intubated for third time i/s/o aspiration PNA of PEG feeds and copious secretions, now off Abx, pending GOC    NEURO  #H/O cerebral palsy.   Patient with cerebral palsy. Has functional quadriplegia, health aide at assisted living facility reports at baseline she is able to follow commands and communicate "when she wants to".  - pending discussion with pt's CAB state advocate for GOC and medical decisions  - FOR CONSENT, 2-physician consent is required  - see GOC as below    #Goals of care, counseling/discussion.   Patient with cerebral palsy. Previously a [place] resident, now resides in group home, with [place] [facility] per palliative care. Patient with repeat admission for aspiration pneumonia with history of dysphagia and copious secretions from oropharynx on exam.  - palliative care consulted - follow up recs  - Ethics counsulted - follow up recs  - pending [facility] discussion with CAB    #Anxiety  previously, attempt for extubation was delayed 2/2 agitation/anxiety  - currently not anxious or agitated  - c/w home med Abilify 2mg qd    HEMODYNAMICS  Shock:   VS:   Drips:    PERFUSION  Mental status:   UOP:   Extremities:     CV  No active issues    PULM  #Acute respiratory failure with hypoxia i/s/o of aspiration pneumonia of PEG feeds and copious oral secretions  Patient with h/o aspiration events requiring intubation.   Patient found with respiratory distress and SaO2 of 84%, intubated in field for airway protection, s/p extubation on 3/8 to HFNC, re-intubation on 3/13/22  - currently on VC/AC FiO2 50%, PEEP 5, -280, RR 10, flow 4L/min  - Frequent suctioning at a rate of X  - Chest PT q6h  - Aspiration precautions  - ABG qd, improvement in resp alkalosis on 3/14  - treatment plan of aspiration pneumonia as below    #On medication for venous thromboembolism (VTE).   History of PE 5 years ago. On Eliquis at home 2.5 mg BID at home. Bedside ultrasound performed in MICU with no evidence of clot.    - c/w lovenox 50 mg BID    RENAL  No active issues    GI  #History of dysphagia  - PEG tube in place and functional  - in case of PEG clogging  Per GI Chart Note       ''1) flush with warm water (can clamp tube for 20min allowing the water to soak)        2) flush with soda.        3) If that does not work, can flush with Pancrealipase (creon) tab + bicarb tab - crush with approx 4mL warm water to dissolve the mixture. Flush the mixture into the tube and clamp for 5-15 minutes. Milk the tube to help dissolve. Afterwards flush with warm water.''    #GERD (gastroesophageal reflux disease)  Patient with history of GERD on famotidine 20 mg qHS at home. On pantoprazole 40 mg QD while in MICU  - c/w PPI 40 mg IV qd      Quintero in place draining concentrated urine    ENDO  No Active issues    ID  #Aspiration pneumonia  Patient presenting with AHRF requiring intubation.   CXR with bilateral lower lobe opacities on 3/3. Sputum culture:  pseudomonas aeruginosa.   AHRF 2/2 aspiration PNA on 3/13/22 with blood culture with no growth to date.  CXR with worsening opacification on left upper and mid lung fields on 3/14  - CXR qd  - s/p 7 day course of Cipro 400mg IV Q8 (3/5-3/12) - holding Abx for the moment  - underwent bronchoscopy with following findings '' Airway evaluation revealed Sharp Lise. DONAVAN and LLL evaluation revealed Patent airways with no signs of mucous plugging. RUL, RML, RLL revealed Patient airways with compression of superior segment of RLL, did open but minimally with saline injection. ETT was then confirmed to be in good position.''  - discontinued Abx to prevent resistance     HEME  #Anemia, macrocytic  Hgb downtrending 12.8 > 10.9  MCV   No active bleeding noted  most likely 2/2 nutritional deficiency  - Follow-up Folate, B12  - Transfuse if Hgb<7 (would require 2-physician consent)    PROPHYLAXIS  F: None  E: Replete PRN  N: None  DVT: Lovenox 50 mg BID  GI: protonix 40 mg IV qd  C: Full Code, pending discussion with pt's CAB state advocate for GOC and medical decisions  FOR CONSENT 2-physician consent is required  D: MICU   73 yo F with PMHx of cerebral palsy with functional quadriplegia, Hx of PE/DVTs, GERD, chronic dysphagia with PEG, thoracic spine fusion initially admitted for acute hypoxic respiratory failure found to have pseudomonal PNA, now intubated for third time i/s/o aspiration PNA of PEG feeds and copious secretions, now off Abx, pending GOC discussion regarding trach    NEURO  #H/O cerebral palsy.   Patient with cerebral palsy. Has functional quadriplegia, health aide at assisted living facility reports at baseline she is able to follow commands and communicate "when she wants to".  - pending discussion with pt's CAB state advocate for GOC and medical decisions  - FOR CONSENT, 2-physician consent is required  - see GOC as below    #Goals of care, counseling/discussion.   Patient with cerebral palsy. Previously a [place] resident, now resides in group home, with [place] [facility] per palliative care. Patient with repeat admission for aspiration pneumonia with history of dysphagia and copious secretions from oropharynx on exam.  - palliative care consulted - follow up recs  - Ethics counsulted - follow up recs  - pending GOC discussion with CAB    #Anxiety  previously, attempt for extubation was delayed 2/2 agitation/anxiety  - currently not anxious or agitated  - c/w home med Abilify 2mg qd    HEMODYNAMICS  Shock: no signs of shock  VS: HR 70-90s, MAP 70-90s  Drips: no vasopressors    PERFUSION  Mental status: alert, calm, cooperative  UOP: 20-30cc/h  Extremities: WWP, 2+ pulses in UE/LE, <2 cap refill    CV  No active issues    PULM  #Acute respiratory failure with hypoxia i/s/o of aspiration pneumonia of PEG feeds and copious oral secretions  Patient with h/o aspiration events requiring intubation.   Patient found with respiratory distress and SaO2 of 84%, intubated in field for airway protection, s/p extubation on 3/8 to HFNC, re-intubation on 3/13/22  - currently on VC/AC FiO2 50%, PEEP 5, -280, RR 10, flow 4L/min  - Frequent suctioning at a rate of X  - Chest PT q6h  - Aspiration precautions  - ABG qd, improvement in resp alkalosis on 3/15    #On medication for venous thromboembolism (VTE).   History of PE 5 years ago. On Eliquis at home 2.5 mg BID at home. Bedside ultrasound performed in MICU with no evidence of clot.    - c/w lovenox 50 mg BID    RENAL  No active issues    GI  #History of dysphagia  - PEG tube in place and functional  - in case of PEG clogging  Per GI Chart Note       ''1) flush with warm water (can clamp tube for 20min allowing the water to soak)        2) flush with soda.        3) If that does not work, can flush with Pancrealipase (creon) tab + bicarb tab - crush with approx 4mL warm water to dissolve the mixture. Flush the mixture into the tube and clamp for 5-15 minutes. Milk the tube to help dissolve. Afterwards flush with warm water.''  - however PEG can't be used during decompression, follow-up with GI recs to exchange PEG  - per GI, no indication for tube studies    #GERD (gastroesophageal reflux disease)  Patient with history of GERD on famotidine 20 mg qHS at home. On pantoprazole 40 mg QD while in MICU  - c/w PPI 40 mg IV qd      Quintero in place draining concentrated urine    ENDO  No Active issues    ID  No active issues     HEME  #Anemia, macrocytic  Hgb downtrending 12.8 > 10.9  MCV   No active bleeding noted  most likely 2/2 nutritional deficiency  - Follow-up Folate, B12  - Transfuse if Hgb<7 (would require 2-physician consent)    PROPHYLAXIS  F: D5 30cc/h for 24h  E: Replete PRN  N: None   DVT: Lovenox 50 mg BID  GI: protonix 40 mg IV qd  C: Full Code, pending discussion with pt's CAB state advocate for GOC and medical decisions  FOR CONSENT 2-physician consent is required  D: MICU   75 yo F with PMHx of cerebral palsy with functional quadriplegia, Hx of PE/DVTs, GERD, chronic dysphagia with PEG, thoracic spine fusion initially admitted for acute hypoxic respiratory failure found to have pseudomonal PNA, now intubated for third time i/s/o aspiration PNA of PEG feeds and copious secretions, now off Abx, pending GOC discussion regarding trach    NEURO  #H/O cerebral palsy.   Patient with cerebral palsy. Has functional quadriplegia, health aide at assisted living facility reports at baseline she is able to follow commands and communicate "when she wants to".  - pending discussion with pt's CAB state advocate for GOC and medical decisions  - FOR CONSENT, 2-physician consent is required  - see GOC as below    #Goals of care, counseling/discussion.   Patient with cerebral palsy. Previously a [place] resident, now resides in group home, with [place] [facility] per palliative care. Patient with repeat admission for aspiration pneumonia with history of dysphagia and copious secretions from oropharynx on exam.  - palliative care consulted - follow up recs  - Ethics counsulted - follow up recs  - pending GOC discussion with CAB    #Anxiety  previously, attempt for extubation was delayed 2/2 agitation/anxiety  - currently not anxious or agitated  - c/w home med Abilify 2mg qd    HEMODYNAMICS  Shock: no signs of shock  VS: HR 70-90s, MAP 70-90s  Drips: no vasopressors    PERFUSION  Mental status: alert, calm, cooperative  UOP: 20-30cc/h  Extremities: WWP, 2+ pulses in UE/LE, <2 cap refill    CV  No active issues    PULM  #Acute respiratory failure with hypoxia i/s/o of aspiration pneumonia of PEG feeds and copious oral secretions  Patient with h/o aspiration events requiring intubation.   Patient found with respiratory distress and SaO2 of 84%, intubated in field for airway protection, s/p extubation on 3/8 to HFNC, re-intubation on 3/13/22  - currently on VC/AC FiO2 50%, PEEP 5, -280, RR 10, flow 4L/min  - Frequent suctioning at a rate of X  - Chest PT q6h  - Aspiration precautions  - ABG qd, improvement in resp alkalosis on 3/15    #On medication for venous thromboembolism (VTE).   History of PE 5 years ago. On Eliquis at home 2.5 mg BID at home. Bedside ultrasound performed in MICU with no evidence of clot.    - c/w lovenox 50 mg BID    RENAL  No active issues    GI  #History of dysphagia  - PEG tube in place and functional  - in case of PEG clogging  Per GI Chart Note       ''1) flush with warm water (can clamp tube for 20min allowing the water to soak)        2) flush with soda.        3) If that does not work, can flush with Pancrealipase (creon) tab + bicarb tab - crush with approx 4mL warm water to dissolve the mixture. Flush the mixture into the tube and clamp for 5-15 minutes. Milk the tube to help dissolve. Afterwards flush with warm water.''  - however PEG can't be used during decompression, follow-up with GI recs to exchange PEG  - Follow up tube study with contrast to assess direction of PEG feeds       #GERD (gastroesophageal reflux disease)  Patient with history of GERD on famotidine 20 mg qHS at home. On pantoprazole 40 mg QD while in MICU  - c/w PPI 40 mg IV qd      Quintero in place draining concentrated urine    ENDO  No Active issues    ID  No active issues     HEME  #Anemia, macrocytic  Hgb downtrending 12.8 > 10.9  MCV   No active bleeding noted  most likely 2/2 nutritional deficiency  - Follow-up Folate, B12  - Transfuse if Hgb<7 (would require 2-physician consent)    PROPHYLAXIS  F: D5 30cc/h for 24h  E: Replete PRN  N: None   DVT: Lovenox 50 mg BID  GI: protonix 40 mg IV qd, reglan  C: Full Code, pending discussion with pt's CAB state advocate for GOC and medical decisions  FOR CONSENT 2-physician consent is required  D: MICU   75 yo F with PMHx of cerebral palsy with functional quadriplegia, Hx of PE/DVTs, GERD, chronic dysphagia with PEG, thoracic spine fusion initially admitted for acute hypoxic respiratory failure found to have pseudomonal PNA, now intubated for third time i/s/o aspiration PNA of PEG feeds and copious secretions, now off Abx, pending GOC discussion regarding trach    NEURO  #H/O cerebral palsy.   Patient with cerebral palsy. Has functional quadriplegia, health aide at assisted living facility reports at baseline she is able to follow commands and communicate "when she wants to".  - pending discussion with pt's CAB state advocate for GOC and medical decisions  - FOR CONSENT, 2-physician consent is required  - see GOC as below    #Goals of care, counseling/discussion.   Patient with cerebral palsy. Previously a [place] resident, now resides in group home, with [place] [facility] per palliative care. Patient with repeat admission for aspiration pneumonia with history of dysphagia and copious secretions from oropharynx on exam.  - palliative care consulted - follow up recs  - Ethics counsulted - follow up recs  - pending GOC discussion with CAB    #Anxiety  previously, attempt for extubation was delayed 2/2 agitation/anxiety  - currently not anxious or agitated  - c/w home med Abilify 2mg qd    HEMODYNAMICS  Shock: no signs of shock  VS: HR 70-90s, MAP 70-90s  Drips: no vasopressors    PERFUSION  Mental status: alert, calm, cooperative  UOP: 20-30cc/h  Extremities: WWP, 2+ pulses in UE/LE, <2 cap refill    CV  No active issues    PULM  #Acute respiratory failure with hypoxia i/s/o of aspiration pneumonia of PEG feeds and copious oral secretions  Patient with h/o aspiration events requiring intubation.   Patient found with respiratory distress and SaO2 of 84%, intubated in field for airway protection, s/p extubation on 3/8 to HFNC, re-intubation on 3/13/22  - currently on VC/AC FiO2 50%, PEEP 5, -280, RR 10, flow 4L/min  - Frequent suctioning at a rate of X  - Chest PT q6h  - Aspiration precautions  - ABG qd, improvement in resp alkalosis on 3/15    #On medication for venous thromboembolism (VTE).   History of PE 5 years ago. On Eliquis at home 2.5 mg BID at home. Bedside ultrasound performed in MICU with no evidence of clot.    - c/w lovenox 50 mg BID    RENAL  No active issues    GI  #History of dysphagia  - PEG tube in place and functional  - in case of PEG clogging  Per GI Chart Note       ''1) flush with warm water (can clamp tube for 20min allowing the water to soak)        2) flush with soda.        3) If that does not work, can flush with Pancrealipase (creon) tab + bicarb tab - crush with approx 4mL warm water to dissolve the mixture. Flush the mixture into the tube and clamp for 5-15 minutes. Milk the tube to help dissolve. Afterwards flush with warm water.''  - however PEG can't be used during decompression, follow-up with GI recs to exchange PEG  - Follow up tube study with contrast to assess direction of PEG feeds > no concern, can re-start PEG feeds       #GERD (gastroesophageal reflux disease)  Patient with history of GERD on famotidine 20 mg qHS at home. On pantoprazole 40 mg QD while in MICU  - c/w PPI 40 mg IV qd      Quintero in place draining concentrated urine    ENDO  No Active issues    ID  No active issues     HEME  #Anemia, macrocytic  Hgb downtrending 12.8 > 10.9  MCV   No active bleeding noted  most likely 2/2 nutritional deficiency  - Follow-up Folate, B12  - Transfuse if Hgb<7 (would require 2-physician consent)    PROPHYLAXIS  F: D5 30cc/h for 24h  E: Replete PRN  N: None   DVT: Lovenox 50 mg BID  GI: protonix 40 mg IV qd, reglan  C: Full Code, pending discussion with pt's CAB state advocate for GOC and medical decisions  FOR CONSENT 2-physician consent is required  D: MICU

## 2022-03-15 NOTE — CHART NOTE - NSCHARTNOTEFT_GEN_A_CORE
Admitting Diagnosis:   Patient is a 76y old  Female who presents with a chief complaint of Acute hypoxic respiratory failure 2/2 pseudomonas pneumonia (10 Mar 2022 12:13)      PAST MEDICAL & SURGICAL HISTORY:  CP (cerebral palsy)    Mental retardation    Dysphagia    GERD (gastroesophageal reflux disease)    PE (pulmonary thromboembolism)    DVT (deep venous thrombosis)    Spastic quadriplegia    HTN (hypertension)    PEG tube malfunction    Current Nutrition Order: NPO    PO Intake: Good (%) [   ]  Fair (50-75%) [   ] Poor (<25%) [   ] [x] NPO    GI Issues: No n/v/d/c documented. Last documented bowel movement 3/10 (confirmed w/ RN).     Pain: Unable to assess at this time.    Skin Integrity: B/L arm edema 1+. B/L leg edema 2+. No pressure injuries ordered. Roberto Carlos score: 12.     Labs:   03-15    142  |  102  |  20  ----------------------------<  90  3.9   |  28  |  0.63    Ca    8.2<L>      15 Mar 2022 06:31  Phos  2.0     03-15  Mg     2.0     03-15    TPro  6.5  /  Alb  3.2<L>  /  TBili  0.6  /  DBili  x   /  AST  34  /  ALT  29  /  AlkPhos  73  03-15    CAPILLARY BLOOD GLUCOSE      POCT Blood Glucose.: 83 mg/dL (15 Mar 2022 05:37)  POCT Blood Glucose.: 84 mg/dL (14 Mar 2022 23:03)  POCT Blood Glucose.: 75 mg/dL (14 Mar 2022 17:37)  POCT Blood Glucose.: 120 mg/dL (14 Mar 2022 11:19)      Medications:  MEDICATIONS  (STANDING):  albuterol/ipratropium for Nebulization. 3 milliLiter(s) Nebulizer once  ARIPiprazole 2 milliGRAM(s) Oral every 24 hours  chlorhexidine 0.12% Liquid 15 milliLiter(s) Oral Mucosa every 12 hours  dextrose 40% Gel 15 Gram(s) Oral once  dextrose 5%. 1000 milliLiter(s) (50 mL/Hr) IV Continuous <Continuous>  dextrose 5%. 1000 milliLiter(s) (100 mL/Hr) IV Continuous <Continuous>  dextrose 50% Injectable 25 Gram(s) IV Push once  dextrose 50% Injectable 12.5 Gram(s) IV Push once  dextrose 50% Injectable 25 Gram(s) IV Push once  enoxaparin Injectable 50 milliGRAM(s) SubCutaneous every 12 hours  glucagon  Injectable 1 milliGRAM(s) IntraMuscular once  insulin lispro (ADMELOG) corrective regimen sliding scale   SubCutaneous every 6 hours  norepinephrine Infusion 0.05 MICROgram(s)/kG/Min (5.33 mL/Hr) IV Continuous <Continuous>  pantoprazole  Injectable 40 milliGRAM(s) IV Push daily  propofol Infusion 10 MICROgram(s)/kG/Min (3.41 mL/Hr) IV Continuous <Continuous>  sodium chloride 7% Inhalation 4 milliLiter(s) Inhalation every 12 hours    MEDICATIONS  (PRN):  acetaminophen     Tablet .. 650 milliGRAM(s) Oral every 6 hours PRN Temp greater or equal to 38C (100.4F), Mild Pain (1 - 3)  albuterol/ipratropium for Nebulization 3 milliLiter(s) Nebulizer every 6 hours PRN Shortness of Breath and/or Wheezing    Anthropometrics:  Ht: 152.4cm (60")  Wt: 56.8kg (3/03)-Admit     IBW: 100# (45.5kg)    % IBW: 125%    Weight Change: No new weight since admission. Obtain minimum biweekly weights to monitor trends especially being on TFs    Estimated energy needs: 45.3kg  Fluids per MD. IBW used to estimate needs as pt weighs >100% of IBW and per critical care nutrition guidelines. Needs estimated for age and adjusted for vent  Calories: 1150-1450kcals (25-32kcals/kg)  Protein: 65-75g (1.4-1.6g/kg)  Fluid: Per MD     Subjective: 75 yo F with a PMHx of cerebral palsy with functional quadriplegia, DVT/PE on Eliquis, GERD, chronic dysphagia with PEG, thoracic spine fusion with a prior admission in 2020 for aspiration PNA requiring intubation. Patient presented from her assisted living facility in AHRF likely 2/2 aspiration pneumonia which has grown pseudomonas sensitive to ciprofloxacin. Patient was intubated in the field prior to arrival for airway protection. While in the MICU, one unsuccessful attempt was made at extubation requiring reintubation attributed to pt's anxiety. Subsequently, pt was successfully extubated in MICU with a seroquel regimen. Pt was weaned to HFNC then 4L NC satting 94-95%. On 3/10, pt was stepped down to the floors with increasing frequency of suctioning. On 3/13, pt's oxygen requirement increased and 2L NC titrated up to 6L NC, however pt was found to be hypoxic to 70s. Pt was suctioned and removed significant amount of PEG tube feed products and mucus. However, no change in pulse ox was noted and rapid response was called. Pt was intubated, sedated on propofol and started on levophed for hypotension 70s/40s. BP improved to 100s/60s and patient transferred to MICU. En route, patient had episode of emesis with continued copious secretions, requiring frequent suctioning. In ICU, pt underwent bronchoscopy which showed no signs of purulent secretions or endobronchial lesions.    Pt seen at bedside for follow up assessment- intubated on VC/AC mode, sedated (on propofol) and off pressor support- MAP 95. TMAX: 99.2. Labs reviewed 3/15; pt hypophosphatemic. Pt awake at bedside; RD introduced self and role. Per RN at bedside, EN has been held as plan for GI to replace PEG tube. Made aware RD remains available. RD to follow up per protocol. See nutrition recommendations below.     Previous Nutrition Diagnosis: Increased nutrient needs R/T acute illness, hypermetabolic AEB 25-32kcals/kg energy and 1.4-1.6g/kg protein needs of ABW    Active [ x ]  Resolved [   ]    If resolved, new PES:     Goal: Pt to meet >75% of EER via G-tube     Recommendations:  1. As medically feasible, restart EN via PEG  -Jevity 1.2 @ 50ml/hr + 1 LPS   (Provides 1200ml TV, 1540kcals, 82g protein, 968ml free water)  2. Water flush per MD, minimum 30-60ml q4hrs for tube patency   3. Aggressive bowel regimen to prevent  constipation/ monitor BMs  4. Monitor labs  5. Obtain minimum biweekly weights     Education: Role of RD     Risk Level: High [ x ] Moderate [   ] Low [   ] Admitting Diagnosis:   Patient is a 76y old  Female who presents with a chief complaint of Acute hypoxic respiratory failure 2/2 pseudomonas pneumonia (10 Mar 2022 12:13)      PAST MEDICAL & SURGICAL HISTORY:  CP (cerebral palsy)    Mental retardation    Dysphagia    GERD (gastroesophageal reflux disease)    PE (pulmonary thromboembolism)    DVT (deep venous thrombosis)    Spastic quadriplegia    HTN (hypertension)    PEG tube malfunction    Current Nutrition Order: NPO    PO Intake: Good (%) [   ]  Fair (50-75%) [   ] Poor (<25%) [   ] [x] NPO    GI Issues: No n/v/d/c documented. Last documented bowel movement 3/10 (confirmed w/ RN).     Pain: Unable to assess at this time.    Skin Integrity: B/L arm edema 1+. B/L leg edema 2+. No pressure injuries ordered. Roberto Carlos score: 12.     Labs:   03-15    142  |  102  |  20  ----------------------------<  90  3.9   |  28  |  0.63    Ca    8.2<L>      15 Mar 2022 06:31  Phos  2.0     03-15  Mg     2.0     03-15    TPro  6.5  /  Alb  3.2<L>  /  TBili  0.6  /  DBili  x   /  AST  34  /  ALT  29  /  AlkPhos  73  03-15    CAPILLARY BLOOD GLUCOSE      POCT Blood Glucose.: 83 mg/dL (15 Mar 2022 05:37)  POCT Blood Glucose.: 84 mg/dL (14 Mar 2022 23:03)  POCT Blood Glucose.: 75 mg/dL (14 Mar 2022 17:37)  POCT Blood Glucose.: 120 mg/dL (14 Mar 2022 11:19)      Medications:  MEDICATIONS  (STANDING):  albuterol/ipratropium for Nebulization. 3 milliLiter(s) Nebulizer once  ARIPiprazole 2 milliGRAM(s) Oral every 24 hours  chlorhexidine 0.12% Liquid 15 milliLiter(s) Oral Mucosa every 12 hours  dextrose 40% Gel 15 Gram(s) Oral once  dextrose 5%. 1000 milliLiter(s) (50 mL/Hr) IV Continuous <Continuous>  dextrose 5%. 1000 milliLiter(s) (100 mL/Hr) IV Continuous <Continuous>  dextrose 50% Injectable 25 Gram(s) IV Push once  dextrose 50% Injectable 12.5 Gram(s) IV Push once  dextrose 50% Injectable 25 Gram(s) IV Push once  enoxaparin Injectable 50 milliGRAM(s) SubCutaneous every 12 hours  glucagon  Injectable 1 milliGRAM(s) IntraMuscular once  insulin lispro (ADMELOG) corrective regimen sliding scale   SubCutaneous every 6 hours  norepinephrine Infusion 0.05 MICROgram(s)/kG/Min (5.33 mL/Hr) IV Continuous <Continuous>  pantoprazole  Injectable 40 milliGRAM(s) IV Push daily  propofol Infusion 10 MICROgram(s)/kG/Min (3.41 mL/Hr) IV Continuous <Continuous>  sodium chloride 7% Inhalation 4 milliLiter(s) Inhalation every 12 hours    MEDICATIONS  (PRN):  acetaminophen     Tablet .. 650 milliGRAM(s) Oral every 6 hours PRN Temp greater or equal to 38C (100.4F), Mild Pain (1 - 3)  albuterol/ipratropium for Nebulization 3 milliLiter(s) Nebulizer every 6 hours PRN Shortness of Breath and/or Wheezing    Anthropometrics:  Ht: 152.4cm (60")  Wt: 56.8kg (3/03)-Admit     IBW: 100# (45.5kg)    % IBW: 125%    Weight Change: No new weight since admission. Obtain minimum biweekly weights to monitor trends especially being on TFs    Estimated energy needs: 45.3kg  Fluids per MD. IBW used to estimate needs as pt weighs >100% of IBW and per critical care nutrition guidelines. Needs estimated for age and adjusted for vent  Calories: 1150-1450kcals (25-32kcals/kg)  Protein: 65-75g (1.4-1.6g/kg)  Fluid: Per MD     Subjective: 75 yo F with a PMHx of cerebral palsy with functional quadriplegia, DVT/PE on Eliquis, GERD, chronic dysphagia with PEG, thoracic spine fusion with a prior admission in 2020 for aspiration PNA requiring intubation. Patient presented from her assisted living facility in AHRF likely 2/2 aspiration pneumonia which has grown pseudomonas sensitive to ciprofloxacin. Patient was intubated in the field prior to arrival for airway protection. While in the MICU, one unsuccessful attempt was made at extubation requiring reintubation attributed to pt's anxiety. Subsequently, pt was successfully extubated in MICU with a seroquel regimen. Pt was weaned to HFNC then 4L NC satting 94-95%. On 3/10, pt was stepped down to the floors with increasing frequency of suctioning. On 3/13, pt's oxygen requirement increased and 2L NC titrated up to 6L NC, however pt was found to be hypoxic to 70s. Pt was suctioned and removed significant amount of PEG tube feed products and mucus. However, no change in pulse ox was noted and rapid response was called. Pt was intubated, sedated on propofol and started on levophed for hypotension 70s/40s. BP improved to 100s/60s and patient transferred to MICU. En route, patient had episode of emesis with continued copious secretions, requiring frequent suctioning. In ICU, pt underwent bronchoscopy which showed no signs of purulent secretions or endobronchial lesions.    Pt seen at bedside for follow up assessment- intubated on VC/AC mode, sedated (on propofol) and off pressor support- MAP 95. TMAX: 99.2. Labs reviewed 3/15; pt hypophosphatemic. Observed propofol infusing @ 3.4 mL/hr (90 kcal/day). Pt awake at bedside; RD introduced self and role. Per RN at bedside, EN has been held as plan for GI to replace PEG tube. Made aware RD remains available. RD to follow up per protocol. See nutrition recommendations below.     Previous Nutrition Diagnosis: Increased nutrient needs R/T acute illness, hypermetabolic AEB 25-32kcals/kg energy and 1.4-1.6g/kg protein needs of ABW    Active [ x ]  Resolved [   ]    If resolved, new PES:     Goal: Pt to meet >75% of EER via G-tube     Recommendations:  1. As medically feasible, restart EN via PEG  -Jevity 1.2 @ 50ml/hr + 1 LPS   (Provides 1200ml TV, 1540kcals, 82g protein, 968ml free water)  2. Water flush per MD, minimum 30-60ml q4hrs for tube patency   3. Aggressive bowel regimen to prevent  constipation/ monitor BMs  4. Monitor labs  5. Obtain minimum biweekly weights     Education: Role of RD     Risk Level: High [ x ] Moderate [   ] Low [   ]

## 2022-03-15 NOTE — CONSULT NOTE ADULT - SUBJECTIVE AND OBJECTIVE BOX
74 F with PMHx of CP with functional quadriplegia, DVT/PE on eliquis, GERD, chronic dysphagia with PEG, thoracic spine fusion admitted for acute hypoxic respiratory failure found to have pseudomonal PNA c/b periods of agitation/anxiety, s/p extubation to HFNC since 3/8 stepped down to RMF, course c/b increasing suctioning and oxygen requirement, course c/b AHRF 2/2 aspiration PNA requiring intubation and stepped up to MICU for further management.    PMH/PSHx: functional quadriplegia, DVT/PE on eliquis, GERD, chronic dysphagia with PEG, thoracic spine fusion   Orders: SQL, tylenol, ISS, NPO, duonebs  SocHx: former [place] resident    VSS, afebrile, ventilator    Exam:   Gen: calm, intubated  Abdo: G tube in place, double lumen plus balloon port, 20Fr    Micro:   - bld clx NGTD @1 and 5d  - sputum Proteus    CXR with persistent bilateral infiltrates

## 2022-03-16 LAB
ALBUMIN SERPL ELPH-MCNC: 3.4 G/DL — SIGNIFICANT CHANGE UP (ref 3.3–5)
ALP SERPL-CCNC: 69 U/L — SIGNIFICANT CHANGE UP (ref 40–120)
ALT FLD-CCNC: 27 U/L — SIGNIFICANT CHANGE UP (ref 10–45)
ANION GAP SERPL CALC-SCNC: 8 MMOL/L — SIGNIFICANT CHANGE UP (ref 5–17)
AST SERPL-CCNC: 31 U/L — SIGNIFICANT CHANGE UP (ref 10–40)
BILIRUB SERPL-MCNC: 0.5 MG/DL — SIGNIFICANT CHANGE UP (ref 0.2–1.2)
BUN SERPL-MCNC: 17 MG/DL — SIGNIFICANT CHANGE UP (ref 7–23)
CALCIUM SERPL-MCNC: 7.9 MG/DL — LOW (ref 8.4–10.5)
CHLORIDE SERPL-SCNC: 103 MMOL/L — SIGNIFICANT CHANGE UP (ref 96–108)
CO2 SERPL-SCNC: 29 MMOL/L — SIGNIFICANT CHANGE UP (ref 22–31)
CREAT SERPL-MCNC: 0.56 MG/DL — SIGNIFICANT CHANGE UP (ref 0.5–1.3)
CULTURE RESULTS: NO GROWTH — SIGNIFICANT CHANGE UP
EGFR: 96 ML/MIN/1.73M2 — SIGNIFICANT CHANGE UP
GLUCOSE BLDC GLUCOMTR-MCNC: 122 MG/DL — HIGH (ref 70–99)
GLUCOSE BLDC GLUCOMTR-MCNC: 137 MG/DL — HIGH (ref 70–99)
GLUCOSE BLDC GLUCOMTR-MCNC: 165 MG/DL — HIGH (ref 70–99)
GLUCOSE SERPL-MCNC: 156 MG/DL — HIGH (ref 70–99)
HCT VFR BLD CALC: 33.2 % — LOW (ref 34.5–45)
HGB BLD-MCNC: 10.4 G/DL — LOW (ref 11.5–15.5)
MAGNESIUM SERPL-MCNC: 1.9 MG/DL — SIGNIFICANT CHANGE UP (ref 1.6–2.6)
MCHC RBC-ENTMCNC: 30.9 PG — SIGNIFICANT CHANGE UP (ref 27–34)
MCHC RBC-ENTMCNC: 31.3 GM/DL — LOW (ref 32–36)
MCV RBC AUTO: 98.5 FL — SIGNIFICANT CHANGE UP (ref 80–100)
NRBC # BLD: 0 /100 WBCS — SIGNIFICANT CHANGE UP (ref 0–0)
PHOSPHATE SERPL-MCNC: 1.8 MG/DL — LOW (ref 2.5–4.5)
PLATELET # BLD AUTO: 355 K/UL — SIGNIFICANT CHANGE UP (ref 150–400)
POTASSIUM SERPL-MCNC: 3.7 MMOL/L — SIGNIFICANT CHANGE UP (ref 3.5–5.3)
POTASSIUM SERPL-SCNC: 3.7 MMOL/L — SIGNIFICANT CHANGE UP (ref 3.5–5.3)
PROT SERPL-MCNC: 6.3 G/DL — SIGNIFICANT CHANGE UP (ref 6–8.3)
RBC # BLD: 3.37 M/UL — LOW (ref 3.8–5.2)
RBC # FLD: 13.2 % — SIGNIFICANT CHANGE UP (ref 10.3–14.5)
SODIUM SERPL-SCNC: 140 MMOL/L — SIGNIFICANT CHANGE UP (ref 135–145)
SPECIMEN SOURCE: SIGNIFICANT CHANGE UP
WBC # BLD: 7.11 K/UL — SIGNIFICANT CHANGE UP (ref 3.8–10.5)
WBC # FLD AUTO: 7.11 K/UL — SIGNIFICANT CHANGE UP (ref 3.8–10.5)

## 2022-03-16 PROCEDURE — 71045 X-RAY EXAM CHEST 1 VIEW: CPT | Mod: 26

## 2022-03-16 PROCEDURE — 99233 SBSQ HOSP IP/OBS HIGH 50: CPT

## 2022-03-16 PROCEDURE — 99233 SBSQ HOSP IP/OBS HIGH 50: CPT | Mod: GC

## 2022-03-16 RX ORDER — MAGNESIUM SULFATE 500 MG/ML
1 VIAL (ML) INJECTION ONCE
Refills: 0 | Status: COMPLETED | OUTPATIENT
Start: 2022-03-16 | End: 2022-03-16

## 2022-03-16 RX ORDER — POTASSIUM PHOSPHATE, MONOBASIC POTASSIUM PHOSPHATE, DIBASIC 236; 224 MG/ML; MG/ML
30 INJECTION, SOLUTION INTRAVENOUS ONCE
Refills: 0 | Status: COMPLETED | OUTPATIENT
Start: 2022-03-16 | End: 2022-03-16

## 2022-03-16 RX ORDER — PANTOPRAZOLE SODIUM 20 MG/1
40 TABLET, DELAYED RELEASE ORAL
Refills: 0 | Status: DISCONTINUED | OUTPATIENT
Start: 2022-03-16 | End: 2022-03-16

## 2022-03-16 RX ORDER — PANTOPRAZOLE SODIUM 20 MG/1
40 TABLET, DELAYED RELEASE ORAL
Refills: 0 | Status: DISCONTINUED | OUTPATIENT
Start: 2022-03-16 | End: 2022-04-06

## 2022-03-16 RX ADMIN — POTASSIUM PHOSPHATE, MONOBASIC POTASSIUM PHOSPHATE, DIBASIC 83.33 MILLIMOLE(S): 236; 224 INJECTION, SOLUTION INTRAVENOUS at 06:02

## 2022-03-16 RX ADMIN — PROPOFOL 3.41 MICROGRAM(S)/KG/MIN: 10 INJECTION, EMULSION INTRAVENOUS at 17:18

## 2022-03-16 RX ADMIN — Medication 10 MILLIGRAM(S): at 06:04

## 2022-03-16 RX ADMIN — ENOXAPARIN SODIUM 50 MILLIGRAM(S): 100 INJECTION SUBCUTANEOUS at 17:16

## 2022-03-16 RX ADMIN — Medication 100 GRAM(S): at 06:04

## 2022-03-16 RX ADMIN — CHLORHEXIDINE GLUCONATE 15 MILLILITER(S): 213 SOLUTION TOPICAL at 07:04

## 2022-03-16 RX ADMIN — CHLORHEXIDINE GLUCONATE 1 APPLICATION(S): 213 SOLUTION TOPICAL at 07:03

## 2022-03-16 RX ADMIN — SODIUM CHLORIDE 4 MILLILITER(S): 9 INJECTION INTRAMUSCULAR; INTRAVENOUS; SUBCUTANEOUS at 17:18

## 2022-03-16 RX ADMIN — ENOXAPARIN SODIUM 50 MILLIGRAM(S): 100 INJECTION SUBCUTANEOUS at 07:04

## 2022-03-16 RX ADMIN — CHLORHEXIDINE GLUCONATE 15 MILLILITER(S): 213 SOLUTION TOPICAL at 17:16

## 2022-03-16 RX ADMIN — PANTOPRAZOLE SODIUM 40 MILLIGRAM(S): 20 TABLET, DELAYED RELEASE ORAL at 13:02

## 2022-03-16 RX ADMIN — Medication 10 MILLIGRAM(S): at 13:02

## 2022-03-16 NOTE — PROGRESS NOTE ADULT - SUBJECTIVE AND OBJECTIVE BOX
Ethics and Dr. Amos (Palliative) met with Consumer Advisory Board (CAB) representative and patient's group home to discuss patient's current condition and tracheostomy. Patient had tracheostomy in past and was able to be weaned. Likely plan is to proceed with tracheostomy, but final approval and consent will come from Consumer Advisory Board meeting. If tracheostomy is pursued, patient cannot return back to group home and will be discharged to nursing home.    Ethics will continue to follow.    Vera Montes MS, [facility]  Medical Ethicist   [phone]

## 2022-03-16 NOTE — PROGRESS NOTE ADULT - ASSESSMENT
75 yo F with PMHx of cerebral palsy with functional quadriplegia, Hx of PE/DVTs, GERD, chronic dysphagia with PEG, thoracic spine fusion initially admitted for acute hypoxic respiratory failure found to have pseudomonal PNA, now intubated for third time i/s/o aspiration PNA of PEG feeds and copious secretions, now off Abx, pending GOC discussion regarding trach    NEURO  #H/O cerebral palsy.   Patient with cerebral palsy. Has functional quadriplegia, health aide at assisted living facility reports at baseline she is able to follow commands and communicate "when she wants to".  - pending discussion with pt's CAB state advocate for GOC and medical decisions  - FOR CONSENT, 2-physician consent is required  - see GOC as below    #Goals of care, counseling/discussion.   Patient with cerebral palsy. Previously a [place] resident, now resides in group home, with [place] CAB per palliative care. Patient with repeat admission for aspiration pneumonia with history of dysphagia and copious secretions from oropharynx on exam.  - palliative care consulted - follow up recs  - Ethics counsulted - follow up recs  - pending GOC discussion with CAB    #Anxiety  previously, attempt for extubation was delayed 2/2 agitation/anxiety  - currently not anxious or agitated  - c/w home med Abilify 2mg qd    HEMODYNAMICS  Shock: no signs of shock  VS: HR 70-90s, MAP 70-90s  Drips: no vasopressors    PERFUSION  Mental status: alert, calm, cooperative  UOP: 20-30cc/h  Extremities: WWP, 2+ pulses in UE/LE, <2 cap refill    CV  No active issues    PULM  #Acute respiratory failure with hypoxia i/s/o of aspiration pneumonia of PEG feeds and copious oral secretions  Patient with h/o aspiration events requiring intubation.   Patient found with respiratory distress and SaO2 of 84%, intubated in field for airway protection, s/p extubation on 3/8 to HFNC, re-intubation on 3/13/22  - currently on VC/AC FiO2 50%, PEEP 5, -280, RR 10, flow 4L/min  - Frequent suctioning at a rate of X  - Chest PT q6h  - Aspiration precautions  - ABG qd, improvement in resp alkalosis on 3/15    #On medication for venous thromboembolism (VTE).   History of PE 5 years ago. On Eliquis at home 2.5 mg BID at home. Bedside ultrasound performed in MICU with no evidence of clot.    - c/w lovenox 50 mg BID    RENAL  No active issues    GI  #History of dysphagia  - PEG tube in place and functional  - in case of PEG clogging  Per GI Chart Note       ''1) flush with warm water (can clamp tube for 20min allowing the water to soak)        2) flush with soda.        3) If that does not work, can flush with Pancrealipase (creon) tab + bicarb tab - crush with approx 4mL warm water to dissolve the mixture. Flush the mixture into the tube and clamp for 5-15 minutes. Milk the tube to help dissolve. Afterwards flush with warm water.''  - however PEG can't be used during decompression, follow-up with GI recs to exchange PEG  - Follow up tube study with contrast to assess direction of PEG feeds > no concern, can re-start PEG feeds       #GERD (gastroesophageal reflux disease)  Patient with history of GERD on famotidine 20 mg qHS at home. On pantoprazole 40 mg QD while in MICU  - c/w PPI 40 mg IV qd      Quintero in place draining concentrated urine    ENDO  No Active issues    ID  No active issues     HEME  #Anemia, macrocytic  Hgb downtrending 12.8 > 10.9  MCV   No active bleeding noted  most likely 2/2 nutritional deficiency  - Follow-up Folate, B12  - Transfuse if Hgb<7 (would require 2-physician consent)    PROPHYLAXIS  F: D5 30cc/h for 24h  E: Replete PRN  N: None   DVT: Lovenox 50 mg BID  GI: protonix 40 mg IV qd, reglan  C: Full Code, pending discussion with pt's CAB state advocate for GOC and medical decisions  FOR CONSENT 2-physician consent is required  D: MICU   75 yo F with PMHx of cerebral palsy with functional quadriplegia, Hx of PE/DVTs, GERD, chronic dysphagia with PEG, thoracic spine fusion initially admitted for acute hypoxic respiratory failure found to have pseudomonal PNA, now intubated for third time i/s/o aspiration PNA of PEG feeds and copious secretions, now off Abx, pending trach placement pending consent will come from Consumer Advisory Board meeting    NEURO  #H/O cerebral palsy.   Patient with cerebral palsy. Has functional quadriplegia, health aide at assisted living facility reports at baseline she is able to follow commands and communicate "when she wants to".  - pending discussion with pt's CAB state advocate for GOC and medical decisions  - FOR CONSENT, 2-physician consent is required  - see GOC as below    #Goals of care, counseling/discussion.   Patient with cerebral palsy. Previously a [place] resident, now resides in group home, with [place] CAB per palliative care. Patient with repeat admission for aspiration pneumonia with history of dysphagia and copious secretions from oropharynx on exam.  - palliative care and ethics consulted - follow up recs  - consent will come from Consumer Advisory Board meeting for trach    #Anxiety  previously, attempt for extubation was delayed 2/2 agitation/anxiety  - currently not anxious or agitated  - c/w home med Abilify 2mg qd    HEMODYNAMICS  Shock: no signs of shock  VS: HR 70-90s, MAP 70-90s  Drips: no vasopressors    PERFUSION  Mental status: alert, calm, cooperative  UOP: 20-30cc/h  Extremities: WWP, 2+ pulses in UE/LE, <2 cap refill    CV  No active issues    PULM  #Acute respiratory failure with hypoxia i/s/o of aspiration pneumonia of PEG feeds and copious oral secretions  Patient with h/o aspiration events requiring intubation.   Patient found with respiratory distress and SaO2 of 84%, intubated in field for airway protection, s/p extubation on 3/8 to HFNC, re-intubation on 3/13/22  - currently on VC/AC FiO2 40%, PEEP 5, -280, RR 10, flow 4L/min  - Frequent suctioning  - Chest PT q6h  - Aspiration precautions  - ABG qd, improvement in resp alkalosis on 3/15    #On medication for venous thromboembolism (VTE).   History of PE 5 years ago. On Eliquis at home 2.5 mg BID at home. Bedside ultrasound performed in MICU with no evidence of clot.    - c/w lovenox 50 mg BID    RENAL  No active issues    GI  #History of dysphagia  - PEG tube in place and functional  - Tube study with contrast to assess direction of PEG feeds > no concern, can re-start PEG feeds      #GERD (gastroesophageal reflux disease)  Patient with history of GERD on famotidine 20 mg qHS at home. On pantoprazole 40 mg QD while in MICU  - c/w PPI 40 mg IV qd      Quintero in place draining concentrated urine    ENDO  No Active issues    ID  No active issues     HEME  #Anemia, macrocytic  Hgb downtrending 12.8 > 10.4  MCV   No active bleeding noted  most likely 2/2 nutritional deficiency  - Follow-up Folate, B12  - Transfuse if Hgb<7 (would require 2-physician consent)    PROPHYLAXIS  F: D5 30cc/h for 24h  E: Replete PRN  N: TF Jevity 1.2 at 40cc/h + 150cc free water flushes every 4h + 1 LPS  DVT: Lovenox 50 mg BID  GI: protonix 40 mg IV qd, reglan  C: Full Code, pending discussion with pt's CAB state advocate for GOC and medical decisions  FOR CONSENT 2-physician consent is required  D: Pending trach, if tracheostomy is pursued, patient cannot return back to group home and will be discharged to nursing home.

## 2022-03-16 NOTE — PROGRESS NOTE ADULT - ATTENDING COMMENTS
AHRF now chronic resp failure with cerebral palsy with h/o DVT  Physical as above  await trach  continue MV  continue feeds  continue lovenox  high complexity decision making

## 2022-03-16 NOTE — PROGRESS NOTE ADULT - SUBJECTIVE AND OBJECTIVE BOX
[facility] Geriatrics and Palliative Care  April Amos Palliative Care Attending  Contact Info: Call [phone] (HEAL Line) or message on Microsoft Teams    HPI:  74 year old female with a PMH of cerebral palsy with functional quadroplegia, DVT/PE on eliquis, GERD, Chronic dyshagia with PEG, Thoracic spine fusion and a history of prior admission in 2020 for aspiration PNA requiring intubation presents from her assisted living facility in acute hypoxic respiratory failure. Ahe was found by staff with difficulty breathing.  +cough.  ~1am.  EMS arrival noted pt w/ resp distress, O2 84% in RA, intubated in field for airway protection, noted "thick viscous" liquid in her oropharynx during intubation. Patient lost conciousness multiple times upon transfer to [facility] with subsequent transfer to [facility] to be cared for in the MICU.     ED Course:  Vitals: T 99.1, HR 81, BP 91/58, RR 18, 100% O2 sat Intubated VC/AC  Labs: cbc wnl, Cl 94, HCO3- 32, Lactate 5.4, VBG with pH 7.19, pCO2 87, venous O2 sat 95%  Imaging: CXR BL opacities worse on the left   Interventions: Metronidazole 500mg x1, Fent drip  (03 Mar 2022 05:40)    PERTINENT PM/SXH:   CP (cerebral palsy)    Mental retardation    Dysphagia    GERD (gastroesophageal reflux disease)    PE (pulmonary thromboembolism)    DVT (deep venous thrombosis)    Spastic quadriplegia    HTN (hypertension)      PEG tube malfunction      FAMILY HISTORY:    ITEMS NOT CHECKED ARE NOT PRESENT    SOCIAL HISTORY:   Significant other/partner:  []  Children:  []   Substance hx:  []   Tobacco hx:  []   Alcohol hx: []   Home Opioid hx:  [] I-Stop Reference No:  - no active Rx's / see chart note  Living Situation: []Home  []Long term care  []Rehab [x]Other - Group Home  Amish/Spiritual practice: ; Role of organized [employer] [ ] important [ ] some [x ] unable to assess  Coping: [ ] well [ ] with difficulty [ ] poor coping [x ] unable to assess  Support system: [ ] strong [x ] adequate [ ] inadequate    ADVANCE DIRECTIVES:    []MOLST  []Living Will  DECISION MAKER(s):  [] Health Care Proxy(s)  [] Surrogate(s)  [x] Guardian           Name(s)/Phone Number(s): OPWDD and MHLS    BASELINE (I)ADLs (prior to admission):  [place]: []Total  [] Moderate []Dependent    ALLERGIES:  ace inhibitors (Anaphylaxis)  caffeine (Rash)  cefepime (Rash)  chocolate (Rash)  high acid (Rash)  Tomatoes (Hives)    MEDICATIONS  (STANDING):  chlorhexidine 4% Liquid 1 Application(s) Topical <User Schedule>  ciprofloxacin   IVPB      ciprofloxacin   IVPB 400 milliGRAM(s) IV Intermittent every 8 hours  dexMEDEtomidine Infusion 0.2 MICROgram(s)/kG/Hr (2.84 mL/Hr) IV Continuous <Continuous>  dextrose 40% Gel 15 Gram(s) Oral once  dextrose 5%. 1000 milliLiter(s) (50 mL/Hr) IV Continuous <Continuous>  dextrose 5%. 1000 milliLiter(s) (100 mL/Hr) IV Continuous <Continuous>  dextrose 50% Injectable 25 Gram(s) IV Push once  dextrose 50% Injectable 12.5 Gram(s) IV Push once  dextrose 50% Injectable 25 Gram(s) IV Push once  enoxaparin Injectable 50 milliGRAM(s) SubCutaneous every 12 hours  glucagon  Injectable 1 milliGRAM(s) IntraMuscular once  insulin lispro (ADMELOG) corrective regimen sliding scale   SubCutaneous every 6 hours  pantoprazole  Injectable 40 milliGRAM(s) IV Push every 24 hours  QUEtiapine 50 milliGRAM(s) Oral every 12 hours    MEDICATIONS  (PRN):    PRESENT SYMPTOMS: [x]Unable to obtain due to poor mentation/encephalopathy  Source if other than patient:  []Family   []Team     Pain: [ ] yes [x ] no  QOL impact -   Location -                    Aggravating Factors -  Quality -  Radiation -  Timing -  Severity (0-10 scale) -   Minimal Acceptable Level (0-10 scale) -    PAIN AD Score:  [email or web address].[email or web address]/[email or web address] (press ctrl +  left click to view)    Dyspnea:                           [ ]Mild  [x ]Moderate [ ]Severe  Anxiety:                             [ x]Mild [ ]Moderate [ ]Severe  Fatigue:                             [ ]Mild [ ]Moderate [x ]Severe  Nausea:                             [ ]Mild [ ]Moderate [ ]Severe  Loss of Appetite:             [ ]Mild [ ]Moderate [ x]Severe  Constipation:                   [ ]Mild [ ]Moderate [ ]Severe    Other Symptoms:  [x ]All other review of systems negative     Palliative Performance Status Version 2:  30%    http://npcrc.org/files/news/palliative_performance_scale_[email or web address].pdf    PHYSICAL EXAM:  GENERAL:  [x ]Alert  [ x]Oriented x  2 [ ]Lethargic  [ ]Cachexia  [ ]Unarousable  [ ]Verbal  [ ]Non-Verbal  Behavioral:   [ ]Anxiety  [ ]Delirium [ ]Agitation [x ]Cooperative  HEENT:  [ ]Normal   [x ]Dry mouth   [ ]ET Tube/Trach  [ ]Oral lesions  PULMONARY:   [ ]Clear []Tachypnea  []Audible excessive secretions   [ x]Rhonchi        [ ]Right [ ]Left [x ]Bilateral  [ ]Crackles        [ ]Right [ ]Left [ ]Bilateral  [ ]Wheezing     [ ]Right [ ]Left [ ]Bilateral  CARDIOVASCULAR:    [x ]Regular [ ]Irregular [ ]Tachy  [ ]Eliot [ ]Murmur [ ]Other  GASTROINTESTINAL:  [ x]Soft  [ ]Distended   [x ]+BS  [x ]Non tender [ ]Tender  [ ]PEG [ ]OGT/ NGT  Last BM:     GENITOURINARY:  [ ]Normal [ ] Incontinent   [ ]Oliguria/Anuria   [ ]Quintero  MUSCULOSKELETAL:   [ ]Normal   [ ]Weakness  [ ]Bed/Wheelchair bound [ ]Edema  NEUROLOGIC:   [ ]No focal deficits  [ ]Cognitive impairment  [ ]Dysphagia [ ]Dysarthria [ ]Paresis [ ]Encephalopathic   SKIN:   [ ]Normal   [ ]Pressure ulcer(s)  [ ]Rash    CRITICAL CARE:  [ ]Shock Present  [ ]Septic [ ]Cardiogenic [ ]Neurologic [ ]Hypovolemic  [ ]Vasopressors [ ]Inotropes   [x ]Respiratory failure present [ ]Mechanical Ventilation [ ]Non-invasive ventilatory support [ x]High-Flow  [ ]Acute  [ ]Chronic [ ]Hypoxic  [ ]Hypercarbic  [ ]Other organ failure    Vital Signs Last 24 Hrs  T(C): 37 (08 Mar 2022 21:24), Max: 38.1 (08 Mar 2022 14:09)  T(F): 98.6 (08 Mar 2022 21:24), Max: 100.6 (08 Mar 2022 14:09)  HR: 74 (08 Mar 2022 20:00) (57 - 109)  BP: 102/53 (08 Mar 2022 20:00) (83/46 - 160/67)  BP(mean): 74 (08 Mar 2022 20:00) (60 - 98)  RR: 25 (08 Mar 2022 20:00) (19 - 35)  SpO2: 91% (08 Mar 2022 20:00) (89% - 99%) I&O's Summary    07 Mar 2022 07:01  -  08 Mar 2022 07:00  --------------------------------------------------------  IN: 2549.4 mL / OUT: 3330 mL / NET: -780.6 mL    08 Mar 2022 07:01  -  08 Mar 2022 22:09  --------------------------------------------------------  IN: 561.9 mL / OUT: 2240 mL / NET: -1678.1 mL        LABS:                        10.6   5.96  )-----------( 191      ( 08 Mar 2022 05:25 )             34.7   03-08    141  |  104  |  14  ----------------------------<  134<H>  4.2   |  30  |  0.52    Ca    8.5      08 Mar 2022 05:25  Phos  3.0     03-08  Mg     1.8     03-08    TPro  5.8<L>  /  Alb  3.0<L>  /  TBili  0.3  /  DBili  x   /  AST  43<H>  /  ALT  29  /  AlkPhos  53  03-07      RADIOLOGY & ADDITIONAL STUDIES:      PROTEIN CALORIE MALNUTRITION PRESENT: [ ]mild [ ]moderate [ ]severe [ ]underweight [ ]morbid obesity  [ ]PPSV2 < or = to 30% [ ]significant weight loss  [ ]poor nutritional intake [ ]catabolic state [ ]anasarca     Artificial Nutrition [ ]     REFERRALS:  [x]Social Work  [ ]Case management [ ]PT/OT [ ]Chaplaincy  [ ]Hospice  [ ]Patient/Family Support    DISCUSSION OF CASE: Family - to obtain additional history and to provide emotional support; ( ) -     Care Coordination/Goals of Care Document:                                          Progress Notes    PROGRESS NOTE  Date & Time of Note   2022-03-08 14:00    Notes    Notes: Social work note:  Patient currently remains on 7 east, per medical team  in morning interdisciplinary rounds palliative team will be consulted, and team  working with patient on breathing trials to verify if she is able to be weaned  from ventilator.       recieved call from [place] phone  [phone], updated provided.  Alternated emergency contacts for patient are  residence clinical manager Nina Lynch  [phone], residence nursing manager Meng [phone].       Electronically signed by:  Faiza Ortega  Electronically signed on:  2022-03-08  14:23           PALLIATIVE MEDICINE COORDINATION OF CARE DOCUMENTATION: [x] Inpatient Consult  Non-Face-to-Face prolonged service provided that relates to (face-to-face) care that has or will occur and ongoing patient management, including one or more of the following: - Reviewed documentation from other physicians and other health care professional services - Reviewed medical records and diagnostic / radiology study results - Coordination with patient's support system  ************************************************************************  MEDICATION REVIEW:  - See Medication List Above    ISTOP REFERENCE:   - no active Rx's / see ISTOP Chart Note  - PRN usage: NO PRN'S  ------------------------------------------------------------------------  COORDINATION OF CARE:  - Palliative Care consulted for: GOC / Symptom Management  - Patient (to be) assessed:  - Patient previously seen by Palliative Care service: NO    ADVANCE CARE PLANNING  - Code status: FULL  - MOLST reviewed in chart: NONE; None found on [facility]  - HCP/Surrogate:  - GOC documents: NONE found on [place]  - HCP/Living will/Other Advanced Directives in Alpha: NONE found on Alpha  ------------------------------------------------------------------------  CARE PROVIDER DOCUMENTATION:  - SW/CM notes: Remains medically active  -   -   -     PLAN OF CARE  - Known admissions in past year: 1  - Current admit date: 3/3/22  - LOS: 5  - LACE score: 3   - Current dispo plan: TO BE DETERMINED    03-03-22 (5d)  ------------------------------------------------------------------------  - Time Spent/Chart reviewed: 41 Minutes [including time used to gather, review and transfer data]  - Start: 1000  - End:  1041    Prolonged services rendered, as part of this patient's care provided by Palliative Medicine, include: i. chart review for provider and ancillary service documentation, ii. pertinent diagnostics including laboratory and imaging studies, iii. medication review including PRN use, iv. admission history including previous palliative care encounters and GOC notes, v. advance care planning documents including HCP and MOLST forms in Alpha. Part of Palliative Medicine extended evaluation and management also involves coordination of care with our IDT, the primary and consulting teams, and unit CM/SW and Hospice if eligible. Recommendations based on the information gathered and discussed are outlined in the A/P of Palliative notes.

## 2022-03-16 NOTE — PROGRESS NOTE ADULT - PROBLEM SELECTOR PLAN 4
Patient with cerebral palsy, previously a [facility] resident, now resides in [facility], with [facility]. Presenting to the hospital with respiratory failure, PNA, intubation, attempt at extubation with subsequent reintubation.   Although patient was recently extubated, required reintubation over the weekend, and concern over a need of a trache is high.  - Awaiting callback from [facility] to schedule a follow up family meeting.

## 2022-03-16 NOTE — PROGRESS NOTE ADULT - SUBJECTIVE AND OBJECTIVE BOX
*INCOMPLETE* *INCOMPLETE*    OVERNIGHT EVENTS:     SUBJECTIVE / INTERVAL HPI: Patient seen and examined at bedside.     MEDICATIONS  (STANDING):  ARIPiprazole 2 milliGRAM(s) Oral every 24 hours  chlorhexidine 0.12% Liquid 15 milliLiter(s) Oral Mucosa every 12 hours  chlorhexidine 2% Cloths 1 Application(s) Topical <User Schedule>  dextrose 40% Gel 15 Gram(s) Oral once  dextrose 5%. 1000 milliLiter(s) (50 mL/Hr) IV Continuous <Continuous>  dextrose 5%. 1000 milliLiter(s) (100 mL/Hr) IV Continuous <Continuous>  dextrose 5%. 1000 milliLiter(s) (30 mL/Hr) IV Continuous <Continuous>  dextrose 50% Injectable 25 Gram(s) IV Push once  dextrose 50% Injectable 12.5 Gram(s) IV Push once  dextrose 50% Injectable 25 Gram(s) IV Push once  enoxaparin Injectable 50 milliGRAM(s) SubCutaneous every 12 hours  glucagon  Injectable 1 milliGRAM(s) IntraMuscular once  insulin lispro (ADMELOG) corrective regimen sliding scale   SubCutaneous every 6 hours  pantoprazole  Injectable 40 milliGRAM(s) IV Push daily  propofol Infusion 10 MICROgram(s)/kG/Min (3.41 mL/Hr) IV Continuous <Continuous>  sodium chloride 7% Inhalation 4 milliLiter(s) Inhalation every 12 hours    MEDICATIONS  (PRN):  acetaminophen    Suspension .. 650 milliGRAM(s) Enteral Tube every 6 hours PRN Temp greater or equal to 38C (100.4F), Mild Pain (1 - 3)  albuterol/ipratropium for Nebulization 3 milliLiter(s) Nebulizer every 6 hours PRN Shortness of Breath and/or Wheezing    Allergies    ace inhibitors (Anaphylaxis)  caffeine (Rash)  cefepime (Rash)  chocolate (Rash)  high acid (Rash)  Tomatoes (Hives)    Intolerances        VITAL SIGNS:  Vital Signs Last 24 Hrs  T(C): 37.1 (15 Mar 2022 10:08), Max: 37.3 (14 Mar 2022 17:50)  T(F): 98.8 (15 Mar 2022 10:08), Max: 99.2 (14 Mar 2022 17:50)  HR: 98 (15 Mar 2022 13:00) (70 - 98)  BP: 135/73 (15 Mar 2022 10:00) (84/45 - 142/66)  BP(mean): 95 (15 Mar 2022 09:00) (62 - 95)  RR: 24 (15 Mar 2022 12:03) (10 - 27)  SpO2: 93% (15 Mar 2022 13:00) (92% - 100%)    on VC/AC with FiO2 50%, , PEEP 5, RR 10       03-14-22 @ 07:01  -  03-15-22 @ 07:00  --------------------------------------------------------  IN: 287.2 mL / OUT: 604 mL / NET: -316.8 mL    03-15-22 @ 07:01  -  03-15-22 @ 13:19  --------------------------------------------------------  IN: 352 mL / OUT: 165 mL / NET: 187 mL    Drips:  - Propofol: 10 microgram/kg/min      PHYSICAL EXAM:  Constitutional: Calm, cooperative, intubated   HEENT: NC/AT; clear conjunctiva, anicteric sclera, left eye with limited movement, with ETT  Neck: supple; no JVD or thyromegaly, no cervical, post-auricular or supraclavicular lymphadenopathy  Respiratory: rhonchi in bilateral upper lung fields, decreased breath sounds on the left side mid to lower fields, no increased work of breathing or accessory muscle use  Cardiac: +S1/S2, no murmurs/rubs/gallops, RRR  Gastrointestinal: abdomen soft, NT/ND; +BSx4, PEG in place with dressing from 3/11 C/D/I  Genitourinary: no suprapubic tenderness, Lu draining concentrated yellow urine  Extremities: WWP, no clubbing, edema or cyanosis, with bilateral mittens  Vascular: 2+ radial, femoral, DP/PT pulses B/L  Neurologic: following simple commands such as wiggle toes, maintaining eye contact>10 secs, contracture of extremities.   Dermatologic: skin warm, dry and intact; no rashes, wounds, or scars, no decubitus ulcers  Lines: 18 gauge peripheral line in right UE since 3/13, 24 gauge a-line on left arm since 3/14, lu since 3/3/22    LABS:                        10.6   7.76  )-----------( 348      ( 15 Mar 2022 06:31 )             34.6     03-15    142  |  102  |  20  ----------------------------<  90  3.9   |  28  |  0.63    Ca    8.2<L>      15 Mar 2022 06:31  Phos  2.0     03-15  Mg     2.0     03-15    TPro  6.5  /  Alb  3.2<L>  /  TBili  0.6  /  DBili  x   /  AST  34  /  ALT  29  /  AlkPhos  73  03-15    PT/INR - ( 13 Mar 2022 14:34 )   PT: 13.8 sec;   INR: 1.16          PTT - ( 13 Mar 2022 14:34 )  PTT:32.1 sec    ABG AM 3/14: pH 7.5 with 49/72/38/97.6  ABG AM 3/15: pH 7.43 with 49/89/32/99    CAPILLARY BLOOD GLUCOSE      POCT Blood Glucose.: 77 mg/dL (15 Mar 2022 12:05)  POCT Blood Glucose.: 69 mg/dL (15 Mar 2022 12:03)  POCT Blood Glucose.: 83 mg/dL (15 Mar 2022 05:37)  POCT Blood Glucose.: 84 mg/dL (14 Mar 2022 23:03)  POCT Blood Glucose.: 75 mg/dL (14 Mar 2022 17:37)        Culture - Bronchial (collected 03-14-22 @ 14:47)  Source: .Bronchial Bronchial wash  Gram Stain (03-14-22 @ 15:50):    No epithelial cells seen    Rare WBC's    No organisms seen  Preliminary Report (03-15-22 @ 11:43):    No growth to date.        RADIOLOGY & ADDITIONAL TESTS: Reviewed.    Sputum culture 3/13: no organisms  Blood culture 3/13: NGTD 1d  Bronchial wash 3/14: no organisms  CXR AM 3/14: compared to prior, change in rotation, increase opacification in upper and mid lung fields.   CXR AM 3/15: unchanged with focal atelectasis and infiltrates, bilaterally       OVERNIGHT EVENTS: FSG up to 110-160s. Per RN, pt needs suctioning every 1h. Feeds were restarted after abd xray w/ contrast showed contrast going to small bowel.     SUBJECTIVE / INTERVAL HPI: Patient seen and examined at bedside. Pt is calm, in no distress. Pt denies any complaints on ROS.     MEDICATIONS  (STANDING):  ARIPiprazole 2 milliGRAM(s) Oral every 24 hours  chlorhexidine 0.12% Liquid 15 milliLiter(s) Oral Mucosa every 12 hours  chlorhexidine 2% Cloths 1 Application(s) Topical <User Schedule>  dextrose 40% Gel 15 Gram(s) Oral once  dextrose 5%. 1000 milliLiter(s) (50 mL/Hr) IV Continuous <Continuous>  dextrose 5%. 1000 milliLiter(s) (100 mL/Hr) IV Continuous <Continuous>  dextrose 5%. 1000 milliLiter(s) (30 mL/Hr) IV Continuous <Continuous>  dextrose 50% Injectable 25 Gram(s) IV Push once  dextrose 50% Injectable 12.5 Gram(s) IV Push once  dextrose 50% Injectable 25 Gram(s) IV Push once  enoxaparin Injectable 50 milliGRAM(s) SubCutaneous every 12 hours  glucagon  Injectable 1 milliGRAM(s) IntraMuscular once  insulin lispro (ADMELOG) corrective regimen sliding scale   SubCutaneous every 6 hours  pantoprazole  Injectable 40 milliGRAM(s) IV Push daily  propofol Infusion 10 MICROgram(s)/kG/Min (3.41 mL/Hr) IV Continuous <Continuous>  sodium chloride 7% Inhalation 4 milliLiter(s) Inhalation every 12 hours    MEDICATIONS  (PRN):  acetaminophen    Suspension .. 650 milliGRAM(s) Enteral Tube every 6 hours PRN Temp greater or equal to 38C (100.4F), Mild Pain (1 - 3)  albuterol/ipratropium for Nebulization 3 milliLiter(s) Nebulizer every 6 hours PRN Shortness of Breath and/or Wheezing    Allergies    ace inhibitors (Anaphylaxis)  caffeine (Rash)  cefepime (Rash)  chocolate (Rash)  high acid (Rash)  Tomatoes (Hives)    Intolerances        VITAL SIGNS:  Vital Signs Last 24 Hrs  T(C): 37.1 (15 Mar 2022 10:08), Max: 37.3 (14 Mar 2022 17:50)  T(F): 98.8 (15 Mar 2022 10:08), Max: 99.2 (14 Mar 2022 17:50)  HR: 98 (15 Mar 2022 13:00) (70 - 98)  BP: 135/73 (15 Mar 2022 10:00) (84/45 - 142/66)  BP(mean): 95 (15 Mar 2022 09:00) (62 - 95)  RR: 24 (15 Mar 2022 12:03) (10 - 27)  SpO2: 93% (15 Mar 2022 13:00) (92% - 100%)    on VC/AC with FiO2 40%, , PEEP 5, RR 10       03-14-22 @ 07:01  -  03-15-22 @ 07:00  --------------------------------------------------------  IN: 287.2 mL / OUT: 604 mL / NET: -316.8 mL    03-15-22 @ 07:01  -  03-15-22 @ 13:19  --------------------------------------------------------  IN: 352 mL / OUT: 165 mL / NET: 187 mL    Drips:  - Propofol: 10 microgram/kg/min > 15 microgram/kg/min      PHYSICAL EXAM:  Constitutional: Calm, cooperative, intubated   HEENT: NC/AT; clear conjunctiva, anicteric sclera, left eye with limited movement, with ETT  Neck: supple; no JVD or thyromegaly, no cervical, post-auricular or supraclavicular lymphadenopathy  Respiratory: CTAB, decreased breath sounds on the left side mid to lower fields, no increased work of breathing or accessory muscle use  Cardiac: +S1/S2, no murmurs/rubs/gallops, RRR  Gastrointestinal: abdomen soft, NT/ND; +BSx4, PEG in place with dressing from 3/11 C/D/I  Genitourinary: no suprapubic tenderness, Lu draining concentrated yellow urine  Extremities: WWP, no clubbing, edema or cyanosis, with bilateral mittens  Vascular: 2+ radial, femoral, DP/PT pulses B/L  Neurologic: following simple commands such as wiggle toes, maintaining eye contact>10 secs, contracture of extremities.   Dermatologic: skin warm, dry and intact; no rashes, wounds, or scars, no decubitus ulcers  Lines: 18 gauge peripheral line in right UE since 3/13, 24 gauge a-line on left arm since 3/14, lu since 3/3/22    Labs                        10.4   7.11  )-----------( 355      ( 16 Mar 2022 03:35 )             33.2       03-16    140  |  103  |  17  ----------------------------<  156<H>  3.7   |  29  |  0.56    Ca    7.9<L>      16 Mar 2022 03:35  Phos  1.8     03-16  Mg     1.9     03-16    TPro  6.3  /  Alb  3.4  /  TBili  0.5  /  DBili  x   /  AST  31  /  ALT  27  /  AlkPhos  69  03-16          ABG - ( 15 Mar 2022 06:30 )  pH, Arterial: 7.43  pH, Blood: x     /  pCO2: 49    /  pO2: 89    / HCO3: 32    / Base Excess: 6.9   /  SaO2: 99.0        CAPILLARY BLOOD GLUCOSE      POCT Blood Glucose.: 122 mg/dL (16 Mar 2022 17:09)      Sputum culture 3/13: rare proteus mirabilis  Blood culture 3/13: NGTD 1d  Bronchial wash 3/14: no organisms  CXR AM 3/14: compared to prior, change in rotation, increase opacification in upper and mid lung fields.   CXR AM 3/15: unchanged with focal atelectasis and infiltrates, bilaterally  CXR AM 3/16: decrease infiltrates bilateral

## 2022-03-16 NOTE — PROGRESS NOTE ADULT - PROBLEM SELECTOR PLAN 2
- Patient with long standing CP.  - Used to be a resident of [facility].  - Now resides in a group home.   - Although some family is present, not involved in patients care.  - OPWDD and MHLS are mainly involved in shared decision making.

## 2022-03-17 LAB
-  AMPICILLIN/SULBACTAM: SIGNIFICANT CHANGE UP
-  AMPICILLIN: SIGNIFICANT CHANGE UP
-  CEFAZOLIN: SIGNIFICANT CHANGE UP
-  CEFEPIME: SIGNIFICANT CHANGE UP
-  CEFTRIAXONE: SIGNIFICANT CHANGE UP
-  CIPROFLOXACIN: SIGNIFICANT CHANGE UP
-  ERTAPENEM: SIGNIFICANT CHANGE UP
-  GENTAMICIN: SIGNIFICANT CHANGE UP
-  LEVOFLOXACIN: SIGNIFICANT CHANGE UP
-  PIPERACILLIN/TAZOBACTAM: SIGNIFICANT CHANGE UP
-  TOBRAMYCIN: SIGNIFICANT CHANGE UP
-  TRIMETHOPRIM/SULFAMETHOXAZOLE: SIGNIFICANT CHANGE UP
ANION GAP SERPL CALC-SCNC: 8 MMOL/L — SIGNIFICANT CHANGE UP (ref 5–17)
BUN SERPL-MCNC: 18 MG/DL — SIGNIFICANT CHANGE UP (ref 7–23)
CALCIUM SERPL-MCNC: 7.9 MG/DL — LOW (ref 8.4–10.5)
CHLORIDE SERPL-SCNC: 103 MMOL/L — SIGNIFICANT CHANGE UP (ref 96–108)
CO2 SERPL-SCNC: 29 MMOL/L — SIGNIFICANT CHANGE UP (ref 22–31)
CREAT SERPL-MCNC: 0.46 MG/DL — LOW (ref 0.5–1.3)
CULTURE RESULTS: SIGNIFICANT CHANGE UP
EGFR: 100 ML/MIN/1.73M2 — SIGNIFICANT CHANGE UP
GLUCOSE BLDC GLUCOMTR-MCNC: 110 MG/DL — HIGH (ref 70–99)
GLUCOSE BLDC GLUCOMTR-MCNC: 111 MG/DL — HIGH (ref 70–99)
GLUCOSE BLDC GLUCOMTR-MCNC: 112 MG/DL — HIGH (ref 70–99)
GLUCOSE BLDC GLUCOMTR-MCNC: 123 MG/DL — HIGH (ref 70–99)
GLUCOSE BLDC GLUCOMTR-MCNC: 137 MG/DL — HIGH (ref 70–99)
GLUCOSE SERPL-MCNC: 127 MG/DL — HIGH (ref 70–99)
HCT VFR BLD CALC: 33 % — LOW (ref 34.5–45)
HGB BLD-MCNC: 10.1 G/DL — LOW (ref 11.5–15.5)
MAGNESIUM SERPL-MCNC: 2 MG/DL — SIGNIFICANT CHANGE UP (ref 1.6–2.6)
MCHC RBC-ENTMCNC: 30.3 PG — SIGNIFICANT CHANGE UP (ref 27–34)
MCHC RBC-ENTMCNC: 30.6 GM/DL — LOW (ref 32–36)
MCV RBC AUTO: 99.1 FL — SIGNIFICANT CHANGE UP (ref 80–100)
METHOD TYPE: SIGNIFICANT CHANGE UP
NRBC # BLD: 0 /100 WBCS — SIGNIFICANT CHANGE UP (ref 0–0)
ORGANISM # SPEC MICROSCOPIC CNT: SIGNIFICANT CHANGE UP
ORGANISM # SPEC MICROSCOPIC CNT: SIGNIFICANT CHANGE UP
PHOSPHATE SERPL-MCNC: 1.9 MG/DL — LOW (ref 2.5–4.5)
PLATELET # BLD AUTO: 345 K/UL — SIGNIFICANT CHANGE UP (ref 150–400)
POTASSIUM SERPL-MCNC: 4.2 MMOL/L — SIGNIFICANT CHANGE UP (ref 3.5–5.3)
POTASSIUM SERPL-SCNC: 4.2 MMOL/L — SIGNIFICANT CHANGE UP (ref 3.5–5.3)
RBC # BLD: 3.33 M/UL — LOW (ref 3.8–5.2)
RBC # FLD: 13.3 % — SIGNIFICANT CHANGE UP (ref 10.3–14.5)
SODIUM SERPL-SCNC: 140 MMOL/L — SIGNIFICANT CHANGE UP (ref 135–145)
SPECIMEN SOURCE: SIGNIFICANT CHANGE UP
WBC # BLD: 5.81 K/UL — SIGNIFICANT CHANGE UP (ref 3.8–10.5)
WBC # FLD AUTO: 5.81 K/UL — SIGNIFICANT CHANGE UP (ref 3.8–10.5)

## 2022-03-17 PROCEDURE — 99233 SBSQ HOSP IP/OBS HIGH 50: CPT | Mod: GC

## 2022-03-17 RX ORDER — INSULIN HUMAN 100 [IU]/ML
INJECTION, SOLUTION SUBCUTANEOUS EVERY 6 HOURS
Refills: 0 | Status: DISCONTINUED | OUTPATIENT
Start: 2022-03-17 | End: 2022-04-06

## 2022-03-17 RX ORDER — POTASSIUM PHOSPHATE, MONOBASIC POTASSIUM PHOSPHATE, DIBASIC 236; 224 MG/ML; MG/ML
30 INJECTION, SOLUTION INTRAVENOUS ONCE
Refills: 0 | Status: DISCONTINUED | OUTPATIENT
Start: 2022-03-17 | End: 2022-03-17

## 2022-03-17 RX ADMIN — PROPOFOL 3.41 MICROGRAM(S)/KG/MIN: 10 INJECTION, EMULSION INTRAVENOUS at 18:18

## 2022-03-17 RX ADMIN — ARIPIPRAZOLE 2 MILLIGRAM(S): 15 TABLET ORAL at 00:09

## 2022-03-17 RX ADMIN — CHLORHEXIDINE GLUCONATE 1 APPLICATION(S): 213 SOLUTION TOPICAL at 06:06

## 2022-03-17 RX ADMIN — Medication 10 MILLIGRAM(S): at 14:30

## 2022-03-17 RX ADMIN — SODIUM CHLORIDE 4 MILLILITER(S): 9 INJECTION INTRAMUSCULAR; INTRAVENOUS; SUBCUTANEOUS at 06:16

## 2022-03-17 RX ADMIN — Medication 10 MILLIGRAM(S): at 00:09

## 2022-03-17 RX ADMIN — ENOXAPARIN SODIUM 50 MILLIGRAM(S): 100 INJECTION SUBCUTANEOUS at 18:18

## 2022-03-17 RX ADMIN — PANTOPRAZOLE SODIUM 40 MILLIGRAM(S): 20 TABLET, DELAYED RELEASE ORAL at 06:03

## 2022-03-17 RX ADMIN — Medication 10 MILLIGRAM(S): at 21:44

## 2022-03-17 RX ADMIN — ARIPIPRAZOLE 2 MILLIGRAM(S): 15 TABLET ORAL at 23:06

## 2022-03-17 RX ADMIN — Medication 10 MILLIGRAM(S): at 06:03

## 2022-03-17 RX ADMIN — CHLORHEXIDINE GLUCONATE 15 MILLILITER(S): 213 SOLUTION TOPICAL at 06:03

## 2022-03-17 RX ADMIN — SODIUM CHLORIDE 4 MILLILITER(S): 9 INJECTION INTRAMUSCULAR; INTRAVENOUS; SUBCUTANEOUS at 18:11

## 2022-03-17 RX ADMIN — CHLORHEXIDINE GLUCONATE 15 MILLILITER(S): 213 SOLUTION TOPICAL at 18:18

## 2022-03-17 RX ADMIN — Medication 62.5 MILLIMOLE(S): at 08:56

## 2022-03-17 NOTE — PROGRESS NOTE ADULT - ATTENDING COMMENTS
AHRF now chronic resp failure with cerebral palsy with h/o DVT  Physical as above  await state consent for trach  continue MV  lovenox for AC  she is tolerating feeds with reglan  high complexity decision making

## 2022-03-17 NOTE — PROGRESS NOTE ADULT - SUBJECTIVE AND OBJECTIVE BOX
*INCOMPLETE*    OVERNIGHT EVENTS:    SUBJECTIVE / INTERVAL HPI: Patient seen and examined at bedside. Denies f/c, n/v, HA, chest pain, SOB, abdominal pain, diarrhea, constipation, melena, hematochezia, hematuria, dysuria    MEDICATIONS  (STANDING):  ARIPiprazole 2 milliGRAM(s) Oral every 24 hours  chlorhexidine 0.12% Liquid 15 milliLiter(s) Oral Mucosa every 12 hours  chlorhexidine 2% Cloths 1 Application(s) Topical <User Schedule>  dextrose 40% Gel 15 Gram(s) Oral once  dextrose 5%. 1000 milliLiter(s) (50 mL/Hr) IV Continuous <Continuous>  dextrose 5%. 1000 milliLiter(s) (100 mL/Hr) IV Continuous <Continuous>  dextrose 50% Injectable 25 Gram(s) IV Push once  dextrose 50% Injectable 12.5 Gram(s) IV Push once  dextrose 50% Injectable 25 Gram(s) IV Push once  enoxaparin Injectable 50 milliGRAM(s) SubCutaneous every 12 hours  glucagon  Injectable 1 milliGRAM(s) IntraMuscular once  insulin lispro (ADMELOG) corrective regimen sliding scale   SubCutaneous every 6 hours  metoclopramide 10 milliGRAM(s) Oral every 8 hours  pantoprazole   Suspension 40 milliGRAM(s) Oral before breakfast  propofol Infusion 10 MICROgram(s)/kG/Min (3.41 mL/Hr) IV Continuous <Continuous>  sodium chloride 7% Inhalation 4 milliLiter(s) Inhalation every 12 hours    MEDICATIONS  (PRN):  acetaminophen    Suspension .. 650 milliGRAM(s) Enteral Tube every 6 hours PRN Temp greater or equal to 38C (100.4F), Mild Pain (1 - 3)  albuterol/ipratropium for Nebulization 3 milliLiter(s) Nebulizer every 6 hours PRN Shortness of Breath and/or Wheezing    Allergies    ace inhibitors (Anaphylaxis)  caffeine (Rash)  cefepime (Rash)  chocolate (Rash)  high acid (Rash)  Tomatoes (Hives)    Intolerances        VITAL SIGNS:  Vital Signs Last 24 Hrs  T(C): 37 (17 Mar 2022 01:03), Max: 37.4 (16 Mar 2022 23:00)  T(F): 98.6 (17 Mar 2022 01:03), Max: 99.3 (16 Mar 2022 23:00)  HR: 81 (17 Mar 2022 05:05) (70 - 84)  BP: 108/52 (17 Mar 2022 05:00) (87/48 - 147/66)  BP(mean): 75 (17 Mar 2022 05:00) (64 - 96)  RR: 22 (17 Mar 2022 05:05) (10 - 27)  SpO2: 100% (17 Mar 2022 05:05) (85% - 100%)      03-15-22 @ 07:01  -  03-16-22 @ 07:00  --------------------------------------------------------  IN: 2519.4 mL / OUT: 765 mL / NET: 1754.4 mL    03-16-22 @ 07:01  -  03-17-22 @ 06:59  --------------------------------------------------------  IN: 1972.2 mL / OUT: 945 mL / NET: 1027.2 mL      PHYSICAL EXAM:  Constitutional: Calm, cooperative, intubated   HEENT: NC/AT; clear conjunctiva, anicteric sclera, left eye with limited movement, with ETT  Neck: supple; no JVD or thyromegaly, no cervical, post-auricular or supraclavicular lymphadenopathy  Respiratory: CTAB, decreased breath sounds on the left side mid to lower fields, no increased work of breathing or accessory muscle use  Cardiac: +S1/S2, no murmurs/rubs/gallops, RRR  Gastrointestinal: abdomen soft, NT/ND; +BSx4, PEG in place with dressing from 3/11 C/D/I  Genitourinary: no suprapubic tenderness, Lu draining concentrated yellow urine  Extremities: WWP, no clubbing, edema or cyanosis, with bilateral mittens  Vascular: 2+ radial, femoral, DP/PT pulses B/L  Neurologic: following simple commands such as wiggle toes, maintaining eye contact>10 secs, contracture of extremities.   Dermatologic: skin warm, dry and intact; no rashes, wounds, or scars, no decubitus ulcers  Lines: 18 gauge peripheral line in right UE since 3/13, 24 gauge a-line on left arm since 3/14, lu since 3/3/22      LABS:                        10.1   5.81  )-----------( 345      ( 17 Mar 2022 06:24 )             33.0     03-17    140  |  103  |  18  ----------------------------<  127<H>  4.2   |  29  |  x     Ca    7.9<L>      16 Mar 2022 03:35  Phos  1.8     03-16  Mg     2.0     03-17    TPro  6.3  /  Alb  3.4  /  TBili  0.5  /  DBili  x   /  AST  31  /  ALT  27  /  AlkPhos  69  03-16        CAPILLARY BLOOD GLUCOSE      POCT Blood Glucose.: 123 mg/dL (17 Mar 2022 06:11)  POCT Blood Glucose.: 111 mg/dL (17 Mar 2022 00:37)  POCT Blood Glucose.: 122 mg/dL (16 Mar 2022 17:09)  POCT Blood Glucose.: 137 mg/dL (16 Mar 2022 11:33)          RADIOLOGY & ADDITIONAL TESTS: Reviewed. OVERNIGHT EVENTS: NAOE    SUBJECTIVE / INTERVAL HPI: Patient seen and examined at bedside. Pt calm, and in no distress, laying in bed.     MEDICATIONS  (STANDING):  ARIPiprazole 2 milliGRAM(s) Oral every 24 hours  chlorhexidine 0.12% Liquid 15 milliLiter(s) Oral Mucosa every 12 hours  chlorhexidine 2% Cloths 1 Application(s) Topical <User Schedule>  dextrose 40% Gel 15 Gram(s) Oral once  dextrose 5%. 1000 milliLiter(s) (50 mL/Hr) IV Continuous <Continuous>  dextrose 5%. 1000 milliLiter(s) (100 mL/Hr) IV Continuous <Continuous>  dextrose 50% Injectable 25 Gram(s) IV Push once  dextrose 50% Injectable 12.5 Gram(s) IV Push once  dextrose 50% Injectable 25 Gram(s) IV Push once  enoxaparin Injectable 50 milliGRAM(s) SubCutaneous every 12 hours  glucagon  Injectable 1 milliGRAM(s) IntraMuscular once  insulin lispro (ADMELOG) corrective regimen sliding scale   SubCutaneous every 6 hours  metoclopramide 10 milliGRAM(s) Oral every 8 hours  pantoprazole   Suspension 40 milliGRAM(s) Oral before breakfast  propofol Infusion 10 MICROgram(s)/kG/Min (3.41 mL/Hr) IV Continuous <Continuous>  sodium chloride 7% Inhalation 4 milliLiter(s) Inhalation every 12 hours    MEDICATIONS  (PRN):  acetaminophen    Suspension .. 650 milliGRAM(s) Enteral Tube every 6 hours PRN Temp greater or equal to 38C (100.4F), Mild Pain (1 - 3)  albuterol/ipratropium for Nebulization 3 milliLiter(s) Nebulizer every 6 hours PRN Shortness of Breath and/or Wheezing    Allergies    ace inhibitors (Anaphylaxis)  caffeine (Rash)  cefepime (Rash)  chocolate (Rash)  high acid (Rash)  Tomatoes (Hives)    Intolerances        VITAL SIGNS:  Vital Signs Last 24 Hrs  T(C): 37 (17 Mar 2022 01:03), Max: 37.4 (16 Mar 2022 23:00)  T(F): 98.6 (17 Mar 2022 01:03), Max: 99.3 (16 Mar 2022 23:00)  HR: 81 (17 Mar 2022 05:05) (70 - 84)  BP: 108/52 (17 Mar 2022 05:00) (87/48 - 147/66)  BP(mean): 75 (17 Mar 2022 05:00) (64 - 96)  RR: 22 (17 Mar 2022 05:05) (10 - 27)  SpO2: 100% (17 Mar 2022 05:05) (85% - 100%)    on VC/AC FiO2 50%, , PEEP 5, RR 10     03-15-22 @ 07:01  -  03-16-22 @ 07:00  --------------------------------------------------------  IN: 2519.4 mL / OUT: 765 mL / NET: 1754.4 mL    03-16-22 @ 07:01  -  03-17-22 @ 06:59  --------------------------------------------------------  IN: 1972.2 mL / OUT: 945 mL / NET: 1027.2 mL    Drips:   - Propofol: 16 microgram/kg/min      PHYSICAL EXAM:  Constitutional: Calm, cooperative, intubated   HEENT: NC/AT; clear conjunctiva, anicteric sclera, left eye with limited movement, with ETT  Neck: supple; no JVD or thyromegaly, no cervical, post-auricular or supraclavicular lymphadenopathy  Respiratory: CTAB, decreased breath sounds on the left side mid to lower fields, no increased work of breathing or accessory muscle use  Cardiac: +S1/S2, no murmurs/rubs/gallops, RRR  Gastrointestinal: abdomen soft, NT/ND; +BSx4, PEG in place with dressing from 3/11 C/D/I  Genitourinary: no suprapubic tenderness, Lu draining concentrated yellow urine  Extremities: WWP, no clubbing, edema or cyanosis, with bilateral mittens  Vascular: 2+ radial, femoral, DP/PT pulses B/L  Neurologic: following simple commands such as wiggle toes, maintaining eye contact>10 secs, contracture of extremities.   Dermatologic: skin warm, dry and intact; no rashes, wounds, or scars, no decubitus ulcers  Lines: 18 gauge peripheral line in right UE since 3/13, 24 gauge a-line on left arm since 3/14, lu since 3/3/22      LABS:                        10.1   5.81  )-----------( 345      ( 17 Mar 2022 06:24 )             33.0     03-17    140  |  103  |  18  ----------------------------<  127<H>  4.2   |  29  |  x     Ca    7.9<L>      16 Mar 2022 03:35  Phos  1.8     03-16  Mg     2.0     03-17    TPro  6.3  /  Alb  3.4  /  TBili  0.5  /  DBili  x   /  AST  31  /  ALT  27  /  AlkPhos  69  03-16        CAPILLARY BLOOD GLUCOSE      POCT Blood Glucose.: 123 mg/dL (17 Mar 2022 06:11)  POCT Blood Glucose.: 111 mg/dL (17 Mar 2022 00:37)  POCT Blood Glucose.: 122 mg/dL (16 Mar 2022 17:09)  POCT Blood Glucose.: 137 mg/dL (16 Mar 2022 11:33)      RADIOLOGY & ADDITIONAL TESTS: Reviewed.    Sputum culture 3/13: rare proteus mirabilis  Blood culture 3/13: NGTD 1d  Bronchial wash 3/14: no organisms  CXR AM 3/14: compared to prior, change in rotation, increase opacification in upper and mid lung fields.   CXR AM 3/15: unchanged with focal atelectasis and infiltrates, bilaterally  CXR AM 3/16: decrease infiltrates bilateral  CXR AM 3/17: Unchanged compared to prior OVERNIGHT EVENTS: NAOE    SUBJECTIVE / INTERVAL HPI: Patient seen and examined at bedside. Pt calm, and in no distress, laying in bed.     MEDICATIONS  (STANDING):  ARIPiprazole 2 milliGRAM(s) Oral every 24 hours  chlorhexidine 0.12% Liquid 15 milliLiter(s) Oral Mucosa every 12 hours  chlorhexidine 2% Cloths 1 Application(s) Topical <User Schedule>  dextrose 40% Gel 15 Gram(s) Oral once  dextrose 5%. 1000 milliLiter(s) (50 mL/Hr) IV Continuous <Continuous>  dextrose 5%. 1000 milliLiter(s) (100 mL/Hr) IV Continuous <Continuous>  dextrose 50% Injectable 25 Gram(s) IV Push once  dextrose 50% Injectable 12.5 Gram(s) IV Push once  dextrose 50% Injectable 25 Gram(s) IV Push once  enoxaparin Injectable 50 milliGRAM(s) SubCutaneous every 12 hours  glucagon  Injectable 1 milliGRAM(s) IntraMuscular once  insulin lispro (ADMELOG) corrective regimen sliding scale   SubCutaneous every 6 hours  metoclopramide 10 milliGRAM(s) Oral every 8 hours  pantoprazole   Suspension 40 milliGRAM(s) Oral before breakfast  propofol Infusion 10 MICROgram(s)/kG/Min (3.41 mL/Hr) IV Continuous <Continuous>  sodium chloride 7% Inhalation 4 milliLiter(s) Inhalation every 12 hours    MEDICATIONS  (PRN):  acetaminophen    Suspension .. 650 milliGRAM(s) Enteral Tube every 6 hours PRN Temp greater or equal to 38C (100.4F), Mild Pain (1 - 3)  albuterol/ipratropium for Nebulization 3 milliLiter(s) Nebulizer every 6 hours PRN Shortness of Breath and/or Wheezing    Allergies    ace inhibitors (Anaphylaxis)  caffeine (Rash)  cefepime (Rash)  chocolate (Rash)  high acid (Rash)  Tomatoes (Hives)    Intolerances        VITAL SIGNS:  Vital Signs Last 24 Hrs  T(C): 37 (17 Mar 2022 01:03), Max: 37.4 (16 Mar 2022 23:00)  T(F): 98.6 (17 Mar 2022 01:03), Max: 99.3 (16 Mar 2022 23:00)  HR: 81 (17 Mar 2022 05:05) (70 - 84)  BP: 108/52 (17 Mar 2022 05:00) (87/48 - 147/66)  BP(mean): 75 (17 Mar 2022 05:00) (64 - 96)  RR: 22 (17 Mar 2022 05:05) (10 - 27)  SpO2: 100% (17 Mar 2022 05:05) (85% - 100%)    on VC/AC FiO2 50%, , PEEP 5, RR 10     03-15-22 @ 07:01  -  03-16-22 @ 07:00  --------------------------------------------------------  IN: 2519.4 mL / OUT: 765 mL / NET: 1754.4 mL    03-16-22 @ 07:01  -  03-17-22 @ 06:59  --------------------------------------------------------  IN: 1972.2 mL / OUT: 945 mL / NET: 1027.2 mL    Drips:   - Propofol: 16 microgram/kg/min      PHYSICAL EXAM:  Constitutional: Calm, cooperative, intubated   HEENT: NC/AT; clear conjunctiva, anicteric sclera, left eye with limited movement, with ETT  Neck: supple; no JVD or thyromegaly, no cervical, post-auricular or supraclavicular lymphadenopathy  Respiratory: Coarse rhonchi R>L, decreased breath sounds on the left side mid to lower fields, no increased work of breathing or accessory muscle use  Cardiac: +S1/S2, no murmurs/rubs/gallops, RRR  Gastrointestinal: abdomen soft, NT/ND; +BSx4, PEG in place with dressing from 3/11 C/D/I  Genitourinary: no suprapubic tenderness, Lu draining concentrated yellow urine  Extremities: WWP, no clubbing, edema or cyanosis, with bilateral mittens  Vascular: 2+ radial, femoral, DP/PT pulses B/L  Neurologic: following simple commands such as wiggle toes, maintaining eye contact>10 secs, contracture of extremities.   Dermatologic: skin warm, dry and intact; no rashes, wounds, or scars, no decubitus ulcers  Lines: 18 gauge peripheral line in right UE since 3/13, 24 gauge a-line on left arm since 3/14, lu since 3/3/22      LABS:                        10.1   5.81  )-----------( 345      ( 17 Mar 2022 06:24 )             33.0     03-17    140  |  103  |  18  ----------------------------<  127<H>  4.2   |  29  |  x     Ca    7.9<L>      16 Mar 2022 03:35  Phos  1.8     03-16  Mg     2.0     03-17    TPro  6.3  /  Alb  3.4  /  TBili  0.5  /  DBili  x   /  AST  31  /  ALT  27  /  AlkPhos  69  03-16        CAPILLARY BLOOD GLUCOSE      POCT Blood Glucose.: 123 mg/dL (17 Mar 2022 06:11)  POCT Blood Glucose.: 111 mg/dL (17 Mar 2022 00:37)  POCT Blood Glucose.: 122 mg/dL (16 Mar 2022 17:09)  POCT Blood Glucose.: 137 mg/dL (16 Mar 2022 11:33)      RADIOLOGY & ADDITIONAL TESTS: Reviewed.    Sputum culture 3/13: rare proteus mirabilis  Blood culture 3/13: NGTD 1d  Bronchial wash 3/14: no organisms  CXR AM 3/14: compared to prior, change in rotation, increase opacification in upper and mid lung fields.   CXR AM 3/15: unchanged with focal atelectasis and infiltrates, bilaterally  CXR AM 3/16: decrease infiltrates bilateral  CXR AM 3/17: Unchanged compared to prior

## 2022-03-17 NOTE — CHART NOTE - NSCHARTNOTEFT_GEN_A_CORE
Admitting Diagnosis:   Patient is a 76y old  Female who presents with a chief complaint of respiratory failure (14 Mar 2022 12:10)      PAST MEDICAL & SURGICAL HISTORY:  CP (cerebral palsy)    Mental retardation    Dysphagia    GERD (gastroesophageal reflux disease)    PE (pulmonary thromboembolism)    DVT (deep venous thrombosis)    Spastic quadriplegia    HTN (hypertension)    PEG tube malfunction        Current Nutrition Order:  Jevity 1.2 @ 50ml/hr + 1 LPS via PEG  (Provides 1200ml TV, 1540kcals/1685kcal w/propofol, 82g protein, 968ml free water 1.8g/kg IBW protein)  +150ml free h2O Q4hrs     PO Intake: Good (%) [   ]  Fair (50-75%) [   ] Poor (<25%) [   ]- NA NPO w/EN    GI Issues: No episodes of N/V/C/D  No residuals or abdominal distention per RN[facility]    Pain: Pt denied pain at time of assessment     Skin Integrity: Roberto Carlos 14, B/L arms trace edema  Intact pressure-wise     Labs:   03-17    140  |  103  |  18  ----------------------------<  127<H>  4.2   |  29  |  0.46<L>    Ca    7.9<L>      17 Mar 2022 06:24  Phos  1.9     03-17  Mg     2.0     03-17    TPro  6.3  /  Alb  3.4  /  TBili  0.5  /  DBili  x   /  AST  31  /  ALT  27  /  AlkPhos  69  03-16    CAPILLARY BLOOD GLUCOSE      POCT Blood Glucose.: 110 mg/dL (17 Mar 2022 18:11)  POCT Blood Glucose.: 137 mg/dL (17 Mar 2022 11:56)  POCT Blood Glucose.: 123 mg/dL (17 Mar 2022 06:11)  POCT Blood Glucose.: 111 mg/dL (17 Mar 2022 00:37)      Medications:  MEDICATIONS  (STANDING):  ARIPiprazole 2 milliGRAM(s) Oral every 24 hours  chlorhexidine 0.12% Liquid 15 milliLiter(s) Oral Mucosa every 12 hours  chlorhexidine 2% Cloths 1 Application(s) Topical <User Schedule>  dextrose 40% Gel 15 Gram(s) Oral once  dextrose 5%. 1000 milliLiter(s) (50 mL/Hr) IV Continuous <Continuous>  dextrose 5%. 1000 milliLiter(s) (100 mL/Hr) IV Continuous <Continuous>  dextrose 50% Injectable 25 Gram(s) IV Push once  dextrose 50% Injectable 12.5 Gram(s) IV Push once  dextrose 50% Injectable 25 Gram(s) IV Push once  enoxaparin Injectable 50 milliGRAM(s) SubCutaneous every 12 hours  glucagon  Injectable 1 milliGRAM(s) IntraMuscular once  insulin regular  human corrective regimen sliding scale   SubCutaneous every 6 hours  metoclopramide 10 milliGRAM(s) Oral every 8 hours  pantoprazole   Suspension 40 milliGRAM(s) Oral before breakfast  propofol Infusion 10 MICROgram(s)/kG/Min (3.41 mL/Hr) IV Continuous <Continuous>  sodium chloride 7% Inhalation 4 milliLiter(s) Inhalation every 12 hours    MEDICATIONS  (PRN):  acetaminophen    Suspension .. 650 milliGRAM(s) Enteral Tube every 6 hours PRN Temp greater or equal to 38C (100.4F), Mild Pain (1 - 3)  albuterol/ipratropium for Nebulization 3 milliLiter(s) Nebulizer every 6 hours PRN Shortness of Breath and/or Wheezing      Anthropometrics:  Ht: 152.4cm (60")  Wt: 56.8kg (3/03)-Admit     IBW: 100# (45.5kg)    % IBW: 125%    Weight Change: No new weight since admission. Obtain minimum biweekly weights to monitor trends especially being on TFs    Estimated energy needs: 45.3kg  Fluids per MD. IBW used to estimate needs as pt weighs >100% of IBW and per critical care nutrition guidelines. Needs estimated for age and adjusted for vent  Calories: 1150-1359kcals (25-30kcal/kg)  Protein: 65-75g (1.4-1.6g/kg)  Fluid: Per MD     Subjective: 75 yo F with a PMHx of cerebral palsy with functional quadriplegia, DVT/PE on Eliquis, GERD, chronic dysphagia with PEG, thoracic spine fusion with a prior admission in 2020 for aspiration PNA requiring intubation. Patient presented from her assisted living facility in [facility] likely 2/2 aspiration pneumonia which has grown pseudomonas sensitive to ciprofloxacin. Patient was intubated in the field prior to arrival for airway protection. While in the MICU, one unsuccessful attempt was made at extubation requiring reintubation attributed to pt's anxiety. Subsequently, pt was successfully extubated in MICU with a seroquel regimen. Pt was weaned to HFNC then 4L NC satting 94-95%. On 3/10, pt was stepped down to the floors with increasing frequency of suctioning. On 3/13, pt's oxygen requirement increased and 2L NC titrated up to 6L NC, however pt was found to be hypoxic to 70s. Pt was suctioned and removed significant amount of PEG tube feed products and mucus. However, no change in pulse ox was noted and rapid response was called. Pt was intubated, sedated on propofol and started on levophed for hypotension 70s/40s. En route, patient had episode of emesis with continued copious secretions, requiring frequent suctioning. In ICU, pt underwent bronchoscopy on 3/14 which showed no signs of purulent secretions or endobronchial lesions. 3/16 CXR with improved B/L infiltrates. Now ethics consulted regarding trach placement.    Pt seen in room, intubated on VC/AC mode, sedated on propofol @ 5.5ml/hr (145kcal/day from lipids). MAP 84- not on pressors at this time. Pt awake, able to shake head no to pain, but did appear to be somewhat uncomfortable. EN running at goal of 50ml/hr with no s/s intolerance per RN. Having regular BMs, ordered for reglan. Afebrile. Pertinent labs: Phos 1.9 (L), POC , 123mg/dL. Will continue to follow per RD protocol.     Previous Nutrition Diagnosis: Increased nutrient needs R/T acute illness, hypermetabolic AEB 25-32kcals/kg energy and 1.4-1.6g/kg protein needs of ABW    Active [ x ]  Resolved [   ]    If resolved, new PES:     Goal: Pt to meet >75% of EER via G-tube     Recommendations:  1. To avoid overfeeding, consider changing feeds to Jevity 1.2 Doug @ 44ml/hr x 24hrs plus 1 Liquid Protein Supplement (100kcal, 15g pro) via PEG. Provides: 1056ml TV, 1367kcal, 74g pro, 852ml free H2O, 1.6g/kg IBW protein.  2. Water flush per MD, minimum 30-60ml q4hrs for tube patency   3. Pain and bowel regimens per MD discretion  4. Monitor lytes and replete prn. POC BG q6hrs   5. Obtain minimum biweekly weights     Education: N/A     Risk Level: High [ x ] Moderate [   ] Low [   ].

## 2022-03-17 NOTE — PROGRESS NOTE ADULT - ASSESSMENT
75 yo F with PMHx of cerebral palsy with functional quadriplegia, Hx of PE/DVTs, GERD, chronic dysphagia with PEG, thoracic spine fusion initially admitted for acute hypoxic respiratory failure found to have pseudomonal PNA, now intubated for third time i/s/o aspiration PNA of PEG feeds and copious secretions, now off Abx, pending trach placement pending consent will come from Consumer Advisory Board meeting    NEURO  #H/O cerebral palsy.   Patient with cerebral palsy. Has functional quadriplegia, health aide at assisted living facility reports at baseline she is able to follow commands and communicate "when she wants to".  - pending discussion with pt's CAB state advocate for GOC and medical decisions  - FOR CONSENT, 2-physician consent is required  - see GOC as below    #Goals of care, counseling/discussion.   Patient with cerebral palsy. Previously a [place] resident, now resides in group home, with [place] [facility] per palliative care. Patient with repeat admission for aspiration pneumonia with history of dysphagia and copious secretions from oropharynx on exam.  - palliative care and ethics consulted - follow up recs  - consent will come from Consumer Advisory Board meeting for trach    #Anxiety  previously, attempt for extubation was delayed 2/2 agitation/anxiety  - currently not anxious or agitated  - c/w home med Abilify 2mg qd    HEMODYNAMICS  Shock: no signs of shock  VS: HR 70-90s, MAP 70-90s  Drips: no vasopressors    PERFUSION  Mental status: alert, calm, cooperative  UOP: 20-30cc/h  Extremities: WWP, 2+ pulses in UE/LE, <2 cap refill    CV  No active issues    PULM  #Acute respiratory failure with hypoxia i/s/o of aspiration pneumonia of PEG feeds and copious oral secretions  Patient with h/o aspiration events requiring intubation.   Patient found with respiratory distress and SaO2 of 84%, intubated in field for airway protection, s/p extubation on 3/8 to HFNC, re-intubation on 3/13/22  - currently on VC/AC FiO2 40%, PEEP 5, -280, RR 10, flow 4L/min  - Frequent suctioning  - Chest PT q6h  - Aspiration precautions  - ABG qd, improvement in resp alkalosis on 3/15    #On medication for venous thromboembolism (VTE).   History of PE 5 years ago. On Eliquis at home 2.5 mg BID at home. Bedside ultrasound performed in MICU with no evidence of clot.    - c/w lovenox 50 mg BID    RENAL  No active issues    GI  #History of dysphagia  - PEG tube in place and functional  - Tube study with contrast to assess direction of PEG feeds > no concern, can re-start PEG feeds      #GERD (gastroesophageal reflux disease)  Patient with history of GERD on famotidine 20 mg qHS at home. On pantoprazole 40 mg QD while in MICU  - c/w PPI 40 mg IV qd      Quintero in place draining concentrated urine    ENDO  No Active issues    ID  No active issues     HEME  #Anemia, macrocytic  Hgb downtrending 12.8 > 10.4  MCV   No active bleeding noted  most likely 2/2 nutritional deficiency  - Follow-up Folate, B12  - Transfuse if Hgb<7 (would require 2-physician consent)    PROPHYLAXIS  F: D5 30cc/h for 24h  E: Replete PRN  N: TF Jevity 1.2 at 40cc/h + 150cc free water flushes every 4h + 1 LPS  DVT: Lovenox 50 mg BID  GI: protonix 40 mg IV qd, reglan  C: Full Code, pending discussion with pt's CAB state advocate for GOC and medical decisions  FOR CONSENT 2-physician consent is required  D: Pending trach, if tracheostomy is pursued, patient cannot return back to group home and will be discharged to nursing home.   73 yo F with PMHx of cerebral palsy with functional quadriplegia, Hx of PE/DVTs, GERD, chronic dysphagia with PEG, thoracic spine fusion initially admitted for acute hypoxic respiratory failure found to have pseudomonal PNA, now intubated for third time i/s/o aspiration PNA of PEG feeds and copious secretions, now off Abx, pending trach placement pending consent will come from Consumer Advisory Board meeting    NEURO  #H/O cerebral palsy.   Patient with cerebral palsy. Has functional quadriplegia, health aide at assisted living facility reports at baseline she is able to follow commands and communicate "when she wants to".  - pending discussion with pt's CAB state advocate for GOC and medical decisions  - FOR CONSENT, 2-physician consent is required  - see GOC as below    #Goals of care, counseling/discussion.   Patient with cerebral palsy. Previously a [place] resident, now resides in group home, with [place] [facility] per palliative care. Patient with repeat admission for aspiration pneumonia with history of dysphagia and copious secretions from oropharynx on exam.  - palliative care and ethics consulted - follow up recs  - consent will come from Consumer Advisory Board meeting for trach    #Anxiety  previously, attempt for extubation was delayed 2/2 agitation/anxiety  - currently not anxious or agitated  - c/w home med Abilify 2mg qd    HEMODYNAMICS  Shock: no signs of shock  VS: HR 70-90s, MAP 70-90s  Drips: no vasopressors    PERFUSION  Mental status: alert, calm, cooperative  UOP: 20-30cc/h  Extremities: WWP, 2+ pulses in UE/LE, <2 cap refill    CV  No active issues    PULM  #Acute respiratory failure with hypoxia i/s/o of aspiration pneumonia of PEG feeds and copious oral secretions  Patient with h/o aspiration events requiring intubation.   Patient found with respiratory distress and SaO2 of 84%, intubated in field for airway protection, s/p extubation on 3/8 to HFNC, re-intubation on 3/13/22  - currently on VC/AC FiO2 40%, PEEP 5, -280, RR 10, flow 4L/min  - Frequent suctioning  - Chest PT q6h  - Aspiration precautions  - ABG qd, improvement in resp alkalosis on 3/15    #On medication for venous thromboembolism (VTE).   History of PE 5 years ago. On Eliquis at home 2.5 mg BID at home. Bedside ultrasound performed in MICU with no evidence of clot.    - c/w lovenox 50 mg BID    RENAL  No active issues    GI  #History of dysphagia  - PEG tube in place and functional  - Tube study with contrast to assess direction of PEG feeds > no concern, can re-start PEG feeds      #GERD (gastroesophageal reflux disease)  Patient with history of GERD on famotidine 20 mg qHS at home. On pantoprazole 40 mg QD while in MICU  - c/w PPI 40 mg IV qd      Quintero in place draining concentrated urine    ENDO  On regular ISS for TF    ID  No active issues     HEME  #Anemia, macrocytic  Hgb downtrending 12.8 > 10.4  MCV   No active bleeding noted  most likely 2/2 nutritional deficiency  - Follow-up Folate, B12  - Transfuse if Hgb<7 (would require 2-physician consent)    PROPHYLAXIS  F: None  E: Replete PRN  N: TF Jevity 1.2 at 50cc/h + 150cc free water flushes every 4h + 1 LPS  DVT: Lovenox 50 mg BID  GI: protonix 40 mg IV qd, reglan  C: Full Code, pending discussion with pt's CAB state advocate for GOC and medical decisions  FOR CONSENT 2-physician consent is required  D: Pending trach, if tracheostomy is pursued, patient cannot return back to group home and will be discharged to nursing home.

## 2022-03-18 LAB
ANION GAP SERPL CALC-SCNC: 8 MMOL/L — SIGNIFICANT CHANGE UP (ref 5–17)
BUN SERPL-MCNC: 14 MG/DL — SIGNIFICANT CHANGE UP (ref 7–23)
CALCIUM SERPL-MCNC: 8.3 MG/DL — LOW (ref 8.4–10.5)
CHLORIDE SERPL-SCNC: 107 MMOL/L — SIGNIFICANT CHANGE UP (ref 96–108)
CO2 SERPL-SCNC: 27 MMOL/L — SIGNIFICANT CHANGE UP (ref 22–31)
CREAT SERPL-MCNC: 0.42 MG/DL — LOW (ref 0.5–1.3)
CULTURE RESULTS: SIGNIFICANT CHANGE UP
CULTURE RESULTS: SIGNIFICANT CHANGE UP
EGFR: 103 ML/MIN/1.73M2 — SIGNIFICANT CHANGE UP
GLUCOSE BLDC GLUCOMTR-MCNC: 104 MG/DL — HIGH (ref 70–99)
GLUCOSE BLDC GLUCOMTR-MCNC: 114 MG/DL — HIGH (ref 70–99)
GLUCOSE BLDC GLUCOMTR-MCNC: 116 MG/DL — HIGH (ref 70–99)
GLUCOSE BLDC GLUCOMTR-MCNC: 131 MG/DL — HIGH (ref 70–99)
GLUCOSE SERPL-MCNC: 122 MG/DL — HIGH (ref 70–99)
HCT VFR BLD CALC: 35.6 % — SIGNIFICANT CHANGE UP (ref 34.5–45)
HGB BLD-MCNC: 10.8 G/DL — LOW (ref 11.5–15.5)
MAGNESIUM SERPL-MCNC: 1.9 MG/DL — SIGNIFICANT CHANGE UP (ref 1.6–2.6)
MCHC RBC-ENTMCNC: 30.3 GM/DL — LOW (ref 32–36)
MCHC RBC-ENTMCNC: 31.3 PG — SIGNIFICANT CHANGE UP (ref 27–34)
MCV RBC AUTO: 103.2 FL — HIGH (ref 80–100)
NRBC # BLD: 0 /100 WBCS — SIGNIFICANT CHANGE UP (ref 0–0)
PHOSPHATE SERPL-MCNC: 2 MG/DL — LOW (ref 2.5–4.5)
PLATELET # BLD AUTO: 194 K/UL — SIGNIFICANT CHANGE UP (ref 150–400)
POTASSIUM SERPL-MCNC: 4 MMOL/L — SIGNIFICANT CHANGE UP (ref 3.5–5.3)
POTASSIUM SERPL-SCNC: 4 MMOL/L — SIGNIFICANT CHANGE UP (ref 3.5–5.3)
RBC # BLD: 3.45 M/UL — LOW (ref 3.8–5.2)
RBC # FLD: 13.3 % — SIGNIFICANT CHANGE UP (ref 10.3–14.5)
SODIUM SERPL-SCNC: 142 MMOL/L — SIGNIFICANT CHANGE UP (ref 135–145)
SPECIMEN SOURCE: SIGNIFICANT CHANGE UP
SPECIMEN SOURCE: SIGNIFICANT CHANGE UP
WBC # BLD: 5.86 K/UL — SIGNIFICANT CHANGE UP (ref 3.8–10.5)
WBC # FLD AUTO: 5.86 K/UL — SIGNIFICANT CHANGE UP (ref 3.8–10.5)

## 2022-03-18 PROCEDURE — 99232 SBSQ HOSP IP/OBS MODERATE 35: CPT | Mod: GC

## 2022-03-18 RX ORDER — MIDAZOLAM HYDROCHLORIDE 1 MG/ML
4 INJECTION, SOLUTION INTRAMUSCULAR; INTRAVENOUS ONCE
Refills: 0 | Status: DISCONTINUED | OUTPATIENT
Start: 2022-03-18 | End: 2022-03-18

## 2022-03-18 RX ORDER — METOCLOPRAMIDE HCL 10 MG
5 TABLET ORAL EVERY 8 HOURS
Refills: 0 | Status: DISCONTINUED | OUTPATIENT
Start: 2022-03-18 | End: 2022-03-22

## 2022-03-18 RX ORDER — FENTANYL CITRATE 50 UG/ML
50 INJECTION INTRAVENOUS ONCE
Refills: 0 | Status: DISCONTINUED | OUTPATIENT
Start: 2022-03-18 | End: 2022-03-18

## 2022-03-18 RX ORDER — MIDAZOLAM HYDROCHLORIDE 1 MG/ML
2 INJECTION, SOLUTION INTRAMUSCULAR; INTRAVENOUS ONCE
Refills: 0 | Status: DISCONTINUED | OUTPATIENT
Start: 2022-03-18 | End: 2022-03-18

## 2022-03-18 RX ORDER — FENTANYL CITRATE 50 UG/ML
25 INJECTION INTRAVENOUS ONCE
Refills: 0 | Status: DISCONTINUED | OUTPATIENT
Start: 2022-03-18 | End: 2022-03-18

## 2022-03-18 RX ORDER — NOREPINEPHRINE BITARTRATE/D5W 8 MG/250ML
0.05 PLASTIC BAG, INJECTION (ML) INTRAVENOUS
Qty: 8 | Refills: 0 | Status: DISCONTINUED | OUTPATIENT
Start: 2022-03-18 | End: 2022-03-18

## 2022-03-18 RX ORDER — CISATRACURIUM BESYLATE 2 MG/ML
6 INJECTION INTRAVENOUS ONCE
Refills: 0 | Status: DISCONTINUED | OUTPATIENT
Start: 2022-03-18 | End: 2022-03-18

## 2022-03-18 RX ADMIN — ENOXAPARIN SODIUM 50 MILLIGRAM(S): 100 INJECTION SUBCUTANEOUS at 18:44

## 2022-03-18 RX ADMIN — MIDAZOLAM HYDROCHLORIDE 2 MILLIGRAM(S): 1 INJECTION, SOLUTION INTRAMUSCULAR; INTRAVENOUS at 15:54

## 2022-03-18 RX ADMIN — Medication 10 MILLIGRAM(S): at 07:46

## 2022-03-18 RX ADMIN — SODIUM CHLORIDE 4 MILLILITER(S): 9 INJECTION INTRAMUSCULAR; INTRAVENOUS; SUBCUTANEOUS at 17:07

## 2022-03-18 RX ADMIN — FENTANYL CITRATE 25 MICROGRAM(S): 50 INJECTION INTRAVENOUS at 15:54

## 2022-03-18 RX ADMIN — PROPOFOL 3.41 MICROGRAM(S)/KG/MIN: 10 INJECTION, EMULSION INTRAVENOUS at 08:16

## 2022-03-18 RX ADMIN — ENOXAPARIN SODIUM 50 MILLIGRAM(S): 100 INJECTION SUBCUTANEOUS at 07:46

## 2022-03-18 RX ADMIN — PANTOPRAZOLE SODIUM 40 MILLIGRAM(S): 20 TABLET, DELAYED RELEASE ORAL at 07:46

## 2022-03-18 RX ADMIN — CHLORHEXIDINE GLUCONATE 1 APPLICATION(S): 213 SOLUTION TOPICAL at 07:46

## 2022-03-18 RX ADMIN — ARIPIPRAZOLE 2 MILLIGRAM(S): 15 TABLET ORAL at 22:25

## 2022-03-18 RX ADMIN — CHLORHEXIDINE GLUCONATE 15 MILLILITER(S): 213 SOLUTION TOPICAL at 07:03

## 2022-03-18 RX ADMIN — Medication 5 MILLIGRAM(S): at 15:38

## 2022-03-18 RX ADMIN — SODIUM CHLORIDE 4 MILLILITER(S): 9 INJECTION INTRAMUSCULAR; INTRAVENOUS; SUBCUTANEOUS at 05:48

## 2022-03-18 RX ADMIN — Medication 5 MILLIGRAM(S): at 22:25

## 2022-03-18 RX ADMIN — CHLORHEXIDINE GLUCONATE 15 MILLILITER(S): 213 SOLUTION TOPICAL at 18:40

## 2022-03-18 NOTE — PROGRESS NOTE ADULT - ATTENDING COMMENTS
cerebral palsy, chronic aspiration/now chronic respiratory failure  physical as above  plan for trach when we get consent  continue currnt vent settings  lovenox full dose

## 2022-03-18 NOTE — PROGRESS NOTE ADULT - SUBJECTIVE AND OBJECTIVE BOX
*INCOMPLETE* *INCOMPLETE*    OVERNIGHT EVENTS:    SUBJECTIVE / INTERVAL HPI: Patient seen and examined at bedside. Denies f/c, n/v, HA, chest pain, SOB, abdominal pain, diarrhea, constipation, melena, hematochezia, hematuria, dysuria    MEDICATIONS  (STANDING):  ARIPiprazole 2 milliGRAM(s) Oral every 24 hours  chlorhexidine 0.12% Liquid 15 milliLiter(s) Oral Mucosa every 12 hours  chlorhexidine 2% Cloths 1 Application(s) Topical <User Schedule>  dextrose 40% Gel 15 Gram(s) Oral once  dextrose 5%. 1000 milliLiter(s) (50 mL/Hr) IV Continuous <Continuous>  dextrose 5%. 1000 milliLiter(s) (100 mL/Hr) IV Continuous <Continuous>  dextrose 50% Injectable 25 Gram(s) IV Push once  dextrose 50% Injectable 12.5 Gram(s) IV Push once  dextrose 50% Injectable 25 Gram(s) IV Push once  enoxaparin Injectable 50 milliGRAM(s) SubCutaneous every 12 hours  glucagon  Injectable 1 milliGRAM(s) IntraMuscular once  insulin regular  human corrective regimen sliding scale   SubCutaneous every 6 hours  metoclopramide 10 milliGRAM(s) Oral every 8 hours  pantoprazole   Suspension 40 milliGRAM(s) Oral before breakfast  propofol Infusion 10 MICROgram(s)/kG/Min (3.41 mL/Hr) IV Continuous <Continuous>  sodium chloride 7% Inhalation 4 milliLiter(s) Inhalation every 12 hours    MEDICATIONS  (PRN):  acetaminophen    Suspension .. 650 milliGRAM(s) Enteral Tube every 6 hours PRN Temp greater or equal to 38C (100.4F), Mild Pain (1 - 3)  albuterol/ipratropium for Nebulization 3 milliLiter(s) Nebulizer every 6 hours PRN Shortness of Breath and/or Wheezing    Allergies    ace inhibitors (Anaphylaxis)  caffeine (Rash)  cefepime (Rash)  chocolate (Rash)  high acid (Rash)  Tomatoes (Hives)    Intolerances        VITAL SIGNS:  Vital Signs Last 24 Hrs  T(C): 36.9 (18 Mar 2022 06:06), Max: 37.5 (17 Mar 2022 19:11)  T(F): 98.4 (18 Mar 2022 06:06), Max: 99.5 (17 Mar 2022 19:11)  HR: 85 (18 Mar 2022 09:00) (64 - 88)  BP: 116/66 (18 Mar 2022 09:00) (91/50 - 150/79)  BP(mean): 85 (18 Mar 2022 09:00) (65 - 106)  RR: 22 (18 Mar 2022 08:19) (12 - 24)  SpO2: 95% (18 Mar 2022 09:00) (90% - 100%)      03-17-22 @ 07:01  -  03-18-22 @ 07:00  --------------------------------------------------------  IN: 2295.9 mL / OUT: 1435 mL / NET: 860.9 mL    03-18-22 @ 07:01  -  03-18-22 @ 10:08  --------------------------------------------------------  IN: 50 mL / OUT: 0 mL / NET: 50 mL    Drips:   - Propofol: 16 microgram/kg/min      PHYSICAL EXAM:  Constitutional: Calm, cooperative, intubated   HEENT: NC/AT; clear conjunctiva, anicteric sclera, left eye with limited movement, with ETT  Neck: supple; no JVD or thyromegaly, no cervical, post-auricular or supraclavicular lymphadenopathy  Respiratory: Coarse rhonchi R>L, decreased breath sounds on the left side mid to lower fields, no increased work of breathing or accessory muscle use  Cardiac: +S1/S2, no murmurs/rubs/gallops, RRR  Gastrointestinal: abdomen soft, NT/ND; +BSx4, PEG in place with dressing from 3/11 C/D/I  Genitourinary: no suprapubic tenderness, Lu draining concentrated yellow urine  Extremities: WWP, no clubbing, edema or cyanosis, with bilateral mittens  Vascular: 2+ radial, femoral, DP/PT pulses B/L  Neurologic: following simple commands such as wiggle toes, maintaining eye contact>10 secs, contracture of extremities.   Dermatologic: skin warm, dry and intact; no rashes, wounds, or scars, no decubitus ulcers  Lines: 18 gauge peripheral line in right UE since 3/13, 24 gauge a-line on left arm since 3/14, lu since 3/3/22      LABS:                        10.8   5.86  )-----------( 194      ( 18 Mar 2022 06:58 )             35.6     03-18    142  |  107  |  14  ----------------------------<  122<H>  4.0   |  27  |  0.42<L>    Ca    8.3<L>      18 Mar 2022 07:09  Phos  2.0     03-18  Mg     1.9     03-18          CAPILLARY BLOOD GLUCOSE      POCT Blood Glucose.: 131 mg/dL (18 Mar 2022 06:34)  POCT Blood Glucose.: 112 mg/dL (17 Mar 2022 22:56)  POCT Blood Glucose.: 110 mg/dL (17 Mar 2022 18:11)  POCT Blood Glucose.: 137 mg/dL (17 Mar 2022 11:56)          RADIOLOGY & ADDITIONAL TESTS: Reviewed. *INCOMPLETE*    OVERNIGHT EVENTS:    SUBJECTIVE / INTERVAL HPI: Patient seen and examined at bedside. Denies f/c, n/v, HA, chest pain, SOB, abdominal pain, diarrhea, constipation, melena, hematochezia, hematuria, dysuria    MEDICATIONS  (STANDING):  ARIPiprazole 2 milliGRAM(s) Oral every 24 hours  chlorhexidine 0.12% Liquid 15 milliLiter(s) Oral Mucosa every 12 hours  chlorhexidine 2% Cloths 1 Application(s) Topical <User Schedule>  dextrose 40% Gel 15 Gram(s) Oral once  dextrose 5%. 1000 milliLiter(s) (50 mL/Hr) IV Continuous <Continuous>  dextrose 5%. 1000 milliLiter(s) (100 mL/Hr) IV Continuous <Continuous>  dextrose 50% Injectable 25 Gram(s) IV Push once  dextrose 50% Injectable 12.5 Gram(s) IV Push once  dextrose 50% Injectable 25 Gram(s) IV Push once  enoxaparin Injectable 50 milliGRAM(s) SubCutaneous every 12 hours  glucagon  Injectable 1 milliGRAM(s) IntraMuscular once  insulin regular  human corrective regimen sliding scale   SubCutaneous every 6 hours  metoclopramide 10 milliGRAM(s) Oral every 8 hours  pantoprazole   Suspension 40 milliGRAM(s) Oral before breakfast  propofol Infusion 10 MICROgram(s)/kG/Min (3.41 mL/Hr) IV Continuous <Continuous>  sodium chloride 7% Inhalation 4 milliLiter(s) Inhalation every 12 hours    MEDICATIONS  (PRN):  acetaminophen    Suspension .. 650 milliGRAM(s) Enteral Tube every 6 hours PRN Temp greater or equal to 38C (100.4F), Mild Pain (1 - 3)  albuterol/ipratropium for Nebulization 3 milliLiter(s) Nebulizer every 6 hours PRN Shortness of Breath and/or Wheezing    Allergies    ace inhibitors (Anaphylaxis)  caffeine (Rash)  cefepime (Rash)  chocolate (Rash)  high acid (Rash)  Tomatoes (Hives)    Intolerances        VITAL SIGNS:  Vital Signs Last 24 Hrs  T(C): 36.9 (18 Mar 2022 06:06), Max: 37.5 (17 Mar 2022 19:11)  T(F): 98.4 (18 Mar 2022 06:06), Max: 99.5 (17 Mar 2022 19:11)  HR: 85 (18 Mar 2022 09:00) (64 - 88)  BP: 116/66 (18 Mar 2022 09:00) (91/50 - 150/79)  BP(mean): 85 (18 Mar 2022 09:00) (65 - 106)  RR: 22 (18 Mar 2022 08:19) (12 - 24)  SpO2: 95% (18 Mar 2022 09:00) (90% - 100%)      03-17-22 @ 07:01  -  03-18-22 @ 07:00  --------------------------------------------------------  IN: 2295.9 mL / OUT: 1435 mL / NET: 860.9 mL    03-18-22 @ 07:01  -  03-18-22 @ 10:08  --------------------------------------------------------  IN: 50 mL / OUT: 0 mL / NET: 50 mL    Drips:   - Propofol: 16 microgram/kg/min      PHYSICAL EXAM:  Constitutional: Calm, cooperative, intubated   HEENT: NC/AT; clear conjunctiva, anicteric sclera, left eye with limited movement, with ETT  Neck: supple; no JVD or thyromegaly, no cervical, post-auricular or supraclavicular lymphadenopathy  Respiratory: Coarse rhonchi R>L, decreased breath sounds on the left side mid to lower fields, no increased work of breathing or accessory muscle use  Cardiac: +S1/S2, no murmurs/rubs/gallops, RRR  Gastrointestinal: abdomen soft, NT/ND; +BSx4, PEG in place with dressing from 3/11 C/D/I  Genitourinary: no suprapubic tenderness, Lu draining concentrated yellow urine  Extremities: WWP, no clubbing, edema or cyanosis, with bilateral mittens  Vascular: 2+ radial, femoral, DP/PT pulses B/L  Neurologic: following simple commands such as wiggle toes, maintaining eye contact>10 secs, contracture of extremities.   Dermatologic: skin warm, dry and intact; no rashes, wounds, or scars, no decubitus ulcers  Lines: 18 gauge peripheral line in right UE since 3/13, 24 gauge a-line on left arm since 3/14, lu since 3/3/22      LABS:                        10.8   5.86  )-----------( 194      ( 18 Mar 2022 06:58 )             35.6     03-18    142  |  107  |  14  ----------------------------<  122<H>  4.0   |  27  |  0.42<L>    Ca    8.3<L>      18 Mar 2022 07:09  Phos  2.0     03-18  Mg     1.9     03-18          CAPILLARY BLOOD GLUCOSE      POCT Blood Glucose.: 131 mg/dL (18 Mar 2022 06:34)  POCT Blood Glucose.: 112 mg/dL (17 Mar 2022 22:56)  POCT Blood Glucose.: 110 mg/dL (17 Mar 2022 18:11)  POCT Blood Glucose.: 137 mg/dL (17 Mar 2022 11:56)          RADIOLOGY & ADDITIONAL TESTS: Reviewed.    Sputum culture 3/13: rare proteus mirabilis  Blood culture 3/13: NGTD 1d  Bronchial wash 3/14: no organisms  CXR AM 3/14: compared to prior, change in rotation, increase opacification in upper and mid lung fields.   CXR AM 3/15: unchanged with focal atelectasis and infiltrates, bilaterally  CXR AM 3/16: decrease infiltrates bilateral  CXR AM 3/17: Unchanged compared to prior OVERNIGHT EVENTS: LIZABETH    SUBJECTIVE / INTERVAL HPI: Patient seen and examined at bedside. Pt denies any pain including CP and abd pain. No complaints.     MEDICATIONS  (STANDING):  ARIPiprazole 2 milliGRAM(s) Oral every 24 hours  chlorhexidine 0.12% Liquid 15 milliLiter(s) Oral Mucosa every 12 hours  chlorhexidine 2% Cloths 1 Application(s) Topical <User Schedule>  dextrose 40% Gel 15 Gram(s) Oral once  dextrose 5%. 1000 milliLiter(s) (50 mL/Hr) IV Continuous <Continuous>  dextrose 5%. 1000 milliLiter(s) (100 mL/Hr) IV Continuous <Continuous>  dextrose 50% Injectable 25 Gram(s) IV Push once  dextrose 50% Injectable 12.5 Gram(s) IV Push once  dextrose 50% Injectable 25 Gram(s) IV Push once  enoxaparin Injectable 50 milliGRAM(s) SubCutaneous every 12 hours  glucagon  Injectable 1 milliGRAM(s) IntraMuscular once  insulin regular  human corrective regimen sliding scale   SubCutaneous every 6 hours  metoclopramide 10 milliGRAM(s) Oral every 8 hours  pantoprazole   Suspension 40 milliGRAM(s) Oral before breakfast  propofol Infusion 10 MICROgram(s)/kG/Min (3.41 mL/Hr) IV Continuous <Continuous>  sodium chloride 7% Inhalation 4 milliLiter(s) Inhalation every 12 hours    MEDICATIONS  (PRN):  acetaminophen    Suspension .. 650 milliGRAM(s) Enteral Tube every 6 hours PRN Temp greater or equal to 38C (100.4F), Mild Pain (1 - 3)  albuterol/ipratropium for Nebulization 3 milliLiter(s) Nebulizer every 6 hours PRN Shortness of Breath and/or Wheezing    Allergies    ace inhibitors (Anaphylaxis)  caffeine (Rash)  cefepime (Rash)  chocolate (Rash)  high acid (Rash)  Tomatoes (Hives)    Intolerances        VITAL SIGNS:  Vital Signs Last 24 Hrs  T(C): 36.9 (18 Mar 2022 06:06), Max: 37.5 (17 Mar 2022 19:11)  T(F): 98.4 (18 Mar 2022 06:06), Max: 99.5 (17 Mar 2022 19:11)  HR: 85 (18 Mar 2022 09:00) (64 - 88)  BP: 116/66 (18 Mar 2022 09:00) (91/50 - 150/79)  BP(mean): 85 (18 Mar 2022 09:00) (65 - 106)  RR: 22 (18 Mar 2022 08:19) (12 - 24)  SpO2: 95% (18 Mar 2022 09:00) (90% - 100%)      03-17-22 @ 07:01  -  03-18-22 @ 07:00  --------------------------------------------------------  IN: 2295.9 mL / OUT: 1435 mL / NET: 860.9 mL    03-18-22 @ 07:01  -  03-18-22 @ 10:08  --------------------------------------------------------  IN: 50 mL / OUT: 0 mL / NET: 50 mL    Drips:   - Propofol: 16 microgram/kg/min      PHYSICAL EXAM:  Constitutional: Calm, cooperative, intubated   HEENT: NC/AT; clear conjunctiva, anicteric sclera, left eye with limited movement, with ETT  Neck: supple; no JVD or thyromegaly, no cervical, post-auricular or supraclavicular lymphadenopathy  Respiratory: Coarse rhonchi R>L, decreased breath sounds on the left side mid to lower fields, no increased work of breathing or accessory muscle use  Cardiac: +S1/S2, no murmurs/rubs/gallops, RRR  Gastrointestinal: abdomen soft, NT/ND; +BSx4, PEG in place with dressing from 3/11 C/D/I  Genitourinary: no suprapubic tenderness, Lu draining concentrated yellow urine  Extremities: WWP, no clubbing, edema or cyanosis, with bilateral mittens  Vascular: 2+ radial, femoral, DP/PT pulses B/L  Neurologic: following simple commands such as wiggle toes, maintaining eye contact>10 secs, contracture of extremities.   Dermatologic: skin warm, dry and intact; no rashes, wounds, or scars, no decubitus ulcers  Lines: 18 gauge peripheral line in right UE since 3/13, 24 gauge a-line on left arm since 3/14, lu since 3/3/22      LABS:                        10.8   5.86  )-----------( 194      ( 18 Mar 2022 06:58 )             35.6     03-18    142  |  107  |  14  ----------------------------<  122<H>  4.0   |  27  |  0.42<L>    Ca    8.3<L>      18 Mar 2022 07:09  Phos  2.0     03-18  Mg     1.9     03-18          CAPILLARY BLOOD GLUCOSE      POCT Blood Glucose.: 131 mg/dL (18 Mar 2022 06:34)  POCT Blood Glucose.: 112 mg/dL (17 Mar 2022 22:56)  POCT Blood Glucose.: 110 mg/dL (17 Mar 2022 18:11)  POCT Blood Glucose.: 137 mg/dL (17 Mar 2022 11:56)          RADIOLOGY & ADDITIONAL TESTS: Reviewed.    Sputum culture 3/13: rare proteus mirabilis  Blood culture 3/13: NGTD 1d  Bronchial wash 3/14: no organisms  CXR AM 3/14: compared to prior, change in rotation, increase opacification in upper and mid lung fields.   CXR AM 3/15: unchanged with focal atelectasis and infiltrates, bilaterally  CXR AM 3/16: decrease infiltrates bilateral  CXR AM 3/17: Unchanged compared to prior

## 2022-03-18 NOTE — PROGRESS NOTE ADULT - ASSESSMENT
73 yo F with PMHx of cerebral palsy with functional quadriplegia, Hx of PE/DVTs, GERD, chronic dysphagia with PEG, thoracic spine fusion initially admitted for acute hypoxic respiratory failure found to have pseudomonal PNA, now intubated for third time i/s/o aspiration PNA of PEG feeds and copious secretions, now off Abx, pending trach placement pending consent will come from Consumer Advisory Board meeting    NEURO  #H/O cerebral palsy.   Patient with cerebral palsy. Has functional quadriplegia, health aide at assisted living facility reports at baseline she is able to follow commands and communicate "when she wants to".  - pending discussion with pt's CAB state advocate for GOC and medical decisions  - FOR CONSENT, 2-physician consent is required  - see GOC as below    #Goals of care, counseling/discussion.   Patient with cerebral palsy. Previously a [place] resident, now resides in group home, with [place] [facility] per palliative care. Patient with repeat admission for aspiration pneumonia with history of dysphagia and copious secretions from oropharynx on exam.  - palliative care and ethics consulted - follow up recs  - consent will come from Consumer Advisory Board meeting for trach    #Anxiety  previously, attempt for extubation was delayed 2/2 agitation/anxiety  - currently not anxious or agitated  - c/w home med Abilify 2mg qd    HEMODYNAMICS  Shock: no signs of shock  VS: HR 70-90s, MAP 70-90s  Drips: no vasopressors    PERFUSION  Mental status: alert, calm, cooperative  UOP: 20-30cc/h  Extremities: WWP, 2+ pulses in UE/LE, <2 cap refill    CV  No active issues    PULM  #Acute respiratory failure with hypoxia i/s/o of aspiration pneumonia of PEG feeds and copious oral secretions  Patient with h/o aspiration events requiring intubation.   Patient found with respiratory distress and SaO2 of 84%, intubated in field for airway protection, s/p extubation on 3/8 to HFNC, re-intubation on 3/13/22  - currently on VC/AC FiO2 40%, PEEP 5, -280, RR 10, flow 4L/min  - Frequent suctioning  - Chest PT q6h  - Aspiration precautions  - ABG qd, improvement in resp alkalosis on 3/15    #On medication for venous thromboembolism (VTE).   History of PE 5 years ago. On Eliquis at home 2.5 mg BID at home. Bedside ultrasound performed in MICU with no evidence of clot.    - c/w lovenox 50 mg BID    RENAL  No active issues    GI  #History of dysphagia  - PEG tube in place and functional  - Tube study with contrast to assess direction of PEG feeds > no concern, can re-start PEG feeds      #GERD (gastroesophageal reflux disease)  Patient with history of GERD on famotidine 20 mg qHS at home. On pantoprazole 40 mg QD while in MICU  - c/w PPI 40 mg IV qd      Quintero in place draining concentrated urine    ENDO  On regular ISS for TF    ID  No active issues     HEME  #Anemia, macrocytic  Hgb downtrending 12.8 > 10.4  MCV   No active bleeding noted  most likely 2/2 nutritional deficiency  - Follow-up Folate, B12  - Transfuse if Hgb<7 (would require 2-physician consent)    PROPHYLAXIS  F: None  E: Replete PRN  N: TF Jevity 1.2 at 50cc/h + 150cc free water flushes every 4h + 1 LPS  DVT: Lovenox 50 mg BID  GI: protonix 40 mg IV qd, reglan  C: Full Code, pending discussion with pt's CAB state advocate for GOC and medical decisions  FOR CONSENT 2-physician consent is required  D: Pending trach, if tracheostomy is pursued, patient cannot return back to group home and will be discharged to nursing home.

## 2022-03-19 LAB
ANION GAP SERPL CALC-SCNC: 11 MMOL/L — SIGNIFICANT CHANGE UP (ref 5–17)
BUN SERPL-MCNC: 13 MG/DL — SIGNIFICANT CHANGE UP (ref 7–23)
CALCIUM SERPL-MCNC: 9.5 MG/DL — SIGNIFICANT CHANGE UP (ref 8.4–10.5)
CHLORIDE SERPL-SCNC: 103 MMOL/L — SIGNIFICANT CHANGE UP (ref 96–108)
CO2 SERPL-SCNC: 26 MMOL/L — SIGNIFICANT CHANGE UP (ref 22–31)
CREAT SERPL-MCNC: 0.42 MG/DL — LOW (ref 0.5–1.3)
EGFR: 103 ML/MIN/1.73M2 — SIGNIFICANT CHANGE UP
GLUCOSE BLDC GLUCOMTR-MCNC: 112 MG/DL — HIGH (ref 70–99)
GLUCOSE BLDC GLUCOMTR-MCNC: 113 MG/DL — HIGH (ref 70–99)
GLUCOSE BLDC GLUCOMTR-MCNC: 120 MG/DL — HIGH (ref 70–99)
GLUCOSE BLDC GLUCOMTR-MCNC: 137 MG/DL — HIGH (ref 70–99)
GLUCOSE SERPL-MCNC: 99 MG/DL — SIGNIFICANT CHANGE UP (ref 70–99)
HCT VFR BLD CALC: 35 % — SIGNIFICANT CHANGE UP (ref 34.5–45)
HGB BLD-MCNC: 10.8 G/DL — LOW (ref 11.5–15.5)
MAGNESIUM SERPL-MCNC: 1.6 MG/DL — SIGNIFICANT CHANGE UP (ref 1.6–2.6)
MCHC RBC-ENTMCNC: 30.9 GM/DL — LOW (ref 32–36)
MCHC RBC-ENTMCNC: 30.9 PG — SIGNIFICANT CHANGE UP (ref 27–34)
MCV RBC AUTO: 100.3 FL — HIGH (ref 80–100)
NRBC # BLD: 0 /100 WBCS — SIGNIFICANT CHANGE UP (ref 0–0)
PHOSPHATE SERPL-MCNC: 3.5 MG/DL — SIGNIFICANT CHANGE UP (ref 2.5–4.5)
PLATELET # BLD AUTO: 377 K/UL — SIGNIFICANT CHANGE UP (ref 150–400)
POTASSIUM SERPL-MCNC: 4.6 MMOL/L — SIGNIFICANT CHANGE UP (ref 3.5–5.3)
POTASSIUM SERPL-SCNC: 4.6 MMOL/L — SIGNIFICANT CHANGE UP (ref 3.5–5.3)
RBC # BLD: 3.49 M/UL — LOW (ref 3.8–5.2)
RBC # FLD: 13.2 % — SIGNIFICANT CHANGE UP (ref 10.3–14.5)
SODIUM SERPL-SCNC: 140 MMOL/L — SIGNIFICANT CHANGE UP (ref 135–145)
WBC # BLD: 5.8 K/UL — SIGNIFICANT CHANGE UP (ref 3.8–10.5)
WBC # FLD AUTO: 5.8 K/UL — SIGNIFICANT CHANGE UP (ref 3.8–10.5)

## 2022-03-19 PROCEDURE — 99291 CRITICAL CARE FIRST HOUR: CPT

## 2022-03-19 RX ORDER — SODIUM CHLORIDE 9 MG/ML
500 INJECTION INTRAMUSCULAR; INTRAVENOUS; SUBCUTANEOUS ONCE
Refills: 0 | Status: COMPLETED | OUTPATIENT
Start: 2022-03-19 | End: 2022-03-19

## 2022-03-19 RX ORDER — MAGNESIUM SULFATE 500 MG/ML
2 VIAL (ML) INJECTION ONCE
Refills: 0 | Status: COMPLETED | OUTPATIENT
Start: 2022-03-19 | End: 2022-03-19

## 2022-03-19 RX ORDER — DEXMEDETOMIDINE HYDROCHLORIDE IN 0.9% SODIUM CHLORIDE 4 UG/ML
0.2 INJECTION INTRAVENOUS
Qty: 400 | Refills: 0 | Status: DISCONTINUED | OUTPATIENT
Start: 2022-03-19 | End: 2022-03-23

## 2022-03-19 RX ADMIN — Medication 5 MILLIGRAM(S): at 05:36

## 2022-03-19 RX ADMIN — Medication 5 MILLIGRAM(S): at 21:32

## 2022-03-19 RX ADMIN — Medication 650 MILLIGRAM(S): at 14:36

## 2022-03-19 RX ADMIN — ENOXAPARIN SODIUM 50 MILLIGRAM(S): 100 INJECTION SUBCUTANEOUS at 05:37

## 2022-03-19 RX ADMIN — CHLORHEXIDINE GLUCONATE 1 APPLICATION(S): 213 SOLUTION TOPICAL at 05:32

## 2022-03-19 RX ADMIN — ARIPIPRAZOLE 2 MILLIGRAM(S): 15 TABLET ORAL at 21:30

## 2022-03-19 RX ADMIN — SODIUM CHLORIDE 4 MILLILITER(S): 9 INJECTION INTRAMUSCULAR; INTRAVENOUS; SUBCUTANEOUS at 06:42

## 2022-03-19 RX ADMIN — CHLORHEXIDINE GLUCONATE 15 MILLILITER(S): 213 SOLUTION TOPICAL at 19:37

## 2022-03-19 RX ADMIN — PROPOFOL 3.41 MICROGRAM(S)/KG/MIN: 10 INJECTION, EMULSION INTRAVENOUS at 00:11

## 2022-03-19 RX ADMIN — DEXMEDETOMIDINE HYDROCHLORIDE IN 0.9% SODIUM CHLORIDE 2.84 MICROGRAM(S)/KG/HR: 4 INJECTION INTRAVENOUS at 10:08

## 2022-03-19 RX ADMIN — PANTOPRAZOLE SODIUM 40 MILLIGRAM(S): 20 TABLET, DELAYED RELEASE ORAL at 06:48

## 2022-03-19 RX ADMIN — Medication 5 MILLIGRAM(S): at 14:30

## 2022-03-19 RX ADMIN — SODIUM CHLORIDE 500 MILLILITER(S): 9 INJECTION INTRAMUSCULAR; INTRAVENOUS; SUBCUTANEOUS at 22:30

## 2022-03-19 RX ADMIN — CHLORHEXIDINE GLUCONATE 15 MILLILITER(S): 213 SOLUTION TOPICAL at 05:36

## 2022-03-19 RX ADMIN — ENOXAPARIN SODIUM 50 MILLIGRAM(S): 100 INJECTION SUBCUTANEOUS at 19:36

## 2022-03-19 RX ADMIN — Medication 650 MILLIGRAM(S): at 15:30

## 2022-03-19 RX ADMIN — DEXMEDETOMIDINE HYDROCHLORIDE IN 0.9% SODIUM CHLORIDE 2.84 MICROGRAM(S)/KG/HR: 4 INJECTION INTRAVENOUS at 22:36

## 2022-03-19 RX ADMIN — Medication 25 GRAM(S): at 10:08

## 2022-03-19 NOTE — PROGRESS NOTE ADULT - ATTENDING COMMENTS
CP with guadruplegia, multiple aspiration pna requiring MV awaiting state decision about tracheotomy.  Continue MV.  Precedex for comfort.

## 2022-03-19 NOTE — PROCEDURE NOTE - NSICDXPROCEDURE_GEN_ALL_CORE_FT
PROCEDURES:  Insertion, catheter, intravenous 19-Mar-2022 10:57:11  Nando Camacho  Ultrasound guided venous access 19-Mar-2022 10:57:20  Nando Camacho

## 2022-03-19 NOTE — PROCEDURE NOTE - NSINDICATIONS_GEN_A_CORE
antibiotic therapy/emergency venous access
arterial puncture to obtain ABG's/monitoring purposes
no peripheral access
arterial puncture to obtain ABG's
sedation/emergency venous access/other
venous access, blood draw
drainage
emergency venous access
arterial puncture to obtain ABG's/blood sampling

## 2022-03-19 NOTE — PROCEDURE NOTE - NSAPPROACH_GEN_A_CORE
percutaneous
peripheral
percutaneous
via natural/artificial opening
percutaneous
percutaneous
peripheral
percutaneous
percutaneous
peripheral

## 2022-03-19 NOTE — PROCEDURE NOTE - NSPROCDETAILS_GEN_ALL_CORE
Ultrasound Guidance/location identified, draped/prepped, sterile technique used/blood seen on insertion/dressing applied/flushes easily/secured in place/sterile technique, catheter placed
location identified, draped/prepped, sterile technique used/blood seen on insertion/dressing applied/flushes easily/secured in place/sterile technique, catheter placed
ultrasound-guided/location identified, draped/prepped, sterile technique used/blood seen on insertion/dressing applied/flushes easily/secured in place/sterile technique, catheter placed
location identified, draped/prepped, sterile technique used/blood seen on insertion/dressing applied/flushes easily/secured in place
audible air bolus/orogastric/gastric secretions aspirated, placement confirmed
location identified, draped/prepped, sterile technique used, needle inserted/introduced/positive blood return obtained via catheter/connected to a pressurized flush line/sutured in place/hemostasis with direct pressure, dressing applied/Seldinger technique/all materials/supplies accounted for at end of procedure
location identified, draped/prepped, sterile technique used/blood seen on insertion/dressing applied/flushes easily/secured in place/sterile technique, catheter placed
location identified, draped/prepped, sterile technique used/sterile dressing applied
location identified, draped/prepped, sterile technique used, needle inserted/introduced/hemostasis with direct pressure, dressing applied/all materials/supplies accounted for at end of procedure

## 2022-03-19 NOTE — PROGRESS NOTE ADULT - SUBJECTIVE AND OBJECTIVE BOX
*INCOMPLETE*    OVERNIGHT EVENTS:    SUBJECTIVE / INTERVAL HPI: Patient seen and examined at bedside. Denies f/c, n/v, HA, chest pain, SOB, abdominal pain, diarrhea, constipation, melena, hematochezia, hematuria, dysuria    MEDICATIONS  (STANDING):  ARIPiprazole 2 milliGRAM(s) Oral every 24 hours  chlorhexidine 0.12% Liquid 15 milliLiter(s) Oral Mucosa every 12 hours  chlorhexidine 2% Cloths 1 Application(s) Topical <User Schedule>  dextrose 40% Gel 15 Gram(s) Oral once  dextrose 5%. 1000 milliLiter(s) (50 mL/Hr) IV Continuous <Continuous>  dextrose 5%. 1000 milliLiter(s) (100 mL/Hr) IV Continuous <Continuous>  dextrose 50% Injectable 25 Gram(s) IV Push once  dextrose 50% Injectable 12.5 Gram(s) IV Push once  dextrose 50% Injectable 25 Gram(s) IV Push once  enoxaparin Injectable 50 milliGRAM(s) SubCutaneous every 12 hours  glucagon  Injectable 1 milliGRAM(s) IntraMuscular once  insulin regular  human corrective regimen sliding scale   SubCutaneous every 6 hours  metoclopramide 5 milliGRAM(s) Oral every 8 hours  pantoprazole   Suspension 40 milliGRAM(s) Oral before breakfast  propofol Infusion 10 MICROgram(s)/kG/Min (3.41 mL/Hr) IV Continuous <Continuous>  sodium chloride 7% Inhalation 4 milliLiter(s) Inhalation every 12 hours    MEDICATIONS  (PRN):  acetaminophen    Suspension .. 650 milliGRAM(s) Enteral Tube every 6 hours PRN Temp greater or equal to 38C (100.4F), Mild Pain (1 - 3)  albuterol/ipratropium for Nebulization 3 milliLiter(s) Nebulizer every 6 hours PRN Shortness of Breath and/or Wheezing    Allergies    ace inhibitors (Anaphylaxis)  caffeine (Rash)  cefepime (Rash)  chocolate (Rash)  high acid (Rash)  Tomatoes (Hives)    Intolerances        VITAL SIGNS:  Vital Signs Last 24 Hrs  T(C): 36.9 (19 Mar 2022 06:05), Max: 37.3 (18 Mar 2022 14:30)  T(F): 98.5 (19 Mar 2022 06:05), Max: 99.1 (18 Mar 2022 14:30)  HR: 87 (19 Mar 2022 08:00) (62 - 87)  BP: 134/62 (19 Mar 2022 08:00) (97/52 - 149/65)  BP(mean): 89 (19 Mar 2022 08:00) (70 - 100)  RR: 16 (19 Mar 2022 00:01) (16 - 24)  SpO2: 93% (19 Mar 2022 08:00) (93% - 100%)      03-18-22 @ 07:01  -  03-19-22 @ 07:00  --------------------------------------------------------  IN: 2239.2 mL / OUT: 1212 mL / NET: 1027.2 mL        PHYSICAL EXAM:  Constitutional: Calm, cooperative, intubated   HEENT: NC/AT; clear conjunctiva, anicteric sclera, left eye with limited movement, with ETT  Neck: supple; no JVD or thyromegaly, no cervical, post-auricular or supraclavicular lymphadenopathy  Respiratory: Coarse rhonchi R>L, decreased breath sounds on the left side mid to lower fields, no increased work of breathing or accessory muscle use  Cardiac: +S1/S2, no murmurs/rubs/gallops, RRR  Gastrointestinal: abdomen soft, NT/ND; +BSx4, PEG in place with dressing from 3/11 C/D/I  Genitourinary: no suprapubic tenderness, Lu draining concentrated yellow urine  Extremities: WWP, no clubbing, edema or cyanosis, with bilateral mittens  Vascular: 2+ radial, femoral, DP/PT pulses B/L  Neurologic: following simple commands such as wiggle toes, maintaining eye contact>10 secs, contracture of extremities.   Dermatologic: skin warm, dry and intact; no rashes, wounds, or scars, no decubitus ulcers  Lines: 18 gauge peripheral line in right UE since 3/13, 24 gauge a-line on left arm since 3/14, lu since 3/3/22      LABS:                        10.8   5.80  )-----------( 377      ( 19 Mar 2022 04:52 )             35.0     03-19    140  |  103  |  13  ----------------------------<  99  4.6   |  26  |  0.42<L>    Ca    9.5      19 Mar 2022 04:52  Phos  3.5     03-19  Mg     1.6     03-19          CAPILLARY BLOOD GLUCOSE      POCT Blood Glucose.: 120 mg/dL (19 Mar 2022 06:53)  POCT Blood Glucose.: 114 mg/dL (18 Mar 2022 23:37)  POCT Blood Glucose.: 116 mg/dL (18 Mar 2022 18:49)  POCT Blood Glucose.: 104 mg/dL (18 Mar 2022 14:10)          RADIOLOGY & ADDITIONAL TESTS: Reviewed. OVERNIGHT EVENTS: NAOE    SUBJECTIVE / INTERVAL HPI: Patient seen and examined at bedside. Pt denies any complaints.     MEDICATIONS  (STANDING):  ARIPiprazole 2 milliGRAM(s) Oral every 24 hours  chlorhexidine 0.12% Liquid 15 milliLiter(s) Oral Mucosa every 12 hours  chlorhexidine 2% Cloths 1 Application(s) Topical <User Schedule>  dextrose 40% Gel 15 Gram(s) Oral once  dextrose 5%. 1000 milliLiter(s) (50 mL/Hr) IV Continuous <Continuous>  dextrose 5%. 1000 milliLiter(s) (100 mL/Hr) IV Continuous <Continuous>  dextrose 50% Injectable 25 Gram(s) IV Push once  dextrose 50% Injectable 12.5 Gram(s) IV Push once  dextrose 50% Injectable 25 Gram(s) IV Push once  enoxaparin Injectable 50 milliGRAM(s) SubCutaneous every 12 hours  glucagon  Injectable 1 milliGRAM(s) IntraMuscular once  insulin regular  human corrective regimen sliding scale   SubCutaneous every 6 hours  metoclopramide 5 milliGRAM(s) Oral every 8 hours  pantoprazole   Suspension 40 milliGRAM(s) Oral before breakfast  propofol Infusion 10 MICROgram(s)/kG/Min (3.41 mL/Hr) IV Continuous <Continuous>  sodium chloride 7% Inhalation 4 milliLiter(s) Inhalation every 12 hours    MEDICATIONS  (PRN):  acetaminophen    Suspension .. 650 milliGRAM(s) Enteral Tube every 6 hours PRN Temp greater or equal to 38C (100.4F), Mild Pain (1 - 3)  albuterol/ipratropium for Nebulization 3 milliLiter(s) Nebulizer every 6 hours PRN Shortness of Breath and/or Wheezing    Allergies    ace inhibitors (Anaphylaxis)  caffeine (Rash)  cefepime (Rash)  chocolate (Rash)  high acid (Rash)  Tomatoes (Hives)    Intolerances        VITAL SIGNS:  Vital Signs Last 24 Hrs  T(C): 36.9 (19 Mar 2022 06:05), Max: 37.3 (18 Mar 2022 14:30)  T(F): 98.5 (19 Mar 2022 06:05), Max: 99.1 (18 Mar 2022 14:30)  HR: 87 (19 Mar 2022 08:00) (62 - 87)  BP: 134/62 (19 Mar 2022 08:00) (97/52 - 149/65)  BP(mean): 89 (19 Mar 2022 08:00) (70 - 100)  RR: 16 (19 Mar 2022 00:01) (16 - 24)  SpO2: 93% (19 Mar 2022 08:00) (93% - 100%)      03-18-22 @ 07:01  -  03-19-22 @ 07:00  --------------------------------------------------------  IN: 2239.2 mL / OUT: 1212 mL / NET: 1027.2 mL        PHYSICAL EXAM:  Constitutional: Calm, cooperative, intubated   HEENT: NC/AT; clear conjunctiva, anicteric sclera, left eye with limited movement, with ETT  Neck: supple; no JVD or thyromegaly, no cervical, post-auricular or supraclavicular lymphadenopathy  Respiratory: Coarse rhonchi R>L, decreased breath sounds on the left side mid to lower fields, no increased work of breathing or accessory muscle use  Cardiac: +S1/S2, no murmurs/rubs/gallops, RRR  Gastrointestinal: abdomen soft, NT/ND; +BSx4, PEG in place with dressing from 3/11 C/D/I  Genitourinary: no suprapubic tenderness, Lu draining concentrated yellow urine  Extremities: WWP, no clubbing, edema or cyanosis, with bilateral mittens  Vascular: 2+ radial, femoral, DP/PT pulses B/L  Neurologic: following simple commands such as wiggle toes, maintaining eye contact>10 secs, contracture of extremities.   Dermatologic: skin warm, dry and intact; no rashes, wounds, or scars, no decubitus ulcers  Lines: 18 gauge peripheral line in right UE since 3/13, 24 gauge a-line on left arm since 3/14, lu since 3/3/22      LABS:                        10.8   5.80  )-----------( 377      ( 19 Mar 2022 04:52 )             35.0     03-19    140  |  103  |  13  ----------------------------<  99  4.6   |  26  |  0.42<L>    Ca    9.5      19 Mar 2022 04:52  Phos  3.5     03-19  Mg     1.6     03-19          CAPILLARY BLOOD GLUCOSE      POCT Blood Glucose.: 120 mg/dL (19 Mar 2022 06:53)  POCT Blood Glucose.: 114 mg/dL (18 Mar 2022 23:37)  POCT Blood Glucose.: 116 mg/dL (18 Mar 2022 18:49)  POCT Blood Glucose.: 104 mg/dL (18 Mar 2022 14:10)          RADIOLOGY & ADDITIONAL TESTS: Reviewed.

## 2022-03-19 NOTE — PROCEDURE NOTE - ADDITIONAL PROCEDURE DETAILS
writer notified that one of the IVs that were in place was accidentally pulled out, patient on levo and propofol gtt while also receiving electrolyte repletion
Two 20 gauge IVs placed under US guidance to left arm.
Called to assist with placement of difficult vascular access. Site assessed by ultrasound and a angiocath was placed under ultrasound guidance. Catheter visualized in the center of vessel and confirmed with flush exam distal from catheter on ultrasound. Catheter may be used per nursing procedure.

## 2022-03-19 NOTE — PROGRESS NOTE ADULT - ASSESSMENT
73 yo F with PMHx of cerebral palsy with functional quadriplegia, Hx of PE/DVTs, GERD, chronic dysphagia with PEG, thoracic spine fusion initially admitted for acute hypoxic respiratory failure found to have pseudomonal PNA, now intubated for third time i/s/o aspiration PNA of PEG feeds and copious secretions, now off Abx, pending trach placement pending consent will come from Consumer Advisory Board meeting    NEURO  #H/O cerebral palsy.   Patient with cerebral palsy. Has functional quadriplegia, health aide at assisted living facility reports at baseline she is able to follow commands and communicate "when she wants to".  - pending discussion with pt's CAB state advocate for GOC and medical decisions  - FOR CONSENT, 2-physician consent is required  - see GOC as below    #Goals of care, counseling/discussion.   Patient with cerebral palsy. [facility] a [facility], now resides in group home, with [facility] per [facility] care. Patient with repeat admission for aspiration pneumonia with history of dysphagia and copious secretions from oropharynx on exam.  - palliative care and ethics consulted - follow up recs  - consent will come from Consumer Advisory Board meeting for trach    #Anxiety  previously, attempt for extubation was delayed 2/2 agitation/anxiety  - currently not anxious or agitated  - c/w home med Abilify 2mg qd    HEMODYNAMICS  Shock: no signs of shock  VS: HR 70-90s, MAP 70-90s  Drips: no vasopressors    PERFUSION  Mental status: alert, calm, cooperative  UOP: 20-30cc/h  Extremities: WWP, 2+ pulses in UE/LE, <2 cap refill    CV  No active issues    PULM  #Acute respiratory failure with hypoxia i/s/o of aspiration pneumonia of PEG feeds and copious oral secretions  Patient with h/o aspiration events requiring intubation.   Patient found with respiratory distress and SaO2 of 84%, intubated in field for airway protection, s/p extubation on 3/8 to HFNC, re-intubation on 3/13/22  - currently on VC/AC FiO2 40%, PEEP 5, -280, RR 10, flow 4L/min  - Frequent suctioning  - Chest PT q6h  - Aspiration precautions  - ABG qd, improvement in resp alkalosis on 3/15    #On medication for venous thromboembolism (VTE).   History of PE 5 years ago. On Eliquis at home 2.5 mg BID at home. Bedside ultrasound performed in MICU with no evidence of clot.    - c/w lovenox 50 mg BID    RENAL  No active issues    GI  #History of dysphagia  - PEG tube in place and functional  - Tube study with contrast to assess direction of PEG feeds > no concern, can re-start PEG feeds      #GERD (gastroesophageal reflux disease)  Patient with history of GERD on famotidine 20 mg qHS at home. On pantoprazole 40 mg QD while in MICU  - c/w PPI 40 mg IV qd      Quintero in place draining concentrated urine    ENDO  On regular ISS for TF    ID  No active issues     HEME  #Anemia, macrocytic  Hgb downtrending 12.8 > 10.4  MCV   No active bleeding noted  most likely 2/2 nutritional deficiency  - Follow-up Folate, B12  - Transfuse if Hgb<7 (would require 2-physician consent)    PROPHYLAXIS  F: None  E: Replete PRN  N: TF Jevity 1.2 at 50cc/h + 150cc free water flushes every 4h + 1 LPS  DVT: Lovenox 50 mg BID  GI: protonix 40 mg IV qd, reglan  C: Full Code, pending discussion with pt's CAB state advocate for GOC and medical decisions  FOR CONSENT 2-physician consent is required  D: Pending trach, if tracheostomy is pursued, patient cannot return back to group home and will be discharged to nursing home.   75 yo F with PMHx of cerebral palsy with functional quadriplegia, Hx of PE/DVTs, GERD, chronic dysphagia with PEG, thoracic spine fusion initially admitted for acute hypoxic respiratory failure found to have pseudomonal PNA, now intubated for third time i/s/o aspiration PNA of PEG feeds and copious secretions, now off Abx, pending trach placement pending consent will come from Consumer Advisory Board meeting    NEURO  #H/O cerebral palsy.   Patient with cerebral palsy. Has functional quadriplegia, health aide at assisted living facility reports at baseline she is able to follow commands and communicate "when she wants to".  - pending discussion with pt's CAB state advocate for GOC and medical decisions  - FOR CONSENT, 2-physician consent is required  - see GOC as below    #Goals of care, counseling/discussion.   Patient with cerebral palsy. Previously a [place] resident, now resides in group home, with [facility] per palliative care. Patient with repeat admission for aspiration pneumonia with history of dysphagia and copious secretions from oropharynx on exam.  - palliative care and ethics consulted - follow up recs  - consent will come from Consumer Advisory Board meeting for trach    #Anxiety  previously, attempt for extubation was delayed 2/2 agitation/anxiety  - c/w home med Abilify 2mg qd  - c/w precedex, and wean off    HEMODYNAMICS  Shock: no signs of shock  VS: HR 70-90s, MAP 70-90s  Drips: no vasopressors    PERFUSION  Mental status: alert, calm, cooperative  UOP: 20-30cc/h  Extremities: WWP, 2+ pulses in UE/LE, <2 cap refill    CV  No active issues    PULM  #Acute respiratory failure with hypoxia i/s/o of aspiration pneumonia of PEG feeds and copious oral secretions  Patient with h/o aspiration events requiring intubation.   Patient found with respiratory distress and SaO2 of 84%, intubated in field for airway protection, s/p extubation on 3/8 to HFNC, re-intubation on 3/13/22  - currently on VC/AC FiO2 40%, PEEP 5, -280, RR 10, flow 4L/min  - Frequent suctioning  - Chest PT q6h  - Aspiration precautions  - ABG qd, improvement in resp alkalosis on 3/15    #On medication for venous thromboembolism (VTE).   History of PE 5 years ago. On Eliquis at home 2.5 mg BID at home. Bedside ultrasound performed in MICU with no evidence of clot.    - c/w lovenox 50 mg BID    RENAL  No active issues    GI  #History of dysphagia  - PEG tube in place and functional  - Tube study with contrast to assess direction of PEG feeds > no concern, can re-start PEG feeds      #GERD (gastroesophageal reflux disease)  Patient with history of GERD on famotidine 20 mg qHS at home. On pantoprazole 40 mg QD while in MICU  - c/w PPI 40 mg IV qd      Quintero in place draining concentrated urine    ENDO  On regular ISS for TF    ID  No active issues     HEME  #Anemia, macrocytic  Hgb downtrending 12.8 > 10.4  MCV   No active bleeding noted  most likely 2/2 nutritional deficiency  - Follow-up Folate, B12  - Transfuse if Hgb<7 (would require 2-physician consent)    PROPHYLAXIS  F: None  E: Replete PRN  N: TF Jevity 1.2 at 50cc/h + 1 LPS  DVT: Lovenox 50 mg BID  GI: protonix 40 mg IV qd, reglan  C: Full Code, pending discussion with pt's CAB state advocate for GOC and medical decisions  FOR CONSENT 2-physician consent is required  D: Pending trach, if tracheostomy is pursued, patient cannot return back to group home and will be discharged to nursing home.

## 2022-03-20 LAB
ALBUMIN SERPL ELPH-MCNC: 3.1 G/DL — LOW (ref 3.3–5)
ALP SERPL-CCNC: 68 U/L — SIGNIFICANT CHANGE UP (ref 40–120)
ALT FLD-CCNC: 21 U/L — SIGNIFICANT CHANGE UP (ref 10–45)
ANION GAP SERPL CALC-SCNC: 11 MMOL/L — SIGNIFICANT CHANGE UP (ref 5–17)
AST SERPL-CCNC: 25 U/L — SIGNIFICANT CHANGE UP (ref 10–40)
BILIRUB SERPL-MCNC: 0.3 MG/DL — SIGNIFICANT CHANGE UP (ref 0.2–1.2)
BUN SERPL-MCNC: 22 MG/DL — SIGNIFICANT CHANGE UP (ref 7–23)
CALCIUM SERPL-MCNC: 9.1 MG/DL — SIGNIFICANT CHANGE UP (ref 8.4–10.5)
CHLORIDE SERPL-SCNC: 104 MMOL/L — SIGNIFICANT CHANGE UP (ref 96–108)
CO2 SERPL-SCNC: 26 MMOL/L — SIGNIFICANT CHANGE UP (ref 22–31)
CREAT SERPL-MCNC: 0.47 MG/DL — LOW (ref 0.5–1.3)
EGFR: 100 ML/MIN/1.73M2 — SIGNIFICANT CHANGE UP
GLUCOSE BLDC GLUCOMTR-MCNC: 118 MG/DL — HIGH (ref 70–99)
GLUCOSE BLDC GLUCOMTR-MCNC: 136 MG/DL — HIGH (ref 70–99)
GLUCOSE BLDC GLUCOMTR-MCNC: 140 MG/DL — HIGH (ref 70–99)
GLUCOSE SERPL-MCNC: 105 MG/DL — HIGH (ref 70–99)
HCT VFR BLD CALC: 33.1 % — LOW (ref 34.5–45)
HGB BLD-MCNC: 10.2 G/DL — LOW (ref 11.5–15.5)
MAGNESIUM SERPL-MCNC: 1.9 MG/DL — SIGNIFICANT CHANGE UP (ref 1.6–2.6)
MCHC RBC-ENTMCNC: 30.4 PG — SIGNIFICANT CHANGE UP (ref 27–34)
MCHC RBC-ENTMCNC: 30.8 GM/DL — LOW (ref 32–36)
MCV RBC AUTO: 98.5 FL — SIGNIFICANT CHANGE UP (ref 80–100)
NRBC # BLD: 0 /100 WBCS — SIGNIFICANT CHANGE UP (ref 0–0)
PHOSPHATE SERPL-MCNC: 3 MG/DL — SIGNIFICANT CHANGE UP (ref 2.5–4.5)
PLATELET # BLD AUTO: 374 K/UL — SIGNIFICANT CHANGE UP (ref 150–400)
POTASSIUM SERPL-MCNC: 4.5 MMOL/L — SIGNIFICANT CHANGE UP (ref 3.5–5.3)
POTASSIUM SERPL-SCNC: 4.5 MMOL/L — SIGNIFICANT CHANGE UP (ref 3.5–5.3)
PROT SERPL-MCNC: 6.4 G/DL — SIGNIFICANT CHANGE UP (ref 6–8.3)
RBC # BLD: 3.36 M/UL — LOW (ref 3.8–5.2)
RBC # FLD: 13.1 % — SIGNIFICANT CHANGE UP (ref 10.3–14.5)
SODIUM SERPL-SCNC: 141 MMOL/L — SIGNIFICANT CHANGE UP (ref 135–145)
WBC # BLD: 7.23 K/UL — SIGNIFICANT CHANGE UP (ref 3.8–10.5)
WBC # FLD AUTO: 7.23 K/UL — SIGNIFICANT CHANGE UP (ref 3.8–10.5)

## 2022-03-20 PROCEDURE — 99291 CRITICAL CARE FIRST HOUR: CPT

## 2022-03-20 RX ORDER — MAGNESIUM SULFATE 500 MG/ML
1 VIAL (ML) INJECTION ONCE
Refills: 0 | Status: COMPLETED | OUTPATIENT
Start: 2022-03-20 | End: 2022-03-20

## 2022-03-20 RX ADMIN — ENOXAPARIN SODIUM 50 MILLIGRAM(S): 100 INJECTION SUBCUTANEOUS at 05:26

## 2022-03-20 RX ADMIN — DEXMEDETOMIDINE HYDROCHLORIDE IN 0.9% SODIUM CHLORIDE 2.84 MICROGRAM(S)/KG/HR: 4 INJECTION INTRAVENOUS at 23:03

## 2022-03-20 RX ADMIN — PANTOPRAZOLE SODIUM 40 MILLIGRAM(S): 20 TABLET, DELAYED RELEASE ORAL at 05:26

## 2022-03-20 RX ADMIN — ARIPIPRAZOLE 2 MILLIGRAM(S): 15 TABLET ORAL at 21:11

## 2022-03-20 RX ADMIN — ENOXAPARIN SODIUM 50 MILLIGRAM(S): 100 INJECTION SUBCUTANEOUS at 19:15

## 2022-03-20 RX ADMIN — DEXMEDETOMIDINE HYDROCHLORIDE IN 0.9% SODIUM CHLORIDE 2.84 MICROGRAM(S)/KG/HR: 4 INJECTION INTRAVENOUS at 10:03

## 2022-03-20 RX ADMIN — CHLORHEXIDINE GLUCONATE 1 APPLICATION(S): 213 SOLUTION TOPICAL at 05:26

## 2022-03-20 RX ADMIN — Medication 5 MILLIGRAM(S): at 06:44

## 2022-03-20 RX ADMIN — Medication 5 MILLIGRAM(S): at 14:00

## 2022-03-20 RX ADMIN — CHLORHEXIDINE GLUCONATE 15 MILLILITER(S): 213 SOLUTION TOPICAL at 05:25

## 2022-03-20 RX ADMIN — CHLORHEXIDINE GLUCONATE 15 MILLILITER(S): 213 SOLUTION TOPICAL at 19:15

## 2022-03-20 RX ADMIN — Medication 5 MILLIGRAM(S): at 21:12

## 2022-03-20 RX ADMIN — Medication 100 GRAM(S): at 05:27

## 2022-03-20 NOTE — PROGRESS NOTE ADULT - SUBJECTIVE AND OBJECTIVE BOX
CC: Patient is a 76y old  Female who presents with a chief complaint of respiratory failure       INTERVAL EVENTS: LIZABETH    SUBJECTIVE / INTERVAL HPI: Patient seen and examined at bedside.     ROS: negative unless otherwise stated above.    VITAL SIGNS:  Vital Signs Last 24 Hrs  T(C): 36.4 (20 Mar 2022 14:25), Max: 36.8 (20 Mar 2022 06:39)  T(F): 97.6 (20 Mar 2022 14:25), Max: 98.2 (20 Mar 2022 06:39)  HR: 59 (20 Mar 2022 14:00) (52 - 67)  BP: 116/58 (20 Mar 2022 14:00) (85/53 - 131/64)  BP(mean): 80 (20 Mar 2022 14:00) (65 - 89)  RR: 18 (20 Mar 2022 14:00) (15 - 22)  SpO2: 96% (20 Mar 2022 14:00) (93% - 99%)      03-19-22 @ 07:01  -  03-20-22 @ 07:00  --------------------------------------------------------  IN: 2280.8 mL / OUT: 575 mL / NET: 1705.8 mL    03-20-22 @ 07:01  -  03-20-22 @ 14:46  --------------------------------------------------------  IN: 343.5 mL / OUT: 140 mL / NET: 203.5 mL        PHYSICAL EXAM:    Constitutional: Calm, cooperative, intubated   HEENT: NC/AT; clear conjunctiva, anicteric sclera, left eye with limited movement, with ETT  Neck: supple; no JVD or thyromegaly, no cervical, post-auricular or supraclavicular lymphadenopathy  Respiratory: Coarse rhonchi R>L, decreased breath sounds on the left side mid to lower fields, no increased work of breathing or accessory muscle use  Cardiac: +S1/S2, no murmurs/rubs/gallops, RRR  Gastrointestinal: abdomen soft, NT/ND; +BSx4, PEG in place with dressing from 3/11 C/D/I  Genitourinary: no suprapubic tenderness, Lu draining concentrated yellow urine  Extremities: WWP, no clubbing, edema or cyanosis, with bilateral mittens  Vascular: 2+ radial, femoral, DP/PT pulses B/L  Neurologic: following simple commands such as wiggle toes, maintaining eye contact>10 secs, contracture of extremities.   Dermatologic: skin warm, dry and intact; no rashes, wounds, or scars, no decubitus ulcers  Lines: 18 gauge peripheral line in right UE since 3/13, 24 gauge a-line on left arm since 3/14, lu since 3/3/22    MEDICATIONS:  MEDICATIONS  (STANDING):  ARIPiprazole 2 milliGRAM(s) Oral every 24 hours  chlorhexidine 0.12% Liquid 15 milliLiter(s) Oral Mucosa every 12 hours  chlorhexidine 2% Cloths 1 Application(s) Topical <User Schedule>  dexMEDEtomidine Infusion 0.2 MICROgram(s)/kG/Hr (2.84 mL/Hr) IV Continuous <Continuous>  dextrose 40% Gel 15 Gram(s) Oral once  dextrose 5%. 1000 milliLiter(s) (50 mL/Hr) IV Continuous <Continuous>  dextrose 5%. 1000 milliLiter(s) (100 mL/Hr) IV Continuous <Continuous>  dextrose 50% Injectable 12.5 Gram(s) IV Push once  dextrose 50% Injectable 25 Gram(s) IV Push once  dextrose 50% Injectable 25 Gram(s) IV Push once  enoxaparin Injectable 50 milliGRAM(s) SubCutaneous every 12 hours  glucagon  Injectable 1 milliGRAM(s) IntraMuscular once  insulin regular  human corrective regimen sliding scale   SubCutaneous every 6 hours  metoclopramide 5 milliGRAM(s) Oral every 8 hours  pantoprazole   Suspension 40 milliGRAM(s) Oral before breakfast    MEDICATIONS  (PRN):  acetaminophen    Suspension .. 650 milliGRAM(s) Enteral Tube every 6 hours PRN Temp greater or equal to 38C (100.4F), Mild Pain (1 - 3)  albuterol/ipratropium for Nebulization 3 milliLiter(s) Nebulizer every 6 hours PRN Shortness of Breath and/or Wheezing      ALLERGIES:  Allergies    ace inhibitors (Anaphylaxis)  caffeine (Rash)  cefepime (Rash)  chocolate (Rash)  high acid (Rash)  Tomatoes (Hives)    Intolerances        LABS:                        10.2   7.23  )-----------( 374      ( 20 Mar 2022 04:55 )             33.1     03-20    141  |  104  |  22  ----------------------------<  105<H>  4.5   |  26  |  0.47<L>    Ca    9.1      20 Mar 2022 04:55  Phos  3.0     03-20  Mg     1.9     03-20    TPro  6.4  /  Alb  3.1<L>  /  TBili  0.3  /  DBili  x   /  AST  25  /  ALT  21  /  AlkPhos  68  03-20        CAPILLARY BLOOD GLUCOSE      POCT Blood Glucose.: 118 mg/dL (20 Mar 2022 12:52)      RADIOLOGY & ADDITIONAL TESTS: Reviewed. INTERVAL HPI/OVERNIGHT EVENTS: 500cc NS for decreased UOP.  AM lytes repleted.    SUBJECTIVE: Patient seen and examined at bedside. Unable to assess ROS due to patient mental status and intubated.     OBJECTIVE:    VITAL SIGNS:  ICU Vital Signs Last 24 Hrs  T(C): 36.7 (20 Mar 2022 17:43), Max: 36.8 (20 Mar 2022 06:39)  T(F): 98.1 (20 Mar 2022 17:43), Max: 98.2 (20 Mar 2022 06:39)  HR: 57 (20 Mar 2022 20:00) (52 - 67)  BP: 124/58 (20 Mar 2022 20:00) (95/50 - 131/64)  BP(mean): 83 (20 Mar 2022 20:00) (67 - 89)  ABP: --  ABP(mean): --  RR: 18 (20 Mar 2022 20:00) (17 - 22)  SpO2: 100% (20 Mar 2022 20:00) (93% - 100%)    Mode: AC/ CMV (Assist Control/ Continuous Mandatory Ventilation), RR (machine): 15, TV (machine): 250, FiO2: 50, PEEP: 5, ITime: 1, MAP: 9, PIP: 17    03-19 @ 07:01  -  03-20 @ 07:00  --------------------------------------------------------  IN: 2280.8 mL / OUT: 575 mL / NET: 1705.8 mL    03-20 @ 07:01  -  03-20 @ 21:19  --------------------------------------------------------  IN: 859.5 mL / OUT: 330 mL / NET: 529.5 mL      CAPILLARY BLOOD GLUCOSE      POCT Blood Glucose.: 136 mg/dL (20 Mar 2022 19:17)      PHYSICAL EXAM:    Constitutional: Calm, cooperative, intubated   HEENT: NC/AT; clear conjunctiva, anicteric sclera, left eye with limited movement, with ETT  Neck: supple; no JVD or thyromegaly, no cervical, post-auricular or supraclavicular lymphadenopathy  Respiratory: Coarse rhonchi R>L, decreased breath sounds on the left side mid to lower fields, no increased work of breathing or accessory muscle use  Cardiac: +S1/S2, no murmurs/rubs/gallops, RRR  Gastrointestinal: abdomen soft, NT/ND; +BSx4, PEG in place with dressing from 3/11 C/D/I  Genitourinary: no suprapubic tenderness, Lu draining concentrated yellow urine  Extremities: WWP, no clubbing, edema or cyanosis, with bilateral mittens  Vascular: 2+ radial, femoral, DP/PT pulses B/L  Neurologic: following simple commands such as wiggle toes, maintaining eye contact>10 secs, contracture of extremities.   Dermatologic: skin warm, dry and intact; no rashes, wounds, or scars, no decubitus ulcers  Lines: 18 gauge peripheral line in right UE since 3/13, 24 gauge a-line on left arm since 3/14, lu since 3/3/22    MEDICATIONS:  MEDICATIONS  (STANDING):  ARIPiprazole 2 milliGRAM(s) Oral every 24 hours  chlorhexidine 0.12% Liquid 15 milliLiter(s) Oral Mucosa every 12 hours  chlorhexidine 2% Cloths 1 Application(s) Topical <User Schedule>  dexMEDEtomidine Infusion 0.2 MICROgram(s)/kG/Hr (2.84 mL/Hr) IV Continuous <Continuous>  dextrose 40% Gel 15 Gram(s) Oral once  dextrose 5%. 1000 milliLiter(s) (50 mL/Hr) IV Continuous <Continuous>  dextrose 5%. 1000 milliLiter(s) (100 mL/Hr) IV Continuous <Continuous>  dextrose 50% Injectable 25 Gram(s) IV Push once  dextrose 50% Injectable 12.5 Gram(s) IV Push once  dextrose 50% Injectable 25 Gram(s) IV Push once  enoxaparin Injectable 50 milliGRAM(s) SubCutaneous every 12 hours  glucagon  Injectable 1 milliGRAM(s) IntraMuscular once  insulin regular  human corrective regimen sliding scale   SubCutaneous every 6 hours  metoclopramide 5 milliGRAM(s) Oral every 8 hours  pantoprazole   Suspension 40 milliGRAM(s) Oral before breakfast    MEDICATIONS  (PRN):  acetaminophen    Suspension .. 650 milliGRAM(s) Enteral Tube every 6 hours PRN Temp greater or equal to 38C (100.4F), Mild Pain (1 - 3)  albuterol/ipratropium for Nebulization 3 milliLiter(s) Nebulizer every 6 hours PRN Shortness of Breath and/or Wheezing      ALLERGIES:  Allergies    ace inhibitors (Anaphylaxis)  caffeine (Rash)  cefepime (Rash)  chocolate (Rash)  high acid (Rash)  Tomatoes (Hives)    Intolerances        LABS:                        10.2   7.23  )-----------( 374      ( 20 Mar 2022 04:55 )             33.1     03-20    141  |  104  |  22  ----------------------------<  105<H>  4.5   |  26  |  0.47<L>    Ca    9.1      20 Mar 2022 04:55  Phos  3.0     03-20  Mg     1.9     03-20    TPro  6.4  /  Alb  3.1<L>  /  TBili  0.3  /  DBili  x   /  AST  25  /  ALT  21  /  AlkPhos  68  03-20

## 2022-03-20 NOTE — PROGRESS NOTE ADULT - ASSESSMENT
75 yo F with PMHx of cerebral palsy with functional quadriplegia, Hx of PE/DVTs, GERD, chronic dysphagia with PEG, thoracic spine fusion initially admitted for acute hypoxic respiratory failure found to have pseudomonal PNA, now intubated for third time i/s/o aspiration PNA of PEG feeds and copious secretions, now off Abx, pending trach placement pending consent will come from Consumer Advisory Board meeting    NEURO  #H/O cerebral palsy.   Patient with cerebral palsy. Has functional quadriplegia, health aide at assisted living facility reports at baseline she is able to follow commands and communicate "when she wants to".  - pending discussion with pt's CAB state advocate for GOC and medical decisions  - FOR CONSENT, 2-physician consent is required  - see GOC as below    #Goals of care, counseling/discussion.   Patient with cerebral palsy. Previously a [place] resident, now resides in group home, with [facility] per palliative care. Patient with repeat admission for aspiration pneumonia with history of dysphagia and copious secretions from oropharynx on exam.  - palliative care and ethics consulted - follow up recs  - consent will come from Consumer Advisory Board meeting for trach    #Anxiety  previously, attempt for extubation was delayed 2/2 agitation/anxiety  - c/w home med Abilify 2mg qd  - c/w precedex, and wean off    HEMODYNAMICS  Shock: no signs of shock  VS: HR 70-90s, MAP 70-90s  Drips: no vasopressors    PERFUSION  Mental status: alert, calm, cooperative  UOP: 20-30cc/h  Extremities: WWP, 2+ pulses in UE/LE, <2 cap refill    CV  No active issues    PULM  #Acute respiratory failure with hypoxia i/s/o of aspiration pneumonia of PEG feeds and copious oral secretions  Patient with h/o aspiration events requiring intubation.   Patient found with respiratory distress and SaO2 of 84%, intubated in field for airway protection, s/p extubation on 3/8 to HFNC, re-intubation on 3/13/22  - currently on VC/AC FiO2 40%, PEEP 5, -280, RR 10, flow 4L/min  - Frequent suctioning  - Chest PT q6h  - Aspiration precautions  - ABG qd, improvement in resp alkalosis on 3/15    #On medication for venous thromboembolism (VTE).   History of PE 5 years ago. On Eliquis at home 2.5 mg BID at home. Bedside ultrasound performed in MICU with no evidence of clot.    - c/w lovenox 50 mg BID    RENAL  No active issues    GI  #History of dysphagia  - PEG tube in place and functional  - Tube study with contrast to assess direction of PEG feeds > no concern, can re-start PEG feeds      #GERD (gastroesophageal reflux disease)  Patient with history of GERD on famotidine 20 mg qHS at home. On pantoprazole 40 mg QD while in MICU  - c/w PPI 40 mg IV qd      Quintero in place draining concentrated urine    ENDO  On regular ISS for TF    ID  No active issues     HEME  #Anemia, macrocytic  Hgb downtrending 12.8 > 10.4  MCV   No active bleeding noted  most likely 2/2 nutritional deficiency  - Follow-up Folate, B12  - Transfuse if Hgb<7 (would require 2-physician consent)    PROPHYLAXIS  F: None  E: Replete PRN  N: TF Jevity 1.2 at 50cc/h + 1 LPS  DVT: Lovenox 50 mg BID  GI: protonix 40 mg IV qd, reglan  C: Full Code, pending discussion with pt's CAB state advocate for GOC and medical decisions  FOR CONSENT 2-physician consent is required  D: Pending trach, if tracheostomy is pursued, patient cannot return back to group home and will be discharged to nursing home.   73 yo F with PMHx of cerebral palsy with functional quadriplegia, Hx of PE/DVTs, GERD, chronic dysphagia with PEG, thoracic spine fusion initially admitted for acute hypoxic respiratory failure found to have pseudomonal PNA, now intubated for third time i/s/o aspiration PNA of PEG feeds and copious secretions, now off Abx, pending trach placement pending consent will come from Consumer Advisory Board meeting.    NEURO  #H/O cerebral palsy.   Patient with cerebral palsy. Has functional quadriplegia, health aide at assisted living facility reports at baseline she is able to follow commands and communicate "when she wants to".  - pending discussion with pt's CAB state advocate for GOC and medical decisions  - FOR CONSENT, 2-physician consent is required  - see GOC as below    #Goals of care, counseling/discussion.   Patient with cerebral palsy. Previously a [place] resident, now resides in group home, with [facility] per palliative care. Patient with repeat admission for aspiration pneumonia with history of dysphagia and copious secretions from oropharynx on exam.  - palliative care and ethics consulted - follow up recs  - consent will come from [employer] Advisory Board meeting for trach    #Anxiety  previously, attempt for extubation was delayed 2/2 agitation/anxiety  - c/w home med Abilify 2mg qd  - c/w precedex, and wean off    HEMODYNAMICS  Shock: no signs of shock  VS: HR 70-90s, MAP 70-90s  Drips: no vasopressors    PERFUSION  Mental status: alert, calm, cooperative  UOP: 20-30cc/h  Extremities: WWP, 2+ pulses in UE/LE, <2 cap refill    CV  No active issues    PULM  #Acute respiratory failure with hypoxia i/s/o of aspiration pneumonia of PEG feeds and copious oral secretions  Patient with h/o aspiration events requiring intubation.   Patient found with respiratory distress and SaO2 of 84%, intubated in field for airway protection, s/p extubation on 3/8 to HFNC, re-intubation on 3/13/22  - currently on VC/AC FiO2 40%, PEEP 5, -280, RR 10, flow 4L/min  - Frequent suctioning  - Chest PT q6h  - Aspiration precautions  - ABG qd, improvement in resp alkalosis on 3/15    #On medication for venous thromboembolism (VTE).   History of PE 5 years ago. On Eliquis at home 2.5 mg BID at home. Bedside ultrasound performed in MICU with no evidence of clot.    - c/w lovenox 50 mg BID    RENAL  No active issues    GI  #History of dysphagia  - PEG tube in place and functional  - Tube study with contrast to assess direction of PEG feeds > no concern, can re-start PEG feeds      #GERD (gastroesophageal reflux disease)  Patient with history of GERD on famotidine 20 mg qHS at home. On pantoprazole 40 mg QD while in MICU  - c/w PPI 40 mg IV qd      Quintero in place    ENDO  On regular ISS for TF    ID  No active issues     HEME  #Anemia, macrocytic  Hgb downtrending 12.8 > 10.4  MCV   No active bleeding noted  most likely 2/2 nutritional deficiency  - Follow-up Folate, B12  - Transfuse if Hgb<7 (would require 2-physician consent)    PROPHYLAXIS  F: None  E: Replete PRN  N: TF Jevity 1.2 at 50cc/h + 1 LPS  DVT: Lovenox 50 mg BID  GI: protonix 40 mg IV qd, reglan  C: Full Code, pending discussion with pt's CAB state advocate for GOC and medical decisions  FOR CONSENT 2-physician consent is required  D: Pending trach, if tracheostomy is pursued, patient cannot return back to group home and will be discharged to nursing home.

## 2022-03-21 LAB
ANION GAP SERPL CALC-SCNC: 12 MMOL/L — SIGNIFICANT CHANGE UP (ref 5–17)
BASOPHILS # BLD AUTO: 0.07 K/UL — SIGNIFICANT CHANGE UP (ref 0–0.2)
BASOPHILS NFR BLD AUTO: 1.1 % — SIGNIFICANT CHANGE UP (ref 0–2)
BUN SERPL-MCNC: 25 MG/DL — HIGH (ref 7–23)
CALCIUM SERPL-MCNC: 8.9 MG/DL — SIGNIFICANT CHANGE UP (ref 8.4–10.5)
CHLORIDE SERPL-SCNC: 104 MMOL/L — SIGNIFICANT CHANGE UP (ref 96–108)
CO2 SERPL-SCNC: 24 MMOL/L — SIGNIFICANT CHANGE UP (ref 22–31)
CREAT SERPL-MCNC: 0.44 MG/DL — LOW (ref 0.5–1.3)
EGFR: 101 ML/MIN/1.73M2 — SIGNIFICANT CHANGE UP
EOSINOPHIL # BLD AUTO: 0.27 K/UL — SIGNIFICANT CHANGE UP (ref 0–0.5)
EOSINOPHIL NFR BLD AUTO: 4.3 % — SIGNIFICANT CHANGE UP (ref 0–6)
GLUCOSE BLDC GLUCOMTR-MCNC: 101 MG/DL — HIGH (ref 70–99)
GLUCOSE BLDC GLUCOMTR-MCNC: 107 MG/DL — HIGH (ref 70–99)
GLUCOSE BLDC GLUCOMTR-MCNC: 117 MG/DL — HIGH (ref 70–99)
GLUCOSE BLDC GLUCOMTR-MCNC: 125 MG/DL — HIGH (ref 70–99)
GLUCOSE BLDC GLUCOMTR-MCNC: 156 MG/DL — HIGH (ref 70–99)
GLUCOSE SERPL-MCNC: 123 MG/DL — HIGH (ref 70–99)
HCT VFR BLD CALC: 32.9 % — LOW (ref 34.5–45)
HGB BLD-MCNC: 10.1 G/DL — LOW (ref 11.5–15.5)
IMM GRANULOCYTES NFR BLD AUTO: 0.5 % — SIGNIFICANT CHANGE UP (ref 0–1.5)
LYMPHOCYTES # BLD AUTO: 1.33 K/UL — SIGNIFICANT CHANGE UP (ref 1–3.3)
LYMPHOCYTES # BLD AUTO: 21.2 % — SIGNIFICANT CHANGE UP (ref 13–44)
MAGNESIUM SERPL-MCNC: 1.6 MG/DL — SIGNIFICANT CHANGE UP (ref 1.6–2.6)
MCHC RBC-ENTMCNC: 30.5 PG — SIGNIFICANT CHANGE UP (ref 27–34)
MCHC RBC-ENTMCNC: 30.7 GM/DL — LOW (ref 32–36)
MCV RBC AUTO: 99.4 FL — SIGNIFICANT CHANGE UP (ref 80–100)
MONOCYTES # BLD AUTO: 0.67 K/UL — SIGNIFICANT CHANGE UP (ref 0–0.9)
MONOCYTES NFR BLD AUTO: 10.7 % — SIGNIFICANT CHANGE UP (ref 2–14)
NEUTROPHILS # BLD AUTO: 3.91 K/UL — SIGNIFICANT CHANGE UP (ref 1.8–7.4)
NEUTROPHILS NFR BLD AUTO: 62.2 % — SIGNIFICANT CHANGE UP (ref 43–77)
NRBC # BLD: 0 /100 WBCS — SIGNIFICANT CHANGE UP (ref 0–0)
PHOSPHATE SERPL-MCNC: 3.2 MG/DL — SIGNIFICANT CHANGE UP (ref 2.5–4.5)
PLATELET # BLD AUTO: 351 K/UL — SIGNIFICANT CHANGE UP (ref 150–400)
POTASSIUM SERPL-MCNC: 4.5 MMOL/L — SIGNIFICANT CHANGE UP (ref 3.5–5.3)
POTASSIUM SERPL-SCNC: 4.5 MMOL/L — SIGNIFICANT CHANGE UP (ref 3.5–5.3)
RBC # BLD: 3.31 M/UL — LOW (ref 3.8–5.2)
RBC # FLD: 13.2 % — SIGNIFICANT CHANGE UP (ref 10.3–14.5)
SARS-COV-2 RNA SPEC QL NAA+PROBE: SIGNIFICANT CHANGE UP
SODIUM SERPL-SCNC: 140 MMOL/L — SIGNIFICANT CHANGE UP (ref 135–145)
WBC # BLD: 6.28 K/UL — SIGNIFICANT CHANGE UP (ref 3.8–10.5)
WBC # FLD AUTO: 6.28 K/UL — SIGNIFICANT CHANGE UP (ref 3.8–10.5)

## 2022-03-21 PROCEDURE — 99232 SBSQ HOSP IP/OBS MODERATE 35: CPT | Mod: GC

## 2022-03-21 RX ORDER — SODIUM CHLORIDE 9 MG/ML
4 INJECTION INTRAMUSCULAR; INTRAVENOUS; SUBCUTANEOUS EVERY 6 HOURS
Refills: 0 | Status: DISCONTINUED | OUTPATIENT
Start: 2022-03-21 | End: 2022-03-29

## 2022-03-21 RX ORDER — MAGNESIUM SULFATE 500 MG/ML
2 VIAL (ML) INJECTION ONCE
Refills: 0 | Status: COMPLETED | OUTPATIENT
Start: 2022-03-21 | End: 2022-03-21

## 2022-03-21 RX ORDER — IPRATROPIUM/ALBUTEROL SULFATE 18-103MCG
3 AEROSOL WITH ADAPTER (GRAM) INHALATION EVERY 6 HOURS
Refills: 0 | Status: DISCONTINUED | OUTPATIENT
Start: 2022-03-21 | End: 2022-04-06

## 2022-03-21 RX ADMIN — Medication 5 MILLIGRAM(S): at 06:10

## 2022-03-21 RX ADMIN — SODIUM CHLORIDE 4 MILLILITER(S): 9 INJECTION INTRAMUSCULAR; INTRAVENOUS; SUBCUTANEOUS at 17:30

## 2022-03-21 RX ADMIN — INSULIN HUMAN 2: 100 INJECTION, SOLUTION SUBCUTANEOUS at 17:38

## 2022-03-21 RX ADMIN — Medication 3 MILLILITER(S): at 21:19

## 2022-03-21 RX ADMIN — CHLORHEXIDINE GLUCONATE 15 MILLILITER(S): 213 SOLUTION TOPICAL at 06:09

## 2022-03-21 RX ADMIN — ENOXAPARIN SODIUM 50 MILLIGRAM(S): 100 INJECTION SUBCUTANEOUS at 17:39

## 2022-03-21 RX ADMIN — Medication 25 GRAM(S): at 10:44

## 2022-03-21 RX ADMIN — Medication 5 MILLIGRAM(S): at 14:02

## 2022-03-21 RX ADMIN — Medication 3 MILLILITER(S): at 17:29

## 2022-03-21 RX ADMIN — SODIUM CHLORIDE 4 MILLILITER(S): 9 INJECTION INTRAMUSCULAR; INTRAVENOUS; SUBCUTANEOUS at 21:22

## 2022-03-21 RX ADMIN — ENOXAPARIN SODIUM 50 MILLIGRAM(S): 100 INJECTION SUBCUTANEOUS at 06:10

## 2022-03-21 RX ADMIN — CHLORHEXIDINE GLUCONATE 15 MILLILITER(S): 213 SOLUTION TOPICAL at 17:38

## 2022-03-21 RX ADMIN — PANTOPRAZOLE SODIUM 40 MILLIGRAM(S): 20 TABLET, DELAYED RELEASE ORAL at 06:09

## 2022-03-21 RX ADMIN — DEXMEDETOMIDINE HYDROCHLORIDE IN 0.9% SODIUM CHLORIDE 2.84 MICROGRAM(S)/KG/HR: 4 INJECTION INTRAVENOUS at 11:25

## 2022-03-21 RX ADMIN — DEXMEDETOMIDINE HYDROCHLORIDE IN 0.9% SODIUM CHLORIDE 2.84 MICROGRAM(S)/KG/HR: 4 INJECTION INTRAVENOUS at 20:50

## 2022-03-21 RX ADMIN — ARIPIPRAZOLE 2 MILLIGRAM(S): 15 TABLET ORAL at 21:32

## 2022-03-21 RX ADMIN — CHLORHEXIDINE GLUCONATE 1 APPLICATION(S): 213 SOLUTION TOPICAL at 06:10

## 2022-03-21 RX ADMIN — Medication 5 MILLIGRAM(S): at 21:32

## 2022-03-21 NOTE — PROGRESS NOTE ADULT - SUBJECTIVE AND OBJECTIVE BOX
OVERNIGHT EVENTS: NAOE    SUBJECTIVE / INTERVAL HPI: Patient seen and examined at bedside. ROS negative per pt. Pt denies any pain including CP and abd pain.     MEDICATIONS  (STANDING):  albuterol/ipratropium for Nebulization 3 milliLiter(s) Nebulizer every 6 hours  ARIPiprazole 2 milliGRAM(s) Oral every 24 hours  chlorhexidine 0.12% Liquid 15 milliLiter(s) Oral Mucosa every 12 hours  chlorhexidine 2% Cloths 1 Application(s) Topical <User Schedule>  dexMEDEtomidine Infusion 0.2 MICROgram(s)/kG/Hr (2.84 mL/Hr) IV Continuous <Continuous>  dextrose 40% Gel 15 Gram(s) Oral once  dextrose 5%. 1000 milliLiter(s) (50 mL/Hr) IV Continuous <Continuous>  dextrose 5%. 1000 milliLiter(s) (100 mL/Hr) IV Continuous <Continuous>  dextrose 50% Injectable 25 Gram(s) IV Push once  dextrose 50% Injectable 12.5 Gram(s) IV Push once  dextrose 50% Injectable 25 Gram(s) IV Push once  enoxaparin Injectable 50 milliGRAM(s) SubCutaneous every 12 hours  glucagon  Injectable 1 milliGRAM(s) IntraMuscular once  insulin regular  human corrective regimen sliding scale   SubCutaneous every 6 hours  metoclopramide 5 milliGRAM(s) Oral every 8 hours  pantoprazole   Suspension 40 milliGRAM(s) Oral before breakfast  sodium chloride 3%  Inhalation 4 milliLiter(s) Inhalation every 6 hours    MEDICATIONS  (PRN):  acetaminophen    Suspension .. 650 milliGRAM(s) Enteral Tube every 6 hours PRN Temp greater or equal to 38C (100.4F), Mild Pain (1 - 3)    Allergies    ace inhibitors (Anaphylaxis)  caffeine (Rash)  cefepime (Rash)  chocolate (Rash)  high acid (Rash)  Tomatoes (Hives)    Intolerances        VITAL SIGNS:  Vital Signs Last 24 Hrs  T(C): 36.3 (21 Mar 2022 14:00), Max: 36.9 (21 Mar 2022 09:00)  T(F): 97.3 (21 Mar 2022 14:00), Max: 98.5 (21 Mar 2022 09:00)  HR: 54 (21 Mar 2022 16:00) (51 - 96)  BP: 122/58 (21 Mar 2022 16:00) (103/56 - 131/61)  BP(mean): 84 (21 Mar 2022 16:00) (76 - 89)  RR: 17 (21 Mar 2022 16:00) (16 - 25)  SpO2: 94% (21 Mar 2022 16:00) (94% - 100%)      03-20-22 @ 07:01  -  03-21-22 @ 07:00  --------------------------------------------------------  IN: 1417.9 mL / OUT: 580 mL / NET: 837.9 mL    03-21-22 @ 07:01  -  03-21-22 @ 16:26  --------------------------------------------------------  IN: 987 mL / OUT: 380 mL / NET: 607 mL        PHYSICAL EXAM:  Constitutional: Calm, cooperative, intubated   HEENT: NC/AT; clear conjunctiva, anicteric sclera, left eye with limited movement, with ETT  Neck: supple; no JVD or thyromegaly, no cervical, post-auricular or supraclavicular lymphadenopathy  Respiratory: Coarse rhonchi R>L, decreased breath sounds on the left side mid to lower fields, no increased work of breathing or accessory muscle use  Cardiac: +S1/S2, no murmurs/rubs/gallops, RRR  Gastrointestinal: abdomen soft, NT/ND; +BSx4, PEG in place with dressing from 3/11 C/D/I  Genitourinary: no suprapubic tenderness, Lu draining concentrated yellow urine  Extremities: WWP, no clubbing, edema or cyanosis, with bilateral mittens  Vascular: 2+ radial, femoral, DP/PT pulses B/L  Neurologic: following simple commands such as wiggle toes, maintaining eye contact>10 secs, contracture of extremities.   Dermatologic: skin warm, dry and intact; no rashes, wounds, or scars, no decubitus ulcers  Lines: 18 gauge peripheral line in right UE since 3/13, 24 gauge a-line on left arm since 3/14, lu since 3/3/22      LABS:                        10.1   6.28  )-----------( 351      ( 21 Mar 2022 05:52 )             32.9     03-21    140  |  104  |  25<H>  ----------------------------<  123<H>  4.5   |  24  |  0.44<L>    Ca    8.9      21 Mar 2022 05:52  Phos  3.2     03-21  Mg     1.6     03-21    TPro  6.4  /  Alb  3.1<L>  /  TBili  0.3  /  DBili  x   /  AST  25  /  ALT  21  /  AlkPhos  68  03-20        CAPILLARY BLOOD GLUCOSE      POCT Blood Glucose.: 117 mg/dL (21 Mar 2022 11:15)  POCT Blood Glucose.: 125 mg/dL (21 Mar 2022 06:35)  POCT Blood Glucose.: 101 mg/dL (21 Mar 2022 01:03)  POCT Blood Glucose.: 136 mg/dL (20 Mar 2022 19:17)          RADIOLOGY & ADDITIONAL TESTS: Reviewed.

## 2022-03-21 NOTE — PROGRESS NOTE ADULT - ATTENDING COMMENTS
CP/PE/DVT/chronic resp failure  physical as above  await consent from [facility] for trach  continue MV  rest as above

## 2022-03-21 NOTE — PROGRESS NOTE ADULT - ASSESSMENT
75 yo F with PMHx of cerebral palsy with functional quadriplegia, Hx of PE/DVTs, GERD, chronic dysphagia with PEG, thoracic spine fusion initially admitted for acute hypoxic respiratory failure found to have pseudomonal PNA, now intubated for third time i/s/o aspiration PNA of PEG feeds and copious secretions, now off Abx, pending trach placement pending consent will come from Consumer Advisory Board meeting.    NEURO  #H/O cerebral palsy.   Patient with cerebral palsy. Has functional quadriplegia, health aide at assisted living facility reports at baseline she is able to follow commands and communicate "when she wants to".  - pending discussion with pt's CAB state advocate for GOC and medical decisions  - FOR CONSENT, 2-physician consent is required  - see GOC as below    #Goals of care, counseling/discussion.   Patient with cerebral palsy. Previously a [place] resident, now resides in group home, with [place] [facility] per palliative care. Patient with repeat admission for aspiration pneumonia with history of dysphagia and copious secretions from oropharynx on exam.  - palliative care and ethics consulted - follow up recs  - consent will come from [employer] Advisory Board meeting for trach    #Anxiety  previously, attempt for extubation was delayed 2/2 agitation/anxiety  - c/w home med Abilify 2mg qd  - c/w precedex, and wean off    HEMODYNAMICS  Shock: no signs of shock  VS: HR 70-90s, MAP 70-90s  Drips: no vasopressors    PERFUSION  Mental status: alert, calm, cooperative  UOP: 20-30cc/h  Extremities: WWP, 2+ pulses in UE/LE, <2 cap refill    CV  No active issues    PULM  #Acute respiratory failure with hypoxia i/s/o of aspiration pneumonia of PEG feeds and copious oral secretions  Patient with h/o aspiration events requiring intubation.   Patient found with respiratory distress and SaO2 of 84%, intubated in field for airway protection, s/p extubation on 3/8 to HFNC, re-intubation on 3/13/22  - currently on VC/AC FiO2 40%, PEEP 5, -280, RR 10, flow 4L/min  - Frequent suctioning  - c/w hypertonic saline followed by duonebs - standing  - Chest PT q6h  - Aspiration precautions  - ABG qd, improvement in resp alkalosis on 3/15    #On medication for venous thromboembolism (VTE).   History of PE 5 years ago. On Eliquis at home 2.5 mg BID at home. Bedside ultrasound performed in MICU with no evidence of clot.    - c/w lovenox 50 mg BID    RENAL  No active issues    GI  #History of dysphagia  - PEG tube in place and functional  - Tube study with contrast to assess direction of PEG feeds > no concern, can re-start PEG feeds      #GERD (gastroesophageal reflux disease)  Patient with history of GERD on famotidine 20 mg qHS at home. On pantoprazole 40 mg QD while in MICU  - c/w PPI 40 mg IV qd      Quintero in place    ENDO  On regular ISS for TF    ID  No active issues     HEME  #Anemia, macrocytic  Hgb downtrending 12.8 > 10.4  MCV   No active bleeding noted  most likely 2/2 nutritional deficiency  - Follow-up Folate, B12  - Transfuse if Hgb<7 (would require 2-physician consent)    PROPHYLAXIS  F: None  E: Replete PRN  N: TF Jevity 1.2 at 50cc/h + 1 LPS  DVT: Lovenox 50 mg BID  GI: protonix 40 mg IV qd, reglan  C: Full Code, pending discussion with pt's CAB state advocate for GOC and medical decisions  FOR CONSENT 2-physician consent is required  D: Pending trach, if tracheostomy is pursued, patient cannot return back to group home and will be discharged to nursing home.

## 2022-03-22 LAB
ALBUMIN SERPL ELPH-MCNC: 2.9 G/DL — LOW (ref 3.3–5)
ALP SERPL-CCNC: 70 U/L — SIGNIFICANT CHANGE UP (ref 40–120)
ALT FLD-CCNC: 22 U/L — SIGNIFICANT CHANGE UP (ref 10–45)
ANION GAP SERPL CALC-SCNC: 11 MMOL/L — SIGNIFICANT CHANGE UP (ref 5–17)
AST SERPL-CCNC: 27 U/L — SIGNIFICANT CHANGE UP (ref 10–40)
BILIRUB SERPL-MCNC: 0.2 MG/DL — SIGNIFICANT CHANGE UP (ref 0.2–1.2)
BUN SERPL-MCNC: 18 MG/DL — SIGNIFICANT CHANGE UP (ref 7–23)
CALCIUM SERPL-MCNC: 8.8 MG/DL — SIGNIFICANT CHANGE UP (ref 8.4–10.5)
CHLORIDE SERPL-SCNC: 106 MMOL/L — SIGNIFICANT CHANGE UP (ref 96–108)
CO2 SERPL-SCNC: 25 MMOL/L — SIGNIFICANT CHANGE UP (ref 22–31)
CREAT SERPL-MCNC: 0.45 MG/DL — LOW (ref 0.5–1.3)
EGFR: 101 ML/MIN/1.73M2 — SIGNIFICANT CHANGE UP
GLUCOSE BLDC GLUCOMTR-MCNC: 106 MG/DL — HIGH (ref 70–99)
GLUCOSE BLDC GLUCOMTR-MCNC: 109 MG/DL — HIGH (ref 70–99)
GLUCOSE BLDC GLUCOMTR-MCNC: 110 MG/DL — HIGH (ref 70–99)
GLUCOSE BLDC GLUCOMTR-MCNC: 119 MG/DL — HIGH (ref 70–99)
GLUCOSE SERPL-MCNC: 136 MG/DL — HIGH (ref 70–99)
HCT VFR BLD CALC: 33.2 % — LOW (ref 34.5–45)
HGB BLD-MCNC: 10.2 G/DL — LOW (ref 11.5–15.5)
MAGNESIUM SERPL-MCNC: 1.7 MG/DL — SIGNIFICANT CHANGE UP (ref 1.6–2.6)
MCHC RBC-ENTMCNC: 30.6 PG — SIGNIFICANT CHANGE UP (ref 27–34)
MCHC RBC-ENTMCNC: 30.7 GM/DL — LOW (ref 32–36)
MCV RBC AUTO: 99.7 FL — SIGNIFICANT CHANGE UP (ref 80–100)
NRBC # BLD: 0 /100 WBCS — SIGNIFICANT CHANGE UP (ref 0–0)
PHOSPHATE SERPL-MCNC: 3.1 MG/DL — SIGNIFICANT CHANGE UP (ref 2.5–4.5)
PLATELET # BLD AUTO: 326 K/UL — SIGNIFICANT CHANGE UP (ref 150–400)
POTASSIUM SERPL-MCNC: 4.4 MMOL/L — SIGNIFICANT CHANGE UP (ref 3.5–5.3)
POTASSIUM SERPL-SCNC: 4.4 MMOL/L — SIGNIFICANT CHANGE UP (ref 3.5–5.3)
PROT SERPL-MCNC: 6.1 G/DL — SIGNIFICANT CHANGE UP (ref 6–8.3)
RBC # BLD: 3.33 M/UL — LOW (ref 3.8–5.2)
RBC # FLD: 13 % — SIGNIFICANT CHANGE UP (ref 10.3–14.5)
SODIUM SERPL-SCNC: 142 MMOL/L — SIGNIFICANT CHANGE UP (ref 135–145)
WBC # BLD: 6.15 K/UL — SIGNIFICANT CHANGE UP (ref 3.8–10.5)
WBC # FLD AUTO: 6.15 K/UL — SIGNIFICANT CHANGE UP (ref 3.8–10.5)

## 2022-03-22 PROCEDURE — 99232 SBSQ HOSP IP/OBS MODERATE 35: CPT | Mod: GC

## 2022-03-22 RX ADMIN — PANTOPRAZOLE SODIUM 40 MILLIGRAM(S): 20 TABLET, DELAYED RELEASE ORAL at 06:19

## 2022-03-22 RX ADMIN — CHLORHEXIDINE GLUCONATE 1 APPLICATION(S): 213 SOLUTION TOPICAL at 06:20

## 2022-03-22 RX ADMIN — Medication 3 MILLILITER(S): at 22:25

## 2022-03-22 RX ADMIN — SODIUM CHLORIDE 4 MILLILITER(S): 9 INJECTION INTRAMUSCULAR; INTRAVENOUS; SUBCUTANEOUS at 22:25

## 2022-03-22 RX ADMIN — SODIUM CHLORIDE 4 MILLILITER(S): 9 INJECTION INTRAMUSCULAR; INTRAVENOUS; SUBCUTANEOUS at 05:22

## 2022-03-22 RX ADMIN — SODIUM CHLORIDE 4 MILLILITER(S): 9 INJECTION INTRAMUSCULAR; INTRAVENOUS; SUBCUTANEOUS at 09:53

## 2022-03-22 RX ADMIN — CHLORHEXIDINE GLUCONATE 15 MILLILITER(S): 213 SOLUTION TOPICAL at 06:19

## 2022-03-22 RX ADMIN — ARIPIPRAZOLE 2 MILLIGRAM(S): 15 TABLET ORAL at 23:11

## 2022-03-22 RX ADMIN — Medication 5 MILLIGRAM(S): at 06:21

## 2022-03-22 RX ADMIN — ENOXAPARIN SODIUM 50 MILLIGRAM(S): 100 INJECTION SUBCUTANEOUS at 18:08

## 2022-03-22 RX ADMIN — SODIUM CHLORIDE 4 MILLILITER(S): 9 INJECTION INTRAMUSCULAR; INTRAVENOUS; SUBCUTANEOUS at 16:09

## 2022-03-22 RX ADMIN — ENOXAPARIN SODIUM 50 MILLIGRAM(S): 100 INJECTION SUBCUTANEOUS at 06:20

## 2022-03-22 RX ADMIN — Medication 3 MILLILITER(S): at 05:23

## 2022-03-22 RX ADMIN — Medication 3 MILLILITER(S): at 16:20

## 2022-03-22 RX ADMIN — Medication 3 MILLILITER(S): at 10:08

## 2022-03-22 RX ADMIN — CHLORHEXIDINE GLUCONATE 15 MILLILITER(S): 213 SOLUTION TOPICAL at 18:08

## 2022-03-22 NOTE — CHART NOTE - NSCHARTNOTEFT_GEN_A_CORE
Admitting Diagnosis:   Patient is a 76y old  Female who presents with a chief complaint of respiratory failure (14 Mar 2022 12:10)      PAST MEDICAL & SURGICAL HISTORY:  CP (cerebral palsy)    Mental retardation    Dysphagia    GERD (gastroesophageal reflux disease)    PE (pulmonary thromboembolism)    DVT (deep venous thrombosis)    Spastic quadriplegia    HTN (hypertension)    PEG tube malfunction    Current Nutrition Order: Jevity 1.2 @ 50ml/hr + 1 LPS via PEG  (Provides 1200ml TV, 1540kcals/1685kcal w/propofol, 82g protein, 968ml free water 1.8g/kg IBW protein)  +150ml free h2O Q4hrs     PO Intake: Good (%) [   ]  Fair (50-75%) [   ] Poor (<25%) [   ] [x] NPO w/ EN feeds    GI Issues: Denies nausea/vomiting at this time. Per RN, pt tolerating feeds well. Last documented bowel movement 3/20 (noted w/ fecal incontinence).     Pain: No signs of pain at time of RD interview.    Skin Integrity: Right hand/left hand edema 1+. No pressure injuries documented at this time. Roberto Carlos score: 14.    Labs:   03-22    142  |  106  |  18  ----------------------------<  136<H>  4.4   |  25  |  0.45<L>    Ca    8.8      22 Mar 2022 05:27  Phos  3.1     03-22  Mg     1.7     03-22    TPro  6.1  /  Alb  2.9<L>  /  TBili  0.2  /  DBili  x   /  AST  27  /  ALT  22  /  AlkPhos  70  03-22    CAPILLARY BLOOD GLUCOSE      POCT Blood Glucose.: 109 mg/dL (22 Mar 2022 06:33)  POCT Blood Glucose.: 107 mg/dL (21 Mar 2022 23:54)  POCT Blood Glucose.: 156 mg/dL (21 Mar 2022 17:34)  POCT Blood Glucose.: 117 mg/dL (21 Mar 2022 11:15)      Medications:  MEDICATIONS  (STANDING):  albuterol/ipratropium for Nebulization 3 milliLiter(s) Nebulizer every 6 hours  ARIPiprazole 2 milliGRAM(s) Oral every 24 hours  chlorhexidine 0.12% Liquid 15 milliLiter(s) Oral Mucosa every 12 hours  chlorhexidine 2% Cloths 1 Application(s) Topical <User Schedule>  dexMEDEtomidine Infusion 0.2 MICROgram(s)/kG/Hr (2.84 mL/Hr) IV Continuous <Continuous>  dextrose 40% Gel 15 Gram(s) Oral once  dextrose 5%. 1000 milliLiter(s) (50 mL/Hr) IV Continuous <Continuous>  dextrose 5%. 1000 milliLiter(s) (100 mL/Hr) IV Continuous <Continuous>  dextrose 50% Injectable 25 Gram(s) IV Push once  dextrose 50% Injectable 12.5 Gram(s) IV Push once  dextrose 50% Injectable 25 Gram(s) IV Push once  enoxaparin Injectable 50 milliGRAM(s) SubCutaneous every 12 hours  glucagon  Injectable 1 milliGRAM(s) IntraMuscular once  insulin regular  human corrective regimen sliding scale   SubCutaneous every 6 hours  pantoprazole   Suspension 40 milliGRAM(s) Oral before breakfast  sodium chloride 3%  Inhalation 4 milliLiter(s) Inhalation every 6 hours    MEDICATIONS  (PRN):  acetaminophen    Suspension .. 650 milliGRAM(s) Enteral Tube every 6 hours PRN Temp greater or equal to 38C (100.4F), Mild Pain (1 - 3)    Anthropometrics:  Ht: 152.4cm (60")  Wt: 56.8kg (3/03)-Admit     IBW: 100# (45.5kg)    % IBW: 125%    Weight Change: No new weight since admission. Obtain minimum biweekly weights to monitor trends especially being on TFs    Estimated energy needs: 45.3kg  Fluids per MD. IBW used to estimate needs as pt weighs >100% of IBW and per critical care nutrition guidelines. Needs estimated for age and adjusted for vent  Calories: 1150-1359kcals (25-30kcal/kg)  Protein: 65-75g (1.4-1.6g/kg)  Fluid: Per MD    Subjective: 75 yo F with a PMHx of cerebral palsy with functional quadriplegia, DVT/PE on Eliquis, GERD, chronic dysphagia with PEG, thoracic spine fusion with a prior admission in 2020 for aspiration PNA requiring intubation. Patient presented from her assisted living facility in [facility] likely 2/2 aspiration pneumonia which has grown pseudomonas sensitive to ciprofloxacin. Patient was intubated in the field prior to arrival for airway protection. While in the MICU, one unsuccessful attempt was made at extubation requiring reintubation attributed to pt's anxiety. Subsequently, pt was successfully extubated in MICU with a seroquel regimen. Pt was weaned to HFNC then 4L NC satting 94-95%. On 3/10, pt was stepped down to the floors with increasing frequency of suctioning. On 3/13, pt's oxygen requirement increased and 2L NC titrated up to 6L NC, however pt was found to be hypoxic to 70s. Pt was suctioned and removed significant amount of PEG tube feed products and mucus. However, no change in pulse ox was noted and rapid response was called. Pt was intubated, sedated on propofol and started on levophed for hypotension 70s/40s. En route, patient had episode of emesis with continued copious secretions, requiring frequent suctioning. In ICU, pt underwent bronchoscopy on 3/14 which showed no signs of purulent secretions or endobronchial lesions. 3/16 CXR with improved B/L infiltrates. Now ethics consulted regarding trach placement. Pt remains awaiting consent for trach.     Pt seen at bedside for follow up assessment- intubated on VC/AC mode on precedex- remains off pressor support, TMAX: 98.3. Labs reviewed 3/22; electrolytes within normal limits at this time. Fingersticks 3/21-3/22: 101-156 mg/dL; ISS ordered. Observed EN hung and running at 50 mL/hr x 24 hours. Per RN, pt receiving LPS 1x/day. Current EN regimen provides >100% EER and >100% protein needs, consider decreasing EN feeds to Jevity 1.2 @ 44 mL/hr to better meet nutritional needs. Made aware RD remains available. RD to follow up per protocol. See nutrition recommendations below.    Previous Nutrition Diagnosis: Increased nutrient needs R/T acute illness, hypermetabolic AEB 25-32kcals/kg energy and 1.4-1.6g/kg protein needs of ABW    Active [ x ]  Resolved [   ]    If resolved, new PES:     Goal: Pt to meet >75% of EER via G-tube     Recommendations:  1. To avoid overfeeding, consider changing feeds to Jevity 1.2 Doug @ 44ml/hr x 24hrs plus 1 Liquid Protein Supplement (100kcal, 15g pro) via PEG. Provides: 1056ml TV, 1367kcal, 74g pro, 852ml free H2O, 1.6g/kg IBW protein.  2. Water flush per MD, minimum 30-60ml q4hrs for tube patency   3. Pain and bowel regimens per MD discretion  4. Monitor lytes and replete prn. POC BG q6hrs   5. Obtain minimum biweekly weights     Education: N/A     Risk Level: High [ x ] Moderate [   ] Low [   ].

## 2022-03-22 NOTE — PROGRESS NOTE ADULT - ASSESSMENT
75 yo F with PMHx of cerebral palsy with functional quadriplegia, Hx of PE/DVTs, GERD, chronic dysphagia with PEG, thoracic spine fusion initially admitted for acute hypoxic respiratory failure found to have pseudomonal PNA, now intubated for third time i/s/o aspiration PNA of PEG feeds and copious secretions, now off Abx, pending trach placement pending consent will come from Consumer Advisory Board meeting.    NEURO  #H/O cerebral palsy.   Patient with cerebral palsy. Has functional quadriplegia, health aide at assisted living facility reports at baseline she is able to follow commands and communicate "when she wants to".  - pending discussion with pt's CAB state advocate for GOC and medical decisions  - FOR CONSENT, 2-physician consent is required  - see GOC as below    #Goals of care, counseling/discussion.   Patient with cerebral palsy. Previously a [place] resident, now resides in group home, with [place] [facility] per palliative care. Patient with repeat admission for aspiration pneumonia with history of dysphagia and copious secretions from oropharynx on exam.  - palliative care and ethics consulted - follow up recs  - consent will come from Consumer Advisory Board meeting for trach    #Anxiety  previously, attempt for extubation was delayed 2/2 agitation/anxiety  - c/w home med Abilify 2mg qd  - c/w precedex, and wean off    HEMODYNAMICS  Shock: no signs of shock  VS: HR 70-90s, MAP 70-90s  Drips: no vasopressors    PERFUSION  Mental status: alert, calm, cooperative  UOP: 20-30cc/h  Extremities: WWP, 2+ pulses in UE/LE, <2 cap refill    CV  No active issues    PULM  #Acute respiratory failure with hypoxia i/s/o of aspiration pneumonia of PEG feeds and copious oral secretions  Patient with h/o aspiration events requiring intubation.   Patient found with respiratory distress and SaO2 of 84%, intubated in field for airway protection, s/p extubation on 3/8 to HFNC, re-intubation on 3/13/22  - currently on VC/AC FiO2 40%, PEEP 5, -280, RR 10, flow 4L/min  - Frequent suctioning  - c/w hypertonic saline followed by duonebs - standing  - Chest PT q6h  - Aspiration precautions  - ABG qd, improvement in resp alkalosis on 3/15    #On medication for venous thromboembolism (VTE).   History of PE 5 years ago. On Eliquis at home 2.5 mg BID at home. Bedside ultrasound performed in MICU with no evidence of clot.    - c/w lovenox 50 mg BID    RENAL  No active issues    GI  #History of dysphagia  - PEG tube in place and functional  - Tube study with contrast to assess direction of PEG feeds > no concern, can re-start PEG feeds      #GERD (gastroesophageal reflux disease)  Patient with history of GERD on famotidine 20 mg qHS at home. On pantoprazole 40 mg QD while in MICU  - c/w PPI 40 mg IV qd      Quintero in place    ENDO  On regular ISS for TF    ID  No active issues     HEME  #Anemia, macrocytic  Hgb downtrending 12.8 > 10.4  MCV   No active bleeding noted  most likely 2/2 nutritional deficiency  - Follow-up Folate, B12  - Transfuse if Hgb<7 (would require 2-physician consent)    PROPHYLAXIS  F: None  E: Replete PRN  N: TF Jevity 1.2 at 50cc/h + 1 LPS  DVT: Lovenox 50 mg BID  GI: protonix 40 mg IV qd, reglan  C: Full Code, pending discussion with pt's CAB state advocate for GOC and medical decisions  FOR CONSENT 2-physician consent is required  D: Pending trach, if tracheostomy is pursued, patient cannot return back to group home and will be discharged to nursing home. 75 yo F with PMHx of cerebral palsy with functional quadriplegia, Hx of PE/DVTs, GERD, chronic dysphagia with PEG, thoracic spine fusion initially admitted for acute hypoxic respiratory failure found to have pseudomonal PNA, now intubated for third time i/s/o aspiration PNA of PEG feeds and copious secretions, now off Abx, pending trach placement pending consent will come from Consumer Advisory Board meeting.    NEURO  #H/O cerebral palsy.   Patient with cerebral palsy. Has functional quadriplegia, health aide at assisted living facility reports at baseline she is able to follow commands and communicate "when she wants to".  - pending discussion with pt's CAB state advocate for GOC and medical decisions  - FOR CONSENT, 2-physician consent is required  - see GOC as below    #Goals of care, counseling/discussion.   Patient with cerebral palsy. Previously a [place] resident, now resides in group home, with [place] [facility] per palliative care. Patient with repeat admission for aspiration pneumonia with history of dysphagia and copious secretions from oropharynx on exam.  - palliative care and ethics consulted - follow up recs  - consent will come from Consumer Advisory Board meeting for trach    #Anxiety  previously, attempt for extubation was delayed 2/2 agitation/anxiety  - c/w home med Abilify 2mg qd  - c/w precedex, and wean off    HEMODYNAMICS  Shock: no signs of shock  VS: HR 70-90s, MAP 70-90s  Drips: no vasopressors    PERFUSION  Mental status: alert, calm, cooperative  UOP: 20-30cc/h  Extremities: WWP, 2+ pulses in UE/LE, <2 cap refill    CV  No active issues    PULM  #Acute respiratory failure with hypoxia i/s/o of aspiration pneumonia of PEG feeds and copious oral secretions  Patient with h/o aspiration events requiring intubation.   Patient found with respiratory distress and SaO2 of 84%, intubated in field for airway protection, s/p extubation on 3/8 to HFNC, re-intubation on 3/13/22  - currently on VC/AC FiO2 40%, PEEP 5, -280, RR 10, flow 4L/min  - Frequent suctioning  - c/w hypertonic saline followed by duonebs - standing  - Chest PT q6h  - Aspiration precautions  - ABG qd, improvement in resp alkalosis on 3/15    #On medication for venous thromboembolism (VTE).   History of PE 5 years ago. On Eliquis at home 2.5 mg BID at home. Bedside ultrasound performed in MICU with no evidence of clot.    - c/w lovenox 50 mg BID    RENAL  No active issues    GI  #History of dysphagia  - PEG tube in place and functional  - Tube study with contrast to assess direction of PEG feeds > no concern, can re-start PEG feeds      #GERD (gastroesophageal reflux disease)  Patient with history of GERD on famotidine 20 mg qHS at home. On pantoprazole 40 mg QD while in MICU  - c/w PPI 40 mg IV qd      Quintero in place    ENDO  On regular ISS for TF    ID  No active issues     HEME  #Anemia, macrocytic  Hgb downtrending 12.8 > 10.4  MCV   No active bleeding noted  most likely 2/2 nutritional deficiency  - Follow-up Folate, B12  - Transfuse if Hgb<7 (would require 2-physician consent)    PROPHYLAXIS  F: None  E: Replete PRN  N: TF Jevity 1.2 at 50cc/h + 1 LPS  DVT: Lovenox 50 mg BID  GI: protonix 40 mg IV qd  C: Full Code, pending discussion with pt's CAB state advocate for GOC and medical decisions  FOR CONSENT 2-physician consent is required  D: Pending trach, if tracheostomy is pursued, patient cannot return back to group home and will be discharged to nursing home.

## 2022-03-22 NOTE — PROGRESS NOTE ADULT - ATTENDING COMMENTS
CP, h/o DVT with chronic resp failure  physical as above  continue lovenox  no change in MV  await trach consent from [facility]  rest as above

## 2022-03-22 NOTE — PROGRESS NOTE ADULT - SUBJECTIVE AND OBJECTIVE BOX
HOSPITAL COURSE  75 yo F with a PMHx of cerebral palsy with functional quadriplegia, DVT/PE on Eliquis, GERD, chronic dysphagia with PEG, thoracic spine fusion with a prior admission in 2020 for aspiration PNA requiring intubation. Patient presented from her assisted living facility in [facility] likely 2/2 aspiration pneumonia which has grown pseudomonas sensitive to ciprofloxacin. Patient was intubated in the field prior to arrival for airway protection. While in the MICU, one unsuccessful attempt was made at extubation requiring reintubation attributed to pt's anxiety. Subsequently, pt was successfully extubated in MICU with a seroquel regimen. Pt was weaned to HFNC then 4L NC satting 94-95%.     On 3/10, pt was stepped down to the floors with increasing frequency of suctioning. On 3/13, pt's oxygen requirement increased and 2L NC titrated up to 6L NC, however pt was found to be hypoxic to 70s. Pt was suctioned and removed significant amount of PEG tube feed products and mucus. However, no change in pulse ox was noted and rapid response was called.     Pt was intubated, sedated on propofol and started on levophed for hypotension 70s/40s. BP improved to 100s/60s and patient transferred to MICU. En route, patient had episode of emesis with continued copious secretions, requiring frequent suctioning. In ICU, pt underwent bronchoscopy which showed no signs of purulent secretions or endobronchial lesions. Documents for trach approval sent to consumer advisory board. Pending response.     OVERNIGHT EVENTS: NAOE    SUBJECTIVE / INTERVAL HPI: Patient seen and examined at bedside. ROS negative per pt. Pt denies any pain including CP and abd pain.       MEDICATIONS  (STANDING):  albuterol/ipratropium for Nebulization 3 milliLiter(s) Nebulizer every 6 hours  ARIPiprazole 2 milliGRAM(s) Oral every 24 hours  chlorhexidine 0.12% Liquid 15 milliLiter(s) Oral Mucosa every 12 hours  chlorhexidine 2% Cloths 1 Application(s) Topical <User Schedule>  dexMEDEtomidine Infusion 0.2 MICROgram(s)/kG/Hr (2.84 mL/Hr) IV Continuous <Continuous>  dextrose 40% Gel 15 Gram(s) Oral once  dextrose 5%. 1000 milliLiter(s) (50 mL/Hr) IV Continuous <Continuous>  dextrose 5%. 1000 milliLiter(s) (100 mL/Hr) IV Continuous <Continuous>  dextrose 50% Injectable 25 Gram(s) IV Push once  dextrose 50% Injectable 12.5 Gram(s) IV Push once  dextrose 50% Injectable 25 Gram(s) IV Push once  enoxaparin Injectable 50 milliGRAM(s) SubCutaneous every 12 hours  glucagon  Injectable 1 milliGRAM(s) IntraMuscular once  insulin regular  human corrective regimen sliding scale   SubCutaneous every 6 hours  pantoprazole   Suspension 40 milliGRAM(s) Oral before breakfast  sodium chloride 3%  Inhalation 4 milliLiter(s) Inhalation every 6 hours    MEDICATIONS  (PRN):  acetaminophen    Suspension .. 650 milliGRAM(s) Enteral Tube every 6 hours PRN Temp greater or equal to 38C (100.4F), Mild Pain (1 - 3)    Allergies    ace inhibitors (Anaphylaxis)  caffeine (Rash)  cefepime (Rash)  chocolate (Rash)  high acid (Rash)  Tomatoes (Hives)    Intolerances        VITAL SIGNS:  Vital Signs Last 24 Hrs  T(C): 36.9 (22 Mar 2022 10:52), Max: 36.9 (22 Mar 2022 10:52)  T(F): 98.5 (22 Mar 2022 10:52), Max: 98.5 (22 Mar 2022 10:52)  HR: 94 (22 Mar 2022 12:00) (52 - 94)  BP: 125/59 (22 Mar 2022 12:00) (105/53 - 149/87)  BP(mean): 85 (22 Mar 2022 12:00) (75 - 112)  RR: 17 (22 Mar 2022 12:00) (16 - 28)  SpO2: 98% (22 Mar 2022 12:00) (84% - 100%)      03-21-22 @ 07:01  -  03-22-22 @ 07:00  --------------------------------------------------------  IN: 2187.5 mL / OUT: 1555 mL / NET: 632.5 mL    03-22-22 @ 07:01  -  03-22-22 @ 12:58  --------------------------------------------------------  IN: 479.8 mL / OUT: 480 mL / NET: -0.2 mL        PHYSICAL EXAM:  Constitutional: Calm, cooperative, intubated   HEENT: NC/AT; clear conjunctiva, anicteric sclera, left eye with limited movement, with ETT  Neck: supple; no JVD or thyromegaly, no cervical, post-auricular or supraclavicular lymphadenopathy  Respiratory: Coarse rhonchi R>L, decreased breath sounds on the left side mid to lower fields, no increased work of breathing or accessory muscle use  Cardiac: +S1/S2, no murmurs/rubs/gallops, RRR  Gastrointestinal: abdomen soft, NT/ND; +BSx4, PEG in place with dressing from 3/11 C/D/I  Genitourinary: no suprapubic tenderness, Lu draining concentrated yellow urine  Extremities: WWP, no clubbing, edema or cyanosis, with bilateral mittens  Vascular: 2+ radial, femoral, DP/PT pulses B/L  Neurologic: following simple commands such as wiggle toes, maintaining eye contact>10 secs, contracture of extremities.   Dermatologic: skin warm, dry and intact; no rashes, wounds, or scars, no decubitus ulcers  Lines: 18 gauge peripheral line in right UE since 3/13, 24 gauge a-line on left arm since 3/14, lu since 3/3/22    LABS:                        10.2   6.15  )-----------( 326      ( 22 Mar 2022 05:27 )             33.2     03-22    142  |  106  |  18  ----------------------------<  136<H>  4.4   |  25  |  0.45<L>    Ca    8.8      22 Mar 2022 05:27  Phos  3.1     03-22  Mg     1.7     03-22    TPro  6.1  /  Alb  2.9<L>  /  TBili  0.2  /  DBili  x   /  AST  27  /  ALT  22  /  AlkPhos  70  03-22        CAPILLARY BLOOD GLUCOSE      POCT Blood Glucose.: 110 mg/dL (22 Mar 2022 11:16)  POCT Blood Glucose.: 109 mg/dL (22 Mar 2022 06:33)  POCT Blood Glucose.: 107 mg/dL (21 Mar 2022 23:54)  POCT Blood Glucose.: 156 mg/dL (21 Mar 2022 17:34)          RADIOLOGY & ADDITIONAL TESTS: Reviewed HOSPITAL COURSE  73 yo F with a PMHx of cerebral palsy with functional quadriplegia, DVT/PE on Eliquis, GERD, chronic dysphagia with PEG, thoracic spine fusion with a prior admission in 2020 for aspiration PNA requiring intubation. Patient presented from her assisted living facility in [facility] likely 2/2 aspiration pneumonia which has grown pseudomonas sensitive to ciprofloxacin. Patient was intubated in the field prior to arrival for airway protection. While in the MICU, one unsuccessful attempt was made at extubation requiring reintubation attributed to pt's anxiety. Subsequently, pt was successfully extubated in MICU with a seroquel regimen. Pt was weaned to HFNC then 4L NC satting 94-95%.     On 3/10, pt was stepped down to the floors with increasing frequency of suctioning. On 3/13, pt's oxygen requirement increased and 2L NC titrated up to 6L NC, however pt was found to be hypoxic to 70s. Pt was suctioned and removed significant amount of PEG tube feed products and mucus. However, no change in pulse ox was noted and rapid response was called.     Pt was intubated, sedated on propofol and started on levophed for hypotension 70s/40s. BP improved to 100s/60s and patient transferred to MICU. En route, patient had episode of emesis with continued copious secretions, requiring frequent suctioning. In ICU, pt underwent bronchoscopy which showed no signs of purulent secretions or endobronchial lesions. Documents for trach approval sent to consumer advisory board. Pending response.     OVERNIGHT EVENTS: NAOE    SUBJECTIVE / INTERVAL HPI: Patient seen and examined at bedside. ROS negative per pt. Pt endorses cough and throat pain. Denies CP and abd pain.     MEDICATIONS  (STANDING):  albuterol/ipratropium for Nebulization 3 milliLiter(s) Nebulizer every 6 hours  ARIPiprazole 2 milliGRAM(s) Oral every 24 hours  chlorhexidine 0.12% Liquid 15 milliLiter(s) Oral Mucosa every 12 hours  chlorhexidine 2% Cloths 1 Application(s) Topical <User Schedule>  dexMEDEtomidine Infusion 0.2 MICROgram(s)/kG/Hr (2.84 mL/Hr) IV Continuous <Continuous>  dextrose 40% Gel 15 Gram(s) Oral once  dextrose 5%. 1000 milliLiter(s) (50 mL/Hr) IV Continuous <Continuous>  dextrose 5%. 1000 milliLiter(s) (100 mL/Hr) IV Continuous <Continuous>  dextrose 50% Injectable 25 Gram(s) IV Push once  dextrose 50% Injectable 12.5 Gram(s) IV Push once  dextrose 50% Injectable 25 Gram(s) IV Push once  enoxaparin Injectable 50 milliGRAM(s) SubCutaneous every 12 hours  glucagon  Injectable 1 milliGRAM(s) IntraMuscular once  insulin regular  human corrective regimen sliding scale   SubCutaneous every 6 hours  pantoprazole   Suspension 40 milliGRAM(s) Oral before breakfast  sodium chloride 3%  Inhalation 4 milliLiter(s) Inhalation every 6 hours    MEDICATIONS  (PRN):  acetaminophen    Suspension .. 650 milliGRAM(s) Enteral Tube every 6 hours PRN Temp greater or equal to 38C (100.4F), Mild Pain (1 - 3)    Allergies    ace inhibitors (Anaphylaxis)  caffeine (Rash)  cefepime (Rash)  chocolate (Rash)  high acid (Rash)  Tomatoes (Hives)    Intolerances        VITAL SIGNS:  Vital Signs Last 24 Hrs  T(C): 36.9 (22 Mar 2022 10:52), Max: 36.9 (22 Mar 2022 10:52)  T(F): 98.5 (22 Mar 2022 10:52), Max: 98.5 (22 Mar 2022 10:52)  HR: 94 (22 Mar 2022 12:00) (52 - 94)  BP: 125/59 (22 Mar 2022 12:00) (105/53 - 149/87)  BP(mean): 85 (22 Mar 2022 12:00) (75 - 112)  RR: 17 (22 Mar 2022 12:00) (16 - 28)  SpO2: 98% (22 Mar 2022 12:00) (84% - 100%)      03-21-22 @ 07:01  -  03-22-22 @ 07:00  --------------------------------------------------------  IN: 2187.5 mL / OUT: 1555 mL / NET: 632.5 mL    03-22-22 @ 07:01  -  03-22-22 @ 12:58  --------------------------------------------------------  IN: 479.8 mL / OUT: 480 mL / NET: -0.2 mL        PHYSICAL EXAM:  Constitutional: Calm, cooperative, intubated   HEENT: NC/AT; clear conjunctiva, anicteric sclera, left eye with limited movement, with ETT  Neck: supple; no JVD or thyromegaly, no cervical, post-auricular or supraclavicular lymphadenopathy  Respiratory: Shallow inspiration, CTA b/l, no increased work of breathing or accessory muscle use  Cardiac: +S1/S2, no murmurs/rubs/gallops, RRR  Gastrointestinal: abdomen soft, NT/ND; +BSx4, PEG in place with dressing from 3/11 C/D/I  Genitourinary: no suprapubic tenderness, Lu draining concentrated yellow urine  Extremities: WWP, no clubbing, edema or cyanosis, with bilateral mittens  Vascular: 2+ radial, femoral, DP/PT pulses B/L  Neurologic: following simple commands such as wiggle toes, maintaining eye contact>10 secs, contracture of extremities.   Dermatologic: skin warm, dry and intact; no rashes, wounds, or scars, no decubitus ulcers  Lines: 18 gauge peripheral line in right UE since 3/13, 24 gauge a-line on left arm since 3/14, lu since 3/3/22    LABS:                        10.2   6.15  )-----------( 326      ( 22 Mar 2022 05:27 )             33.2     03-22    142  |  106  |  18  ----------------------------<  136<H>  4.4   |  25  |  0.45<L>    Ca    8.8      22 Mar 2022 05:27  Phos  3.1     03-22  Mg     1.7     03-22    TPro  6.1  /  Alb  2.9<L>  /  TBili  0.2  /  DBili  x   /  AST  27  /  ALT  22  /  AlkPhos  70  03-22        CAPILLARY BLOOD GLUCOSE      POCT Blood Glucose.: 110 mg/dL (22 Mar 2022 11:16)  POCT Blood Glucose.: 109 mg/dL (22 Mar 2022 06:33)  POCT Blood Glucose.: 107 mg/dL (21 Mar 2022 23:54)  POCT Blood Glucose.: 156 mg/dL (21 Mar 2022 17:34)          RADIOLOGY & ADDITIONAL TESTS: Reviewed

## 2022-03-23 LAB
GLUCOSE BLDC GLUCOMTR-MCNC: 115 MG/DL — HIGH (ref 70–99)
GLUCOSE BLDC GLUCOMTR-MCNC: 146 MG/DL — HIGH (ref 70–99)
GLUCOSE BLDC GLUCOMTR-MCNC: 157 MG/DL — HIGH (ref 70–99)

## 2022-03-23 PROCEDURE — 71045 X-RAY EXAM CHEST 1 VIEW: CPT | Mod: 26

## 2022-03-23 PROCEDURE — 99232 SBSQ HOSP IP/OBS MODERATE 35: CPT | Mod: GC

## 2022-03-23 RX ORDER — DEXMEDETOMIDINE HYDROCHLORIDE IN 0.9% SODIUM CHLORIDE 4 UG/ML
0.1 INJECTION INTRAVENOUS
Qty: 400 | Refills: 0 | Status: DISCONTINUED | OUTPATIENT
Start: 2022-03-23 | End: 2022-03-24

## 2022-03-23 RX ADMIN — Medication 3 MILLILITER(S): at 10:11

## 2022-03-23 RX ADMIN — DEXMEDETOMIDINE HYDROCHLORIDE IN 0.9% SODIUM CHLORIDE 1.42 MICROGRAM(S)/KG/HR: 4 INJECTION INTRAVENOUS at 10:15

## 2022-03-23 RX ADMIN — SODIUM CHLORIDE 4 MILLILITER(S): 9 INJECTION INTRAMUSCULAR; INTRAVENOUS; SUBCUTANEOUS at 04:52

## 2022-03-23 RX ADMIN — ENOXAPARIN SODIUM 50 MILLIGRAM(S): 100 INJECTION SUBCUTANEOUS at 06:43

## 2022-03-23 RX ADMIN — ENOXAPARIN SODIUM 50 MILLIGRAM(S): 100 INJECTION SUBCUTANEOUS at 17:08

## 2022-03-23 RX ADMIN — CHLORHEXIDINE GLUCONATE 1 APPLICATION(S): 213 SOLUTION TOPICAL at 06:44

## 2022-03-23 RX ADMIN — PANTOPRAZOLE SODIUM 40 MILLIGRAM(S): 20 TABLET, DELAYED RELEASE ORAL at 06:43

## 2022-03-23 RX ADMIN — Medication 3 MILLILITER(S): at 04:52

## 2022-03-23 RX ADMIN — SODIUM CHLORIDE 4 MILLILITER(S): 9 INJECTION INTRAMUSCULAR; INTRAVENOUS; SUBCUTANEOUS at 16:34

## 2022-03-23 RX ADMIN — SODIUM CHLORIDE 4 MILLILITER(S): 9 INJECTION INTRAMUSCULAR; INTRAVENOUS; SUBCUTANEOUS at 10:30

## 2022-03-23 RX ADMIN — Medication 3 MILLILITER(S): at 16:34

## 2022-03-23 RX ADMIN — SODIUM CHLORIDE 4 MILLILITER(S): 9 INJECTION INTRAMUSCULAR; INTRAVENOUS; SUBCUTANEOUS at 21:14

## 2022-03-23 RX ADMIN — INSULIN HUMAN 2: 100 INJECTION, SOLUTION SUBCUTANEOUS at 17:11

## 2022-03-23 RX ADMIN — CHLORHEXIDINE GLUCONATE 15 MILLILITER(S): 213 SOLUTION TOPICAL at 17:08

## 2022-03-23 RX ADMIN — CHLORHEXIDINE GLUCONATE 15 MILLILITER(S): 213 SOLUTION TOPICAL at 06:43

## 2022-03-23 RX ADMIN — Medication 3 MILLILITER(S): at 21:14

## 2022-03-23 RX ADMIN — DEXMEDETOMIDINE HYDROCHLORIDE IN 0.9% SODIUM CHLORIDE 1.42 MICROGRAM(S)/KG/HR: 4 INJECTION INTRAVENOUS at 17:08

## 2022-03-23 NOTE — CONSULT NOTE ADULT - SUBJECTIVE AND OBJECTIVE BOX
73 yo F with PMHx of cerebral palsy with functional quadriplegia, Hx of PE/DVTs, GERD, chronic dysphagia with PEG, thoracic spine fusion initially admitted for acute hypoxic respiratory failure found to have pseudomonal PNA, now intubated for third time i/s/o aspiration PNA of PEG feeds and copious secretions, now off Abx, pending trach placement.

## 2022-03-23 NOTE — PROGRESS NOTE ADULT - ATTENDING COMMENTS
Chronic respiratory failure/DVT/PE/CP  Physical as above  plan for trach when we have consent  back on precedex for discomfort and agitation  continue feeds  rest as above

## 2022-03-23 NOTE — PROGRESS NOTE ADULT - ASSESSMENT
73 yo F with PMHx of cerebral palsy with functional quadriplegia, Hx of PE/DVTs, GERD, chronic dysphagia with PEG, thoracic spine fusion initially admitted for acute hypoxic respiratory failure found to have pseudomonal PNA, now intubated for third time i/s/o aspiration PNA of PEG feeds and copious secretions, now off Abx, pending trach placement pending consent will come from Consumer Advisory Board meeting.    NEURO  #H/O cerebral palsy.   Patient with cerebral palsy. Has functional quadriplegia, health aide at assisted living facility reports at baseline she is able to follow commands and communicate "when she wants to".  - pending discussion with pt's CAB state advocate for GOC and medical decisions  - FOR CONSENT, 2-physician consent is required  - see GOC as below    #Goals of care, counseling/discussion.   Patient with cerebral palsy. Previously a [place] resident, now resides in group home, with [place] [facility] per palliative care. Patient with repeat admission for aspiration pneumonia with history of dysphagia and copious secretions from oropharynx on exam.  - palliative care and ethics consulted - follow up recs  - consent will come from Consumer Advisory Board meeting for trach    #Anxiety  previously, attempt for extubation was delayed 2/2 agitation/anxiety  - c/w home med Abilify 2mg qd  - c/w precedex, and wean off    HEMODYNAMICS  Shock: no signs of shock  VS: HR 70-90s, MAP 70-90s  Drips: no vasopressors    PERFUSION  Mental status: alert, calm, cooperative  UOP: 20-30cc/h  Extremities: WWP, 2+ pulses in UE/LE, <2 cap refill    CV  No active issues    PULM  #Acute respiratory failure with hypoxia i/s/o of aspiration pneumonia of PEG feeds and copious oral secretions  Patient with h/o aspiration events requiring intubation.   Patient found with respiratory distress and SaO2 of 84%, intubated in field for airway protection, s/p extubation on 3/8 to HFNC, re-intubation on 3/13/22  - currently on VC/AC FiO2 40%, PEEP 5, -280, RR 10, flow 4L/min  - Frequent suctioning  - c/w hypertonic saline followed by duonebs - standing  - Chest PT q6h  - Aspiration precautions  - ABG qd, improvement in resp alkalosis on 3/15    #On medication for venous thromboembolism (VTE).   History of PE 5 years ago. On Eliquis at home 2.5 mg BID at home. Bedside ultrasound performed in MICU with no evidence of clot.    - c/w lovenox 50 mg BID    RENAL  No active issues    GI  #History of dysphagia  - PEG tube in place and functional  - Tube study with contrast to assess direction of PEG feeds > no concern, can re-start PEG feeds      #GERD (gastroesophageal reflux disease)  Patient with history of GERD on famotidine 20 mg qHS at home. On pantoprazole 40 mg QD while in MICU  - c/w PPI 40 mg IV qd      Quintero in place    ENDO  On regular ISS for TF    ID  No active issues     HEME  #Anemia, macrocytic  Hgb downtrending 12.8 > 10.4  MCV   No active bleeding noted  most likely 2/2 nutritional deficiency  - Follow-up Folate, B12  - Transfuse if Hgb<7 (would require 2-physician consent)    PROPHYLAXIS  F: None  E: Replete PRN  N: TF Jevity 1.2 at 50cc/h + 1 LPS  DVT: Lovenox 50 mg BID  GI: protonix 40 mg IV qd  C: Full Code, pending discussion with pt's CAB state advocate for GOC and medical decisions  FOR CONSENT 2-physician consent is required  D: Pending trach, if tracheostomy is pursued, patient cannot return back to group home and will be discharged to nursing home. 75 yo F with PMHx of cerebral palsy with functional quadriplegia, Hx of PE/DVTs, GERD, chronic dysphagia with PEG, thoracic spine fusion initially admitted for acute hypoxic respiratory failure found to have pseudomonal PNA, now intubated for third time i/s/o aspiration PNA of PEG feeds and copious secretions, now off Abx, pending trach placement.    NEURO  #Cerebral palsy  Patient with cerebral palsy. Has functional quadriplegia, health aide at assisted living facility reports at baseline, following commands and communicating intermittently  - pending discussion with pt's CAB state advocate for GOC and medical decisions  - FOR CONSENT, 2-physician consent is required  - see GOC as below    #Goals of care, counseling/discussion.   Patient with cerebral palsy. Previously a [place] resident, now resides in group home, with [facility] per palliative care. Patient with repeat admission for aspiration pneumonia with history of dysphagia and copious secretions from oropharynx on exam.  - palliative care and ethics consulted - follow up recs  - consent will come from Consumer Advisory Board meeting for trach    #Anxiety  Pt with ongoing restlessness, anxiety, agitation  - c/w home med Abilify 2mg qd  - c/w precedex gtt, wean as tolerated.    CV  No active issues    PULM  #Acute respiratory failure with hypoxia i/s/o of aspiration pneumonia of PEG feeds and copious oral secretions  Patient with h/o multiple aspiration events requiring intubation, and has been extubated and reintubated twice during this hospital stay, thus patient will require a trach.  - currently on VC/AC FiO2 40%, PEEP 5, -280, RR 15  - Frequent suctioning - oral and tracheal   - c/w hypertonic saline followed by duonebs - standing  - Chest PT q6h  - Aspiration precautions  - Plan for trach placement as soon as we can get consent     #PE  History of PE 5 years ago. On Eliquis at home 2.5 mg BID at home. Bedside ultrasound performed in MICU with no evidence of clot.    - c/w lovenox 50 mg BID while here    RENAL  No active issues    GI  #Dysphagia  - PEG tube in place and functional, c/w tube feeds    #GERD   -On famotidine 20 mg qHS at home  - c/w pantoprazole 40 mg QD while in MICU      Quintero in place    ENDO  On regular ISS for TF    ID  No active issues     HEME  #Anemia, macrocytic  Hb stable 9 to 11  MCV   B12/folate WNL  - Transfuse if Hgb<7 (would require 2-physician consent)    PROPHYLAXIS  F: None  E: Replete PRN  N: TF Jevity 1.2 at 50cc/h + 1 LPS  DVT: Lovenox 50 mg BID  GI: protonix 40 mg daily via PEG  C: Full Code, pending discussion with pt's CAB state advocate for GOC and medical decisions  FOR CONSENT 2-physician consent is required  D: Pending trach, if tracheostomy is pursued, patient cannot return back to group home and will be discharged to nursing home.

## 2022-03-23 NOTE — PROGRESS NOTE ADULT - SUBJECTIVE AND OBJECTIVE BOX
***INCOMPLETE****  OVERNIGHT EVENTS:    SUBJECTIVE / INTERVAL HPI: Patient seen and examined at bedside.     ROS: negative unless otherwise stated above.    VITAL SIGNS:  Vital Signs Last 24 Hrs  T(C): 37.3 (23 Mar 2022 06:08), Max: 37.5 (22 Mar 2022 17:50)  T(F): 99.2 (23 Mar 2022 06:08), Max: 99.5 (22 Mar 2022 17:50)  HR: 93 (23 Mar 2022 05:02) (63 - 113)  BP: 140/78 (23 Mar 2022 05:00) (108/56 - 159/73)  BP(mean): 99 (23 Mar 2022 05:00) (77 - 102)  RR: 30 (23 Mar 2022 05:02) (17 - 30)  SpO2: 96% (23 Mar 2022 05:02) (92% - 100%)    PHYSICAL EXAM:    General: In no apparent distress  HEENT: NC/AT; PERRL, anicteric sclera; MMM  Neck: supple  Cardiovascular: +S1/S2; RRR  Respiratory: CTA B/L; no W/R/R  Gastrointestinal: soft, NT/ND; +BSx4  Extremities: WWP; no edema, clubbing or cyanosis  Vascular: 2+ radial, DP/PT pulses B/L  Neurological: AAOx3; no focal deficits    MEDICATIONS:  MEDICATIONS  (STANDING):  albuterol/ipratropium for Nebulization 3 milliLiter(s) Nebulizer every 6 hours  ARIPiprazole 2 milliGRAM(s) Oral every 24 hours  chlorhexidine 0.12% Liquid 15 milliLiter(s) Oral Mucosa every 12 hours  chlorhexidine 2% Cloths 1 Application(s) Topical <User Schedule>  dextrose 40% Gel 15 Gram(s) Oral once  dextrose 5%. 1000 milliLiter(s) (50 mL/Hr) IV Continuous <Continuous>  dextrose 5%. 1000 milliLiter(s) (100 mL/Hr) IV Continuous <Continuous>  dextrose 50% Injectable 25 Gram(s) IV Push once  dextrose 50% Injectable 12.5 Gram(s) IV Push once  dextrose 50% Injectable 25 Gram(s) IV Push once  enoxaparin Injectable 50 milliGRAM(s) SubCutaneous every 12 hours  glucagon  Injectable 1 milliGRAM(s) IntraMuscular once  insulin regular  human corrective regimen sliding scale   SubCutaneous every 6 hours  pantoprazole   Suspension 40 milliGRAM(s) Oral before breakfast  sodium chloride 3%  Inhalation 4 milliLiter(s) Inhalation every 6 hours    MEDICATIONS  (PRN):  acetaminophen    Suspension .. 650 milliGRAM(s) Enteral Tube every 6 hours PRN Temp greater or equal to 38C (100.4F), Mild Pain (1 - 3)      ALLERGIES:  Allergies    ace inhibitors (Anaphylaxis)  caffeine (Rash)  cefepime (Rash)  chocolate (Rash)  high acid (Rash)  Tomatoes (Hives)    Intolerances        LABS:                        10.2   6.15  )-----------( 326      ( 22 Mar 2022 05:27 )             33.2     03-22    142  |  106  |  18  ----------------------------<  136<H>  4.4   |  25  |  0.45<L>    Ca    8.8      22 Mar 2022 05:27  Phos  3.1     03-22  Mg     1.7     03-22    TPro  6.1  /  Alb  2.9<L>  /  TBili  0.2  /  DBili  x   /  AST  27  /  ALT  22  /  AlkPhos  70  03-22        CAPILLARY BLOOD GLUCOSE      POCT Blood Glucose.: 106 mg/dL (22 Mar 2022 23:15)      RADIOLOGY & ADDITIONAL TESTS: Reviewed. OVERNIGHT EVENTS: Patient was anxious, restless, agitated this morning, restarted precedex gtt at 11am.    SUBJECTIVE / INTERVAL HPI: Patient seen and examined at bedside. Anxious, tremulous, indicating she was uncomfortable in the bed.    ROS: negative unless otherwise stated above.    VITAL SIGNS:  Vital Signs Last 24 Hrs  T(C): 37.3 (23 Mar 2022 06:08), Max: 37.5 (22 Mar 2022 17:50)  T(F): 99.2 (23 Mar 2022 06:08), Max: 99.5 (22 Mar 2022 17:50)  HR: 93 (23 Mar 2022 05:02) (63 - 113)  BP: 140/78 (23 Mar 2022 05:00) (108/56 - 159/73)  BP(mean): 99 (23 Mar 2022 05:00) (77 - 102)  RR: 30 (23 Mar 2022 05:02) (17 - 30)  SpO2: 96% (23 Mar 2022 05:02) (92% - 100%)    PHYSICAL EXAM:  General: Anxious, tremulous  HEENT: NC/AT; PERRL, anicteric sclera; MMM  Neck: supple  Cardiovascular: +S1/S2; RRR  Respiratory: CTA B/L; no W/R/R  Gastrointestinal: soft, NT/ND; +BSx4  Extremities: WWP; no edema, clubbing or cyanosis  Vascular: 2+ radial, DP/PT pulses B/L  Neurological: AAOx3; no focal deficits    MEDICATIONS:  MEDICATIONS  (STANDING):  albuterol/ipratropium for Nebulization 3 milliLiter(s) Nebulizer every 6 hours  ARIPiprazole 2 milliGRAM(s) Oral every 24 hours  chlorhexidine 0.12% Liquid 15 milliLiter(s) Oral Mucosa every 12 hours  chlorhexidine 2% Cloths 1 Application(s) Topical <User Schedule>  dextrose 40% Gel 15 Gram(s) Oral once  dextrose 5%. 1000 milliLiter(s) (50 mL/Hr) IV Continuous <Continuous>  dextrose 5%. 1000 milliLiter(s) (100 mL/Hr) IV Continuous <Continuous>  dextrose 50% Injectable 25 Gram(s) IV Push once  dextrose 50% Injectable 12.5 Gram(s) IV Push once  dextrose 50% Injectable 25 Gram(s) IV Push once  enoxaparin Injectable 50 milliGRAM(s) SubCutaneous every 12 hours  glucagon  Injectable 1 milliGRAM(s) IntraMuscular once  insulin regular  human corrective regimen sliding scale   SubCutaneous every 6 hours  pantoprazole   Suspension 40 milliGRAM(s) Oral before breakfast  sodium chloride 3%  Inhalation 4 milliLiter(s) Inhalation every 6 hours    MEDICATIONS  (PRN):  acetaminophen    Suspension .. 650 milliGRAM(s) Enteral Tube every 6 hours PRN Temp greater or equal to 38C (100.4F), Mild Pain (1 - 3)      ALLERGIES:  Allergies    ace inhibitors (Anaphylaxis)  caffeine (Rash)  cefepime (Rash)  chocolate (Rash)  high acid (Rash)  Tomatoes (Hives)    Intolerances        LABS:                        10.2   6.15  )-----------( 326      ( 22 Mar 2022 05:27 )             33.2     03-22    142  |  106  |  18  ----------------------------<  136<H>  4.4   |  25  |  0.45<L>    Ca    8.8      22 Mar 2022 05:27  Phos  3.1     03-22  Mg     1.7     03-22    TPro  6.1  /  Alb  2.9<L>  /  TBili  0.2  /  DBili  x   /  AST  27  /  ALT  22  /  AlkPhos  70  03-22        CAPILLARY BLOOD GLUCOSE      POCT Blood Glucose.: 106 mg/dL (22 Mar 2022 23:15)      RADIOLOGY & ADDITIONAL TESTS: Reviewed. OVERNIGHT EVENTS: Patient was anxious, restless, agitated this morning, restarted precedex gtt at 11am.    SUBJECTIVE / INTERVAL HPI: Patient seen and examined at bedside. Anxious, tremulous, indicating she was uncomfortable in the bed.    ROS: negative unless otherwise stated above.    VITAL SIGNS:  Vital Signs Last 24 Hrs  T(C): 37.3 (23 Mar 2022 06:08), Max: 37.5 (22 Mar 2022 17:50)  T(F): 99.2 (23 Mar 2022 06:08), Max: 99.5 (22 Mar 2022 17:50)  HR: 93 (23 Mar 2022 05:02) (63 - 113)  BP: 140/78 (23 Mar 2022 05:00) (108/56 - 159/73)  BP(mean): 99 (23 Mar 2022 05:00) (77 - 102)  RR: 30 (23 Mar 2022 05:02) (17 - 30)  SpO2: 96% (23 Mar 2022 05:02) (92% - 100%)    PHYSICAL EXAM:  General: Anxious, tremulous  HEENT: NC/AT; PERRL, anicteric sclera; MMM  Neck: supple  Cardiovascular: +S1/S2; RRR  Respiratory: CTA B/L; no W/R/R; Vent to ETT  Gastrointestinal: soft, NT/ND; +BSx4; PEG in place.  Extremities: WWP; no edema, clubbing or cyanosis  Vascular: 2+ radial, DP/PT pulses B/L  Neurological: AAOx3; difficulty with communication 2/2 intubation and cerebral palsy, but demonstrates full understanding. Moves all extremities, full strength 5/5 throughout.  Quintero in place.  PICC left arm.    MEDICATIONS:  MEDICATIONS  (STANDING):  albuterol/ipratropium for Nebulization 3 milliLiter(s) Nebulizer every 6 hours  ARIPiprazole 2 milliGRAM(s) Oral every 24 hours  chlorhexidine 0.12% Liquid 15 milliLiter(s) Oral Mucosa every 12 hours  chlorhexidine 2% Cloths 1 Application(s) Topical <User Schedule>  dextrose 40% Gel 15 Gram(s) Oral once  dextrose 5%. 1000 milliLiter(s) (50 mL/Hr) IV Continuous <Continuous>  dextrose 5%. 1000 milliLiter(s) (100 mL/Hr) IV Continuous <Continuous>  dextrose 50% Injectable 25 Gram(s) IV Push once  dextrose 50% Injectable 12.5 Gram(s) IV Push once  dextrose 50% Injectable 25 Gram(s) IV Push once  enoxaparin Injectable 50 milliGRAM(s) SubCutaneous every 12 hours  glucagon  Injectable 1 milliGRAM(s) IntraMuscular once  insulin regular  human corrective regimen sliding scale   SubCutaneous every 6 hours  pantoprazole   Suspension 40 milliGRAM(s) Oral before breakfast  sodium chloride 3%  Inhalation 4 milliLiter(s) Inhalation every 6 hours    MEDICATIONS  (PRN):  acetaminophen    Suspension .. 650 milliGRAM(s) Enteral Tube every 6 hours PRN Temp greater or equal to 38C (100.4F), Mild Pain (1 - 3)      ALLERGIES:  Allergies    ace inhibitors (Anaphylaxis)  caffeine (Rash)  cefepime (Rash)  chocolate (Rash)  high acid (Rash)  Tomatoes (Hives)    Intolerances        LABS:                        10.2   6.15  )-----------( 326      ( 22 Mar 2022 05:27 )             33.2     03-22    142  |  106  |  18  ----------------------------<  136<H>  4.4   |  25  |  0.45<L>    Ca    8.8      22 Mar 2022 05:27  Phos  3.1     03-22  Mg     1.7     03-22    TPro  6.1  /  Alb  2.9<L>  /  TBili  0.2  /  DBili  x   /  AST  27  /  ALT  22  /  AlkPhos  70  03-22        CAPILLARY BLOOD GLUCOSE      POCT Blood Glucose.: 106 mg/dL (22 Mar 2022 23:15)      RADIOLOGY & ADDITIONAL TESTS: Reviewed.

## 2022-03-23 NOTE — CONSULT NOTE ADULT - CONSULT REASON
G tube exchange
trach placement
Assist the team in ethical dilemma posed by an unbefriended 76-year-old female with developmental disability, who lacks decision-making capacity.
Pseudomonal infection
Respiratory Failure

## 2022-03-24 DIAGNOSIS — J96.10 CHRONIC RESPIRATORY FAILURE, UNSPECIFIED WHETHER WITH HYPOXIA OR HYPERCAPNIA: ICD-10-CM

## 2022-03-24 LAB
ALBUMIN SERPL ELPH-MCNC: 3.5 G/DL — SIGNIFICANT CHANGE UP (ref 3.3–5)
ALP SERPL-CCNC: 67 U/L — SIGNIFICANT CHANGE UP (ref 40–120)
ALT FLD-CCNC: 17 U/L — SIGNIFICANT CHANGE UP (ref 10–45)
ANION GAP SERPL CALC-SCNC: 7 MMOL/L — SIGNIFICANT CHANGE UP (ref 5–17)
APTT BLD: 35.2 SEC — SIGNIFICANT CHANGE UP (ref 27.5–35.5)
AST SERPL-CCNC: 19 U/L — SIGNIFICANT CHANGE UP (ref 10–40)
BILIRUB SERPL-MCNC: 0.3 MG/DL — SIGNIFICANT CHANGE UP (ref 0.2–1.2)
BLD GP AB SCN SERPL QL: NEGATIVE — SIGNIFICANT CHANGE UP
BUN SERPL-MCNC: 25 MG/DL — HIGH (ref 7–23)
CALCIUM SERPL-MCNC: 9.1 MG/DL — SIGNIFICANT CHANGE UP (ref 8.4–10.5)
CHLORIDE SERPL-SCNC: 103 MMOL/L — SIGNIFICANT CHANGE UP (ref 96–108)
CO2 SERPL-SCNC: 30 MMOL/L — SIGNIFICANT CHANGE UP (ref 22–31)
CREAT SERPL-MCNC: 0.47 MG/DL — LOW (ref 0.5–1.3)
EGFR: 100 ML/MIN/1.73M2 — SIGNIFICANT CHANGE UP
GLUCOSE BLDC GLUCOMTR-MCNC: 70 MG/DL — SIGNIFICANT CHANGE UP (ref 70–99)
GLUCOSE BLDC GLUCOMTR-MCNC: 87 MG/DL — SIGNIFICANT CHANGE UP (ref 70–99)
GLUCOSE BLDC GLUCOMTR-MCNC: 93 MG/DL — SIGNIFICANT CHANGE UP (ref 70–99)
GLUCOSE BLDC GLUCOMTR-MCNC: 99 MG/DL — SIGNIFICANT CHANGE UP (ref 70–99)
GLUCOSE SERPL-MCNC: 106 MG/DL — HIGH (ref 70–99)
HCT VFR BLD CALC: 34 % — LOW (ref 34.5–45)
HGB BLD-MCNC: 10.5 G/DL — LOW (ref 11.5–15.5)
INR BLD: 0.98 — SIGNIFICANT CHANGE UP (ref 0.88–1.16)
MAGNESIUM SERPL-MCNC: 1.6 MG/DL — SIGNIFICANT CHANGE UP (ref 1.6–2.6)
MCHC RBC-ENTMCNC: 30.9 GM/DL — LOW (ref 32–36)
MCHC RBC-ENTMCNC: 30.9 PG — SIGNIFICANT CHANGE UP (ref 27–34)
MCV RBC AUTO: 100 FL — SIGNIFICANT CHANGE UP (ref 80–100)
NRBC # BLD: 0 /100 WBCS — SIGNIFICANT CHANGE UP (ref 0–0)
PHOSPHATE SERPL-MCNC: 3 MG/DL — SIGNIFICANT CHANGE UP (ref 2.5–4.5)
PLATELET # BLD AUTO: 339 K/UL — SIGNIFICANT CHANGE UP (ref 150–400)
POTASSIUM SERPL-MCNC: 4.4 MMOL/L — SIGNIFICANT CHANGE UP (ref 3.5–5.3)
POTASSIUM SERPL-SCNC: 4.4 MMOL/L — SIGNIFICANT CHANGE UP (ref 3.5–5.3)
PROT SERPL-MCNC: 6.6 G/DL — SIGNIFICANT CHANGE UP (ref 6–8.3)
PROTHROM AB SERPL-ACNC: 11.7 SEC — SIGNIFICANT CHANGE UP (ref 10.5–13.4)
RBC # BLD: 3.4 M/UL — LOW (ref 3.8–5.2)
RBC # FLD: 13.2 % — SIGNIFICANT CHANGE UP (ref 10.3–14.5)
RH IG SCN BLD-IMP: POSITIVE — SIGNIFICANT CHANGE UP
SODIUM SERPL-SCNC: 140 MMOL/L — SIGNIFICANT CHANGE UP (ref 135–145)
WBC # BLD: 8.76 K/UL — SIGNIFICANT CHANGE UP (ref 3.8–10.5)
WBC # FLD AUTO: 8.76 K/UL — SIGNIFICANT CHANGE UP (ref 3.8–10.5)

## 2022-03-24 PROCEDURE — 31600 PLANNED TRACHEOSTOMY: CPT | Mod: GC

## 2022-03-24 PROCEDURE — 99232 SBSQ HOSP IP/OBS MODERATE 35: CPT | Mod: GC

## 2022-03-24 RX ORDER — PROPOFOL 10 MG/ML
5 INJECTION, EMULSION INTRAVENOUS
Qty: 500 | Refills: 0 | Status: DISCONTINUED | OUTPATIENT
Start: 2022-03-24 | End: 2022-03-24

## 2022-03-24 RX ORDER — NOREPINEPHRINE BITARTRATE/D5W 8 MG/250ML
0.05 PLASTIC BAG, INJECTION (ML) INTRAVENOUS
Qty: 8 | Refills: 0 | Status: DISCONTINUED | OUTPATIENT
Start: 2022-03-24 | End: 2022-03-28

## 2022-03-24 RX ORDER — SODIUM CHLORIDE 9 MG/ML
500 INJECTION INTRAMUSCULAR; INTRAVENOUS; SUBCUTANEOUS ONCE
Refills: 0 | Status: COMPLETED | OUTPATIENT
Start: 2022-03-24 | End: 2022-03-24

## 2022-03-24 RX ORDER — MIDAZOLAM HYDROCHLORIDE 1 MG/ML
2 INJECTION, SOLUTION INTRAMUSCULAR; INTRAVENOUS ONCE
Refills: 0 | Status: DISCONTINUED | OUTPATIENT
Start: 2022-03-24 | End: 2022-03-24

## 2022-03-24 RX ORDER — DEXMEDETOMIDINE HYDROCHLORIDE IN 0.9% SODIUM CHLORIDE 4 UG/ML
0.2 INJECTION INTRAVENOUS
Qty: 400 | Refills: 0 | Status: DISCONTINUED | OUTPATIENT
Start: 2022-03-24 | End: 2022-03-25

## 2022-03-24 RX ORDER — SODIUM CHLORIDE 9 MG/ML
1000 INJECTION, SOLUTION INTRAVENOUS ONCE
Refills: 0 | Status: COMPLETED | OUTPATIENT
Start: 2022-03-24 | End: 2022-03-24

## 2022-03-24 RX ORDER — CISATRACURIUM BESYLATE 2 MG/ML
20 INJECTION INTRAVENOUS ONCE
Refills: 0 | Status: COMPLETED | OUTPATIENT
Start: 2022-03-24 | End: 2022-03-24

## 2022-03-24 RX ORDER — FENTANYL CITRATE 50 UG/ML
100 INJECTION INTRAVENOUS ONCE
Refills: 0 | Status: DISCONTINUED | OUTPATIENT
Start: 2022-03-24 | End: 2022-03-24

## 2022-03-24 RX ORDER — FENTANYL CITRATE 50 UG/ML
0.5 INJECTION INTRAVENOUS
Qty: 2500 | Refills: 0 | Status: DISCONTINUED | OUTPATIENT
Start: 2022-03-24 | End: 2022-03-26

## 2022-03-24 RX ORDER — PROPOFOL 10 MG/ML
5 INJECTION, EMULSION INTRAVENOUS
Qty: 1000 | Refills: 0 | Status: DISCONTINUED | OUTPATIENT
Start: 2022-03-24 | End: 2022-03-26

## 2022-03-24 RX ORDER — NOREPINEPHRINE BITARTRATE/D5W 8 MG/250ML
0.05 PLASTIC BAG, INJECTION (ML) INTRAVENOUS
Qty: 16 | Refills: 0 | Status: DISCONTINUED | OUTPATIENT
Start: 2022-03-24 | End: 2022-03-24

## 2022-03-24 RX ORDER — MIDAZOLAM HYDROCHLORIDE 1 MG/ML
6 INJECTION, SOLUTION INTRAMUSCULAR; INTRAVENOUS ONCE
Refills: 0 | Status: DISCONTINUED | OUTPATIENT
Start: 2022-03-24 | End: 2022-03-24

## 2022-03-24 RX ORDER — MIDAZOLAM HYDROCHLORIDE 1 MG/ML
0.02 INJECTION, SOLUTION INTRAMUSCULAR; INTRAVENOUS
Qty: 100 | Refills: 0 | Status: DISCONTINUED | OUTPATIENT
Start: 2022-03-24 | End: 2022-03-25

## 2022-03-24 RX ADMIN — Medication 3 MILLILITER(S): at 10:27

## 2022-03-24 RX ADMIN — FENTANYL CITRATE 2.84 MICROGRAM(S)/KG/HR: 50 INJECTION INTRAVENOUS at 09:30

## 2022-03-24 RX ADMIN — SODIUM CHLORIDE 4 MILLILITER(S): 9 INJECTION INTRAMUSCULAR; INTRAVENOUS; SUBCUTANEOUS at 05:23

## 2022-03-24 RX ADMIN — SODIUM CHLORIDE 500 MILLILITER(S): 9 INJECTION INTRAMUSCULAR; INTRAVENOUS; SUBCUTANEOUS at 06:08

## 2022-03-24 RX ADMIN — SODIUM CHLORIDE 1000 MILLILITER(S): 9 INJECTION, SOLUTION INTRAVENOUS at 18:36

## 2022-03-24 RX ADMIN — SODIUM CHLORIDE 4 MILLILITER(S): 9 INJECTION INTRAMUSCULAR; INTRAVENOUS; SUBCUTANEOUS at 10:26

## 2022-03-24 RX ADMIN — CHLORHEXIDINE GLUCONATE 1 APPLICATION(S): 213 SOLUTION TOPICAL at 06:54

## 2022-03-24 RX ADMIN — CHLORHEXIDINE GLUCONATE 15 MILLILITER(S): 213 SOLUTION TOPICAL at 06:53

## 2022-03-24 RX ADMIN — SODIUM CHLORIDE 4 MILLILITER(S): 9 INJECTION INTRAMUSCULAR; INTRAVENOUS; SUBCUTANEOUS at 20:45

## 2022-03-24 RX ADMIN — Medication 5.33 MICROGRAM(S)/KG/MIN: at 14:30

## 2022-03-24 RX ADMIN — MIDAZOLAM HYDROCHLORIDE 1.14 MG/KG/HR: 1 INJECTION, SOLUTION INTRAMUSCULAR; INTRAVENOUS at 13:30

## 2022-03-24 RX ADMIN — CISATRACURIUM BESYLATE 20 MILLIGRAM(S): 2 INJECTION INTRAVENOUS at 15:01

## 2022-03-24 RX ADMIN — PROPOFOL 1.7 MICROGRAM(S)/KG/MIN: 10 INJECTION, EMULSION INTRAVENOUS at 09:15

## 2022-03-24 RX ADMIN — MIDAZOLAM HYDROCHLORIDE 2 MILLIGRAM(S): 1 INJECTION, SOLUTION INTRAMUSCULAR; INTRAVENOUS at 15:01

## 2022-03-24 RX ADMIN — Medication 3 MILLILITER(S): at 16:53

## 2022-03-24 RX ADMIN — SODIUM CHLORIDE 2000 MILLILITER(S): 9 INJECTION, SOLUTION INTRAVENOUS at 09:16

## 2022-03-24 RX ADMIN — CHLORHEXIDINE GLUCONATE 15 MILLILITER(S): 213 SOLUTION TOPICAL at 18:06

## 2022-03-24 RX ADMIN — Medication 3 MILLILITER(S): at 20:43

## 2022-03-24 RX ADMIN — Medication 3 MILLILITER(S): at 05:23

## 2022-03-24 RX ADMIN — ARIPIPRAZOLE 2 MILLIGRAM(S): 15 TABLET ORAL at 00:41

## 2022-03-24 RX ADMIN — SODIUM CHLORIDE 4 MILLILITER(S): 9 INJECTION INTRAMUSCULAR; INTRAVENOUS; SUBCUTANEOUS at 16:54

## 2022-03-24 NOTE — BRIEF OPERATIVE NOTE - COMMENTS
No immediate complication. some oozing of blood from tracheostomy site endotracheally. Plat pressure 20 cm. No deep suctioning for now. Keep patient sedated till tomorrow AM.  Remove suture in 7 days. Obturator in  plastic bag on the wall at head end.

## 2022-03-24 NOTE — BRIEF OPERATIVE NOTE - NSICDXBRIEFPREOP_GEN_ALL_CORE_FT
PRE-OP DIAGNOSIS:  Chronic respiratory failure requiring continuous mechanical ventilation through tracheostomy 24-Mar-2022 15:39:58  Anthony Manuel

## 2022-03-24 NOTE — PROGRESS NOTE ADULT - SUBJECTIVE AND OBJECTIVE BOX
***INCOMPLETE****  OVERNIGHT EVENTS:    SUBJECTIVE / INTERVAL HPI: Patient seen and examined at bedside.     ROS: negative unless otherwise stated above.    VITAL SIGNS:  Vital Signs Last 24 Hrs  T(C): 36.6 (23 Mar 2022 22:08), Max: 37.3 (23 Mar 2022 06:08)  T(F): 97.8 (23 Mar 2022 22:08), Max: 99.2 (23 Mar 2022 06:08)  HR: 58 (24 Mar 2022 04:00) (57 - 119)  BP: 106/52 (24 Mar 2022 04:00) (87/47 - 136/80)  BP(mean): 75 (24 Mar 2022 04:00) (64 - 103)  RR: 22 (24 Mar 2022 00:05) (15 - 35)  SpO2: 95% (24 Mar 2022 04:00) (89% - 97%)    PHYSICAL EXAM:    General: In no apparent distress  HEENT: NC/AT; PERRL, anicteric sclera; MMM  Neck: supple  Cardiovascular: +S1/S2; RRR  Respiratory: CTA B/L; no W/R/R  Gastrointestinal: soft, NT/ND; +BSx4  Extremities: WWP; no edema, clubbing or cyanosis  Vascular: 2+ radial, DP/PT pulses B/L  Neurological: AAOx3; no focal deficits    MEDICATIONS:  MEDICATIONS  (STANDING):  albuterol/ipratropium for Nebulization 3 milliLiter(s) Nebulizer every 6 hours  ARIPiprazole 2 milliGRAM(s) Oral every 24 hours  chlorhexidine 0.12% Liquid 15 milliLiter(s) Oral Mucosa every 12 hours  chlorhexidine 2% Cloths 1 Application(s) Topical <User Schedule>  dexMEDEtomidine Infusion 0.1 MICROgram(s)/kG/Hr (1.42 mL/Hr) IV Continuous <Continuous>  dextrose 40% Gel 15 Gram(s) Oral once  dextrose 5%. 1000 milliLiter(s) (50 mL/Hr) IV Continuous <Continuous>  dextrose 5%. 1000 milliLiter(s) (100 mL/Hr) IV Continuous <Continuous>  dextrose 50% Injectable 25 Gram(s) IV Push once  dextrose 50% Injectable 12.5 Gram(s) IV Push once  dextrose 50% Injectable 25 Gram(s) IV Push once  glucagon  Injectable 1 milliGRAM(s) IntraMuscular once  insulin regular  human corrective regimen sliding scale   SubCutaneous every 6 hours  pantoprazole   Suspension 40 milliGRAM(s) Oral before breakfast  sodium chloride 0.9% Bolus 500 milliLiter(s) IV Bolus once  sodium chloride 3%  Inhalation 4 milliLiter(s) Inhalation every 6 hours    MEDICATIONS  (PRN):  acetaminophen    Suspension .. 650 milliGRAM(s) Enteral Tube every 6 hours PRN Temp greater or equal to 38C (100.4F), Mild Pain (1 - 3)      ALLERGIES:  Allergies    ace inhibitors (Anaphylaxis)  caffeine (Rash)  cefepime (Rash)  chocolate (Rash)  high acid (Rash)  Tomatoes (Hives)    Intolerances        LABS:              CAPILLARY BLOOD GLUCOSE      POCT Blood Glucose.: 157 mg/dL (23 Mar 2022 17:04)      RADIOLOGY & ADDITIONAL TESTS: Reviewed. OVERNIGHT EVENTS: Overnight, 500 NS bolus given after urine output decreased, with good response in urine output. Started fentanyl and propofol this AM for trach placement today.    SUBJECTIVE / INTERVAL HPI: Patient seen and examined at bedside. Patient was calm this morning, no major complaints.    ROS: negative unless otherwise stated above.    VITAL SIGNS:  Vital Signs Last 24 Hrs  T(C): 36.6 (23 Mar 2022 22:08), Max: 37.3 (23 Mar 2022 06:08)  T(F): 97.8 (23 Mar 2022 22:08), Max: 99.2 (23 Mar 2022 06:08)  HR: 58 (24 Mar 2022 04:00) (57 - 119)  BP: 106/52 (24 Mar 2022 04:00) (87/47 - 136/80)  BP(mean): 75 (24 Mar 2022 04:00) (64 - 103)  RR: 22 (24 Mar 2022 00:05) (15 - 35)  SpO2: 95% (24 Mar 2022 04:00) (89% - 97%)    PHYSICAL EXAM:  General: Alert, calm  HEENT: NC/AT; PERRL, anicteric sclera; MMM  Neck: supple  Cardiovascular: +S1/S2; RRR  Respiratory: CTA B/L; no W/R/R; Vent to ETT  Gastrointestinal: soft, NT/ND; +BSx4; PEG in place.  Extremities: WWP; no edema, clubbing or cyanosis  Vascular: 2+ radial, DP/PT pulses B/L  Neurological: AAOx3; difficulty with communication 2/2 intubation and cerebral palsy, but demonstrates full understanding. Moves all extremities, full strength 5/5 throughout. B/l foot deformity.   Quintero in place.  PICC left arm.    MEDICATIONS:  MEDICATIONS  (STANDING):  albuterol/ipratropium for Nebulization 3 milliLiter(s) Nebulizer every 6 hours  ARIPiprazole 2 milliGRAM(s) Oral every 24 hours  chlorhexidine 0.12% Liquid 15 milliLiter(s) Oral Mucosa every 12 hours  chlorhexidine 2% Cloths 1 Application(s) Topical <User Schedule>  dexMEDEtomidine Infusion 0.1 MICROgram(s)/kG/Hr (1.42 mL/Hr) IV Continuous <Continuous>  dextrose 40% Gel 15 Gram(s) Oral once  dextrose 5%. 1000 milliLiter(s) (50 mL/Hr) IV Continuous <Continuous>  dextrose 5%. 1000 milliLiter(s) (100 mL/Hr) IV Continuous <Continuous>  dextrose 50% Injectable 25 Gram(s) IV Push once  dextrose 50% Injectable 12.5 Gram(s) IV Push once  dextrose 50% Injectable 25 Gram(s) IV Push once  glucagon  Injectable 1 milliGRAM(s) IntraMuscular once  insulin regular  human corrective regimen sliding scale   SubCutaneous every 6 hours  pantoprazole   Suspension 40 milliGRAM(s) Oral before breakfast  sodium chloride 0.9% Bolus 500 milliLiter(s) IV Bolus once  sodium chloride 3%  Inhalation 4 milliLiter(s) Inhalation every 6 hours    MEDICATIONS  (PRN):  acetaminophen    Suspension .. 650 milliGRAM(s) Enteral Tube every 6 hours PRN Temp greater or equal to 38C (100.4F), Mild Pain (1 - 3)      ALLERGIES:  Allergies    ace inhibitors (Anaphylaxis)  caffeine (Rash)  cefepime (Rash)  chocolate (Rash)  high acid (Rash)  Tomatoes (Hives)    Intolerances    LABS:      CAPILLARY BLOOD GLUCOSE      POCT Blood Glucose.: 157 mg/dL (23 Mar 2022 17:04)      RADIOLOGY & ADDITIONAL TESTS: Reviewed.

## 2022-03-24 NOTE — PROGRESS NOTE ADULT - ATTENDING COMMENTS
h/o DVT and PE, chronic respiratory failure, CP  physical as above  trach today  trial of weaning from MV tomorrow  hope to decrease need for sedation and increase mobility

## 2022-03-24 NOTE — PROGRESS NOTE ADULT - ASSESSMENT
73 yo F with PMHx of cerebral palsy with functional quadriplegia, Hx of PE/DVTs, GERD, chronic dysphagia with PEG, thoracic spine fusion initially admitted for acute hypoxic respiratory failure found to have pseudomonal PNA, now intubated for third time i/s/o aspiration PNA of PEG feeds and copious secretions, now off Abx, pending trach placement.    NEURO  #Cerebral palsy  Patient with cerebral palsy. Has functional quadriplegia, health aide at assisted living facility reports at baseline, following commands and communicating intermittently  - pending discussion with pt's CAB state advocate for GOC and medical decisions  - FOR CONSENT, 2-physician consent is required  - see GOC as below    #Goals of care, counseling/discussion.   Patient with cerebral palsy. Previously a [facility] resident, now resides in group home, with [facility] per palliative care. Patient with repeat admission for aspiration pneumonia with history of dysphagia and copious secretions from oropharynx on exam.  - palliative care and ethics consulted - follow up recs  - consent will come from Consumer Advisory Board meeting for trach    #Anxiety  Pt with ongoing restlessness, anxiety, agitation  - c/w home med Abilify 2mg qd  - c/w precedex gtt, wean as tolerated.    CV  No active issues    PULM  #Acute respiratory failure with hypoxia i/s/o of aspiration pneumonia of PEG feeds and copious oral secretions  Patient with h/o multiple aspiration events requiring intubation, and has been extubated and reintubated twice during this hospital stay, thus patient will require a trach.  - currently on VC/AC FiO2 40%, PEEP 5, -280, RR 15  - Frequent suctioning - oral and tracheal   - c/w hypertonic saline followed by duonebs - standing  - Chest PT q6h  - Aspiration precautions  - Plan for trach placement as soon as we can get consent     #PE  History of PE 5 years ago. On Eliquis at home 2.5 mg BID at home. Bedside ultrasound performed in MICU with no evidence of clot.    - c/w lovenox 50 mg BID while here    RENAL  No active issues    GI  #Dysphagia  - PEG tube in place and functional, c/w tube feeds    #GERD   -On famotidine 20 mg qHS at home  - c/w pantoprazole 40 mg QD while in MICU      Quintero in place    ENDO  On regular ISS for TF    ID  No active issues     HEME  #Anemia, macrocytic  Hb stable 9 to 11  MCV   B12/folate WNL  - Transfuse if Hgb<7 (would require 2-physician consent)    PROPHYLAXIS  F: None  E: Replete PRN  N: TF Jevity 1.2 at 50cc/h + 1 LPS  DVT: Lovenox 50 mg BID  GI: protonix 40 mg daily via PEG  C: Full Code, pending discussion with pt's CAB state advocate for GOC and medical decisions  FOR CONSENT 2-physician consent is required  D: Pending trach, if tracheostomy is pursued, patient cannot return back to group home and will be discharged to nursing home. 73 yo F with PMHx of cerebral palsy with functional quadriplegia, Hx of PE/DVTs, GERD, chronic dysphagia with PEG, thoracic spine fusion initially admitted for acute hypoxic respiratory failure found to have pseudomonal PNA, now intubated for third time i/s/o aspiration PNA of PEG feeds and copious secretions, now off Abx, pending trach placement.    NEURO  #Cerebral palsy  Patient with cerebral palsy. Has functional quadriplegia, health aide at assisted living facility reports patient is currently at baseline.  - FOR CONSENT, 2-physician consent is required, although patient is AOx3.     #Goals of care, counseling/discussion.   Patient with cerebral palsy. Previously a [facility] resident, now resides in group home, with [facility] per palliative care. Patient with repeat admission for aspiration pneumonia with history of dysphagia and copious secretions from oropharynx on exam.  - palliative care and ethics consulted - Advisory board must make decisions for patient, plan is for trach to be placed today.    #Anxiety  Pt with ongoing restlessness, anxiety, agitation  - c/w home med Abilify 2mg qd  - c/w precedex gtt (on sedation as well with propofol and fent today for trach placement, will dc propofol later today and leave fent on for 12-18 hrs after procedure for pain control)    CV  No active issues    PULM  #Acute respiratory failure with hypoxia i/s/o of aspiration pneumonia of PEG feeds and copious oral secretions  Patient with h/o multiple aspiration events requiring intubation, and has been extubated and reintubated twice during this hospital stay, thus patient will require a trach.  - currently on VC/AC FiO2 40%, PEEP 5, -280, RR 15 -> via ETT, plan to place trach today  - c/w Frequent suctioning - oral and tracheal   - c/w hypertonic saline followed by duonebs - standing  - c/w Chest PT q6h  - c/w Aspiration precautions    #PE  History of PE 5 years ago. On Eliquis at home 2.5 mg BID at home. Bedside ultrasound performed in MICU with no evidence of clot.    - c/w lovenox 50 mg BID while here (on hold now for trach)    RENAL  No active issues    GI  #Dysphagia  - PEG tube in place and functional, c/w tube feeds    #GERD   -On famotidine 20 mg qHS at home  - c/w pantoprazole 40 mg QD while in MICU      Quintero in place    ENDO  On regular ISS for TF    ID  No active issues     HEME  #Anemia, macrocytic  Hb stable 9 to 11  MCV   B12/folate WNL  - Transfuse if Hgb<7 (would require 2-physician consent)    PROPHYLAXIS  F: None  E: Replete PRN  N: TF Jevity 1.2 at 50cc/h + 1 LPS  DVT: Lovenox 50 mg BID  GI: protonix 40 mg daily via PEG  C: Full Code, pending discussion with pt's CAB state advocate for GOC and medical decisions  FOR CONSENT 2-physician consent is required  D: With trach, patient cannot return back to group home and will be discharged to nursing home.

## 2022-03-25 LAB
ALBUMIN SERPL ELPH-MCNC: 3.2 G/DL — LOW (ref 3.3–5)
ALP SERPL-CCNC: 79 U/L — SIGNIFICANT CHANGE UP (ref 40–120)
ALT FLD-CCNC: 15 U/L — SIGNIFICANT CHANGE UP (ref 10–45)
ANION GAP SERPL CALC-SCNC: 10 MMOL/L — SIGNIFICANT CHANGE UP (ref 5–17)
AST SERPL-CCNC: 24 U/L — SIGNIFICANT CHANGE UP (ref 10–40)
BILIRUB SERPL-MCNC: 0.3 MG/DL — SIGNIFICANT CHANGE UP (ref 0.2–1.2)
BUN SERPL-MCNC: 17 MG/DL — SIGNIFICANT CHANGE UP (ref 7–23)
CALCIUM SERPL-MCNC: 8.5 MG/DL — SIGNIFICANT CHANGE UP (ref 8.4–10.5)
CHLORIDE SERPL-SCNC: 103 MMOL/L — SIGNIFICANT CHANGE UP (ref 96–108)
CO2 SERPL-SCNC: 25 MMOL/L — SIGNIFICANT CHANGE UP (ref 22–31)
CREAT SERPL-MCNC: 0.56 MG/DL — SIGNIFICANT CHANGE UP (ref 0.5–1.3)
EGFR: 96 ML/MIN/1.73M2 — SIGNIFICANT CHANGE UP
GLUCOSE BLDC GLUCOMTR-MCNC: 119 MG/DL — HIGH (ref 70–99)
GLUCOSE BLDC GLUCOMTR-MCNC: 122 MG/DL — HIGH (ref 70–99)
GLUCOSE BLDC GLUCOMTR-MCNC: 132 MG/DL — HIGH (ref 70–99)
GLUCOSE BLDC GLUCOMTR-MCNC: 145 MG/DL — HIGH (ref 70–99)
GLUCOSE SERPL-MCNC: 130 MG/DL — HIGH (ref 70–99)
GRAM STN FLD: SIGNIFICANT CHANGE UP
HCT VFR BLD CALC: 35.2 % — SIGNIFICANT CHANGE UP (ref 34.5–45)
HGB BLD-MCNC: 10.1 G/DL — LOW (ref 11.5–15.5)
MAGNESIUM SERPL-MCNC: 1.6 MG/DL — SIGNIFICANT CHANGE UP (ref 1.6–2.6)
MCHC RBC-ENTMCNC: 28.7 GM/DL — LOW (ref 32–36)
MCHC RBC-ENTMCNC: 29.9 PG — SIGNIFICANT CHANGE UP (ref 27–34)
MCV RBC AUTO: 104.1 FL — HIGH (ref 80–100)
NRBC # BLD: 0 /100 WBCS — SIGNIFICANT CHANGE UP (ref 0–0)
PHOSPHATE SERPL-MCNC: 3.7 MG/DL — SIGNIFICANT CHANGE UP (ref 2.5–4.5)
PLATELET # BLD AUTO: 372 K/UL — SIGNIFICANT CHANGE UP (ref 150–400)
POTASSIUM SERPL-MCNC: 4.2 MMOL/L — SIGNIFICANT CHANGE UP (ref 3.5–5.3)
POTASSIUM SERPL-SCNC: 4.2 MMOL/L — SIGNIFICANT CHANGE UP (ref 3.5–5.3)
PROT SERPL-MCNC: 6.4 G/DL — SIGNIFICANT CHANGE UP (ref 6–8.3)
RBC # BLD: 3.38 M/UL — LOW (ref 3.8–5.2)
RBC # FLD: 13.2 % — SIGNIFICANT CHANGE UP (ref 10.3–14.5)
SODIUM SERPL-SCNC: 138 MMOL/L — SIGNIFICANT CHANGE UP (ref 135–145)
SPECIMEN SOURCE: SIGNIFICANT CHANGE UP
WBC # BLD: 11.07 K/UL — HIGH (ref 3.8–10.5)
WBC # FLD AUTO: 11.07 K/UL — HIGH (ref 3.8–10.5)

## 2022-03-25 PROCEDURE — 31622 DX BRONCHOSCOPE/WASH: CPT | Mod: GC

## 2022-03-25 PROCEDURE — 99233 SBSQ HOSP IP/OBS HIGH 50: CPT | Mod: GC

## 2022-03-25 PROCEDURE — 71045 X-RAY EXAM CHEST 1 VIEW: CPT | Mod: 26

## 2022-03-25 RX ORDER — MIDAZOLAM HYDROCHLORIDE 1 MG/ML
4 INJECTION, SOLUTION INTRAMUSCULAR; INTRAVENOUS ONCE
Refills: 0 | Status: DISCONTINUED | OUTPATIENT
Start: 2022-03-25 | End: 2022-03-25

## 2022-03-25 RX ORDER — LIDOCAINE HCL 20 MG/ML
2 VIAL (ML) INJECTION ONCE
Refills: 0 | Status: DISCONTINUED | OUTPATIENT
Start: 2022-03-25 | End: 2022-03-25

## 2022-03-25 RX ORDER — CEFEPIME 1 G/1
2000 INJECTION, POWDER, FOR SOLUTION INTRAMUSCULAR; INTRAVENOUS EVERY 8 HOURS
Refills: 0 | Status: DISCONTINUED | OUTPATIENT
Start: 2022-03-25 | End: 2022-03-25

## 2022-03-25 RX ORDER — FENTANYL CITRATE 50 UG/ML
100 INJECTION INTRAVENOUS ONCE
Refills: 0 | Status: DISCONTINUED | OUTPATIENT
Start: 2022-03-25 | End: 2022-03-25

## 2022-03-25 RX ORDER — PIPERACILLIN AND TAZOBACTAM 4; .5 G/20ML; G/20ML
4.5 INJECTION, POWDER, LYOPHILIZED, FOR SOLUTION INTRAVENOUS EVERY 6 HOURS
Refills: 0 | Status: DISCONTINUED | OUTPATIENT
Start: 2022-03-25 | End: 2022-03-26

## 2022-03-25 RX ORDER — LIDOCAINE HCL 20 MG/ML
20 VIAL (ML) INJECTION ONCE
Refills: 0 | Status: COMPLETED | OUTPATIENT
Start: 2022-03-25 | End: 2022-03-25

## 2022-03-25 RX ADMIN — CHLORHEXIDINE GLUCONATE 1 APPLICATION(S): 213 SOLUTION TOPICAL at 07:39

## 2022-03-25 RX ADMIN — CHLORHEXIDINE GLUCONATE 15 MILLILITER(S): 213 SOLUTION TOPICAL at 07:34

## 2022-03-25 RX ADMIN — SODIUM CHLORIDE 4 MILLILITER(S): 9 INJECTION INTRAMUSCULAR; INTRAVENOUS; SUBCUTANEOUS at 09:17

## 2022-03-25 RX ADMIN — CHLORHEXIDINE GLUCONATE 15 MILLILITER(S): 213 SOLUTION TOPICAL at 17:52

## 2022-03-25 RX ADMIN — PROPOFOL 1.7 MICROGRAM(S)/KG/MIN: 10 INJECTION, EMULSION INTRAVENOUS at 07:40

## 2022-03-25 RX ADMIN — Medication 3 MILLILITER(S): at 03:15

## 2022-03-25 RX ADMIN — ARIPIPRAZOLE 2 MILLIGRAM(S): 15 TABLET ORAL at 21:15

## 2022-03-25 RX ADMIN — SODIUM CHLORIDE 4 MILLILITER(S): 9 INJECTION INTRAMUSCULAR; INTRAVENOUS; SUBCUTANEOUS at 15:41

## 2022-03-25 RX ADMIN — MIDAZOLAM HYDROCHLORIDE 2 MILLIGRAM(S): 1 INJECTION, SOLUTION INTRAMUSCULAR; INTRAVENOUS at 13:07

## 2022-03-25 RX ADMIN — FENTANYL CITRATE 50 MICROGRAM(S): 50 INJECTION INTRAVENOUS at 13:08

## 2022-03-25 RX ADMIN — Medication 3 MILLILITER(S): at 21:12

## 2022-03-25 RX ADMIN — FENTANYL CITRATE 100 MICROGRAM(S): 50 INJECTION INTRAVENOUS at 14:04

## 2022-03-25 RX ADMIN — Medication 3 MILLILITER(S): at 09:18

## 2022-03-25 RX ADMIN — SODIUM CHLORIDE 4 MILLILITER(S): 9 INJECTION INTRAMUSCULAR; INTRAVENOUS; SUBCUTANEOUS at 21:13

## 2022-03-25 RX ADMIN — Medication 20 MILLILITER(S): at 13:08

## 2022-03-25 RX ADMIN — SODIUM CHLORIDE 4 MILLILITER(S): 9 INJECTION INTRAMUSCULAR; INTRAVENOUS; SUBCUTANEOUS at 03:18

## 2022-03-25 RX ADMIN — PANTOPRAZOLE SODIUM 40 MILLIGRAM(S): 20 TABLET, DELAYED RELEASE ORAL at 07:39

## 2022-03-25 RX ADMIN — PROPOFOL 1.7 MICROGRAM(S)/KG/MIN: 10 INJECTION, EMULSION INTRAVENOUS at 14:48

## 2022-03-25 RX ADMIN — Medication 3 MILLILITER(S): at 15:41

## 2022-03-25 RX ADMIN — ARIPIPRAZOLE 2 MILLIGRAM(S): 15 TABLET ORAL at 00:13

## 2022-03-25 NOTE — PROCEDURE NOTE - PROCEDURE DATE TIME, MLM
03-Mar-2022 08:00
13-Mar-2022 18:00
14-Mar-2022 15:23
04-Mar-2022 06:10
14-Mar-2022 09:00
03-Mar-2022 06:30
03-Mar-2022 08:30
13-Mar-2022 14:00
13-Mar-2022 16:45
25-Mar-2022 22:11
14-Mar-2022 00:00
13-Mar-2022 14:00
19-Mar-2022 10:54

## 2022-03-25 NOTE — PROCEDURE NOTE - NSBRONCHFINDINGS_GEN_A_CORE_FT
No signs of clot or bleeding. Inner cannula of trach was found to have bloody secretions and was cleaned out with inner cannula replaced after washing. All airways open
Erythematous mucosa bilaterally but no signs of mucous plugging with all airways patent  Superior segment of RLL was mostly closed with minimal opening on breathing and injection of saline.  No signs of purulent secretions or endobronchial lesions

## 2022-03-25 NOTE — PROCEDURE NOTE - NSBRONCHHISTORY_GEN_A_CORE_FT
Abnormal chest Xray concern for mucous plug
Patient status post Trach with significant bleeding. Having desaturations with concern for clot causing plugging

## 2022-03-25 NOTE — PROGRESS NOTE ADULT - ATTENDING COMMENTS
acute hypoxemic resp failure  physical as above  new infiltrate on CXR with fever  culture and add zosyn  continue feeds  precedex for sedation  fentanyl for pain  failed CPAP  complex decision making rendered

## 2022-03-25 NOTE — CHART NOTE - NSCHARTNOTEFT_GEN_A_CORE
Patient status post trach with some bleeding during procedure that went down into lungs. She had drop in saturations overnight and increased oxygen requirement today. Given bleeding feel that bronchoscopy needs to be done to clear out blood and improve oxygenation and ventilation. Given that this is urgent unable to obtain consent. Given this and urgent need will proceed with 2 PC consent.

## 2022-03-25 NOTE — PROCEDURE NOTE - NSPRE-BRON/TUBRISKASSES_GEN_ALL_CORE
I evaluated the patient prior to bronchoscopy procedure for active pulmonary/laryngeal M. tuberculosis disease and the risk and actions taken:    Low risk with routine standard of care measures followed.
I evaluated the patient prior to bronchoscopy procedure for active pulmonary/laryngeal M. tuberculosis disease and the risk and actions taken:    Low risk with routine standard of care measures followed.

## 2022-03-25 NOTE — PROCEDURE NOTE - NSICDXIRPREOP_GEN_A_CORE_FT
PRE-OP DIAGNOSIS:  Pneumonia, aspiration 14-Mar-2022 15:19:55  Alan Wilson  
PRE-OP DIAGNOSIS:  Pneumonia, aspiration 14-Mar-2022 15:19:55  Alan Wilson

## 2022-03-25 NOTE — PROCEDURE NOTE - NSICDXIRPOSTOP_GEN_A_CORE_FT
POST-OP DIAGNOSIS:  Pneumonia, aspiration 14-Mar-2022 15:20:07  Alan Wilson  
POST-OP DIAGNOSIS:  Pneumonia, aspiration 14-Mar-2022 15:20:07  Alan Wilson

## 2022-03-25 NOTE — PROGRESS NOTE ADULT - SUBJECTIVE AND OBJECTIVE BOX
***INCOMPLETE****  OVERNIGHT EVENTS:    SUBJECTIVE / INTERVAL HPI: Patient seen and examined at bedside.     ROS: negative unless otherwise stated above.    VITAL SIGNS:  Vital Signs Last 24 Hrs  T(C): 37.3 (25 Mar 2022 01:03), Max: 37.3 (25 Mar 2022 01:03)  T(F): 99.1 (25 Mar 2022 01:03), Max: 99.1 (25 Mar 2022 01:03)  HR: 82 (25 Mar 2022 06:00) (43 - 90)  BP: 88/45 (25 Mar 2022 06:00) (73/34 - 157/71)  BP(mean): 63 (25 Mar 2022 06:00) (48 - 102)  RR: 18 (25 Mar 2022 05:09) (14 - 22)  SpO2: 95% (25 Mar 2022 06:00) (89% - 100%)    PHYSICAL EXAM:    General: In no apparent distress  HEENT: NC/AT; PERRL, anicteric sclera; MMM  Neck: supple  Cardiovascular: +S1/S2; RRR  Respiratory: CTA B/L; no W/R/R  Gastrointestinal: soft, NT/ND; +BSx4  Extremities: WWP; no edema, clubbing or cyanosis  Vascular: 2+ radial, DP/PT pulses B/L  Neurological: AAOx3; no focal deficits    MEDICATIONS:  MEDICATIONS  (STANDING):  albuterol/ipratropium for Nebulization 3 milliLiter(s) Nebulizer every 6 hours  ARIPiprazole 2 milliGRAM(s) Oral every 24 hours  chlorhexidine 0.12% Liquid 15 milliLiter(s) Oral Mucosa every 12 hours  chlorhexidine 2% Cloths 1 Application(s) Topical <User Schedule>  dexMEDEtomidine Infusion 0.2 MICROgram(s)/kG/Hr (2.84 mL/Hr) IV Continuous <Continuous>  dextrose 40% Gel 15 Gram(s) Oral once  dextrose 5%. 1000 milliLiter(s) (50 mL/Hr) IV Continuous <Continuous>  dextrose 5%. 1000 milliLiter(s) (100 mL/Hr) IV Continuous <Continuous>  dextrose 50% Injectable 25 Gram(s) IV Push once  dextrose 50% Injectable 12.5 Gram(s) IV Push once  dextrose 50% Injectable 25 Gram(s) IV Push once  fentaNYL   Infusion. 0.5 MICROgram(s)/kG/Hr (2.84 mL/Hr) IV Continuous <Continuous>  glucagon  Injectable 1 milliGRAM(s) IntraMuscular once  insulin regular  human corrective regimen sliding scale   SubCutaneous every 6 hours  midazolam Infusion 0.02 mG/kG/Hr (1.14 mL/Hr) IV Continuous <Continuous>  norepinephrine Infusion 0.05 MICROgram(s)/kG/Min (5.33 mL/Hr) IV Continuous <Continuous>  pantoprazole   Suspension 40 milliGRAM(s) Oral before breakfast  propofol Infusion 5 MICROgram(s)/kG/Min (1.7 mL/Hr) IV Continuous <Continuous>  sodium chloride 3%  Inhalation 4 milliLiter(s) Inhalation every 6 hours    MEDICATIONS  (PRN):  acetaminophen    Suspension .. 650 milliGRAM(s) Enteral Tube every 6 hours PRN Temp greater or equal to 38C (100.4F), Mild Pain (1 - 3)      ALLERGIES:  Allergies    ace inhibitors (Anaphylaxis)  caffeine (Rash)  cefepime (Rash)  chocolate (Rash)  high acid (Rash)  Tomatoes (Hives)    Intolerances        LABS:                        10.1   11.07 )-----------( 372      ( 25 Mar 2022 05:42 )             35.2     03-25    138  |  103  |  17  ----------------------------<  130<H>  4.2   |  25  |  0.56    Ca    8.5      25 Mar 2022 05:42  Phos  3.7     03-25  Mg     1.6     03-25    TPro  6.4  /  Alb  3.2<L>  /  TBili  0.3  /  DBili  x   /  AST  24  /  ALT  15  /  AlkPhos  79  03-25    PT/INR - ( 24 Mar 2022 06:02 )   PT: 11.7 sec;   INR: 0.98          PTT - ( 24 Mar 2022 06:02 )  PTT:35.2 sec    CAPILLARY BLOOD GLUCOSE      POCT Blood Glucose.: 122 mg/dL (25 Mar 2022 05:32)      RADIOLOGY & ADDITIONAL TESTS: Reviewed. OVERNIGHT EVENTS: Overnight, pt desatted, requiring increased FiO2, CXR in AM showed increased opacities bilaterally, consistent with blood or fluid aspiration. Sedation and levo remained on over night.     SUBJECTIVE / INTERVAL HPI: Patient seen and examined at bedside. Sedated.     ROS: negative unless otherwise stated above.    VITAL SIGNS:  Vital Signs Last 24 Hrs  T(C): 37.3 (25 Mar 2022 01:03), Max: 37.3 (25 Mar 2022 01:03)  T(F): 99.1 (25 Mar 2022 01:03), Max: 99.1 (25 Mar 2022 01:03)  HR: 82 (25 Mar 2022 06:00) (43 - 90)  BP: 88/45 (25 Mar 2022 06:00) (73/34 - 157/71)  BP(mean): 63 (25 Mar 2022 06:00) (48 - 102)  RR: 18 (25 Mar 2022 05:09) (14 - 22)  SpO2: 95% (25 Mar 2022 06:00) (89% - 100%)    PHYSICAL EXAM:  General: In no apparent distress, sedated  HEENT: NC/AT; PERRL, anicteric sclera; MMM  Neck: supple  Cardiovascular: +S1/S2; RRR  Respiratory: +crackles bilaterally  Gastrointestinal: soft, NT/ND; +BSx4; PEG tube  Extremities: WWP; no edema, clubbing or cyanosis; +deformities in b/l feet   Vascular: 2+ radial, DP/PT pulses B/L  Neurological: Sedated to RASS -2 this AM    MEDICATIONS:  MEDICATIONS  (STANDING):  albuterol/ipratropium for Nebulization 3 milliLiter(s) Nebulizer every 6 hours  ARIPiprazole 2 milliGRAM(s) Oral every 24 hours  chlorhexidine 0.12% Liquid 15 milliLiter(s) Oral Mucosa every 12 hours  chlorhexidine 2% Cloths 1 Application(s) Topical <User Schedule>  dexMEDEtomidine Infusion 0.2 MICROgram(s)/kG/Hr (2.84 mL/Hr) IV Continuous <Continuous>  dextrose 40% Gel 15 Gram(s) Oral once  dextrose 5%. 1000 milliLiter(s) (50 mL/Hr) IV Continuous <Continuous>  dextrose 5%. 1000 milliLiter(s) (100 mL/Hr) IV Continuous <Continuous>  dextrose 50% Injectable 25 Gram(s) IV Push once  dextrose 50% Injectable 12.5 Gram(s) IV Push once  dextrose 50% Injectable 25 Gram(s) IV Push once  fentaNYL   Infusion. 0.5 MICROgram(s)/kG/Hr (2.84 mL/Hr) IV Continuous <Continuous>  glucagon  Injectable 1 milliGRAM(s) IntraMuscular once  insulin regular  human corrective regimen sliding scale   SubCutaneous every 6 hours  midazolam Infusion 0.02 mG/kG/Hr (1.14 mL/Hr) IV Continuous <Continuous>  norepinephrine Infusion 0.05 MICROgram(s)/kG/Min (5.33 mL/Hr) IV Continuous <Continuous>  pantoprazole   Suspension 40 milliGRAM(s) Oral before breakfast  propofol Infusion 5 MICROgram(s)/kG/Min (1.7 mL/Hr) IV Continuous <Continuous>  sodium chloride 3%  Inhalation 4 milliLiter(s) Inhalation every 6 hours    MEDICATIONS  (PRN):  acetaminophen    Suspension .. 650 milliGRAM(s) Enteral Tube every 6 hours PRN Temp greater or equal to 38C (100.4F), Mild Pain (1 - 3)      ALLERGIES:  Allergies    ace inhibitors (Anaphylaxis)  caffeine (Rash)  cefepime (Rash)  chocolate (Rash)  high acid (Rash)  Tomatoes (Hives)    Intolerances        LABS:                        10.1   11.07 )-----------( 372      ( 25 Mar 2022 05:42 )             35.2     03-25    138  |  103  |  17  ----------------------------<  130<H>  4.2   |  25  |  0.56    Ca    8.5      25 Mar 2022 05:42  Phos  3.7     03-25  Mg     1.6     03-25    TPro  6.4  /  Alb  3.2<L>  /  TBili  0.3  /  DBili  x   /  AST  24  /  ALT  15  /  AlkPhos  79  03-25    PT/INR - ( 24 Mar 2022 06:02 )   PT: 11.7 sec;   INR: 0.98          PTT - ( 24 Mar 2022 06:02 )  PTT:35.2 sec    CAPILLARY BLOOD GLUCOSE      POCT Blood Glucose.: 122 mg/dL (25 Mar 2022 05:32)      RADIOLOGY & ADDITIONAL TESTS: Reviewed.

## 2022-03-25 NOTE — PROCEDURE NOTE - NSBRONCHPROCDETAILS_GEN_A_CORE_FT
Indication: hypoxia    History: Recent trach on 3/24 with blood during procedure and concern for clot in airway    Preop medication:    Findings:  Bronchoscope inserted through tracheostomy. Airway evaluation revealed Sharp Lise. DONAVAN and LLL evaluation revealed Patient airway with no signs of clot. RUL, RML, RLL revealed Patient airway with no signs of clot of purulent secretions. Both airways were revealed to be patient with no signs of clot present. Secretions were suctioned and there was a large amount of blood looking secretions in the trach inner cannula. After inspection and washing bronchoscope was removed through tracheosomty. Minimal bleeding noted    Specimens: Bronchial washings    EBL: minimal
Indication:    History:    Preop medication:    Findings:  Bronchoscope inserted through ETT. Initially ET tube was felt to be in the R mainstem but after slow removal was found to be just above the main paula. Lidocaine was given down both L and R main bronchus. Airway evaluation revealed Sharp Paula. DONAVAN and LLL evaluation revealed Patent airways with no signs of mucous plugging. RUL, RML, RLL revealed Patient airways with compression of superior segment of RLL, did open but minimally with saline injection. ETT was then confirmed to be in good position. Bronchoscope then withdrawn from ETT. Minimal bleeding noted.    Specimens: bronchial washings    EBL: minimal

## 2022-03-25 NOTE — PROCEDURE NOTE - NSINFORMCONSENT_GEN_A_CORE
Benefits, risks, and possible complications of procedure explained to patient/caregiver who verbalized understanding and gave verbal consent.
Benefits, risks, and possible complications of procedure explained to patient/caregiver who verbalized understanding and gave written consent.
This was an emergent procedure.
This was an emergent procedure.
Benefits, risks, and possible complications of procedure explained to patient/caregiver who verbalized understanding and gave verbal consent.
Benefits, risks, and possible complications of procedure explained to patient/caregiver who verbalized understanding and gave verbal consent.
This was an emergent procedure.
Benefits, risks, and possible complications of procedure explained to patient/caregiver who verbalized understanding and gave verbal consent.
This was an emergent procedure.

## 2022-03-25 NOTE — PROGRESS NOTE ADULT - ASSESSMENT
75 yo F with PMHx of cerebral palsy with functional quadriplegia, Hx of PE/DVTs, GERD, chronic dysphagia with PEG, thoracic spine fusion initially admitted for acute hypoxic respiratory failure found to have pseudomonal PNA, now intubated for third time i/s/o aspiration PNA of PEG feeds and copious secretions, now off Abx, pending trach placement.    NEURO  #Cerebral palsy  Patient with cerebral palsy. Has functional quadriplegia, health aide at assisted living facility reports patient is currently at baseline.  - FOR CONSENT, 2-physician consent is required, although patient is AOx3.     #Goals of care, counseling/discussion.   Patient with cerebral palsy. Previously a [place] resident, now resides in group home, with [place] [facility] per palliative care. Patient with repeat admission for aspiration pneumonia with history of dysphagia and copious secretions from oropharynx on exam.  - palliative care and ethics consulted - Advisory board must make decisions for patient, plan is for trach to be placed today.    #Anxiety  Pt with ongoing restlessness, anxiety, agitation  - c/w home med Abilify 2mg qd  - c/w precedex gtt (on sedation as well with propofol and fent today for trach placement, will dc propofol later today and leave fent on for 12-18 hrs after procedure for pain control)    CV  No active issues    PULM  #Acute respiratory failure with hypoxia i/s/o of aspiration pneumonia of PEG feeds and copious oral secretions  Patient with h/o multiple aspiration events requiring intubation, and has been extubated and reintubated twice during this hospital stay, thus patient will require a trach.  - currently on VC/AC FiO2 40%, PEEP 5, -280, RR 15 -> via ETT, plan to place trach today  - c/w Frequent suctioning - oral and tracheal   - c/w hypertonic saline followed by duonebs - standing  - c/w Chest PT q6h  - c/w Aspiration precautions    #PE  History of PE 5 years ago. On Eliquis at home 2.5 mg BID at home. Bedside ultrasound performed in MICU with no evidence of clot.    - c/w lovenox 50 mg BID while here (on hold now for trach)    RENAL  No active issues    GI  #Dysphagia  - PEG tube in place and functional, c/w tube feeds    #GERD   -On famotidine 20 mg qHS at home  - c/w pantoprazole 40 mg QD while in MICU      Quintero in place    ENDO  On regular ISS for TF    ID  No active issues     HEME  #Anemia, macrocytic  Hb stable 9 to 11  MCV   B12/folate WNL  - Transfuse if Hgb<7 (would require 2-physician consent)    PROPHYLAXIS  F: None  E: Replete PRN  N: TF Jevity 1.2 at 50cc/h + 1 LPS  DVT: Lovenox 50 mg BID  GI: protonix 40 mg daily via PEG  C: Full Code, pending discussion with pt's CAB state advocate for GOC and medical decisions  FOR CONSENT 2-physician consent is required  D: With trach, patient cannot return back to group home and will be discharged to nursing home. 73 yo F with PMHx of cerebral palsy with functional quadriplegia, Hx of PE/DVTs, GERD, chronic dysphagia with PEG, thoracic spine fusion initially admitted for acute hypoxic respiratory failure found to have pseudomonal PNA, intubated for third time i/s/o aspiration PNA of PEG feeds and copious secretions, trach placed 3/24.    NEURO  #Cerebral palsy  Patient with cerebral palsy. Has functional quadriplegia, health aide at assisted living facility reports patient is currently at baseline.  - FOR CONSENT, 2-physician consent is required, although patient is AOx3.     #Goals of care, counseling/discussion.   Patient with cerebral palsy. Previously a [place] resident, now resides in group home, with [place] [facility] per palliative care. Patient with repeat admission for aspiration pneumonia with history of dysphagia and copious secretions from oropharynx on exam.  - palliative care and ethics consulted - Advisory board must make decisions for patient, plan is for trach to be placed today.    #Anxiety  Pt with ongoing restlessness, anxiety, agitation  - c/w home med Abilify 2mg qd  - c/w precedex gtt (on sedation as well with propofol and fentanyl given post-trach placement dyssynchrony with vent because of decreased compliance given possible aspiration event during placement)    CV  No active issues    PULM  #Acute respiratory failure with hypoxia i/s/o of aspiration pneumonia of PEG feeds and copious oral secretions  Extubated and reintubated multiple times, trached on 3/24  - currently on VC/AC and requiring sedation for dyssynchrony after possible aspiration event during trach placement  - plan for bronch today   - f/u sputum cultures sent 3/25  - c/w Frequent suctioning - oral and tracheal, hypertonic saline followed by duonebs - standing, Chest PT q6h, aspiration precautions    #PE  History of PE 5 years ago. On Eliquis at home 2.5 mg BID at home. Bedside ultrasound performed in MICU with no evidence of clot.    - c/w lovenox 50 mg BID while here (on hold now)    RENAL  No active issues    GI  #Dysphagia  - PEG tube in place and functional, c/w tube feeds    #GERD   -On famotidine 20 mg qHS at home  - c/w pantoprazole 40 mg QD while in MICU      Quintero in place    ENDO  On regular ISS for TF    ID  #Suspected aspiration event 3/25  -f/u sputum culture  -No abx for now (no fever, only small increase in WBC and it would be expected after surgery)     HEME  #Anemia, macrocytic  Hb stable 9 to 11  MCV   B12/folate WNL  - Maintain active T&S (Last 3/24, next 3/27)  - Transfuse if Hgb<7 (would require 2-physician consent)    PROPHYLAXIS  F: None  E: Replete PRN  N: TF Jevity 1.2 at 50cc/h + 1 LPS  DVT: Lovenox 50 mg BID (on hold)  GI: protonix 40 mg daily via PEG  C: Full Code, pending discussion with pt's CAB state advocate for GOC and medical decisions  FOR CONSENT 2-physician consent is required  D: With trach, patient cannot return back to group home and will be discharged to nursing home.

## 2022-03-25 NOTE — CHART NOTE - NSCHARTNOTEFT_GEN_A_CORE
Admitting Diagnosis:   Patient is a 76y old  Female who presents with a chief complaint of respiratory failure (16 Mar 2022 13:37)      PAST MEDICAL & SURGICAL HISTORY:  CP (cerebral palsy)    Mental retardation    Dysphagia    GERD (gastroesophageal reflux disease)    PE (pulmonary thromboembolism)    DVT (deep venous thrombosis)    Spastic quadriplegia    HTN (hypertension)    PEG tube malfunction    Current Nutrition Order:  Jevity 1.2 @ 50ml/hr + 1 LPS via PEG  (Provides 1200ml TV, 1540kcals/1846kcal w/propofol, 82g protein, 968ml free water 1.8g/kg IBW protein)  +150ml free h2O Q4hrs     PO Intake: Good (%) [   ]  Fair (50-75%) [   ] Poor (<25%) [   ] [x] NPO w/ EN    GI Issues: No nausea/vomiting documented at this time. Last documented bowel movement 3/23 (noted w/ fecal incontinence).     Pain: No signs of pain at time of RD interview.    Skin Integrity: B/L foot edema 1+. No pressure injuries documented at this time. Roberto Carlos score: 13.    Labs:   03-25    138  |  103  |  17  ----------------------------<  130<H>  4.2   |  25  |  0.56    Ca    8.5      25 Mar 2022 05:42  Phos  3.7     03-25  Mg     1.6     03-25    TPro  6.4  /  Alb  3.2<L>  /  TBili  0.3  /  DBili  x   /  AST  24  /  ALT  15  /  AlkPhos  79  03-25    CAPILLARY BLOOD GLUCOSE      POCT Blood Glucose.: 122 mg/dL (25 Mar 2022 05:32)  POCT Blood Glucose.: 93 mg/dL (24 Mar 2022 23:42)  POCT Blood Glucose.: 70 mg/dL (24 Mar 2022 17:58)  POCT Blood Glucose.: 87 mg/dL (24 Mar 2022 11:40)      Medications:  MEDICATIONS  (STANDING):  albuterol/ipratropium for Nebulization 3 milliLiter(s) Nebulizer every 6 hours  ARIPiprazole 2 milliGRAM(s) Oral every 24 hours  chlorhexidine 0.12% Liquid 15 milliLiter(s) Oral Mucosa every 12 hours  chlorhexidine 2% Cloths 1 Application(s) Topical <User Schedule>  dextrose 40% Gel 15 Gram(s) Oral once  dextrose 5%. 1000 milliLiter(s) (50 mL/Hr) IV Continuous <Continuous>  dextrose 5%. 1000 milliLiter(s) (100 mL/Hr) IV Continuous <Continuous>  dextrose 50% Injectable 25 Gram(s) IV Push once  dextrose 50% Injectable 12.5 Gram(s) IV Push once  dextrose 50% Injectable 25 Gram(s) IV Push once  fentaNYL   Infusion. 0.5 MICROgram(s)/kG/Hr (2.84 mL/Hr) IV Continuous <Continuous>  glucagon  Injectable 1 milliGRAM(s) IntraMuscular once  insulin regular  human corrective regimen sliding scale   SubCutaneous every 6 hours  norepinephrine Infusion 0.05 MICROgram(s)/kG/Min (5.33 mL/Hr) IV Continuous <Continuous>  pantoprazole   Suspension 40 milliGRAM(s) Oral before breakfast  propofol Infusion 5 MICROgram(s)/kG/Min (1.7 mL/Hr) IV Continuous <Continuous>  sodium chloride 3%  Inhalation 4 milliLiter(s) Inhalation every 6 hours    MEDICATIONS  (PRN):  acetaminophen    Suspension .. 650 milliGRAM(s) Enteral Tube every 6 hours PRN Temp greater or equal to 38C (100.4F), Mild Pain (1 - 3)    Anthropometrics:  Ht: 152.4cm (60")  Wt: 56.8kg (3/03)-Admit     IBW: 100# (45.5kg)    % IBW: 125%    Weight Change: No new weight since admission. Obtain minimum biweekly weights to monitor trends especially being on TFs    Estimated energy needs: 45.3kg  Fluids per MD. IBW used to estimate needs as pt weighs >100% of IBW and per critical care nutrition guidelines. Needs estimated for age and adjusted for vent  Calories: 1150-1359kcals (25-30kcal/kg)  Protein: 65-75g (1.4-1.6g/kg)  Fluid: Per MD    Subjective: 73 yo F with a PMHx of cerebral palsy with functional quadriplegia, DVT/PE on Eliquis, GERD, chronic dysphagia with PEG, thoracic spine fusion with a prior admission in 2020 for aspiration PNA requiring intubation. Patient presented from her assisted living facility in [facility] likely 2/2 aspiration pneumonia which has grown pseudomonas sensitive to ciprofloxacin. Patient was intubated in the field prior to arrival for airway protection. While in the MICU, one unsuccessful attempt was made at extubation requiring reintubation attributed to pt's anxiety. Subsequently, pt was successfully extubated in MICU with a seroquel regimen. Pt was weaned to HFNC then 4L NC satting 94-95%. On 3/10, pt was stepped down to the floors with increasing frequency of suctioning. On 3/13, pt's oxygen requirement increased and 2L NC titrated up to 6L NC, however pt was found to be hypoxic to 70s. Pt was suctioned and removed significant amount of PEG tube feed products and mucus. However, no change in pulse ox was noted and rapid response was called. Pt was intubated, sedated on propofol and started on levophed for hypotension 70s/40s. En route, patient had episode of emesis with continued copious secretions, requiring frequent suctioning. In ICU, pt underwent bronchoscopy on 3/14 which showed no signs of purulent secretions or endobronchial lesions. 3/16 CXR with improved B/L infiltrates. Now s/p bronch w/ trach 3/24.    Pt seen at bedside for follow up assessment-intubated on VC/AC mode, sedated (on propofol) and on pressor support (Levophed); TMAX: 99.6. Other drips: Fentanyl. Labs reviewed 3/25; electrolytes within normal limits at this time. Fingersticks 3/24-3/25:  mg/dL; ISS ordered. Observed EN infusing at 40 mL/hr (titrating to goal); feeds were held temporarily 3/24 for OR. Observed propofol infusing at 11.6 mL/hr (306 kcal/day). Current EN regimen at goal rate + propofol meeting >100% EER (41 kcal/kg), if pt remains on propofol continue EN at 40 mL/hr to avoid overfeeding/vent dependency. No residuals noted in EMR; pt appears to be tolerating feeds well. RD to follow up per protocol. See nutrition recommendations below.     Previous Nutrition Diagnosis: Increased nutrient needs R/T acute illness, hypermetabolic AEB 25-32kcals/kg energy and 1.4-1.6g/kg protein needs of ABW    Active [ x ]  Resolved [   ]    If resolved, new PES:     Goal: Pt to meet >75% of EER via G-tube     Recommendations:  1. If pt remains on propofol, recommend continue current EN at 40 mL/hr + LPS 1x/day. If propofol d/linda recommend Jevity 1.2 @ 50ml/hr + 1 LPS via PEG (Provides 1200ml TV, 1540kcals, 82g protein, 968ml free water 1.5g/kg IBW protein)  2. Water flush per MD, minimum 30-60ml q4hrs for tube patency   3. Pain and bowel regimens per MD discretion  4. Monitor lytes and replete prn. POC BG q6hrs   5. Obtain minimum biweekly weights     Education: N/A     Risk Level: High [ x ] Moderate [   ] Low [   ].

## 2022-03-26 LAB
ALBUMIN SERPL ELPH-MCNC: 3.1 G/DL — LOW (ref 3.3–5)
ALP SERPL-CCNC: 75 U/L — SIGNIFICANT CHANGE UP (ref 40–120)
ALT FLD-CCNC: 16 U/L — SIGNIFICANT CHANGE UP (ref 10–45)
ANION GAP SERPL CALC-SCNC: 7 MMOL/L — SIGNIFICANT CHANGE UP (ref 5–17)
APPEARANCE UR: CLEAR — SIGNIFICANT CHANGE UP
AST SERPL-CCNC: 19 U/L — SIGNIFICANT CHANGE UP (ref 10–40)
BACTERIA # UR AUTO: PRESENT /HPF
BASOPHILS # BLD AUTO: 0.05 K/UL — SIGNIFICANT CHANGE UP (ref 0–0.2)
BASOPHILS NFR BLD AUTO: 0.6 % — SIGNIFICANT CHANGE UP (ref 0–2)
BILIRUB SERPL-MCNC: 0.2 MG/DL — SIGNIFICANT CHANGE UP (ref 0.2–1.2)
BILIRUB UR-MCNC: NEGATIVE — SIGNIFICANT CHANGE UP
BUN SERPL-MCNC: 18 MG/DL — SIGNIFICANT CHANGE UP (ref 7–23)
CALCIUM SERPL-MCNC: 8.4 MG/DL — SIGNIFICANT CHANGE UP (ref 8.4–10.5)
CHLORIDE SERPL-SCNC: 102 MMOL/L — SIGNIFICANT CHANGE UP (ref 96–108)
CO2 SERPL-SCNC: 29 MMOL/L — SIGNIFICANT CHANGE UP (ref 22–31)
COLOR SPEC: YELLOW — SIGNIFICANT CHANGE UP
COMMENT - URINE: SIGNIFICANT CHANGE UP
CREAT SERPL-MCNC: 0.59 MG/DL — SIGNIFICANT CHANGE UP (ref 0.5–1.3)
DIFF PNL FLD: ABNORMAL
EGFR: 95 ML/MIN/1.73M2 — SIGNIFICANT CHANGE UP
EOSINOPHIL # BLD AUTO: 0.17 K/UL — SIGNIFICANT CHANGE UP (ref 0–0.5)
EOSINOPHIL NFR BLD AUTO: 2 % — SIGNIFICANT CHANGE UP (ref 0–6)
EPI CELLS # UR: SIGNIFICANT CHANGE UP /HPF (ref 0–5)
GLUCOSE BLDC GLUCOMTR-MCNC: 151 MG/DL — HIGH (ref 70–99)
GLUCOSE BLDC GLUCOMTR-MCNC: 156 MG/DL — HIGH (ref 70–99)
GLUCOSE BLDC GLUCOMTR-MCNC: 188 MG/DL — HIGH (ref 70–99)
GLUCOSE BLDC GLUCOMTR-MCNC: 223 MG/DL — HIGH (ref 70–99)
GLUCOSE SERPL-MCNC: 170 MG/DL — HIGH (ref 70–99)
GLUCOSE UR QL: NEGATIVE — SIGNIFICANT CHANGE UP
HCT VFR BLD CALC: 34.1 % — LOW (ref 34.5–45)
HGB BLD-MCNC: 10 G/DL — LOW (ref 11.5–15.5)
IMM GRANULOCYTES NFR BLD AUTO: 0.6 % — SIGNIFICANT CHANGE UP (ref 0–1.5)
KETONES UR-MCNC: NEGATIVE — SIGNIFICANT CHANGE UP
LEUKOCYTE ESTERASE UR-ACNC: ABNORMAL
LYMPHOCYTES # BLD AUTO: 1.39 K/UL — SIGNIFICANT CHANGE UP (ref 1–3.3)
LYMPHOCYTES # BLD AUTO: 16 % — SIGNIFICANT CHANGE UP (ref 13–44)
MAGNESIUM SERPL-MCNC: 1.8 MG/DL — SIGNIFICANT CHANGE UP (ref 1.6–2.6)
MCHC RBC-ENTMCNC: 29.3 GM/DL — LOW (ref 32–36)
MCHC RBC-ENTMCNC: 31 PG — SIGNIFICANT CHANGE UP (ref 27–34)
MCV RBC AUTO: 105.6 FL — HIGH (ref 80–100)
MONOCYTES # BLD AUTO: 1.04 K/UL — HIGH (ref 0–0.9)
MONOCYTES NFR BLD AUTO: 11.9 % — SIGNIFICANT CHANGE UP (ref 2–14)
NEUTROPHILS # BLD AUTO: 6.01 K/UL — SIGNIFICANT CHANGE UP (ref 1.8–7.4)
NEUTROPHILS NFR BLD AUTO: 68.9 % — SIGNIFICANT CHANGE UP (ref 43–77)
NITRITE UR-MCNC: POSITIVE
NRBC # BLD: 0 /100 WBCS — SIGNIFICANT CHANGE UP (ref 0–0)
PH UR: 5.5 — SIGNIFICANT CHANGE UP (ref 5–8)
PHOSPHATE SERPL-MCNC: 3.1 MG/DL — SIGNIFICANT CHANGE UP (ref 2.5–4.5)
PLATELET # BLD AUTO: 299 K/UL — SIGNIFICANT CHANGE UP (ref 150–400)
POTASSIUM SERPL-MCNC: 4.1 MMOL/L — SIGNIFICANT CHANGE UP (ref 3.5–5.3)
POTASSIUM SERPL-SCNC: 4.1 MMOL/L — SIGNIFICANT CHANGE UP (ref 3.5–5.3)
PROT SERPL-MCNC: 6.3 G/DL — SIGNIFICANT CHANGE UP (ref 6–8.3)
PROT UR-MCNC: 30 MG/DL
RBC # BLD: 3.23 M/UL — LOW (ref 3.8–5.2)
RBC # FLD: 13.2 % — SIGNIFICANT CHANGE UP (ref 10.3–14.5)
RBC CASTS # UR COMP ASSIST: < 5 /HPF — SIGNIFICANT CHANGE UP
SODIUM SERPL-SCNC: 138 MMOL/L — SIGNIFICANT CHANGE UP (ref 135–145)
SP GR SPEC: >=1.03 — SIGNIFICANT CHANGE UP (ref 1–1.03)
UROBILINOGEN FLD QL: 0.2 E.U./DL — SIGNIFICANT CHANGE UP
WBC # BLD: 8.71 K/UL — SIGNIFICANT CHANGE UP (ref 3.8–10.5)
WBC # FLD AUTO: 8.71 K/UL — SIGNIFICANT CHANGE UP (ref 3.8–10.5)
WBC UR QL: ABNORMAL /HPF

## 2022-03-26 PROCEDURE — 71045 X-RAY EXAM CHEST 1 VIEW: CPT | Mod: 26

## 2022-03-26 PROCEDURE — 99233 SBSQ HOSP IP/OBS HIGH 50: CPT | Mod: GC

## 2022-03-26 RX ORDER — MAGNESIUM SULFATE 500 MG/ML
2 VIAL (ML) INJECTION ONCE
Refills: 0 | Status: COMPLETED | OUTPATIENT
Start: 2022-03-26 | End: 2022-03-26

## 2022-03-26 RX ORDER — ENOXAPARIN SODIUM 100 MG/ML
50 INJECTION SUBCUTANEOUS EVERY 12 HOURS
Refills: 0 | Status: DISCONTINUED | OUTPATIENT
Start: 2022-03-26 | End: 2022-03-31

## 2022-03-26 RX ORDER — DEXMEDETOMIDINE HYDROCHLORIDE IN 0.9% SODIUM CHLORIDE 4 UG/ML
0.2 INJECTION INTRAVENOUS
Qty: 400 | Refills: 0 | Status: DISCONTINUED | OUTPATIENT
Start: 2022-03-26 | End: 2022-03-26

## 2022-03-26 RX ORDER — POTASSIUM CHLORIDE 20 MEQ
40 PACKET (EA) ORAL ONCE
Refills: 0 | Status: COMPLETED | OUTPATIENT
Start: 2022-03-26 | End: 2022-03-26

## 2022-03-26 RX ORDER — DEXMEDETOMIDINE HYDROCHLORIDE IN 0.9% SODIUM CHLORIDE 4 UG/ML
0.1 INJECTION INTRAVENOUS
Qty: 200 | Refills: 0 | Status: DISCONTINUED | OUTPATIENT
Start: 2022-03-26 | End: 2022-03-26

## 2022-03-26 RX ORDER — PROPOFOL 10 MG/ML
5 INJECTION, EMULSION INTRAVENOUS
Qty: 1000 | Refills: 0 | Status: DISCONTINUED | OUTPATIENT
Start: 2022-03-26 | End: 2022-03-27

## 2022-03-26 RX ORDER — MAGNESIUM SULFATE 500 MG/ML
2 VIAL (ML) INJECTION ONCE
Refills: 0 | Status: DISCONTINUED | OUTPATIENT
Start: 2022-03-26 | End: 2022-03-26

## 2022-03-26 RX ORDER — FENTANYL CITRATE 50 UG/ML
0.5 INJECTION INTRAVENOUS
Qty: 2500 | Refills: 0 | Status: DISCONTINUED | OUTPATIENT
Start: 2022-03-26 | End: 2022-03-27

## 2022-03-26 RX ORDER — FUROSEMIDE 40 MG
20 TABLET ORAL ONCE
Refills: 0 | Status: COMPLETED | OUTPATIENT
Start: 2022-03-26 | End: 2022-03-26

## 2022-03-26 RX ORDER — DEXMEDETOMIDINE HYDROCHLORIDE IN 0.9% SODIUM CHLORIDE 4 UG/ML
0.1 INJECTION INTRAVENOUS
Qty: 400 | Refills: 0 | Status: DISCONTINUED | OUTPATIENT
Start: 2022-03-26 | End: 2022-03-28

## 2022-03-26 RX ORDER — MEROPENEM 1 G/30ML
1000 INJECTION INTRAVENOUS EVERY 8 HOURS
Refills: 0 | Status: COMPLETED | OUTPATIENT
Start: 2022-03-26 | End: 2022-03-26

## 2022-03-26 RX ORDER — POTASSIUM CHLORIDE 20 MEQ
40 PACKET (EA) ORAL ONCE
Refills: 0 | Status: DISCONTINUED | OUTPATIENT
Start: 2022-03-26 | End: 2022-03-26

## 2022-03-26 RX ORDER — AZTREONAM 2 G
2000 VIAL (EA) INJECTION EVERY 8 HOURS
Refills: 0 | Status: DISCONTINUED | OUTPATIENT
Start: 2022-03-26 | End: 2022-03-26

## 2022-03-26 RX ADMIN — Medication 650 MILLIGRAM(S): at 13:20

## 2022-03-26 RX ADMIN — INSULIN HUMAN 2: 100 INJECTION, SOLUTION SUBCUTANEOUS at 23:53

## 2022-03-26 RX ADMIN — Medication 3 MILLILITER(S): at 04:47

## 2022-03-26 RX ADMIN — SODIUM CHLORIDE 4 MILLILITER(S): 9 INJECTION INTRAMUSCULAR; INTRAVENOUS; SUBCUTANEOUS at 16:32

## 2022-03-26 RX ADMIN — ARIPIPRAZOLE 2 MILLIGRAM(S): 15 TABLET ORAL at 21:35

## 2022-03-26 RX ADMIN — SODIUM CHLORIDE 4 MILLILITER(S): 9 INJECTION INTRAMUSCULAR; INTRAVENOUS; SUBCUTANEOUS at 04:41

## 2022-03-26 RX ADMIN — PIPERACILLIN AND TAZOBACTAM 200 GRAM(S): 4; .5 INJECTION, POWDER, LYOPHILIZED, FOR SOLUTION INTRAVENOUS at 00:37

## 2022-03-26 RX ADMIN — ENOXAPARIN SODIUM 50 MILLIGRAM(S): 100 INJECTION SUBCUTANEOUS at 17:31

## 2022-03-26 RX ADMIN — Medication 650 MILLIGRAM(S): at 10:51

## 2022-03-26 RX ADMIN — DEXMEDETOMIDINE HYDROCHLORIDE IN 0.9% SODIUM CHLORIDE 1.42 MICROGRAM(S)/KG/HR: 4 INJECTION INTRAVENOUS at 11:19

## 2022-03-26 RX ADMIN — Medication 650 MILLIGRAM(S): at 03:00

## 2022-03-26 RX ADMIN — CHLORHEXIDINE GLUCONATE 15 MILLILITER(S): 213 SOLUTION TOPICAL at 17:31

## 2022-03-26 RX ADMIN — Medication 20 MILLIGRAM(S): at 10:50

## 2022-03-26 RX ADMIN — FENTANYL CITRATE 2.84 MICROGRAM(S)/KG/HR: 50 INJECTION INTRAVENOUS at 07:09

## 2022-03-26 RX ADMIN — Medication 650 MILLIGRAM(S): at 02:01

## 2022-03-26 RX ADMIN — Medication 3 MILLILITER(S): at 21:29

## 2022-03-26 RX ADMIN — Medication 5.33 MICROGRAM(S)/KG/MIN: at 02:14

## 2022-03-26 RX ADMIN — INSULIN HUMAN 4: 100 INJECTION, SOLUTION SUBCUTANEOUS at 06:50

## 2022-03-26 RX ADMIN — PIPERACILLIN AND TAZOBACTAM 200 GRAM(S): 4; .5 INJECTION, POWDER, LYOPHILIZED, FOR SOLUTION INTRAVENOUS at 05:49

## 2022-03-26 RX ADMIN — PROPOFOL 1.7 MICROGRAM(S)/KG/MIN: 10 INJECTION, EMULSION INTRAVENOUS at 00:18

## 2022-03-26 RX ADMIN — INSULIN HUMAN 2: 100 INJECTION, SOLUTION SUBCUTANEOUS at 11:18

## 2022-03-26 RX ADMIN — DEXMEDETOMIDINE HYDROCHLORIDE IN 0.9% SODIUM CHLORIDE 1.42 MICROGRAM(S)/KG/HR: 4 INJECTION INTRAVENOUS at 17:31

## 2022-03-26 RX ADMIN — Medication 40 MILLIEQUIVALENT(S): at 10:50

## 2022-03-26 RX ADMIN — CHLORHEXIDINE GLUCONATE 1 APPLICATION(S): 213 SOLUTION TOPICAL at 05:49

## 2022-03-26 RX ADMIN — SODIUM CHLORIDE 4 MILLILITER(S): 9 INJECTION INTRAMUSCULAR; INTRAVENOUS; SUBCUTANEOUS at 21:30

## 2022-03-26 RX ADMIN — Medication 3 MILLILITER(S): at 16:32

## 2022-03-26 RX ADMIN — PANTOPRAZOLE SODIUM 40 MILLIGRAM(S): 20 TABLET, DELAYED RELEASE ORAL at 06:09

## 2022-03-26 RX ADMIN — CHLORHEXIDINE GLUCONATE 15 MILLILITER(S): 213 SOLUTION TOPICAL at 05:49

## 2022-03-26 RX ADMIN — Medication 100 MILLIGRAM(S): at 13:26

## 2022-03-26 RX ADMIN — MEROPENEM 100 MILLIGRAM(S): 1 INJECTION INTRAVENOUS at 08:08

## 2022-03-26 RX ADMIN — Medication 100 GRAM(S): at 10:50

## 2022-03-26 RX ADMIN — Medication 518.75 MILLIGRAM(S): at 21:16

## 2022-03-26 RX ADMIN — INSULIN HUMAN 2: 100 INJECTION, SOLUTION SUBCUTANEOUS at 17:30

## 2022-03-26 RX ADMIN — SODIUM CHLORIDE 4 MILLILITER(S): 9 INJECTION INTRAMUSCULAR; INTRAVENOUS; SUBCUTANEOUS at 11:18

## 2022-03-26 NOTE — PROGRESS NOTE ADULT - ATTENDING COMMENTS
Chronic respiratory failure with pneumonia, CP, h/o PE  Physical as above  aim for RASS -2  continue abx, ID approval for meropenem or aztreonam re Pseudomonas  continue PEG feeds  continue lovenox  decision making of high complexity

## 2022-03-26 NOTE — PROGRESS NOTE ADULT - ASSESSMENT
73 yo F with PMHx of cerebral palsy with functional quadriplegia, Hx of PE/DVTs, GERD, chronic dysphagia with PEG, thoracic spine fusion initially admitted for acute hypoxic respiratory failure found to have pseudomonal PNA, intubated for third time i/s/o aspiration PNA of PEG feeds and copious secretions, trach placed 3/24.    NEURO  #Cerebral palsy  Patient with cerebral palsy. Has functional quadriplegia, health aide at assisted living facility reports patient is currently at baseline.  - FOR CONSENT, 2-physician consent is required, although patient is AOx3.     #Goals of care, counseling/discussion.   Patient with cerebral palsy. Previously a [place] resident, now resides in group home, with [facility] per palliative care. Patient with repeat admission for aspiration pneumonia with history of dysphagia and copious secretions from oropharynx on exam.  - palliative care and ethics consulted - Advisory board must make decisions for patient, plan is for trach to be placed today.    #Anxiety  Pt with ongoing restlessness, anxiety, agitation  - c/w home med Abilify 2mg qd  - c/w precedex gtt (on sedation as well with propofol and fentanyl given post-trach placement dyssynchrony with vent because of decreased compliance given possible aspiration event during placement)    CV  No active issues    PULM  #Acute respiratory failure with hypoxia i/s/o of aspiration pneumonia of PEG feeds and copious oral secretions  Extubated and reintubated multiple times, trached on 3/24  - currently on VC/AC and requiring sedation for dyssynchrony after possible aspiration event during trach placement  - plan for bronch today   - f/u sputum cultures sent 3/25  - c/w Frequent suctioning - oral and tracheal, hypertonic saline followed by duonebs - standing, Chest PT q6h, aspiration precautions    #PE  History of PE 5 years ago. On Eliquis at home 2.5 mg BID at home. Bedside ultrasound performed in MICU with no evidence of clot.    - c/w lovenox 50 mg BID while here (on hold now)    RENAL  No active issues    GI  #Dysphagia  - PEG tube in place and functional, c/w tube feeds    #GERD   -On famotidine 20 mg qHS at home  - c/w pantoprazole 40 mg QD while in MICU      Quintero in place    ENDO  On regular ISS for TF    ID  #Suspected aspiration event 3/25  -f/u sputum culture  -No abx for now (no fever, only small increase in WBC and it would be expected after surgery)     HEME  #Anemia, macrocytic  Hb stable 9 to 11  MCV   B12/folate WNL  - Maintain active T&S (Last 3/24, next 3/27)  - Transfuse if Hgb<7 (would require 2-physician consent)    PROPHYLAXIS  F: None  E: Replete PRN  N: TF Jevity 1.2 at 50cc/h + 1 LPS  DVT: Lovenox 50 mg BID (on hold)  GI: protonix 40 mg daily via PEG  C: Full Code, pending discussion with pt's CAB state advocate for GOC and medical decisions  FOR CONSENT 2-physician consent is required  D: With trach, patient cannot return back to group home and will be discharged to nursing home. 73 yo F with PMHx of cerebral palsy with functional quadriplegia, Hx of PE/DVTs, GERD, chronic dysphagia with PEG, thoracic spine fusion initially admitted for acute hypoxic respiratory failure found to have pseudomonal PNA, intubated for third time i/s/o aspiration PNA of PEG feeds and copious secretions, trach placed 3/24.    NEURO  #Cerebral palsy  Patient with cerebral palsy. Has functional quadriplegia, health aide at assisted living facility reports patient is currently at baseline.  - FOR CONSENT, 2-physician consent is required, although patient is AOx3.     #Goals of care, counseling/discussion.   Patient with cerebral palsy. Previously a [place] resident, now resides in group home, with [place] [facility] per palliative care. Patient with repeat admission for aspiration pneumonia with history of dysphagia and copious secretions from oropharynx on exam.  - palliative care and ethics consulted - Advisory board must make decisions for patient, plan is for trach to be placed today.    #Anxiety  Pt with ongoing restlessness, anxiety, agitation  - c/w home med Abilify 2mg qd  - c/w precedex gtt (on sedation as well with propofol and fentanyl given post-trach placement dyssynchrony with vent because of decreased compliance given possible aspiration event during placement)    CV  No active issues    PULM  #Acute respiratory failure with hypoxia i/s/o of aspiration pneumonia of PEG feeds and copious oral secretions  Extubated and reintubated multiple times, trached on 3/24  - currently on VC/AC and requiring sedation for dyssynchrony after possible aspiration event during trach placement  - plan for bronch today   - f/u sputum cultures sent 3/25  - c/w Frequent suctioning - oral and tracheal, hypertonic saline followed by duonebs - standing, Chest PT q6h, aspiration precautions    #PE  History of PE 5 years ago. On Eliquis at home 2.5 mg BID at home. Bedside ultrasound performed in MICU with no evidence of clot.    - c/w lovenox 50 mg BID while here (on hold now)    RENAL  No active issues    GI  #Dysphagia  - PEG tube in place and functional, c/w tube feeds    #GERD   -On famotidine 20 mg qHS at home  - c/w pantoprazole 40 mg QD while in MICU      Quintero in place    ENDO  On regular ISS for TF    ID  #Suspected aspiration event 3/25  -f/u sputum culture  -No abx for now (no fever, only small increase in WBC and it would be expected after surgery)     HEME  #Anemia, macrocytic  Hb stable 9 to 11  MCV   B12/folate WNL  - Maintain active T&S (Last 3/24, next 3/27)  - Transfuse if Hgb<7 (would require 2-physician consent)    PROPHYLAXIS  F: None  E: Replete PRN  N: TF Jevity 1.2 at 50cc/h + 1 LPS  DVT: Lovenox 50 mg BID   GI: protonix 40 mg daily via PEG  C: Full Code, pending discussion with pt's CAB state advocate for GOC and medical decisions  FOR EMERGENT CONSENT 2-physician consent is required  D: With trach, patient cannot return back to group home and will be discharged to nursing home. 75 yo F with PMHx of cerebral palsy with functional quadriplegia, Hx of PE/DVTs, GERD, chronic dysphagia with PEG, thoracic spine fusion initially admitted for acute hypoxic respiratory failure found to have pseudomonal PNA, intubated for third time i/s/o aspiration PNA of PEG feeds and copious secretions, trach placed 3/24, s/p bronchoscopy on 3/25    NEURO  #Cerebral palsy  Patient with cerebral palsy. Has functional quadriplegia, health aide at assisted living facility reports patient is currently at baseline.  - FOR CONSENT, 2-physician consent is required, although patient is AOx3.     #Goals of care, counseling/discussion.   Patient with cerebral palsy. Previously a [place] resident, now resides in group home, with [facility] per palliative care. Patient with repeat admission for aspiration pneumonia with history of dysphagia and copious secretions from oropharynx on exam.  - palliative care and ethics consulted - Advisory board must make decisions for patient, plan is for trach to be placed today.    #Anxiety  Pt with ongoing restlessness, anxiety, agitation  - c/w home med Abilify 2mg qd  - c/w precedex gtt, RASS goal -1 to -2 (s/p propofol and fentanyl post-trach placement, now more synchronous with vent)    CV  No active issues    PULM  #Acute respiratory failure with hypoxia i/s/o of aspiration pneumonia of PEG feeds and copious oral secretions  Extubated and reintubated multiple times, trached on 3/24  - currently on VC/AC 50/250/0.8/15/7  - sputum cultures 3/25 show numerous proteus mirabilis, moderate Stenotrophomonas maltophilia  - c/w Aztreonam 2g q8h, f/u SClx susceptibilities  - c/w Frequent suctioning - oral and tracheal, hypertonic saline followed by duonebs - standing, Chest PT q6h, aspiration precautions    #PE  History of PE 5 years ago. On Eliquis at home 2.5 mg BID at home. Bedside ultrasound performed in MICU with no evidence of clot.    - c/w lovenox 50 mg BID    RENAL  No active issues    GI  #Dysphagia  - PEG tube in place and functional, c/w tube feeds    #GERD   On famotidine 20 mg qHS at home  - c/w pantoprazole 40 mg QD while in MICU      Quintero in place    ENDO  On regular ISS for TF    ID  #Suspected aspiration event 3/25  -f/u sputum culture  -No abx for now (no fever, only small increase in WBC and it would be expected after surgery)     HEME  #Anemia, macrocytic  Hb stable 9 to 11  MCV   B12/folate WNL  - Maintain active T&S (Last 3/24, next 3/27)  - Transfuse if Hgb<7 (would require 2-physician consent)    PROPHYLAXIS  F: None  E: Replete PRN  N: TF Jevity 1.2 at 50cc/h + 1 LPS  DVT: Lovenox 50 mg BID   GI: protonix 40 mg daily via PEG  C: Full Code, pending discussion with pt's CAB state advocate for GOC and medical decisions  FOR EMERGENT CONSENT 2-physician consent is required  D: With trach, patient cannot return back to group home and will be discharged to nursing home. 73 yo F with PMHx of cerebral palsy with functional quadriplegia, Hx of PE/DVTs, GERD, chronic dysphagia with PEG, thoracic spine fusion initially admitted for acute hypoxic respiratory failure found to have pseudomonal PNA, intubated for third time i/s/o aspiration PNA of PEG feeds and copious secretions, trach placed 3/24, s/p bronchoscopy on 3/25    NEURO  #Cerebral palsy  Patient with cerebral palsy. Has functional quadriplegia, health aide at assisted living facility reports patient is currently at baseline.  - FOR CONSENT, 2-physician consent is required, although patient is AOx3.     #Goals of care, counseling/discussion.   Patient with cerebral palsy. Previously a [place] resident, now resides in group home, with [place] [facility] per palliative care. Patient with repeat admission for aspiration pneumonia with history of dysphagia and copious secretions from oropharynx on exam.  - palliative care and ethics consulted - Advisory board must make decisions for patient, plan is for trach to be placed today.    #Anxiety  Pt with ongoing restlessness, anxiety, agitation  - c/w home med Abilify 2mg qd  - c/w precedex gtt, RASS goal -1 to -2 (s/p propofol and fentanyl post-trach placement, now more synchronous with vent)    CV  No active issues    PULM  #Acute respiratory failure with hypoxia i/s/o of aspiration pneumonia of PEG feeds and copious oral secretions  Extubated and reintubated multiple times, trached on 3/24  - currently on VC/AC 50/250/0.8/15/7  - sputum cultures 3/25 show numerous proteus mirabilis, moderate Stenotrophomonas maltophilia  - c/w Aztreonam 2g q8h, f/u SClx susceptibilities  - c/w Frequent suctioning - oral and tracheal, hypertonic saline followed by duonebs - standing, Chest PT q6h, aspiration precautions    #PE  History of PE 5 years ago. On Eliquis at home 2.5 mg BID at home. Bedside ultrasound performed in MICU with no evidence of clot.    - c/w lovenox 50 mg BID    RENAL  No active issues    GI  #Dysphagia  - PEG tube in place and functional, c/w tube feeds    #GERD   On famotidine 20 mg qHS at home  - c/w pantoprazole 40 mg QD while in MICU      Quintero in place    ENDO  On regular ISS for TF    ID  #Suspected aspiration event 3/25  - see pulm above     HEME  #Anemia, macrocytic  Hb stable 9 to 11  MCV   B12/folate WNL  - Maintain active T&S (Last 3/24, next 3/27)  - Transfuse if Hgb<7 (would require 2-physician consent)    PROPHYLAXIS  F: None  E: Replete PRN  N: TF Jevity 1.2 at 42cc/h + 1 LPS  DVT: Lovenox 50 mg BID   GI: protonix 40 mg daily via PEG  C: Full Code, pending discussion with pt's CAB state advocate for GOC and medical decisions  FOR EMERGENT CONSENT 2-physician consent is required  D: With trach, patient cannot return back to group home and will be discharged to nursing home.

## 2022-03-26 NOTE — CHART NOTE - NSCHARTNOTEFT_GEN_A_CORE
Infectious Diseases Anti-infective Approval Note    Medication: aztreonam  Dose*: 2g  Route: IV  Frequency: q8h  Duration**: 5d    *Dose may be adjusted as needed for alterations in renal function.    **Reflects duration of approval and not necessarily duration of therapy     THIS IS NOT AN INFECTIOUS DISEASES CONSULTATION

## 2022-03-26 NOTE — PROGRESS NOTE ADULT - SUBJECTIVE AND OBJECTIVE BOX
**INCOMPLETE NOTE    OVERNIGHT EVENTS:    SUBJECTIVE:  Patient seen and examined at bedside.    Vital Signs Last 12 Hrs  T(F): 99.7 (03-26-22 @ 04:00), Max: 101.5 (03-25-22 @ 19:00)  HR: 97 (03-26-22 @ 04:30) (78 - 97)  BP: 96/54 (03-26-22 @ 04:30) (87/48 - 139/65)  BP(mean): 73 (03-26-22 @ 04:30) (64 - 93)  RR: 17 (03-26-22 @ 04:30) (15 - 18)  SpO2: 95% (03-26-22 @ 04:30) (92% - 96%)  I&O's Summary    24 Mar 2022 07:01  -  25 Mar 2022 07:00  --------------------------------------------------------  IN: 2950.2 mL / OUT: 1125 mL / NET: 1825.2 mL    25 Mar 2022 07:01  -  26 Mar 2022 06:10  --------------------------------------------------------  IN: 2218.2 mL / OUT: 706 mL / NET: 1512.2 mL        PHYSICAL EXAM:  Constitutional: NAD, comfortable in bed.  HEENT: NC/AT, PERRLA, EOMI, no conjunctival pallor or scleral icterus, MMM  Neck: Supple, no JVD  Respiratory: CTA B/L. No w/r/r.   Cardiovascular: RRR, normal S1 and S2, no m/r/g.   Gastrointestinal: +BS, soft NTND, no guarding or rebound tenderness, no palpable masses   Extremities: wwp; no cyanosis, clubbing or edema.   Vascular: Pulses equal and strong throughout.   Neurological: AAOx3, no CN deficits, strength and sensation intact throughout.   Skin: No gross skin abnormalities or rashes        LABS:                        10.1   11.07 )-----------( [id number]      ( 25 Mar 2022 05:42 )             35.2     03-25    138  |  103  |  17  ----------------------------<  130<H>  4.2   |  25  |  0.56    Ca    8.5      25 Mar 2022 05:42  Phos  3.7     03-25  Mg     1.6     03-25    TPro  6.4  /  Alb  3.2<L>  /  TBili  0.3  /  DBili  x   /  AST  24  /  ALT  15  /  AlkPhos  79  03-25            RADIOLOGY & ADDITIONAL TESTS:    MEDICATIONS  (STANDING):  albuterol/ipratropium for Nebulization 3 milliLiter(s) Nebulizer every 6 hours  ARIPiprazole 2 milliGRAM(s) Oral every 24 hours  chlorhexidine 0.12% Liquid 15 milliLiter(s) Oral Mucosa every 12 hours  chlorhexidine 2% Cloths 1 Application(s) Topical <User Schedule>  dextrose 40% Gel 15 Gram(s) Oral once  dextrose 5%. 1000 milliLiter(s) (50 mL/Hr) IV Continuous <Continuous>  dextrose 5%. 1000 milliLiter(s) (100 mL/Hr) IV Continuous <Continuous>  dextrose 50% Injectable 25 Gram(s) IV Push once  dextrose 50% Injectable 12.5 Gram(s) IV Push once  dextrose 50% Injectable 25 Gram(s) IV Push once  fentaNYL   Infusion. 0.5 MICROgram(s)/kG/Hr (2.84 mL/Hr) IV Continuous <Continuous>  glucagon  Injectable 1 milliGRAM(s) IntraMuscular once  insulin regular  human corrective regimen sliding scale   SubCutaneous every 6 hours  norepinephrine Infusion 0.05 MICROgram(s)/kG/Min (5.33 mL/Hr) IV Continuous <Continuous>  pantoprazole   Suspension 40 milliGRAM(s) Oral before breakfast  piperacillin/tazobactam IVPB.. 4.5 Gram(s) IV Intermittent every 6 hours  propofol Infusion 5 MICROgram(s)/kG/Min (1.7 mL/Hr) IV Continuous <Continuous>  sodium chloride 3%  Inhalation 4 milliLiter(s) Inhalation every 6 hours    MEDICATIONS  (PRN):  acetaminophen    Suspension .. 650 milliGRAM(s) Enteral Tube every 6 hours PRN Temp greater or equal to 38C (100.4F), Mild Pain (1 - 3)   OVERNIGHT EVENTS: LIZABETH    SUBJECTIVE:  Patient seen and examined at bedside.  Intubated & sedated    Vital Signs Last 12 Hrs  T(F): 99.7 (03-26-22 @ 04:00), Max: 101.5 (03-25-22 @ 19:00)  HR: 97 (03-26-22 @ 04:30) (78 - 97)  BP: 96/54 (03-26-22 @ 04:30) (87/48 - 139/65)  BP(mean): 73 (03-26-22 @ 04:30) (64 - 93)  RR: 17 (03-26-22 @ 04:30) (15 - 18)  SpO2: 95% (03-26-22 @ 04:30) (92% - 96%)  I&O's Summary    24 Mar 2022 07:01  -  25 Mar 2022 07:00  --------------------------------------------------------  IN: 2950.2 mL / OUT: 1125 mL / NET: 1825.2 mL    25 Mar 2022 07:01  -  26 Mar 2022 06:10  --------------------------------------------------------  IN: 2218.2 mL / OUT: 706 mL / NET: 1512.2 mL    PHYSICAL EXAM:  Constitutional: NAD, comfortable in bed.  HEENT: NC/AT, PERRLA, EOMI, no conjunctival pallor or scleral icterus, MMM  Neck: Supple, no JVD, trach c/d/i no bleeding  Respiratory: CTA B/L. No w/r/r.   Cardiovascular: RRR, normal S1 and S2, no m/r/g.   Gastrointestinal: +BS, soft NTND, no guarding or rebound tenderness, no palpable masses   Extremities: wwp; no cyanosis, clubbing or edema.   Vascular: Pulses equal and strong throughout.   Neurological: RASS -5  Skin: No gross skin abnormalities or rashes    LABS:                        10.1   11.07 )-----------( 372      ( 25 Mar 2022 05:42 )             35.2     03-25    138  |  103  |  17  ----------------------------<  130<H>  4.2   |  25  |  0.56    Ca    8.5      25 Mar 2022 05:42  Phos  3.7     03-25  Mg     1.6     03-25    TPro  6.4  /  Alb  3.2<L>  /  TBili  0.3  /  DBili  x   /  AST  24  /  ALT  15  /  AlkPhos  79  03-25    RADIOLOGY & ADDITIONAL TESTS:    MEDICATIONS  (STANDING):  albuterol/ipratropium for Nebulization 3 milliLiter(s) Nebulizer every 6 hours  ARIPiprazole 2 milliGRAM(s) Oral every 24 hours  chlorhexidine 0.12% Liquid 15 milliLiter(s) Oral Mucosa every 12 hours  chlorhexidine 2% Cloths 1 Application(s) Topical <User Schedule>  dextrose 40% Gel 15 Gram(s) Oral once  dextrose 5%. 1000 milliLiter(s) (50 mL/Hr) IV Continuous <Continuous>  dextrose 5%. 1000 milliLiter(s) (100 mL/Hr) IV Continuous <Continuous>  dextrose 50% Injectable 25 Gram(s) IV Push once  dextrose 50% Injectable 12.5 Gram(s) IV Push once  dextrose 50% Injectable 25 Gram(s) IV Push once  fentaNYL   Infusion. 0.5 MICROgram(s)/kG/Hr (2.84 mL/Hr) IV Continuous <Continuous>  glucagon  Injectable 1 milliGRAM(s) IntraMuscular once  insulin regular  human corrective regimen sliding scale   SubCutaneous every 6 hours  norepinephrine Infusion 0.05 MICROgram(s)/kG/Min (5.33 mL/Hr) IV Continuous <Continuous>  pantoprazole   Suspension 40 milliGRAM(s) Oral before breakfast  piperacillin/tazobactam IVPB.. 4.5 Gram(s) IV Intermittent every 6 hours  propofol Infusion 5 MICROgram(s)/kG/Min (1.7 mL/Hr) IV Continuous <Continuous>  sodium chloride 3%  Inhalation 4 milliLiter(s) Inhalation every 6 hours    MEDICATIONS  (PRN):  acetaminophen    Suspension .. 650 milliGRAM(s) Enteral Tube every 6 hours PRN Temp greater or equal to 38C (100.4F), Mild Pain (1 - 3)

## 2022-03-27 LAB
-  AMPICILLIN/SULBACTAM: SIGNIFICANT CHANGE UP
-  AMPICILLIN: SIGNIFICANT CHANGE UP
-  CEFAZOLIN: SIGNIFICANT CHANGE UP
-  CEFEPIME: SIGNIFICANT CHANGE UP
-  CEFTAZIDIME: SIGNIFICANT CHANGE UP
-  CEFTRIAXONE: SIGNIFICANT CHANGE UP
-  ERTAPENEM: SIGNIFICANT CHANGE UP
-  GENTAMICIN: SIGNIFICANT CHANGE UP
-  LEVOFLOXACIN: SIGNIFICANT CHANGE UP
-  PIPERACILLIN/TAZOBACTAM: SIGNIFICANT CHANGE UP
-  TOBRAMYCIN: SIGNIFICANT CHANGE UP
-  TRIMETHOPRIM/SULFAMETHOXAZOLE: SIGNIFICANT CHANGE UP
-  TRIMETHOPRIM/SULFAMETHOXAZOLE: SIGNIFICANT CHANGE UP
ANION GAP SERPL CALC-SCNC: 7 MMOL/L — SIGNIFICANT CHANGE UP (ref 5–17)
BASOPHILS # BLD AUTO: 0.02 K/UL — SIGNIFICANT CHANGE UP (ref 0–0.2)
BASOPHILS NFR BLD AUTO: 0.3 % — SIGNIFICANT CHANGE UP (ref 0–2)
BLD GP AB SCN SERPL QL: NEGATIVE — SIGNIFICANT CHANGE UP
BUN SERPL-MCNC: 16 MG/DL — SIGNIFICANT CHANGE UP (ref 7–23)
CALCIUM SERPL-MCNC: 8.6 MG/DL — SIGNIFICANT CHANGE UP (ref 8.4–10.5)
CHLORIDE SERPL-SCNC: 101 MMOL/L — SIGNIFICANT CHANGE UP (ref 96–108)
CO2 SERPL-SCNC: 28 MMOL/L — SIGNIFICANT CHANGE UP (ref 22–31)
CREAT SERPL-MCNC: 0.48 MG/DL — LOW (ref 0.5–1.3)
EGFR: 99 ML/MIN/1.73M2 — SIGNIFICANT CHANGE UP
EOSINOPHIL # BLD AUTO: 0.13 K/UL — SIGNIFICANT CHANGE UP (ref 0–0.5)
EOSINOPHIL NFR BLD AUTO: 1.7 % — SIGNIFICANT CHANGE UP (ref 0–6)
GLUCOSE BLDC GLUCOMTR-MCNC: 111 MG/DL — HIGH (ref 70–99)
GLUCOSE BLDC GLUCOMTR-MCNC: 137 MG/DL — HIGH (ref 70–99)
GLUCOSE BLDC GLUCOMTR-MCNC: 280 MG/DL — HIGH (ref 70–99)
GLUCOSE SERPL-MCNC: 246 MG/DL — HIGH (ref 70–99)
HCT VFR BLD CALC: 30.3 % — LOW (ref 34.5–45)
HGB BLD-MCNC: 9.5 G/DL — LOW (ref 11.5–15.5)
IMM GRANULOCYTES NFR BLD AUTO: 0.7 % — SIGNIFICANT CHANGE UP (ref 0–1.5)
LYMPHOCYTES # BLD AUTO: 0.58 K/UL — LOW (ref 1–3.3)
LYMPHOCYTES # BLD AUTO: 7.6 % — LOW (ref 13–44)
MAGNESIUM SERPL-MCNC: 1.8 MG/DL — SIGNIFICANT CHANGE UP (ref 1.6–2.6)
MCHC RBC-ENTMCNC: 31.1 PG — SIGNIFICANT CHANGE UP (ref 27–34)
MCHC RBC-ENTMCNC: 31.4 GM/DL — LOW (ref 32–36)
MCV RBC AUTO: 99.3 FL — SIGNIFICANT CHANGE UP (ref 80–100)
METHOD TYPE: SIGNIFICANT CHANGE UP
MONOCYTES # BLD AUTO: 0.51 K/UL — SIGNIFICANT CHANGE UP (ref 0–0.9)
MONOCYTES NFR BLD AUTO: 6.7 % — SIGNIFICANT CHANGE UP (ref 2–14)
NEUTROPHILS # BLD AUTO: 6.31 K/UL — SIGNIFICANT CHANGE UP (ref 1.8–7.4)
NEUTROPHILS NFR BLD AUTO: 83 % — HIGH (ref 43–77)
NRBC # BLD: 0 /100 WBCS — SIGNIFICANT CHANGE UP (ref 0–0)
PHOSPHATE SERPL-MCNC: 1.1 MG/DL — LOW (ref 2.5–4.5)
PLATELET # BLD AUTO: 272 K/UL — SIGNIFICANT CHANGE UP (ref 150–400)
POTASSIUM SERPL-MCNC: 4.4 MMOL/L — SIGNIFICANT CHANGE UP (ref 3.5–5.3)
POTASSIUM SERPL-SCNC: 4.4 MMOL/L — SIGNIFICANT CHANGE UP (ref 3.5–5.3)
RBC # BLD: 3.05 M/UL — LOW (ref 3.8–5.2)
RBC # FLD: 13.1 % — SIGNIFICANT CHANGE UP (ref 10.3–14.5)
RH IG SCN BLD-IMP: POSITIVE — SIGNIFICANT CHANGE UP
SARS-COV-2 RNA SPEC QL NAA+PROBE: SIGNIFICANT CHANGE UP
SODIUM SERPL-SCNC: 136 MMOL/L — SIGNIFICANT CHANGE UP (ref 135–145)
WBC # BLD: 7.6 K/UL — SIGNIFICANT CHANGE UP (ref 3.8–10.5)
WBC # FLD AUTO: 7.6 K/UL — SIGNIFICANT CHANGE UP (ref 3.8–10.5)

## 2022-03-27 PROCEDURE — 99233 SBSQ HOSP IP/OBS HIGH 50: CPT | Mod: GC

## 2022-03-27 RX ORDER — POTASSIUM PHOSPHATE, MONOBASIC POTASSIUM PHOSPHATE, DIBASIC 236; 224 MG/ML; MG/ML
30 INJECTION, SOLUTION INTRAVENOUS ONCE
Refills: 0 | Status: COMPLETED | OUTPATIENT
Start: 2022-03-27 | End: 2022-03-27

## 2022-03-27 RX ADMIN — DEXMEDETOMIDINE HYDROCHLORIDE IN 0.9% SODIUM CHLORIDE 1.42 MICROGRAM(S)/KG/HR: 4 INJECTION INTRAVENOUS at 19:51

## 2022-03-27 RX ADMIN — Medication 3 MILLILITER(S): at 03:52

## 2022-03-27 RX ADMIN — SODIUM CHLORIDE 4 MILLILITER(S): 9 INJECTION INTRAMUSCULAR; INTRAVENOUS; SUBCUTANEOUS at 17:19

## 2022-03-27 RX ADMIN — Medication 3 MILLILITER(S): at 22:06

## 2022-03-27 RX ADMIN — Medication 518.75 MILLIGRAM(S): at 23:11

## 2022-03-27 RX ADMIN — POTASSIUM PHOSPHATE, MONOBASIC POTASSIUM PHOSPHATE, DIBASIC 83.33 MILLIMOLE(S): 236; 224 INJECTION, SOLUTION INTRAVENOUS at 09:40

## 2022-03-27 RX ADMIN — SODIUM CHLORIDE 4 MILLILITER(S): 9 INJECTION INTRAMUSCULAR; INTRAVENOUS; SUBCUTANEOUS at 03:53

## 2022-03-27 RX ADMIN — CHLORHEXIDINE GLUCONATE 1 APPLICATION(S): 213 SOLUTION TOPICAL at 07:24

## 2022-03-27 RX ADMIN — Medication 518.75 MILLIGRAM(S): at 09:31

## 2022-03-27 RX ADMIN — SODIUM CHLORIDE 4 MILLILITER(S): 9 INJECTION INTRAMUSCULAR; INTRAVENOUS; SUBCUTANEOUS at 10:14

## 2022-03-27 RX ADMIN — Medication 3 MILLILITER(S): at 10:14

## 2022-03-27 RX ADMIN — INSULIN HUMAN 6: 100 INJECTION, SOLUTION SUBCUTANEOUS at 07:22

## 2022-03-27 RX ADMIN — ARIPIPRAZOLE 2 MILLIGRAM(S): 15 TABLET ORAL at 23:11

## 2022-03-27 RX ADMIN — Medication 518.75 MILLIGRAM(S): at 13:39

## 2022-03-27 RX ADMIN — Medication 3 MILLILITER(S): at 17:19

## 2022-03-27 RX ADMIN — SODIUM CHLORIDE 4 MILLILITER(S): 9 INJECTION INTRAMUSCULAR; INTRAVENOUS; SUBCUTANEOUS at 22:06

## 2022-03-27 RX ADMIN — ENOXAPARIN SODIUM 50 MILLIGRAM(S): 100 INJECTION SUBCUTANEOUS at 06:00

## 2022-03-27 RX ADMIN — DEXMEDETOMIDINE HYDROCHLORIDE IN 0.9% SODIUM CHLORIDE 1.42 MICROGRAM(S)/KG/HR: 4 INJECTION INTRAVENOUS at 12:17

## 2022-03-27 RX ADMIN — CHLORHEXIDINE GLUCONATE 15 MILLILITER(S): 213 SOLUTION TOPICAL at 18:21

## 2022-03-27 RX ADMIN — Medication 518.75 MILLIGRAM(S): at 09:32

## 2022-03-27 RX ADMIN — CHLORHEXIDINE GLUCONATE 15 MILLILITER(S): 213 SOLUTION TOPICAL at 07:21

## 2022-03-27 RX ADMIN — ENOXAPARIN SODIUM 50 MILLIGRAM(S): 100 INJECTION SUBCUTANEOUS at 18:20

## 2022-03-27 RX ADMIN — PANTOPRAZOLE SODIUM 40 MILLIGRAM(S): 20 TABLET, DELAYED RELEASE ORAL at 07:21

## 2022-03-27 NOTE — PROGRESS NOTE ADULT - ATTENDING COMMENTS
acute on chronic hypoxemic respiratory failure, CP, h/o DVT and pna  physical as above  continue IV bactrim for Proteus and Stenotrophomonas in sputum  continue AC, failed trial of TC today  S/p trach  continue PEG feeds  continue lovenox  much more comfortable after the trach  decision making of high complexity

## 2022-03-27 NOTE — PROGRESS NOTE ADULT - ASSESSMENT
75 yo F with PMHx of cerebral palsy with functional quadriplegia, Hx of PE/DVTs, GERD, chronic dysphagia with PEG, thoracic spine fusion initially admitted for acute hypoxic respiratory failure found to have pseudomonal PNA, intubated for third time i/s/o aspiration PNA of PEG feeds and copious secretions, trach placed 3/24, s/p bronchoscopy on 3/25    NEURO  #Cerebral palsy  Patient with cerebral palsy. Has functional quadriplegia, health aide at assisted living facility reports patient is currently at baseline.  - FOR CONSENT, 2-physician consent is required, although patient is AOx3.     #Goals of care, counseling/discussion.   Patient with cerebral palsy. Previously a [place] resident, now resides in group home, with [place] [facility] per palliative care. Patient with repeat admission for aspiration pneumonia with history of dysphagia and copious secretions from oropharynx on exam.  - palliative care and ethics consulted - Advisory board must make decisions for patient, plan is for trach to be placed today.    #Anxiety  Pt with ongoing restlessness, anxiety, agitation  - c/w home med Abilify 2mg qd  - c/w precedex gtt, RASS goal -1 to -2 (s/p propofol and fentanyl post-trach placement, now more synchronous with vent)    CV  No active issues    PULM  #Acute respiratory failure with hypoxia i/s/o of aspiration pneumonia of PEG feeds and copious oral secretions  Extubated and reintubated multiple times, trached on 3/24  - currently on VC/AC 50/250/0.8/15/7  - sputum cultures 3/25 show numerous proteus mirabilis, moderate Stenotrophomonas maltophilia  - c/w Aztreonam 2g q8h, f/u SClx susceptibilities  - c/w Frequent suctioning - oral and tracheal, hypertonic saline followed by duonebs - standing, Chest PT q6h, aspiration precautions    #PE  History of PE 5 years ago. On Eliquis at home 2.5 mg BID at home. Bedside ultrasound performed in MICU with no evidence of clot.    - c/w lovenox 50 mg BID    RENAL  No active issues    GI  #Dysphagia  - PEG tube in place and functional, c/w tube feeds    #GERD   On famotidine 20 mg qHS at home  - c/w pantoprazole 40 mg QD while in MICU      Quintero in place    ENDO  On regular ISS for TF    ID  #Suspected aspiration event 3/25  - see pulm above     HEME  #Anemia, macrocytic  Hb stable 9 to 11  MCV   B12/folate WNL  - Maintain active T&S (Last 3/24, next 3/27)  - Transfuse if Hgb<7 (would require 2-physician consent)    PROPHYLAXIS  F: None  E: Replete PRN  N: TF Jevity 1.2 at 42cc/h + 1 LPS  DVT: Lovenox 50 mg BID   GI: protonix 40 mg daily via PEG  C: Full Code, pending discussion with pt's CAB state advocate for GOC and medical decisions  FOR EMERGENT CONSENT 2-physician consent is required  D: With trach, patient cannot return back to group home and will be discharged to nursing home. 75 yo F with PMHx of cerebral palsy with functional quadriplegia, Hx of PE/DVTs, GERD, chronic dysphagia with PEG, thoracic spine fusion initially admitted for acute hypoxic respiratory failure found to have pseudomonal PNA, intubated for third time i/s/o aspiration PNA of PEG feeds and copious secretions, trach placed 3/24, s/p bronchoscopy on 3/25    NEURO  #Cerebral palsy  Patient with cerebral palsy. Has functional quadriplegia, health aide at assisted living facility reports patient is currently at baseline.  - FOR CONSENT, 2-physician consent is required, although patient is AOx3.     #Goals of care, counseling/discussion.   Patient with cerebral palsy. Previously a [place] resident, now resides in group home, with [place] [facility] per palliative care. Patient with repeat admission for aspiration pneumonia with history of dysphagia and copious secretions from oropharynx on exam.  - palliative care and ethics consulted - Advisory board must make decisions for patient.    #Anxiety  Pt with ongoing restlessness, anxiety, agitation  - c/w home med Abilify 2mg qd  - c/w precedex gtt, wean as tolerated     CV  No active issues    PULM  #Acute respiratory failure with hypoxia i/s/o of aspiration pneumonia of PEG feeds and copious oral secretions  Extubated and reintubated multiple times, trached on 3/24  - currently on VC/AC 40/250/0.8/15/8  - sputum cultures 3/25 show numerous proteus mirabilis, moderate Stenotrophomonas maltophilia  - c/w Bactrim 300mg IV q8 (3/26 - )  - c/w Frequent suctioning - oral and tracheal, hypertonic saline followed by duonebs - standing, Chest PT q6h, aspiration precautions    #PE  History of PE 5 years ago. On Eliquis at home 2.5 mg BID at home. Bedside ultrasound performed in MICU with no evidence of clot.    - c/w lovenox 50 mg BID    RENAL  No active issues    GI  #Dysphagia  - PEG tube in place and functional, c/w tube feeds    #GERD   On famotidine 20 mg qHS at home  - c/w pantoprazole 40 mg QD while here       Quintero in place    ENDO  On regular ISS for TF    ID  #Suspected aspiration event 3/25  - see pulm above     HEME  #Anemia, macrocytic  Hb stable 9 to 11  MCV   B12/folate WNL  - Maintain active T&S (Last 3/24, next 3/27)  - Transfuse if Hgb<7 (would require 2-physician consent)    PROPHYLAXIS  F: None  E: Replete PRN  N: TF Jevity 1.2 at 42cc/h + 1 LPS  DVT: Lovenox 50 mg BID   GI: protonix 40 mg daily via PEG  C: Full Code, pending discussion with pt's CAB state advocate for GOC and medical decisions  FOR EMERGENT CONSENT 2-physician consent is required  D: With trach, patient cannot return back to group home and will be discharged to nursing home. 75 yo F with PMHx of cerebral palsy with functional quadriplegia, Hx of PE/DVTs, GERD, chronic dysphagia with PEG, thoracic spine fusion initially admitted for acute hypoxic respiratory failure found to have pseudomonal PNA, intubated for third time i/s/o aspiration PNA of PEG feeds and copious secretions, trach placed 3/24, s/p bronchoscopy on 3/25    NEURO  #Cerebral palsy  Patient with cerebral palsy. Has functional quadriplegia, health aide at assisted living facility reports patient is currently at baseline.  - FOR CONSENT, 2-physician consent is required, although patient is AOx3.     #Goals of care, counseling/discussion.   Patient with cerebral palsy. Previously a [place] resident, now resides in group home, with [place] [facility] per palliative care. Patient with repeat admission for aspiration pneumonia with history of dysphagia and copious secretions from oropharynx on exam.  - palliative care and ethics consulted - Advisory board must make decisions for patient.    #Anxiety  Pt with ongoing restlessness, anxiety, agitation  - c/w home med Abilify 2mg qd  - c/w precedex gtt, wean as tolerated     CV  No active issues    PULM  #Acute respiratory failure with hypoxia i/s/o of aspiration pneumonia of PEG feeds and copious oral secretions  Extubated and reintubated multiple times, trached on 3/24  - currently on VC/AC 40/250/0.8/15/8 - pt attempted on PS trial but bc of her high resistance, she may need trach collar as weaning trial, so attempted trach collar 3/27 and pt tachypnic so switched back to [facility]  - sputum cultures 3/25 show numerous proteus mirabilis, moderate Stenotrophomonas maltophilia  - c/w Bactrim 300mg IV q8 (3/26 - ) - plan to transition to PO bactrim on 3/28 and resume free water flushes (bactrim IV is providing free water currently)  - c/w Frequent suctioning - oral and tracheal, hypertonic saline followed by duonebs - standing, Chest PT q6h, aspiration precautions    #PE  History of PE 5 years ago. On Eliquis at home 2.5 mg BID at home. Bedside ultrasound performed in MICU with no evidence of clot.    - c/w lovenox 50 mg BID    RENAL  No active issues    GI  #Dysphagia  - PEG tube in place and functional, c/w tube feeds    #GERD   On famotidine 20 mg qHS at home  - c/w pantoprazole 40 mg QD while here       Quintero removed 3/27- due for TOV  UA positive - no culture sent and no symptoms per pt, but is on bactrim course for pulm regardless     ENDO  On regular ISS for TF    ID  #Fever   -Last 3/26 at 12pm   Possibly 2/2 to aspiration pna - suspected aspiration event 3/25 during/after trach placement  -UA positive on 3/26   -c/w bactrim 300mg q8 (3/26 - )     HEME  #Anemia, macrocytic  Hb stable 9 to 11  MCV   B12/folate WNL  - Maintain active T&S (Last 3/27, next 3/30)  - Transfuse if Hgb<7 (would require 2-physician consent)    PROPHYLAXIS  F: None  E: Replete PRN  N: TF Jevity 1.2 at 42cc/h + 1 LPS  DVT: Lovenox 50 mg BID   GI: protonix 40 mg daily via PEG  C: Full Code, pending discussion with pt's CAB state advocate for GOC and medical decisions  FOR EMERGENT CONSENT 2-physician consent is required  D: With trach, patient cannot return back to group home and will be discharged to nursing home.

## 2022-03-27 NOTE — PROGRESS NOTE ADULT - SUBJECTIVE AND OBJECTIVE BOX
***INCOMPLETE****  OVERNIGHT EVENTS:    SUBJECTIVE / INTERVAL HPI: Patient seen and examined at bedside.     ROS: negative unless otherwise stated above.    VITAL SIGNS:  Vital Signs Last 24 Hrs  T(C): 37.6 (27 Mar 2022 01:03), Max: 38.4 (26 Mar 2022 11:00)  T(F): 99.7 (27 Mar 2022 01:03), Max: 101.1 (26 Mar 2022 11:00)  HR: 76 (27 Mar 2022 05:00) (61 - 115)  BP: 120/58 (27 Mar 2022 05:00) (73/38 - 155/67)  BP(mean): 84 (27 Mar 2022 05:00) (51 - 97)  RR: 16 (27 Mar 2022 05:00) (15 - 32)  SpO2: 94% (27 Mar 2022 05:00) (88% - 99%)    PHYSICAL EXAM:    General: In no apparent distress  HEENT: NC/AT; PERRL, anicteric sclera; MMM  Neck: supple  Cardiovascular: +S1/S2; RRR  Respiratory: CTA B/L; no W/R/R  Gastrointestinal: soft, NT/ND; +BSx4  Extremities: WWP; no edema, clubbing or cyanosis  Vascular: 2+ radial, DP/PT pulses B/L  Neurological: AAOx3; no focal deficits    MEDICATIONS:  MEDICATIONS  (STANDING):  albuterol/ipratropium for Nebulization 3 milliLiter(s) Nebulizer every 6 hours  ARIPiprazole 2 milliGRAM(s) Oral every 24 hours  chlorhexidine 0.12% Liquid 15 milliLiter(s) Oral Mucosa every 12 hours  chlorhexidine 2% Cloths 1 Application(s) Topical <User Schedule>  dexMEDEtomidine Infusion 0.1 MICROgram(s)/kG/Hr (1.42 mL/Hr) IV Continuous <Continuous>  dextrose 40% Gel 15 Gram(s) Oral once  dextrose 5%. 1000 milliLiter(s) (50 mL/Hr) IV Continuous <Continuous>  dextrose 5%. 1000 milliLiter(s) (100 mL/Hr) IV Continuous <Continuous>  dextrose 50% Injectable 25 Gram(s) IV Push once  dextrose 50% Injectable 12.5 Gram(s) IV Push once  dextrose 50% Injectable 25 Gram(s) IV Push once  enoxaparin Injectable 50 milliGRAM(s) SubCutaneous every 12 hours  fentaNYL   Infusion. 0.5 MICROgram(s)/kG/Hr (2.84 mL/Hr) IV Continuous <Continuous>  glucagon  Injectable 1 milliGRAM(s) IntraMuscular once  insulin regular  human corrective regimen sliding scale   SubCutaneous every 6 hours  norepinephrine Infusion 0.05 MICROgram(s)/kG/Min (5.33 mL/Hr) IV Continuous <Continuous>  pantoprazole   Suspension 40 milliGRAM(s) Oral before breakfast  propofol Infusion 5 MICROgram(s)/kG/Min (1.7 mL/Hr) IV Continuous <Continuous>  sodium chloride 3%  Inhalation 4 milliLiter(s) Inhalation every 6 hours  trimethoprim / sulfamethoxazole IVPB 300 milliGRAM(s) IV Intermittent every 8 hours    MEDICATIONS  (PRN):  acetaminophen    Suspension .. 650 milliGRAM(s) Enteral Tube every 6 hours PRN Temp greater or equal to 38C (100.4F), Mild Pain (1 - 3)      ALLERGIES:  Allergies    ace inhibitors (Anaphylaxis)  caffeine (Rash)  cefepime (Rash)  chocolate (Rash)  high acid (Rash)  Tomatoes (Hives)    Intolerances        LABS:                        10.0   8.71  )-----------( 299      ( 26 Mar 2022 05:46 )             34.1     03-26    138  |  102  |  18  ----------------------------<  170<H>  4.1   |  29  |  0.59    Ca    8.4      26 Mar 2022 05:46  Phos  3.1     03-26  Mg     1.8     03-26    TPro  6.3  /  Alb  3.1<L>  /  TBili  0.2  /  DBili  x   /  AST  19  /  ALT  16  /  AlkPhos  75  03-26      Urinalysis Basic - ( 26 Mar 2022 11:17 )    Color: Yellow / Appearance: Clear / SG: >=1.030 / pH: x  Gluc: x / Ketone: NEGATIVE  / Bili: Negative / Urobili: 0.2 E.U./dL   Blood: x / Protein: 30 mg/dL / Nitrite: POSITIVE   Leuk Esterase: Moderate / RBC: < 5 /HPF / WBC Many /HPF   Sq Epi: x / Non Sq Epi: 0-5 /HPF / Bacteria: Present /HPF      CAPILLARY BLOOD GLUCOSE      POCT Blood Glucose.: 188 mg/dL (26 Mar 2022 23:25)      RADIOLOGY & ADDITIONAL TESTS: Reviewed. OVERNIGHT EVENTS: No acute events overnight.    SUBJECTIVE / INTERVAL HPI: Patient seen and examined at bedside. Patient is relatively comfortable, not expressing any specific complaints.    ROS: negative unless otherwise stated above.    VITAL SIGNS:  Vital Signs Last 24 Hrs  T(C): 37.6 (27 Mar 2022 01:03), Max: 38.4 (26 Mar 2022 11:00)  T(F): 99.7 (27 Mar 2022 01:03), Max: 101.1 (26 Mar 2022 11:00)  HR: 76 (27 Mar 2022 05:00) (61 - 115)  BP: 120/58 (27 Mar 2022 05:00) (73/38 - 155/67)  BP(mean): 84 (27 Mar 2022 05:00) (51 - 97)  RR: 16 (27 Mar 2022 05:00) (15 - 32)  SpO2: 94% (27 Mar 2022 05:00) (88% - 99%)    PHYSICAL EXAM:  General: In no apparent distress this AM, on precedex of 1   HEENT: NC/AT; PERRL, anicteric sclera; MMM  Neck: supple  Cardiovascular: +S1/S2; RRR  Respiratory: CTA b/l, no wheeze, no crackles    Gastrointestinal: soft, NT/ND; +BSx4; PEG tube  Extremities: WWP; no edema, clubbing or cyanosis; +chronic deformities in b/l feet   Vascular: 2+ radial, DP/PT pulses B/L  Neurological: AOx3, moving all extremities, cerebral palsy- difficulty with communication but making needs known and clearly aware and understanding     MEDICATIONS:  MEDICATIONS  (STANDING):  albuterol/ipratropium for Nebulization 3 milliLiter(s) Nebulizer every 6 hours  ARIPiprazole 2 milliGRAM(s) Oral every 24 hours  chlorhexidine 0.12% Liquid 15 milliLiter(s) Oral Mucosa every 12 hours  chlorhexidine 2% Cloths 1 Application(s) Topical <User Schedule>  dexMEDEtomidine Infusion 0.1 MICROgram(s)/kG/Hr (1.42 mL/Hr) IV Continuous <Continuous>  dextrose 40% Gel 15 Gram(s) Oral once  dextrose 5%. 1000 milliLiter(s) (50 mL/Hr) IV Continuous <Continuous>  dextrose 5%. 1000 milliLiter(s) (100 mL/Hr) IV Continuous <Continuous>  dextrose 50% Injectable 25 Gram(s) IV Push once  dextrose 50% Injectable 12.5 Gram(s) IV Push once  dextrose 50% Injectable 25 Gram(s) IV Push once  enoxaparin Injectable 50 milliGRAM(s) SubCutaneous every 12 hours  fentaNYL   Infusion. 0.5 MICROgram(s)/kG/Hr (2.84 mL/Hr) IV Continuous <Continuous>  glucagon  Injectable 1 milliGRAM(s) IntraMuscular once  insulin regular  human corrective regimen sliding scale   SubCutaneous every 6 hours  norepinephrine Infusion 0.05 MICROgram(s)/kG/Min (5.33 mL/Hr) IV Continuous <Continuous>  pantoprazole   Suspension 40 milliGRAM(s) Oral before breakfast  propofol Infusion 5 MICROgram(s)/kG/Min (1.7 mL/Hr) IV Continuous <Continuous>  sodium chloride 3%  Inhalation 4 milliLiter(s) Inhalation every 6 hours  trimethoprim / sulfamethoxazole IVPB 300 milliGRAM(s) IV Intermittent every 8 hours    MEDICATIONS  (PRN):  acetaminophen    Suspension .. 650 milliGRAM(s) Enteral Tube every 6 hours PRN Temp greater or equal to 38C (100.4F), Mild Pain (1 - 3)      ALLERGIES:  Allergies    ace inhibitors (Anaphylaxis)  caffeine (Rash)  cefepime (Rash)  chocolate (Rash)  high acid (Rash)  Tomatoes (Hives)    Intolerances        LABS:                        10.0   8.71  )-----------( 299      ( 26 Mar 2022 05:46 )             34.1     03-26    138  |  102  |  18  ----------------------------<  170<H>  4.1   |  29  |  0.59    Ca    8.4      26 Mar 2022 05:46  Phos  3.1     03-26  Mg     1.8     03-26    TPro  6.3  /  Alb  3.1<L>  /  TBili  0.2  /  DBili  x   /  AST  19  /  ALT  16  /  AlkPhos  75  03-26      Urinalysis Basic - ( 26 Mar 2022 11:17 )    Color: Yellow / Appearance: Clear / SG: >=1.030 / pH: x  Gluc: x / Ketone: NEGATIVE  / Bili: Negative / Urobili: 0.2 E.U./dL   Blood: x / Protein: 30 mg/dL / Nitrite: POSITIVE   Leuk Esterase: Moderate / RBC: < 5 /HPF / WBC Many /HPF   Sq Epi: x / Non Sq Epi: 0-5 /HPF / Bacteria: Present /HPF      CAPILLARY BLOOD GLUCOSE      POCT Blood Glucose.: 188 mg/dL (26 Mar 2022 23:25)      RADIOLOGY & ADDITIONAL TESTS: Reviewed.

## 2022-03-28 LAB
-  CEFAZOLIN: SIGNIFICANT CHANGE UP
-  CEFTAZIDIME: SIGNIFICANT CHANGE UP
-  CIPROFLOXACIN: SIGNIFICANT CHANGE UP
ALBUMIN SERPL ELPH-MCNC: 3 G/DL — LOW (ref 3.3–5)
ALP SERPL-CCNC: 74 U/L — SIGNIFICANT CHANGE UP (ref 40–120)
ALT FLD-CCNC: 14 U/L — SIGNIFICANT CHANGE UP (ref 10–45)
ANION GAP SERPL CALC-SCNC: 11 MMOL/L — SIGNIFICANT CHANGE UP (ref 5–17)
AST SERPL-CCNC: 17 U/L — SIGNIFICANT CHANGE UP (ref 10–40)
BASOPHILS # BLD AUTO: 0.05 K/UL — SIGNIFICANT CHANGE UP (ref 0–0.2)
BASOPHILS NFR BLD AUTO: 0.8 % — SIGNIFICANT CHANGE UP (ref 0–2)
BILIRUB SERPL-MCNC: 0.2 MG/DL — SIGNIFICANT CHANGE UP (ref 0.2–1.2)
BUN SERPL-MCNC: 12 MG/DL — SIGNIFICANT CHANGE UP (ref 7–23)
CALCIUM SERPL-MCNC: 9.2 MG/DL — SIGNIFICANT CHANGE UP (ref 8.4–10.5)
CHLORIDE SERPL-SCNC: 102 MMOL/L — SIGNIFICANT CHANGE UP (ref 96–108)
CO2 SERPL-SCNC: 25 MMOL/L — SIGNIFICANT CHANGE UP (ref 22–31)
CREAT SERPL-MCNC: 0.52 MG/DL — SIGNIFICANT CHANGE UP (ref 0.5–1.3)
CULTURE RESULTS: SIGNIFICANT CHANGE UP
EGFR: 97 ML/MIN/1.73M2 — SIGNIFICANT CHANGE UP
EOSINOPHIL # BLD AUTO: 0.25 K/UL — SIGNIFICANT CHANGE UP (ref 0–0.5)
EOSINOPHIL NFR BLD AUTO: 3.9 % — SIGNIFICANT CHANGE UP (ref 0–6)
GLUCOSE BLDC GLUCOMTR-MCNC: 105 MG/DL — HIGH (ref 70–99)
GLUCOSE BLDC GLUCOMTR-MCNC: 115 MG/DL — HIGH (ref 70–99)
GLUCOSE BLDC GLUCOMTR-MCNC: 145 MG/DL — HIGH (ref 70–99)
GLUCOSE SERPL-MCNC: 146 MG/DL — HIGH (ref 70–99)
HCT VFR BLD CALC: 31 % — LOW (ref 34.5–45)
HGB BLD-MCNC: 9.5 G/DL — LOW (ref 11.5–15.5)
IMM GRANULOCYTES NFR BLD AUTO: 1.1 % — SIGNIFICANT CHANGE UP (ref 0–1.5)
LYMPHOCYTES # BLD AUTO: 1.1 K/UL — SIGNIFICANT CHANGE UP (ref 1–3.3)
LYMPHOCYTES # BLD AUTO: 17.2 % — SIGNIFICANT CHANGE UP (ref 13–44)
MAGNESIUM SERPL-MCNC: 1.8 MG/DL — SIGNIFICANT CHANGE UP (ref 1.6–2.6)
MCHC RBC-ENTMCNC: 30.1 PG — SIGNIFICANT CHANGE UP (ref 27–34)
MCHC RBC-ENTMCNC: 30.6 GM/DL — LOW (ref 32–36)
MCV RBC AUTO: 98.1 FL — SIGNIFICANT CHANGE UP (ref 80–100)
METHOD TYPE: SIGNIFICANT CHANGE UP
METHOD TYPE: SIGNIFICANT CHANGE UP
MONOCYTES # BLD AUTO: 0.56 K/UL — SIGNIFICANT CHANGE UP (ref 0–0.9)
MONOCYTES NFR BLD AUTO: 8.8 % — SIGNIFICANT CHANGE UP (ref 2–14)
NEUTROPHILS # BLD AUTO: 4.37 K/UL — SIGNIFICANT CHANGE UP (ref 1.8–7.4)
NEUTROPHILS NFR BLD AUTO: 68.2 % — SIGNIFICANT CHANGE UP (ref 43–77)
NRBC # BLD: 0 /100 WBCS — SIGNIFICANT CHANGE UP (ref 0–0)
ORGANISM # SPEC MICROSCOPIC CNT: SIGNIFICANT CHANGE UP
PHOSPHATE SERPL-MCNC: 2.6 MG/DL — SIGNIFICANT CHANGE UP (ref 2.5–4.5)
PLATELET # BLD AUTO: 299 K/UL — SIGNIFICANT CHANGE UP (ref 150–400)
POTASSIUM SERPL-MCNC: 4.8 MMOL/L — SIGNIFICANT CHANGE UP (ref 3.5–5.3)
POTASSIUM SERPL-SCNC: 4.8 MMOL/L — SIGNIFICANT CHANGE UP (ref 3.5–5.3)
PROT SERPL-MCNC: 6.5 G/DL — SIGNIFICANT CHANGE UP (ref 6–8.3)
RBC # BLD: 3.16 M/UL — LOW (ref 3.8–5.2)
RBC # FLD: 13.2 % — SIGNIFICANT CHANGE UP (ref 10.3–14.5)
SODIUM SERPL-SCNC: 138 MMOL/L — SIGNIFICANT CHANGE UP (ref 135–145)
SPECIMEN SOURCE: SIGNIFICANT CHANGE UP
WBC # BLD: 6.4 K/UL — SIGNIFICANT CHANGE UP (ref 3.8–10.5)
WBC # FLD AUTO: 6.4 K/UL — SIGNIFICANT CHANGE UP (ref 3.8–10.5)

## 2022-03-28 PROCEDURE — 99232 SBSQ HOSP IP/OBS MODERATE 35: CPT | Mod: GC

## 2022-03-28 RX ORDER — FUROSEMIDE 40 MG
20 TABLET ORAL ONCE
Refills: 0 | Status: COMPLETED | OUTPATIENT
Start: 2022-03-28 | End: 2022-03-28

## 2022-03-28 RX ORDER — FENTANYL CITRATE 50 UG/ML
25 INJECTION INTRAVENOUS ONCE
Refills: 0 | Status: DISCONTINUED | OUTPATIENT
Start: 2022-03-28 | End: 2022-03-28

## 2022-03-28 RX ADMIN — FENTANYL CITRATE 25 MICROGRAM(S): 50 INJECTION INTRAVENOUS at 22:14

## 2022-03-28 RX ADMIN — Medication 3 MILLILITER(S): at 18:19

## 2022-03-28 RX ADMIN — Medication 650 MILLIGRAM(S): at 13:52

## 2022-03-28 RX ADMIN — SODIUM CHLORIDE 4 MILLILITER(S): 9 INJECTION INTRAMUSCULAR; INTRAVENOUS; SUBCUTANEOUS at 09:22

## 2022-03-28 RX ADMIN — Medication 3 MILLILITER(S): at 21:10

## 2022-03-28 RX ADMIN — CHLORHEXIDINE GLUCONATE 1 APPLICATION(S): 213 SOLUTION TOPICAL at 06:34

## 2022-03-28 RX ADMIN — Medication 300 MILLIGRAM(S): at 23:50

## 2022-03-28 RX ADMIN — CHLORHEXIDINE GLUCONATE 15 MILLILITER(S): 213 SOLUTION TOPICAL at 18:30

## 2022-03-28 RX ADMIN — Medication 3 MILLILITER(S): at 03:54

## 2022-03-28 RX ADMIN — Medication 0.1 MILLIGRAM(S): at 20:38

## 2022-03-28 RX ADMIN — SODIUM CHLORIDE 4 MILLILITER(S): 9 INJECTION INTRAMUSCULAR; INTRAVENOUS; SUBCUTANEOUS at 21:09

## 2022-03-28 RX ADMIN — FENTANYL CITRATE 25 MICROGRAM(S): 50 INJECTION INTRAVENOUS at 23:32

## 2022-03-28 RX ADMIN — PANTOPRAZOLE SODIUM 40 MILLIGRAM(S): 20 TABLET, DELAYED RELEASE ORAL at 06:33

## 2022-03-28 RX ADMIN — Medication 518.75 MILLIGRAM(S): at 06:34

## 2022-03-28 RX ADMIN — ENOXAPARIN SODIUM 50 MILLIGRAM(S): 100 INJECTION SUBCUTANEOUS at 06:35

## 2022-03-28 RX ADMIN — Medication 20 MILLIGRAM(S): at 12:58

## 2022-03-28 RX ADMIN — DEXMEDETOMIDINE HYDROCHLORIDE IN 0.9% SODIUM CHLORIDE 1.42 MICROGRAM(S)/KG/HR: 4 INJECTION INTRAVENOUS at 09:40

## 2022-03-28 RX ADMIN — SODIUM CHLORIDE 4 MILLILITER(S): 9 INJECTION INTRAMUSCULAR; INTRAVENOUS; SUBCUTANEOUS at 03:54

## 2022-03-28 RX ADMIN — ENOXAPARIN SODIUM 50 MILLIGRAM(S): 100 INJECTION SUBCUTANEOUS at 18:30

## 2022-03-28 RX ADMIN — Medication 3 MILLILITER(S): at 09:22

## 2022-03-28 RX ADMIN — Medication 650 MILLIGRAM(S): at 15:00

## 2022-03-28 RX ADMIN — SODIUM CHLORIDE 4 MILLILITER(S): 9 INJECTION INTRAMUSCULAR; INTRAVENOUS; SUBCUTANEOUS at 18:18

## 2022-03-28 RX ADMIN — ARIPIPRAZOLE 2 MILLIGRAM(S): 15 TABLET ORAL at 20:38

## 2022-03-28 RX ADMIN — CHLORHEXIDINE GLUCONATE 15 MILLILITER(S): 213 SOLUTION TOPICAL at 06:33

## 2022-03-28 RX ADMIN — Medication 300 MILLIGRAM(S): at 13:38

## 2022-03-28 RX ADMIN — DEXMEDETOMIDINE HYDROCHLORIDE IN 0.9% SODIUM CHLORIDE 1.42 MICROGRAM(S)/KG/HR: 4 INJECTION INTRAVENOUS at 02:44

## 2022-03-28 NOTE — PROGRESS NOTE ADULT - SUBJECTIVE AND OBJECTIVE BOX
***INCOMPLETE****  OVERNIGHT EVENTS:    SUBJECTIVE / INTERVAL HPI: Patient seen and examined at bedside.     ROS: negative unless otherwise stated above.    VITAL SIGNS:  Vital Signs Last 24 Hrs  T(C): 36.5 (28 Mar 2022 01:03), Max: 37.7 (27 Mar 2022 09:40)  T(F): 97.7 (28 Mar 2022 01:03), Max: 99.9 (27 Mar 2022 09:40)  HR: 82 (28 Mar 2022 05:00) (69 - 95)  BP: 126/60 (28 Mar 2022 05:00) (109/58 - 157/68)  BP(mean): 86 (28 Mar 2022 05:00) (77 - 100)  RR: 28 (28 Mar 2022 04:26) (18 - 44)  SpO2: 95% (28 Mar 2022 05:00) (91% - 99%)    PHYSICAL EXAM:    General: In no apparent distress  HEENT: NC/AT; PERRL, anicteric sclera; MMM  Neck: supple  Cardiovascular: +S1/S2; RRR  Respiratory: CTA B/L; no W/R/R  Gastrointestinal: soft, NT/ND; +BSx4  Extremities: WWP; no edema, clubbing or cyanosis  Vascular: 2+ radial, DP/PT pulses B/L  Neurological: AAOx3; no focal deficits    MEDICATIONS:  MEDICATIONS  (STANDING):  albuterol/ipratropium for Nebulization 3 milliLiter(s) Nebulizer every 6 hours  ARIPiprazole 2 milliGRAM(s) Oral every 24 hours  chlorhexidine 0.12% Liquid 15 milliLiter(s) Oral Mucosa every 12 hours  chlorhexidine 2% Cloths 1 Application(s) Topical <User Schedule>  dexMEDEtomidine Infusion 0.1 MICROgram(s)/kG/Hr (1.42 mL/Hr) IV Continuous <Continuous>  dextrose 40% Gel 15 Gram(s) Oral once  dextrose 5%. 1000 milliLiter(s) (50 mL/Hr) IV Continuous <Continuous>  dextrose 5%. 1000 milliLiter(s) (100 mL/Hr) IV Continuous <Continuous>  dextrose 50% Injectable 25 Gram(s) IV Push once  dextrose 50% Injectable 12.5 Gram(s) IV Push once  dextrose 50% Injectable 25 Gram(s) IV Push once  enoxaparin Injectable 50 milliGRAM(s) SubCutaneous every 12 hours  glucagon  Injectable 1 milliGRAM(s) IntraMuscular once  insulin regular  human corrective regimen sliding scale   SubCutaneous every 6 hours  norepinephrine Infusion 0.05 MICROgram(s)/kG/Min (5.33 mL/Hr) IV Continuous <Continuous>  pantoprazole   Suspension 40 milliGRAM(s) Oral before breakfast  sodium chloride 3%  Inhalation 4 milliLiter(s) Inhalation every 6 hours  trimethoprim / sulfamethoxazole IVPB 300 milliGRAM(s) IV Intermittent every 8 hours    MEDICATIONS  (PRN):  acetaminophen    Suspension .. 650 milliGRAM(s) Enteral Tube every 6 hours PRN Temp greater or equal to 38C (100.4F), Mild Pain (1 - 3)      ALLERGIES:  Allergies    ace inhibitors (Anaphylaxis)  caffeine (Rash)  cefepime (Rash)  chocolate (Rash)  high acid (Rash)  Tomatoes (Hives)    Intolerances        LABS:                        9.5    7.60  )-----------( 272      ( 27 Mar 2022 07:12 )             30.3     03-27    136  |  101  |  16  ----------------------------<  246<H>  4.4   |  28  |  0.48<L>    Ca    8.6      27 Mar 2022 07:12  Phos  1.1     03-27  Mg     1.8     03-27        Urinalysis Basic - ( 26 Mar 2022 11:17 )    Color: Yellow / Appearance: Clear / SG: >=1.030 / pH: x  Gluc: x / Ketone: NEGATIVE  / Bili: Negative / Urobili: 0.2 E.U./dL   Blood: x / Protein: 30 mg/dL / Nitrite: POSITIVE   Leuk Esterase: Moderate / RBC: < 5 /HPF / WBC Many /HPF   Sq Epi: x / Non Sq Epi: 0-5 /HPF / Bacteria: Present /HPF      CAPILLARY BLOOD GLUCOSE      POCT Blood Glucose.: 145 mg/dL (28 Mar 2022 02:27)      RADIOLOGY & ADDITIONAL TESTS: Reviewed. OVERNIGHT EVENTS: NAEO    SUBJECTIVE / INTERVAL HPI: Patient seen and examined at bedside. Some restlessness this morning.    ROS: negative unless otherwise stated above.    VITAL SIGNS:  Vital Signs Last 24 Hrs  T(C): 36.5 (28 Mar 2022 01:03), Max: 37.7 (27 Mar 2022 09:40)  T(F): 97.7 (28 Mar 2022 01:03), Max: 99.9 (27 Mar 2022 09:40)  HR: 82 (28 Mar 2022 05:00) (69 - 95)  BP: 126/60 (28 Mar 2022 05:00) (109/58 - 157/68)  BP(mean): 86 (28 Mar 2022 05:00) (77 - 100)  RR: 28 (28 Mar 2022 04:26) (18 - 44)  SpO2: 95% (28 Mar 2022 05:00) (91% - 99%)    PHYSICAL EXAM:  General: In no apparent distress  HEENT: NC/AT; PERRL, anicteric sclera; MMM  Neck: supple  Cardiovascular: +S1/S2; RRR  Respiratory: CTA B/L; no W/R/R; trach to vent  Gastrointestinal: soft, NT/ND; +BSx4; PEG tube  Extremities: WWP; +1 edema  Vascular: 2+ radial, DP/PT pulses B/L  Neurological: AAOx3; moving all extremities, +chronic deformity of b/l LEs, restlessness    MEDICATIONS:  MEDICATIONS  (STANDING):  albuterol/ipratropium for Nebulization 3 milliLiter(s) Nebulizer every 6 hours  ARIPiprazole 2 milliGRAM(s) Oral every 24 hours  chlorhexidine 0.12% Liquid 15 milliLiter(s) Oral Mucosa every 12 hours  chlorhexidine 2% Cloths 1 Application(s) Topical <User Schedule>  dexMEDEtomidine Infusion 0.1 MICROgram(s)/kG/Hr (1.42 mL/Hr) IV Continuous <Continuous>  dextrose 40% Gel 15 Gram(s) Oral once  dextrose 5%. 1000 milliLiter(s) (50 mL/Hr) IV Continuous <Continuous>  dextrose 5%. 1000 milliLiter(s) (100 mL/Hr) IV Continuous <Continuous>  dextrose 50% Injectable 25 Gram(s) IV Push once  dextrose 50% Injectable 12.5 Gram(s) IV Push once  dextrose 50% Injectable 25 Gram(s) IV Push once  enoxaparin Injectable 50 milliGRAM(s) SubCutaneous every 12 hours  glucagon  Injectable 1 milliGRAM(s) IntraMuscular once  insulin regular  human corrective regimen sliding scale   SubCutaneous every 6 hours  norepinephrine Infusion 0.05 MICROgram(s)/kG/Min (5.33 mL/Hr) IV Continuous <Continuous>  pantoprazole   Suspension 40 milliGRAM(s) Oral before breakfast  sodium chloride 3%  Inhalation 4 milliLiter(s) Inhalation every 6 hours  trimethoprim / sulfamethoxazole IVPB 300 milliGRAM(s) IV Intermittent every 8 hours    MEDICATIONS  (PRN):  acetaminophen    Suspension .. 650 milliGRAM(s) Enteral Tube every 6 hours PRN Temp greater or equal to 38C (100.4F), Mild Pain (1 - 3)      ALLERGIES:  Allergies    ace inhibitors (Anaphylaxis)  caffeine (Rash)  cefepime (Rash)  chocolate (Rash)  high acid (Rash)  Tomatoes (Hives)    Intolerances        LABS:                        9.5    7.60  )-----------( 272      ( 27 Mar 2022 07:12 )             30.3     03-27    136  |  101  |  16  ----------------------------<  246<H>  4.4   |  28  |  0.48<L>    Ca    8.6      27 Mar 2022 07:12  Phos  1.1     03-27  Mg     1.8     03-27        Urinalysis Basic - ( 26 Mar 2022 11:17 )    Color: Yellow / Appearance: Clear / SG: >=1.030 / pH: x  Gluc: x / Ketone: NEGATIVE  / Bili: Negative / Urobili: 0.2 E.U./dL   Blood: x / Protein: 30 mg/dL / Nitrite: POSITIVE   Leuk Esterase: Moderate / RBC: < 5 /HPF / WBC Many /HPF   Sq Epi: x / Non Sq Epi: 0-5 /HPF / Bacteria: Present /HPF      CAPILLARY BLOOD GLUCOSE      POCT Blood Glucose.: 145 mg/dL (28 Mar 2022 02:27)      RADIOLOGY & ADDITIONAL TESTS: Reviewed.

## 2022-03-28 NOTE — PROGRESS NOTE ADULT - ASSESSMENT
75 yo F with PMHx of cerebral palsy with functional quadriplegia, Hx of PE/DVTs, GERD, chronic dysphagia with PEG, thoracic spine fusion initially admitted for acute hypoxic respiratory failure found to have pseudomonal PNA, intubated for third time i/s/o aspiration PNA of PEG feeds and copious secretions, trach placed 3/24, s/p bronchoscopy on 3/25    NEURO  #Cerebral palsy  Patient with cerebral palsy. Has functional quadriplegia, health aide at assisted living facility reports patient is currently at baseline.  - FOR CONSENT, 2-physician consent is required, although patient is AOx3.     #Goals of care, counseling/discussion.   Patient with cerebral palsy. Previously a [place] resident, now resides in group home, with [place] [facility] per palliative care. Patient with repeat admission for aspiration pneumonia with history of dysphagia and copious secretions from oropharynx on exam.  - palliative care and ethics consulted - Advisory board must make decisions for patient.    #Anxiety  Pt with ongoing restlessness, anxiety, agitation  - c/w home med Abilify 2mg qd  - c/w precedex gtt, wean as tolerated     CV  No active issues    PULM  #Acute respiratory failure with hypoxia i/s/o of aspiration pneumonia of PEG feeds and copious oral secretions  Extubated and reintubated multiple times, trached on 3/24  - currently on VC/AC 40/250/0.8/15/8 - pt attempted on PS trial but bc of her high resistance, she may need trach collar as weaning trial, so attempted trach collar 3/27 and pt tachypnic so switched back to VCAC  - sputum cultures 3/25 show numerous proteus mirabilis, moderate Stenotrophomonas maltophilia  - c/w Bactrim 300mg IV q8 (3/26 - ) - plan to transition to PO bactrim on 3/28 and resume free water flushes (bactrim IV is providing free water currently)  - c/w Frequent suctioning - oral and tracheal, hypertonic saline followed by duonebs - standing, Chest PT q6h, aspiration precautions    #PE  History of PE 5 years ago. On Eliquis at home 2.5 mg BID at home. Bedside ultrasound performed in MICU with no evidence of clot.    - c/w lovenox 50 mg BID    RENAL  No active issues    GI  #Dysphagia  - PEG tube in place and functional, c/w tube feeds    #GERD   On famotidine 20 mg qHS at home  - c/w pantoprazole 40 mg QD while here       Quintero removed 3/27- due for TOV  UA positive - no culture sent and no symptoms per pt, but is on bactrim course for pulm regardless     ENDO  On regular ISS for TF    ID  #Fever   -Last 3/26 at 12pm   Possibly 2/2 to aspiration pna - suspected aspiration event 3/25 during/after trach placement  -UA positive on 3/26   -c/w bactrim 300mg q8 (3/26 - )     HEME  #Anemia, macrocytic  Hb stable 9 to 11  MCV   B12/folate WNL  - Maintain active T&S (Last 3/27, next 3/30)  - Transfuse if Hgb<7 (would require 2-physician consent)    PROPHYLAXIS  F: None  E: Replete PRN  N: TF Jevity 1.2 at 42cc/h + 1 LPS  DVT: Lovenox 50 mg BID   GI: protonix 40 mg daily via PEG  C: Full Code, pending discussion with pt's CAB state advocate for GOC and medical decisions  FOR EMERGENT CONSENT 2-physician consent is required  D: With trach, patient cannot return back to group home and will be discharged to nursing home. 75 yo F with PMHx of cerebral palsy with functional quadriplegia, Hx of PE/DVTs, GERD, chronic dysphagia with PEG, thoracic spine fusion initially admitted for acute hypoxic respiratory failure found to have pseudomonal PNA, intubated for third time i/s/o aspiration PNA of PEG feeds and copious secretions, trach placed 3/24, s/p bronchoscopy on 3/25    NEURO  #Cerebral palsy  Patient with cerebral palsy. Has functional quadriplegia, health aide at assisted living facility reports patient is currently at baseline.  - FOR CONSENT, 2-physician consent is required, although patient is AOx3.     #Goals of care, counseling/discussion.   Patient with cerebral palsy. Previously a [place] resident, now resides in group home, with [place] [facility] per palliative care. Patient with repeat admission for aspiration pneumonia with history of dysphagia and copious secretions from oropharynx on exam.  - palliative care and ethics consulted - Advisory board must make decisions for patient.    #Anxiety  Pt with ongoing restlessness, anxiety, agitation  - c/w home med Abilify 2mg qd  - c/w precedex gtt, wean as tolerated     CV  No active issues    PULM  #Acute respiratory failure with hypoxia i/s/o of aspiration pneumonia of PEG feeds and copious oral secretions  Extubated and reintubated multiple times, trached on 3/24  - currently on VC/AC 40/250/0.8/15/8 and tachypneic - will continue to try daily trach collar trials.  - sputum cultures 3/25 show numerous proteus mirabilis, moderate Stenotrophomonas maltophilia  - c/w Bactrim 300mg IV q8 (3/26 -3/28) switched to PO bactrim on 3/28 300mg q8 until 4/2  - c/w Frequent suctioning - oral and tracheal, hypertonic saline followed by duonebs - standing, Chest PT q6h, aspiration precautions  - goal for net negative - 20mg lasix given 3/28    #PE  History of PE 5 years ago. On Eliquis at home 2.5 mg BID at home. Bedside ultrasound performed in MICU with no evidence of clot.    - c/w lovenox 50 mg BID    RENAL  No active issues    GI  #Dysphagia  - PEG tube in place and functional, c/w tube feeds    #GERD   On famotidine 20 mg qHS at home  - c/w pantoprazole 40 mg QD while here       Quintero removed 3/27  UA positive - no culture sent and no symptoms per pt, but is on bactrim course for pulm regardless     ENDO  On regular ISS for TF    ID  #Fever   -Last 3/26 at 12pm   Possibly 2/2 to aspiration pna - suspected aspiration event 3/25 during/after trach placement  -UA positive on 3/26   -c/w bactrim 300mg q8 (3/26 - )     HEME  #Anemia, macrocytic  Hb stable 9 to 11  MCV   B12/folate WNL  - Maintain active T&S (Last 3/27, next 3/30)  - Transfuse if Hgb<7 (would require 2-physician consent)    PROPHYLAXIS  F: None  E: Replete PRN  N: TF Jevity 1.2 at 42cc/h + 1 LPS  DVT: Lovenox 50 mg BID   GI: protonix 40 mg daily via PEG  C: Full Code, pending discussion with pt's CAB state advocate for GOC and medical decisions  FOR EMERGENT CONSENT 2-physician consent is required  D: With trach, patient cannot return back to group home and will be discharged to nursing home.

## 2022-03-28 NOTE — PROGRESS NOTE ADULT - ATTENDING COMMENTS
acute on chronic hypoxemci respiratory failure, h/o PE, cerebral palsy, pna  physical as above  continue zosyn  failing CPAP trials with tachypnea  continue PEG feeds  continue lovenox full AC

## 2022-03-29 LAB
ALBUMIN SERPL ELPH-MCNC: 3.5 G/DL — SIGNIFICANT CHANGE UP (ref 3.3–5)
ALP SERPL-CCNC: 86 U/L — SIGNIFICANT CHANGE UP (ref 40–120)
ALT FLD-CCNC: 20 U/L — SIGNIFICANT CHANGE UP (ref 10–45)
ANION GAP SERPL CALC-SCNC: 13 MMOL/L — SIGNIFICANT CHANGE UP (ref 5–17)
AST SERPL-CCNC: 29 U/L — SIGNIFICANT CHANGE UP (ref 10–40)
BASOPHILS # BLD AUTO: 0.11 K/UL — SIGNIFICANT CHANGE UP (ref 0–0.2)
BASOPHILS NFR BLD AUTO: 1 % — SIGNIFICANT CHANGE UP (ref 0–2)
BILIRUB SERPL-MCNC: 0.2 MG/DL — SIGNIFICANT CHANGE UP (ref 0.2–1.2)
BUN SERPL-MCNC: 22 MG/DL — SIGNIFICANT CHANGE UP (ref 7–23)
CALCIUM SERPL-MCNC: 10.2 MG/DL — SIGNIFICANT CHANGE UP (ref 8.4–10.5)
CHLORIDE SERPL-SCNC: 102 MMOL/L — SIGNIFICANT CHANGE UP (ref 96–108)
CO2 SERPL-SCNC: 25 MMOL/L — SIGNIFICANT CHANGE UP (ref 22–31)
CREAT SERPL-MCNC: 0.77 MG/DL — SIGNIFICANT CHANGE UP (ref 0.5–1.3)
EGFR: 81 ML/MIN/1.73M2 — SIGNIFICANT CHANGE UP
EOSINOPHIL # BLD AUTO: 0.05 K/UL — SIGNIFICANT CHANGE UP (ref 0–0.5)
EOSINOPHIL NFR BLD AUTO: 0.4 % — SIGNIFICANT CHANGE UP (ref 0–6)
GLUCOSE BLDC GLUCOMTR-MCNC: 106 MG/DL — HIGH (ref 70–99)
GLUCOSE BLDC GLUCOMTR-MCNC: 109 MG/DL — HIGH (ref 70–99)
GLUCOSE BLDC GLUCOMTR-MCNC: 97 MG/DL — SIGNIFICANT CHANGE UP (ref 70–99)
GLUCOSE BLDC GLUCOMTR-MCNC: 98 MG/DL — SIGNIFICANT CHANGE UP (ref 70–99)
GLUCOSE SERPL-MCNC: 102 MG/DL — HIGH (ref 70–99)
HCT VFR BLD CALC: 33.7 % — LOW (ref 34.5–45)
HGB BLD-MCNC: 10.5 G/DL — LOW (ref 11.5–15.5)
IMM GRANULOCYTES NFR BLD AUTO: 0.8 % — SIGNIFICANT CHANGE UP (ref 0–1.5)
LYMPHOCYTES # BLD AUTO: 1.06 K/UL — SIGNIFICANT CHANGE UP (ref 1–3.3)
LYMPHOCYTES # BLD AUTO: 9.4 % — LOW (ref 13–44)
MAGNESIUM SERPL-MCNC: 1.8 MG/DL — SIGNIFICANT CHANGE UP (ref 1.6–2.6)
MCHC RBC-ENTMCNC: 30.5 PG — SIGNIFICANT CHANGE UP (ref 27–34)
MCHC RBC-ENTMCNC: 31.2 GM/DL — LOW (ref 32–36)
MCV RBC AUTO: 98 FL — SIGNIFICANT CHANGE UP (ref 80–100)
MONOCYTES # BLD AUTO: 0.94 K/UL — HIGH (ref 0–0.9)
MONOCYTES NFR BLD AUTO: 8.4 % — SIGNIFICANT CHANGE UP (ref 2–14)
NEUTROPHILS # BLD AUTO: 8.98 K/UL — HIGH (ref 1.8–7.4)
NEUTROPHILS NFR BLD AUTO: 80 % — HIGH (ref 43–77)
NRBC # BLD: 0 /100 WBCS — SIGNIFICANT CHANGE UP (ref 0–0)
PHOSPHATE SERPL-MCNC: 4.1 MG/DL — SIGNIFICANT CHANGE UP (ref 2.5–4.5)
PLATELET # BLD AUTO: 372 K/UL — SIGNIFICANT CHANGE UP (ref 150–400)
POTASSIUM SERPL-MCNC: 5.3 MMOL/L — SIGNIFICANT CHANGE UP (ref 3.5–5.3)
POTASSIUM SERPL-SCNC: 5.3 MMOL/L — SIGNIFICANT CHANGE UP (ref 3.5–5.3)
PROT SERPL-MCNC: 7.5 G/DL — SIGNIFICANT CHANGE UP (ref 6–8.3)
RBC # BLD: 3.44 M/UL — LOW (ref 3.8–5.2)
RBC # FLD: 13.5 % — SIGNIFICANT CHANGE UP (ref 10.3–14.5)
SODIUM SERPL-SCNC: 140 MMOL/L — SIGNIFICANT CHANGE UP (ref 135–145)
WBC # BLD: 11.23 K/UL — HIGH (ref 3.8–10.5)
WBC # FLD AUTO: 11.23 K/UL — HIGH (ref 3.8–10.5)

## 2022-03-29 PROCEDURE — 99233 SBSQ HOSP IP/OBS HIGH 50: CPT | Mod: GC

## 2022-03-29 RX ORDER — ALPRAZOLAM 0.25 MG
0.25 TABLET ORAL ONCE
Refills: 0 | Status: DISCONTINUED | OUTPATIENT
Start: 2022-03-29 | End: 2022-03-29

## 2022-03-29 RX ORDER — LIDOCAINE 4 G/100G
1 CREAM TOPICAL
Refills: 0 | Status: DISCONTINUED | OUTPATIENT
Start: 2022-03-29 | End: 2022-03-30

## 2022-03-29 RX ORDER — HYDROMORPHONE HYDROCHLORIDE 2 MG/ML
0.25 INJECTION INTRAMUSCULAR; INTRAVENOUS; SUBCUTANEOUS ONCE
Refills: 0 | Status: DISCONTINUED | OUTPATIENT
Start: 2022-03-29 | End: 2022-03-29

## 2022-03-29 RX ORDER — DEXMEDETOMIDINE HYDROCHLORIDE IN 0.9% SODIUM CHLORIDE 4 UG/ML
0.2 INJECTION INTRAVENOUS
Qty: 400 | Refills: 0 | Status: DISCONTINUED | OUTPATIENT
Start: 2022-03-29 | End: 2022-03-29

## 2022-03-29 RX ORDER — ALPRAZOLAM 0.25 MG
0.25 TABLET ORAL EVERY 12 HOURS
Refills: 0 | Status: DISCONTINUED | OUTPATIENT
Start: 2022-03-29 | End: 2022-03-30

## 2022-03-29 RX ADMIN — CHLORHEXIDINE GLUCONATE 15 MILLILITER(S): 213 SOLUTION TOPICAL at 17:19

## 2022-03-29 RX ADMIN — Medication 3 MILLILITER(S): at 10:17

## 2022-03-29 RX ADMIN — Medication 3 MILLILITER(S): at 15:13

## 2022-03-29 RX ADMIN — Medication 0.1 MILLIGRAM(S): at 06:14

## 2022-03-29 RX ADMIN — CHLORHEXIDINE GLUCONATE 15 MILLILITER(S): 213 SOLUTION TOPICAL at 06:14

## 2022-03-29 RX ADMIN — SODIUM CHLORIDE 4 MILLILITER(S): 9 INJECTION INTRAMUSCULAR; INTRAVENOUS; SUBCUTANEOUS at 10:15

## 2022-03-29 RX ADMIN — DEXMEDETOMIDINE HYDROCHLORIDE IN 0.9% SODIUM CHLORIDE 2.84 MICROGRAM(S)/KG/HR: 4 INJECTION INTRAVENOUS at 10:23

## 2022-03-29 RX ADMIN — Medication 300 MILLIGRAM(S): at 22:02

## 2022-03-29 RX ADMIN — Medication 300 MILLIGRAM(S): at 14:29

## 2022-03-29 RX ADMIN — HYDROMORPHONE HYDROCHLORIDE 0.25 MILLIGRAM(S): 2 INJECTION INTRAMUSCULAR; INTRAVENOUS; SUBCUTANEOUS at 14:44

## 2022-03-29 RX ADMIN — CHLORHEXIDINE GLUCONATE 1 APPLICATION(S): 213 SOLUTION TOPICAL at 06:22

## 2022-03-29 RX ADMIN — LIDOCAINE 1 APPLICATION(S): 4 CREAM TOPICAL at 12:50

## 2022-03-29 RX ADMIN — Medication 3 MILLILITER(S): at 21:10

## 2022-03-29 RX ADMIN — Medication 0.25 MILLIGRAM(S): at 16:06

## 2022-03-29 RX ADMIN — ENOXAPARIN SODIUM 50 MILLIGRAM(S): 100 INJECTION SUBCUTANEOUS at 17:19

## 2022-03-29 RX ADMIN — Medication 300 MILLIGRAM(S): at 06:11

## 2022-03-29 RX ADMIN — PANTOPRAZOLE SODIUM 40 MILLIGRAM(S): 20 TABLET, DELAYED RELEASE ORAL at 06:14

## 2022-03-29 RX ADMIN — ARIPIPRAZOLE 2 MILLIGRAM(S): 15 TABLET ORAL at 22:03

## 2022-03-29 RX ADMIN — Medication 3 MILLILITER(S): at 04:31

## 2022-03-29 RX ADMIN — SODIUM CHLORIDE 4 MILLILITER(S): 9 INJECTION INTRAMUSCULAR; INTRAVENOUS; SUBCUTANEOUS at 04:31

## 2022-03-29 RX ADMIN — HYDROMORPHONE HYDROCHLORIDE 0.25 MILLIGRAM(S): 2 INJECTION INTRAMUSCULAR; INTRAVENOUS; SUBCUTANEOUS at 14:29

## 2022-03-29 RX ADMIN — ENOXAPARIN SODIUM 50 MILLIGRAM(S): 100 INJECTION SUBCUTANEOUS at 06:24

## 2022-03-29 NOTE — PROGRESS NOTE ADULT - ATTENDING COMMENTS
History of cerebral palsy with functional quadriplegia with chronic respiratory failure with admission for healthcare associated pneumonia (on PO bactrim)  Plan:  - Patient was agitated in the morning. Lidocaine gel around trach site and Dilaudid did not help. One dose of alprazolam improved her symptoms. Her agitation is associated with anxiety. Patient is off Precedex.  - PO bactrim to continue  - D/c hypertonic saline  - Rest as above History of cerebral palsy with functional quadriplegia with chronic respiratory failure with admission for healthcare associated pneumonia (on PO bactrim)  Plan:  - Patient was agitated in the morning. Lidocaine gel around trach site and Dilaudid did not help. One dose of alprazolam improved her symptoms. Her agitation is associated with anxiety. Patient is off Precedex.  - PO bactrim to continue  - D/c hypertonic saline  - Rest as above. History of cerebral palsy with functional quadriplegia with chronic respiratory failure with admission for healthcare associated pneumonia (on PO bactrim)  Plan:  - Patient was agitated in the morning. Lidocaine gel around trach site and Dilaudid did not help. One dose of alprazolam improved her symptoms. Her agitation is associated with anxiety. Patient is off Precedex.  - PO bactrim to continue  - D/c hypertonic saline.  - Rest as above.

## 2022-03-29 NOTE — PROGRESS NOTE ADULT - ASSESSMENT
73 yo F with PMHx of cerebral palsy with functional quadriplegia, Hx of PE/DVTs, GERD, chronic dysphagia with PEG, thoracic spine fusion initially admitted for acute hypoxic respiratory failure found to have pseudomonal PNA, intubated for third time i/s/o aspiration PNA of PEG feeds and copious secretions, trach placed 3/24, s/p bronchoscopy on 3/25    NEURO  #Cerebral palsy  Patient with cerebral palsy. Has functional quadriplegia, health aide at assisted living facility reports patient is currently at baseline.  - FOR CONSENT, 2-physician consent is required, although patient is AOx3.     #Goals of care, counseling/discussion.   Patient with cerebral palsy. Previously a [place] resident, now resides in group home, with [place] [facility] per palliative care. Patient with repeat admission for aspiration pneumonia with history of dysphagia and copious secretions from oropharynx on exam.  - palliative care and ethics consulted - Advisory board must make decisions for patient.    #Anxiety  Pt with ongoing restlessness, anxiety, agitation  - c/w home med Abilify 2mg qd  - c/w precedex gtt, wean as tolerated     CV  No active issues    PULM  #Acute respiratory failure with hypoxia i/s/o of aspiration pneumonia of PEG feeds and copious oral secretions  Extubated and reintubated multiple times, trached on 3/24  - currently on VC/AC 40/250/0.8/15/8 and tachypneic - will continue to try daily trach collar trials.  - sputum cultures 3/25 show numerous proteus mirabilis, moderate Stenotrophomonas maltophilia  - c/w Bactrim 300mg IV q8 (3/26 -3/28) switched to PO bactrim on 3/28 300mg q8 until 4/2  - c/w Frequent suctioning - oral and tracheal, hypertonic saline followed by duonebs - standing, Chest PT q6h, aspiration precautions  - goal for net negative - 20mg lasix given 3/28    #PE  History of PE 5 years ago. On Eliquis at home 2.5 mg BID at home. Bedside ultrasound performed in MICU with no evidence of clot.    - c/w lovenox 50 mg BID    RENAL  No active issues    GI  #Dysphagia  - PEG tube in place and functional, c/w tube feeds    #GERD   On famotidine 20 mg qHS at home  - c/w pantoprazole 40 mg QD while here       Quintero removed 3/27  UA positive - no culture sent and no symptoms per pt, but is on bactrim course for pulm regardless     ENDO  On regular ISS for TF    ID  #Fever   -Last 3/26 at 12pm   Possibly 2/2 to aspiration pna - suspected aspiration event 3/25 during/after trach placement  -UA positive on 3/26   -c/w bactrim 300mg q8 (3/26 - )     HEME  #Anemia, macrocytic  Hb stable 9 to 11  MCV   B12/folate WNL  - Maintain active T&S (Last 3/27, next 3/30)  - Transfuse if Hgb<7 (would require 2-physician consent)    PROPHYLAXIS  F: None  E: Replete PRN  N: TF Jevity 1.2 at 42cc/h + 1 LPS  DVT: Lovenox 50 mg BID   GI: protonix 40 mg daily via PEG  C: Full Code, pending discussion with pt's CAB state advocate for GOC and medical decisions  FOR EMERGENT CONSENT 2-physician consent is required  D: With trach, patient cannot return back to group home and will be discharged to nursing home. 73 yo F with PMHx of cerebral palsy with functional quadriplegia, Hx of PE/DVTs, GERD, chronic dysphagia with PEG, thoracic spine fusion initially admitted for acute hypoxic respiratory failure found to have pseudomonal PNA, intubated for third time i/s/o aspiration PNA of PEG feeds and copious secretions, trach placed 3/24, s/p bronchoscopy on 3/25.    NEURO  #Cerebral palsy  Patient with cerebral palsy. Has functional quadriplegia, health aide at assisted living facility reports patient is currently at baseline.  - FOR CONSENT, 2-physician consent is required, although patient is AOx3.     #Goals of care, counseling/discussion.   Patient with cerebral palsy. Previously a [place] resident, now resides in group home, with [place] [facility] per palliative care. Patient with repeat admission for aspiration pneumonia with history of dysphagia and copious secretions from oropharynx on exam.  - palliative care and ethics consulted - Advisory board must make decisions for patient.    #Anxiety  Pt with ongoing restlessness, anxiety, agitation  - c/w home med Abilify 2mg qd  - c/w precedex gtt, wean as tolerated     CV  No active issues    PULM  #Acute respiratory failure with hypoxia i/s/o of aspiration pneumonia of PEG feeds and copious oral secretions  Extubated and reintubated multiple times, trached on 3/24  - currently on VC/AC 40/250/0.8/15/8 and tachypneic - will continue to try daily trach collar trials.  - sputum cultures 3/25 show numerous proteus mirabilis, moderate Stenotrophomonas maltophilia  - c/w Bactrim 300mg IV q8 (3/26 -3/28) switched to PO bactrim on 3/28 300mg q8 until 4/2  - c/w Frequent suctioning - oral and tracheal, hypertonic saline followed by duonebs - standing, Chest PT q6h, aspiration precautions  - goal for net negative - 20mg lasix given 3/28    #PE  History of PE 5 years ago. On Eliquis at home 2.5 mg BID at home. Bedside ultrasound performed in MICU with no evidence of clot.    - c/w lovenox 50 mg BID    RENAL  No active issues    GI  #Dysphagia  - PEG tube in place and functional, c/w tube feeds    #GERD   On famotidine 20 mg qHS at home  - c/w pantoprazole 40 mg QD while here       Ingrid removed 3/27  UA positive - no culture sent and no symptoms per pt, but is on bactrim course for pulm regardless     ENDO  On regular ISS for TF    ID  #Fever   -Last 3/26 at 12pm   Possibly 2/2 to aspiration pna - suspected aspiration event 3/25 during/after trach placement  -UA positive on 3/26   -c/w bactrim 300mg q8 (3/26 - )     HEME  #Anemia, macrocytic  Hb stable 9 to 11  MCV   B12/folate WNL  - Maintain active T&S (Last 3/27, next 3/30)  - Transfuse if Hgb<7 (would require 2-physician consent)    PROPHYLAXIS  F: None  E: Replete PRN  N: TF Jevity 1.2 at 42cc/h + 1 LPS  DVT: Lovenox 50 mg BID   GI: protonix 40 mg daily via PEG  C: Full Code, pending discussion with pt's CAB state advocate for GOC and medical decisions  FOR EMERGENT CONSENT 2-physician consent is required  D: With trach, patient cannot return back to group home and will be discharged to nursing home. 73 yo F with PMHx of cerebral palsy with functional quadriplegia, Hx of PE/DVTs, GERD, chronic dysphagia with PEG, thoracic spine fusion initially admitted for acute hypoxic respiratory failure found to have pseudomonal PNA, intubated for third time i/s/o aspiration PNA of PEG feeds and copious secretions, trach placed 3/24, s/p bronchoscopy on 3/25.    NEURO  #Cerebral palsy  Patient with cerebral palsy. Has functional quadriplegia, health aide at assisted living facility reports patient is currently at baseline.  - FOR CONSENT, 2-physician consent is required, although patient is AOx3.     #Goals of care, counseling/discussion.   Patient with cerebral palsy. Previously a [place] resident, now resides in group home, with [place] [facility] per palliative care. Patient with repeat admission for aspiration pneumonia with history of dysphagia and copious secretions from oropharynx on exam.  - palliative care and ethics consulted - Advisory board must make decisions for patient.    #Anxiety  Pt with ongoing restlessness, anxiety, agitation  - c/w home med Abilify 2mg qd  - started alprazolam 0.25mg q12 PRN 3/29    CV  No active issues    PULM  #Acute respiratory failure with hypoxia i/s/o of aspiration pneumonia of PEG feeds and copious oral secretions  Extubated and reintubated multiple times, trached on 3/24  - currently on VC/AC 40/250/0.8/15/8 and tachypneic - will continue to try daily trach collar trials.  - sputum cultures 3/25 show numerous proteus mirabilis, moderate Stenotrophomonas maltophilia  - c/w Bactrim 300mg IV q8 (3/26 -3/28) switched to PO bactrim on 3/28 300mg q8 until 4/2   - c/w Frequent suctioning - oral and tracheal, hypertonic saline followed by duonebs - standing, Chest PT q6h, aspiration precautions    #PE  History of PE 5 years ago. On Eliquis at home 2.5 mg BID at home. Bedside ultrasound performed in MICU with no evidence of clot.    - c/w lovenox 50 mg BID    RENAL  No active issues    GI  #Dysphagia  - PEG tube in place and functional, c/w tube feeds    #GERD   On famotidine 20 mg qHS at home  - c/w pantoprazole 40 mg QD while here       Quintero removed 3/27  UA positive - no culture sent and no symptoms per pt, but is on bactrim course for pulm regardless     ENDO  On regular ISS for TF    ID  #Fever   -Last 3/26 at 12pm   Possibly 2/2 to aspiration pna - suspected aspiration event 3/25 during/after trach placement  -UA positive on 3/26   -c/w bactrim 300mg q8 (3/26 - )     HEME  #Anemia, macrocytic  Hb stable 9 to 11  MCV   B12/folate WNL  - Maintain active T&S (Last 3/30, next 4/2)  - Transfuse if Hgb<7 (would require 2-physician consent)    PROPHYLAXIS  F: None  E: Replete PRN  N: TF Jevity 1.2 at 42cc/h + 1 LPS  DVT: Lovenox 50 mg BID   GI: protonix 40 mg daily via PEG  C: Full Code, pending discussion with pt's CAB state advocate for GOC and medical decisions  FOR EMERGENT CONSENT 2-physician consent is required  D: With trach, patient cannot return back to group home and will be discharged to nursing home.

## 2022-03-29 NOTE — PROGRESS NOTE ADULT - SUBJECTIVE AND OBJECTIVE BOX
***INCOMPLETE****  OVERNIGHT EVENTS:    SUBJECTIVE / INTERVAL HPI: Patient seen and examined at bedside.     ROS: negative unless otherwise stated above.    VITAL SIGNS:  Vital Signs Last 24 Hrs  T(C): 37.2 (29 Mar 2022 01:01), Max: 37.5 (28 Mar 2022 08:53)  T(F): 99 (29 Mar 2022 01:01), Max: 99.5 (28 Mar 2022 08:53)  HR: 118 (29 Mar 2022 05:00) (75 - 135)  BP: 125/72 (29 Mar 2022 05:00) (105/62 - 161/72)  BP(mean): 94 (29 Mar 2022 05:00) (78 - 107)  RR: 36 (29 Mar 2022 04:35) (29 - 38)  SpO2: 93% (29 Mar 2022 05:00) (85% - 96%)    PHYSICAL EXAM:    General: In no apparent distress  HEENT: NC/AT; PERRL, anicteric sclera; MMM  Neck: supple  Cardiovascular: +S1/S2; RRR  Respiratory: CTA B/L; no W/R/R  Gastrointestinal: soft, NT/ND; +BSx4  Extremities: WWP; no edema, clubbing or cyanosis  Vascular: 2+ radial, DP/PT pulses B/L  Neurological: AAOx3; no focal deficits    MEDICATIONS:  MEDICATIONS  (STANDING):  albuterol/ipratropium for Nebulization 3 milliLiter(s) Nebulizer every 6 hours  ARIPiprazole 2 milliGRAM(s) Oral every 24 hours  chlorhexidine 0.12% Liquid 15 milliLiter(s) Oral Mucosa every 12 hours  chlorhexidine 2% Cloths 1 Application(s) Topical <User Schedule>  cloNIDine 0.1 milliGRAM(s) Oral three times a day  dextrose 40% Gel 15 Gram(s) Oral once  dextrose 5%. 1000 milliLiter(s) (50 mL/Hr) IV Continuous <Continuous>  dextrose 5%. 1000 milliLiter(s) (100 mL/Hr) IV Continuous <Continuous>  dextrose 50% Injectable 25 Gram(s) IV Push once  dextrose 50% Injectable 12.5 Gram(s) IV Push once  dextrose 50% Injectable 25 Gram(s) IV Push once  enoxaparin Injectable 50 milliGRAM(s) SubCutaneous every 12 hours  glucagon  Injectable 1 milliGRAM(s) IntraMuscular once  insulin regular  human corrective regimen sliding scale   SubCutaneous every 6 hours  pantoprazole   Suspension 40 milliGRAM(s) Oral before breakfast  sodium chloride 3%  Inhalation 4 milliLiter(s) Inhalation every 6 hours  trimethoprim  40 mG/sulfamethoxazole 200 mG Suspension 300 milliGRAM(s) Enteral Tube every 8 hours    MEDICATIONS  (PRN):  acetaminophen    Suspension .. 650 milliGRAM(s) Enteral Tube every 6 hours PRN Temp greater or equal to 38C (100.4F), Mild Pain (1 - 3)      ALLERGIES:  Allergies    ace inhibitors (Anaphylaxis)  caffeine (Rash)  cefepime (Rash)  chocolate (Rash)  high acid (Rash)  Tomatoes (Hives)    Intolerances        LABS:                        10.5   11.23 )-----------( 372      ( 29 Mar 2022 06:11 )             33.7     03-28    138  |  102  |  12  ----------------------------<  146<H>  4.8   |  25  |  0.52    Ca    9.2      28 Mar 2022 06:55  Phos  2.6     03-28  Mg     1.8     03-28    TPro  6.5  /  Alb  3.0<L>  /  TBili  0.2  /  DBili  x   /  AST  17  /  ALT  14  /  AlkPhos  74  03-28        CAPILLARY BLOOD GLUCOSE      POCT Blood Glucose.: 98 mg/dL (29 Mar 2022 05:59)      RADIOLOGY & ADDITIONAL TESTS: Reviewed. **incomplete**    OVERNIGHT EVENTS: NAEO    SUBJECTIVE / INTERVAL HPI: Patient seen and examined at bedside. Anxious, restless and tachypnea this morning, improved with alprazolam in afternoon.    ROS: negative unless otherwise stated above.    VITAL SIGNS:  Vital Signs Last 24 Hrs  T(C): 37.2 (29 Mar 2022 01:01), Max: 37.5 (28 Mar 2022 08:53)  T(F): 99 (29 Mar 2022 01:01), Max: 99.5 (28 Mar 2022 08:53)  HR: 118 (29 Mar 2022 05:00) (75 - 135)  BP: 125/72 (29 Mar 2022 05:00) (105/62 - 161/72)  BP(mean): 94 (29 Mar 2022 05:00) (78 - 107)  RR: 36 (29 Mar 2022 04:35) (29 - 38)  SpO2: 93% (29 Mar 2022 05:00) (85% - 96%)    PHYSICAL EXAM:    General: In no apparent distress  HEENT: NC/AT; PERRL, anicteric sclera; MMM  Neck: supple  Cardiovascular: +S1/S2; RRR  Respiratory: CTA B/L; no W/R/R  Gastrointestinal: soft, NT/ND; +BSx4  Extremities: WWP; no edema, clubbing or cyanosis  Vascular: 2+ radial, DP/PT pulses B/L  Neurological: AAOx3; no focal deficits    MEDICATIONS:  MEDICATIONS  (STANDING):  albuterol/ipratropium for Nebulization 3 milliLiter(s) Nebulizer every 6 hours  ARIPiprazole 2 milliGRAM(s) Oral every 24 hours  chlorhexidine 0.12% Liquid 15 milliLiter(s) Oral Mucosa every 12 hours  chlorhexidine 2% Cloths 1 Application(s) Topical <User Schedule>  cloNIDine 0.1 milliGRAM(s) Oral three times a day  dextrose 40% Gel 15 Gram(s) Oral once  dextrose 5%. 1000 milliLiter(s) (50 mL/Hr) IV Continuous <Continuous>  dextrose 5%. 1000 milliLiter(s) (100 mL/Hr) IV Continuous <Continuous>  dextrose 50% Injectable 25 Gram(s) IV Push once  dextrose 50% Injectable 12.5 Gram(s) IV Push once  dextrose 50% Injectable 25 Gram(s) IV Push once  enoxaparin Injectable 50 milliGRAM(s) SubCutaneous every 12 hours  glucagon  Injectable 1 milliGRAM(s) IntraMuscular once  insulin regular  human corrective regimen sliding scale   SubCutaneous every 6 hours  pantoprazole   Suspension 40 milliGRAM(s) Oral before breakfast  sodium chloride 3%  Inhalation 4 milliLiter(s) Inhalation every 6 hours  trimethoprim  40 mG/sulfamethoxazole 200 mG Suspension 300 milliGRAM(s) Enteral Tube every 8 hours    MEDICATIONS  (PRN):  acetaminophen    Suspension .. 650 milliGRAM(s) Enteral Tube every 6 hours PRN Temp greater or equal to 38C (100.4F), Mild Pain (1 - 3)      ALLERGIES:  Allergies    ace inhibitors (Anaphylaxis)  caffeine (Rash)  cefepime (Rash)  chocolate (Rash)  high acid (Rash)  Tomatoes (Hives)    Intolerances        LABS:                        10.5   11.23 )-----------( 372      ( 29 Mar 2022 06:11 )             33.7     03-28    138  |  102  |  12  ----------------------------<  146<H>  4.8   |  25  |  0.52    Ca    9.2      28 Mar 2022 06:55  Phos  2.6     03-28  Mg     1.8     03-28    TPro  6.5  /  Alb  3.0<L>  /  TBili  0.2  /  DBili  x   /  AST  17  /  ALT  14  /  AlkPhos  74  03-28        CAPILLARY BLOOD GLUCOSE      POCT Blood Glucose.: 98 mg/dL (29 Mar 2022 05:59)      RADIOLOGY & ADDITIONAL TESTS: Reviewed. OVERNIGHT EVENTS: NAEO    SUBJECTIVE / INTERVAL HPI: Patient seen and examined at bedside. Anxious, restless and tachypnea this morning, improved with alprazolam in afternoon.    ROS: negative unless otherwise stated above.    VITAL SIGNS:  Vital Signs Last 24 Hrs  T(C): 37.2 (29 Mar 2022 01:01), Max: 37.5 (28 Mar 2022 08:53)  T(F): 99 (29 Mar 2022 01:01), Max: 99.5 (28 Mar 2022 08:53)  HR: 118 (29 Mar 2022 05:00) (75 - 135)  BP: 125/72 (29 Mar 2022 05:00) (105/62 - 161/72)  BP(mean): 94 (29 Mar 2022 05:00) (78 - 107)  RR: 36 (29 Mar 2022 04:35) (29 - 38)  SpO2: 93% (29 Mar 2022 05:00) (85% - 96%)    PHYSICAL EXAM:  General: Restless  HEENT: NC/AT; PERRL, anicteric sclera; MMM  Neck: supple  Cardiovascular: +S1/S2; RRR  Respiratory: CTA B/L; no W/R/R; trach to vent  Gastrointestinal: soft, NT/ND; +BSx4  Extremities: WWP; +1 edema in LEs, clubbing or cyanosis. +chronic deformity in b/l feet.  Vascular: 2+ radial, DP/PT pulses B/L  Neurological: AAOx3, but with cerebral palsy and expressive aphasia, difficult with communication and making needs known.    MEDICATIONS:  MEDICATIONS  (STANDING):  albuterol/ipratropium for Nebulization 3 milliLiter(s) Nebulizer every 6 hours  ARIPiprazole 2 milliGRAM(s) Oral every 24 hours  chlorhexidine 0.12% Liquid 15 milliLiter(s) Oral Mucosa every 12 hours  chlorhexidine 2% Cloths 1 Application(s) Topical <User Schedule>  cloNIDine 0.1 milliGRAM(s) Oral three times a day  dextrose 40% Gel 15 Gram(s) Oral once  dextrose 5%. 1000 milliLiter(s) (50 mL/Hr) IV Continuous <Continuous>  dextrose 5%. 1000 milliLiter(s) (100 mL/Hr) IV Continuous <Continuous>  dextrose 50% Injectable 25 Gram(s) IV Push once  dextrose 50% Injectable 12.5 Gram(s) IV Push once  dextrose 50% Injectable 25 Gram(s) IV Push once  enoxaparin Injectable 50 milliGRAM(s) SubCutaneous every 12 hours  glucagon  Injectable 1 milliGRAM(s) IntraMuscular once  insulin regular  human corrective regimen sliding scale   SubCutaneous every 6 hours  pantoprazole   Suspension 40 milliGRAM(s) Oral before breakfast  sodium chloride 3%  Inhalation 4 milliLiter(s) Inhalation every 6 hours  trimethoprim  40 mG/sulfamethoxazole 200 mG Suspension 300 milliGRAM(s) Enteral Tube every 8 hours    MEDICATIONS  (PRN):  acetaminophen    Suspension .. 650 milliGRAM(s) Enteral Tube every 6 hours PRN Temp greater or equal to 38C (100.4F), Mild Pain (1 - 3)      ALLERGIES:  Allergies    ace inhibitors (Anaphylaxis)  caffeine (Rash)  cefepime (Rash)  chocolate (Rash)  high acid (Rash)  Tomatoes (Hives)    Intolerances        LABS:                        10.5   11.23 )-----------( 372      ( 29 Mar 2022 06:11 )             33.7     03-28    138  |  102  |  12  ----------------------------<  146<H>  4.8   |  25  |  0.52    Ca    9.2      28 Mar 2022 06:55  Phos  2.6     03-28  Mg     1.8     03-28    TPro  6.5  /  Alb  3.0<L>  /  TBili  0.2  /  DBili  x   /  AST  17  /  ALT  14  /  AlkPhos  74  03-28        CAPILLARY BLOOD GLUCOSE      POCT Blood Glucose.: 98 mg/dL (29 Mar 2022 05:59)      RADIOLOGY & ADDITIONAL TESTS: Reviewed. R shoulder pain

## 2022-03-30 LAB
ALBUMIN SERPL ELPH-MCNC: 3.7 G/DL — SIGNIFICANT CHANGE UP (ref 3.3–5)
ALP SERPL-CCNC: 76 U/L — SIGNIFICANT CHANGE UP (ref 40–120)
ALT FLD-CCNC: 28 U/L — SIGNIFICANT CHANGE UP (ref 10–45)
ANION GAP SERPL CALC-SCNC: 10 MMOL/L — SIGNIFICANT CHANGE UP (ref 5–17)
AST SERPL-CCNC: 38 U/L — SIGNIFICANT CHANGE UP (ref 10–40)
BASOPHILS # BLD AUTO: 0.09 K/UL — SIGNIFICANT CHANGE UP (ref 0–0.2)
BASOPHILS NFR BLD AUTO: 1.1 % — SIGNIFICANT CHANGE UP (ref 0–2)
BILIRUB SERPL-MCNC: 0.2 MG/DL — SIGNIFICANT CHANGE UP (ref 0.2–1.2)
BLD GP AB SCN SERPL QL: NEGATIVE — SIGNIFICANT CHANGE UP
BUN SERPL-MCNC: 26 MG/DL — HIGH (ref 7–23)
CALCIUM SERPL-MCNC: 9.9 MG/DL — SIGNIFICANT CHANGE UP (ref 8.4–10.5)
CHLORIDE SERPL-SCNC: 100 MMOL/L — SIGNIFICANT CHANGE UP (ref 96–108)
CO2 SERPL-SCNC: 27 MMOL/L — SIGNIFICANT CHANGE UP (ref 22–31)
CREAT SERPL-MCNC: 0.64 MG/DL — SIGNIFICANT CHANGE UP (ref 0.5–1.3)
CULTURE RESULTS: SIGNIFICANT CHANGE UP
EGFR: 93 ML/MIN/1.73M2 — SIGNIFICANT CHANGE UP
EOSINOPHIL # BLD AUTO: 0.19 K/UL — SIGNIFICANT CHANGE UP (ref 0–0.5)
EOSINOPHIL NFR BLD AUTO: 2.3 % — SIGNIFICANT CHANGE UP (ref 0–6)
GLUCOSE BLDC GLUCOMTR-MCNC: 106 MG/DL — HIGH (ref 70–99)
GLUCOSE BLDC GLUCOMTR-MCNC: 117 MG/DL — HIGH (ref 70–99)
GLUCOSE BLDC GLUCOMTR-MCNC: 93 MG/DL — SIGNIFICANT CHANGE UP (ref 70–99)
GLUCOSE BLDC GLUCOMTR-MCNC: 98 MG/DL — SIGNIFICANT CHANGE UP (ref 70–99)
GLUCOSE SERPL-MCNC: 116 MG/DL — HIGH (ref 70–99)
HCT VFR BLD CALC: 33.2 % — LOW (ref 34.5–45)
HGB BLD-MCNC: 10.1 G/DL — LOW (ref 11.5–15.5)
IMM GRANULOCYTES NFR BLD AUTO: 1 % — SIGNIFICANT CHANGE UP (ref 0–1.5)
LYMPHOCYTES # BLD AUTO: 1.2 K/UL — SIGNIFICANT CHANGE UP (ref 1–3.3)
LYMPHOCYTES # BLD AUTO: 14.5 % — SIGNIFICANT CHANGE UP (ref 13–44)
MAGNESIUM SERPL-MCNC: 2 MG/DL — SIGNIFICANT CHANGE UP (ref 1.6–2.6)
MCHC RBC-ENTMCNC: 30.4 GM/DL — LOW (ref 32–36)
MCHC RBC-ENTMCNC: 30.4 PG — SIGNIFICANT CHANGE UP (ref 27–34)
MCV RBC AUTO: 100 FL — SIGNIFICANT CHANGE UP (ref 80–100)
MONOCYTES # BLD AUTO: 0.83 K/UL — SIGNIFICANT CHANGE UP (ref 0–0.9)
MONOCYTES NFR BLD AUTO: 10 % — SIGNIFICANT CHANGE UP (ref 2–14)
NEUTROPHILS # BLD AUTO: 5.88 K/UL — SIGNIFICANT CHANGE UP (ref 1.8–7.4)
NEUTROPHILS NFR BLD AUTO: 71.1 % — SIGNIFICANT CHANGE UP (ref 43–77)
NRBC # BLD: 0 /100 WBCS — SIGNIFICANT CHANGE UP (ref 0–0)
PHOSPHATE SERPL-MCNC: 4.2 MG/DL — SIGNIFICANT CHANGE UP (ref 2.5–4.5)
PLATELET # BLD AUTO: 353 K/UL — SIGNIFICANT CHANGE UP (ref 150–400)
POTASSIUM SERPL-MCNC: 5.5 MMOL/L — HIGH (ref 3.5–5.3)
POTASSIUM SERPL-SCNC: 5.5 MMOL/L — HIGH (ref 3.5–5.3)
PROT SERPL-MCNC: 7.4 G/DL — SIGNIFICANT CHANGE UP (ref 6–8.3)
RBC # BLD: 3.32 M/UL — LOW (ref 3.8–5.2)
RBC # FLD: 13.6 % — SIGNIFICANT CHANGE UP (ref 10.3–14.5)
RH IG SCN BLD-IMP: POSITIVE — SIGNIFICANT CHANGE UP
SODIUM SERPL-SCNC: 137 MMOL/L — SIGNIFICANT CHANGE UP (ref 135–145)
SPECIMEN SOURCE: SIGNIFICANT CHANGE UP
WBC # BLD: 8.27 K/UL — SIGNIFICANT CHANGE UP (ref 3.8–10.5)
WBC # FLD AUTO: 8.27 K/UL — SIGNIFICANT CHANGE UP (ref 3.8–10.5)

## 2022-03-30 PROCEDURE — 99233 SBSQ HOSP IP/OBS HIGH 50: CPT | Mod: GC

## 2022-03-30 RX ORDER — SODIUM CHLORIDE 9 MG/ML
4 INJECTION INTRAMUSCULAR; INTRAVENOUS; SUBCUTANEOUS EVERY 12 HOURS
Refills: 0 | Status: DISCONTINUED | OUTPATIENT
Start: 2022-03-30 | End: 2022-04-06

## 2022-03-30 RX ORDER — SODIUM ZIRCONIUM CYCLOSILICATE 10 G/10G
5 POWDER, FOR SUSPENSION ORAL ONCE
Refills: 0 | Status: COMPLETED | OUTPATIENT
Start: 2022-03-30 | End: 2022-03-30

## 2022-03-30 RX ORDER — CEFTAZIDIME 6 G/30ML
1 INJECTION, POWDER, FOR SOLUTION INTRAVENOUS ONCE
Refills: 0 | Status: COMPLETED | OUTPATIENT
Start: 2022-03-30 | End: 2022-03-30

## 2022-03-30 RX ORDER — ALPRAZOLAM 0.25 MG
0.25 TABLET ORAL ONCE
Refills: 0 | Status: DISCONTINUED | OUTPATIENT
Start: 2022-03-30 | End: 2022-03-30

## 2022-03-30 RX ORDER — CEFTAZIDIME 6 G/30ML
2 INJECTION, POWDER, FOR SOLUTION INTRAVENOUS EVERY 12 HOURS
Refills: 0 | Status: COMPLETED | OUTPATIENT
Start: 2022-03-31 | End: 2022-04-02

## 2022-03-30 RX ORDER — CLONAZEPAM 1 MG
0.25 TABLET ORAL EVERY 12 HOURS
Refills: 0 | Status: DISCONTINUED | OUTPATIENT
Start: 2022-03-30 | End: 2022-04-06

## 2022-03-30 RX ADMIN — CEFTAZIDIME 100 GRAM(S): 6 INJECTION, POWDER, FOR SOLUTION INTRAVENOUS at 13:00

## 2022-03-30 RX ADMIN — Medication 0.25 MILLIGRAM(S): at 10:40

## 2022-03-30 RX ADMIN — SODIUM CHLORIDE 4 MILLILITER(S): 9 INJECTION INTRAMUSCULAR; INTRAVENOUS; SUBCUTANEOUS at 18:12

## 2022-03-30 RX ADMIN — Medication 300 MILLIGRAM(S): at 06:52

## 2022-03-30 RX ADMIN — Medication 3 MILLILITER(S): at 09:46

## 2022-03-30 RX ADMIN — PANTOPRAZOLE SODIUM 40 MILLIGRAM(S): 20 TABLET, DELAYED RELEASE ORAL at 07:06

## 2022-03-30 RX ADMIN — LIDOCAINE 1 APPLICATION(S): 4 CREAM TOPICAL at 07:05

## 2022-03-30 RX ADMIN — ENOXAPARIN SODIUM 50 MILLIGRAM(S): 100 INJECTION SUBCUTANEOUS at 18:10

## 2022-03-30 RX ADMIN — Medication 3 MILLILITER(S): at 16:23

## 2022-03-30 RX ADMIN — SODIUM ZIRCONIUM CYCLOSILICATE 5 GRAM(S): 10 POWDER, FOR SUSPENSION ORAL at 11:33

## 2022-03-30 RX ADMIN — Medication 0.25 MILLIGRAM(S): at 01:10

## 2022-03-30 RX ADMIN — Medication 3 MILLILITER(S): at 22:46

## 2022-03-30 RX ADMIN — CHLORHEXIDINE GLUCONATE 1 APPLICATION(S): 213 SOLUTION TOPICAL at 06:53

## 2022-03-30 RX ADMIN — Medication 3 MILLILITER(S): at 05:28

## 2022-03-30 RX ADMIN — ENOXAPARIN SODIUM 50 MILLIGRAM(S): 100 INJECTION SUBCUTANEOUS at 07:04

## 2022-03-30 RX ADMIN — CEFTAZIDIME 100 GRAM(S): 6 INJECTION, POWDER, FOR SOLUTION INTRAVENOUS at 11:44

## 2022-03-30 RX ADMIN — CHLORHEXIDINE GLUCONATE 15 MILLILITER(S): 213 SOLUTION TOPICAL at 18:10

## 2022-03-30 RX ADMIN — Medication 0.25 MILLIGRAM(S): at 18:33

## 2022-03-30 RX ADMIN — CHLORHEXIDINE GLUCONATE 15 MILLILITER(S): 213 SOLUTION TOPICAL at 06:51

## 2022-03-30 RX ADMIN — Medication 0.25 MILLIGRAM(S): at 06:51

## 2022-03-30 NOTE — PROGRESS NOTE ADULT - ATTENDING COMMENTS
History of cerebral palsy with functional quadriplegia with chronic respiratory failure with admission for healthcare associated pneumonia (on PO bactrim) with hyperkalemia.   Plan:  - Anxiety/ agitation: Start clonazepam .25 mg bid.  - Pneumonia: D/c PO bactrim due to hyperkalemia. Start Ceftazidime (covers both proteus and Stenotrophomonas)  - Chronic resp failure: Restart hypertonic saline. Trach collar trial today.   - Rest as above

## 2022-03-30 NOTE — CHART NOTE - NSCHARTNOTEFT_GEN_A_CORE
Admitting Diagnosis:   Patient is a 76y old  Female who presents with a chief complaint of respiratory failure (16 Mar 2022 13:37)      PAST MEDICAL & SURGICAL HISTORY:  CP (cerebral palsy)    Mental retardation    Dysphagia    GERD (gastroesophageal reflux disease)    PE (pulmonary thromboembolism)    DVT (deep venous thrombosis)    Spastic quadriplegia    HTN (hypertension)    PEG tube malfunction    Current Nutrition Order: NPO w/ EN via PEG: Jevity 1.2 @ 42mL/hr x 24 hours + LPS 1x/day (100 kcal, 15 g protein per serving). This provides: 1008 mL TV, 1310 kcal, 70.9 g pro, 813 mL free water. Meetin kcal/kg, 1.6 g/kg based on IBW 45.5 kg.     PO Intake: Good (%) [   ]  Fair (50-75%) [   ] Poor (<25%) [   ] [x]  NPO w/ EN    GI Issues: No nausea/vomiting documented at this time. Noted w/ fecal incontinence; last documented bowel movement 3/29. No gastric residuals noted.    Pain: Unable to assess at this time.    Skin Integrity: B/L arm edema 1+. No pressure injuries documented at this time. Roberto Carlos score: 11.    Labs:       137  |  100  |  26<H>  ----------------------------<  116<H>  5.5<H>   |  27  |  0.64    Ca    9.9      30 Mar 2022 05:37  Phos  4.2       Mg     2.0         TPro  7.4  /  Alb  3.7  /  TBili  0.2  /  DBili  x   /  AST  38  /  ALT  28  /  AlkPhos  76  -30    CAPILLARY BLOOD GLUCOSE      POCT Blood Glucose.: 106 mg/dL (30 Mar 2022 05:30)  POCT Blood Glucose.: 106 mg/dL (29 Mar 2022 23:25)  POCT Blood Glucose.: 97 mg/dL (29 Mar 2022 16:46)  POCT Blood Glucose.: 109 mg/dL (29 Mar 2022 11:10)      Medications:  MEDICATIONS  (STANDING):  albuterol/ipratropium for Nebulization 3 milliLiter(s) Nebulizer every 6 hours  ARIPiprazole 2 milliGRAM(s) Oral every 24 hours  chlorhexidine 0.12% Liquid 15 milliLiter(s) Oral Mucosa every 12 hours  chlorhexidine 2% Cloths 1 Application(s) Topical <User Schedule>  clonazePAM  Tablet 0.25 milliGRAM(s) Oral every 12 hours  dextrose 5%. 1000 milliLiter(s) (50 mL/Hr) IV Continuous <Continuous>  dextrose 5%. 1000 milliLiter(s) (100 mL/Hr) IV Continuous <Continuous>  enoxaparin Injectable 50 milliGRAM(s) SubCutaneous every 12 hours  glucagon  Injectable 1 milliGRAM(s) IntraMuscular once  insulin regular  human corrective regimen sliding scale   SubCutaneous every 6 hours  lidocaine 2% Gel 1 Application(s) Topical two times a day  pantoprazole   Suspension 40 milliGRAM(s) Oral before breakfast  trimethoprim  40 mG/sulfamethoxazole 200 mG Suspension 300 milliGRAM(s) Enteral Tube every 8 hours    MEDICATIONS  (PRN):  acetaminophen    Suspension .. 650 milliGRAM(s) Enteral Tube every 6 hours PRN Temp greater or equal to 38C (100.4F), Mild Pain (1 - 3)    Anthropometrics:  Ht: 152.4cm (60")  Wt: 56.8kg (303)-Admit     IBW: 100# (45.5kg)    % IBW: 125%    Weight Change: No new weight since admission. Obtain minimum biweekly weights to monitor trends especially being on TFs    Estimated energy needs: 45.3kg  Fluids per MD. IBW used to estimate needs as pt weighs >100% of IBW and per critical care nutrition guidelines. Needs estimated for age and adjusted for vent  Calories: 1150-1359kcals (25-30kcal/kg)  Protein: 65-75g (1.4-1.6g/kg)  Fluid: Per MD    Subjective: 75 yo F with a PMHx of cerebral palsy with functional quadriplegia, DVT/PE on Eliquis, GERD, chronic dysphagia with PEG, thoracic spine fusion with a prior admission in  for aspiration PNA requiring intubation. Patient presented from her assisted living facility in [facility] likely 2/2 aspiration pneumonia which has grown pseudomonas sensitive to ciprofloxacin. Patient was intubated in the field prior to arrival for airway protection. While in the MICU, one unsuccessful attempt was made at extubation requiring reintubation attributed to pt's anxiety. Subsequently, pt was successfully extubated in MICU with a seroquel regimen. Pt was weaned to HFNC then 4L NC satting 94-95%. On 3/10, pt was stepped down to the floors with increasing frequency of suctioning. On 3/13, pt's oxygen requirement increased and 2L NC titrated up to 6L NC, however pt was found to be hypoxic to 70s. Pt was suctioned and removed significant amount of PEG tube feed products and mucus. However, no change in pulse ox was noted and rapid response was called. Pt was intubated, sedated on propofol and started on levophed for hypotension 70s/40s. En route, patient had episode of emesis with continued copious secretions, requiring frequent suctioning. In ICU, pt underwent bronchoscopy on 3/14 which showed no signs of purulent secretions or endobronchial lesions. 3/16 CXR with improved B/L infiltrates. Now s/p bronch w/ trach 3/24. S/p bronch 3/25.   Pt febrile 3/25; started on zosyn. 3/26 precedex started and feeds decreased jevity 1.2 to 42cc/hr. 3/27; trial of trach collar, pt failed, placed on trach to CPAP 3/29: Weaned off precedex.     Pt seen at bedside for follow up assessment-trach to vent on VC/AC mode, off all sedation and pressor support. TMAX: 99. Labs reviewed 3/30; pt hyperkalemic. Fingersticks 3/29-3/30:  mg/dL; ISS ordered. Per RN at bedside, pt tolerating feeds well, no s/s of intolerance/gastric residuals. RD to follow up per protocol. See nutrition recommendations below.    Previous Nutrition Diagnosis: Increased nutrient needs R/T acute illness, hypermetabolic AEB 25-32kcals/kg energy and 1.4-1.6g/kg protein needs of ABW    Active [ x ]  Resolved [   ]    If resolved, new PES:     Goal: Pt to meet >75% of EER via G-tube     Recommendations:  1. Continue current EN regimen: Jevity 1.2 @ 42mL/hr x 24 hours + LPS 1x/day (100 kcal, 15 g protein per serving). This provides: 1008 mL TV, 1310 kcal, 70.9 g pro, 813 mL free water. Meetin kcal/kg, 1.6 g/kg based on IBW 45.5 kg.   >>monitor s/s of intolerance; adjust as needed  >>maintain aspiration precautions at all times  2. Water flush per MD, minimum 30-60ml q4hrs for tube patency   3. Pain and bowel regimens per MD discretion  4. Monitor lytes and replete prn. POC BG q6hrs   5. Obtain minimum biweekly weights     Education: N/A     Risk Level: High [ x ] Moderate [   ] Low [   ].

## 2022-03-30 NOTE — PROGRESS NOTE ADULT - ASSESSMENT
75 yo F with PMHx of cerebral palsy with functional quadriplegia, Hx of PE/DVTs, GERD, chronic dysphagia with PEG, thoracic spine fusion initially admitted for acute hypoxic respiratory failure found to have pseudomonal PNA, intubated for third time i/s/o aspiration PNA of PEG feeds and copious secretions, trach placed 3/24, s/p bronchoscopy on 3/25.    NEURO  #Cerebral palsy  Patient with cerebral palsy. Has functional quadriplegia, health aide at assisted living facility reports patient is currently at baseline.  - FOR CONSENT, 2-physician consent is required, although patient is AOx3.     #Goals of care, counseling/discussion.   Patient with cerebral palsy. Previously a [place] resident, now resides in group home, with [place] [facility] per palliative care. Patient with repeat admission for aspiration pneumonia with history of dysphagia and copious secretions from oropharynx on exam.  - palliative care and ethics consulted - Advisory board must make decisions for patient.    #Anxiety  Pt with ongoing restlessness, anxiety, agitation  - c/w home med Abilify 2mg qd  - started alprazolam 0.25mg q12 PRN 3/29    CV  No active issues    PULM  #Acute respiratory failure with hypoxia i/s/o of aspiration pneumonia of PEG feeds and copious oral secretions  Extubated and reintubated multiple times, trached on 3/24  - currently on VC/AC 40/250/0.8/15/8 and tachypneic - will continue to try daily trach collar trials.  - sputum cultures 3/25 show numerous proteus mirabilis, moderate Stenotrophomonas maltophilia  - c/w Bactrim 300mg IV q8 (3/26 -3/28) switched to PO bactrim on 3/28 300mg q8 until 4/2   - c/w Frequent suctioning - oral and tracheal, hypertonic saline followed by duonebs - standing, Chest PT q6h, aspiration precautions    #PE  History of PE 5 years ago. On Eliquis at home 2.5 mg BID at home. Bedside ultrasound performed in MICU with no evidence of clot.    - c/w lovenox 50 mg BID    RENAL  No active issues    GI  #Dysphagia  - PEG tube in place and functional, c/w tube feeds    #GERD   On famotidine 20 mg qHS at home  - c/w pantoprazole 40 mg QD while here       Quintero removed 3/27  UA positive - no culture sent and no symptoms per pt, but is on bactrim course for pulm regardless     ENDO  On regular ISS for TF    ID  #Fever   -Last 3/26 at 12pm   Possibly 2/2 to aspiration pna - suspected aspiration event 3/25 during/after trach placement  -UA positive on 3/26   -c/w bactrim 300mg q8 (3/26 - )     HEME  #Anemia, macrocytic  Hb stable 9 to 11  MCV   B12/folate WNL  - Maintain active T&S (Last 3/30, next 4/2)  - Transfuse if Hgb<7 (would require 2-physician consent)    PROPHYLAXIS  F: None  E: Replete PRN  N: TF Jevity 1.2 at 42cc/h + 1 LPS  DVT: Lovenox 50 mg BID   GI: protonix 40 mg daily via PEG  C: Full Code, pending discussion with pt's CAB state advocate for GOC and medical decisions  FOR EMERGENT CONSENT 2-physician consent is required  D: With trach, patient cannot return back to group home and will be discharged to nursing home. 73 yo F with PMHx of cerebral palsy with functional quadriplegia, Hx of PE/DVTs, GERD, chronic dysphagia with PEG, thoracic spine fusion initially admitted for acute hypoxic respiratory failure found to have pseudomonal PNA, intubated for third time i/s/o aspiration PNA of PEG feeds and copious secretions, trach placed 3/24, s/p bronchoscopy on 3/25.    NEURO  #Cerebral palsy  Patient with cerebral palsy. Has functional quadriplegia, health aide at assisted living facility reports patient is currently at baseline.  - FOR CONSENT, 2-physician consent is required, although patient is AOx3.     #Goals of care, counseling/discussion.   Patient with cerebral palsy. Previously a [place] resident, now resides in group home, with [place] [facility] per palliative care. Patient with repeat admission for aspiration pneumonia with history of dysphagia and copious secretions from oropharynx on exam.  - palliative care and ethics consulted - Advisory board must make decisions for patient.    #Anxiety  Pt with ongoing restlessness, anxiety, agitation  - c/w home med Abilify 2mg qd  - c/w clonazepam 0.25 q12 (started 3/30 after good response to alprazolam on 3/29)    CV  No active issues    PULM  #Acute respiratory failure with hypoxia i/s/o of aspiration pneumonia of PEG feeds and copious oral secretions  Extubated and reintubated multiple times, trached on 3/24  - currently on VC/AC 40/250/0.8/15/8 and tachypneic - will continue to try daily trach collar trials.  - sputum cultures 3/25 show numerous proteus mirabilis, moderate Stenotrophomonas maltophilia  - c/w Bactrim 300mg IV q8 (3/26 -3/28) switched to PO bactrim on 3/28 300mg q8 until 4/2   - c/w Frequent suctioning - oral and tracheal, hypertonic saline followed by duonebs - standing, Chest PT q6h, aspiration precautions    #PE  History of PE 5 years ago. On Eliquis at home 2.5 mg BID at home. Bedside ultrasound performed in MICU with no evidence of clot.    - c/w lovenox 50 mg BID    RENAL  No active issues    GI  #Dysphagia  - PEG tube in place and functional, c/w tube feeds    #GERD   On famotidine 20 mg qHS at home  - c/w pantoprazole 40 mg QD while here       Quintero removed 3/27  UA positive - no culture sent and no symptoms per pt, but is on bactrim course for pulm regardless     ENDO  On regular ISS for TF    ID  #Fever   -Last 3/26 at 12pm   Possibly 2/2 to aspiration pna - suspected aspiration event 3/25 during/after trach placement  -UA positive on 3/26   -c/w bactrim 300mg q8 (3/26 - )     HEME  #Anemia, macrocytic  Hb stable 9 to 11  MCV   B12/folate WNL  - Maintain active T&S (Last 3/30, next 4/2)  - Transfuse if Hgb<7 (would require 2-physician consent)    PROPHYLAXIS  F: None  E: Replete PRN  N: TF Jevity 1.2 at 42cc/h + 1 LPS  DVT: Lovenox 50 mg BID   GI: protonix 40 mg daily via PEG  C: Full Code, pending discussion with pt's CAB state advocate for GOC and medical decisions  FOR EMERGENT CONSENT 2-physician consent is required  D: With trach, patient cannot return back to group home and will be discharged to nursing home. 73 yo F with PMHx of cerebral palsy with functional quadriplegia, Hx of PE/DVTs, GERD, chronic dysphagia with PEG, thoracic spine fusion initially admitted for acute hypoxic respiratory failure found to have pseudomonal PNA, intubated for third time i/s/o aspiration PNA of PEG feeds and copious secretions, trach placed 3/24, s/p bronchoscopy on 3/25.    NEURO  #Cerebral palsy  Patient with cerebral palsy. Has functional quadriplegia, health aide at assisted living facility reports patient is currently at baseline.  - FOR CONSENT, 2-physician consent is required, although patient is AOx3.     #Goals of care, counseling/discussion.   Patient with cerebral palsy. Previously a [place] resident, now resides in group home, with [place] [facility] per palliative care. Patient with repeat admission for aspiration pneumonia with history of dysphagia and copious secretions from oropharynx on exam.  - palliative care and ethics consulted - Advisory board must make decisions for patient.    #Anxiety  Pt with ongoing restlessness, anxiety, agitation  - c/w home med Abilify 2mg qd  - c/w clonazepam 0.25 q12 (started 3/30 after good response to alprazolam on 3/29)    CV  No active issues    PULM  #Acute respiratory failure with hypoxia i/s/o of aspiration pneumonia of PEG feeds and copious oral secretions  Extubated and reintubated multiple times, trached on 3/24  - currently on VC/AC 40/250/0.8/15/8 and tachypneic - will continue to try daily trach collar trials.  - sputum cultures 3/25 show numerous proteus mirabilis, moderate Stenotrophomonas maltophilia  - s/p bactrim 300mg q8 (3/26 - 3/30), switched to ceftazidime 2g q12 until 4/2  - c/w Frequent suctioning - oral and tracheal, hypertonic saline followed by duonebs - standing, Chest PT q6h, aspiration precautions    #PE  History of PE 5 years ago. On Eliquis at home 2.5 mg BID at home. Bedside ultrasound performed in MICU with no evidence of clot.    - c/w lovenox 50 mg BID    RENAL  #HyperKalemia  -likely 2/2 bactrim, given lokelma x1 on 3/30 and switched to ceftazidime    GI  #Dysphagia  - PEG tube in place and functional, c/w tube feeds    #GERD   On famotidine 20 mg qHS at home  - c/w pantoprazole 40 mg QD while here       Quintero removed 3/27  UA positive - no culture sent and no symptoms per pt    ENDO  On regular ISS for TF    ID  #Fever   -Last 3/26 at 12pm   Possibly 2/2 to aspiration pna - suspected aspiration event 3/25 during/after trach placement  -UA positive on 3/26   -s/p bactrim 300mg q8 (3/26 - 3/30), switched to ceftazidime 2g q12 until 4/2     HEME  #Anemia, macrocytic  Hb stable 9 to 11  MCV   B12/folate WNL  - Maintain active T&S (Last 3/30, next 4/2)  - Transfuse if Hgb<7 (would require 2-physician consent)    PROPHYLAXIS  F: None  E: Replete PRN  N: TF Jevity 1.2 at 42cc/h + 1 LPS  DVT: Lovenox 50 mg BID   GI: protonix 40 mg daily via PEG  C: Full Code, pending discussion with pt's CAB state advocate for GOC and medical decisions  FOR EMERGENT CONSENT 2-physician consent is required  D: With trach, patient cannot return back to group home and will be discharged to nursing home.

## 2022-03-30 NOTE — CHART NOTE - NSCHARTNOTEFT_GEN_A_CORE
Anti-infective approval note    Medication: Ceftazidime  Route: IV  Dosage: 2g  Frequency: q12h  Duration: 4 days    *this is not a consult note

## 2022-03-30 NOTE — PROGRESS NOTE ADULT - SUBJECTIVE AND OBJECTIVE BOX
***INCOMPLETE****  OVERNIGHT EVENTS:    SUBJECTIVE / INTERVAL HPI: Patient seen and examined at bedside.     ROS: negative unless otherwise stated above.    VITAL SIGNS:  Vital Signs Last 24 Hrs  T(C): 37.2 (30 Mar 2022 05:11), Max: 37.2 (29 Mar 2022 07:06)  T(F): 98.9 (30 Mar 2022 05:11), Max: 98.9 (29 Mar 2022 07:06)  HR: 90 (30 Mar 2022 05:05) (84 - 125)  BP: 86/59 (30 Mar 2022 05:00) (86/59 - 161/73)  BP(mean): 69 (30 Mar 2022 05:00) (69 - 121)  RR: 29 (30 Mar 2022 05:05) (25 - 40)  SpO2: 95% (30 Mar 2022 05:05) (84% - 99%)    PHYSICAL EXAM:    General: In no apparent distress  HEENT: NC/AT; PERRL, anicteric sclera; MMM  Neck: supple  Cardiovascular: +S1/S2; RRR  Respiratory: CTA B/L; no W/R/R  Gastrointestinal: soft, NT/ND; +BSx4  Extremities: WWP; no edema, clubbing or cyanosis  Vascular: 2+ radial, DP/PT pulses B/L  Neurological: AAOx3; no focal deficits    MEDICATIONS:  MEDICATIONS  (STANDING):  albuterol/ipratropium for Nebulization 3 milliLiter(s) Nebulizer every 6 hours  ALPRAZolam 0.25 milliGRAM(s) Oral every 12 hours  ARIPiprazole 2 milliGRAM(s) Oral every 24 hours  chlorhexidine 0.12% Liquid 15 milliLiter(s) Oral Mucosa every 12 hours  chlorhexidine 2% Cloths 1 Application(s) Topical <User Schedule>  dextrose 5%. 1000 milliLiter(s) (50 mL/Hr) IV Continuous <Continuous>  dextrose 5%. 1000 milliLiter(s) (100 mL/Hr) IV Continuous <Continuous>  enoxaparin Injectable 50 milliGRAM(s) SubCutaneous every 12 hours  glucagon  Injectable 1 milliGRAM(s) IntraMuscular once  insulin regular  human corrective regimen sliding scale   SubCutaneous every 6 hours  lidocaine 2% Gel 1 Application(s) Topical two times a day  pantoprazole   Suspension 40 milliGRAM(s) Oral before breakfast  trimethoprim  40 mG/sulfamethoxazole 200 mG Suspension 300 milliGRAM(s) Enteral Tube every 8 hours    MEDICATIONS  (PRN):  acetaminophen    Suspension .. 650 milliGRAM(s) Enteral Tube every 6 hours PRN Temp greater or equal to 38C (100.4F), Mild Pain (1 - 3)      ALLERGIES:  Allergies    ace inhibitors (Anaphylaxis)  caffeine (Rash)  cefepime (Rash)  chocolate (Rash)  high acid (Rash)  Tomatoes (Hives)    Intolerances        LABS:                        10.1   8.27  )-----------( 353      ( 30 Mar 2022 05:37 )             33.2     03-30    137  |  100  |  26<H>  ----------------------------<  116<H>  5.5<H>   |  27  |  0.64    Ca    9.9      30 Mar 2022 05:37  Phos  4.2     03-30  Mg     2.0     03-30    TPro  7.4  /  Alb  3.7  /  TBili  0.2  /  DBili  x   /  AST  38  /  ALT  28  /  AlkPhos  76  03-30        CAPILLARY BLOOD GLUCOSE      POCT Blood Glucose.: 106 mg/dL (30 Mar 2022 05:30)      RADIOLOGY & ADDITIONAL TESTS: Reviewed. OVERNIGHT EVENTS: Overnight, pt was anxious and given 0.25mg alprazolam with good response.    SUBJECTIVE / INTERVAL HPI: Patient seen and examined at bedside. Less anxious this morning, doing well on pressure support.    ROS: negative unless otherwise stated above.    VITAL SIGNS:  Vital Signs Last 24 Hrs  T(C): 37.2 (30 Mar 2022 05:11), Max: 37.2 (29 Mar 2022 07:06)  T(F): 98.9 (30 Mar 2022 05:11), Max: 98.9 (29 Mar 2022 07:06)  HR: 90 (30 Mar 2022 05:05) (84 - 125)  BP: 86/59 (30 Mar 2022 05:00) (86/59 - 161/73)  BP(mean): 69 (30 Mar 2022 05:00) (69 - 121)  RR: 29 (30 Mar 2022 05:05) (25 - 40)  SpO2: 95% (30 Mar 2022 05:05) (84% - 99%)    PHYSICAL EXAM:  General: Restless  HEENT: NC/AT; PERRL, anicteric sclera; MMM  Neck: supple  Cardiovascular: +S1/S2; RRR  Respiratory: CTA B/L; no W/R/R; trach to vent  Gastrointestinal: soft, NT/ND; +BSx4  Extremities: WWP; +1 edema in LEs, clubbing or cyanosis. +chronic deformity in b/l feet.  Vascular: 2+ radial, DP/PT pulses B/L  Neurological: AAOx3, but with cerebral pals, difficulty with communication and making needs known.    MEDICATIONS:  MEDICATIONS  (STANDING):  albuterol/ipratropium for Nebulization 3 milliLiter(s) Nebulizer every 6 hours  ALPRAZolam 0.25 milliGRAM(s) Oral every 12 hours  ARIPiprazole 2 milliGRAM(s) Oral every 24 hours  chlorhexidine 0.12% Liquid 15 milliLiter(s) Oral Mucosa every 12 hours  chlorhexidine 2% Cloths 1 Application(s) Topical <User Schedule>  dextrose 5%. 1000 milliLiter(s) (50 mL/Hr) IV Continuous <Continuous>  dextrose 5%. 1000 milliLiter(s) (100 mL/Hr) IV Continuous <Continuous>  enoxaparin Injectable 50 milliGRAM(s) SubCutaneous every 12 hours  glucagon  Injectable 1 milliGRAM(s) IntraMuscular once  insulin regular  human corrective regimen sliding scale   SubCutaneous every 6 hours  lidocaine 2% Gel 1 Application(s) Topical two times a day  pantoprazole   Suspension 40 milliGRAM(s) Oral before breakfast  trimethoprim  40 mG/sulfamethoxazole 200 mG Suspension 300 milliGRAM(s) Enteral Tube every 8 hours    MEDICATIONS  (PRN):  acetaminophen    Suspension .. 650 milliGRAM(s) Enteral Tube every 6 hours PRN Temp greater or equal to 38C (100.4F), Mild Pain (1 - 3)      ALLERGIES:  Allergies    ace inhibitors (Anaphylaxis)  caffeine (Rash)  cefepime (Rash)  chocolate (Rash)  high acid (Rash)  Tomatoes (Hives)    Intolerances        LABS:                        10.1   8.27  )-----------( 353      ( 30 Mar 2022 05:37 )             33.2     03-30    137  |  100  |  26<H>  ----------------------------<  116<H>  5.5<H>   |  27  |  0.64    Ca    9.9      30 Mar 2022 05:37  Phos  4.2     03-30  Mg     2.0     03-30    TPro  7.4  /  Alb  3.7  /  TBili  0.2  /  DBili  x   /  AST  38  /  ALT  28  /  AlkPhos  76  03-30        CAPILLARY BLOOD GLUCOSE      POCT Blood Glucose.: 106 mg/dL (30 Mar 2022 05:30)      RADIOLOGY & ADDITIONAL TESTS: Reviewed.

## 2022-03-31 DIAGNOSIS — K21.9 GASTRO-ESOPHAGEAL REFLUX DISEASE WITHOUT ESOPHAGITIS: ICD-10-CM

## 2022-03-31 DIAGNOSIS — I26.99 OTHER PULMONARY EMBOLISM WITHOUT ACUTE COR PULMONALE: ICD-10-CM

## 2022-03-31 DIAGNOSIS — R50.9 FEVER, UNSPECIFIED: ICD-10-CM

## 2022-03-31 DIAGNOSIS — D64.9 ANEMIA, UNSPECIFIED: ICD-10-CM

## 2022-03-31 DIAGNOSIS — R13.10 DYSPHAGIA, UNSPECIFIED: ICD-10-CM

## 2022-03-31 DIAGNOSIS — F41.9 ANXIETY DISORDER, UNSPECIFIED: ICD-10-CM

## 2022-03-31 DIAGNOSIS — Z97.8 PRESENCE OF OTHER SPECIFIED DEVICES: ICD-10-CM

## 2022-03-31 LAB
ALBUMIN SERPL ELPH-MCNC: 3.9 G/DL — SIGNIFICANT CHANGE UP (ref 3.3–5)
ALP SERPL-CCNC: 75 U/L — SIGNIFICANT CHANGE UP (ref 40–120)
ALT FLD-CCNC: 26 U/L — SIGNIFICANT CHANGE UP (ref 10–45)
ANION GAP SERPL CALC-SCNC: 8 MMOL/L — SIGNIFICANT CHANGE UP (ref 5–17)
AST SERPL-CCNC: 37 U/L — SIGNIFICANT CHANGE UP (ref 10–40)
BASOPHILS # BLD AUTO: 0.1 K/UL — SIGNIFICANT CHANGE UP (ref 0–0.2)
BASOPHILS NFR BLD AUTO: 1.4 % — SIGNIFICANT CHANGE UP (ref 0–2)
BILIRUB SERPL-MCNC: 0.3 MG/DL — SIGNIFICANT CHANGE UP (ref 0.2–1.2)
BUN SERPL-MCNC: 24 MG/DL — HIGH (ref 7–23)
CALCIUM SERPL-MCNC: 9.8 MG/DL — SIGNIFICANT CHANGE UP (ref 8.4–10.5)
CHLORIDE SERPL-SCNC: 98 MMOL/L — SIGNIFICANT CHANGE UP (ref 96–108)
CO2 SERPL-SCNC: 29 MMOL/L — SIGNIFICANT CHANGE UP (ref 22–31)
CREAT SERPL-MCNC: 0.65 MG/DL — SIGNIFICANT CHANGE UP (ref 0.5–1.3)
EGFR: 92 ML/MIN/1.73M2 — SIGNIFICANT CHANGE UP
EOSINOPHIL # BLD AUTO: 0.35 K/UL — SIGNIFICANT CHANGE UP (ref 0–0.5)
EOSINOPHIL NFR BLD AUTO: 4.7 % — SIGNIFICANT CHANGE UP (ref 0–6)
GLUCOSE BLDC GLUCOMTR-MCNC: 105 MG/DL — HIGH (ref 70–99)
GLUCOSE BLDC GLUCOMTR-MCNC: 117 MG/DL — HIGH (ref 70–99)
GLUCOSE BLDC GLUCOMTR-MCNC: 120 MG/DL — HIGH (ref 70–99)
GLUCOSE BLDC GLUCOMTR-MCNC: 126 MG/DL — HIGH (ref 70–99)
GLUCOSE SERPL-MCNC: 130 MG/DL — HIGH (ref 70–99)
HCT VFR BLD CALC: 34.2 % — LOW (ref 34.5–45)
HGB BLD-MCNC: 10.5 G/DL — LOW (ref 11.5–15.5)
IMM GRANULOCYTES NFR BLD AUTO: 1.4 % — SIGNIFICANT CHANGE UP (ref 0–1.5)
LYMPHOCYTES # BLD AUTO: 1.32 K/UL — SIGNIFICANT CHANGE UP (ref 1–3.3)
LYMPHOCYTES # BLD AUTO: 17.9 % — SIGNIFICANT CHANGE UP (ref 13–44)
MAGNESIUM SERPL-MCNC: 1.8 MG/DL — SIGNIFICANT CHANGE UP (ref 1.6–2.6)
MCHC RBC-ENTMCNC: 30 PG — SIGNIFICANT CHANGE UP (ref 27–34)
MCHC RBC-ENTMCNC: 30.7 GM/DL — LOW (ref 32–36)
MCV RBC AUTO: 97.7 FL — SIGNIFICANT CHANGE UP (ref 80–100)
MONOCYTES # BLD AUTO: 0.74 K/UL — SIGNIFICANT CHANGE UP (ref 0–0.9)
MONOCYTES NFR BLD AUTO: 10 % — SIGNIFICANT CHANGE UP (ref 2–14)
NEUTROPHILS # BLD AUTO: 4.77 K/UL — SIGNIFICANT CHANGE UP (ref 1.8–7.4)
NEUTROPHILS NFR BLD AUTO: 64.6 % — SIGNIFICANT CHANGE UP (ref 43–77)
NRBC # BLD: 0 /100 WBCS — SIGNIFICANT CHANGE UP (ref 0–0)
PHOSPHATE SERPL-MCNC: 3.3 MG/DL — SIGNIFICANT CHANGE UP (ref 2.5–4.5)
PLATELET # BLD AUTO: 347 K/UL — SIGNIFICANT CHANGE UP (ref 150–400)
POTASSIUM SERPL-MCNC: 5 MMOL/L — SIGNIFICANT CHANGE UP (ref 3.5–5.3)
POTASSIUM SERPL-SCNC: 5 MMOL/L — SIGNIFICANT CHANGE UP (ref 3.5–5.3)
PROT SERPL-MCNC: 7.6 G/DL — SIGNIFICANT CHANGE UP (ref 6–8.3)
RBC # BLD: 3.5 M/UL — LOW (ref 3.8–5.2)
RBC # FLD: 13.8 % — SIGNIFICANT CHANGE UP (ref 10.3–14.5)
SODIUM SERPL-SCNC: 135 MMOL/L — SIGNIFICANT CHANGE UP (ref 135–145)
WBC # BLD: 7.38 K/UL — SIGNIFICANT CHANGE UP (ref 3.8–10.5)
WBC # FLD AUTO: 7.38 K/UL — SIGNIFICANT CHANGE UP (ref 3.8–10.5)

## 2022-03-31 PROCEDURE — 99233 SBSQ HOSP IP/OBS HIGH 50: CPT | Mod: GC

## 2022-03-31 PROCEDURE — 71045 X-RAY EXAM CHEST 1 VIEW: CPT | Mod: 26

## 2022-03-31 RX ORDER — APIXABAN 2.5 MG/1
2.5 TABLET, FILM COATED ORAL EVERY 12 HOURS
Refills: 0 | Status: DISCONTINUED | OUTPATIENT
Start: 2022-03-31 | End: 2022-04-06

## 2022-03-31 RX ADMIN — SODIUM CHLORIDE 4 MILLILITER(S): 9 INJECTION INTRAMUSCULAR; INTRAVENOUS; SUBCUTANEOUS at 19:15

## 2022-03-31 RX ADMIN — Medication 3 MILLILITER(S): at 04:44

## 2022-03-31 RX ADMIN — Medication 0.25 MILLIGRAM(S): at 18:13

## 2022-03-31 RX ADMIN — CEFTAZIDIME 100 GRAM(S): 6 INJECTION, POWDER, FOR SOLUTION INTRAVENOUS at 11:25

## 2022-03-31 RX ADMIN — Medication 0.25 MILLIGRAM(S): at 07:43

## 2022-03-31 RX ADMIN — ENOXAPARIN SODIUM 50 MILLIGRAM(S): 100 INJECTION SUBCUTANEOUS at 06:59

## 2022-03-31 RX ADMIN — PANTOPRAZOLE SODIUM 40 MILLIGRAM(S): 20 TABLET, DELAYED RELEASE ORAL at 06:59

## 2022-03-31 RX ADMIN — SODIUM CHLORIDE 4 MILLILITER(S): 9 INJECTION INTRAMUSCULAR; INTRAVENOUS; SUBCUTANEOUS at 04:45

## 2022-03-31 RX ADMIN — Medication 3 MILLILITER(S): at 15:58

## 2022-03-31 RX ADMIN — CHLORHEXIDINE GLUCONATE 15 MILLILITER(S): 213 SOLUTION TOPICAL at 07:42

## 2022-03-31 RX ADMIN — ARIPIPRAZOLE 2 MILLIGRAM(S): 15 TABLET ORAL at 23:26

## 2022-03-31 RX ADMIN — CEFTAZIDIME 100 GRAM(S): 6 INJECTION, POWDER, FOR SOLUTION INTRAVENOUS at 23:27

## 2022-03-31 RX ADMIN — CEFTAZIDIME 100 GRAM(S): 6 INJECTION, POWDER, FOR SOLUTION INTRAVENOUS at 00:25

## 2022-03-31 RX ADMIN — ARIPIPRAZOLE 2 MILLIGRAM(S): 15 TABLET ORAL at 00:21

## 2022-03-31 RX ADMIN — Medication 3 MILLILITER(S): at 22:55

## 2022-03-31 RX ADMIN — APIXABAN 2.5 MILLIGRAM(S): 2.5 TABLET, FILM COATED ORAL at 18:13

## 2022-03-31 RX ADMIN — CHLORHEXIDINE GLUCONATE 15 MILLILITER(S): 213 SOLUTION TOPICAL at 18:13

## 2022-03-31 RX ADMIN — Medication 3 MILLILITER(S): at 11:20

## 2022-03-31 RX ADMIN — CHLORHEXIDINE GLUCONATE 1 APPLICATION(S): 213 SOLUTION TOPICAL at 07:03

## 2022-03-31 NOTE — PROGRESS NOTE ADULT - PROBLEM SELECTOR PLAN 5
Patient with cerebral palsy. Previously a [place] resident, now resides in group home, with [place] [facility] per palliative care. Patient with repeat admission for aspiration pneumonia with history of dysphagia and copious secretions from oropharynx on exam.  - palliative care and ethics consulted - Advisory board must make decisions for patient.

## 2022-03-31 NOTE — PROGRESS NOTE ADULT - SUBJECTIVE AND OBJECTIVE BOX
***INCOMPLETE****  OVERNIGHT EVENTS:    SUBJECTIVE / INTERVAL HPI: Patient seen and examined at bedside.     ROS: negative unless otherwise stated above.    VITAL SIGNS:  Vital Signs Last 24 Hrs  T(C): 36.9 (31 Mar 2022 06:30), Max: 37.2 (30 Mar 2022 11:00)  T(F): 98.5 (31 Mar 2022 06:30), Max: 99 (30 Mar 2022 11:00)  HR: 85 (31 Mar 2022 05:20) (81 - 112)  BP: 140/65 (31 Mar 2022 03:00) (113/56 - 155/72)  BP(mean): 93 (31 Mar 2022 03:00) (80 - 110)  RR: 20 (31 Mar 2022 05:20) (20 - 30)  SpO2: 94% (31 Mar 2022 05:20) (86% - 100%)    PHYSICAL EXAM:    General: In no apparent distress  HEENT: NC/AT; PERRL, anicteric sclera; MMM  Neck: supple  Cardiovascular: +S1/S2; RRR  Respiratory: CTA B/L; no W/R/R  Gastrointestinal: soft, NT/ND; +BSx4  Extremities: WWP; no edema, clubbing or cyanosis  Vascular: 2+ radial, DP/PT pulses B/L  Neurological: AAOx3; no focal deficits    MEDICATIONS:  MEDICATIONS  (STANDING):  albuterol/ipratropium for Nebulization 3 milliLiter(s) Nebulizer every 6 hours  ARIPiprazole 2 milliGRAM(s) Oral every 24 hours  cefTAZidime  IVPB 2 Gram(s) IV Intermittent every 12 hours  chlorhexidine 0.12% Liquid 15 milliLiter(s) Oral Mucosa every 12 hours  chlorhexidine 2% Cloths 1 Application(s) Topical <User Schedule>  clonazePAM  Tablet 0.25 milliGRAM(s) Oral every 12 hours  dextrose 5%. 1000 milliLiter(s) (50 mL/Hr) IV Continuous <Continuous>  dextrose 5%. 1000 milliLiter(s) (100 mL/Hr) IV Continuous <Continuous>  enoxaparin Injectable 50 milliGRAM(s) SubCutaneous every 12 hours  glucagon  Injectable 1 milliGRAM(s) IntraMuscular once  insulin regular  human corrective regimen sliding scale   SubCutaneous every 6 hours  pantoprazole   Suspension 40 milliGRAM(s) Oral before breakfast  sodium chloride 3%  Inhalation 4 milliLiter(s) Inhalation every 12 hours    MEDICATIONS  (PRN):  acetaminophen    Suspension .. 650 milliGRAM(s) Enteral Tube every 6 hours PRN Temp greater or equal to 38C (100.4F), Mild Pain (1 - 3)      ALLERGIES:  Allergies    ace inhibitors (Anaphylaxis)  caffeine (Rash)  cefepime (Rash)  chocolate (Rash)  high acid (Rash)  Tomatoes (Hives)    Intolerances        LABS:                        10.5   7.38  )-----------( 347      ( 31 Mar 2022 05:27 )             34.2     03-31    135  |  98  |  24<H>  ----------------------------<  130<H>  5.0   |  29  |  0.65    Ca    9.8      31 Mar 2022 05:27  Phos  3.3     03-31  Mg     1.8     03-31    TPro  7.6  /  Alb  3.9  /  TBili  0.3  /  DBili  x   /  AST  37  /  ALT  26  /  AlkPhos  75  03-31        CAPILLARY BLOOD GLUCOSE      POCT Blood Glucose.: 126 mg/dL (31 Mar 2022 06:22)      RADIOLOGY & ADDITIONAL TESTS: Reviewed. 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 Hospital course complicated by anxiety, treated with precedex gtt while in ICU, now with clonazepam 0.5mg BID. Stable for step down to [facility] on 3/31.     OVERNIGHT EVENTS: No events overnight.    SUBJECTIVE / INTERVAL HPI: Patient seen and examined at bedside.     ROS: negative unless otherwise stated above.    VITAL SIGNS:  Vital Signs Last 24 Hrs  T(C): 36.9 (31 Mar 2022 06:30), Max: 37.2 (30 Mar 2022 11:00)  T(F): 98.5 (31 Mar 2022 06:30), Max: 99 (30 Mar 2022 11:00)  HR: 85 (31 Mar 2022 05:20) (81 - 112)  BP: 140/65 (31 Mar 2022 03:00) (113/56 - 155/72)  BP(mean): 93 (31 Mar 2022 03:00) (80 - 110)  RR: 20 (31 Mar 2022 05:20) (20 - 30)  SpO2: 94% (31 Mar 2022 05:20) (86% - 100%)    PHYSICAL EXAM:  General: Restless  HEENT: NC/AT; PERRL, anicteric sclera; MMM  Neck: supple  Cardiovascular: +S1/S2; RRR  Respiratory: CTA B/L; no W/R/R; trach to vent  Gastrointestinal: soft, NT/ND; +BSx4  Extremities: WWP; +1 edema in LEs, clubbing or cyanosis. +chronic deformity in b/l feet.  Vascular: 2+ radial, DP/PT pulses B/L  Neurological: AAOx3, but with cerebral palsy, difficulty with communication and making needs known.    MEDICATIONS:  MEDICATIONS  (STANDING):  albuterol/ipratropium for Nebulization 3 milliLiter(s) Nebulizer every 6 hours  ARIPiprazole 2 milliGRAM(s) Oral every 24 hours  cefTAZidime  IVPB 2 Gram(s) IV Intermittent every 12 hours  chlorhexidine 0.12% Liquid 15 milliLiter(s) Oral Mucosa every 12 hours  chlorhexidine 2% Cloths 1 Application(s) Topical <User Schedule>  clonazePAM  Tablet 0.25 milliGRAM(s) Oral every 12 hours  dextrose 5%. 1000 milliLiter(s) (50 mL/Hr) IV Continuous <Continuous>  dextrose 5%. 1000 milliLiter(s) (100 mL/Hr) IV Continuous <Continuous>  enoxaparin Injectable 50 milliGRAM(s) SubCutaneous every 12 hours  glucagon  Injectable 1 milliGRAM(s) IntraMuscular once  insulin regular  human corrective regimen sliding scale   SubCutaneous every 6 hours  pantoprazole   Suspension 40 milliGRAM(s) Oral before breakfast  sodium chloride 3%  Inhalation 4 milliLiter(s) Inhalation every 12 hours    MEDICATIONS  (PRN):  acetaminophen    Suspension .. 650 milliGRAM(s) Enteral Tube every 6 hours PRN Temp greater or equal to 38C (100.4F), Mild Pain (1 - 3)      ALLERGIES:  Allergies    ace inhibitors (Anaphylaxis)  caffeine (Rash)  cefepime (Rash)  chocolate (Rash)  high acid (Rash)  Tomatoes (Hives)    Intolerances        LABS:                        10.5   7.38  )-----------( 347      ( 31 Mar 2022 05:27 )             34.2     03-31    135  |  98  |  24<H>  ----------------------------<  130<H>  5.0   |  29  |  0.65    Ca    9.8      31 Mar 2022 05:27  Phos  3.3     03-31  Mg     1.8     03-31    TPro  7.6  /  Alb  3.9  /  TBili  0.3  /  DBili  x   /  AST  37  /  ALT  26  /  AlkPhos  75  03-31        CAPILLARY BLOOD GLUCOSE      POCT Blood Glucose.: 126 mg/dL (31 Mar 2022 06:22)      RADIOLOGY & ADDITIONAL TESTS: Reviewed. 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 Hospital course complicated by anxiety, treated with precedex gtt while in ICU, now with clonazepam 0.5mg BID. Stable for step down to [facility] on 3/31.     OVERNIGHT EVENTS: No events overnight.     SUBJECTIVE / INTERVAL HPI: Patient seen and examined at bedside. Patient feeling ok on CPAP this morning, no complaints.    ROS: negative unless otherwise stated above.    VITAL SIGNS:  Vital Signs Last 24 Hrs  T(C): 36.9 (31 Mar 2022 06:30), Max: 37.2 (30 Mar 2022 11:00)  T(F): 98.5 (31 Mar 2022 06:30), Max: 99 (30 Mar 2022 11:00)  HR: 85 (31 Mar 2022 05:20) (81 - 112)  BP: 140/65 (31 Mar 2022 03:00) (113/56 - 155/72)  BP(mean): 93 (31 Mar 2022 03:00) (80 - 110)  RR: 20 (31 Mar 2022 05:20) (20 - 30)  SpO2: 94% (31 Mar 2022 05:20) (86% - 100%)    PHYSICAL EXAM:  General: Restless  HEENT: NC/AT; PERRL, anicteric sclera; MMM  Neck: supple  Cardiovascular: +S1/S2; RRR  Respiratory: CTA B/L; no W/R/R; trach to vent  Gastrointestinal: soft, NT/ND; +BSx4  Extremities: WWP; +1 edema in LEs, clubbing or cyanosis. +chronic deformity in b/l feet.  Vascular: 2+ radial, DP/PT pulses B/L  Neurological: AAOx3, but with cerebral palsy, difficulty with communication and making needs known.    MEDICATIONS:  MEDICATIONS  (STANDING):  albuterol/ipratropium for Nebulization 3 milliLiter(s) Nebulizer every 6 hours  ARIPiprazole 2 milliGRAM(s) Oral every 24 hours  cefTAZidime  IVPB 2 Gram(s) IV Intermittent every 12 hours  chlorhexidine 0.12% Liquid 15 milliLiter(s) Oral Mucosa every 12 hours  chlorhexidine 2% Cloths 1 Application(s) Topical <User Schedule>  clonazePAM  Tablet 0.25 milliGRAM(s) Oral every 12 hours  dextrose 5%. 1000 milliLiter(s) (50 mL/Hr) IV Continuous <Continuous>  dextrose 5%. 1000 milliLiter(s) (100 mL/Hr) IV Continuous <Continuous>  enoxaparin Injectable 50 milliGRAM(s) SubCutaneous every 12 hours  glucagon  Injectable 1 milliGRAM(s) IntraMuscular once  insulin regular  human corrective regimen sliding scale   SubCutaneous every 6 hours  pantoprazole   Suspension 40 milliGRAM(s) Oral before breakfast  sodium chloride 3%  Inhalation 4 milliLiter(s) Inhalation every 12 hours    MEDICATIONS  (PRN):  acetaminophen    Suspension .. 650 milliGRAM(s) Enteral Tube every 6 hours PRN Temp greater or equal to 38C (100.4F), Mild Pain (1 - 3)      ALLERGIES:  Allergies    ace inhibitors (Anaphylaxis)  caffeine (Rash)  cefepime (Rash)  chocolate (Rash)  high acid (Rash)  Tomatoes (Hives)    Intolerances        LABS:                        10.5   7.38  )-----------( 347      ( 31 Mar 2022 05:27 )             34.2     03-31    135  |  98  |  24<H>  ----------------------------<  130<H>  5.0   |  29  |  0.65    Ca    9.8      31 Mar 2022 05:27  Phos  3.3     03-31  Mg     1.8     03-31    TPro  7.6  /  Alb  3.9  /  TBili  0.3  /  DBili  x   /  AST  37  /  ALT  26  /  AlkPhos  75  03-31        CAPILLARY BLOOD GLUCOSE      POCT Blood Glucose.: 126 mg/dL (31 Mar 2022 06:22)      RADIOLOGY & ADDITIONAL TESTS: Reviewed.

## 2022-03-31 NOTE — PROGRESS NOTE ADULT - PROBLEM SELECTOR PLAN 4
History of PE 5 years ago. On Eliquis at home 2.5 mg BID at home. Bedside ultrasound performed in MICU with no evidence of clot.    - c/w eliquis 2.5mg BID (switched from lovenox 3/31)

## 2022-03-31 NOTE — PROGRESS NOTE ADULT - PROBLEM SELECTOR PLAN 3
Pt with ongoing restlessness, anxiety, agitation  - c/w home med Abilify 2mg qd  - c/w clonazepam 0.25 q12 (started 3/30 after good response to alprazolam on 3/29)

## 2022-03-31 NOTE — PROGRESS NOTE ADULT - PROBLEM SELECTOR PLAN 2
Patient with cerebral palsy. Has functional quadriplegia, health aide at assisted living facility reports patient is currently at baseline.  - FOR CONSENT, 2-physician consent is required, although patient is AOx3.

## 2022-03-31 NOTE — PROGRESS NOTE ADULT - PROBLEM SELECTOR PLAN 9
Last 3/26 at 12pm   Possibly 2/2 to aspiration pna - suspected aspiration event 3/25 during/after trach placement  -UA positive on 3/26   -s/p bactrim 300mg q8 (3/26 - 3/30), switched to ceftazidime 2g q12 until 4/2

## 2022-03-31 NOTE — PROGRESS NOTE ADULT - ASSESSMENT
74 F with PMHx of CP with functional quadriplegia, DVT/PE on eliquis, GERD, chronic dysphagia with PEG, thoracic spine fusion, intubated en route to hospital and admitted to MICU for AHRF 2/2 pseudomonal PNA, treated with ciprofloxacin (3/5-3/12) c/b periods of agitation/anxiety. Extubation on 3/8 and stepped down to F, course c/b increasing suctioning and fluctuating oxygen requirement 2L-6L with spiration event on 3/13 requiring re-intubation and step up back to the MICU. Trached on 3/24 which was c/b fever for which patient is completing one week antibiotic course bactrim (3/26-3/30, dc'ed due to hyperkalemia) and ceftazidime (3/31-4/2)

## 2022-03-31 NOTE — PROGRESS NOTE ADULT - ATTENDING COMMENTS
Cerebral palsy with chronic respiratory failure on trach with pneumonia (on ceftazidime). Improvement in agitation with clonazepam. Rest as above

## 2022-03-31 NOTE — PROGRESS NOTE ADULT - ASSESSMENT
75 yo F with PMHx of cerebral palsy with functional quadriplegia, Hx of PE/DVTs, GERD, chronic dysphagia with PEG, thoracic spine fusion initially admitted for acute hypoxic respiratory failure found to have pseudomonal PNA, intubated for third time i/s/o aspiration PNA of PEG feeds and copious secretions, trach placed 3/24, s/p bronchoscopy on 3/25.    NEURO  #Cerebral palsy  Patient with cerebral palsy. Has functional quadriplegia, health aide at assisted living facility reports patient is currently at baseline.  - FOR CONSENT, 2-physician consent is required, although patient is AOx3.     #Goals of care, counseling/discussion.   Patient with cerebral palsy. Previously a [place] resident, now resides in group home, with [place] [facility] per palliative care. Patient with repeat admission for aspiration pneumonia with history of dysphagia and copious secretions from oropharynx on exam.  - palliative care and ethics consulted - Advisory board must make decisions for patient.    #Anxiety  Pt with ongoing restlessness, anxiety, agitation  - c/w home med Abilify 2mg qd  - c/w clonazepam 0.25 q12 (started 3/30 after good response to alprazolam on 3/29)    CV  No active issues    PULM  #Acute respiratory failure with hypoxia i/s/o of aspiration pneumonia of PEG feeds and copious oral secretions  Extubated and reintubated multiple times, trached on 3/24  - currently on VC/AC 40/250/0.8/15/8 and tachypneic - will continue to try daily trach collar trials.  - sputum cultures 3/25 show numerous proteus mirabilis, moderate Stenotrophomonas maltophilia  - s/p bactrim 300mg q8 (3/26 - 3/30), switched to ceftazidime 2g q12 until 4/2  - c/w Frequent suctioning - oral and tracheal, hypertonic saline followed by duonebs - standing, Chest PT q6h, aspiration precautions    #PE  History of PE 5 years ago. On Eliquis at home 2.5 mg BID at home. Bedside ultrasound performed in MICU with no evidence of clot.    - c/w lovenox 50 mg BID    RENAL  #HyperKalemia  -likely 2/2 bactrim, given lokelma x1 on 3/30 and switched to ceftazidime    GI  #Dysphagia  - PEG tube in place and functional, c/w tube feeds    #GERD   On famotidine 20 mg qHS at home  - c/w pantoprazole 40 mg QD while here       Quintero removed 3/27  UA positive - no culture sent and no symptoms per pt    ENDO  On regular ISS for TF    ID  #Fever   -Last 3/26 at 12pm   Possibly 2/2 to aspiration pna - suspected aspiration event 3/25 during/after trach placement  -UA positive on 3/26   -s/p bactrim 300mg q8 (3/26 - 3/30), switched to ceftazidime 2g q12 until 4/2     HEME  #Anemia, macrocytic  Hb stable 9 to 11  MCV   B12/folate WNL  - Maintain active T&S (Last 3/30, next 4/2)  - Transfuse if Hgb<7 (would require 2-physician consent)    PROPHYLAXIS  F: None  E: Replete PRN  N: TF Jevity 1.2 at 42cc/h + 1 LPS  DVT: Lovenox 50 mg BID   GI: protonix 40 mg daily via PEG  C: Full Code, pending discussion with pt's CAB state advocate for GOC and medical decisions  FOR EMERGENT CONSENT 2-physician consent is required  D: With trach, patient cannot return back to group home and will be discharged to nursing home. 75 yo F with PMHx of cerebral palsy with functional quadriplegia, Hx of PE/DVTs, GERD, chronic dysphagia with PEG, thoracic spine fusion initially admitted for acute hypoxic respiratory failure found to have pseudomonal PNA, intubated for third time i/s/o aspiration PNA of PEG feeds and copious secretions, trach placed 3/24, s/p bronchoscopy on 3/25.    NEURO  #Cerebral palsy  Patient with cerebral palsy. Has functional quadriplegia, health aide at assisted living facility reports patient is currently at baseline.  - FOR CONSENT, 2-physician consent is required, although patient is AOx3.     #Goals of care, counseling/discussion.   Patient with cerebral palsy. Previously a [place] resident, now resides in group home, with [facility] per palliative care. Patient with repeat admission for aspiration pneumonia with history of dysphagia and copious secretions from oropharynx on exam.  - palliative care and ethics consulted - Advisory board must make decisions for patient.    #Anxiety  Pt with ongoing restlessness, anxiety, agitation  - c/w home med Abilify 2mg qd  - c/w clonazepam 0.25 q12 (started 3/30 after good response to alprazolam on 3/29)    CV  No active issues    PULM  #Acute respiratory failure with hypoxia i/s/o of aspiration pneumonia of PEG feeds and copious oral secretions  Extubated and reintubated multiple times, trached on 3/24  - currently on VC/AC 40/250/0.8/15/8 and tachypneic - will continue to try daily trach collar trials.  - sputum cultures 3/25 show numerous proteus mirabilis, moderate Stenotrophomonas maltophilia  - s/p bactrim 300mg q8 (3/26 - 3/30), switched to ceftazidime 2g q12 until 4/2  - c/w Frequent suctioning - oral and tracheal, hypertonic saline followed by duonebs - standing, Chest PT q6h, aspiration precautions    #PE  History of PE 5 years ago. On Eliquis at home 2.5 mg BID at home. Bedside ultrasound performed in MICU with no evidence of clot.    - c/w lovenox 50 mg BID    RENAL  #HyperKalemia, NOW RESOLVED  -likely 2/2 bactrim, given lokelma x1 on 3/30 and switched to ceftazidime and K decreased from 5.5 to 5 on 3/31    GI  #Dysphagia  - PEG tube in place and functional, c/w tube feeds    #GERD   On famotidine 20 mg qHS at home  - c/w pantoprazole 40 mg QD while here     AMBER Quintero removed 3/27  UA positive - no culture sent and no symptoms per pt    ENDO  On regular ISS for TF    ID  #Fever   -Last 3/26 at 12pm   Possibly 2/2 to aspiration pna - suspected aspiration event 3/25 during/after trach placement  -UA positive on 3/26   -s/p bactrim 300mg q8 (3/26 - 3/30), switched to ceftazidime 2g q12 until 4/2     HEME  #Anemia, macrocytic  Hb stable 9 to 11  MCV   B12/folate WNL  - Maintain active T&S (Last 3/30, next 4/2)  - Transfuse if Hgb<7 (would require 2-physician consent)    PROPHYLAXIS  F: None  E: Replete PRN  N: TF Jevity 1.2 at 42cc/h + 1 LPS  DVT: Lovenox 50 mg BID   GI: protonix 40 mg daily via PEG  C: Full Code, pending discussion with pt's CAB state advocate for GOC and medical decisions  FOR EMERGENT CONSENT 2-physician consent is required  D: With trach, patient cannot return back to group home and will be discharged to nursing home. 73 yo F with PMHx of cerebral palsy with functional quadriplegia, Hx of PE/DVTs, GERD, chronic dysphagia with PEG, thoracic spine fusion initially admitted for acute hypoxic respiratory failure found to have pseudomonal PNA, intubated for third time i/s/o aspiration PNA of PEG feeds and copious secretions, trach placed 3/24, s/p bronchoscopy on 3/25.    NEURO  #Cerebral palsy  Patient with cerebral palsy. Has functional quadriplegia, health aide at assisted living facility reports patient is currently at baseline.  - FOR CONSENT, 2-physician consent is required, although patient is AOx3.     #Goals of care, counseling/discussion.   Patient with cerebral palsy. Previously a [place] resident, now resides in group home, with [place] [facility] per palliative care. Patient with repeat admission for aspiration pneumonia with history of dysphagia and copious secretions from oropharynx on exam.  - palliative care and ethics consulted - Advisory board must make decisions for patient.    #Anxiety  Pt with ongoing restlessness, anxiety, agitation  - c/w home med Abilify 2mg qd  - c/w clonazepam 0.25 q12 (started 3/30 after good response to alprazolam on 3/29)    CV  No active issues    PULM  #Acute respiratory failure with hypoxia i/s/o of aspiration pneumonia of PEG feeds and copious oral secretions  Extubated and reintubated multiple times, trached on 3/24  - currently on VC/AC 40/250/0.8/15/8 and tachypneic - will continue to try daily trach collar trials.  - sputum cultures 3/25 show numerous proteus mirabilis, moderate Stenotrophomonas maltophilia  - s/p bactrim 300mg q8 (3/26 - 3/30), switched to ceftazidime 2g q12 until 4/2  - c/w Frequent suctioning - oral and tracheal, hypertonic saline followed by duonebs - standing, Chest PT q6h, aspiration precautions    #PE  History of PE 5 years ago. On Eliquis at home 2.5 mg BID at home. Bedside ultrasound performed in MICU with no evidence of clot.    - c/w eliquis 2.5mg BID (switched from lovenox 3/31)    RENAL  #HyperKalemia, NOW RESOLVED  -likely 2/2 bactrim, given lokelma x1 on 3/30 and switched to ceftazidime and K decreased from 5.5 to 5 on 3/31    GI  #Dysphagia  - PEG tube in place and functional, c/w tube feeds    #GERD   On famotidine 20 mg qHS at home  - c/w pantoprazole 40 mg QD while here     AMBER Quintero removed 3/27  UA positive - no culture sent and no symptoms per pt    ENDO  On regular ISS for TF    ID  #Fever   -Last 3/26 at 12pm   Possibly 2/2 to aspiration pna - suspected aspiration event 3/25 during/after trach placement  -UA positive on 3/26   -s/p bactrim 300mg q8 (3/26 - 3/30), switched to ceftazidime 2g q12 until 4/2     HEME  #Anemia, macrocytic  Hb stable 9 to 11  MCV   B12/folate WNL  - Maintain active T&S (Last 3/30, next 4/2)  - Transfuse if Hgb<7 (would require 2-physician consent)    PROPHYLAXIS  F: None  E: Replete PRN  N: TF Jevity 1.2 at 42cc/h + 1 LPS  DVT: Lovenox 50 mg BID   GI: protonix 40 mg daily via PEG  C: Full Code, pending discussion with pt's CAB state advocate for GOC and medical decisions  FOR EMERGENT CONSENT 2-physician consent is required  D: With trach, patient cannot return back to group home and will be discharged to nursing home.

## 2022-03-31 NOTE — PROGRESS NOTE ADULT - SUBJECTIVE AND OBJECTIVE BOX
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 Hospital course complicated by anxiety, treated with precedex gtt while in ICU, now with clonazepam 0.5mg BID. Stable for step down to 7LA on 3/31.         DAVINANOAM, 76y, Female  MRN-[id number]  Patient is a 76y old  Female who presents with a chief complaint of respiratory failure (16 Mar 2022 13:37)      OVERNIGHT EVENTS:  desat to 88%, suctioned for increased tracheal secretions, improved to 90's.     SUBJECTIVE:    12 Point ROS Negative unless noted otherwise above.  -------------------------------------------------------------------------------  VITAL SIGNS:  Vital Signs Last 24 Hrs  T(C): 37.3 (31 Mar 2022 09:47), Max: 37.3 (31 Mar 2022 09:47)  T(F): 99.2 (31 Mar 2022 09:47), Max: 99.2 (31 Mar 2022 09:47)  HR: 89 (31 Mar 2022 11:00) (77 - 112)  BP: 106/71 (31 Mar 2022 11:00) (106/71 - 155/72)  BP(mean): 81 (31 Mar 2022 11:00) (80 - 110)  RR: 22 (31 Mar 2022 11:00) (20 - 31)  SpO2: 97% (31 Mar 2022 11:00) (86% - 100%)  I&O's Summary    30 Mar 2022 07:01  -  31 Mar 2022 07:00  --------------------------------------------------------  IN: 1186 mL / OUT: 375 mL / NET: 811 mL    31 Mar 2022 07:01  -  31 Mar 2022 12:23  --------------------------------------------------------  IN: 168 mL / OUT: 300 mL / NET: -132 mL        PHYSICAL EXAM:    General: NAD, well developed  HEENT: NC/AT; EOMI, PERRLA, anicteric sclera; moist mucosal membranes.  Neck: supple, trachea midline  Cardiovascular: RRR, +S1/S2; NO M/R/G  Respiratory: CTA B/L; no W/R/R  Gastrointestinal: soft, NT/ND; +BSx4  Extremities: WWP; no edema or cyanosis  Vascular: 2+ radial, DP/PT pulses B/L  Neurological: AAOx3; no focal deficits    ALLERGIES:  Allergies    ace inhibitors (Anaphylaxis)  caffeine (Rash)  cefepime (Rash)  chocolate (Rash)  high acid (Rash)  Tomatoes (Hives)    Intolerances        MEDICATIONS:  MEDICATIONS  (STANDING):  albuterol/ipratropium for Nebulization 3 milliLiter(s) Nebulizer every 6 hours  apixaban 2.5 milliGRAM(s) Enteral Tube every 12 hours  ARIPiprazole 2 milliGRAM(s) Oral every 24 hours  cefTAZidime  IVPB 2 Gram(s) IV Intermittent every 12 hours  chlorhexidine 0.12% Liquid 15 milliLiter(s) Oral Mucosa every 12 hours  chlorhexidine 2% Cloths 1 Application(s) Topical <User Schedule>  clonazePAM  Tablet 0.25 milliGRAM(s) Oral every 12 hours  dextrose 5%. 1000 milliLiter(s) (50 mL/Hr) IV Continuous <Continuous>  dextrose 5%. 1000 milliLiter(s) (100 mL/Hr) IV Continuous <Continuous>  glucagon  Injectable 1 milliGRAM(s) IntraMuscular once  insulin regular  human corrective regimen sliding scale   SubCutaneous every 6 hours  pantoprazole   Suspension 40 milliGRAM(s) Oral before breakfast  sodium chloride 3%  Inhalation 4 milliLiter(s) Inhalation every 12 hours    MEDICATIONS  (PRN):  acetaminophen    Suspension .. 650 milliGRAM(s) Enteral Tube every 6 hours PRN Temp greater or equal to 38C (100.4F), Mild Pain (1 - 3)      -------------------------------------------------------------------------------  LABS:                        10.5   7.38  )-----------( 347      ( 31 Mar 2022 05:27 )             34.2     03-31    135  |  98  |  24<H>  ----------------------------<  130<H>  5.0   |  29  |  0.65    Ca    9.8      31 Mar 2022 05:27  Phos  3.3     03-31  Mg     1.8     03-31    TPro  7.6  /  Alb  3.9  /  TBili  0.3  /  DBili  x   /  AST  37  /  ALT  26  /  AlkPhos  75  03-31    LIVER FUNCTIONS - ( 31 Mar 2022 05:27 )  Alb: 3.9 g/dL / Pro: 7.6 g/dL / ALK PHOS: 75 U/L / ALT: 26 U/L / AST: 37 U/L / GGT: x               CAPILLARY BLOOD GLUCOSE      POCT Blood Glucose.: 117 mg/dL (31 Mar 2022 11:20)      COVID-19 PCR: NotDetec (27 Mar 2022 09:26)  COVID-19 PCR: NotDetec (21 Mar 2022 06:26)  COVID-19 PCR: NotDetec (15 Mar 2022 05:33)  COVID-19 PCR: NotDetec (09 Mar 2022 13:39)  COVID-19 PCR: NotDetec (03 Mar 2022 02:50)      RADIOLOGY & ADDITIONAL TESTS: Reviewed.   **Incomplete Note**    **TRANSFER MICU TO [facility]**    Hospital Course:     Patient is a 74 year old female with a PMH of cerebral palsy with functional quadriplegia, DVT/PE on Eliquis, GERD, chronic dysphagia with PEG, thoracic spine fusion, previously a [place] resident, now resides in [place], with [facility]. Patient has a history of prior admission in 2020 for aspiration pneumonia requiring intubation. Patient presented from her assisted living facility in acute hypoxemic respiratory failure. Patient was found by assisted living facility staff with difficulty breathing. EMS noted that the patient was in respiratory distress upon arrival with SaO2 84% on RA. Patient was intubated in the field for airway protection. "Thick viscous" secretions were noticed in her oropharynx during intubation. Acute hypoxemic respiratory failure likely 2/2 aspiration pneumonia. Patient started on Meropenem due to previous hospitalization with Pseudomonas pneumonia requiring Meropenem. Antibiotic switched to Ciprofloxacin (3/5-3/12) for adequate coverage of Pseudomonas pneumonia. One unsuccessful attempt was made at extubation. Patient started on Seroquel due to persistent anxiety and agitation making extubation increasingly difficult. Patient successfully extubated to HFNC (3/8). Pt further weaned to 6L--> 4L with oxygen sat of 94-95%. Catheter culture from 3/3 with >100,000 E.coli, treated with ciprofloxacin. New lu placed on admission. Medical ethics consulted on 3/8 and deemed [place] Consumer Advisory Board (CAB) as the appropriate surrogate decision-maker for patient. Although some family is present, not involved in patients care. FOR CONSENT, 2-physician consent is required. Weaned to 3L and stepped down to RMF on 3/10. Patient had tenuous respiratory status given innability to clear secretions and fluctating mental status, given frequent suctioning with fluctuating O2 requirements 2L-6L.On 3/13, rapid response ws called for AHRF and unresponsiveness in setting of aspiration event. Patient was reintubated and stepped back up to MICU. In ICU, pt underwent bronchoscopy which showed no signs of purulent secretions or endobronchial lesions. Blood cultures from 3/13 negative. Documents for trach approval sent to consumer advisory board. Pending response.Trach performed on 3/24. On 3/25, fever 101.4, started zosyn for empiric treatment of pseudomonal pna. Sputum from 3/25 grew  proteus mirabilis and Stenotrophomonas maltophilia. Blood cultures from 3/25 negative. Switched from zosyn to aztreonam (due to penicillin allergy?). Started on bactrim from 3/26-3/30 bactrim for Proteus and Stenotrophomonas in sputum, D/c bactrim due to hyperkalemia and Started on Ceftazidime (covers both proteus and Stenotrophomonas), switched to ceftazidime (3/31-4/2). Hospital course complicated by anxiety, treated with precedex gtt while in ICU, now with clonazepam 0.5mg BID.      NOAM GHOSH, 76y, Female  MRN-[id number]  Patient is a 76y old  Female who presents with a chief complaint of respiratory failure (16 Mar 2022 13:37)      OVERNIGHT EVENTS:  desat to 88%, suctioned for increased tracheal secretions, improved to 90's.     SUBJECTIVE:    12 Point ROS Negative unless noted otherwise above.  -------------------------------------------------------------------------------  VITAL SIGNS:  Vital Signs Last 24 Hrs  T(C): 37.3 (31 Mar 2022 09:47), Max: 37.3 (31 Mar 2022 09:47)  T(F): 99.2 (31 Mar 2022 09:47), Max: 99.2 (31 Mar 2022 09:47)  HR: 89 (31 Mar 2022 11:00) (77 - 112)  BP: 106/71 (31 Mar 2022 11:00) (106/71 - 155/72)  BP(mean): 81 (31 Mar 2022 11:00) (80 - 110)  RR: 22 (31 Mar 2022 11:00) (20 - 31)  SpO2: 97% (31 Mar 2022 11:00) (86% - 100%)  I&O's Summary    30 Mar 2022 07:01  -  31 Mar 2022 07:00  --------------------------------------------------------  IN: 1186 mL / OUT: 375 mL / NET: 811 mL    31 Mar 2022 07:01  -  31 Mar 2022 12:23  --------------------------------------------------------  IN: 168 mL / OUT: 300 mL / NET: -132 mL        PHYSICAL EXAM:    General: NAD, well developed  HEENT: NC/AT; EOMI, PERRLA, anicteric sclera; moist mucosal membranes.  Neck: supple, trachea midline  Cardiovascular: RRR, +S1/S2; NO M/R/G  Respiratory: CTA B/L; no W/R/R  Gastrointestinal: soft, NT/ND; +BSx4  Extremities: WWP; no edema or cyanosis  Vascular: 2+ radial, DP/PT pulses B/L  Neurological: AAOx3; no focal deficits    ALLERGIES:  Allergies    ace inhibitors (Anaphylaxis)  caffeine (Rash)  cefepime (Rash)  chocolate (Rash)  high acid (Rash)  Tomatoes (Hives)    Intolerances        MEDICATIONS:  MEDICATIONS  (STANDING):  albuterol/ipratropium for Nebulization 3 milliLiter(s) Nebulizer every 6 hours  apixaban 2.5 milliGRAM(s) Enteral Tube every 12 hours  ARIPiprazole 2 milliGRAM(s) Oral every 24 hours  cefTAZidime  IVPB 2 Gram(s) IV Intermittent every 12 hours  chlorhexidine 0.12% Liquid 15 milliLiter(s) Oral Mucosa every 12 hours  chlorhexidine 2% Cloths 1 Application(s) Topical <User Schedule>  clonazePAM  Tablet 0.25 milliGRAM(s) Oral every 12 hours  dextrose 5%. 1000 milliLiter(s) (50 mL/Hr) IV Continuous <Continuous>  dextrose 5%. 1000 milliLiter(s) (100 mL/Hr) IV Continuous <Continuous>  glucagon  Injectable 1 milliGRAM(s) IntraMuscular once  insulin regular  human corrective regimen sliding scale   SubCutaneous every 6 hours  pantoprazole   Suspension 40 milliGRAM(s) Oral before breakfast  sodium chloride 3%  Inhalation 4 milliLiter(s) Inhalation every 12 hours    MEDICATIONS  (PRN):  acetaminophen    Suspension .. 650 milliGRAM(s) Enteral Tube every 6 hours PRN Temp greater or equal to 38C (100.4F), Mild Pain (1 - 3)      -------------------------------------------------------------------------------  LABS:                        10.5   7.38  )-----------( 347      ( 31 Mar 2022 05:27 )             34.2     03-31    135  |  98  |  24<H>  ----------------------------<  130<H>  5.0   |  29  |  0.65    Ca    9.8      31 Mar 2022 05:27  Phos  3.3     03-31  Mg     1.8     03-31    TPro  7.6  /  Alb  3.9  /  TBili  0.3  /  DBili  x   /  AST  37  /  ALT  26  /  AlkPhos  75  03-31    LIVER FUNCTIONS - ( 31 Mar 2022 05:27 )  Alb: 3.9 g/dL / Pro: 7.6 g/dL / ALK PHOS: 75 U/L / ALT: 26 U/L / AST: 37 U/L / GGT: x               CAPILLARY BLOOD GLUCOSE      POCT Blood Glucose.: 117 mg/dL (31 Mar 2022 11:20)      COVID-19 PCR: NotDetec (27 Mar 2022 09:26)  COVID-19 PCR: NotDetec (21 Mar 2022 06:26)  COVID-19 PCR: NotDetec (15 Mar 2022 05:33)  COVID-19 PCR: NotDetec (09 Mar 2022 13:39)  COVID-19 PCR: NotDetec (03 Mar 2022 02:50)      RADIOLOGY & ADDITIONAL TESTS: Reviewed.   **Incomplete Note**    Transfer from MICU to     Hospital Course:     Patient is a 74 year old female with a PMH of cerebral palsy with functional quadriplegia, DVT/PE on Eliquis, GERD, chronic dysphagia with PEG, thoracic spine fusion, previously a [place] resident, now resides in [place], with [facility]. Patient has a history of prior admission in 2020 for aspiration pneumonia requiring intubation. Patient presented from her assisted living facility in acute hypoxemic respiratory failure likely 2/2 aspiration pneumonia. Patient was intubated in the field for airway protection. Admitted to MICU intubated. Patient started on Meropenem due to previous hospitalization with Pseudomonas pneumonia requiring Meropenem. Antibiotic switched to Ciprofloxacin (3/5-3/12) due to improved sensitivity. Catheter culture from 3/3 with >100,000 E.coli, treated with ciprofloxacin. Lu was exchanged on admission. Failed extubation on 3/4 due to anxiety/agitation. Patient successfully extubated to HFNC on 3/8. Pt further weaned to 6L--> 4L with oxygen sat of 94-95%. ew lu placed on admission. Medical ethics consulted on 3/8 and deemed [place] Consumer Advisory Board (CAB) as the appropriate surrogate decision-maker for patient. Although some family is present, not involved in patients care. FOR CONSENT, 2-physician consent is required. Weaned to 3L and stepped down to RMF on 3/10.     On floors, patient had tenuous respiratory status given inability to clear secretions and fluctuating mental status, given frequent suctioning with fluctuating O2 requirements 2L-6L. On 3/13, rapid response was called for AHRF and unresponsiveness in setting of aspiration event. Patient was reintubated and stepped back up to MICU. In ICU, pt underwent bronchoscopy which showed no signs of purulent secretions or endobronchial lesions. Blood cultures from 3/13 negative. Documents for trach approval sent to consumer advisory board.Trach performed on 3/24. On 3/25, fever 101.4, started zosyn for empiric treatment of pseudomonal pna. Sputum from 3/25 grew  proteus mirabilis and Stenotrophomonas maltophilia. Blood cultures from 3/25 negative. Switched from zosyn to aztreonam (due to penicillin allergy?). Started on bactrim from 3/26-3/30 bactrim for Proteus and Stenotrophomonas in sputum, D/c bactrim due to hyperkalemia and Started on Ceftazidime (covers both proteus and Stenotrophomonas), switched to ceftazidime (3/31-4/2). Hospital course complicated by anxiety, treated with precedex gtt while in ICU, now with clonazepam 0.5mg BID.      NOAM GHOSH, 76y, Female  MRN-[id number]  Patient is a 76y old  Female who presents with a chief complaint of respiratory failure (16 Mar 2022 13:37)      OVERNIGHT EVENTS:  desat to 88%, suctioned for increased tracheal secretions, improved to 90's.     SUBJECTIVE:    12 Point ROS Negative unless noted otherwise above.  -------------------------------------------------------------------------------  VITAL SIGNS:  Vital Signs Last 24 Hrs  T(C): 37.3 (31 Mar 2022 09:47), Max: 37.3 (31 Mar 2022 09:47)  T(F): 99.2 (31 Mar 2022 09:47), Max: 99.2 (31 Mar 2022 09:47)  HR: 89 (31 Mar 2022 11:00) (77 - 112)  BP: 106/71 (31 Mar 2022 11:00) (106/71 - 155/72)  BP(mean): 81 (31 Mar 2022 11:00) (80 - 110)  RR: 22 (31 Mar 2022 11:00) (20 - 31)  SpO2: 97% (31 Mar 2022 11:00) (86% - 100%)  I&O's Summary    30 Mar 2022 07:01  -  31 Mar 2022 07:00  --------------------------------------------------------  IN: 1186 mL / OUT: 375 mL / NET: 811 mL    31 Mar 2022 07:01  -  31 Mar 2022 12:23  --------------------------------------------------------  IN: 168 mL / OUT: 300 mL / NET: -132 mL        PHYSICAL EXAM:    General: NAD, well developed  HEENT: NC/AT; EOMI, PERRLA, anicteric sclera; moist mucosal membranes.  Neck: supple, trachea midline  Cardiovascular: RRR, +S1/S2; NO M/R/G  Respiratory: CTA B/L; no W/R/R  Gastrointestinal: soft, NT/ND; +BSx4  Extremities: WWP; no edema or cyanosis  Vascular: 2+ radial, DP/PT pulses B/L  Neurological: AAOx3; no focal deficits    ALLERGIES:  Allergies    ace inhibitors (Anaphylaxis)  caffeine (Rash)  cefepime (Rash)  chocolate (Rash)  high acid (Rash)  Tomatoes (Hives)    Intolerances        MEDICATIONS:  MEDICATIONS  (STANDING):  albuterol/ipratropium for Nebulization 3 milliLiter(s) Nebulizer every 6 hours  apixaban 2.5 milliGRAM(s) Enteral Tube every 12 hours  ARIPiprazole 2 milliGRAM(s) Oral every 24 hours  cefTAZidime  IVPB 2 Gram(s) IV Intermittent every 12 hours  chlorhexidine 0.12% Liquid 15 milliLiter(s) Oral Mucosa every 12 hours  chlorhexidine 2% Cloths 1 Application(s) Topical <User Schedule>  clonazePAM  Tablet 0.25 milliGRAM(s) Oral every 12 hours  dextrose 5%. 1000 milliLiter(s) (50 mL/Hr) IV Continuous <Continuous>  dextrose 5%. 1000 milliLiter(s) (100 mL/Hr) IV Continuous <Continuous>  glucagon  Injectable 1 milliGRAM(s) IntraMuscular once  insulin regular  human corrective regimen sliding scale   SubCutaneous every 6 hours  pantoprazole   Suspension 40 milliGRAM(s) Oral before breakfast  sodium chloride 3%  Inhalation 4 milliLiter(s) Inhalation every 12 hours    MEDICATIONS  (PRN):  acetaminophen    Suspension .. 650 milliGRAM(s) Enteral Tube every 6 hours PRN Temp greater or equal to 38C (100.4F), Mild Pain (1 - 3)      -------------------------------------------------------------------------------  LABS:                        10.5   7.38  )-----------( 347      ( 31 Mar 2022 05:27 )             34.2     03-31    135  |  98  |  24<H>  ----------------------------<  130<H>  5.0   |  29  |  0.65    Ca    9.8      31 Mar 2022 05:27  Phos  3.3     03-31  Mg     1.8     03-31    TPro  7.6  /  Alb  3.9  /  TBili  0.3  /  DBili  x   /  AST  37  /  ALT  26  /  AlkPhos  75  03-31    LIVER FUNCTIONS - ( 31 Mar 2022 05:27 )  Alb: 3.9 g/dL / Pro: 7.6 g/dL / ALK PHOS: 75 U/L / ALT: 26 U/L / AST: 37 U/L / GGT: x               CAPILLARY BLOOD GLUCOSE      POCT Blood Glucose.: 117 mg/dL (31 Mar 2022 11:20)      COVID-19 PCR: NotDetec (27 Mar 2022 09:26)  COVID-19 PCR: NotDetec (21 Mar 2022 06:26)  COVID-19 PCR: NotDetec (15 Mar 2022 05:33)  COVID-19 PCR: NotDetec (09 Mar 2022 13:39)  COVID-19 PCR: NotDetec (03 Mar 2022 02:50)      RADIOLOGY & ADDITIONAL TESTS: Reviewed.   **Incomplete Note**    Transfer from MICU to     Hospital Course:     Patient is a 74 year old female with a PMH of cerebral palsy with functional quadriplegia, DVT/PE on Eliquis, GERD, chronic dysphagia with PEG, thoracic spine fusion, previously a [place] resident, now resides [place], with [facility]. Patient has a history of prior admission in 2020 for aspiration pneumonia requiring intubation. Patient presented from her assisted living facility in acute hypoxemic respiratory failure likely 2/2 aspiration pneumonia. Patient was intubated in the field for airway protection. Admitted to MICU intubated. Patient started on Meropenem due to previous hospitalization with Pseudomonas pneumonia requiring Meropenem. Antibiotic switched to Ciprofloxacin (3/5-3/12) due to improved sensitivity. Catheter culture from 3/3 with >100,000 E.coli, treated with ciprofloxacin. Lu was exchanged on admission. Failed extubation on 3/4 due to anxiety/agitation. Patient successfully extubated to HFNC on 3/8. Pt further weaned to 6L--> 4L with oxygen sat of 94-95%. ew lu placed on admission. Medical ethics consulted on 3/8 and deemed [place] Consumer Advisory Board (CAB) as the appropriate surrogate decision-maker for patient. Although some family is present, not involved in patients care. FOR CONSENT, 2-physician consent is required. Weaned to 3L and stepped down to RMF on 3/10.     On floors, patient had tenuous respiratory status given inability to clear secretions and fluctuating mental status, given frequent suctioning with fluctuating O2 requirements 2L-6L. On 3/13, rapid response was called for AHRF and unresponsiveness in setting of aspiration event. Patient was reintubated and stepped back up to MICU. In ICU, pt underwent bronchoscopy which showed no signs of purulent secretions or endobronchial lesions. Blood cultures from 3/13 negative. Documents for trach approval sent to consumer advisory board.Trach performed on 3/24. On 3/25, fever 101.4, started zosyn for empiric treatment of pseudomonal pna. Sputum from 3/25 grew proteus mirabilis and Stenotrophomonas maltophilia. Blood cultures from 3/25 negative. Switched from zosyn to aztreonam (due to penicillin allergy?). Started on bactrim from 3/26-3/30 bactrim for Proteus and Stenotrophomonas in sputum, D/c bactrim due to hyperkalemia and Started on Ceftazidime (covers both proteus and Stenotrophomonas), switched to ceftazidime (3/31-4/2). Hospital course complicated by anxiety, treated with precedex gtt while in ICU, now with clonazepam 0.5mg BID.      NOAM GHOSH, 76y, Female  MRN-[id number]  Patient is a 76y old  Female who presents with a chief complaint of respiratory failure (16 Mar 2022 13:37)      OVERNIGHT EVENTS:  desat to 88%, suctioned for increased tracheal secretions, improved to 90's.     SUBJECTIVE: Patient seen and examined at bedside. Does not appear to be in distress. Interview limited because of patient condition     12 Point ROS Negative unless noted otherwise above.  -------------------------------------------------------------------------------  VITAL SIGNS:  Vital Signs Last 24 Hrs  T(C): 37.3 (31 Mar 2022 09:47), Max: 37.3 (31 Mar 2022 09:47)  T(F): 99.2 (31 Mar 2022 09:47), Max: 99.2 (31 Mar 2022 09:47)  HR: 89 (31 Mar 2022 11:00) (77 - 112)  BP: 106/71 (31 Mar 2022 11:00) (106/71 - 155/72)  BP(mean): 81 (31 Mar 2022 11:00) (80 - 110)  RR: 22 (31 Mar 2022 11:00) (20 - 31)  SpO2: 97% (31 Mar 2022 11:00) (86% - 100%)  I&O's Summary    30 Mar 2022 07:01  -  31 Mar 2022 07:00  --------------------------------------------------------  IN: 1186 mL / OUT: 375 mL / NET: 811 mL    31 Mar 2022 07:01  -  31 Mar 2022 12:23  --------------------------------------------------------  IN: 168 mL / OUT: 300 mL / NET: -132 mL        PHYSICAL EXAM:    General: frail elderly woman in nad   HEENT: NC/AT; EOMI, PERRLA, anicteric sclera; dry mucosal membranes.  Neck: +trach in place; supple, trachea midline  Cardiovascular: RRR, +S1/S2; NO M/R/G  Respiratory: CTA B/L; no W/R/R  Gastrointestinal: soft, NT/ND; +BSx4  Extremities: WWP; +1 pitting edema in bilateral LE; +chronic deformity in b/l feet.  Vascular: 2+ radial, DP/PT pulses B/L  Neurological: AAOx3; no focal deficits; cerebral palsy, difficulty with communication and making needs    ALLERGIES:  Allergies    ace inhibitors (Anaphylaxis)  caffeine (Rash)  cefepime (Rash)  chocolate (Rash)  high acid (Rash)  Tomatoes (Hives)    Intolerances        MEDICATIONS:  MEDICATIONS  (STANDING):  albuterol/ipratropium for Nebulization 3 milliLiter(s) Nebulizer every 6 hours  apixaban 2.5 milliGRAM(s) Enteral Tube every 12 hours  ARIPiprazole 2 milliGRAM(s) Oral every 24 hours  cefTAZidime  IVPB 2 Gram(s) IV Intermittent every 12 hours  chlorhexidine 0.12% Liquid 15 milliLiter(s) Oral Mucosa every 12 hours  chlorhexidine 2% Cloths 1 Application(s) Topical <User Schedule>  clonazePAM  Tablet 0.25 milliGRAM(s) Oral every 12 hours  dextrose 5%. 1000 milliLiter(s) (50 mL/Hr) IV Continuous <Continuous>  dextrose 5%. 1000 milliLiter(s) (100 mL/Hr) IV Continuous <Continuous>  glucagon  Injectable 1 milliGRAM(s) IntraMuscular once  insulin regular  human corrective regimen sliding scale   SubCutaneous every 6 hours  pantoprazole   Suspension 40 milliGRAM(s) Oral before breakfast  sodium chloride 3%  Inhalation 4 milliLiter(s) Inhalation every 12 hours    MEDICATIONS  (PRN):  acetaminophen    Suspension .. 650 milliGRAM(s) Enteral Tube every 6 hours PRN Temp greater or equal to 38C (100.4F), Mild Pain (1 - 3)      -------------------------------------------------------------------------------  LABS:                        10.5   7.38  )-----------( 347      ( 31 Mar 2022 05:27 )             34.2     03-31    135  |  98  |  24<H>  ----------------------------<  130<H>  5.0   |  29  |  0.65    Ca    9.8      31 Mar 2022 05:27  Phos  3.3     03-31  Mg     1.8     03-31    TPro  7.6  /  Alb  3.9  /  TBili  0.3  /  DBili  x   /  AST  37  /  ALT  26  /  AlkPhos  75  03-31    LIVER FUNCTIONS - ( 31 Mar 2022 05:27 )  Alb: 3.9 g/dL / Pro: 7.6 g/dL / ALK PHOS: 75 U/L / ALT: 26 U/L / AST: 37 U/L / GGT: x               CAPILLARY BLOOD GLUCOSE      POCT Blood Glucose.: 117 mg/dL (31 Mar 2022 11:20)      COVID-19 PCR: NotDetec (27 Mar 2022 09:26)  COVID-19 PCR: NotDetec (21 Mar 2022 06:26)  COVID-19 PCR: NotDetec (15 Mar 2022 05:33)  COVID-19 PCR: NotDetec (09 Mar 2022 13:39)  COVID-19 PCR: NotDetec (03 Mar 2022 02:50)      RADIOLOGY & ADDITIONAL TESTS: Reviewed.   **Incomplete Note**    Transfer from MICU to     Hospital Course:     Patient is a 74 year old female with a PMH of cerebral palsy with functional quadriplegia, DVT/PE on Eliquis, GERD, chronic dysphagia with PEG, thoracic spine fusion, previously a [place] resident, now resides in [facility], with [facility] Consumer Advisory Board (CAB). Patient presented from her assisted living facility in acute hypoxemic respiratory failure likely 2/2 aspiration pneumonia. Patient was intubated in the field for airway protection. Admitted to MICU intubated. Patient started on Meropenem due to previous hospitalization with Pseudomonas pneumonia requiring Meropenem. Antibiotic switched to Ciprofloxacin (3/5-3/12) due to improved sensitivity. Catheter culture from 3/3 with >100,000 E.coli, treated with ciprofloxacin. Lu was exchanged on admission. Failed extubation on 3/4 due to anxiety/agitation. Patient successfully extubated to HFNC on 3/8. Pt further weaned to 6L--> 4L with oxygen sat of 94-95%. ew lu placed on admission. Medical ethics consulted on 3/8 and deemed [place] Consumer [employer] Board (CAB) as the appropriate surrogate decision-maker for patient. Although some family is present, not involved in patients care. FOR CONSENT, 2-physician consent is required. Weaned to 3L and stepped down to RMF on 3/10.     On floors, patient had tenuous respiratory status given inability to clear secretions and fluctuating mental status, given frequent suctioning with fluctuating O2 requirements 2L-6L. On 3/13, rapid response was called for AHRF and unresponsiveness in setting of aspiration event. Patient was reintubated and stepped back up to MICU. In ICU, pt underwent bronchoscopy which showed no signs of purulent secretions or endobronchial lesions. Blood cultures from 3/13 negative. Documents for trach approval sent to consumer advisory board.Trach performed on 3/24. On 3/25, fever 101.4, started zosyn for empiric treatment of pseudomonal pna. Sputum from 3/25 grew proteus mirabilis and Stenotrophomonas maltophilia. Blood cultures from 3/25 negative. Switched from zosyn to aztreonam (due to penicillin allergy?). Started on bactrim from 3/26-3/30 bactrim for Proteus and Stenotrophomonas in sputum, D/c bactrim due to hyperkalemia and Started on Ceftazidime (covers both proteus and Stenotrophomonas), switched to ceftazidime (3/31-4/2). Hospital course complicated by anxiety, treated with precedex gtt while in ICU, now with clonazepam 0.5mg BID.      NOAM GHOSH, 76y, Female  MRN-[id number]  Patient is a 76y old  Female who presents with a chief complaint of respiratory failure (16 Mar 2022 13:37)      OVERNIGHT EVENTS:  desat to 88%, suctioned for increased tracheal secretions, improved to 90's.     SUBJECTIVE: Patient seen and examined at bedside. Does not appear to be in distress. Interview limited because of patient condition     12 Point ROS Negative unless noted otherwise above.  -------------------------------------------------------------------------------  VITAL SIGNS:  Vital Signs Last 24 Hrs  T(C): 37.3 (31 Mar 2022 09:47), Max: 37.3 (31 Mar 2022 09:47)  T(F): 99.2 (31 Mar 2022 09:47), Max: 99.2 (31 Mar 2022 09:47)  HR: 89 (31 Mar 2022 11:00) (77 - 112)  BP: 106/71 (31 Mar 2022 11:00) (106/71 - 155/72)  BP(mean): 81 (31 Mar 2022 11:00) (80 - 110)  RR: 22 (31 Mar 2022 11:00) (20 - 31)  SpO2: 97% (31 Mar 2022 11:00) (86% - 100%)  I&O's Summary    30 Mar 2022 07:01  -  31 Mar 2022 07:00  --------------------------------------------------------  IN: 1186 mL / OUT: 375 mL / NET: 811 mL    31 Mar 2022 07:01  -  31 Mar 2022 12:23  --------------------------------------------------------  IN: 168 mL / OUT: 300 mL / NET: -132 mL        PHYSICAL EXAM:    General: frail elderly woman in nad   HEENT: NC/AT; EOMI, PERRLA, anicteric sclera; dry mucosal membranes.  Neck: +trach in place; supple, trachea midline  Cardiovascular: RRR, +S1/S2; NO M/R/G  Respiratory: CTA B/L; no W/R/R  Gastrointestinal: soft, NT/ND; +BSx4  Extremities: WWP; +1 pitting edema in bilateral LE; +chronic deformity in b/l feet.  Vascular: 2+ radial, DP/PT pulses B/L  Neurological: AAOx3; no focal deficits; cerebral palsy, difficulty with communication and making needs    ALLERGIES:  Allergies    ace inhibitors (Anaphylaxis)  caffeine (Rash)  cefepime (Rash)  chocolate (Rash)  high acid (Rash)  Tomatoes (Hives)    Intolerances        MEDICATIONS:  MEDICATIONS  (STANDING):  albuterol/ipratropium for Nebulization 3 milliLiter(s) Nebulizer every 6 hours  apixaban 2.5 milliGRAM(s) Enteral Tube every 12 hours  ARIPiprazole 2 milliGRAM(s) Oral every 24 hours  cefTAZidime  IVPB 2 Gram(s) IV Intermittent every 12 hours  chlorhexidine 0.12% Liquid 15 milliLiter(s) Oral Mucosa every 12 hours  chlorhexidine 2% Cloths 1 Application(s) Topical <User Schedule>  clonazePAM  Tablet 0.25 milliGRAM(s) Oral every 12 hours  dextrose 5%. 1000 milliLiter(s) (50 mL/Hr) IV Continuous <Continuous>  dextrose 5%. 1000 milliLiter(s) (100 mL/Hr) IV Continuous <Continuous>  glucagon  Injectable 1 milliGRAM(s) IntraMuscular once  insulin regular  human corrective regimen sliding scale   SubCutaneous every 6 hours  pantoprazole   Suspension 40 milliGRAM(s) Oral before breakfast  sodium chloride 3%  Inhalation 4 milliLiter(s) Inhalation every 12 hours    MEDICATIONS  (PRN):  acetaminophen    Suspension .. 650 milliGRAM(s) Enteral Tube every 6 hours PRN Temp greater or equal to 38C (100.4F), Mild Pain (1 - 3)      -------------------------------------------------------------------------------  LABS:                        10.5   7.38  )-----------( 347      ( 31 Mar 2022 05:27 )             34.2     03-31    135  |  98  |  24<H>  ----------------------------<  130<H>  5.0   |  29  |  0.65    Ca    9.8      31 Mar 2022 05:27  Phos  3.3     03-31  Mg     1.8     03-31    TPro  7.6  /  Alb  3.9  /  TBili  0.3  /  DBili  x   /  AST  37  /  ALT  26  /  AlkPhos  75  03-31    LIVER FUNCTIONS - ( 31 Mar 2022 05:27 )  Alb: 3.9 g/dL / Pro: 7.6 g/dL / ALK PHOS: 75 U/L / ALT: 26 U/L / AST: 37 U/L / GGT: x               CAPILLARY BLOOD GLUCOSE      POCT Blood Glucose.: 117 mg/dL (31 Mar 2022 11:20)      COVID-19 PCR: NotDetec (27 Mar 2022 09:26)  COVID-19 PCR: NotDetec (21 Mar 2022 06:26)  COVID-19 PCR: NotDetec (15 Mar 2022 05:33)  COVID-19 PCR: NotDetec (09 Mar 2022 13:39)  COVID-19 PCR: NotDetec (03 Mar 2022 02:50)      RADIOLOGY & ADDITIONAL TESTS: Reviewed.   **Incomplete Note**    Transfer from MICU to 7    Hospital Course:     Patient is a 74 year old female with a PMH of cerebral palsy with functional quadriplegia, DVT/PE on Eliquis, GERD, chronic dysphagia with PEG, thoracic spine fusion, previously a [place] resident, now resides in halfway, with [facility] Consumer Advisory [employer] (CAB). Patient presented from her assisted living facility in acute hypoxemic respiratory failure likely 2/2 aspiration pneumonia. Patient was intubated in the field for airway protection. Admitted to MICU intubated. Patient started on Meropenem due to previous hospitalization with Pseudomonas pneumonia requiring Meropenem. Antibiotic switched to Ciprofloxacin (3/5-3/12) due to improved sensitivity from sputum culture data. Catheter culture from 3/3 grew >100,000 E.coli, also treated with ciprofloxacin. Quintero was exchanged on admission. Failed extubation on 3/4 due to anxiety/agitation. Patient successfully extubated to HFNC on 3/8. Pt further weaned to 6L--> 4L with oxygen sat of 94-95%. Medical ethics consulted on 3/8 and deemed [place] Consumer Advisory Board (CAB) as the appropriate surrogate decision-maker for patient. Although some family is present, not involved in patients care. FOR CONSENT, 2-physician consent is required. Weaned to 3L and stepped down to RMF on 3/10.     On floors, patient had tenuous respiratory status given inability to clear secretions and fluctuating mental status, required frequent suctioning with fluctuating O2 requirements 2L-6L. On 3/13, rapid response was called for AHRF and unresponsiveness in setting of aspiration event. Patient was reintubated and stepped back up to MICU. In ICU, pt underwent bronchoscopy which showed no signs of purulent secretions or endobronchial lesions. Blood cultures from 3/13 negative. Trach approved by CAB and performed on 3/24. On 3/25, fever 101.4, started zosyn for empiric treatment of pseudomonal pna likely aspiration during trach procedure. Sputum from 3/25 grew proteus mirabilis and Stenotrophomonas maltophilia. Blood cultures from 3/25 negative. Switched from zosyn to aztreonam (due to penicillin allergy?). Then switched on bactrim from 3/26-3/30 bactrim when Proteus and Stenotrophomonas grew in sputum, D/c'ed bactrim due to hyperkalemia and switched to ceftazidime (3/31-4/2). Hospital course complicated by anxiety, now on clonazepam 0.5mg BID.      NOAM GHOSH, 76y, Female  MRN-[id number]  Patient is a 76y old  Female who presents with a chief complaint of respiratory failure (16 Mar 2022 13:37)      OVERNIGHT EVENTS:  desat to 88%, suctioned for increased tracheal secretions, improved to 90's.     SUBJECTIVE: Patient seen and examined at bedside. Does not appear to be in distress. Interview limited because of patient condition     12 Point ROS Negative unless noted otherwise above.  -------------------------------------------------------------------------------  VITAL SIGNS:  Vital Signs Last 24 Hrs  T(C): 37.3 (31 Mar 2022 09:47), Max: 37.3 (31 Mar 2022 09:47)  T(F): 99.2 (31 Mar 2022 09:47), Max: 99.2 (31 Mar 2022 09:47)  HR: 89 (31 Mar 2022 11:00) (77 - 112)  BP: 106/71 (31 Mar 2022 11:00) (106/71 - 155/72)  BP(mean): 81 (31 Mar 2022 11:00) (80 - 110)  RR: 22 (31 Mar 2022 11:00) (20 - 31)  SpO2: 97% (31 Mar 2022 11:00) (86% - 100%)  I&O's Summary    30 Mar 2022 07:01  -  31 Mar 2022 07:00  --------------------------------------------------------  IN: 1186 mL / OUT: 375 mL / NET: 811 mL    31 Mar 2022 07:01  -  31 Mar 2022 12:23  --------------------------------------------------------  IN: 168 mL / OUT: 300 mL / NET: -132 mL        PHYSICAL EXAM:    General: frail elderly woman in nad   HEENT: NC/AT; EOMI, PERRLA, anicteric sclera; dry mucosal membranes.  Neck: +trach in place; supple, trachea midline  Cardiovascular: RRR, +S1/S2; NO M/R/G  Respiratory: CTA B/L; no W/R/R  Gastrointestinal: soft, NT/ND; +BSx4  Extremities: WWP; +1 pitting edema in bilateral LE; +chronic deformity in b/l feet.  Vascular: 2+ radial, DP/PT pulses B/L  Neurological: AAOx3; no focal deficits; cerebral palsy, difficulty with communication and making needs    ALLERGIES:  Allergies    ace inhibitors (Anaphylaxis)  caffeine (Rash)  cefepime (Rash)  chocolate (Rash)  high acid (Rash)  Tomatoes (Hives)    Intolerances        MEDICATIONS:  MEDICATIONS  (STANDING):  albuterol/ipratropium for Nebulization 3 milliLiter(s) Nebulizer every 6 hours  apixaban 2.5 milliGRAM(s) Enteral Tube every 12 hours  ARIPiprazole 2 milliGRAM(s) Oral every 24 hours  cefTAZidime  IVPB 2 Gram(s) IV Intermittent every 12 hours  chlorhexidine 0.12% Liquid 15 milliLiter(s) Oral Mucosa every 12 hours  chlorhexidine 2% Cloths 1 Application(s) Topical <User Schedule>  clonazePAM  Tablet 0.25 milliGRAM(s) Oral every 12 hours  dextrose 5%. 1000 milliLiter(s) (50 mL/Hr) IV Continuous <Continuous>  dextrose 5%. 1000 milliLiter(s) (100 mL/Hr) IV Continuous <Continuous>  glucagon  Injectable 1 milliGRAM(s) IntraMuscular once  insulin regular  human corrective regimen sliding scale   SubCutaneous every 6 hours  pantoprazole   Suspension 40 milliGRAM(s) Oral before breakfast  sodium chloride 3%  Inhalation 4 milliLiter(s) Inhalation every 12 hours    MEDICATIONS  (PRN):  acetaminophen    Suspension .. 650 milliGRAM(s) Enteral Tube every 6 hours PRN Temp greater or equal to 38C (100.4F), Mild Pain (1 - 3)      -------------------------------------------------------------------------------  LABS:                        10.5   7.38  )-----------( 347      ( 31 Mar 2022 05:27 )             34.2     03-31    135  |  98  |  24<H>  ----------------------------<  130<H>  5.0   |  29  |  0.65    Ca    9.8      31 Mar 2022 05:27  Phos  3.3     03-31  Mg     1.8     03-31    TPro  7.6  /  Alb  3.9  /  TBili  0.3  /  DBili  x   /  AST  37  /  ALT  26  /  AlkPhos  75  03-31    LIVER FUNCTIONS - ( 31 Mar 2022 05:27 )  Alb: 3.9 g/dL / Pro: 7.6 g/dL / ALK PHOS: 75 U/L / ALT: 26 U/L / AST: 37 U/L / GGT: x               CAPILLARY BLOOD GLUCOSE      POCT Blood Glucose.: 117 mg/dL (31 Mar 2022 11:20)      COVID-19 PCR: NotDetec (27 Mar 2022 09:26)  COVID-19 PCR: NotDetec (21 Mar 2022 06:26)  COVID-19 PCR: NotDetec (15 Mar 2022 05:33)  COVID-19 PCR: NotDetec (09 Mar 2022 13:39)  COVID-19 PCR: NotDetec (03 Mar 2022 02:50)      RADIOLOGY & ADDITIONAL TESTS: Reviewed.   **Incomplete Note**    Transfer from MICU to 7    Hospital Course:     Patient is a 74 year old female with a PMH of cerebral palsy with functional quadriplegia, DVT/PE on Eliquis, GERD, chronic dysphagia with PEG, thoracic spine fusion, previously a [place] resident, now resides in senior living, with [facility] Consumer [employer] (CAB). Patient presented from her assisted living facility in acute hypoxemic respiratory failure likely 2/2 aspiration pneumonia. Patient was intubated in the field for airway protection. Admitted to MICU intubated. Patient started on Meropenem due to previous hospitalization with Pseudomonas pneumonia requiring Meropenem. Antibiotic switched to Ciprofloxacin (3/5-3/12) due to improved sensitivity from sputum culture data. Catheter culture from 3/3 grew >100,000 E.coli, also treated with ciprofloxacin. Quintero was exchanged on admission. Failed extubation on 3/4 due to anxiety/agitation. Patient successfully extubated to HFNC on 3/8. Pt further weaned to 6L--> 4L with oxygen sat of 94-95%. Medical ethics consulted on 3/8 and deemed [place] Consumer Advisory Board (CAB) as the appropriate surrogate decision-maker for patient. Although some family is present, not involved in patients care. FOR CONSENT, 2-physician consent is required. Weaned to 3L and stepped down to RMF on 3/10.     On floors, patient had tenuous respiratory status given inability to clear secretions and fluctuating mental status, required frequent suctioning with fluctuating O2 requirements 2L-6L. On 3/13, rapid response was called for AHRF and unresponsiveness in setting of aspiration event. Patient was reintubated and stepped back up to MICU. In ICU, pt underwent bronchoscopy which showed no signs of purulent secretions or endobronchial lesions. Blood cultures from 3/13 negative. Trach approved by CAB and performed on 3/24. On 3/25, fever 101.4, started zosyn for empiric treatment of pseudomonal pna likely aspiration during trach procedure. Sputum from 3/25 grew proteus mirabilis and Stenotrophomonas maltophilia. Blood cultures from 3/25 negative. Switched from zosyn to aztreonam (due to penicillin allergy?). Then switched on bactrim from 3/26-3/30 bactrim when Proteus and Stenotrophomonas grew in sputum, D/c'ed bactrim due to hyperkalemia and switched to ceftazidime (3/31-4/2). Hospital course complicated by anxiety, can give xanax 0.25mg after assessing patient.      NOAM GHOSH, 76y, Female  MRN-[id number]  Patient is a 76y old  Female who presents with a chief complaint of respiratory failure (16 Mar 2022 13:37)      OVERNIGHT EVENTS:  desat to 88%, suctioned for increased tracheal secretions, improved to 90's.     SUBJECTIVE: Patient seen and examined at bedside. Does not appear to be in distress. Interview limited because of patient condition     12 Point ROS Negative unless noted otherwise above.  -------------------------------------------------------------------------------  VITAL SIGNS:  Vital Signs Last 24 Hrs  T(C): 37.3 (31 Mar 2022 09:47), Max: 37.3 (31 Mar 2022 09:47)  T(F): 99.2 (31 Mar 2022 09:47), Max: 99.2 (31 Mar 2022 09:47)  HR: 89 (31 Mar 2022 11:00) (77 - 112)  BP: 106/71 (31 Mar 2022 11:00) (106/71 - 155/72)  BP(mean): 81 (31 Mar 2022 11:00) (80 - 110)  RR: 22 (31 Mar 2022 11:00) (20 - 31)  SpO2: 97% (31 Mar 2022 11:00) (86% - 100%)  I&O's Summary    30 Mar 2022 07:01  -  31 Mar 2022 07:00  --------------------------------------------------------  IN: 1186 mL / OUT: 375 mL / NET: 811 mL    31 Mar 2022 07:01  -  31 Mar 2022 12:23  --------------------------------------------------------  IN: 168 mL / OUT: 300 mL / NET: -132 mL        PHYSICAL EXAM:    General: frail elderly woman in nad   HEENT: NC/AT; EOMI, PERRLA, anicteric sclera; dry mucosal membranes.  Neck: +trach in place; supple, trachea midline  Cardiovascular: RRR, +S1/S2; NO M/R/G  Respiratory: CTA B/L; no W/R/R  Gastrointestinal: soft, NT/ND; +BSx4  Extremities: WWP; +1 pitting edema in bilateral LE; +chronic deformity in b/l feet.  Vascular: 2+ radial, DP/PT pulses B/L  Neurological: AAOx3; no focal deficits; cerebral palsy, difficulty with communication and making needs    ALLERGIES:  Allergies    ace inhibitors (Anaphylaxis)  caffeine (Rash)  cefepime (Rash)  chocolate (Rash)  high acid (Rash)  Tomatoes (Hives)    Intolerances        MEDICATIONS:  MEDICATIONS  (STANDING):  albuterol/ipratropium for Nebulization 3 milliLiter(s) Nebulizer every 6 hours  apixaban 2.5 milliGRAM(s) Enteral Tube every 12 hours  ARIPiprazole 2 milliGRAM(s) Oral every 24 hours  cefTAZidime  IVPB 2 Gram(s) IV Intermittent every 12 hours  chlorhexidine 0.12% Liquid 15 milliLiter(s) Oral Mucosa every 12 hours  chlorhexidine 2% Cloths 1 Application(s) Topical <User Schedule>  clonazePAM  Tablet 0.25 milliGRAM(s) Oral every 12 hours  dextrose 5%. 1000 milliLiter(s) (50 mL/Hr) IV Continuous <Continuous>  dextrose 5%. 1000 milliLiter(s) (100 mL/Hr) IV Continuous <Continuous>  glucagon  Injectable 1 milliGRAM(s) IntraMuscular once  insulin regular  human corrective regimen sliding scale   SubCutaneous every 6 hours  pantoprazole   Suspension 40 milliGRAM(s) Oral before breakfast  sodium chloride 3%  Inhalation 4 milliLiter(s) Inhalation every 12 hours    MEDICATIONS  (PRN):  acetaminophen    Suspension .. 650 milliGRAM(s) Enteral Tube every 6 hours PRN Temp greater or equal to 38C (100.4F), Mild Pain (1 - 3)      -------------------------------------------------------------------------------  LABS:                        10.5   7.38  )-----------( 347      ( 31 Mar 2022 05:27 )             34.2     03-31    135  |  98  |  24<H>  ----------------------------<  130<H>  5.0   |  29  |  0.65    Ca    9.8      31 Mar 2022 05:27  Phos  3.3     03-31  Mg     1.8     03-31    TPro  7.6  /  Alb  3.9  /  TBili  0.3  /  DBili  x   /  AST  37  /  ALT  26  /  AlkPhos  75  03-31    LIVER FUNCTIONS - ( 31 Mar 2022 05:27 )  Alb: 3.9 g/dL / Pro: 7.6 g/dL / ALK PHOS: 75 U/L / ALT: 26 U/L / AST: 37 U/L / GGT: x               CAPILLARY BLOOD GLUCOSE      POCT Blood Glucose.: 117 mg/dL (31 Mar 2022 11:20)      COVID-19 PCR: NotDetec (27 Mar 2022 09:26)  COVID-19 PCR: NotDetec (21 Mar 2022 06:26)  COVID-19 PCR: NotDetec (15 Mar 2022 05:33)  COVID-19 PCR: NotDetec (09 Mar 2022 13:39)  COVID-19 PCR: NotDetec (03 Mar 2022 02:50)      RADIOLOGY & ADDITIONAL TESTS: Reviewed.       Transfer from MICU to     Hospital Course:     Patient is a 74 year old female with a PMH of cerebral palsy with functional quadriplegia, DVT/PE on Eliquis, GERD, chronic dysphagia with PEG, thoracic spine fusion, previously a [place] resident, now resides in half-way, with [facility] Consumer Advisory Board (CAB). Patient presented from her assisted living facility in acute hypoxemic respiratory failure likely 2/2 aspiration pneumonia. Patient was intubated in the field for airway protection. Admitted to MICU intubated. Patient started on Meropenem due to previous hospitalization with Pseudomonas pneumonia requiring Meropenem. Antibiotic switched to Ciprofloxacin (3/5-3/12) due to improved sensitivity from sputum culture data. Catheter culture from 3/3 grew >100,000 E.coli, also treated with ciprofloxacin. Quintero was exchanged on admission. Failed extubation on 3/4 due to anxiety/agitation. Patient successfully extubated to HFNC on 3/8. Pt further weaned to 6L--> 4L with oxygen sat of 94-95%. Medical ethics consulted on 3/8 and deemed [place] Consumer Advisory Board (CAB) as the appropriate surrogate decision-maker for patient. Although some family is present, not involved in patients care. FOR CONSENT, 2-physician consent is required. Weaned to 3L and stepped down to RMF on 3/10.     On floors, patient had tenuous respiratory status given inability to clear secretions and fluctuating mental status, required frequent suctioning with fluctuating O2 requirements 2L-6L. On 3/13, rapid response was called for AHRF and unresponsiveness in setting of aspiration event. Patient was reintubated and stepped back up to MICU. In ICU, pt underwent bronchoscopy which showed no signs of purulent secretions or endobronchial lesions. Blood cultures from 3/13 negative. Trach approved by CAB and performed on 3/24. On 3/25, fever 101.4, started zosyn for empiric treatment of pseudomonal pna likely aspiration during trach procedure. Sputum from 3/25 grew proteus mirabilis and Stenotrophomonas maltophilia. Blood cultures from 3/25 negative. Switched from zosyn to aztreonam (due to penicillin allergy?). Then switched on bactrim from 3/26-3/30 bactrim when Proteus and Stenotrophomonas grew in sputum, D/c'ed bactrim due to hyperkalemia and switched to ceftazidime (3/31-4/2). Hospital course complicated by anxiety, can give xanax 0.25mg after assessing patient.      NOAM GHOSH, 76y, Female  MRN-[id number]  Patient is a 76y old  Female who presents with a chief complaint of respiratory failure (16 Mar 2022 13:37)      OVERNIGHT EVENTS:  desat to 88%, suctioned for increased tracheal secretions, improved to 90's.     SUBJECTIVE: Patient seen and examined at bedside. Does not appear to be in distress. Interview limited because of patient condition     12 Point ROS Negative unless noted otherwise above.  -------------------------------------------------------------------------------  VITAL SIGNS:  Vital Signs Last 24 Hrs  T(C): 37.3 (31 Mar 2022 09:47), Max: 37.3 (31 Mar 2022 09:47)  T(F): 99.2 (31 Mar 2022 09:47), Max: 99.2 (31 Mar 2022 09:47)  HR: 89 (31 Mar 2022 11:00) (77 - 112)  BP: 106/71 (31 Mar 2022 11:00) (106/71 - 155/72)  BP(mean): 81 (31 Mar 2022 11:00) (80 - 110)  RR: 22 (31 Mar 2022 11:00) (20 - 31)  SpO2: 97% (31 Mar 2022 11:00) (86% - 100%)  I&O's Summary    30 Mar 2022 07:01  -  31 Mar 2022 07:00  --------------------------------------------------------  IN: 1186 mL / OUT: 375 mL / NET: 811 mL    31 Mar 2022 07:01  -  31 Mar 2022 12:23  --------------------------------------------------------  IN: 168 mL / OUT: 300 mL / NET: -132 mL        PHYSICAL EXAM:    General: frail elderly woman in nad   HEENT: NC/AT; EOMI, PERRLA, anicteric sclera; dry mucosal membranes.  Neck: +trach in place; supple, trachea midline  Cardiovascular: RRR, +S1/S2; NO M/R/G  Respiratory: CTA B/L; no W/R/R  Gastrointestinal: soft, NT/ND; +BSx4  Extremities: WWP; +1 pitting edema in bilateral LE; +chronic deformity in b/l feet.  Vascular: 2+ radial, DP/PT pulses B/L  Neurological: AAOx3; no focal deficits; cerebral palsy, difficulty with communication and making needs    ALLERGIES:  Allergies    ace inhibitors (Anaphylaxis)  caffeine (Rash)  cefepime (Rash)  chocolate (Rash)  high acid (Rash)  Tomatoes (Hives)    Intolerances        MEDICATIONS:  MEDICATIONS  (STANDING):  albuterol/ipratropium for Nebulization 3 milliLiter(s) Nebulizer every 6 hours  apixaban 2.5 milliGRAM(s) Enteral Tube every 12 hours  ARIPiprazole 2 milliGRAM(s) Oral every 24 hours  cefTAZidime  IVPB 2 Gram(s) IV Intermittent every 12 hours  chlorhexidine 0.12% Liquid 15 milliLiter(s) Oral Mucosa every 12 hours  chlorhexidine 2% Cloths 1 Application(s) Topical <User Schedule>  clonazePAM  Tablet 0.25 milliGRAM(s) Oral every 12 hours  dextrose 5%. 1000 milliLiter(s) (50 mL/Hr) IV Continuous <Continuous>  dextrose 5%. 1000 milliLiter(s) (100 mL/Hr) IV Continuous <Continuous>  glucagon  Injectable 1 milliGRAM(s) IntraMuscular once  insulin regular  human corrective regimen sliding scale   SubCutaneous every 6 hours  pantoprazole   Suspension 40 milliGRAM(s) Oral before breakfast  sodium chloride 3%  Inhalation 4 milliLiter(s) Inhalation every 12 hours    MEDICATIONS  (PRN):  acetaminophen    Suspension .. 650 milliGRAM(s) Enteral Tube every 6 hours PRN Temp greater or equal to 38C (100.4F), Mild Pain (1 - 3)      -------------------------------------------------------------------------------  LABS:                        10.5   7.38  )-----------( 347      ( 31 Mar 2022 05:27 )             34.2     03-31    135  |  98  |  24<H>  ----------------------------<  130<H>  5.0   |  29  |  0.65    Ca    9.8      31 Mar 2022 05:27  Phos  3.3     03-31  Mg     1.8     03-31    TPro  7.6  /  Alb  3.9  /  TBili  0.3  /  DBili  x   /  AST  37  /  ALT  26  /  AlkPhos  75  03-31    LIVER FUNCTIONS - ( 31 Mar 2022 05:27 )  Alb: 3.9 g/dL / Pro: 7.6 g/dL / ALK PHOS: 75 U/L / ALT: 26 U/L / AST: 37 U/L / GGT: x               CAPILLARY BLOOD GLUCOSE      POCT Blood Glucose.: 117 mg/dL (31 Mar 2022 11:20)      COVID-19 PCR: NotDetec (27 Mar 2022 09:26)  COVID-19 PCR: NotDetec (21 Mar 2022 06:26)  COVID-19 PCR: NotDetec (15 Mar 2022 05:33)  COVID-19 PCR: NotDetec (09 Mar 2022 13:39)  COVID-19 PCR: NotDetec (03 Mar 2022 02:50)      RADIOLOGY & ADDITIONAL TESTS: Reviewed.

## 2022-03-31 NOTE — PROGRESS NOTE ADULT - PROBLEM SELECTOR PLAN 1
#Acute respiratory failure with hypoxia 2/2 aspiration pneumonia of PEG feeds and copious oral secretions  Arrived intubated, extubated on 3/8. Completed ciprofloxacin course 3/5-3/12. Re-intubated on 3/13 after aspiration event and trached on 3/24. Trach c/b fever and patient is one week antibiotic course bactrim (3/26-3/30, dc'ed due to hyperkalemia) and switched to ceftazidime (3/31-4/2). Sputum cultures 3/25 show numerous proteus mirabilis, moderate Stenotrophomonas maltophilia  - s/p bactrim 300mg q8 (3/26 - 3/30), switched to ceftazidime 2g q12 until 4/2  - c/w Frequent suctioning - oral and tracheal, hypertonic saline followed by duonebs - standing, Chest PT q6h, aspiration precautions

## 2022-04-01 LAB
ALBUMIN SERPL ELPH-MCNC: 3.6 G/DL — SIGNIFICANT CHANGE UP (ref 3.3–5)
ALP SERPL-CCNC: 79 U/L — SIGNIFICANT CHANGE UP (ref 40–120)
ALT FLD-CCNC: 27 U/L — SIGNIFICANT CHANGE UP (ref 10–45)
ANION GAP SERPL CALC-SCNC: 12 MMOL/L — SIGNIFICANT CHANGE UP (ref 5–17)
AST SERPL-CCNC: 39 U/L — SIGNIFICANT CHANGE UP (ref 10–40)
BASOPHILS # BLD AUTO: 0.08 K/UL — SIGNIFICANT CHANGE UP (ref 0–0.2)
BASOPHILS NFR BLD AUTO: 1 % — SIGNIFICANT CHANGE UP (ref 0–2)
BILIRUB SERPL-MCNC: 0.3 MG/DL — SIGNIFICANT CHANGE UP (ref 0.2–1.2)
BUN SERPL-MCNC: 27 MG/DL — HIGH (ref 7–23)
CALCIUM SERPL-MCNC: 9.6 MG/DL — SIGNIFICANT CHANGE UP (ref 8.4–10.5)
CHLORIDE SERPL-SCNC: 100 MMOL/L — SIGNIFICANT CHANGE UP (ref 96–108)
CO2 SERPL-SCNC: 26 MMOL/L — SIGNIFICANT CHANGE UP (ref 22–31)
CREAT SERPL-MCNC: 0.67 MG/DL — SIGNIFICANT CHANGE UP (ref 0.5–1.3)
EGFR: 92 ML/MIN/1.73M2 — SIGNIFICANT CHANGE UP
EOSINOPHIL # BLD AUTO: 0.33 K/UL — SIGNIFICANT CHANGE UP (ref 0–0.5)
EOSINOPHIL NFR BLD AUTO: 4.2 % — SIGNIFICANT CHANGE UP (ref 0–6)
GLUCOSE BLDC GLUCOMTR-MCNC: 114 MG/DL — HIGH (ref 70–99)
GLUCOSE BLDC GLUCOMTR-MCNC: 115 MG/DL — HIGH (ref 70–99)
GLUCOSE BLDC GLUCOMTR-MCNC: 118 MG/DL — HIGH (ref 70–99)
GLUCOSE BLDC GLUCOMTR-MCNC: 121 MG/DL — HIGH (ref 70–99)
GLUCOSE BLDC GLUCOMTR-MCNC: 132 MG/DL — HIGH (ref 70–99)
GLUCOSE SERPL-MCNC: 122 MG/DL — HIGH (ref 70–99)
HCT VFR BLD CALC: 35.1 % — SIGNIFICANT CHANGE UP (ref 34.5–45)
HGB BLD-MCNC: 10.8 G/DL — LOW (ref 11.5–15.5)
IMM GRANULOCYTES NFR BLD AUTO: 1.3 % — SIGNIFICANT CHANGE UP (ref 0–1.5)
LYMPHOCYTES # BLD AUTO: 1.21 K/UL — SIGNIFICANT CHANGE UP (ref 1–3.3)
LYMPHOCYTES # BLD AUTO: 15.6 % — SIGNIFICANT CHANGE UP (ref 13–44)
MAGNESIUM SERPL-MCNC: 1.8 MG/DL — SIGNIFICANT CHANGE UP (ref 1.6–2.6)
MCHC RBC-ENTMCNC: 30.3 PG — SIGNIFICANT CHANGE UP (ref 27–34)
MCHC RBC-ENTMCNC: 30.8 GM/DL — LOW (ref 32–36)
MCV RBC AUTO: 98.3 FL — SIGNIFICANT CHANGE UP (ref 80–100)
MONOCYTES # BLD AUTO: 0.87 K/UL — SIGNIFICANT CHANGE UP (ref 0–0.9)
MONOCYTES NFR BLD AUTO: 11.2 % — SIGNIFICANT CHANGE UP (ref 2–14)
NEUTROPHILS # BLD AUTO: 5.19 K/UL — SIGNIFICANT CHANGE UP (ref 1.8–7.4)
NEUTROPHILS NFR BLD AUTO: 66.7 % — SIGNIFICANT CHANGE UP (ref 43–77)
NRBC # BLD: 0 /100 WBCS — SIGNIFICANT CHANGE UP (ref 0–0)
PHOSPHATE SERPL-MCNC: 3 MG/DL — SIGNIFICANT CHANGE UP (ref 2.5–4.5)
PLATELET # BLD AUTO: 350 K/UL — SIGNIFICANT CHANGE UP (ref 150–400)
POTASSIUM SERPL-MCNC: 4.4 MMOL/L — SIGNIFICANT CHANGE UP (ref 3.5–5.3)
POTASSIUM SERPL-SCNC: 4.4 MMOL/L — SIGNIFICANT CHANGE UP (ref 3.5–5.3)
PROT SERPL-MCNC: 7.1 G/DL — SIGNIFICANT CHANGE UP (ref 6–8.3)
RBC # BLD: 3.57 M/UL — LOW (ref 3.8–5.2)
RBC # FLD: 13.8 % — SIGNIFICANT CHANGE UP (ref 10.3–14.5)
SODIUM SERPL-SCNC: 138 MMOL/L — SIGNIFICANT CHANGE UP (ref 135–145)
WBC # BLD: 7.78 K/UL — SIGNIFICANT CHANGE UP (ref 3.8–10.5)
WBC # FLD AUTO: 7.78 K/UL — SIGNIFICANT CHANGE UP (ref 3.8–10.5)

## 2022-04-01 PROCEDURE — 71045 X-RAY EXAM CHEST 1 VIEW: CPT | Mod: 26

## 2022-04-01 PROCEDURE — 99233 SBSQ HOSP IP/OBS HIGH 50: CPT | Mod: GC

## 2022-04-01 RX ADMIN — ARIPIPRAZOLE 2 MILLIGRAM(S): 15 TABLET ORAL at 21:50

## 2022-04-01 RX ADMIN — Medication 3 MILLILITER(S): at 16:03

## 2022-04-01 RX ADMIN — Medication 3 MILLILITER(S): at 05:31

## 2022-04-01 RX ADMIN — CHLORHEXIDINE GLUCONATE 15 MILLILITER(S): 213 SOLUTION TOPICAL at 18:35

## 2022-04-01 RX ADMIN — Medication 0.25 MILLIGRAM(S): at 06:08

## 2022-04-01 RX ADMIN — CEFTAZIDIME 100 GRAM(S): 6 INJECTION, POWDER, FOR SOLUTION INTRAVENOUS at 12:39

## 2022-04-01 RX ADMIN — APIXABAN 2.5 MILLIGRAM(S): 2.5 TABLET, FILM COATED ORAL at 06:08

## 2022-04-01 RX ADMIN — SODIUM CHLORIDE 4 MILLILITER(S): 9 INJECTION INTRAMUSCULAR; INTRAVENOUS; SUBCUTANEOUS at 18:35

## 2022-04-01 RX ADMIN — CHLORHEXIDINE GLUCONATE 1 APPLICATION(S): 213 SOLUTION TOPICAL at 06:16

## 2022-04-01 RX ADMIN — Medication 0.25 MILLIGRAM(S): at 18:34

## 2022-04-01 RX ADMIN — SODIUM CHLORIDE 4 MILLILITER(S): 9 INJECTION INTRAMUSCULAR; INTRAVENOUS; SUBCUTANEOUS at 06:08

## 2022-04-01 RX ADMIN — PANTOPRAZOLE SODIUM 40 MILLIGRAM(S): 20 TABLET, DELAYED RELEASE ORAL at 06:08

## 2022-04-01 RX ADMIN — Medication 3 MILLILITER(S): at 21:50

## 2022-04-01 RX ADMIN — Medication 3 MILLILITER(S): at 11:28

## 2022-04-01 RX ADMIN — APIXABAN 2.5 MILLIGRAM(S): 2.5 TABLET, FILM COATED ORAL at 18:35

## 2022-04-01 RX ADMIN — CHLORHEXIDINE GLUCONATE 15 MILLILITER(S): 213 SOLUTION TOPICAL at 06:17

## 2022-04-01 NOTE — PROGRESS NOTE ADULT - SUBJECTIVE AND OBJECTIVE BOX
NOAM GHOSH, 76y, Female  MRN-[id number]  Patient is a 76y old  Female who presents with a chief complaint of respiratory failure (16 Mar 2022 13:37)      OVERNIGHT EVENTS: Pt desatting to high 70s/80s, increased FiO2 50%. Pt comfortably breathing, suctioned. CXR similar from previous. RT changed vent settings to full support as she was apneic and pulling low volumes. FiO2 dropped to 40% at 5:45 AM.     SUBJECTIVE: Patient seen and examined at bedside. Communication difficult because of patient condition however pt shook head no when asked if in pain or distress. Does not appear to be in distress.     12 Point ROS Negative unless noted otherwise above.  -------------------------------------------------------------------------------  VITAL SIGNS:  Vital Signs Last 24 Hrs  T(C): 36.7 (01 Apr 2022 10:00), Max: 37.1 (31 Mar 2022 18:00)  T(F): 98.1 (01 Apr 2022 10:00), Max: 98.8 (31 Mar 2022 18:00)  HR: 91 (01 Apr 2022 10:10) (80 - 92)  BP: 125/60 (01 Apr 2022 10:06) (114/59 - 147/60)  BP(mean): 86 (01 Apr 2022 10:06) (79 - 92)  RR: 27 (01 Apr 2022 10:10) (15 - 27)  SpO2: 100% (01 Apr 2022 10:10) (93% - 100%)  I&O's Summary    31 Mar 2022 07:01  -  01 Apr 2022 07:00  --------------------------------------------------------  IN: 1058 mL / OUT: 500 mL / NET: 558 mL    01 Apr 2022 07:01  - 01 Apr 2022 12:23  --------------------------------------------------------  IN: 42 mL / OUT: 0 mL / NET: 42 mL        PHYSICAL EXAM:    General: frail elderly woman in nad   HEENT: NC/AT; EOMI, PERRLA, anicteric sclera; dry mucosal membranes.  Neck: +trach in place; supple, trachea midline  Cardiovascular: RRR, +S1/S2; NO M/R/G  Respiratory: CTA B/L; no W/R/R  Gastrointestinal: soft, NT/ND; +BSx4  Extremities: WWP; +1 pitting edema in bilateral LE; +chronic deformity in b/l feet.  Vascular: 2+ radial, DP/PT pulses B/L  Neurological: Unable to assess AAO due to patient condition; cerebral palsy, difficulty with communication and making needs    ALLERGIES:  Allergies    ace inhibitors (Anaphylaxis)  caffeine (Rash)  cefepime (Rash)  chocolate (Rash)  high acid (Rash)  Tomatoes (Hives)    Intolerances        MEDICATIONS:  MEDICATIONS  (STANDING):  albuterol/ipratropium for Nebulization 3 milliLiter(s) Nebulizer every 6 hours  apixaban 2.5 milliGRAM(s) Enteral Tube every 12 hours  ARIPiprazole 2 milliGRAM(s) Oral every 24 hours  cefTAZidime  IVPB 2 Gram(s) IV Intermittent every 12 hours  chlorhexidine 0.12% Liquid 15 milliLiter(s) Oral Mucosa every 12 hours  chlorhexidine 2% Cloths 1 Application(s) Topical <User Schedule>  clonazePAM  Tablet 0.25 milliGRAM(s) Oral every 12 hours  dextrose 5%. 1000 milliLiter(s) (50 mL/Hr) IV Continuous <Continuous>  dextrose 5%. 1000 milliLiter(s) (100 mL/Hr) IV Continuous <Continuous>  glucagon  Injectable 1 milliGRAM(s) IntraMuscular once  insulin regular  human corrective regimen sliding scale   SubCutaneous every 6 hours  pantoprazole   Suspension 40 milliGRAM(s) Oral before breakfast  sodium chloride 3%  Inhalation 4 milliLiter(s) Inhalation every 12 hours    MEDICATIONS  (PRN):  acetaminophen    Suspension .. 650 milliGRAM(s) Enteral Tube every 6 hours PRN Temp greater or equal to 38C (100.4F), Mild Pain (1 - 3)      -------------------------------------------------------------------------------  LABS:                        10.8   7.78  )-----------( 350      ( 01 Apr 2022 06:28 )             35.1     04-01    138  |  100  |  27<H>  ----------------------------<  122<H>  4.4   |  26  |  0.67    Ca    9.6      01 Apr 2022 06:28  Phos  3.0     04-01  Mg     1.8     04-01    TPro  7.1  /  Alb  3.6  /  TBili  0.3  /  DBili  x   /  AST  39  /  ALT  27  /  AlkPhos  79  04-01    LIVER FUNCTIONS - ( 01 Apr 2022 06:28 )  Alb: 3.6 g/dL / Pro: 7.1 g/dL / ALK PHOS: 79 U/L / ALT: 27 U/L / AST: 39 U/L / GGT: x               CAPILLARY BLOOD GLUCOSE      POCT Blood Glucose.: 132 mg/dL (01 Apr 2022 11:35)      COVID-19 PCR: NotDetec (27 Mar 2022 09:26)  COVID-19 PCR: NotDetec (21 Mar 2022 06:26)  COVID-19 PCR: NotDetec (15 Mar 2022 05:33)  COVID-19 PCR: NotDetec (09 Mar 2022 13:39)  COVID-19 PCR: NotDetec (03 Mar 2022 02:50)      RADIOLOGY & ADDITIONAL TESTS: Reviewed.

## 2022-04-01 NOTE — PROGRESS NOTE ADULT - ATTENDING COMMENTS
Awake and alert. Failed CPAP trial and trach collar. Continue enteral feeds and abx through 4/6. Awaiting meeting with guardian agencies set up for Tuesday to insure agree with transfer to LTAC.

## 2022-04-01 NOTE — PROGRESS NOTE ADULT - PROBLEM SELECTOR PLAN 5
Patient with cerebral palsy. Previously a [place] resident, now resides in group home, with [place] [facility] per palliative care. Patient with repeat admission for aspiration pneumonia with history of dysphagia and copious secretions from oropharynx on exam.  - palliative care and ethics consulted - Advisory board must make decisions for patient.

## 2022-04-02 LAB
GLUCOSE BLDC GLUCOMTR-MCNC: 113 MG/DL — HIGH (ref 70–99)
GLUCOSE BLDC GLUCOMTR-MCNC: 125 MG/DL — HIGH (ref 70–99)
GLUCOSE BLDC GLUCOMTR-MCNC: 128 MG/DL — HIGH (ref 70–99)

## 2022-04-02 PROCEDURE — 99233 SBSQ HOSP IP/OBS HIGH 50: CPT | Mod: GC

## 2022-04-02 RX ADMIN — CEFTAZIDIME 100 GRAM(S): 6 INJECTION, POWDER, FOR SOLUTION INTRAVENOUS at 12:48

## 2022-04-02 RX ADMIN — CHLORHEXIDINE GLUCONATE 15 MILLILITER(S): 213 SOLUTION TOPICAL at 18:36

## 2022-04-02 RX ADMIN — SODIUM CHLORIDE 4 MILLILITER(S): 9 INJECTION INTRAMUSCULAR; INTRAVENOUS; SUBCUTANEOUS at 06:32

## 2022-04-02 RX ADMIN — APIXABAN 2.5 MILLIGRAM(S): 2.5 TABLET, FILM COATED ORAL at 06:32

## 2022-04-02 RX ADMIN — CEFTAZIDIME 100 GRAM(S): 6 INJECTION, POWDER, FOR SOLUTION INTRAVENOUS at 00:25

## 2022-04-02 RX ADMIN — Medication 3 MILLILITER(S): at 18:35

## 2022-04-02 RX ADMIN — CHLORHEXIDINE GLUCONATE 1 APPLICATION(S): 213 SOLUTION TOPICAL at 06:41

## 2022-04-02 RX ADMIN — SODIUM CHLORIDE 4 MILLILITER(S): 9 INJECTION INTRAMUSCULAR; INTRAVENOUS; SUBCUTANEOUS at 18:35

## 2022-04-02 RX ADMIN — PANTOPRAZOLE SODIUM 40 MILLIGRAM(S): 20 TABLET, DELAYED RELEASE ORAL at 06:32

## 2022-04-02 RX ADMIN — CHLORHEXIDINE GLUCONATE 15 MILLILITER(S): 213 SOLUTION TOPICAL at 06:32

## 2022-04-02 RX ADMIN — Medication 3 MILLILITER(S): at 06:32

## 2022-04-02 RX ADMIN — Medication 0.25 MILLIGRAM(S): at 06:31

## 2022-04-02 RX ADMIN — APIXABAN 2.5 MILLIGRAM(S): 2.5 TABLET, FILM COATED ORAL at 18:35

## 2022-04-02 RX ADMIN — ARIPIPRAZOLE 2 MILLIGRAM(S): 15 TABLET ORAL at 22:06

## 2022-04-02 RX ADMIN — Medication 0.25 MILLIGRAM(S): at 18:35

## 2022-04-02 RX ADMIN — Medication 3 MILLILITER(S): at 09:57

## 2022-04-02 NOTE — PROGRESS NOTE ADULT - PROBLEM SELECTOR PLAN 5
Patient with cerebral palsy. Previously a [place] resident, now resides in group home, with [place] [facility] per palliative care. Patient with repeat admission for aspiration pneumonia with history of dysphagia and copious secretions from oropharynx on exam.  - palliative care and ethics consulted - Advisory board must make decisions for patient.  - Pending placement back to [facility]

## 2022-04-02 NOTE — PROGRESS NOTE ADULT - SUBJECTIVE AND OBJECTIVE BOX
** INCOMPLETE **     INTERVAL HPI/OVERNIGHT EVENTS:    SUBJECTIVE: Patient seen and examined at bedside.    VITAL SIGNS:  ICU Vital Signs Last 24 Hrs  T(C): 37.1 (02 Apr 2022 05:33), Max: 37.1 (02 Apr 2022 05:33)  T(F): 98.7 (02 Apr 2022 05:33), Max: 98.7 (02 Apr 2022 05:33)  HR: 80 (02 Apr 2022 05:55) (80 - 96)  BP: 113/58 (02 Apr 2022 04:16) (109/61 - 133/59)  BP(mean): 82 (02 Apr 2022 04:16) (80 - 87)  ABP: --  ABP(mean): --  RR: 17 (02 Apr 2022 04:16) (17 - 27)  SpO2: 99% (02 Apr 2022 05:55) (97% - 100%)    Mode: AC/ CMV (Assist Control/ Continuous Mandatory Ventilation), RR (machine): 12, TV (machine): 330, FiO2: 50, PEEP: 5, ITime: 0.9, MAP: 9.7, PIP: 22    04-01 @ 07:01  -  04-02 @ 07:00  --------------------------------------------------------  IN: 1024 mL / OUT: 450 mL / NET: 574 mL    04-02 @ 07:01 - 04-02 @ 09:20  --------------------------------------------------------  IN: 42 mL / OUT: 0 mL / NET: 42 mL      CAPILLARY BLOOD GLUCOSE      POCT Blood Glucose.: 128 mg/dL (02 Apr 2022 05:22)      PHYSICAL EXAM:    Constitutional: NAD  HEENT: NC/AT; PERRL, anicteric sclera; MMM  Neck: supple, no JVD  Cardiovascular: +S1/S2, RRR  Respiratory: CTA B/L, no W/R/R  Gastrointestinal: abdomen soft, NT/ND; no rebound or guarding; +BSx4  Genitourinary: no suprapubic tenderness or fullness  Extremities: WWP; no LE edema; no clubbing or cyanosis  Vascular: 2+ radial, DP/PT and femoral pulses B/L  Dermatologic: normal color and turgor; no visible rashes  Neurological:     MEDICATIONS:  MEDICATIONS  (STANDING):  albuterol/ipratropium for Nebulization 3 milliLiter(s) Nebulizer every 6 hours  apixaban 2.5 milliGRAM(s) Enteral Tube every 12 hours  ARIPiprazole 2 milliGRAM(s) Oral every 24 hours  cefTAZidime  IVPB 2 Gram(s) IV Intermittent every 12 hours  chlorhexidine 0.12% Liquid 15 milliLiter(s) Oral Mucosa every 12 hours  chlorhexidine 2% Cloths 1 Application(s) Topical <User Schedule>  clonazePAM  Tablet 0.25 milliGRAM(s) Oral every 12 hours  dextrose 5%. 1000 milliLiter(s) (50 mL/Hr) IV Continuous <Continuous>  dextrose 5%. 1000 milliLiter(s) (100 mL/Hr) IV Continuous <Continuous>  glucagon  Injectable 1 milliGRAM(s) IntraMuscular once  insulin regular  human corrective regimen sliding scale   SubCutaneous every 6 hours  pantoprazole   Suspension 40 milliGRAM(s) Oral before breakfast  sodium chloride 3%  Inhalation 4 milliLiter(s) Inhalation every 12 hours    MEDICATIONS  (PRN):  acetaminophen    Suspension .. 650 milliGRAM(s) Enteral Tube every 6 hours PRN Temp greater or equal to 38C (100.4F), Mild Pain (1 - 3)      ALLERGIES:  Allergies    ace inhibitors (Anaphylaxis)  caffeine (Rash)  cefepime (Rash)  chocolate (Rash)  high acid (Rash)  Tomatoes (Hives)    Intolerances        LABS:                        10.8   7.78  )-----------( 350      ( 01 Apr 2022 06:28 )             35.1     04-01    138  |  100  |  27<H>  ----------------------------<  122<H>  4.4   |  26  |  0.67    Ca    9.6      01 Apr 2022 06:28  Phos  3.0     04-01  Mg     1.8     04-01    TPro  7.1  /  Alb  3.6  /  TBili  0.3  /  DBili  x   /  AST  39  /  ALT  27  /  AlkPhos  79  04-01          RADIOLOGY & ADDITIONAL TESTS: Reviewed. INTERVAL HPI/OVERNIGHT EVENTS:  No acute events overnight.     SUBJECTIVE:   Patient seen and examined at bedside. Patient can respond to questioning with yes/no via shaking head. Patient denied pain or having any complaints. Patient looks comfortable and does not appear to be in distress.    VITAL SIGNS:  ICU Vital Signs Last 24 Hrs  T(C): 37.1 (02 Apr 2022 05:33), Max: 37.1 (02 Apr 2022 05:33)  T(F): 98.7 (02 Apr 2022 05:33), Max: 98.7 (02 Apr 2022 05:33)  HR: 80 (02 Apr 2022 05:55) (80 - 96)  BP: 113/58 (02 Apr 2022 04:16) (109/61 - 133/59)  BP(mean): 82 (02 Apr 2022 04:16) (80 - 87)  ABP: --  ABP(mean): --  RR: 17 (02 Apr 2022 04:16) (17 - 27)  SpO2: 99% (02 Apr 2022 05:55) (97% - 100%)    Mode: AC/ CMV (Assist Control/ Continuous Mandatory Ventilation), RR (machine): 12, TV (machine): 330, FiO2: 50, PEEP: 5, ITime: 0.9, MAP: 9.7, PIP: 22    04-01 @ 07:01 - 04-02 @ 07:00  --------------------------------------------------------  IN: 1024 mL / OUT: 450 mL / NET: 574 mL    04-02 @ 07:01  -  04-02 @ 09:20  --------------------------------------------------------  IN: 42 mL / OUT: 0 mL / NET: 42 mL      CAPILLARY BLOOD GLUCOSE      POCT Blood Glucose.: 128 mg/dL (02 Apr 2022 05:22)      PHYSICAL EXAM:  General: frail elderly woman, NAD  HEENT: NC/AT; EOMI, PERRLA, anicteric sclera; dry mucosal membranes.  Neck: +trach in place; supple, trachea midline  Cardiovascular: RRR, +S1/S2; NO M/R/G  Respiratory: CTA B/L; no W/R/R, on ventilator via Trach collar, synchronous breathing  Gastrointestinal: soft, NT/ND; +BSx4  Extremities: WWP; +1 pitting edema in bilateral LE; +chronic deformity in b/l feet.  Vascular: 2+ radial, DP/PT pulses B/L  Neurological: Unable to assess AAO due to patient condition; cerebral palsy, difficulty with communication and making needs    MEDICATIONS:  MEDICATIONS  (STANDING):  albuterol/ipratropium for Nebulization 3 milliLiter(s) Nebulizer every 6 hours  apixaban 2.5 milliGRAM(s) Enteral Tube every 12 hours  ARIPiprazole 2 milliGRAM(s) Oral every 24 hours  cefTAZidime  IVPB 2 Gram(s) IV Intermittent every 12 hours  chlorhexidine 0.12% Liquid 15 milliLiter(s) Oral Mucosa every 12 hours  chlorhexidine 2% Cloths 1 Application(s) Topical <User Schedule>  clonazePAM  Tablet 0.25 milliGRAM(s) Oral every 12 hours  dextrose 5%. 1000 milliLiter(s) (50 mL/Hr) IV Continuous <Continuous>  dextrose 5%. 1000 milliLiter(s) (100 mL/Hr) IV Continuous <Continuous>  glucagon  Injectable 1 milliGRAM(s) IntraMuscular once  insulin regular  human corrective regimen sliding scale   SubCutaneous every 6 hours  pantoprazole   Suspension 40 milliGRAM(s) Oral before breakfast  sodium chloride 3%  Inhalation 4 milliLiter(s) Inhalation every 12 hours    MEDICATIONS  (PRN):  acetaminophen    Suspension .. 650 milliGRAM(s) Enteral Tube every 6 hours PRN Temp greater or equal to 38C (100.4F), Mild Pain (1 - 3)      ALLERGIES:  Allergies    ace inhibitors (Anaphylaxis)  caffeine (Rash)  cefepime (Rash)  chocolate (Rash)  high acid (Rash)  Tomatoes (Hives)    Intolerances        LABS:                        10.8   7.78  )-----------( 350      ( 01 Apr 2022 06:28 )             35.1     04-01    138  |  100  |  27<H>  ----------------------------<  122<H>  4.4   |  26  |  0.67    Ca    9.6      01 Apr 2022 06:28  Phos  3.0     04-01  Mg     1.8     04-01    TPro  7.1  /  Alb  3.6  /  TBili  0.3  /  DBili  x   /  AST  39  /  ALT  27  /  AlkPhos  79  04-01          RADIOLOGY & ADDITIONAL TESTS: Reviewed.

## 2022-04-02 NOTE — PROGRESS NOTE ADULT - ATTENDING COMMENTS
Cerebral palsy with chronic respiratory failure on trach with pneumonia (on ceftazidime, last day today) with anxiety (improved with clonazepam). Pending transfer to LTAC. Rest as above .

## 2022-04-03 LAB
GLUCOSE BLDC GLUCOMTR-MCNC: 102 MG/DL — HIGH (ref 70–99)
GLUCOSE BLDC GLUCOMTR-MCNC: 104 MG/DL — HIGH (ref 70–99)
GLUCOSE BLDC GLUCOMTR-MCNC: 107 MG/DL — HIGH (ref 70–99)
GLUCOSE BLDC GLUCOMTR-MCNC: 113 MG/DL — HIGH (ref 70–99)
GLUCOSE BLDC GLUCOMTR-MCNC: 113 MG/DL — HIGH (ref 70–99)

## 2022-04-03 PROCEDURE — 99233 SBSQ HOSP IP/OBS HIGH 50: CPT | Mod: GC

## 2022-04-03 RX ADMIN — SODIUM CHLORIDE 4 MILLILITER(S): 9 INJECTION INTRAMUSCULAR; INTRAVENOUS; SUBCUTANEOUS at 06:37

## 2022-04-03 RX ADMIN — Medication 3 MILLILITER(S): at 06:27

## 2022-04-03 RX ADMIN — Medication 3 MILLILITER(S): at 22:49

## 2022-04-03 RX ADMIN — ARIPIPRAZOLE 2 MILLIGRAM(S): 15 TABLET ORAL at 22:21

## 2022-04-03 RX ADMIN — CHLORHEXIDINE GLUCONATE 1 APPLICATION(S): 213 SOLUTION TOPICAL at 07:32

## 2022-04-03 RX ADMIN — APIXABAN 2.5 MILLIGRAM(S): 2.5 TABLET, FILM COATED ORAL at 06:35

## 2022-04-03 RX ADMIN — SODIUM CHLORIDE 4 MILLILITER(S): 9 INJECTION INTRAMUSCULAR; INTRAVENOUS; SUBCUTANEOUS at 17:51

## 2022-04-03 RX ADMIN — Medication 0.25 MILLIGRAM(S): at 06:35

## 2022-04-03 RX ADMIN — APIXABAN 2.5 MILLIGRAM(S): 2.5 TABLET, FILM COATED ORAL at 17:40

## 2022-04-03 RX ADMIN — Medication 3 MILLILITER(S): at 12:24

## 2022-04-03 RX ADMIN — CHLORHEXIDINE GLUCONATE 15 MILLILITER(S): 213 SOLUTION TOPICAL at 17:40

## 2022-04-03 RX ADMIN — CHLORHEXIDINE GLUCONATE 15 MILLILITER(S): 213 SOLUTION TOPICAL at 06:35

## 2022-04-03 RX ADMIN — Medication 3 MILLILITER(S): at 03:27

## 2022-04-03 RX ADMIN — PANTOPRAZOLE SODIUM 40 MILLIGRAM(S): 20 TABLET, DELAYED RELEASE ORAL at 06:36

## 2022-04-03 RX ADMIN — Medication 0.25 MILLIGRAM(S): at 17:41

## 2022-04-03 NOTE — PROGRESS NOTE ADULT - ATTENDING COMMENTS
Cerebral palsy with chronic respiratory failure on trach with pneumonia (on ceftazidime course completed) with anxiety (improved with clonazepam). Pending transfer to LTAC. Rest as above .

## 2022-04-03 NOTE — PROGRESS NOTE ADULT - PROBLEM SELECTOR PLAN 5
Patient with cerebral palsy. Previously a [place] resident, now resides in group home, with [place] [facility] per palliative care. Patient with repeat admission for aspiration pneumonia with history of dysphagia and copious secretions from oropharynx on exam.  - palliative care and ethics consulted - Advisory board must make decisions for patient.  - Pending placement back to [facility]

## 2022-04-03 NOTE — PROGRESS NOTE ADULT - PROBLEM SELECTOR PLAN 1
#Acute respiratory failure with hypoxia 2/2 aspiration pneumonia of PEG feeds and copious oral secretions  Arrived intubated, extubated on 3/8. Completed ciprofloxacin course 3/5-3/12. Re-intubated on 3/13 after aspiration event and trached on 3/24. Trach c/b fever and patient is one week antibiotic course bactrim (3/26-3/30, dc'ed due to hyperkalemia) and switched to ceftazidime (3/31-4/2). Sputum cultures 3/25 show numerous proteus mirabilis, moderate Stenotrophomonas maltophilia  - s/p 1 week abx course with bactrim 300mg q8 (3/26 - 3/30), switched to ceftazidime 2g q12 until 4/2  - c/w Frequent suctioning - oral and tracheal, hypertonic saline followed by duonebs - standing, Chest PT q6h, aspiration precautions  - patient tolerating CPAP well, can attempt trach collar tomorrow

## 2022-04-03 NOTE — PROGRESS NOTE ADULT - SUBJECTIVE AND OBJECTIVE BOX
NOAM GHOSH, 76y, Female  MRN-[id number]  Patient is a 76y old  Female who presents with a chief complaint of respiratory failure (16 Mar 2022 13:37)      OVERNIGHT EVENTS: naeo     SUBJECTIVE: Patient seen and examined at bedside. Communication difficult because of patient condition however pt shook head no when asked if in pain or distress. Does not appear to be in distress.     12 Point ROS Negative unless noted otherwise above.  -------------------------------------------------------------------------------  VITAL SIGNS:  Vital Signs Last 24 Hrs  T(C): 36.4 (03 Apr 2022 10:32), Max: 37.1 (02 Apr 2022 13:28)  T(F): 97.5 (03 Apr 2022 10:32), Max: 98.8 (02 Apr 2022 13:28)  HR: 88 (03 Apr 2022 12:27) (88 - 94)  BP: 112/60 (03 Apr 2022 09:15) (111/59 - 139/74)  BP(mean): 81 (03 Apr 2022 09:15) (78 - 101)  RR: 17 (03 Apr 2022 09:15) (17 - 19)  SpO2: 94% (03 Apr 2022 12:27) (94% - 100%)  I&O's Summary    02 Apr 2022 07:01  -  03 Apr 2022 07:00  --------------------------------------------------------  IN: 721 mL / OUT: 150 mL / NET: 571 mL    03 Apr 2022 07:01  -  03 Apr 2022 12:59  --------------------------------------------------------  IN: 252 mL / OUT: 0 mL / NET: 252 mL        PHYSICAL EXAM:    General: frail elderly woman in nad   HEENT: NC/AT; EOMI, PERRLA, anicteric sclera; dry mucosal membranes.  Neck: +trach in place; supple, trachea midline  Cardiovascular: RRR, +S1/S2; NO M/R/G  Respiratory: CTA B/L; no W/R/R  Gastrointestinal: soft, NT/ND; +BSx4  Extremities: WWP; +1 pitting edema in bilateral LE; +chronic deformity in b/l feet.  Vascular: 2+ radial, DP/PT pulses B/L  Neurological: Unable to assess AAO due to patient condition; cerebral palsy, difficulty with communication and making needs      ALLERGIES:  Allergies    ace inhibitors (Anaphylaxis)  caffeine (Rash)  cefepime (Rash)  chocolate (Rash)  high acid (Rash)  Tomatoes (Hives)    Intolerances        MEDICATIONS:  MEDICATIONS  (STANDING):  albuterol/ipratropium for Nebulization 3 milliLiter(s) Nebulizer every 6 hours  apixaban 2.5 milliGRAM(s) Enteral Tube every 12 hours  ARIPiprazole 2 milliGRAM(s) Oral every 24 hours  chlorhexidine 0.12% Liquid 15 milliLiter(s) Oral Mucosa every 12 hours  chlorhexidine 2% Cloths 1 Application(s) Topical <User Schedule>  clonazePAM  Tablet 0.25 milliGRAM(s) Oral every 12 hours  dextrose 5%. 1000 milliLiter(s) (50 mL/Hr) IV Continuous <Continuous>  dextrose 5%. 1000 milliLiter(s) (100 mL/Hr) IV Continuous <Continuous>  glucagon  Injectable 1 milliGRAM(s) IntraMuscular once  insulin regular  human corrective regimen sliding scale   SubCutaneous every 6 hours  pantoprazole   Suspension 40 milliGRAM(s) Oral before breakfast  sodium chloride 3%  Inhalation 4 milliLiter(s) Inhalation every 12 hours    MEDICATIONS  (PRN):  acetaminophen    Suspension .. 650 milliGRAM(s) Enteral Tube every 6 hours PRN Temp greater or equal to 38C (100.4F), Mild Pain (1 - 3)      -------------------------------------------------------------------------------  LABS:                CAPILLARY BLOOD GLUCOSE      POCT Blood Glucose.: 104 mg/dL (03 Apr 2022 11:36)      COVID-19 PCR: NotDetec (27 Mar 2022 09:26)  COVID-19 PCR: NotDetec (21 Mar 2022 06:26)  COVID-19 PCR: NotDetec (15 Mar 2022 05:33)  COVID-19 PCR: NotDetec (09 Mar 2022 13:39)  COVID-19 PCR: NotDetec (03 Mar 2022 02:50)      RADIOLOGY & ADDITIONAL TESTS: Reviewed.

## 2022-04-04 LAB
GLUCOSE BLDC GLUCOMTR-MCNC: 103 MG/DL — HIGH (ref 70–99)
GLUCOSE BLDC GLUCOMTR-MCNC: 104 MG/DL — HIGH (ref 70–99)
GLUCOSE BLDC GLUCOMTR-MCNC: 110 MG/DL — HIGH (ref 70–99)
GLUCOSE BLDC GLUCOMTR-MCNC: 113 MG/DL — HIGH (ref 70–99)

## 2022-04-04 PROCEDURE — 99233 SBSQ HOSP IP/OBS HIGH 50: CPT | Mod: GC

## 2022-04-04 RX ADMIN — PANTOPRAZOLE SODIUM 40 MILLIGRAM(S): 20 TABLET, DELAYED RELEASE ORAL at 06:25

## 2022-04-04 RX ADMIN — Medication 0.25 MILLIGRAM(S): at 22:10

## 2022-04-04 RX ADMIN — APIXABAN 2.5 MILLIGRAM(S): 2.5 TABLET, FILM COATED ORAL at 17:05

## 2022-04-04 RX ADMIN — CHLORHEXIDINE GLUCONATE 15 MILLILITER(S): 213 SOLUTION TOPICAL at 06:26

## 2022-04-04 RX ADMIN — SODIUM CHLORIDE 4 MILLILITER(S): 9 INJECTION INTRAMUSCULAR; INTRAVENOUS; SUBCUTANEOUS at 17:05

## 2022-04-04 RX ADMIN — SODIUM CHLORIDE 4 MILLILITER(S): 9 INJECTION INTRAMUSCULAR; INTRAVENOUS; SUBCUTANEOUS at 04:00

## 2022-04-04 RX ADMIN — Medication 3 MILLILITER(S): at 21:36

## 2022-04-04 RX ADMIN — CHLORHEXIDINE GLUCONATE 1 APPLICATION(S): 213 SOLUTION TOPICAL at 06:27

## 2022-04-04 RX ADMIN — ARIPIPRAZOLE 2 MILLIGRAM(S): 15 TABLET ORAL at 21:36

## 2022-04-04 RX ADMIN — Medication 0.25 MILLIGRAM(S): at 11:00

## 2022-04-04 RX ADMIN — CHLORHEXIDINE GLUCONATE 15 MILLILITER(S): 213 SOLUTION TOPICAL at 17:04

## 2022-04-04 RX ADMIN — Medication 3 MILLILITER(S): at 11:01

## 2022-04-04 RX ADMIN — Medication 3 MILLILITER(S): at 17:03

## 2022-04-04 RX ADMIN — APIXABAN 2.5 MILLIGRAM(S): 2.5 TABLET, FILM COATED ORAL at 06:26

## 2022-04-04 RX ADMIN — Medication 3 MILLILITER(S): at 04:25

## 2022-04-04 NOTE — CHART NOTE - NSCHARTNOTEFT_GEN_A_CORE
Admitting Diagnosis:   Patient is a 76y old  Female who presents with a chief complaint of respiratory failure (16 Mar 2022 13:37)      PAST MEDICAL & SURGICAL HISTORY:  CP (cerebral palsy)    Mental retardation    Dysphagia    GERD (gastroesophageal reflux disease)    PE (pulmonary thromboembolism)    DVT (deep venous thrombosis)    Spastic quadriplegia    HTN (hypertension)    PEG tube malfunction        Current Nutrition Order:  NPO w/ EN via PEG: Jevity 1.2 @ 42mL/hr x 24 hours + LPS 1x/day (100 kcal, 15 g protein per serving). Provides: 1008 mL TV, 1310 kcal, 70.9 g pro, 813 mL free water. Meetin kcal/kg, 1.6 g/kg based on IBW 45.5 kg.     PO Intake: Good (%) [   ]  Fair (50-75%) [   ] Poor (<25%) [   ] -- N/A. On tube feeds    GI Issues: No reports on n/v. Last documented BM 3/31. Pt is fecal incontinent. No gastric residuals    Pain: Denies abdominal pain.     Skin Integrity: Roberto Carlos 12, +1 generalized edema     Labs:   No new labs. POCT 113, 113, 104 mg/dl      CAPILLARY BLOOD GLUCOSE      POCT Blood Glucose.: 110 mg/dL (2022 11:48)  POCT Blood Glucose.: 104 mg/dL (2022 05:16)  POCT Blood Glucose.: 113 mg/dL (2022 23:34)  POCT Blood Glucose.: 113 mg/dL (2022 17:15)      Medications:  MEDICATIONS  (STANDING):  albuterol/ipratropium for Nebulization 3 milliLiter(s) Nebulizer every 6 hours  apixaban 2.5 milliGRAM(s) Enteral Tube every 12 hours  ARIPiprazole 2 milliGRAM(s) Oral every 24 hours  chlorhexidine 0.12% Liquid 15 milliLiter(s) Oral Mucosa every 12 hours  chlorhexidine 2% Cloths 1 Application(s) Topical <User Schedule>  clonazePAM  Tablet 0.25 milliGRAM(s) Oral every 12 hours  dextrose 5%. 1000 milliLiter(s) (50 mL/Hr) IV Continuous <Continuous>  dextrose 5%. 1000 milliLiter(s) (100 mL/Hr) IV Continuous <Continuous>  glucagon  Injectable 1 milliGRAM(s) IntraMuscular once  insulin regular  human corrective regimen sliding scale   SubCutaneous every 6 hours  pantoprazole   Suspension 40 milliGRAM(s) Oral before breakfast  sodium chloride 3%  Inhalation 4 milliLiter(s) Inhalation every 12 hours    MEDICATIONS  (PRN):  acetaminophen    Suspension .. 650 milliGRAM(s) Enteral Tube every 6 hours PRN Temp greater or equal to 38C (100.4F), Mild Pain (1 - 3)      Weight:  Ht: 152.4cm (60")  Wt: 56.8kg (3/03)-Admit     IBW: 100# (45.5kg)    % IBW: 125%    Weight Change: No new weight since admission. Obtain minimum biweekly weights to trend/assess changes    Estimated energy needs: 45.3kg  Fluids per MD. IBW used to estimate needs as pt weighs >100% of IBW and per critical care nutrition guidelines. Needs estimated for age and adjusted for vent  Calories: 1150-1359kcals (25-30kcal/kg)  Protein: 65-75g (1.4-1.6g/kg)  Fluid: Per MD    Subjective:  73 yo F with a PMHx of cerebral palsy with functional quadriplegia, DVT/PE on Eliquis, GERD, chronic dysphagia with PEG, thoracic spine fusion with a prior admission in  for aspiration PNA requiring intubation. Patient presented from her assisted living facility in AHRF likely 2/2 aspiration pneumonia which has grown pseudomonas sensitive to ciprofloxacin. Patient was intubated in the field prior to arrival for airway protection. While in the MICU, one unsuccessful attempt was made at extubation requiring reintubation attributed to pt's anxiety. Subsequently, pt was successfully extubated in MICU with a seroquel regimen. Pt was weaned to HFNC then 4L NC satting 94-95%. On 3/10, pt was stepped down to the floors with increasing frequency of suctioning. On 3/13, pt's oxygen requirement increased and 2L NC titrated up to 6L NC, however pt was found to be hypoxic to 70s. Pt was suctioned and removed significant amount of PEG tube feed products and mucus. However, no change in pulse ox was noted and rapid response was called. Pt was intubated, sedated on propofol and started on levophed for hypotension 70s/40s. En route, patient had episode of emesis with continued copious secretions, requiring frequent suctioning. In ICU, pt underwent bronchoscopy on 3/14 which showed no signs of purulent secretions or endobronchial lesions. 3/16 CXR with improved B/L infiltrates. Now s/p bronch w/ trach 3/24. S/p bronch 3/25. Pt febrile 3/25; started on zosyn. 3/26 precedex started and feeds decreased jevity 1.2 to 42cc/hr. 3/27; trial of trach collar, pt failed, placed on trach to CPAP 3/29: Weaned off precedex. s/p 1 week abx course with bactrim 300mg q8 (3/26 - 3/30), switched to ceftazidime 2g q12 until . CPAP trial on , continues to do well on CPAP.    Pt seen in room, trached, on CPAP. Temp 99. Pt able to shake/nod head. Continues to tolerate well on tube feeds. No gastric residuals. She denies n/v/d/c. Denies abdominal discomfort, or pain. No noted distention. Reviewed labs: POCT 113, 113, 104 mg/dl. RD to follow per protocol. Please see nutr recs below.    Previous Nutrition Diagnosis:  Increased nutrient needs R/T acute illness, hypermetabolic AEB 25-32kcals/kg energy and 1.4-1.6g/kg protein needs of ABW    Active [ x  ]  Resolved [   ]    If resolved, new PES:     Goal: Pt to meet >75% of EER via G-tube     Recommendations:  1. Continue current EN regimen: Jevity 1.2 @ 42mL/hr x 24 hours + LPS 1x/day (100 kcal, 15 g protein per serving). This provides: 1008 mL TV, 1310 kcal, 70.9 g pro, 813 mL free water. Meetin kcal/kg, 1.6 g/kg based on IBW 45.5 kg.   >>monitor s/s of intolerance; adjust as needed  >>maintain aspiration precautions at all times  2. Water flush per MD, minimum 30-60ml q4hrs for tube patency   3. Pain and bowel regimens per MD discretion  4. Monitor lytes and replete prn. POC BG q6hrs   5. Obtain minimum biweekly weights     Education: N/a    Risk Level: High [ x  ] Moderate [   ] Low [   ] Admitting Diagnosis:   Patient is a 76y old  Female who presents with a chief complaint of respiratory failure (16 Mar 2022 13:37)      PAST MEDICAL & SURGICAL HISTORY:  CP (cerebral palsy)    Mental retardation    Dysphagia    GERD (gastroesophageal reflux disease)    PE (pulmonary thromboembolism)    DVT (deep venous thrombosis)    Spastic quadriplegia    HTN (hypertension)    PEG tube malfunction        Current Nutrition Order:  NPO w/ EN via PEG: Jevity 1.2 @ 42mL/hr x 24 hours + LPS 1x/day (100 kcal, 15 g protein per serving). Provides: 1008 mL TV, 1310 kcal, 70.9 g pro, 813 mL free water. Meetin kcal/kg, 1.6 g/kg based on IBW 45.5 kg.     PO Intake: Good (%) [   ]  Fair (50-75%) [   ] Poor (<25%) [   ] -- N/A. On tube feeds    GI Issues: No reports on n/v. Last documented BM 3/31. Pt is fecal incontinent. No gastric residuals    Pain: Denies abdominal pain.     Skin Integrity: Roberto Carlos 12, +1 generalized edema     Labs:   No new labs. POCT 113, 113, 104 mg/dl      CAPILLARY BLOOD GLUCOSE      POCT Blood Glucose.: 110 mg/dL (2022 11:48)  POCT Blood Glucose.: 104 mg/dL (2022 05:16)  POCT Blood Glucose.: 113 mg/dL (2022 23:34)  POCT Blood Glucose.: 113 mg/dL (2022 17:15)      Medications:  MEDICATIONS  (STANDING):  albuterol/ipratropium for Nebulization 3 milliLiter(s) Nebulizer every 6 hours  apixaban 2.5 milliGRAM(s) Enteral Tube every 12 hours  ARIPiprazole 2 milliGRAM(s) Oral every 24 hours  chlorhexidine 0.12% Liquid 15 milliLiter(s) Oral Mucosa every 12 hours  chlorhexidine 2% Cloths 1 Application(s) Topical <User Schedule>  clonazePAM  Tablet 0.25 milliGRAM(s) Oral every 12 hours  dextrose 5%. 1000 milliLiter(s) (50 mL/Hr) IV Continuous <Continuous>  dextrose 5%. 1000 milliLiter(s) (100 mL/Hr) IV Continuous <Continuous>  glucagon  Injectable 1 milliGRAM(s) IntraMuscular once  insulin regular  human corrective regimen sliding scale   SubCutaneous every 6 hours  pantoprazole   Suspension 40 milliGRAM(s) Oral before breakfast  sodium chloride 3%  Inhalation 4 milliLiter(s) Inhalation every 12 hours    MEDICATIONS  (PRN):  acetaminophen    Suspension .. 650 milliGRAM(s) Enteral Tube every 6 hours PRN Temp greater or equal to 38C (100.4F), Mild Pain (1 - 3)      Weight:  Ht: 152.4cm (60")  Wt: 56.8kg (3/03)-Admit     IBW: 100# (45.5kg)    % IBW: 125%    Weight Change: No new weight since admission. Obtain minimum biweekly weights to trend/assess changes    Estimated energy needs: 45.3kg  Fluids per MD. IBW used to estimate needs as pt weighs >100% of IBW and per critical care nutrition guidelines.   Calories: 1150-1359kcals (25-30kcal/kg)  Protein: 65-75g (1.4-1.6g/kg)  Fluid: Per MD    Subjective:  73 yo F with a PMHx of cerebral palsy with functional quadriplegia, DVT/PE on Eliquis, GERD, chronic dysphagia with PEG, thoracic spine fusion with a prior admission in  for aspiration PNA requiring intubation. Patient presented from her assisted living facility in AHRF likely 2/2 aspiration pneumonia which has grown pseudomonas sensitive to ciprofloxacin. Patient was intubated in the field prior to arrival for airway protection. While in the MICU, one unsuccessful attempt was made at extubation requiring reintubation attributed to pt's anxiety. Subsequently, pt was successfully extubated in MICU with a seroquel regimen. Pt was weaned to HFNC then 4L NC satting 94-95%. On 3/10, pt was stepped down to the floors with increasing frequency of suctioning. On 3/13, pt's oxygen requirement increased and 2L NC titrated up to 6L NC, however pt was found to be hypoxic to 70s. Pt was suctioned and removed significant amount of PEG tube feed products and mucus. However, no change in pulse ox was noted and rapid response was called. Pt was intubated, sedated on propofol and started on levophed for hypotension 70s/40s. En route, patient had episode of emesis with continued copious secretions, requiring frequent suctioning. In ICU, pt underwent bronchoscopy on 3/14 which showed no signs of purulent secretions or endobronchial lesions. 3/16 CXR with improved B/L infiltrates. Now s/p bronch w/ trach 3/24. S/p bronch 3/25. Pt febrile 3/25; started on zosyn. 3/26 precedex started and feeds decreased jevity 1.2 to 42cc/hr. 3/27; trial of trach collar, pt failed, placed on trach to CPAP 3/29: Weaned off precedex. s/p 1 week abx course with bactrim 300mg q8 (3/26 - 3/30), switched to ceftazidime 2g q12 until . CPAP trial on , continues to do well on CPAP.    Pt seen in room, trached, on CPAP. Temp 99. Pt able to shake/nod head. Continues to tolerate well on tube feeds. No gastric residuals. She denies n/v/d/c. Denies abdominal discomfort, or pain. No noted distention. Reviewed labs: POCT 113, 113, 104 mg/dl. RD to follow per protocol. Please see nutr recs below.    Previous Nutrition Diagnosis:  Increased nutrient needs R/T acute illness, hypermetabolic AEB 25-32kcals/kg energy and 1.4-1.6g/kg protein needs of ABW    Active [ x  ]  Resolved [   ]    If resolved, new PES:     Goal: Pt to meet >75% of EER via G-tube     Recommendations:  1. Continue current EN regimen: Jevity 1.2 @ 42mL/hr x 24 hours + LPS 1x/day (100 kcal, 15 g protein per serving). This provides: 1008 mL TV, 1310 kcal, 70.9 g pro, 813 mL free water. Meetin kcal/kg, 1.6 g/kg based on IBW 45.5 kg.   >>monitor s/s of intolerance; adjust as needed  >>maintain aspiration precautions at all times  2. Water flush per MD, minimum 30-60ml q4hrs for tube patency   3. Pain and bowel regimens per MD discretion  4. Monitor lytes and replete prn. POC BG q6hrs   5. Obtain minimum biweekly weights     Education: N/a    Risk Level: High [ x  ] Moderate [   ] Low [   ]

## 2022-04-04 NOTE — PROGRESS NOTE ADULT - SUBJECTIVE AND OBJECTIVE BOX
NOAM GHOSH, 76y, Female  MRN-[id number]  Patient is a 76y old  Female who presents with a chief complaint of respiratory failure (16 Mar 2022 13:37)      OVERNIGHT EVENTS: naeo     SUBJECTIVE: Patient seen and examined at bedside. Communication difficult because of patient condition however pt shook head no when asked if in pain or distress. Does not appear to be in distress.      12 Point ROS Negative unless noted otherwise above.  -------------------------------------------------------------------------------  VITAL SIGNS:  Vital Signs Last 24 Hrs  T(C): 36.8 (04 Apr 2022 08:54), Max: 37.4 (03 Apr 2022 17:15)  T(F): 98.3 (04 Apr 2022 08:54), Max: 99.3 (03 Apr 2022 17:15)  HR: 94 (04 Apr 2022 08:34) (82 - 94)  BP: 116/64 (04 Apr 2022 08:34) (113/55 - 121/59)  BP(mean): 84 (04 Apr 2022 08:34) (75 - 85)  RR: 18 (04 Apr 2022 08:34) (14 - 30)  SpO2: 96% (04 Apr 2022 08:34) (90% - 97%)  I&O's Summary    03 Apr 2022 07:01  -  04 Apr 2022 07:00  --------------------------------------------------------  IN: 504 mL / OUT: 0 mL / NET: 504 mL    04 Apr 2022 07:01  -  04 Apr 2022 11:15  --------------------------------------------------------  IN: 168 mL / OUT: 0 mL / NET: 168 mL        PHYSICAL EXAM:    General: frail elderly woman in nad   HEENT: NC/AT; EOMI, PERRLA, anicteric sclera; dry mucosal membranes.  Neck: +trach in place; supple, trachea midline  Cardiovascular: RRR, +S1/S2; NO M/R/G  Respiratory: CTA B/L; no W/R/R  Gastrointestinal: soft, NT/ND; +BSx4  Extremities: WWP; +1 pitting edema in bilateral LE; +chronic deformity in b/l feet.  Vascular: 2+ radial, DP/PT pulses B/L  Neurological: Unable to assess AAO due to patient condition; cerebral palsy, difficulty with communication and making needs    ALLERGIES:  Allergies    ace inhibitors (Anaphylaxis)  caffeine (Rash)  cefepime (Rash)  chocolate (Rash)  high acid (Rash)  Tomatoes (Hives)    Intolerances        MEDICATIONS:  MEDICATIONS  (STANDING):  albuterol/ipratropium for Nebulization 3 milliLiter(s) Nebulizer every 6 hours  apixaban 2.5 milliGRAM(s) Enteral Tube every 12 hours  ARIPiprazole 2 milliGRAM(s) Oral every 24 hours  chlorhexidine 0.12% Liquid 15 milliLiter(s) Oral Mucosa every 12 hours  chlorhexidine 2% Cloths 1 Application(s) Topical <User Schedule>  clonazePAM  Tablet 0.25 milliGRAM(s) Oral every 12 hours  dextrose 5%. 1000 milliLiter(s) (50 mL/Hr) IV Continuous <Continuous>  dextrose 5%. 1000 milliLiter(s) (100 mL/Hr) IV Continuous <Continuous>  glucagon  Injectable 1 milliGRAM(s) IntraMuscular once  insulin regular  human corrective regimen sliding scale   SubCutaneous every 6 hours  pantoprazole   Suspension 40 milliGRAM(s) Oral before breakfast  sodium chloride 3%  Inhalation 4 milliLiter(s) Inhalation every 12 hours    MEDICATIONS  (PRN):  acetaminophen    Suspension .. 650 milliGRAM(s) Enteral Tube every 6 hours PRN Temp greater or equal to 38C (100.4F), Mild Pain (1 - 3)      -------------------------------------------------------------------------------  LABS:                CAPILLARY BLOOD GLUCOSE      POCT Blood Glucose.: 104 mg/dL (04 Apr 2022 05:16)      COVID-19 PCR: NotDetec (27 Mar 2022 09:26)  COVID-19 PCR: NotDetec (21 Mar 2022 06:26)  COVID-19 PCR: NotDetec (15 Mar 2022 05:33)  COVID-19 PCR: NotDetec (09 Mar 2022 13:39)  COVID-19 PCR: NotDetec (03 Mar 2022 02:50)      RADIOLOGY & ADDITIONAL TESTS: Reviewed.

## 2022-04-04 NOTE — PROGRESS NOTE ADULT - PROBLEM SELECTOR PLAN 1
#Acute respiratory failure with hypoxia 2/2 aspiration pneumonia of PEG feeds and copious oral secretions  Arrived intubated, extubated on 3/8. Completed ciprofloxacin course 3/5-3/12. Re-intubated on 3/13 after aspiration event and trached on 3/24. Trach c/b fever and patient is one week antibiotic course bactrim (3/26-3/30, dc'ed due to hyperkalemia) and switched to ceftazidime (3/31-4/2). Sputum cultures 3/25 show numerous proteus mirabilis, moderate Stenotrophomonas maltophilia  - s/p 1 week abx course with bactrim 300mg q8 (3/26 - 3/30), switched to ceftazidime 2g q12 until 4/2  - c/w Frequent suctioning - oral and tracheal, hypertonic saline followed by duonebs - standing, Chest PT q6h, aspiration precautions

## 2022-04-04 NOTE — PROGRESS NOTE ADULT - ATTENDING COMMENTS
chronic respiratory failure in setting of CP, h/o VTE  physical as above  CPAP 5 PS 8  continue anticoagulation with eliquis  tube feeds continue  telemetry monitoring with frequent reevaluation while on CPAP  complex decision making

## 2022-04-04 NOTE — PROGRESS NOTE ADULT - ATTENDING SUPERVISION STATEMENT
Resident
Resident/Fellow
Resident
Resident
Resident/Fellow
Resident/Fellow
Resident
Resident/Fellow
Resident
Resident/Fellow
Resident
Resident/Fellow
Resident

## 2022-04-04 NOTE — CHART NOTE - NSCHARTNOTESELECT_GEN_ALL_CORE
Event Note
Nutrition Services
Anti-infective approval note/Event Note
Anti-infective approval/Event Note
Anti-infective approval/Event Note
Event Note
Follow up/Nutrition Services
Follow-up/Nutrition Services
GI brief note
IO Removal/Event Note
Nutrition Follow Up/Nutrition Services
Nutrition Services
Rapid Response
anti-infective approval/Event Note

## 2022-04-04 NOTE — PROGRESS NOTE ADULT - PROBLEM SELECTOR PLAN 5
Patient with cerebral palsy. Previously a [place] resident, now resides in group home, with [facility] [facility] per palliative care. Patient with repeat admission for aspiration pneumonia with history of dysphagia and copious secretions from oropharynx on exam.  - palliative care and ethics consulted - Advisory board must make decisions for patient.  - Pending placement back to [facility]

## 2022-04-05 LAB
ALBUMIN SERPL ELPH-MCNC: 3.8 G/DL — SIGNIFICANT CHANGE UP (ref 3.3–5)
ALP SERPL-CCNC: 66 U/L — SIGNIFICANT CHANGE UP (ref 40–120)
ALT FLD-CCNC: 16 U/L — SIGNIFICANT CHANGE UP (ref 10–45)
ANION GAP SERPL CALC-SCNC: 10 MMOL/L — SIGNIFICANT CHANGE UP (ref 5–17)
AST SERPL-CCNC: 19 U/L — SIGNIFICANT CHANGE UP (ref 10–40)
BASOPHILS # BLD AUTO: 0.05 K/UL — SIGNIFICANT CHANGE UP (ref 0–0.2)
BASOPHILS NFR BLD AUTO: 0.8 % — SIGNIFICANT CHANGE UP (ref 0–2)
BILIRUB SERPL-MCNC: 0.3 MG/DL — SIGNIFICANT CHANGE UP (ref 0.2–1.2)
BUN SERPL-MCNC: 24 MG/DL — HIGH (ref 7–23)
CALCIUM SERPL-MCNC: 9.6 MG/DL — SIGNIFICANT CHANGE UP (ref 8.4–10.5)
CHLORIDE SERPL-SCNC: 101 MMOL/L — SIGNIFICANT CHANGE UP (ref 96–108)
CO2 SERPL-SCNC: 27 MMOL/L — SIGNIFICANT CHANGE UP (ref 22–31)
CREAT SERPL-MCNC: 0.42 MG/DL — LOW (ref 0.5–1.3)
EGFR: 103 ML/MIN/1.73M2 — SIGNIFICANT CHANGE UP
EOSINOPHIL # BLD AUTO: 0.26 K/UL — SIGNIFICANT CHANGE UP (ref 0–0.5)
EOSINOPHIL NFR BLD AUTO: 4 % — SIGNIFICANT CHANGE UP (ref 0–6)
GLUCOSE BLDC GLUCOMTR-MCNC: 106 MG/DL — HIGH (ref 70–99)
GLUCOSE BLDC GLUCOMTR-MCNC: 114 MG/DL — HIGH (ref 70–99)
GLUCOSE BLDC GLUCOMTR-MCNC: 115 MG/DL — HIGH (ref 70–99)
GLUCOSE BLDC GLUCOMTR-MCNC: 122 MG/DL — HIGH (ref 70–99)
GLUCOSE SERPL-MCNC: 122 MG/DL — HIGH (ref 70–99)
HCT VFR BLD CALC: 35.7 % — SIGNIFICANT CHANGE UP (ref 34.5–45)
HGB BLD-MCNC: 10.8 G/DL — LOW (ref 11.5–15.5)
IMM GRANULOCYTES NFR BLD AUTO: 0.8 % — SIGNIFICANT CHANGE UP (ref 0–1.5)
LYMPHOCYTES # BLD AUTO: 1.23 K/UL — SIGNIFICANT CHANGE UP (ref 1–3.3)
LYMPHOCYTES # BLD AUTO: 19 % — SIGNIFICANT CHANGE UP (ref 13–44)
MAGNESIUM SERPL-MCNC: 1.8 MG/DL — SIGNIFICANT CHANGE UP (ref 1.6–2.6)
MCHC RBC-ENTMCNC: 29.7 PG — SIGNIFICANT CHANGE UP (ref 27–34)
MCHC RBC-ENTMCNC: 30.3 GM/DL — LOW (ref 32–36)
MCV RBC AUTO: 98.1 FL — SIGNIFICANT CHANGE UP (ref 80–100)
MONOCYTES # BLD AUTO: 0.48 K/UL — SIGNIFICANT CHANGE UP (ref 0–0.9)
MONOCYTES NFR BLD AUTO: 7.4 % — SIGNIFICANT CHANGE UP (ref 2–14)
NEUTROPHILS # BLD AUTO: 4.42 K/UL — SIGNIFICANT CHANGE UP (ref 1.8–7.4)
NEUTROPHILS NFR BLD AUTO: 68 % — SIGNIFICANT CHANGE UP (ref 43–77)
NRBC # BLD: 0 /100 WBCS — SIGNIFICANT CHANGE UP (ref 0–0)
PHOSPHATE SERPL-MCNC: 3 MG/DL — SIGNIFICANT CHANGE UP (ref 2.5–4.5)
PLATELET # BLD AUTO: 338 K/UL — SIGNIFICANT CHANGE UP (ref 150–400)
POTASSIUM SERPL-MCNC: 4.1 MMOL/L — SIGNIFICANT CHANGE UP (ref 3.5–5.3)
POTASSIUM SERPL-SCNC: 4.1 MMOL/L — SIGNIFICANT CHANGE UP (ref 3.5–5.3)
PROT SERPL-MCNC: 7.3 G/DL — SIGNIFICANT CHANGE UP (ref 6–8.3)
RBC # BLD: 3.64 M/UL — LOW (ref 3.8–5.2)
RBC # FLD: 14 % — SIGNIFICANT CHANGE UP (ref 10.3–14.5)
SODIUM SERPL-SCNC: 138 MMOL/L — SIGNIFICANT CHANGE UP (ref 135–145)
WBC # BLD: 6.49 K/UL — SIGNIFICANT CHANGE UP (ref 3.8–10.5)
WBC # FLD AUTO: 6.49 K/UL — SIGNIFICANT CHANGE UP (ref 3.8–10.5)

## 2022-04-05 PROCEDURE — 99233 SBSQ HOSP IP/OBS HIGH 50: CPT | Mod: GC

## 2022-04-05 RX ORDER — CHLORHEXIDINE GLUCONATE 213 G/1000ML
15 SOLUTION TOPICAL EVERY 12 HOURS
Refills: 0 | Status: DISCONTINUED | OUTPATIENT
Start: 2022-04-05 | End: 2022-04-06

## 2022-04-05 RX ORDER — CHLORHEXIDINE GLUCONATE 213 G/1000ML
15 SOLUTION TOPICAL EVERY 12 HOURS
Refills: 0 | Status: DISCONTINUED | OUTPATIENT
Start: 2022-04-05 | End: 2022-04-05

## 2022-04-05 RX ADMIN — Medication 3 MILLILITER(S): at 14:14

## 2022-04-05 RX ADMIN — SODIUM CHLORIDE 4 MILLILITER(S): 9 INJECTION INTRAMUSCULAR; INTRAVENOUS; SUBCUTANEOUS at 06:35

## 2022-04-05 RX ADMIN — Medication 3 MILLILITER(S): at 04:27

## 2022-04-05 RX ADMIN — PANTOPRAZOLE SODIUM 40 MILLIGRAM(S): 20 TABLET, DELAYED RELEASE ORAL at 06:35

## 2022-04-05 RX ADMIN — CHLORHEXIDINE GLUCONATE 1 APPLICATION(S): 213 SOLUTION TOPICAL at 06:30

## 2022-04-05 RX ADMIN — SODIUM CHLORIDE 4 MILLILITER(S): 9 INJECTION INTRAMUSCULAR; INTRAVENOUS; SUBCUTANEOUS at 19:29

## 2022-04-05 RX ADMIN — Medication 0.25 MILLIGRAM(S): at 10:12

## 2022-04-05 RX ADMIN — APIXABAN 2.5 MILLIGRAM(S): 2.5 TABLET, FILM COATED ORAL at 17:17

## 2022-04-05 RX ADMIN — Medication 0.25 MILLIGRAM(S): at 22:34

## 2022-04-05 RX ADMIN — CHLORHEXIDINE GLUCONATE 15 MILLILITER(S): 213 SOLUTION TOPICAL at 17:17

## 2022-04-05 RX ADMIN — APIXABAN 2.5 MILLIGRAM(S): 2.5 TABLET, FILM COATED ORAL at 06:31

## 2022-04-05 RX ADMIN — CHLORHEXIDINE GLUCONATE 15 MILLILITER(S): 213 SOLUTION TOPICAL at 06:31

## 2022-04-05 RX ADMIN — Medication 3 MILLILITER(S): at 19:29

## 2022-04-05 RX ADMIN — CHLORHEXIDINE GLUCONATE 15 MILLILITER(S): 213 SOLUTION TOPICAL at 22:34

## 2022-04-05 RX ADMIN — ARIPIPRAZOLE 2 MILLIGRAM(S): 15 TABLET ORAL at 22:34

## 2022-04-05 NOTE — PROGRESS NOTE ADULT - PROBLEM SELECTOR PROBLEM 7
On medication for venous thromboembolism (VTE)
Prophylactic measure
Dysphagia
Prophylactic measure
Dysphagia
Prophylactic measure
Prophylactic measure
Dysphagia
Dysphagia
History of dysphagia
Hypomagnesemia
Hypomagnesemia
On medication for venous thromboembolism (VTE)
Dysphagia
Dysphagia
Prophylactic measure

## 2022-04-05 NOTE — PROGRESS NOTE ADULT - PROVIDER SPECIALTY LIST ADULT
Ethics
Internal Medicine
MICU
Infectious Disease
Internal Medicine
MICU
Internal Medicine
MICU
Internal Medicine
Palliative Care
Hospitalist
Hospitalist
Internal Medicine
Internal Medicine
Palliative Care
Palliative Care
MICU
MICU
Internal Medicine
Internal Medicine

## 2022-04-05 NOTE — PROGRESS NOTE ADULT - SUBJECTIVE AND OBJECTIVE BOX
Hospital Course: Patient is a 74 year old female with a PMH of cerebral palsy with functional quadriplegia, DVT/PE on Eliquis, GERD, chronic dysphagia with PEG, thoracic spine fusion, previously a [place] resident, now resides in [facility], with [facility] ([facility]). Patient presented from her assisted living facility in acute hypoxemic respiratory failure likely 2/2 aspiration pneumonia. Patient was intubated in the field for airway protection. Admitted to MICU intubated. Patient started on Meropenem due to previous hospitalization with Pseudomonas pneumonia requiring Meropenem. Antibiotic switched to Ciprofloxacin (3/5-3/12) due to improved sensitivity from sputum culture data. Catheter culture from 3/3 grew >100,000 E.coli, also treated with ciprofloxacin. Quintero was exchanged on admission. Failed extubation on 3/4 due to anxiety/agitation. Patient successfully extubated to HFNC on 3/8. Pt further weaned to 6L--> 4L with oxygen sat of 94-95%. Medical ethics consulted on 3/8 and deemed [facility] ([facility]) as the appropriate surrogate decision-maker for patient. Although some family is present, not involved in patients care. FOR CONSENT, 2-physician consent is required. Weaned to 3L and stepped down to RMF on 3/10.     On floors, patient had tenuous respiratory status given inability to clear secretions and fluctuating mental status, required frequent suctioning with fluctuating O2 requirements 2L-6L. On 3/13, rapid response was called for AHRF and unresponsiveness in setting of aspiration event. Patient was reintubated and stepped back up to MICU. In ICU, pt underwent bronchoscopy which showed no signs of purulent secretions or endobronchial lesions. Blood cultures from 3/13 negative. Trach approved by CAB and performed on 3/24. On 3/25, fever 101.4, started zosyn for empiric treatment of pseudomonal pna likely aspiration during trach procedure. Sputum from 3/25 grew proteus mirabilis and Stenotrophomonas maltophilia. Blood cultures from 3/25 negative. Switched from zosyn to aztreonam (due to penicillin allergy?). Then switched on bactrim from 3/26-3/30 bactrim when Proteus and Stenotrophomonas grew in sputum, D/c'ed bactrim due to hyperkalemia and switched to ceftazidime (3/31-4/2).       NOAM GHOSH, 76y, Female  MRN-[id number]  Patient is a 76y old  Female who presents with a chief complaint of respiratory failure (16 Mar 2022 13:37)      OVERNIGHT EVENTS: naeo     SUBJECTIVE: Patient seen and examined at bedside. Communication difficult because of patient condition however pt shook head no when asked if in pain or distress. Does not appear to be in distress.      12 Point ROS Negative unless noted otherwise above.  -------------------------------------------------------------------------------  VITAL SIGNS:  Vital Signs Last 24 Hrs  T(C): 37 (05 Apr 2022 09:12), Max: 37.2 (04 Apr 2022 14:54)  T(F): 98.6 (05 Apr 2022 09:12), Max: 99 (04 Apr 2022 14:54)  HR: 82 (05 Apr 2022 09:27) (64 - 94)  BP: 145/59 (05 Apr 2022 09:27) (108/57 - 145/59)  BP(mean): 85 (05 Apr 2022 09:27) (76 - 85)  RR: 19 (05 Apr 2022 09:27) (13 - 28)  SpO2: 95% (05 Apr 2022 09:27) (94% - 100%)  I&O's Summary    04 Apr 2022 07:01  -  05 Apr 2022 07:00  --------------------------------------------------------  IN: 1308 mL / OUT: 600 mL / NET: 708 mL        PHYSICAL EXAM:    General: frail elderly woman in nad   HEENT: NC/AT; EOMI, PERRLA, anicteric sclera; dry mucosal membranes.  Neck: +trach in place; supple, trachea midline  Cardiovascular: RRR, +S1/S2; NO M/R/G  Respiratory: CTA B/L; no W/R/R  Gastrointestinal: soft, NT/ND; +BSx4  Extremities: WWP; +1 pitting edema in bilateral LE; +chronic deformity in b/l feet.  Vascular: 2+ radial, DP/PT pulses B/L  Neurological: Unable to assess AAO due to patient condition; cerebral palsy, difficulty with communication and making needs    ALLERGIES:  Allergies    ace inhibitors (Anaphylaxis)  caffeine (Rash)  cefepime (Rash)  chocolate (Rash)  high acid (Rash)  Tomatoes (Hives)    Intolerances        MEDICATIONS:  MEDICATIONS  (STANDING):  albuterol/ipratropium for Nebulization 3 milliLiter(s) Nebulizer every 6 hours  apixaban 2.5 milliGRAM(s) Enteral Tube every 12 hours  ARIPiprazole 2 milliGRAM(s) Oral every 24 hours  chlorhexidine 0.12% Liquid 15 milliLiter(s) Oral Mucosa every 12 hours  chlorhexidine 2% Cloths 1 Application(s) Topical <User Schedule>  clonazePAM  Tablet 0.25 milliGRAM(s) Oral every 12 hours  dextrose 5%. 1000 milliLiter(s) (50 mL/Hr) IV Continuous <Continuous>  dextrose 5%. 1000 milliLiter(s) (100 mL/Hr) IV Continuous <Continuous>  glucagon  Injectable 1 milliGRAM(s) IntraMuscular once  insulin regular  human corrective regimen sliding scale   SubCutaneous every 6 hours  pantoprazole   Suspension 40 milliGRAM(s) Oral before breakfast  sodium chloride 3%  Inhalation 4 milliLiter(s) Inhalation every 12 hours    MEDICATIONS  (PRN):  acetaminophen    Suspension .. 650 milliGRAM(s) Enteral Tube every 6 hours PRN Temp greater or equal to 38C (100.4F), Mild Pain (1 - 3)      -------------------------------------------------------------------------------  LABS:                        10.8   6.49  )-----------( 338      ( 05 Apr 2022 06:42 )             35.7     04-05    138  |  101  |  24<H>  ----------------------------<  122<H>  4.1   |  27  |  0.42<L>    Ca    9.6      05 Apr 2022 06:42  Phos  3.0     04-05  Mg     1.8     04-05    TPro  7.3  /  Alb  3.8  /  TBili  0.3  /  DBili  x   /  AST  19  /  ALT  16  /  AlkPhos  66  04-05    LIVER FUNCTIONS - ( 05 Apr 2022 06:42 )  Alb: 3.8 g/dL / Pro: 7.3 g/dL / ALK PHOS: 66 U/L / ALT: 16 U/L / AST: 19 U/L / GGT: x               CAPILLARY BLOOD GLUCOSE      POCT Blood Glucose.: 122 mg/dL (05 Apr 2022 14:17)      COVID-19 PCR: NotDetec (27 Mar 2022 09:26)  COVID-19 PCR: NotDetec (21 Mar 2022 06:26)  COVID-19 PCR: NotDetec (15 Mar 2022 05:33)  COVID-19 PCR: NotDetec (09 Mar 2022 13:39)  COVID-19 PCR: NotDetec (03 Mar 2022 02:50)      RADIOLOGY & ADDITIONAL TESTS: Reviewed.

## 2022-04-05 NOTE — PROGRESS NOTE ADULT - PROBLEM SELECTOR PLAN 10
#Anemia, macrocytic  Hb stable 9 to 11  MCV   B12/folate WNL  - Maintain active T&S (Last 3/30, next 4/2)  - Transfuse if Hgb<7 (would require 2-physician consent)

## 2022-04-05 NOTE — PROGRESS NOTE ADULT - PROBLEM SELECTOR PROBLEM 1
Acute respiratory failure with hypoxia
Pseudomonal pneumonia
Acute respiratory failure with hypoxia
Pseudomonal pneumonia
Acute respiratory failure with hypoxia

## 2022-04-05 NOTE — PROGRESS NOTE ADULT - PROBLEM SELECTOR PROBLEM 8
Quintero catheter status
H/O cerebral palsy
Quintero catheter status
Goals of care, counseling/discussion
H/O cerebral palsy
Quintero catheter status
Quintero catheter status

## 2022-04-05 NOTE — PROGRESS NOTE ADULT - PROBLEM SELECTOR PLAN 5
Patient with cerebral palsy. Previously a [place] resident, now resides in group home, with [place] [facility] per palliative care. Patient with repeat admission for aspiration pneumonia with history of dysphagia and copious secretions from oropharynx on exam.  - palliative care and ethics consulted - Advisory board must make decisions for patient.  - Pending placement back to [facility]

## 2022-04-06 ENCOUNTER — TRANSCRIPTION ENCOUNTER (OUTPATIENT)
Age: 77
End: 2022-04-06

## 2022-04-06 VITALS
DIASTOLIC BLOOD PRESSURE: 67 MMHG | RESPIRATION RATE: 14 BRPM | OXYGEN SATURATION: 100 % | SYSTOLIC BLOOD PRESSURE: 141 MMHG | HEART RATE: 82 BPM

## 2022-04-06 LAB
GLUCOSE BLDC GLUCOMTR-MCNC: 112 MG/DL — HIGH (ref 70–99)
GLUCOSE BLDC GLUCOMTR-MCNC: 124 MG/DL — HIGH (ref 70–99)

## 2022-04-06 PROCEDURE — 89051 BODY FLUID CELL COUNT: CPT

## 2022-04-06 PROCEDURE — 80053 COMPREHEN METABOLIC PANEL: CPT

## 2022-04-06 PROCEDURE — 82553 CREATINE MB FRACTION: CPT

## 2022-04-06 PROCEDURE — 87899 AGENT NOS ASSAY W/OPTIC: CPT

## 2022-04-06 PROCEDURE — 85730 THROMBOPLASTIN TIME PARTIAL: CPT

## 2022-04-06 PROCEDURE — U0003: CPT

## 2022-04-06 PROCEDURE — 86900 BLOOD TYPING SEROLOGIC ABO: CPT

## 2022-04-06 PROCEDURE — 74019 RADEX ABDOMEN 2 VIEWS: CPT

## 2022-04-06 PROCEDURE — 82746 ASSAY OF FOLIC ACID SERUM: CPT

## 2022-04-06 PROCEDURE — 87640 STAPH A DNA AMP PROBE: CPT

## 2022-04-06 PROCEDURE — 84132 ASSAY OF SERUM POTASSIUM: CPT

## 2022-04-06 PROCEDURE — 83605 ASSAY OF LACTIC ACID: CPT

## 2022-04-06 PROCEDURE — 87186 SC STD MICRODIL/AGAR DIL: CPT

## 2022-04-06 PROCEDURE — 87641 MR-STAPH DNA AMP PROBE: CPT

## 2022-04-06 PROCEDURE — 94003 VENT MGMT INPAT SUBQ DAY: CPT

## 2022-04-06 PROCEDURE — 86850 RBC ANTIBODY SCREEN: CPT

## 2022-04-06 PROCEDURE — 84478 ASSAY OF TRIGLYCERIDES: CPT

## 2022-04-06 PROCEDURE — 82550 ASSAY OF CK (CPK): CPT

## 2022-04-06 PROCEDURE — 82962 GLUCOSE BLOOD TEST: CPT

## 2022-04-06 PROCEDURE — 87635 SARS-COV-2 COVID-19 AMP PRB: CPT

## 2022-04-06 PROCEDURE — 84295 ASSAY OF SERUM SODIUM: CPT

## 2022-04-06 PROCEDURE — 97164 PT RE-EVAL EST PLAN CARE: CPT

## 2022-04-06 PROCEDURE — 93005 ELECTROCARDIOGRAM TRACING: CPT

## 2022-04-06 PROCEDURE — 81001 URINALYSIS AUTO W/SCOPE: CPT

## 2022-04-06 PROCEDURE — 31500 INSERT EMERGENCY AIRWAY: CPT

## 2022-04-06 PROCEDURE — 94640 AIRWAY INHALATION TREATMENT: CPT

## 2022-04-06 PROCEDURE — 87040 BLOOD CULTURE FOR BACTERIA: CPT

## 2022-04-06 PROCEDURE — 86901 BLOOD TYPING SEROLOGIC RH(D): CPT

## 2022-04-06 PROCEDURE — 82607 VITAMIN B-12: CPT

## 2022-04-06 PROCEDURE — 87184 SC STD DISK METHOD PER PLATE: CPT

## 2022-04-06 PROCEDURE — 83735 ASSAY OF MAGNESIUM: CPT

## 2022-04-06 PROCEDURE — 97161 PT EVAL LOW COMPLEX 20 MIN: CPT

## 2022-04-06 PROCEDURE — 87086 URINE CULTURE/COLONY COUNT: CPT

## 2022-04-06 PROCEDURE — 36415 COLL VENOUS BLD VENIPUNCTURE: CPT

## 2022-04-06 PROCEDURE — 85610 PROTHROMBIN TIME: CPT

## 2022-04-06 PROCEDURE — 97110 THERAPEUTIC EXERCISES: CPT

## 2022-04-06 PROCEDURE — U0005: CPT

## 2022-04-06 PROCEDURE — 80048 BASIC METABOLIC PNL TOTAL CA: CPT

## 2022-04-06 PROCEDURE — 85027 COMPLETE CBC AUTOMATED: CPT

## 2022-04-06 PROCEDURE — 74018 RADEX ABDOMEN 1 VIEW: CPT

## 2022-04-06 PROCEDURE — 87077 CULTURE AEROBIC IDENTIFY: CPT

## 2022-04-06 PROCEDURE — 97530 THERAPEUTIC ACTIVITIES: CPT

## 2022-04-06 PROCEDURE — 82570 ASSAY OF URINE CREATININE: CPT

## 2022-04-06 PROCEDURE — 96374 THER/PROPH/DIAG INJ IV PUSH: CPT

## 2022-04-06 PROCEDURE — 94002 VENT MGMT INPAT INIT DAY: CPT

## 2022-04-06 PROCEDURE — 96375 TX/PRO/DX INJ NEW DRUG ADDON: CPT

## 2022-04-06 PROCEDURE — 84105 ASSAY OF URINE PHOSPHORUS: CPT

## 2022-04-06 PROCEDURE — 84100 ASSAY OF PHOSPHORUS: CPT

## 2022-04-06 PROCEDURE — 82803 BLOOD GASES ANY COMBINATION: CPT

## 2022-04-06 PROCEDURE — 85025 COMPLETE CBC W/AUTO DIFF WBC: CPT

## 2022-04-06 PROCEDURE — 99238 HOSP IP/OBS DSCHRG MGMT 30/<: CPT

## 2022-04-06 PROCEDURE — 99291 CRITICAL CARE FIRST HOUR: CPT | Mod: 25

## 2022-04-06 PROCEDURE — 71045 X-RAY EXAM CHEST 1 VIEW: CPT

## 2022-04-06 PROCEDURE — 82330 ASSAY OF CALCIUM: CPT

## 2022-04-06 PROCEDURE — 87181 SC STD AGAR DILUTION PER AGT: CPT

## 2022-04-06 PROCEDURE — 87449 NOS EACH ORGANISM AG IA: CPT

## 2022-04-06 PROCEDURE — 87070 CULTURE OTHR SPECIMN AEROBIC: CPT

## 2022-04-06 RX ORDER — CHLORHEXIDINE GLUCONATE 213 G/1000ML
15 SOLUTION TOPICAL
Qty: 0 | Refills: 0 | DISCHARGE
Start: 2022-04-06

## 2022-04-06 RX ORDER — CLONAZEPAM 1 MG
0.25 TABLET ORAL
Qty: 0 | Refills: 0 | DISCHARGE
Start: 2022-04-06

## 2022-04-06 RX ORDER — CLONAZEPAM 1 MG
0.25 TABLET ORAL EVERY 12 HOURS
Refills: 0 | Status: DISCONTINUED | OUTPATIENT
Start: 2022-04-06 | End: 2022-04-06

## 2022-04-06 RX ORDER — SODIUM CHLORIDE 9 MG/ML
4 INJECTION INTRAMUSCULAR; INTRAVENOUS; SUBCUTANEOUS
Qty: 0 | Refills: 0 | DISCHARGE
Start: 2022-04-06

## 2022-04-06 RX ADMIN — Medication 0.25 MILLIGRAM(S): at 11:22

## 2022-04-06 RX ADMIN — SODIUM CHLORIDE 4 MILLILITER(S): 9 INJECTION INTRAMUSCULAR; INTRAVENOUS; SUBCUTANEOUS at 05:48

## 2022-04-06 RX ADMIN — APIXABAN 2.5 MILLIGRAM(S): 2.5 TABLET, FILM COATED ORAL at 05:48

## 2022-04-06 RX ADMIN — Medication 3 MILLILITER(S): at 06:01

## 2022-04-06 RX ADMIN — PANTOPRAZOLE SODIUM 40 MILLIGRAM(S): 20 TABLET, DELAYED RELEASE ORAL at 05:48

## 2022-04-06 RX ADMIN — CHLORHEXIDINE GLUCONATE 15 MILLILITER(S): 213 SOLUTION TOPICAL at 05:48

## 2022-04-06 RX ADMIN — CHLORHEXIDINE GLUCONATE 1 APPLICATION(S): 213 SOLUTION TOPICAL at 05:47

## 2022-04-06 RX ADMIN — Medication 3 MILLILITER(S): at 01:08

## 2022-04-06 RX ADMIN — Medication 3 MILLILITER(S): at 14:30

## 2022-04-06 NOTE — DISCHARGE NOTE NURSING/CASE MANAGEMENT/SOCIAL WORK - NSDCPEFALRISK_GEN_ALL_CORE
For information on Fall & Injury Prevention, visit: [email or web address] OR  [email or web address] OR  https://www.cdc.gov/steadi/patient.html

## 2022-04-06 NOTE — DISCHARGE NOTE PROVIDER - INSTRUCTIONS
Tube feeding, Jevity 1.2 Doug - Total Volume for 24 hours 1008 ml   Total number of cans: 5   Continuos starting tube feed rate (ml/hr) - 42   Tube feeding duration (hours) - 24

## 2022-04-06 NOTE — DISCHARGE NOTE PROVIDER - NSDCMRMEDTOKEN_GEN_ALL_CORE_FT
acetaminophen 160 mg/5 mL oral suspension: 15 milliliter(s) by gastrostomy tube every 6 hours, As Needed  acetylcysteine 10% inhalation solution: 2 milliliter(s) inhaled 4 times a day, As Needed  albuterol 2.5 mg/3 mL (0.083%) inhalation solution: 3 milliliter(s) inhaled every 4 hours, As Needed  alendronate 70 mg/75 mL oral solution: 75 milliliter(s) orally once a week on Saturday in the morning  ammonium lactate 12% topical lotion: Apply topically to affected area 2 times a day to feet  apixaban 2.5 mg oral tablet: 1 tab(s) by gastrostomy tube 2 times a day  ARIPiprazole 2 mg oral tablet: 1 tab(s) by gastrostomy tube once a day in the evening  Ayr Saline Gel nasal swab: 1 application nasal 3 times a day  Bion Tears ophthalmic solution: 1 drop(s) in each eye 2 times a day  chlorthalidone 25 mg oral tablet: 1 tab(s) by gastrostomy tube once a day  clonazePAM: 0.25 milligram(s) by PEG tube every 12 hours  Desitin Rapid Relief 13% topical cream: Apply topically to affected area 2 times a day to buttocks  docusate sodium 100 mg oral tablet: 1 tab(s) by gastrostomy tube 2 times a day  ergocalciferol 200 mcg/mL (8000 intl units/mL) oral solution: 6.5 milliliter(s) by gastrostomy tube once a month (given 3/1/22)  famotidine 40 mg oral tablet: 1 tab(s) orally once a day (at bedtime)  polyethylene glycol 3350 oral powder for reconstitution: 17 gram(s) by gastrostomy tube once a day  simvastatin 20 mg oral tablet: 1 tab(s) by gastrostomy tube once a day (at bedtime)  sodium chloride 3% inhalation solution: 4 milliliter(s) inhaled every 12 hours   acetaminophen 160 mg/5 mL oral suspension: 15 milliliter(s) by gastrostomy tube every 6 hours, As Needed  acetylcysteine 10% inhalation solution: 2 milliliter(s) inhaled 4 times a day, As Needed  albuterol 2.5 mg/3 mL (0.083%) inhalation solution: 3 milliliter(s) inhaled every 4 hours, As Needed  alendronate 70 mg/75 mL oral solution: 75 milliliter(s) orally once a week on Saturday in the morning  ammonium lactate 12% topical lotion: Apply topically to affected area 2 times a day to feet  apixaban 2.5 mg oral tablet: 1 tab(s) by gastrostomy tube 2 times a day  ARIPiprazole 2 mg oral tablet: 1 tab(s) by gastrostomy tube once a day in the evening  Ayr Saline Gel nasal swab: 1 application nasal 3 times a day  Bion Tears ophthalmic solution: 1 drop(s) in each eye 2 times a day  chlorthalidone 25 mg oral tablet: 1 tab(s) by gastrostomy tube once a day  clonazePAM: 0.25 milligram(s) by PEG tube every 12 hours  Desitin Rapid Relief 13% topical cream: Apply topically to affected area 2 times a day to buttocks  docusate sodium 100 mg oral tablet: 1 tab(s) by gastrostomy tube 2 times a day  ergocalciferol 200 mcg/mL (8000 intl units/mL) oral solution: 6.5 milliliter(s) by gastrostomy tube once a month (given 3/1/22)  famotidine 40 mg oral tablet: 1 tab(s) orally once a day (at bedtime)  Paroex 0.12% mucous membrane liquid: 15 milliliter(s) mucous membrane every 12 hours  polyethylene glycol 3350 oral powder for reconstitution: 17 gram(s) by gastrostomy tube once a day  simvastatin 20 mg oral tablet: 1 tab(s) by gastrostomy tube once a day (at bedtime)  sodium chloride 3% inhalation solution: 4 milliliter(s) inhaled every 12 hours

## 2022-04-06 NOTE — DISCHARGE NOTE PROVIDER - HOSPITAL COURSE
#Discharge: do not delete  74 F with PMHx of CP with functional quadriplegia, DVT/PE on eliquis, GERD, chronic dysphagia with PEG, thoracic spine fusion, intubated en route to hospital and admitted to MICU for AHRF 2/2 pseudomonal PNA, treated with ciprofloxacin (3/5-3/12) c/b periods of agitation/anxiety. Extubation on 3/8 and stepped down to RMF, course c/b increasing suctioning and fluctuating oxygen requirement 2L-6L with spiration event on 3/13 requiring re-intubation and step up back to the MICU. Trached on 3/24 which was c/b fever for which patient is completing one week antibiotic course bactrim (3/26-3/30, dc'ed due to hyperkalemia) and ceftazidime (3/31-4/2). Patient was afebrile off  antibiotics, respiratory status has been stable since the trach placement.      Problem/Plan - 1: Acute respiratory failure with hypoxia.   Acute respiratory failure with hypoxia 2/2 aspiration pneumonia of PEG feeds and copious oral secretions  Arrived intubated, extubated on 3/8. Completed ciprofloxacin course 3/5-3/12. Re-intubated on 3/13 after aspiration event and trached on 3/24. Trach c/b fever and patient is one week antibiotic course bactrim (3/26-3/30, dc'ed due to hyperkalemia) and switched to ceftazidime (3/31-4/2). Sputum cultures 3/25 show numerous proteus mirabilis, moderate Stenotrophomonas maltophilia. S/p 1 week abx course with bactrim 300mg q8 (3/26 - 3/30), switched to ceftazidime 2g q12 until 4/2  - c/w Frequent suctioning - oral and tracheal, hypertonic saline followed by duonebs - standing, Chest PT q6h, aspiration precautions.     Problem/Plan - 2:Cerebral palsy.   Patient with cerebral palsy. Has functional quadriplegia, health aide at assisted living facility reports patient is currently at baseline.  - FOR CONSENT, 2-physician consent is required, although patient is AOx3.     Problem/Plan - 3: Anxiety.   Pt with ongoing restlessness, anxiety, agitation  - c/w home med Abilify 2mg qd  - c/w clonazepam 0.25 q12 (started 3/30 after good response to alprazolam on 3/29).     Problem/Plan - 4: Pulmonary embolism.   ·  Plan: History of PE 5 years ago. On Eliquis at home 2.5 mg BID at home. Bedside ultrasound performed in MICU with no evidence of clot.    - c/w Eliquis 2.5mg BID (switched from lovenox 3/31).     Problem/Plan - 5: Goals of care, counseling/discussion.    Patient with cerebral palsy. Previously a [place] resident, now resides in group home, with [facility] per palliative care. Patient with repeat admission for aspiration pneumonia with history of dysphagia and copious secretions from oropharynx on exam.  - palliative care and ethics consulted - Advisory board must make decisions for patient.  - Pending placement back to [facility].     Problem/Plan - 6:: GERD (gastroesophageal reflux disease).    On famotidine 20 mg qHS at home  - c/w pantoprazole 40 mg QD while here.     Problem/Plan - 7:Dysphagia.   PEG tube in place and functional, c/w tube feeds  -On regular ISS for tube feeds.     Problem/Plan - 8:Quintero catheter   Quintero removed 3/27  UA positive - no culture sent and no symptoms per pt.      Problem/Plan - 9: Fever.   Last 3/26 at 12pm   Possibly 2/2 to aspiration pna - suspected aspiration event 3/25 during/after trach placement  -UA positive on 3/26   -s/p bactrim 300mg q8 (3/26 - 3/30), switched to ceftazidime 2g q12 until 4/2.     Problem/Plan - 10:: Anemia.   #Anemia, macrocytic  Hb stable 9 to 11  MCV   B12/folate WNL  - Maintain active T&S (Last 3/30, next 4/2)  - Transfuse if Hgb<7 (would require 2-physician consent).    Patient was discharged to: [facility]   New medications: clonazepam 0.25 q12.,   Changes to old medications: None   Medications that were stopped: None   Items to Follow up as outpatient: c/w Frequent suctioning - oral and tracheal, hypertonic saline followed by duonebs - standing, Chest PT q6h, aspiration precautions.    Physical exam at time of discharge:   General: frail elderly woman in, nad, pleasant    HEENT: NC/AT; EOMI, PERRLA, anicteric sclera; dry mucosal membranes.  Neck: +trach in place; supple, trachea midline  Cardiovascular: RRR, +S1/S2; NO M/R/G  Respiratory: CTA B/L; no W/R/R  Gastrointestinal: soft, NT/ND; +BSx4  Extremities: WWP; +1 pitting edema in bilateral LE; +chronic deformity in b/l feet.  Vascular: 2+ radial, DP/PT pulses B/L  Neurological: Unable to assess AAO due to patient condition; cerebral palsy, difficulty with communication and making   #Discharge: do not delete  74 F with PMHx of CP with functional quadriplegia, DVT/PE on eliquis, GERD, chronic dysphagia with PEG, thoracic spine fusion, intubated en route to hospital and admitted to MICU for AHRF 2/2 pseudomonal PNA, treated with ciprofloxacin (3/5-3/12) c/b periods of agitation/anxiety. Extubation on 3/8 and stepped down to RMF, course c/b increasing suctioning and fluctuating oxygen requirement 2L-6L with spiration event on 3/13 requiring re-intubation and step up back to the MICU. Trached on 3/24 which was c/b fever for which patient is completing one week antibiotic course bactrim (3/26-3/30, dc'ed due to hyperkalemia) and ceftazidime (3/31-4/2). Patient was afebrile off  antibiotics, respiratory status has been stable since the trach placement.      Problem/Plan - 1: Acute respiratory failure with hypoxia.   Acute respiratory failure with hypoxia 2/2 aspiration pneumonia of PEG feeds and copious oral secretions  Arrived intubated, extubated on 3/8. Completed ciprofloxacin course 3/5-3/12. Re-intubated on 3/13 after aspiration event and trached on 3/24. Trach c/b fever and patient is one week antibiotic course bactrim (3/26-3/30, dc'ed due to hyperkalemia) and switched to ceftazidime (3/31-4/2). Sputum cultures 3/25 show numerous proteus mirabilis, moderate Stenotrophomonas maltophilia. S/p 1 week abx course with bactrim 300mg q8 (3/26 - 3/30), switched to ceftazidime 2g q12 until 4/2  - c/w Frequent suctioning - oral and tracheal, hypertonic saline followed by duonebs - standing, Chest PT q6h, aspiration precautions.     Problem/Plan - 2:Cerebral palsy.   Patient with cerebral palsy. Has functional quadriplegia, health aide at assisted living facility reports patient is currently at baseline.  - FOR CONSENT, 2-physician consent is required, although patient is AOx3.     Problem/Plan - 3: Anxiety.   Pt with ongoing restlessness, anxiety, agitation  - c/w home med Abilify 2mg qd  - c/w clonazepam 0.25 q12 (started 3/30 after good response to alprazolam on 3/29).     Problem/Plan - 4: Pulmonary embolism.   ·  Plan: History of PE 5 years ago. On Eliquis at home 2.5 mg BID at home. Bedside ultrasound performed in MICU with no evidence of clot.    - c/w Eliquis 2.5mg BID (switched from lovenox 3/31).     Problem/Plan - 5: Goals of care, counseling/discussion.    Patient with cerebral palsy. Previously a [place] resident, now resides in group home, with [facility] per palliative care. Patient with repeat admission for aspiration pneumonia with history of dysphagia and copious secretions from oropharynx on exam.  - palliative care and ethics consulted - Advisory board must make decisions for patient.  - Pending placement back to [facility].     Problem/Plan - 6:: GERD (gastroesophageal reflux disease).    On famotidine 20 mg qHS at home  - c/w pantoprazole 40 mg QD while here.     Problem/Plan - 7:Dysphagia.   PEG tube in place and functional, c/w tube feeds  -On regular ISS for tube feeds.     Problem/Plan - 8:Quintero catheter   Quintero removed 3/27  UA positive - no culture sent and no symptoms per pt.      Problem/Plan - 9: Fever.   Last 3/26 at 12pm   Possibly 2/2 to aspiration pna - suspected aspiration event 3/25 during/after trach placement  -UA positive on 3/26   -s/p bactrim 300mg q8 (3/26 - 3/30), switched to ceftazidime 2g q12 until 4/2.     Problem/Plan - 10:: Anemia.   #Anemia, macrocytic  Hb stable 9 to 11  MCV   B12/folate WNL  - Maintain active T&S (Last 3/30, next 4/2)  - Transfuse if Hgb<7 (would require 2-physician consent).    Patient was discharged to: [facility]   New medications: clonazepam 0.25 q12.,   Changes to old medications: None   Medications that were stopped: None   Items to Follow up as outpatient: c/w Frequent suctioning - oral and tracheal, hypertonic saline followed by duonebs - standing, Chest PT q6h, aspiration precautions.    Physical exam at time of discharge:   General: frail elderly woman in, nad, pleasant    HEENT: NC/AT; EOMI, PERRLA, anicteric sclera; dry mucosal membranes.  Neck: +trach in place; supple, trachea midline  Cardiovascular: RRR, +S1/S2; NO M/R/G  Respiratory: CTA B/L; no W/R/R  Gastrointestinal: soft, NT/ND; +BSx4  Extremities: WWP; +1 pitting edema in bilateral LE; +chronic deformity in b/l feet.  Vascular: 2+ radial, DP/PT pulses B/L  Neurological: Unable to assess AAO due to patient condition; cerebral palsy, difficulty with communication and making

## 2022-04-06 NOTE — DISCHARGE NOTE NURSING/CASE MANAGEMENT/SOCIAL WORK - PATIENT PORTAL LINK FT
You can access the FollowMyHealth Patient Portal offered by [facility] by registering at the following website: [email or web address]. By joining [facility]’s FollowMyHealth portal, you will also be able to view your health information using other applications (apps) compatible with our system.

## 2022-04-06 NOTE — DISCHARGE NOTE PROVIDER - NSDCCPCAREPLAN_GEN_ALL_CORE_FT
PRINCIPAL DISCHARGE DIAGNOSIS  Diagnosis: Acute respiratory failure with hypoxia  Assessment and Plan of Treatment: You were diagnosed with pneumonia, or an infection of the lungs during your admission to [facility]. This was noted based on your symptom profile and findings on chest X-ray. You were treated with antibiotics throughout your hospital course. Please follow-up with your primary care physician when you leave the hospital. Should you experience symptoms such as but not limited to: worsening shortness of breath, wheezing, severe cough, coughing bloody sputum, unrelenting/high fever (>103 F or lasting >3 days), and chest pain, please return to the emergency department for interval evaluation.  You were intubated during the hospitalization which resulted in placement of tracheostomy tube to help with the breathing.         SECONDARY DISCHARGE DIAGNOSES  Diagnosis: Hypercarbia  Assessment and Plan of Treatment:     Diagnosis: Pneumonia, aspiration  Assessment and Plan of Treatment:

## 2022-04-06 NOTE — DISCHARGE NOTE NURSING/CASE MANAGEMENT/SOCIAL WORK - NSDCVIVACCINE_GEN_ALL_CORE_FT
Patient walked to bed, he is independent with ambulating. Patient is able to put right prosthetic on and off himself. IV in patient Right arm is CDI. Orders were placed for  Medications, patient was allowed to eat and drink then begin NPO. Patient long acting insulin was held by physician due to patient not eating for majority of the day. Patient denies pain and discomfort at this time. Per physician patient ate sandwich meal from unit. Patient tolerated well. He will be NPO after meal with sips for medication.        Problem: Patient Centered Care  Goal: Patient preferences are identified and integrated in the patient's plan of care  Description: Interventions:  - What would you like us to know as we care for you?   - Provide timely, complete, and accurate information to patient/family  - Incorporate patient and family knowledge, values, beliefs, and cultural backgrounds into the planning and delivery of care  - Encourage patient/family to participate in care and decision-making at the level they choose  - Honor patient and family perspectives and choices  Outcome: Progressing     Problem: Patient/Family Goals  Goal: Patient/Family Long Term Goal  Description: Patient's Long Term Goal:     Interventions:  -   - See additional Care Plan goals for specific interventions  Outcome: Progressing  Goal: Patient/Family Short Term Goal  Description: Patient's Short Term Goal:     Interventions:   -   - See additional Care Plan goals for specific interventions  Outcome: Progressing No Vaccines Administered.

## 2022-04-11 NOTE — PHYSICAL EXAM
Pt viewed [facility] message below on 4/9/22 0901 [Normal Conjunctiva] : the conjunctiva exhibited no abnormalities [Eyelids - No Xanthelasma] : the eyelids demonstrated no xanthelasmas [Normal Oral Mucosa] : normal oral mucosa [No Oral Pallor] : no oral pallor [No Oral Cyanosis] : no oral cyanosis [Normal Jugular Venous A Waves Present] : normal jugular venous A waves present [Normal Jugular Venous V Waves Present] : normal jugular venous V waves present [No Jugular Venous Zheng A Waves] : no jugular venous zheng A waves [Respiration, Rhythm And Depth] : normal respiratory rhythm and effort [Exaggerated Use Of Accessory Muscles For Inspiration] : no accessory muscle use [Auscultation Breath Sounds / Voice Sounds] : lungs were clear to auscultation bilaterally [Heart Rate And Rhythm] : heart rate and rhythm were normal [Heart Sounds] : normal S1 and S2 [Murmurs] : no murmurs present [Arterial Pulses Normal] : the arterial pulses were normal [Veins - Varicosity Changes] : no varicosital changes were noted in the lower extremities [Abdomen Soft] : soft [Abdomen Tenderness] : non-tender [Abdomen Mass (___ Cm)] : no abdominal mass palpated [Nail Clubbing] : no clubbing of the fingernails [Cyanosis, Localized] : no localized cyanosis [Petechial Hemorrhages (___cm)] : no petechial hemorrhages [] : no rash [Skin Color & Pigmentation] : normal skin color and pigmentation [No Venous Stasis] : no venous stasis [Skin Lesions] : no skin lesions [No Skin Ulcers] : no skin ulcer [No Xanthoma] : no  xanthoma was observed [FreeTextEntry1] : wheelchair bound

## 2022-04-12 DIAGNOSIS — K94.23 GASTROSTOMY MALFUNCTION: ICD-10-CM
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
## 2022-04-12 DIAGNOSIS — Z79.01 LONG TERM (CURRENT) USE OF ANTICOAGULANTS: ICD-10-CM

## 2022-04-12 DIAGNOSIS — R53.2 FUNCTIONAL QUADRIPLEGIA: ICD-10-CM

## 2022-04-12 DIAGNOSIS — A41.9 SEPSIS, UNSPECIFIED ORGANISM: ICD-10-CM

## 2022-04-12 DIAGNOSIS — G80.8 OTHER CEREBRAL PALSY: ICD-10-CM

## 2022-04-12 DIAGNOSIS — B96.20 UNSPECIFIED ESCHERICHIA COLI [E. COLI] AS THE CAUSE OF DISEASES CLASSIFIED ELSEWHERE: ICD-10-CM

## 2022-04-12 DIAGNOSIS — R45.1 RESTLESSNESS AND AGITATION: ICD-10-CM

## 2022-04-12 DIAGNOSIS — I95.9 HYPOTENSION, UNSPECIFIED: ICD-10-CM

## 2022-04-12 DIAGNOSIS — T83.511A INFECTION AND INFLAMMATORY REACTION DUE TO INDWELLING URETHRAL CATHETER, INITIAL ENCOUNTER: ICD-10-CM

## 2022-04-12 DIAGNOSIS — E83.39 OTHER DISORDERS OF PHOSPHORUS METABOLISM: ICD-10-CM

## 2022-04-12 DIAGNOSIS — J96.01 ACUTE RESPIRATORY FAILURE WITH HYPOXIA: ICD-10-CM

## 2022-04-12 DIAGNOSIS — N39.0 URINARY TRACT INFECTION, SITE NOT SPECIFIED: ICD-10-CM

## 2022-04-12 DIAGNOSIS — E87.5 HYPERKALEMIA: ICD-10-CM

## 2022-04-12 DIAGNOSIS — J15.1 PNEUMONIA DUE TO PSEUDOMONAS: ICD-10-CM

## 2022-04-12 DIAGNOSIS — Z88.8 ALLERGY STATUS TO OTHER DRUGS, MEDICAMENTS AND BIOLOGICAL SUBSTANCES STATUS: ICD-10-CM
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
## 2022-04-12 DIAGNOSIS — J69.0 PNEUMONITIS DUE TO INHALATION OF FOOD AND VOMIT: ICD-10-CM

## 2022-04-12 DIAGNOSIS — K21.9 GASTRO-ESOPHAGEAL REFLUX DISEASE WITHOUT ESOPHAGITIS: ICD-10-CM

## 2022-04-12 DIAGNOSIS — R13.10 DYSPHAGIA, UNSPECIFIED: ICD-10-CM

## 2022-04-12 DIAGNOSIS — Z86.711 PERSONAL HISTORY OF PULMONARY EMBOLISM: ICD-10-CM

## 2022-04-12 DIAGNOSIS — D53.9 NUTRITIONAL ANEMIA, UNSPECIFIED: ICD-10-CM

## 2022-04-12 DIAGNOSIS — R65.21 SEVERE SEPSIS WITH SEPTIC SHOCK: ICD-10-CM

## 2022-06-22 NOTE — PROCEDURAL SAFETY CHECKLIST WITH OR WITHOUT SEDATION - NSTEAMNURSEFT_GEN_ALL_CORE
"    6/22/2022         RE: Aleja Gudino  [address]  [facility] [address]        Dear Colleague,    Thank you for referring your patient, Aleja Gudino, to the [facility] [place]. Please see a copy of my visit note below.    Recent issues:  Diabetes follow-up evaluation  Feeling pretty well, previous success with weight loss on Livea (formerly Medifast) diet plan, now on Livea \"simple\" maintenance phase  Additional wt loss, now below her goal wt 151-155#  Continues on the lower dose Ozempic 0.25 mg weekly            1993. Diagnosis of diabetes mellitus  ...Details of initial evaluation not available, body wt near 215#  Initial treatment with diet management  1997. Began metformin medication, but GI intolerance  Subsequent use of glimeparide, Tradjenta, basal insulin, Victoza, pioglitazone  2014. Began weight loss plan  5/2019. Discontinued pioglitazone, per plan  11/1/21. Stopped Victoza and changed to Ozempic     Current DM meds:  Ozempic 0.25 mg  Subcutaneous weekly     Using Accucheck BG meter              Tests 1-2x/day   Trends  mg/dl, overall avg 128 mg/dl    Previous [facility] labs include:          Recent [facility] labs include:  Lab Results   Component Value Date    A1C 6.3 (H) 06/15/2022     (L) 06/15/2022    POTASSIUM 4.3 06/15/2022    CHLORIDE 98 06/15/2022    CO2 28 06/15/2022    ANIONGAP 6 06/15/2022     (H) 06/15/2022    BUN 22 06/15/2022    CR 0.87 06/15/2022    GFRESTIMATED 70 06/15/2022    GFRESTBLACK >90 08/28/2019    HUANG 9.1 06/15/2022    CHOL 154 11/26/2021    TRIG 81 11/26/2021    HDL 60 11/26/2021    LDL 78 11/26/2021    NHDL 94 11/26/2021    UCRR 38 11/26/2021    MICROL 7 11/26/2021    UMALCR 18.42 11/26/2021     Lab Results   Component Value Date    TSH 1.85 11/26/2021     Last eye exam with Dr. Matute 8/2021, no DR  DM Complications: none known        Lives in [facility]  Sees Dr. Tanmay Arriola/[facility] for general " medicine evaluations.  Also sees Dr. Bush[facility]       PMH/PSH:  Past Medical History:   Diagnosis Date     Frontal fibrosing alopecia      Glaucoma      Hair loss      Hypertension      Sleep apnea     c-pap     Squamous cell carcinoma      Thyroid nodule      Type 2 diabetes mellitus (H)      Weight loss      Past Surgical History:   Procedure Laterality Date     BIOPSY OF SKIN LESION       CARPAL TUNNEL RELEASE RT/LT        SECTION       CHOLECYSTECTOMY       COLONOSCOPY N/A 3/19/2015    Procedure: COLONOSCOPY;  Surgeon: Ck Childress MD;  Location: [facility]     Excision oral melanotic macule  2013    lower right lip     HC LAP,FULGURATE/EXCISE LESIONS      tumors removed and colon resection     KNEE SURGERY      arthroscopic- left knee     NO HISTORY OF SURGERY  13    derm     ORTHOPEDIC SURGERY      right and left knee replacements     RELEASE TRIGGER FINGER       RESECTION ABDOMINAL PERINEAL         Family Hx:  Family History   Problem Relation Age of Onset     Skin Cancer Father      Cancer No family hx of         No family history of skin cancer     Melanoma No family hx of          Social Hx:  Social History     Socioeconomic History     Marital status:      Spouse name: Not on file     Number of children: Not on file     Years of education: Not on file     Highest education level: Not on file   Occupational History     Not on file   Tobacco Use     Smoking status: Never Smoker     Smokeless tobacco: Never Used   Substance and Sexual Activity     Alcohol use: No     Alcohol/week: 0.0 standard drinks     Drug use: No     Sexual activity: Not on file   Other Topics Concern     Parent/sibling w/ CABG, MI or angioplasty before 65F 55M? Not Asked   Social History Narrative     Not on file     Social Determinants of Health     Financial Resource Strain: Not on file   Food Insecurity: Not on file   Transportation Needs: Not on file   Physical Activity: Not on file   Stress:  Not on file   Social Connections: Not on file   Intimate Partner Violence: Not on file   Housing Stability: Not on file          MEDICATIONS:  has a current medication list which includes the following prescription(s): aspirin, blood glucose, blood glucose monitoring, vitamin d3, diclofenac sodium, famotidine, fluticasone, latanoprost, losartan-hydrochlorothiazide, NONFORMULARY, NONFORMULARY, omeprazole, ozempic (0.25 or 0.5 mg/dose), piroxicam, and bd desire u/f.       ROS: 10 point ROS neg other than the symptoms noted above in the HPI.     GENERAL: mild fatigue, weight loss as noted; denies fevers, chills, night sweats.   HEENT: no dysphagia, odonophagia, diplopia, neck pain  THYROID:  no apparent hyper or hypothyroid symptoms  CV: no chest pain, pressure, palpitations  LUNGS: no SOB, SIDHU, cough, wheezing   ABDOMEN: no diarrhea, constipation, abdominal pain  EXTREMITIES: cold extremities; no rashes, ulcers, edema  NEUROLOGY: no headaches, denies changes in vision, tingling, extremitiy numbness   MSK: less back pains, some arthralgias of knees; no muscle weakness  SKIN: scalp hair thinning; no rashes or lesions  :  no menses  PSYCH:  stable mood, no significant anxiety or depression  ENDOCRINE: no heat or cold intolerance     Physical Exam   VS: /83   Pulse 82   Wt 67.4 kg (148 lb 11.2 oz)   SpO2 99%   BMI 25.72 kg/m    GENERAL: AXOX3, NAD, well dressed, answering questions appropriately, appears stated age.  ENT: no nose swelling or nasal discharge, mouth redness or gum changes.  EYES: eyes grossly normal to inspection, conjunctivae and sclerae normal, no exophthalmos or proptosis  THYROID:  symmetric, nontender, no goiter or thyroid nodules on exam  LUNGS: no audible wheeze, cough or visible cyanosis, no visible retractions or increased work of breathing  ABDOMEN: abdomen normal size  EXTREMITIES: normal feet exam, no edema, no lesions  NEUROLOGY: CN grossly intact, no tremors  MSK: grossly  intact  SKIN:  wearing wig; no apparent skin lesions, rash, or edema with visualized skin appearance  PSYCH: mentation appears normal, affect normal/bright, judgement and insight intact,   normal speech and appearance well groomed    LABS:     All pertinent notes, labs, and images personally reviewed by me.     A/P:  Encounter Diagnosis   Name Primary?     Type 2 diabetes mellitus with microalbuminuria, without long-term current use of insulin (H) Yes       Comments:  Reviewed health history and diabetes issues.  Recent glycemic control very good, tolerating Ozempic med well  Reviewed and interpreted tests that I previously ordered.   Ordered appropriate tests for the endocrinology disease management.    Management options discussed and implemented after shared medical decision making with the patient.  T2DM problem is chronic-stable    Plan:  Discussed general issues with the diabetes diagnosis and management  We discussed the hgbA1c test which reflects previous overall glucose levels or control  Discussed the importance of blood glucose (BG) testing to assess glucose trends  Provided general overview of the diabetes medication options and medication treatment plan.     Recommend:  Reviewed diabetes medication treatment options  She prefers to continue the low dose Ozempic plan vs discontinuing T2DM med treatment  Continue the current Ozempic 0.25 mg subcutaneous weekly  Check FBG daily or every other day, goal target  mg/dl  Monitor for symptom changes  Plan repeat lab tests in 12/2022   Discussed nearby [facility] clinic   Lab orders placed  Keep focus on diet, exercise, weight management.  Advise having fasting lipid panel testing and dilated eye examination, at least annually     Agree with her plan for the f/u knee orthopedic eval at [facility]  Addressed patient questions today     There are no Patient Instructions on file for this visit.    Future labs ordered today:   Orders Placed This Encounter    Procedures     Hemoglobin A1c     Basic metabolic panel     TSH     Lipid panel reflex to direct LDL Fasting     Albumin Random Urine Quantitative with Creat Ratio     Radiology/Consults ordered today: None    Total time spent in with the patient evaluation:  12 min  Additional time spent reviewing pertinent lab tests and chart notes, and documentation:  9 min    Follow-up:  12/2022, Return    CHANCE Thorne MD, MS  Endocrinology  [facility]                             Again, thank you for allowing me to participate in the care of your patient.        Sincerely,        Hector Thorne MD     Renetta Beyer

## 2022-06-27 NOTE — ED ADULT NURSE NOTE - NSINTERVENTIONOPT_GEN_ALL_ED
Results for orders placed or performed in visit on 06/24/22   Vitamin D   Result Value Ref Range    Vit D, 25-Hydroxy 48 30 - 96 ng/mL     Ok to start actonel  
Chair Alarms/Hourly Rounding/Enhanced Supervision/Move Toilet (commode/urinal) to patient using "arms reach"

## 2023-01-25 NOTE — BRIEF OPERATIVE NOTE - OPERATION/FINDINGS
Result Review:    Patient has called to get the results from most recent: labwork.    The results are in the chart but have not been reviewed by you. Please advise.       Relevant Lab or Imaging       Preprocedure: Universal protocol was followed for this procedure. Prior to the initiation of sedation or the procedure, a timeout was performed.  The area was cleaned with a CHG scrub and draped with large sterile barrier.. The patient was covered by a large sterile drape. Sterile technique was maintained for the entire procedure.    Procedure: The patient was placed in the supine position. The anterior neck was prepped and draped in usual sterile fashion. 1% lidocaine was administered approximately 2 fingerbreadths above the sternal not for local anesthesia. A 1.5-cm vertical incision was then performed. Using a curved Vy, blunt dissection was performed down to the level of the pretracheal fascia. At this point, the bronchoscope was introduced through the endotracheal tube and the trachea was properly visualized. The endotracheal tube was then gradually withdrawn within the trachea under direct bronchoscopic visualization. Proper midline position was confirmed by bouncing the needle from the tracheostomy tray over the trachea with bronchoscopic examination. The needle was advanced into the trachea and proper positioning was confirmed with direct visualization. The wire from the tracheostomy tray was then advanced through the white outer cannula. The cannula was then removed. The small, blue dilator was then advanced over the wire into the trachea. Once proper dilatation was achieved, the dilator was removed. The large, tapered dilator was then advanced over the wire into the trachea. The dilator was removed leaving the wire and white inner cannula in position. A number 6 percutaneous Shiley tracheostomy tube was then advanced over the wire and white inner cannula into the trachea. Proper positioning was confirmed with bronchoscopic visualization. The tracheostomy tube was then sutured in place with two nylon sutures. It was further secured with a tracheostomy tie

## 2023-03-28 NOTE — ED PROVIDER NOTE - NSCAREINITIATED _GEN_ER
Please call result - stable CT chest - patient needs to put on calendar to repeat in 1 year.    Electronically signed by RUEL Lyman, 03/28/23, 11:29 AM CDT.   Jason Blanchard(Attending)

## 2023-07-26 NOTE — ED ADULT NURSE NOTE - PAIN: PRESENCE, MLM
denies pain/discomfort Erythromycin Pregnancy And Lactation Text: This medication is Pregnancy Category B and is considered safe during pregnancy. It is also excreted in breast milk.

## 2023-09-11 NOTE — PROCEDURE NOTE - NSSITEPREP_SKIN_A_CORE
Quality 226: Preventive Care And Screening: Tobacco Use: Screening And Cessation Intervention: Patient screened for tobacco use and is an ex/non-smoker Detail Level: Detailed Quality 431: Preventive Care And Screening: Unhealthy Alcohol Use - Screening: Patient not identified as an unhealthy alcohol user when screened for unhealthy alcohol use using a systematic screening method Quality 130: Documentation Of Current Medications In The Medical Record: Current Medications Documented chlorhexidine

## 2024-02-09 NOTE — ED PROVIDER NOTE - NSTIMEPROVIDERCAREINITIATE_GEN_ER
Anesthesia Post-op Note    Patient: Mars Moseley  Procedure(s) Performed: COLONOSCOPY, WITH POLYPECTOMY  Anesthesia type: MAC    Vitals Value Taken Time   Temp 36.3 °C (97.4 °F) 02/09/24 1126   Pulse 85 02/09/24 1126   Resp 18 02/09/24 1126   SpO2 97 % 02/09/24 1126   /93 02/09/24 1126         Patient Location: PACU Phase 1  Post-op Vital Signs:stable  Level of Consciousness: awake and alert  Respiratory Status: spontaneous ventilation  Cardiovascular stable  Hydration: euvolemic  Pain Management: adequately controlled  Handoff: Handoff to receiving clinician was performed and questions were answered  Vomiting: none  Nausea: None  Airway Patency:patent  Post-op Assessment: no complications and patient tolerated procedure well      There were no known notable events for this encounter.                      
10-Oct-2019 21:56

## 2024-03-27 NOTE — PROGRESS NOTE ADULT - PROBLEM SELECTOR PROBLEM 11
Occupational Therapy Visit    Visit Type: Daily Treatment Note  Visit: 6  Referring Provider: Brittany Torres MD  Medical Diagnosis (from order): G81.14 - Left spastic hemiparesis (CMD)     SUBJECTIVE                                                                                                               Patient reports that her hand and wrist have been feeling stiff lately.       OBJECTIVE                                                                                                                                     Treatment     Manual Therapy   Sitting:  Passive Range of Motion of shoulder, elbow, forearm, wrist and hand.         Neuromuscular Re-Education  Sitting:  Reciprocal Pulley Exercises  Supine:  Bilateral Shoulder Flexion with a Cane  Sitting:  Bilateral Kevin 2 Sets of 10  Bilateral Elbow Flexion/Extension with a Cane  Elbow Active Range of Motion - Palm Up to Chin, Palm Down at Knee  Pronation/Supination Active Range of Motion  Bilateral Rolling Pin 2 Sets of 10  Upper Body Ergometer Level 1 1 minute forwards, 1 minute backwards           ASSESSMENT                                                                                                          Education:   - Results of above outlined education: Verbalizes understanding and Demonstrates understanding    PLAN                                                                                                                           Suggestions for next session as indicated: Progress per plan of care     Therapy procedure time and total treatment time can be found documented on the Time Entry flowsheet  
Prophylactic measure

## 2024-08-23 NOTE — ED ADULT NURSE NOTE - CAS EDP DISCH DISPOSITION ADMI
Assumed patient care. BSSR received from previous Nurse. Seen patient sitting on the chair awake, complaining of generalized pain, and discomfort on her left and right flanks. Daughter was in the room.  Safety measures ensured and call light in reach.   Plan of care ongoing.   0522-01/ICU

## 2024-11-19 NOTE — ED PROVIDER NOTE - GASTROINTESTINAL, MLM
Physical Therapy Daily Note  [address] [place] [address]    VISIT#: 9    Subjective   Rocío José reports 4/10 pain in low back today.       Objective     See Exercise, Manual, and Modality Logs for complete treatment.     Assessment/Plan    Continued with manual therapy today and patient tolerated well, but did report increase in discomfort at lower lumbar and lower lumbar paraspinals. Pt reports continued tightness with return from prone to standing. Discussed with patient to continue with exercises at home.     Progress per Plan of Care and Progress strengthening /stabilization /functional activity            Timed:                 Manual Therapy:    30     mins  92959;     Therapeutic Exercise:    0     mins  97798;     Neuromuscular Magdalene:    0    mins  28797;    Therapeutic Activity:     0     mins  42423;     Gait Trainin     mins  69587;     Ultrasound:     0     mins  39368;    Ionto                               0    mins   13716  Self pay                         0     mins [id number]    Un-Timed:  Electrical Stimulation:    0     mins  90326 ( )  Canalith Repos    0     mins 53540  Dry Needling     0     mins self-pay  Traction     0     mins 62901    Timed Treatment:   30   mins   Total Treatment:     30   mins    Celio Dye PT  Physical Therapist    PT SIGNATURE: Electronically signed by Celio Dye PT  [place] LICENSE: [id number]  
Abdomen is soft, non-tender, and non-distended. G tube in place, secured with sutures, no surrounding erythema or discharge. Distal end of G tube plastic is cracked and tube is leaking.

## 2025-01-06 NOTE — ED ADULT NURSE NOTE - NSFALLRSKOUTCOME_ED_ALL_ED
Spoke to son regarding. He said she has tried melatonin without success. He is asking if Dr. Lam can just fill her lorazepam in the meantime to help her get through the day. Rx pended. Did confirm appointment on 1/23 is 30 minutes.    Fall with Harm Risk

## 2025-03-15 NOTE — ED PROVIDER NOTE - NSRECPHYSICIAN_ED_A_ED_FT
Patient resting on stretcher, NAD  RR even and non-labored  Bed locked and in lowest position  Call light within reach  No needs expressed at this time  Parents at bedside    Dr Solomon

## 2025-04-17 NOTE — PROGRESS NOTE ADULT - PROBLEM SELECTOR PLAN 6
Patient would like communication of their results via:    [facility]    Cell Phone:   Telephone Information:   Mobile [phone]     Okay to leave a message containing results? Yes  There are no preventive care reminders to display for this patient.     On famotidine 20 mg qHS at home  - c/w pantoprazole 40 mg QD while here

## 2025-05-12 NOTE — PROCEDURE NOTE - NSPROCNAME_GEN_A_CORE
Bronchoscopy
Arterial Puncture/Cannulation
Peripheral Line Insertion
Peripheral Line Insertion
Bronchoscopy
Intraosseous Infusion
Peripheral Line Insertion
Arterial Puncture/Cannulation
Arterial Puncture/Cannulation
Gastric Intubation/Gastric Lavage
Peripheral Line Insertion
impaired balance/pain

## 2025-07-08 NOTE — PROGRESS NOTE ADULT - PROBLEM SELECTOR PLAN 3
Documentation of the ultrasound findings, images, and interpretations will be available in the patient's Viewpoint report which is located in the imaging tab in chart review.      - c/w pantoprazole 40mg QD - History of PE 5 years ago. On Eliquis at home  - Bedside ultrasound with no evidence of clot  - switch heparin to Lovenox 50mg Q12